# Patient Record
Sex: MALE | Race: WHITE | NOT HISPANIC OR LATINO | Employment: OTHER | URBAN - METROPOLITAN AREA
[De-identification: names, ages, dates, MRNs, and addresses within clinical notes are randomized per-mention and may not be internally consistent; named-entity substitution may affect disease eponyms.]

---

## 2017-01-16 ENCOUNTER — OFFICE VISIT (OUTPATIENT)
Dept: INTERNAL MEDICINE | Facility: CLINIC | Age: 82
End: 2017-01-16
Payer: MEDICARE

## 2017-01-16 ENCOUNTER — HOSPITAL ENCOUNTER (OUTPATIENT)
Dept: RADIOLOGY | Facility: HOSPITAL | Age: 82
Discharge: HOME | End: 2017-01-16
Attending: FAMILY MEDICINE
Payer: MEDICARE

## 2017-01-16 VITALS
SYSTOLIC BLOOD PRESSURE: 124 MMHG | BODY MASS INDEX: 23.9 KG/M2 | OXYGEN SATURATION: 96 % | HEIGHT: 69 IN | WEIGHT: 161.38 LBS | TEMPERATURE: 98 F | DIASTOLIC BLOOD PRESSURE: 67 MMHG | HEART RATE: 60 BPM | RESPIRATION RATE: 17 BRPM

## 2017-01-16 DIAGNOSIS — E78.2 MIXED HYPERLIPIDEMIA: ICD-10-CM

## 2017-01-16 DIAGNOSIS — K21.9 GASTROESOPHAGEAL REFLUX DISEASE, ESOPHAGITIS PRESENCE NOT SPECIFIED: ICD-10-CM

## 2017-01-16 DIAGNOSIS — Z01.818 PREOP GENERAL PHYSICAL EXAM: Primary | ICD-10-CM

## 2017-01-16 DIAGNOSIS — Z01.818 PREOP GENERAL PHYSICAL EXAM: ICD-10-CM

## 2017-01-16 DIAGNOSIS — N40.0 BENIGN PROSTATIC HYPERPLASIA, PRESENCE OF LOWER URINARY TRACT SYMPTOMS UNSPECIFIED, UNSPECIFIED MORPHOLOGY: ICD-10-CM

## 2017-01-16 DIAGNOSIS — D32.9 MENINGIOMA: ICD-10-CM

## 2017-01-16 LAB
BILIRUB UR QL STRIP: NEGATIVE
CLARITY UR REFRACT.AUTO: CLEAR
COLOR UR AUTO: YELLOW
GLUCOSE UR QL STRIP: NEGATIVE
HGB UR QL STRIP: NEGATIVE
KETONES UR QL STRIP: NEGATIVE
LEUKOCYTE ESTERASE UR QL STRIP: NEGATIVE
NITRITE UR QL STRIP: NEGATIVE
PH UR STRIP: 7 [PH] (ref 5–8)
PROT UR QL STRIP: NEGATIVE
SP GR UR STRIP: 1 (ref 1–1.03)
URN SPEC COLLECT METH UR: NORMAL
UROBILINOGEN UR STRIP-ACNC: NEGATIVE EU/DL

## 2017-01-16 PROCEDURE — 99214 OFFICE O/P EST MOD 30 MIN: CPT | Mod: S$GLB,,, | Performed by: FAMILY MEDICINE

## 2017-01-16 PROCEDURE — 71020 XR CHEST PA AND LATERAL: CPT | Mod: 26,,, | Performed by: RADIOLOGY

## 2017-01-16 PROCEDURE — 99499 UNLISTED E&M SERVICE: CPT | Mod: S$GLB,,, | Performed by: FAMILY MEDICINE

## 2017-01-16 PROCEDURE — 1157F ADVNC CARE PLAN IN RCRD: CPT | Mod: S$GLB,,, | Performed by: FAMILY MEDICINE

## 2017-01-16 PROCEDURE — 3074F SYST BP LT 130 MM HG: CPT | Mod: S$GLB,,, | Performed by: FAMILY MEDICINE

## 2017-01-16 PROCEDURE — 1160F RVW MEDS BY RX/DR IN RCRD: CPT | Mod: S$GLB,,, | Performed by: FAMILY MEDICINE

## 2017-01-16 PROCEDURE — 99999 PR PBB SHADOW E&M-EST. PATIENT-LVL III: CPT | Mod: PBBFAC,,, | Performed by: FAMILY MEDICINE

## 2017-01-16 PROCEDURE — 93010 ELECTROCARDIOGRAM REPORT: CPT | Mod: S$GLB,,, | Performed by: NUCLEAR MEDICINE

## 2017-01-16 PROCEDURE — 1159F MED LIST DOCD IN RCRD: CPT | Mod: S$GLB,,, | Performed by: FAMILY MEDICINE

## 2017-01-16 PROCEDURE — 1125F AMNT PAIN NOTED PAIN PRSNT: CPT | Mod: S$GLB,,, | Performed by: FAMILY MEDICINE

## 2017-01-16 PROCEDURE — 71020 XR CHEST PA AND LATERAL: CPT | Mod: TC,PO

## 2017-01-16 PROCEDURE — 93005 ELECTROCARDIOGRAM TRACING: CPT | Mod: S$GLB,,, | Performed by: FAMILY MEDICINE

## 2017-01-16 PROCEDURE — 3078F DIAST BP <80 MM HG: CPT | Mod: S$GLB,,, | Performed by: FAMILY MEDICINE

## 2017-01-16 NOTE — PROGRESS NOTES
Chief Complaint:    Chief Complaint   Patient presents with    Pre-op Exam       History of Present Illness:  Patient is a for preop evaluation for surgery for lumbar spinal stenosis surgery will be performed at the [facility].  Patient denies any chest pain or shortness of breath.  He has no known cardiac history.  He has been a past smoker but no shortness of breath or cough.    The surgery is to be performed by Dr. Carina Lancaster, neurosurgeon at the [facility]      ROS:  Review of Systems   Constitutional: Negative for activity change, chills, fatigue, fever and unexpected weight change.   HENT: Negative for congestion, ear discharge, ear pain, hearing loss, postnasal drip and rhinorrhea.    Eyes: Negative for pain and visual disturbance.   Respiratory: Negative for cough, chest tightness and shortness of breath.    Cardiovascular: Negative for chest pain and palpitations.   Gastrointestinal: Negative for abdominal pain, diarrhea and vomiting.   Endocrine: Negative for heat intolerance.   Genitourinary: Negative for dysuria, flank pain, frequency and hematuria.   Musculoskeletal: Negative for back pain, gait problem and neck pain.   Skin: Negative for color change and rash.   Neurological: Negative for dizziness, tremors, seizures, numbness and headaches.   Psychiatric/Behavioral: Negative for agitation, hallucinations, self-injury, sleep disturbance and suicidal ideas. The patient is not nervous/anxious.        Past Medical History   Diagnosis Date    Abnormal exercise tolerance test 7/25/2013    Arthritis     Colon polyp     Diverticulosis     Dyslipidemia 7/25/2013    GERD (gastroesophageal reflux disease)     Hyperlipidemia 1980     HIGH TG    Knee pain, right 6/14/2013    Meningioma     Personal history of colonic polyps 5/27/2014    RLS (restless legs syndrome) 6/14/2013    Tobacco dependence      resolved    West Nile meningitis 2010     per patient       Social  "History:  Social History     Social History    Marital status:      Spouse name: N/A    Number of children: N/A    Years of education: N/A     Social History Main Topics    Smoking status: Former Smoker     Packs/day: 1.00     Years: 25.00     Quit date: 1/1/1990    Smokeless tobacco: Never Used    Alcohol use No    Drug use: No    Sexual activity: Not Currently     Birth control/ protection: None     Other Topics Concern    None     Social History Narrative       Family History:   family history includes Cancer in his son; Diabetes in his maternal uncle; Heart disease in his mother. There is no history of Kidney disease or Stroke.    Health Maintenance   Topic Date Due    Zoster Vaccine  11/28/1992    Influenza Vaccine  08/01/2016    Lipid Panel  11/16/2020    TETANUS VACCINE  11/16/2025    Pneumococcal (65+)  Completed       Physical Exam:    Vital Signs  Temp: 97.8 °F (36.6 °C)  Temp src: Tympanic  Pulse: 60  Resp: 17  SpO2: 96 %  BP: 124/67  BP Location: Right arm  BP Method: Manual  Patient Position: Sitting  Pain Score:   8  Height and Weight  Height: 5' 9" (175.3 cm)  Weight: 73.2 kg (161 lb 6 oz)  BSA (Calculated - sq m): 1.89 sq meters  BMI (Calculated): 23.9  Weight in (lb) to have BMI = 25: 168.9]    Body mass index is 23.83 kg/(m^2).    Physical Exam   Constitutional: He is oriented to person, place, and time. He appears well-developed.   HENT:   Mouth/Throat: Oropharynx is clear and moist.   Eyes: Conjunctivae are normal. Pupils are equal, round, and reactive to light.   Neck: Normal range of motion. Neck supple.   Cardiovascular: Normal rate, regular rhythm and normal heart sounds.    No murmur heard.  Pulmonary/Chest: Effort normal and breath sounds normal. No respiratory distress. He has no wheezes. He has no rales. He exhibits no tenderness.   Abdominal: Soft. He exhibits no distension and no mass. There is no tenderness. There is no guarding.   Musculoskeletal: He exhibits no " edema or tenderness.   Lymphadenopathy:     He has no cervical adenopathy.   Neurological: He is alert and oriented to person, place, and time. He has normal reflexes.   Skin: Skin is warm and dry.   Psychiatric: He has a normal mood and affect. His behavior is normal. Judgment and thought content normal.   Vitals reviewed.      Lab Results   Component Value Date    CHOL 157 11/16/2015    CHOL 168 10/07/2014    CHOL 176 06/02/2014    TRIG 161 (H) 11/16/2015    TRIG 228 (H) 10/07/2014    TRIG 88 06/02/2014    HDL 56 11/16/2015    HDL 52 10/07/2014    HDL 61 06/02/2014    TOTALCHOLEST 2.8 11/16/2015    TOTALCHOLEST 3.2 10/07/2014    TOTALCHOLEST 2.9 06/02/2014    NONHDLCHOL 101 11/16/2015    NONHDLCHOL 116 10/07/2014    NONHDLCHOL 115 06/02/2014       Lab Results   Component Value Date    HGBA1C 5.5 11/16/2015       Assessment:      ICD-10-CM ICD-9-CM   1. Preop general physical exam Z01.818 V72.83   2. Benign prostatic hyperplasia, presence of lower urinary tract symptoms unspecified, unspecified morphology N40.0 600.00   3. Mixed hyperlipidemia E78.2 272.2   4. Gastroesophageal reflux disease, esophagitis presence not specified K21.9 530.81   5. Meningioma D32.9 225.2         Plan:  Patient with the above mentioned medical problems which are all stable.  Labs pending as below.  Patient's risk for surgery under general anesthesia is moderate given his age.  Routine perioperative care is advised.  Orders Placed This Encounter   Procedures    X-Ray Chest PA And Lateral    CBC auto differential    Comprehensive metabolic panel    APTT    URINALYSIS    Protime-INR    IN OFFICE EKG 12-LEAD (to [place])       Current Outpatient Prescriptions   Medication Sig Dispense Refill    b complex vitamins tablet Take 1 tablet by mouth once daily.      meclizine (ANTIVERT) 25 mg tablet Take 1 tablet (25 mg total) by mouth 3 (three) times daily as needed. 270 tablet 3    meloxicam (MOBIC) 15 MG tablet Take 1 tablet (15 mg  total) by mouth once daily. 30 tablet 0    meloxicam (MOBIC) 15 MG tablet TAKE ONE TABLET BY MOUTH ONCE DAILY 30 tablet 0    omeprazole (PRILOSEC) 20 MG capsule Take 1 capsule (20 mg total) by mouth once daily. 90 capsule 3    pravastatin (PRAVACHOL) 20 MG tablet Take 1 tablet (20 mg total) by mouth every evening. 90 tablet 3    tamsulosin (FLOMAX) 0.4 mg Cp24 Take 1 capsule (0.4 mg total) by mouth once daily. 90 capsule 3     No current facility-administered medications for this visit.        There are no discontinued medications.    No Follow-up on file.      Dr Madie Davis MD    Disclaimer: This note is prepared using voice recognition system and as such is likely to have errors and is not proof read.

## 2017-01-16 NOTE — MR AVS SNAPSHOT
[facility] - Internal Medicine  [address] [place] [id number]  Phone: [phone]                  Dominguez Ritchie   2017 3:00 PM   Office Visit    Description:  Male : 1932   Provider:  Madie Davis MD   Department:  [facility] Internal Medicine           Reason for Visit     Pre-op Exam           Diagnoses this Visit        Comments    Preop general physical exam    -  Primary     Benign prostatic hyperplasia, presence of lower urinary tract symptoms unspecified, unspecified morphology         Mixed hyperlipidemia         Gastroesophageal reflux disease, esophagitis presence not specified         Meningioma                To Do List           Future Appointments        Provider Department Dept Phone    2017 4:00 PM LABORATORY, [facility] Laboratory [phone]    2017 4:15 PM [facility] - X-Ray [phone]    2017 4:30 PM EKG, [facility] Cardiology [phone]      Goals (5 Years of Data)     None      [facility] On Call     [facility] On Call Nurse [facility] Line -  Assistance  Registered nurses in the [facility] On Call Center provide clinical advisement, health education, appointment booking, and other advisory services.  Call for this free service at [phone].             Medications           Message regarding Medications     Verify the changes and/or additions to your medication regime listed below are the same as discussed with your clinician today.  If any of these changes or additions are incorrect, please notify your healthcare provider.             Verify that the below list of medications is an accurate representation of the medications you are currently taking.  If none reported, the list may be blank. If incorrect, please contact your healthcare provider. Carry this list with you in case of emergency.           Current Medications     b complex vitamins tablet Take 1 tablet by mouth once daily.    meclizine (ANTIVERT) 25 mg tablet  "Take 1 tablet (25 mg total) by mouth 3 (three) times daily as needed.    meloxicam (MOBIC) 15 MG tablet Take 1 tablet (15 mg total) by mouth once daily.    meloxicam (MOBIC) 15 MG tablet TAKE ONE TABLET BY MOUTH ONCE DAILY    omeprazole (PRILOSEC) 20 MG capsule Take 1 capsule (20 mg total) by mouth once daily.    pravastatin (PRAVACHOL) 20 MG tablet Take 1 tablet (20 mg total) by mouth every evening.    tamsulosin (FLOMAX) 0.4 mg Cp24 Take 1 capsule (0.4 mg total) by mouth once daily.           Clinical Reference Information           Vital Signs - Last Recorded  Most recent update: 1/16/2017  3:20 PM by Tracy Figueredo MA    BP Pulse Temp Resp Ht Wt    124/67 (BP Location: Right arm, Patient Position: Sitting, BP Method: Manual) 60 97.8 °F (36.6 °C) (Tympanic) 17 5' 9" (1.753 m) 73.2 kg (161 lb 6 oz)    SpO2 BMI             96% 23.83 kg/m2         Blood Pressure          Most Recent Value    BP  124/67      Allergies as of 1/16/2017     Amoxicillin      Immunizations Administered on Date of Encounter - 1/16/2017     None      Orders Placed During Today's Visit      Normal Orders This Visit    IN OFFICE EKG 12-LEAD (to [place])     URINALYSIS     Future Labs/Procedures Expected by Expires    APTT  1/16/2017 3/17/2018    CBC auto differential  1/16/2017 (Approximate) 2/15/2017    Comprehensive metabolic panel  1/16/2017 (Approximate) 3/17/2018    Protime-INR  1/16/2017 3/17/2018    X-Ray Chest PA And Lateral  1/16/2017 1/16/2018      "

## 2017-01-19 ENCOUNTER — TELEPHONE (OUTPATIENT)
Dept: INTERNAL MEDICINE | Facility: CLINIC | Age: 82
End: 2017-01-19

## 2017-01-19 NOTE — TELEPHONE ENCOUNTER
----- Message from Alfredo Mendes sent at 1/19/2017  1:48 PM CST -----  Lauren, daughter, [phone] is requesting to speak with the nurse in regards to the pt's paperwork that needs to be sent to [facility].

## 2017-01-19 NOTE — TELEPHONE ENCOUNTER
----- Message from Lora Moreno sent at 1/19/2017  4:24 PM CST -----  Contact: Lauren/daughter  Lauren stated that the pre op clearance  needs to fax to Dr. Carina Lancaster, Please call her at [phone].    Thanks  Td

## 2017-02-10 RX ORDER — MELOXICAM 15 MG/1
TABLET ORAL
Qty: 30 TABLET | Refills: 0 | Status: SHIPPED | OUTPATIENT
Start: 2017-02-10 | End: 2017-04-05

## 2017-04-05 ENCOUNTER — OFFICE VISIT (OUTPATIENT)
Dept: INTERNAL MEDICINE | Facility: CLINIC | Age: 82
End: 2017-04-05
Payer: MEDICARE

## 2017-04-05 ENCOUNTER — LAB VISIT (OUTPATIENT)
Dept: LAB | Facility: HOSPITAL | Age: 82
End: 2017-04-05
Attending: FAMILY MEDICINE
Payer: MEDICARE

## 2017-04-05 VITALS
WEIGHT: 162.94 LBS | DIASTOLIC BLOOD PRESSURE: 64 MMHG | SYSTOLIC BLOOD PRESSURE: 138 MMHG | HEART RATE: 66 BPM | BODY MASS INDEX: 24.06 KG/M2 | OXYGEN SATURATION: 98 % | TEMPERATURE: 98 F

## 2017-04-05 DIAGNOSIS — E53.8 B12 DEFICIENCY: ICD-10-CM

## 2017-04-05 DIAGNOSIS — M17.11 PRIMARY LOCALIZED OSTEOARTHROSIS OF THE KNEE, RIGHT: Primary | ICD-10-CM

## 2017-04-05 DIAGNOSIS — M17.11 PRIMARY LOCALIZED OSTEOARTHROSIS OF THE KNEE, RIGHT: ICD-10-CM

## 2017-04-05 DIAGNOSIS — D64.9 ANEMIA, UNSPECIFIED TYPE: ICD-10-CM

## 2017-04-05 DIAGNOSIS — E78.2 MIXED HYPERLIPIDEMIA: ICD-10-CM

## 2017-04-05 DIAGNOSIS — R29.898 LEG WEAKNESS, BILATERAL: ICD-10-CM

## 2017-04-05 LAB
ALBUMIN SERPL BCP-MCNC: 3.4 G/DL
ALP SERPL-CCNC: 68 U/L
ALT SERPL W/O P-5'-P-CCNC: 17 U/L
ANION GAP SERPL CALC-SCNC: 5 MMOL/L
AST SERPL-CCNC: 24 U/L
BASOPHILS # BLD AUTO: 0.03 K/UL
BASOPHILS NFR BLD: 0.5 %
BILIRUB SERPL-MCNC: 0.4 MG/DL
BUN SERPL-MCNC: 17 MG/DL
CALCIUM SERPL-MCNC: 9.2 MG/DL
CHLORIDE SERPL-SCNC: 105 MMOL/L
CHOLEST/HDLC SERPL: 3 {RATIO}
CO2 SERPL-SCNC: 28 MMOL/L
CREAT SERPL-MCNC: 0.8 MG/DL
DIFFERENTIAL METHOD: ABNORMAL
EOSINOPHIL # BLD AUTO: 0.3 K/UL
EOSINOPHIL NFR BLD: 4.1 %
ERYTHROCYTE [DISTWIDTH] IN BLOOD BY AUTOMATED COUNT: 13.7 %
EST. GFR  (AFRICAN AMERICAN): >60 ML/MIN/1.73 M^2
EST. GFR  (NON AFRICAN AMERICAN): >60 ML/MIN/1.73 M^2
GLUCOSE SERPL-MCNC: 100 MG/DL
HCT VFR BLD AUTO: 36.6 %
HDL/CHOLESTEROL RATIO: 33.3 %
HDLC SERPL-MCNC: 156 MG/DL
HDLC SERPL-MCNC: 52 MG/DL
HGB BLD-MCNC: 12.5 G/DL
LDLC SERPL CALC-MCNC: 62.4 MG/DL
LYMPHOCYTES # BLD AUTO: 1.3 K/UL
LYMPHOCYTES NFR BLD: 20.7 %
MCH RBC QN AUTO: 30.9 PG
MCHC RBC AUTO-ENTMCNC: 34.2 %
MCV RBC AUTO: 91 FL
MONOCYTES # BLD AUTO: 0.7 K/UL
MONOCYTES NFR BLD: 10.7 %
NEUTROPHILS # BLD AUTO: 4 K/UL
NEUTROPHILS NFR BLD: 63.7 %
NONHDLC SERPL-MCNC: 104 MG/DL
PLATELET # BLD AUTO: 222 K/UL
PMV BLD AUTO: 9.7 FL
POTASSIUM SERPL-SCNC: 4.2 MMOL/L
PROT SERPL-MCNC: 6.4 G/DL
RBC # BLD AUTO: 4.04 M/UL
SODIUM SERPL-SCNC: 138 MMOL/L
TRIGL SERPL-MCNC: 208 MG/DL
VIT B12 SERPL-MCNC: 389 PG/ML
WBC # BLD AUTO: 6.33 K/UL

## 2017-04-05 PROCEDURE — 3078F DIAST BP <80 MM HG: CPT | Mod: S$GLB,,, | Performed by: FAMILY MEDICINE

## 2017-04-05 PROCEDURE — 80061 LIPID PANEL: CPT

## 2017-04-05 PROCEDURE — 3075F SYST BP GE 130 - 139MM HG: CPT | Mod: S$GLB,,, | Performed by: FAMILY MEDICINE

## 2017-04-05 PROCEDURE — 85025 COMPLETE CBC W/AUTO DIFF WBC: CPT

## 2017-04-05 PROCEDURE — 99999 PR PBB SHADOW E&M-EST. PATIENT-LVL III: CPT | Mod: PBBFAC,,, | Performed by: FAMILY MEDICINE

## 2017-04-05 PROCEDURE — 82607 VITAMIN B-12: CPT

## 2017-04-05 PROCEDURE — 1157F ADVNC CARE PLAN IN RCRD: CPT | Mod: S$GLB,,, | Performed by: FAMILY MEDICINE

## 2017-04-05 PROCEDURE — 99499 UNLISTED E&M SERVICE: CPT | Mod: S$GLB,,, | Performed by: FAMILY MEDICINE

## 2017-04-05 PROCEDURE — 1160F RVW MEDS BY RX/DR IN RCRD: CPT | Mod: S$GLB,,, | Performed by: FAMILY MEDICINE

## 2017-04-05 PROCEDURE — 20610 DRAIN/INJ JOINT/BURSA W/O US: CPT | Mod: RT,S$GLB,, | Performed by: FAMILY MEDICINE

## 2017-04-05 PROCEDURE — 1159F MED LIST DOCD IN RCRD: CPT | Mod: S$GLB,,, | Performed by: FAMILY MEDICINE

## 2017-04-05 PROCEDURE — 80053 COMPREHEN METABOLIC PANEL: CPT

## 2017-04-05 PROCEDURE — 1125F AMNT PAIN NOTED PAIN PRSNT: CPT | Mod: S$GLB,,, | Performed by: FAMILY MEDICINE

## 2017-04-05 PROCEDURE — 99214 OFFICE O/P EST MOD 30 MIN: CPT | Mod: 25,S$GLB,, | Performed by: FAMILY MEDICINE

## 2017-04-05 PROCEDURE — 36415 COLL VENOUS BLD VENIPUNCTURE: CPT | Mod: PO

## 2017-04-05 RX ORDER — METHYLPREDNISOLONE ACETATE 40 MG/ML
40 INJECTION, SUSPENSION INTRA-ARTICULAR; INTRALESIONAL; INTRAMUSCULAR; SOFT TISSUE
Status: COMPLETED | OUTPATIENT
Start: 2017-04-05 | End: 2017-04-05

## 2017-04-05 RX ORDER — DICLOFENAC SODIUM 10 MG/G
2 GEL TOPICAL DAILY
Qty: 1 TUBE | Refills: 2 | Status: SHIPPED | OUTPATIENT
Start: 2017-04-05 | End: 2018-10-08

## 2017-04-05 RX ADMIN — METHYLPREDNISOLONE ACETATE 40 MG: 40 INJECTION, SUSPENSION INTRA-ARTICULAR; INTRALESIONAL; INTRAMUSCULAR; SOFT TISSUE at 01:04

## 2017-04-05 NOTE — PROGRESS NOTES
Chief Complaint:    Chief Complaint   Patient presents with    Knee Pain     right knee pain        History of Present Illness:  Patient presents today status post surgery for lumbar spinal stenosis he says he does not think the surgery was very successful.  He still got some back pain although it's improving he has some leg weakness.  He has some pain in his right knee which is from his arthritis he says he's had a steroid injection about a year back he wants another one.  He's had some iron deficiency anemia but he is not taking any iron tablets.  Does take B12 his B12 levels were low.  BPH is stable.  Taking pravastatin for hyperlipidemia no side effects to for lipid check.      ROS:  Review of Systems   Constitutional: Negative for activity change, chills, fatigue, fever and unexpected weight change.   HENT: Negative for congestion, ear discharge, ear pain, hearing loss, postnasal drip and rhinorrhea.    Eyes: Negative for pain and visual disturbance.   Respiratory: Negative for cough, chest tightness and shortness of breath.    Cardiovascular: Negative for chest pain and palpitations.   Gastrointestinal: Negative for abdominal pain, diarrhea and vomiting.   Endocrine: Negative for heat intolerance.   Genitourinary: Negative for dysuria, flank pain, frequency and hematuria.   Musculoskeletal: Negative for back pain, gait problem and neck pain.   Skin: Negative for color change and rash.   Neurological: Negative for dizziness, tremors, seizures, numbness and headaches.   Psychiatric/Behavioral: Negative for agitation, hallucinations, self-injury, sleep disturbance and suicidal ideas. The patient is not nervous/anxious.        Past Medical History:   Diagnosis Date    Abnormal exercise tolerance test 7/25/2013    Arthritis     Colon polyp     Diverticulosis     Dyslipidemia 7/25/2013    GERD (gastroesophageal reflux disease)     Hyperlipidemia 1980    HIGH TG    Knee pain, right 6/14/2013    Meningioma      Personal history of colonic polyps 5/27/2014    RLS (restless legs syndrome) 6/14/2013    Tobacco dependence     resolved    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       Social History:  Social History     Social History    Marital status:      Spouse name: N/A    Number of children: N/A    Years of education: N/A     Social History Main Topics    Smoking status: Former Smoker     Packs/day: 1.00     Years: 25.00     Quit date: 1/1/1990    Smokeless tobacco: Never Used    Alcohol use No    Drug use: No    Sexual activity: Not Currently     Birth control/ protection: None     Other Topics Concern    None     Social History Narrative    None       Family History:   family history includes Cancer in his son; Diabetes in his maternal uncle; Heart disease in his mother. There is no history of Kidney disease or Stroke.    Health Maintenance   Topic Date Due    Zoster Vaccine  11/28/1992    Lipid Panel  11/16/2020    TETANUS VACCINE  11/16/2025    Pneumococcal (65+)  Completed    Influenza Vaccine  Completed       Physical Exam:    Vital Signs  Temp: 97.9 °F (36.6 °C)  Temp src: Tympanic  Pulse: 66  SpO2: 98 %  BP: 138/64  BP Location: Right arm  BP Method: Automatic  Patient Position: Sitting  Pain Score:   6 (rt knee pain )  Height and Weight  Weight: 73.9 kg (162 lb 14.7 oz)]    Body mass index is 24.06 kg/(m^2).    Physical Exam   Constitutional: He is oriented to person, place, and time. He appears well-developed.   HENT:   Mouth/Throat: Oropharynx is clear and moist.   Eyes: Conjunctivae are normal. Pupils are equal, round, and reactive to light.   Neck: Normal range of motion. Neck supple.   Cardiovascular: Normal rate, regular rhythm and normal heart sounds.    No murmur heard.  Pulmonary/Chest: Effort normal and breath sounds normal. No respiratory distress. He has no wheezes. He has no rales. He exhibits no tenderness.   Abdominal: Soft. He exhibits no distension and no mass. There  is no tenderness. There is no guarding.   Musculoskeletal: He exhibits no edema.        Right knee: Tenderness found. Lateral joint line tenderness noted.   His decreased cramping bilateral legs especially quadriceps.  It also decreased strength of foot dorsiflexion.  Patient has slightly unsteady gait.   Lymphadenopathy:     He has no cervical adenopathy.   Neurological: He is alert and oriented to person, place, and time. He has normal reflexes.   Skin: Skin is warm and dry.   Psychiatric: He has a normal mood and affect. His behavior is normal. Judgment and thought content normal.       Lab Results   Component Value Date    CHOL 157 11/16/2015    CHOL 168 10/07/2014    CHOL 176 06/02/2014    TRIG 161 (H) 11/16/2015    TRIG 228 (H) 10/07/2014    TRIG 88 06/02/2014    HDL 56 11/16/2015    HDL 52 10/07/2014    HDL 61 06/02/2014    TOTALCHOLEST 2.8 11/16/2015    TOTALCHOLEST 3.2 10/07/2014    TOTALCHOLEST 2.9 06/02/2014    NONHDLCHOL 101 11/16/2015    NONHDLCHOL 116 10/07/2014    NONHDLCHOL 115 06/02/2014       Lab Results   Component Value Date    HGBA1C 5.5 11/16/2015       Assessment:      ICD-10-CM ICD-9-CM   1. Primary localized osteoarthrosis of the knee, right M17.11 715.16   2. Leg weakness, bilateral R29.898 729.89   3. Mixed hyperlipidemia E78.2 272.2   4. Anemia, unspecified type D64.9 285.9   5. B12 deficiency E53.8 266.2         Plan:  1.  Osteoarthritis of the knee, Depo-Medrol injection given the right knee.  Offered steroid injection, risk of steroid injection discussed with the patient which include but not limited to,  1.  Infection  2.  Bleeding  3.  Tendon disruption  4.  Elevation of blood sugar  5.  Fat atrophy  6.  Worsening pain and other unforeseen complication.  Treatment alternatives were also discussed, patient likes to proceed with the steroid injection.  Depo-Medrol 40 mg and lidocaine 2% without epi Cc was used for injection, and the area was identified prepped and injection done  sterile condition, patient tolerated procedure well.    2.  Leg weakness including weakness of the foot dorsiflexion possibly related to spinal stenosis, he has an appointment with physical therapy.  That up and work it.  3.  Anemia recheck CBC must take iron tablets.  4.  Hyperlipidemia check lipids and chemistries today  5.  B12 deficiency check levels today.    Orders Placed This Encounter   Procedures    CBC auto differential    Comprehensive metabolic panel    Vitamin B12    Lipid panel       Current Outpatient Prescriptions   Medication Sig Dispense Refill    b complex vitamins tablet Take 1 tablet by mouth once daily.      meclizine (ANTIVERT) 25 mg tablet Take 1 tablet (25 mg total) by mouth 3 (three) times daily as needed. 270 tablet 3    omeprazole (PRILOSEC) 20 MG capsule Take 1 capsule (20 mg total) by mouth once daily. 90 capsule 3    pravastatin (PRAVACHOL) 20 MG tablet Take 1 tablet (20 mg total) by mouth every evening. 90 tablet 3    tamsulosin (FLOMAX) 0.4 mg Cp24 Take 1 capsule (0.4 mg total) by mouth once daily. 90 capsule 3    diclofenac sodium (VOLTAREN) 1 % Gel Apply 2 g topically once daily. 1 Tube 2     No current facility-administered medications for this visit.        Medications Discontinued During This Encounter   Medication Reason    meloxicam (MOBIC) 15 MG tablet Duplicate Order    meloxicam (MOBIC) 15 MG tablet Patient no longer taking       No Follow-up on file.      Dr Madie Davis MD    Disclaimer: This note is prepared using voice recognition system and as such is likely to have errors and is not proof read.

## 2017-04-07 ENCOUNTER — TELEPHONE (OUTPATIENT)
Dept: INTERNAL MEDICINE | Facility: CLINIC | Age: 82
End: 2017-04-07

## 2017-06-02 ENCOUNTER — OFFICE VISIT (OUTPATIENT)
Dept: INTERNAL MEDICINE | Facility: CLINIC | Age: 82
End: 2017-06-02
Payer: MEDICARE

## 2017-06-02 VITALS
WEIGHT: 158.06 LBS | DIASTOLIC BLOOD PRESSURE: 78 MMHG | SYSTOLIC BLOOD PRESSURE: 128 MMHG | HEART RATE: 68 BPM | HEIGHT: 70 IN | TEMPERATURE: 98 F | OXYGEN SATURATION: 96 % | BODY MASS INDEX: 22.63 KG/M2

## 2017-06-02 DIAGNOSIS — G25.81 RLS (RESTLESS LEGS SYNDROME): Primary | ICD-10-CM

## 2017-06-02 PROCEDURE — 1125F AMNT PAIN NOTED PAIN PRSNT: CPT | Mod: S$GLB,,,

## 2017-06-02 PROCEDURE — 99999 PR PBB SHADOW E&M-EST. PATIENT-LVL III: CPT | Mod: PBBFAC,,,

## 2017-06-02 PROCEDURE — 1159F MED LIST DOCD IN RCRD: CPT | Mod: S$GLB,,,

## 2017-06-02 PROCEDURE — 99213 OFFICE O/P EST LOW 20 MIN: CPT | Mod: S$GLB,,,

## 2017-06-02 RX ORDER — ROPINIROLE 0.5 MG/1
0.5 TABLET, FILM COATED ORAL NIGHTLY
Qty: 90 TABLET | Refills: 3 | Status: SHIPPED | OUTPATIENT
Start: 2017-06-02 | End: 2018-12-03 | Stop reason: SDUPTHER

## 2017-06-02 NOTE — PROGRESS NOTES
Subjective:       Patient ID: Dominguez Ritchie is a 84 y.o. male.    Chief Complaint: Follow-up (restless legs)    HPI   The patient presents today with a chronic complaint of an achiness in his legs bilaterally.  He says the symptoms are intermittent and moderate to severe intensity.  He says he experiences the sensations primarily late in the evening and when he is excessively tired.  He states that he feels a compulsion to have to move his legs he voices associated trouble sleeping.  The patient voices a history of restless leg syndrome but has not refilled his medication in quite some time.  He is taking Requip in the past which she says works well for him. He denies any side effects from the medication.    The patient also states that he has had issues with cerumen impactions in the past he has been using some over-the-counter medications to soften and remove wax.  He asks if I would not checking his ear started seeing a clear of wax.     Review of Systems   Constitutional: Negative for activity change, appetite change, fatigue and unexpected weight change.   HENT: Negative.    Eyes: Negative.    Respiratory: Negative for cough, chest tightness, shortness of breath and wheezing.    Cardiovascular: Negative for chest pain, palpitations and leg swelling.   Gastrointestinal: Negative for constipation, diarrhea, nausea and vomiting.   Endocrine: Negative.    Genitourinary: Negative.    Musculoskeletal: Positive for myalgias (bilateral thighs).   Skin: Negative for color change.   Allergic/Immunologic: Negative.    Neurological: Negative for dizziness, weakness and light-headedness.   Hematological: Negative.    Psychiatric/Behavioral: Negative for sleep disturbance.         Objective:      Physical Exam   Constitutional: He is oriented to person, place, and time. Vital signs are normal. He appears well-developed and well-nourished. He is active and cooperative. No distress.   HENT:   Head: Normocephalic and atraumatic.    Right Ear: Hearing, tympanic membrane, external ear and ear canal normal.   Left Ear: Hearing, tympanic membrane, external ear and ear canal normal.   Eyes: Conjunctivae are normal. Pupils are equal, round, and reactive to light. No scleral icterus.   Neck: Normal range of motion. Neck supple.   Cardiovascular: Normal rate, regular rhythm, normal heart sounds and intact distal pulses.  Exam reveals no gallop and no friction rub.    No murmur heard.  Pulmonary/Chest: Effort normal and breath sounds normal. No respiratory distress. He has no wheezes. He has no rales. He exhibits no tenderness.   Musculoskeletal: Normal range of motion. He exhibits no edema or tenderness.   Neurological: He is alert and oriented to person, place, and time. He exhibits normal muscle tone. Coordination normal.   Skin: Skin is warm and dry. No rash noted. He is not diaphoretic. No erythema. No pallor.   Psychiatric: He has a normal mood and affect. His speech is normal and behavior is normal. Judgment and thought content normal.       Assessment:       1. RLS (restless legs syndrome)          Plan:   RLS (restless legs syndrome)  -     ropinirole (REQUIP) 0.5 MG tablet; Take 1 tablet (0.5 mg total) by mouth every evening.  Dispense: 90 tablet; Refill: 3            Disclaimer: This note is prepared using voice recognition software.  As such there may be errors in the dictation.  It has not been proofread.

## 2017-06-16 ENCOUNTER — OFFICE VISIT (OUTPATIENT)
Dept: FAMILY MEDICINE | Facility: CLINIC | Age: 82
End: 2017-06-16
Payer: MEDICARE

## 2017-06-16 VITALS
BODY MASS INDEX: 22.33 KG/M2 | OXYGEN SATURATION: 96 % | WEIGHT: 156 LBS | SYSTOLIC BLOOD PRESSURE: 136 MMHG | TEMPERATURE: 98 F | DIASTOLIC BLOOD PRESSURE: 62 MMHG | HEART RATE: 68 BPM | HEIGHT: 70 IN
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
## 2017-06-16 DIAGNOSIS — G89.29 CHRONIC BILATERAL LOW BACK PAIN WITHOUT SCIATICA: ICD-10-CM

## 2017-06-16 DIAGNOSIS — M25.551 RIGHT HIP PAIN: ICD-10-CM

## 2017-06-16 DIAGNOSIS — M54.50 CHRONIC BILATERAL LOW BACK PAIN WITHOUT SCIATICA: ICD-10-CM

## 2017-06-16 DIAGNOSIS — J30.1 SEASONAL ALLERGIC RHINITIS DUE TO POLLEN: Primary | ICD-10-CM

## 2017-06-16 PROCEDURE — 1159F MED LIST DOCD IN RCRD: CPT | Mod: S$GLB,,,

## 2017-06-16 PROCEDURE — 99214 OFFICE O/P EST MOD 30 MIN: CPT | Mod: 25,S$GLB,,

## 2017-06-16 PROCEDURE — 96372 THER/PROPH/DIAG INJ SC/IM: CPT | Mod: S$GLB,,,

## 2017-06-16 PROCEDURE — 1125F AMNT PAIN NOTED PAIN PRSNT: CPT | Mod: S$GLB,,,

## 2017-06-16 PROCEDURE — 99999 PR PBB SHADOW E&M-EST. PATIENT-LVL IV: CPT | Mod: PBBFAC,,,

## 2017-06-16 RX ORDER — MELOXICAM 15 MG/1
TABLET ORAL
COMMUNITY
Start: 2017-06-07 | End: 2017-11-21

## 2017-06-16 RX ORDER — LEVOCETIRIZINE DIHYDROCHLORIDE 5 MG/1
5 TABLET, FILM COATED ORAL NIGHTLY
Qty: 30 TABLET | Refills: 5 | Status: SHIPPED | OUTPATIENT
Start: 2017-06-16 | End: 2018-05-09 | Stop reason: SINTOL

## 2017-06-16 RX ORDER — METHYLPREDNISOLONE ACETATE 80 MG/ML
80 INJECTION, SUSPENSION INTRA-ARTICULAR; INTRALESIONAL; INTRAMUSCULAR; SOFT TISSUE
Status: COMPLETED | OUTPATIENT
Start: 2017-06-16 | End: 2017-06-16

## 2017-06-16 RX ADMIN — METHYLPREDNISOLONE ACETATE 80 MG: 80 INJECTION, SUSPENSION INTRA-ARTICULAR; INTRALESIONAL; INTRAMUSCULAR; SOFT TISSUE at 11:06

## 2017-06-16 NOTE — PROGRESS NOTES
Pt given Methylprednisolone acetate 80 mg/ ml IM right ventroglutael. Pt advised to wait 15 minutes to observe for adverse effect. Pt tolerated well.

## 2017-06-16 NOTE — PROGRESS NOTES
Subjective:       Patient ID: Dominguez Ritchie is a 84 y.o. male.    Chief Complaint: Hip Pain (right) and Sinus Problem (or allergies)    HPI   The patient presents today with a one-day complaint of right hip pain.  He states he fell yesterday from a standing position and landed on flat concrete.  He denies any bruising.  He voices some associated trouble ambulating he says the pain is a constant mild intensity soreness.  He says the pain is worse with walking and bearing weight and is relieved with resting.  He has not taken any over-the-counter medications for relief.     She also voices a several week complaint of nasal congestion and rhinorrhea.  He says his symptoms are constant and waxing and waning in intensity.  He says they're worse when he is outside working such as painting or mowing the lawn.  He says they improved when she goes inside.  He has been taking Mucinex DM for the symptoms with minimal relief.  He denies a cough, fever, shortness of breath, or wheezing.  He denies any other upper respiratory complaints.     The patient voices a complaint of back pain which is chronic in nature.  He had surgery on his back in January of this year which was approximately 6 months ago.  He says since that time the back pain has not improved.  The patient was given hydrocodone/acetaminophen 5/325 mg to take on a daily basis as needed.  He states he has not taken very much of the medication as he is holding onto in case he really needs it.  The patient went back to his surgeon and told him the pain was not improving and said that they want to do another MRI which he did not have the money to pay for.  The patient did not want to have surgery again he is interested in referral to interventional pain management for epidural steroid injections.  He says the pain is constant and moderate in intensity.  He denies any radiation of the pain.  He does voice some weakness in his legs and some mild tingling.      Review of  Systems   Constitutional: Negative for activity change, appetite change, fatigue and unexpected weight change.   HENT: Positive for congestion.    Eyes: Negative.    Respiratory: Negative for cough, chest tightness, shortness of breath and wheezing.    Cardiovascular: Negative for chest pain, palpitations and leg swelling.   Gastrointestinal: Negative for constipation, diarrhea, nausea and vomiting.   Endocrine: Negative.    Genitourinary: Negative.    Musculoskeletal: Positive for arthralgias and back pain.   Skin: Negative for color change.   Allergic/Immunologic: Negative.    Neurological: Negative for dizziness, weakness and light-headedness.   Hematological: Negative.    Psychiatric/Behavioral: Negative for sleep disturbance.         Objective:      Physical Exam   Constitutional: He is oriented to person, place, and time. Vital signs are normal. He appears well-developed and well-nourished. He is active and cooperative. No distress.   HENT:   Head: Normocephalic and atraumatic. Hair is normal.   Right Ear: Hearing, tympanic membrane, external ear and ear canal normal.   Left Ear: Hearing, tympanic membrane, external ear and ear canal normal.   Nose: Mucosal edema and rhinorrhea present. No nose lacerations, sinus tenderness, nasal deformity, septal deviation or nasal septal hematoma. No epistaxis.  No foreign bodies. Right sinus exhibits no maxillary sinus tenderness and no frontal sinus tenderness. Left sinus exhibits no maxillary sinus tenderness and no frontal sinus tenderness.   Mouth/Throat: Uvula is midline, oropharynx is clear and moist and mucous membranes are normal.   Eyes: Conjunctivae are normal. Pupils are equal, round, and reactive to light. Right eye exhibits no discharge. Left eye exhibits no discharge. No scleral icterus.   Neck: Trachea normal, normal range of motion and phonation normal. Neck supple. No tracheal deviation present.   Cardiovascular: Normal rate, regular rhythm, normal heart  sounds and intact distal pulses.  Exam reveals no gallop and no friction rub.    No murmur heard.  Pulmonary/Chest: Effort normal and breath sounds normal. No respiratory distress. He has no decreased breath sounds. He has no wheezes. He has no rhonchi. He has no rales. He exhibits no tenderness.   Musculoskeletal: Normal range of motion. He exhibits no edema.        Right hip: He exhibits tenderness. He exhibits normal range of motion, normal strength, no bony tenderness, no swelling, no crepitus, no deformity and no laceration.        Lumbar back: He exhibits pain. He exhibits normal range of motion, no tenderness, no bony tenderness, no swelling, no edema, no deformity, no laceration, no spasm and normal pulse.        Legs:  Lymphadenopathy:        Head (right side): No submental, no submandibular, no tonsillar, no preauricular, no posterior auricular and no occipital adenopathy present.        Head (left side): No submental, no submandibular, no tonsillar, no preauricular, no posterior auricular and no occipital adenopathy present.     He has no cervical adenopathy.        Right cervical: No superficial cervical, no deep cervical and no posterior cervical adenopathy present.       Left cervical: No superficial cervical, no deep cervical and no posterior cervical adenopathy present.   Neurological: He is alert and oriented to person, place, and time. He exhibits normal muscle tone. Coordination normal. GCS eye subscore is 4. GCS verbal subscore is 5. GCS motor subscore is 6.   Skin: Skin is warm and dry. No rash noted. He is not diaphoretic. No erythema. No pallor.   Psychiatric: He has a normal mood and affect. His speech is normal and behavior is normal. Judgment and thought content normal.       Assessment:       1. Seasonal allergic rhinitis due to pollen    2. Fall, accidental, initial encounter    3. Right hip pain    4. Chronic bilateral low back pain without sciatica          Plan:   Seasonal allergic  rhinitis due to pollen  -     methylPREDNISolone acetate injection 80 mg; Inject 1 mL (80 mg total) into the muscle one time.  -     levocetirizine (XYZAL) 5 MG tablet; Take 1 tablet (5 mg total) by mouth every evening.  Dispense: 30 tablet; Refill: 5    Fall, accidental, initial encounter        -    I offered x-ray, the patient refused since x-ray is not available in this clinic    Right hip pain        -    I offered x-ray, the patient refused since x-ray is not available in this clinic    Chronic bilateral low back pain without sciatica  -     methylPREDNISolone acetate injection 80 mg; Inject 1 mL (80 mg total) into the muscle one time.  -     Ambulatory referral to Pain Clinic            Disclaimer: This note is prepared using voice recognition software.  As such there may be errors in the dictation.  It has not been proofread.

## 2017-06-22 ENCOUNTER — HOSPITAL ENCOUNTER (OUTPATIENT)
Dept: RADIOLOGY | Facility: HOSPITAL | Age: 82
Discharge: HOME | End: 2017-06-22
Attending: ANESTHESIOLOGY
Payer: MEDICARE

## 2017-06-22 ENCOUNTER — OFFICE VISIT (OUTPATIENT)
Dept: PAIN MEDICINE | Facility: CLINIC | Age: 82
End: 2017-06-22
Payer: MEDICARE

## 2017-06-22 VITALS
DIASTOLIC BLOOD PRESSURE: 67 MMHG | SYSTOLIC BLOOD PRESSURE: 142 MMHG | RESPIRATION RATE: 16 BRPM | WEIGHT: 155 LBS | BODY MASS INDEX: 22.19 KG/M2 | HEART RATE: 54 BPM | HEIGHT: 70 IN

## 2017-06-22 DIAGNOSIS — M54.16 RIGHT LUMBAR RADICULOPATHY: ICD-10-CM

## 2017-06-22 DIAGNOSIS — M47.817 SPONDYLOSIS OF LUMBOSACRAL REGION WITHOUT MYELOPATHY OR RADICULOPATHY: ICD-10-CM

## 2017-06-22 DIAGNOSIS — M47.817 SPONDYLOSIS OF LUMBOSACRAL REGION WITHOUT MYELOPATHY OR RADICULOPATHY: Primary | ICD-10-CM

## 2017-06-22 PROCEDURE — 72114 X-RAY EXAM L-S SPINE BENDING: CPT | Mod: 26,,, | Performed by: RADIOLOGY

## 2017-06-22 PROCEDURE — 1159F MED LIST DOCD IN RCRD: CPT | Mod: S$GLB,,, | Performed by: ANESTHESIOLOGY

## 2017-06-22 PROCEDURE — 1125F AMNT PAIN NOTED PAIN PRSNT: CPT | Mod: S$GLB,,, | Performed by: ANESTHESIOLOGY

## 2017-06-22 PROCEDURE — 99999 PR PBB SHADOW E&M-EST. PATIENT-LVL III: CPT | Mod: PBBFAC,,, | Performed by: ANESTHESIOLOGY

## 2017-06-22 PROCEDURE — 72114 X-RAY EXAM L-S SPINE BENDING: CPT | Mod: TC

## 2017-06-22 PROCEDURE — 99203 OFFICE O/P NEW LOW 30 MIN: CPT | Mod: S$GLB,,, | Performed by: ANESTHESIOLOGY

## 2017-06-22 PROCEDURE — 99499 UNLISTED E&M SERVICE: CPT | Mod: S$GLB,,, | Performed by: ANESTHESIOLOGY

## 2017-06-22 RX ORDER — TRAMADOL HYDROCHLORIDE 50 MG/1
50 TABLET ORAL 3 TIMES DAILY PRN
Qty: 90 TABLET | Refills: 0 | Status: SHIPPED | OUTPATIENT
Start: 2017-06-22 | End: 2017-07-20

## 2017-06-22 NOTE — LETTER
June 22, 2017      Jeremías Vegas, FRANKY  [address] [address] [address]           [facility]  [address] [place] [id number]  Phone: [phone]  Fax: [phone]          Patient: Dominguez Ritchie   MR Number: [id number]   YOB: 1932   Date of Visit: 6/22/2017       Dear Jeremías Vegas:    Thank you for referring Dominguez Ritchie to me for evaluation. Attached you will find relevant portions of my assessment and plan of care.    If you have questions, please do not hesitate to call me. I look forward to following Dominguez Ritchie along with you.    Sincerely,    Dyllan Carroll MD    Enclosure  CC:  No Recipients    If you would like to receive this communication electronically, please contact [email or web address] or [phone] to request more information on [employer] Link access.    For providers and/or their staff who would like to refer a patient to [facility], please contact us through our one-stop-shop provider referral line, [facility] , at [phone].    If you feel you have received this communication in error or would no longer like to receive these types of communications, please e-mail [email or web address]

## 2017-06-22 NOTE — PROGRESS NOTES
Chief Pain Complaint:  Low Back Pain, Right leg pain    History of Present Illness:   This patient is a 84 y.o. male who presents today complaining of the above noted pain/s. The patient describes the pain as follows.    - duration of pain: chronic pain, increased x ~ months  - timing: constant   - character: aching, sharp  - radiating, dermatomal: extends into right leg  - antecedent trauma, prior spinal surgery: patient reports prior trauma, prior lumbar surgery (fusion in January 2017 with Dr. Lancaster at [facility])  - pertinent negatives: No fever, No chills, No weight loss, No bladder dysfunction, No bowel dysfunction, No saddle anesthesia  - pertinent positives: generalized nonspecific Lower Extremity weakness bilaterally    - medications, other therapies tried (physical therapy, injections):     >> Tylenol, NSAIDs, gabapentin    >> Has previously undergone Physical Therapy    >> Has previously undergone spinal injection/s         Imaging / Labs / Studies (reviewed on 6/22/2017):      Results for orders placed during the hospital encounter of 11/16/15   X-Ray Lumbar Spine Complete 5 View    Narrative Five views of the lumbar spine  Findings: There is increased vertebral body height loss noted at the L1 level.  50% vertebrae height loss is noted anteriorly at this level.  The alignment is within normal limits. There is moderate disk space narrowing noted at the L4-5 level.  Mild/moderate facet arthropathy is noted from L2-3 through L5-S1 levels. Calcified granulomas are noted within the spleen. No pars defects.         Results for orders placed during the hospital encounter of 03/26/15   X-Ray Lumbar Spine AP And Lateral    Narrative Three views of the lumbar spine  Findings: There is a compression L1 level that is new when compared to the prior exam and demonstrates approximately 25% vertebral height loss.  There is no obvious condensation line seen on the plain films suggesting that this is still a chronic  "deformity.  The alignment is grossly within normal limits.  There is multilevel spondylosis with mild to moderate to space narrowing noted throughout the lumbar spine greatest at L4-5 level.  Facet arthropathy is noted from the L2-3 through the L5-S1 levels and most prominent at the L5-S1 level.         Review of Systems:  CONSTITUTIONAL: patient denies any fever, chills, or weight loss  SKIN: patient denies any rash or itching  RESPIRATORY: patient denies having any shortness of breath  GASTROINTESTINAL: patient denies having any diarrhea, constipation, or bowel incontinence  GENITOURINARY: patient denies having any abnormal bladder function    MUSCULOSKELETAL:  - patient complains of the above noted pain/s (see chief pain complaint)    NEUROLOGICAL:   - pain as above  - strength in Lower extremities is decreased, BILATERALLY  - sensation in Lower extremities is abnormal, on the LEFT  - patient denies any loss of bowel or bladder control      PSYCHIATRIC: patient denies any change in mood    Other:  All other systems reviewed and are negative      Physical Exam:  BP (!) 142/67 (BP Location: Right arm, Patient Position: Sitting, BP Method: Automatic)   Pulse (!) 54   Resp 16   Ht 5' 10" (1.778 m)   Wt 70.3 kg (155 lb)   BMI 22.24 kg/m²  (reviewed on 6/22/2017)  General: alert and oriented, in no apparent distress  Gait: normal gait  Skin: No rashes, No discoloration, No obvious lesions  HEENT: EOMI  Cardiovascular: no significant peripheral edema present  Respiratory: respirations nonlabored    Musculoskeletal:  - Any pain on flexion, extension, rotation:    >> pain on extension and rotation  - Straight Leg Raise:     >> LEFT :: negative    >> RIGHT :: positive    - Any tenderness to palpation across paraspinal muscles, joints, bursae:     >> across lumbar paraspinals    Neuro:  - Extremity Strength:     >> LEFT :: dec with dorsiflexion    >> RIGHT :: dec with dorsiflexion  - Extremity Reflexes:    >> LEFT  :: " 2+    >> RIGHT :: 2+     Psych:  Mood and affect is appropriate      Assessment:  Lumbar Radiculopathy  Lumbar Spondylosis      Plan:  Patient has low back and right leg pain.  He has had ongoing pain, seen at [facility], underwent spinal injection that did not help, then underwent surgery with Dr. Lancaster in January 2017.  He feels his pain is perhaps worse, he has followed-up at [facility], recommended giving it some time.  He is on gabapentin, I will add tramadol, check lumbar xr.  I will request records.  RTC in 4 weeks.  Can consider injection after imaging and record review at next visit.  Imaging / studies reviewed, detailed above.  I discussed in detail the risks, benefits, and alternatives to any and all potential treatment options.  All questions and concerns were fully addressed today in clinic.      Disclaimer:  This note may have been prepared using voice recognition software, it may have not been extensively proofed, as such there could be errors within the text such as sound alike errors.

## 2017-07-20 ENCOUNTER — OFFICE VISIT (OUTPATIENT)
Dept: PAIN MEDICINE | Facility: CLINIC | Age: 82
End: 2017-07-20
Payer: MEDICARE

## 2017-07-20 VITALS
DIASTOLIC BLOOD PRESSURE: 66 MMHG | HEIGHT: 70 IN | SYSTOLIC BLOOD PRESSURE: 102 MMHG | HEART RATE: 73 BPM | WEIGHT: 155 LBS | BODY MASS INDEX: 22.19 KG/M2

## 2017-07-20 DIAGNOSIS — M47.817 SPONDYLOSIS OF LUMBOSACRAL REGION WITHOUT MYELOPATHY OR RADICULOPATHY: Primary | ICD-10-CM

## 2017-07-20 DIAGNOSIS — M54.16 BILATERAL LUMBAR RADICULOPATHY: ICD-10-CM

## 2017-07-20 PROCEDURE — 1125F AMNT PAIN NOTED PAIN PRSNT: CPT | Mod: S$GLB,,, | Performed by: ANESTHESIOLOGY

## 2017-07-20 PROCEDURE — 99214 OFFICE O/P EST MOD 30 MIN: CPT | Mod: S$GLB,,, | Performed by: ANESTHESIOLOGY

## 2017-07-20 PROCEDURE — 99999 PR PBB SHADOW E&M-EST. PATIENT-LVL III: CPT | Mod: PBBFAC,,, | Performed by: ANESTHESIOLOGY

## 2017-07-20 PROCEDURE — 99499 UNLISTED E&M SERVICE: CPT | Mod: S$GLB,,, | Performed by: ANESTHESIOLOGY

## 2017-07-20 PROCEDURE — 1159F MED LIST DOCD IN RCRD: CPT | Mod: S$GLB,,, | Performed by: ANESTHESIOLOGY

## 2017-07-20 RX ORDER — HYDROCODONE BITARTRATE AND ACETAMINOPHEN 7.5; 325 MG/1; MG/1
TABLET ORAL
Qty: 90 TABLET | Refills: 0 | Status: SHIPPED | OUTPATIENT
Start: 2017-07-20 | End: 2018-05-09 | Stop reason: ALTCHOICE

## 2017-07-20 RX ORDER — GABAPENTIN 300 MG/1
300 CAPSULE ORAL 3 TIMES DAILY
Qty: 90 CAPSULE | Refills: 0 | Status: SHIPPED | OUTPATIENT
Start: 2017-07-20 | End: 2017-08-19

## 2017-07-20 NOTE — PROGRESS NOTES
Chief Pain Complaint:  Low Back Pain, Bilat Hip pain, Right leg pain    History of Present Illness:   This patient is a 84 y.o. male who presents today complaining of the above noted pain/s. The patient describes the pain as follows.    - duration of pain: chronic pain, increased x ~ months  - timing: constant   - character: aching, sharp  - radiating, dermatomal: extends into right leg, patient reports BLE numbness which makes his toes want to drag.   - antecedent trauma, prior spinal surgery: patient reports prior trauma, prior lumbar surgery (surgery in January 2017 with [place]. Lancaster at [facility])  - pertinent negatives: No fever, No chills, No weight loss, No bladder dysfunction, No bowel dysfunction, No saddle anesthesia  - pertinent positives: generalized nonspecific Lower Extremity weakness bilaterally    - medications, other therapies tried (physical therapy, injections):     >> Tylenol, NSAIDs, gabapentin, Mobic, tramadol    >> Has previously undergone Physical Therapy    >> Has previously undergone spinal injection/s in the past, he is uncertain as to the specific type of injections that he has had, it seems one was a lumbar rhizotomy, he has undergone approximately 3-4 spinal injections previously        Imaging / Labs / Studies (reviewed on 7/20/2017):      Results for orders placed during the hospital encounter of 11/16/15   X-Ray Lumbar Spine Complete 5 View    Narrative Five views of the lumbar spine  Findings: There is increased vertebral body height loss noted at the L1 level.  50% vertebrae height loss is noted anteriorly at this level.  The alignment is within normal limits. There is moderate disk space narrowing noted at the L4-5 level.  Mild/moderate facet arthropathy is noted from L2-3 through L5-S1 levels. Calcified granulomas are noted within the spleen. No pars defects.         Results for orders placed during the hospital encounter of 03/26/15   X-Ray Lumbar Spine AP And Lateral    Narrative Three  "views of the lumbar spine  Findings: There is a compression L1 level that is new when compared to the prior exam and demonstrates approximately 25% vertebral height loss.  There is no obvious condensation line seen on the plain films suggesting that this is still a chronic deformity.  The alignment is grossly within normal limits.  There is multilevel spondylosis with mild to moderate to space narrowing noted throughout the lumbar spine greatest at L4-5 level.  Facet arthropathy is noted from the L2-3 through the L5-S1 levels and most prominent at the L5-S1 level.         Review of Systems:  CONSTITUTIONAL: patient denies any fever, chills, or weight loss  SKIN: patient denies any rash or itching  RESPIRATORY: patient denies having any shortness of breath  GASTROINTESTINAL: patient denies having any diarrhea, constipation, or bowel incontinence  GENITOURINARY: patient denies having any abnormal bladder function    MUSCULOSKELETAL:  - patient complains of the above noted pain/s (see chief pain complaint)    NEUROLOGICAL:   - pain as above  - strength in Lower extremities is decreased, BILATERALLY  - sensation in Lower extremities is abnormal, on the LEFT  - patient denies any loss of bowel or bladder control      PSYCHIATRIC: patient denies any change in mood    Other:  All other systems reviewed and are negative      Physical Exam:  /66   Pulse 73   Ht 5' 10" (1.778 m)   Wt 70.3 kg (155 lb)   BMI 22.24 kg/m²  (reviewed on 7/20/2017)  General: alert and oriented, in no apparent distress  Gait: normal gait  Skin: No rashes, No discoloration, No obvious lesions  HEENT: EOMI  Cardiovascular: no significant peripheral edema present  Respiratory: respirations nonlabored    Musculoskeletal:  - Any pain on flexion, extension, rotation:    >> pain on extension and rotation  - Straight Leg Raise:     >> LEFT :: negative    >> RIGHT :: positive    - Any tenderness to palpation across paraspinal muscles, joints, " bursae:     >> across lumbar paraspinals    Neuro:  - Extremity Strength:     >> LEFT :: dec with dorsiflexion    >> RIGHT :: dec with dorsiflexion  - Extremity Reflexes:    >> LEFT  :: 2+    >> RIGHT :: 2+     Psych:  Mood and affect is appropriate      Assessment:  Lumbar Radiculopathy  Lumbar Spondylosis      Plan:  Patient returns for follow-up visit.  He complains of continued low back and right leg pain.  He has been seen at the [facility], underwent a spinal injection that did not help, then underwent surgery with Dr. Lancaster in January 2017.  He is uncertain the exact surgery that he had, I previously requested records from the [facility] however I have not received these as of yet and I will re-request today.  He is on gabapentin, I added tramadol, checked a lumbar xr that is detailed above.  I will request records.  RTC in 4 weeks.  I discuss treatment options including adjusting his medications, checking a new lumbar MRI, another spinal injection, physical therapy.  Patient would prefer to hold off on getting another MRI for the time being.  I will increase his gabapentin, I asked that he discontinue the tramadol.  He notes that he has not been helped by the compounded pain cream, I advise that he can discontinue using.  I will prescribe him Norco 7.5/325 mg tablets to be taken up to 3 times a day when necessary pain.  Patient can follow up in approximately 4 weeks, he will call to make the appointment.  Imaging / studies reviewed, detailed above.  I discussed in detail the risks, benefits, and alternatives to any and all potential treatment options.  All questions and concerns were fully addressed today in clinic.      Disclaimer:  This note may have been prepared using voice recognition software, it may have not been extensively proofed, as such there could be errors within the text such as sound alike errors.

## 2017-08-08 RX ORDER — TAMSULOSIN HYDROCHLORIDE 0.4 MG/1
CAPSULE ORAL
Qty: 90 CAPSULE | Refills: 3 | Status: SHIPPED | OUTPATIENT
Start: 2017-08-08 | End: 2018-10-08

## 2017-08-08 RX ORDER — PRAVASTATIN SODIUM 20 MG/1
TABLET ORAL
Qty: 90 TABLET | Refills: 3 | Status: SHIPPED | OUTPATIENT
Start: 2017-08-08 | End: 2017-12-14

## 2017-08-08 RX ORDER — OMEPRAZOLE 20 MG/1
CAPSULE, DELAYED RELEASE ORAL
Qty: 90 CAPSULE | Refills: 3 | Status: SHIPPED | OUTPATIENT
Start: 2017-08-08 | End: 2018-10-01 | Stop reason: SDUPTHER

## 2017-09-07 ENCOUNTER — OFFICE VISIT (OUTPATIENT)
Dept: FAMILY MEDICINE | Facility: CLINIC | Age: 82
End: 2017-09-07
Payer: MEDICARE

## 2017-09-07 VITALS
DIASTOLIC BLOOD PRESSURE: 62 MMHG | OXYGEN SATURATION: 96 % | BODY MASS INDEX: 23.1 KG/M2 | HEART RATE: 60 BPM | TEMPERATURE: 98 F | SYSTOLIC BLOOD PRESSURE: 126 MMHG | WEIGHT: 161.38 LBS | HEIGHT: 70 IN

## 2017-09-07 DIAGNOSIS — J01.00 ACUTE NON-RECURRENT MAXILLARY SINUSITIS: Primary | ICD-10-CM

## 2017-09-07 PROCEDURE — 3078F DIAST BP <80 MM HG: CPT | Mod: S$GLB,,,

## 2017-09-07 PROCEDURE — 99999 PR PBB SHADOW E&M-EST. PATIENT-LVL IV: CPT | Mod: PBBFAC,,,

## 2017-09-07 PROCEDURE — 3074F SYST BP LT 130 MM HG: CPT | Mod: S$GLB,,,

## 2017-09-07 PROCEDURE — 3008F BODY MASS INDEX DOCD: CPT | Mod: S$GLB,,,

## 2017-09-07 PROCEDURE — 99213 OFFICE O/P EST LOW 20 MIN: CPT | Mod: 25,S$GLB,,

## 2017-09-07 PROCEDURE — 1125F AMNT PAIN NOTED PAIN PRSNT: CPT | Mod: S$GLB,,,

## 2017-09-07 PROCEDURE — 96372 THER/PROPH/DIAG INJ SC/IM: CPT | Mod: S$GLB,,,

## 2017-09-07 PROCEDURE — 1159F MED LIST DOCD IN RCRD: CPT | Mod: S$GLB,,,

## 2017-09-07 RX ORDER — GABAPENTIN 100 MG/1
CAPSULE ORAL
COMMUNITY
Start: 2017-09-07 | End: 2018-05-09 | Stop reason: ALTCHOICE

## 2017-09-07 RX ORDER — METHYLPREDNISOLONE ACETATE 80 MG/ML
80 INJECTION, SUSPENSION INTRA-ARTICULAR; INTRALESIONAL; INTRAMUSCULAR; SOFT TISSUE
Status: COMPLETED | OUTPATIENT
Start: 2017-09-07 | End: 2017-09-07

## 2017-09-07 RX ORDER — LIDOCAINE AND PRILOCAINE 25; 25 MG/G; MG/G
CREAM TOPICAL
COMMUNITY
Start: 2017-06-29 | End: 2018-10-08

## 2017-09-07 RX ORDER — AZITHROMYCIN 250 MG/1
250 TABLET, FILM COATED ORAL DAILY
Qty: 6 TABLET | Refills: 0 | Status: SHIPPED | OUTPATIENT
Start: 2017-09-07 | End: 2017-09-12

## 2017-09-07 RX ORDER — CICLOPIROX OLAMINE 7.7 MG/G
CREAM TOPICAL
COMMUNITY
Start: 2017-07-01 | End: 2018-10-08

## 2017-09-07 RX ORDER — MELOXICAM 15 MG
TABLET ORAL
COMMUNITY
Start: 2017-08-08 | End: 2017-11-21

## 2017-09-07 RX ORDER — TRIAMCINOLONE ACETONIDE 1 MG/G
CREAM TOPICAL
COMMUNITY
Start: 2017-07-01 | End: 2018-10-08

## 2017-09-07 RX ADMIN — METHYLPREDNISOLONE ACETATE 80 MG: 80 INJECTION, SUSPENSION INTRA-ARTICULAR; INTRALESIONAL; INTRAMUSCULAR; SOFT TISSUE at 02:09

## 2017-09-07 NOTE — PROGRESS NOTES
Pt given methylprednisolone acetate injection 80 mg/ ml IM right ventrogluteal. Pt advised to wait 15 minutes to observe for adverse reaction. Pt tolerated well.

## 2017-09-07 NOTE — PROGRESS NOTES
Subjective:       Patient ID: Dominguez Ritchie is a 84 y.o. male.    Chief Complaint: Generalized Body Aches and Chest Congestion    HPI   The patient presents today with a week complaint of nasal congestion, PND, rhinorrhea. he says His symptoms are constant and moderate in intensity.  he voices associated Sinus pressure and a sore throat . he denies fever, Cough, SOB, or wheezing. he can not identify any exacerbating or mitigating factors.      Review of Systems   Constitutional: Negative for appetite change, chills, fatigue and fever.   HENT: Positive for congestion, postnasal drip, rhinorrhea, sinus pressure and sore throat. Negative for ear discharge, ear pain, facial swelling, hearing loss, trouble swallowing and voice change.    Eyes: Negative for visual disturbance.   Respiratory: Negative for cough, chest tightness and shortness of breath.    Cardiovascular: Negative for chest pain and palpitations.   Gastrointestinal: Negative for abdominal pain, nausea and vomiting.   Endocrine: Negative.    Genitourinary: Negative.    Musculoskeletal: Negative for arthralgias and myalgias.   Skin: Negative.    Allergic/Immunologic: Negative for environmental allergies, food allergies and immunocompromised state.   Neurological: Negative for dizziness, weakness and headaches.   Hematological: Negative.    Psychiatric/Behavioral: Negative.          Objective:      Physical Exam   Constitutional: He is oriented to person, place, and time. He appears well-developed and well-nourished. No distress.   HENT:   Head: Normocephalic and atraumatic. Hair is normal.   Right Ear: Hearing, tympanic membrane, external ear and ear canal normal.   Left Ear: Hearing, tympanic membrane, external ear and ear canal normal.   Nose: Mucosal edema and rhinorrhea present. No nose lacerations, sinus tenderness, nasal deformity, septal deviation or nasal septal hematoma. No epistaxis.  No foreign bodies. Right sinus exhibits maxillary sinus  tenderness. Right sinus exhibits no frontal sinus tenderness. Left sinus exhibits no maxillary sinus tenderness and no frontal sinus tenderness.   Mouth/Throat: Uvula is midline, oropharynx is clear and moist and mucous membranes are normal.   Eyes: Conjunctivae are normal. Pupils are equal, round, and reactive to light. Right eye exhibits no discharge. Left eye exhibits no discharge.   Neck: Trachea normal, normal range of motion and phonation normal. Neck supple. No tracheal deviation present.   Cardiovascular: Normal rate, regular rhythm, normal heart sounds and intact distal pulses.  Exam reveals no gallop and no friction rub.    No murmur heard.  Pulmonary/Chest: Effort normal and breath sounds normal. No respiratory distress. He has no decreased breath sounds. He has no wheezes. He has no rhonchi. He has no rales. He exhibits no tenderness.   Musculoskeletal: Normal range of motion.   Lymphadenopathy:        Head (right side): No submental, no submandibular, no tonsillar, no preauricular, no posterior auricular and no occipital adenopathy present.        Head (left side): No submental, no submandibular, no tonsillar, no preauricular, no posterior auricular and no occipital adenopathy present.     He has no cervical adenopathy.        Right cervical: No superficial cervical, no deep cervical and no posterior cervical adenopathy present.       Left cervical: No superficial cervical, no deep cervical and no posterior cervical adenopathy present.   Neurological: He is alert and oriented to person, place, and time. He exhibits normal muscle tone. GCS eye subscore is 4. GCS verbal subscore is 5. GCS motor subscore is 6.   Skin: Skin is warm and dry. No rash noted. He is not diaphoretic. No erythema. No pallor.   Psychiatric: He has a normal mood and affect. His speech is normal and behavior is normal. Judgment and thought content normal.       Assessment:       1. Acute non-recurrent maxillary sinusitis           Plan:   Acute non-recurrent maxillary sinusitis  -     azithromycin (ZITHROMAX Z-JACQUIE) 250 MG tablet; Take 1 tablet (250 mg total) by mouth once daily. Take 2 tablet day 1 the 1 tablet daily until complete  Dispense: 6 tablet; Refill: 0  -     methylPREDNISolone acetate injection 80 mg; Inject 1 mL (80 mg total) into the muscle one time.            Disclaimer: This note is prepared using voice recognition software.  As such there may be errors in the dictation.  It has not been proofread.

## 2017-09-12 ENCOUNTER — TELEPHONE (OUTPATIENT)
Dept: FAMILY MEDICINE | Facility: CLINIC | Age: 82
End: 2017-09-12

## 2017-09-12 DIAGNOSIS — M79.10 MUSCLE PAIN: Primary | ICD-10-CM

## 2017-09-12 NOTE — TELEPHONE ENCOUNTER
Okay to hold pravastatin for at least 3 weeks and tell him to come and check a CPK in follow-up in the clinic.

## 2017-09-12 NOTE — TELEPHONE ENCOUNTER
Pt c/o muscle fatigue and thinks that it could be possibly from his pravastatin , he wants to stop it.   I told him to stop it and call us back in 1 week to let us know how he is feeling, if he improves we can change the medication

## 2017-09-12 NOTE — TELEPHONE ENCOUNTER
----- Message from Trisha Tamez sent at 9/11/2017  2:18 PM CDT -----  He would like for you to call him regarding his medication.   Call him at [phone].                               edmond

## 2017-10-04 RX ORDER — ROSUVASTATIN CALCIUM 10 MG/1
10 TABLET, COATED ORAL DAILY
Qty: 30 TABLET | Refills: 2 | Status: SHIPPED | OUTPATIENT
Start: 2017-10-04 | End: 2017-12-14

## 2017-10-04 NOTE — TELEPHONE ENCOUNTER
Has never tried Crestor, we'll try on Crestor 10 mg once daily watch for increased muscle pain fatigue carefully.

## 2017-10-04 NOTE — TELEPHONE ENCOUNTER
Spoke with pt today, he stopped back by the office after stopping  His Pravastatin for 3 weeks , he said that he feels better but he still has pain in his lower extremities and weakness in his legs , but he has also had a back surgery last year and that has been a results from that surgery.   He would like to start a different statin and see if the pain returns or if he remains the same.   If it comes back he would like to be started on the Repatha

## 2017-11-13 ENCOUNTER — TELEPHONE (OUTPATIENT)
Dept: PAIN MEDICINE | Facility: CLINIC | Age: 82
End: 2017-11-13

## 2017-11-13 NOTE — TELEPHONE ENCOUNTER
----- Message from Gill Lopez LPN sent at 11/10/2017  6:49 AM CST -----  Contact: self- [phone]  Ruthie butt will need an appointment to get refill.  Can you please call him to set that up?   ----- Message -----  From: Candie Membreno LPN  Sent: 11/9/2017   4:47 PM  To: Gill Lopez LPN, #        ----- Message -----  From: Roberto Michaels  Sent: 11/9/2017   2:34 PM  To: [facility] Staff    Would like to consult with nurse about refill on Hydrocod-acetpn 7.5mg 325. Please call pt kulwant at [phone]. thx leandro Funes  [facility] [address] [place] [id number] [place] [address]  [address] [address] [address]  Phone: [phone] Fax: [phone]

## 2017-11-15 ENCOUNTER — TELEPHONE (OUTPATIENT)
Dept: PAIN MEDICINE | Facility: CLINIC | Age: 82
End: 2017-11-15

## 2017-11-21 ENCOUNTER — OFFICE VISIT (OUTPATIENT)
Dept: PAIN MEDICINE | Facility: CLINIC | Age: 82
End: 2017-11-21
Payer: MEDICARE

## 2017-11-21 VITALS
WEIGHT: 161 LBS | SYSTOLIC BLOOD PRESSURE: 131 MMHG | RESPIRATION RATE: 18 BRPM | HEIGHT: 70 IN | BODY MASS INDEX: 23.05 KG/M2 | HEART RATE: 65 BPM | DIASTOLIC BLOOD PRESSURE: 73 MMHG

## 2017-11-21 DIAGNOSIS — M51.36 DDD (DEGENERATIVE DISC DISEASE), LUMBAR: ICD-10-CM

## 2017-11-21 DIAGNOSIS — M47.817 SPONDYLOSIS OF LUMBOSACRAL REGION WITHOUT MYELOPATHY OR RADICULOPATHY: Primary | ICD-10-CM

## 2017-11-21 DIAGNOSIS — M54.16 RIGHT LUMBAR RADICULOPATHY: ICD-10-CM

## 2017-11-21 DIAGNOSIS — M96.1 FAILED BACK SYNDROME OF LUMBAR SPINE: ICD-10-CM

## 2017-11-21 DIAGNOSIS — M54.16 BILATERAL LUMBAR RADICULOPATHY: ICD-10-CM

## 2017-11-21 PROCEDURE — 99214 OFFICE O/P EST MOD 30 MIN: CPT | Mod: S$GLB,,, | Performed by: PHYSICIAN ASSISTANT

## 2017-11-21 PROCEDURE — 99999 PR PBB SHADOW E&M-EST. PATIENT-LVL IV: CPT | Mod: PBBFAC,,, | Performed by: PHYSICIAN ASSISTANT

## 2017-11-21 RX ORDER — MELOXICAM 7.5 MG/1
7.5 TABLET ORAL 2 TIMES DAILY WITH MEALS
Qty: 60 TABLET | Refills: 2 | Status: SHIPPED | OUTPATIENT
Start: 2017-11-21 | End: 2018-03-06 | Stop reason: SDUPTHER

## 2017-11-21 RX ORDER — TRAMADOL HYDROCHLORIDE 50 MG/1
50 TABLET ORAL 3 TIMES DAILY PRN
Qty: 90 TABLET | Refills: 1 | Status: SHIPPED | OUTPATIENT
Start: 2017-11-21 | End: 2017-12-21

## 2017-11-21 NOTE — PROGRESS NOTES
Chief Pain Complaint:  Low Back Pain, Bilat Hip pain, Right leg pain    History of Present Illness:   This patient is a 84 y.o. male who presents today complaining of the above noted pain/s. The patient describes the pain as follows.    - duration of pain: chronic pain   - timing: constant   - character: aching, sharp  - radiating, dermatomal: extends into right leg    - antecedent trauma, prior spinal surgery: patient reports prior trauma, prior lumbar surgery (surgery in January 2017 with Dr. Lancaster at [facility])  - pertinent negatives: No fever, No chills, No weight loss, No bladder dysfunction, No bowel dysfunction, No saddle anesthesia  - pertinent positives: generalized nonspecific Lower Extremity weakness bilaterally, BLE numbness  - medications, other therapies tried (physical therapy, injections):     >> Tylenol, NSAIDs, gabapentin, Mobic, tramadol, Norco    >> Has previously undergone Physical Therapy    >> Has previously undergone spinal injection/s    - has undergone approximately 3-4 spinal injections previously (uncertain as to the specific type of injections that he has had, seems one was a lumbar rhizotomy)        Imaging / Labs / Studies (reviewed on 11/21/2017):    Results for orders placed during the hospital encounter of 11/16/15   X-Ray Lumbar Spine Complete 5 View    Narrative Five views of the lumbar spine  Findings: There is increased vertebral body height loss noted at the L1 level.  50% vertebrae height loss is noted anteriorly at this level.  The alignment is within normal limits. There is moderate disk space narrowing noted at the L4-5 level.  Mild/moderate facet arthropathy is noted from L2-3 through L5-S1 levels. Calcified granulomas are noted within the spleen. No pars defects.       Results for orders placed during the hospital encounter of 03/26/15   X-Ray Lumbar Spine AP And Lateral    Narrative Three views of the lumbar spine  Findings: There is a compression L1 level that is new when  "compared to the prior exam and demonstrates approximately 25% vertebral height loss.  There is no obvious condensation line seen on the plain films suggesting that this is still a chronic deformity.  The alignment is grossly within normal limits.  There is multilevel spondylosis with mild to moderate to space narrowing noted throughout the lumbar spine greatest at L4-5 level.  Facet arthropathy is noted from the L2-3 through the L5-S1 levels and most prominent at the L5-S1 level.         Review of Systems:  CONSTITUTIONAL: patient denies any fever, chills, or weight loss  SKIN: patient denies any rash or itching  RESPIRATORY: patient denies having any shortness of breath  GASTROINTESTINAL: patient denies having any diarrhea, constipation, or bowel incontinence  GENITOURINARY: patient denies having any abnormal bladder function    MUSCULOSKELETAL:  - patient complains of the above noted pain/s (see chief pain complaint)    NEUROLOGICAL:   - pain as above  - strength in Lower extremities is decreased, BILATERALLY  - sensation in Lower extremities is abnormal, on the LEFT  - patient denies any loss of bowel or bladder control      PSYCHIATRIC: patient denies any change in mood    Other:  All other systems reviewed and are negative      Physical Exam:  Vitals:  /73 (BP Location: Right arm, Patient Position: Sitting, BP Method: Medium (Automatic))   Pulse 65   Resp 18   Ht 5' 10" (1.778 m)   Wt 73 kg (161 lb)   BMI 23.10 kg/m²    (reviewed on 11/21/2017)    General: alert and oriented, in no apparent distress  Gait: normal gait  Skin: No rashes, No discoloration, No obvious lesions  HEENT: EOMI  Cardiovascular: no significant peripheral edema present  Respiratory: respirations nonlabored    Musculoskeletal:  - Any pain on flexion, extension, rotation:    >> pain on extension and rotation of lumbar spine  - Straight Leg Raise:     >> LEFT :: negative    >> RIGHT :: positive  - Any tenderness to palpation across " paraspinal muscles, joints, bursae:     >> across lumbar paraspinals    Neuro:  - Extremity Strength:     >> LEFT :: dec with dorsiflexion    >> RIGHT :: dec with dorsiflexion  - Extremity Reflexes:    >> LEFT  :: 2+    >> RIGHT :: 2+     Psych:  Mood and affect is appropriate      Assessment:  Lumbar Radiculopathy  Lumbar Spondylosis      Plan:  Patient returns for follow-up visit.  He complains of continued low back and right leg pain.  He has been seen at [facility], underwent a spinal injection that did not help, and then ultimatelyunderwent surgery (unsure of type) with Dr. Lancaster in January 2017.  We previously requested records multiple times from the [facility], however we have not received these.   - When he was seen in July, he reported that tramadol was not helping, so he was switched to Norco 7.5 TID. Since then, he finds no relief with Norco (although has not filled since July, so takes very sparingly). Will refill tramadol 50mg TID PRN pain today, which will likely last him a while.  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 He had treatment for this, but he reports the damage was already present by the time it was discovered. Will decrease current dose of gabapentin to see if this helps with the increased dizziness. He is currently taking 200mg QAM, mid-day, QHS. Will decrease to 100mg TID.  - Will refill Mobic at 7.5mg BID PRN dose. He denies any kidney or cardiac issues and has no history of gastric ulcers. Most recent labs reviewed.  - We can consider updating lumbar MRI, trying another spinal injection, or physical therapy.  Patient would prefer to hold off at this time.    RTC PRN. I discussed the risks, benefits, and alternatives to potential treatment options. All questions and concerns were fully addressed today in clinic. Dr. Carroll was consulted regarding the patient plan and agrees.

## 2017-11-27 ENCOUNTER — TELEPHONE (OUTPATIENT)
Dept: PAIN MEDICINE | Facility: CLINIC | Age: 82
End: 2017-11-27

## 2017-11-27 NOTE — TELEPHONE ENCOUNTER
----- Message from Candie Membreno LPN sent at 11/14/2017  4:01 PM CST -----  Contact: pt      ----- Message -----  From: Ana Sandhu  Sent: 11/14/2017   2:38 PM  To: [facility] Staff    He's calling to get a refill...    1. What is the name of the medication you are requesting? Hydrocodacetam  2. What is the dose? 75/325 mg  3. How do you take the medication? Orally, topically, etc? orally  4. How often do you take this medication? As needed  5. Do you need a 30 day or 90 day supply? 30  6. How many refills are you requesting? 1  7. What is your preferred pharmacy and location of the pharmacy? [facility] [phone]  8. Who can we contact with further questions? [phone]

## 2017-11-27 NOTE — TELEPHONE ENCOUNTER
----- Message from Elvira Bay sent at 11/27/2017  2:35 PM CST -----  Please call pt again at [phone].

## 2017-11-27 NOTE — TELEPHONE ENCOUNTER
Left message letting patient know he will have to make an appointment in order to get an RX of [place].

## 2017-11-27 NOTE — TELEPHONE ENCOUNTER
Patient states he still has some norco and will finish taking those then get the tramadol refilled if he needs them. I acknowledged.

## 2017-12-04 ENCOUNTER — TELEPHONE (OUTPATIENT)
Dept: FAMILY MEDICINE | Facility: CLINIC | Age: 82
End: 2017-12-04

## 2017-12-04 DIAGNOSIS — E78.5 HYPERLIPIDEMIA, UNSPECIFIED HYPERLIPIDEMIA TYPE: Primary | ICD-10-CM

## 2017-12-04 DIAGNOSIS — R97.20 BPH WITH ELEVATED PSA: ICD-10-CM

## 2017-12-04 DIAGNOSIS — N40.0 BPH WITH ELEVATED PSA: ICD-10-CM

## 2017-12-04 DIAGNOSIS — D64.9 ANEMIA, UNSPECIFIED TYPE: ICD-10-CM

## 2017-12-04 DIAGNOSIS — R42 VERTIGO: ICD-10-CM

## 2017-12-04 DIAGNOSIS — R73.03 PREDIABETES: ICD-10-CM

## 2017-12-04 NOTE — TELEPHONE ENCOUNTER
----- Message from Kay Silva sent at 12/4/2017  1:18 PM CST -----  pls input bloodwork orders & ana this wk....[phone]

## 2017-12-04 NOTE — TELEPHONE ENCOUNTER
----- Message from Kay Silva sent at 12/4/2017  1:18 PM CST -----  pls input bloodwork orders & ana this wk....[phone]

## 2017-12-05 RX ORDER — MECLIZINE HYDROCHLORIDE 25 MG/1
TABLET ORAL
Qty: 270 TABLET | Refills: 3 | Status: SHIPPED | OUTPATIENT
Start: 2017-12-05 | End: 2019-07-18 | Stop reason: DRUGHIGH

## 2017-12-14 ENCOUNTER — OFFICE VISIT (OUTPATIENT)
Dept: FAMILY MEDICINE | Facility: CLINIC | Age: 82
End: 2017-12-14
Payer: MEDICARE

## 2017-12-14 VITALS
HEART RATE: 65 BPM | TEMPERATURE: 99 F | HEIGHT: 70 IN | OXYGEN SATURATION: 97 % | DIASTOLIC BLOOD PRESSURE: 68 MMHG | SYSTOLIC BLOOD PRESSURE: 130 MMHG | BODY MASS INDEX: 22.61 KG/M2 | WEIGHT: 157.94 LBS

## 2017-12-14 DIAGNOSIS — N40.0 BPH WITH ELEVATED PSA: ICD-10-CM

## 2017-12-14 DIAGNOSIS — R97.20 BPH WITH ELEVATED PSA: ICD-10-CM

## 2017-12-14 DIAGNOSIS — E78.2 MIXED HYPERLIPIDEMIA: ICD-10-CM

## 2017-12-14 DIAGNOSIS — R73.03 PREDIABETES: ICD-10-CM

## 2017-12-14 DIAGNOSIS — Z78.9 STATIN INTOLERANCE: Primary | ICD-10-CM

## 2017-12-14 PROCEDURE — 99499 UNLISTED E&M SERVICE: CPT | Mod: S$GLB,,, | Performed by: FAMILY MEDICINE

## 2017-12-14 PROCEDURE — 99214 OFFICE O/P EST MOD 30 MIN: CPT | Mod: S$GLB,,, | Performed by: FAMILY MEDICINE

## 2017-12-14 PROCEDURE — 99999 PR PBB SHADOW E&M-EST. PATIENT-LVL III: CPT | Mod: PBBFAC,,, | Performed by: FAMILY MEDICINE

## 2017-12-14 NOTE — PROGRESS NOTES
Chief Complaint:    Chief Complaint   Patient presents with    Follow-up       History of Present Illness:    Is here for six-month follow-up,  His B12 deficiency is on B12 replacement therapy orally.  He could not tolerate statin drugs he stop pravastatin and also stop Crestor because of muscle pain.  Patient has significant hearing loss he uses hearing aids.  He also takes meloxicam for his back pain I will advise him not to do so because of the risk associated with this.    ROS:  Review of Systems   Constitutional: Negative for activity change, chills, fatigue, fever and unexpected weight change.   HENT: Negative for congestion, ear discharge, ear pain, hearing loss, postnasal drip and rhinorrhea.    Eyes: Negative for pain and visual disturbance.   Respiratory: Negative for cough, chest tightness and shortness of breath.    Cardiovascular: Negative for chest pain and palpitations.   Gastrointestinal: Negative for abdominal pain, diarrhea and vomiting.   Endocrine: Negative for heat intolerance.   Genitourinary: Negative for dysuria, flank pain, frequency and hematuria.   Musculoskeletal: Negative for back pain, gait problem and neck pain.   Skin: Negative for color change and rash.   Neurological: Negative for dizziness, tremors, seizures, numbness and headaches.   Psychiatric/Behavioral: Negative for agitation, hallucinations, self-injury, sleep disturbance and suicidal ideas. The patient is not nervous/anxious.        Past Medical History:   Diagnosis Date    Abnormal exercise tolerance test 7/25/2013    Arthritis     Colon polyp     Diverticulosis     Dyslipidemia 7/25/2013    GERD (gastroesophageal reflux disease)     Hyperlipidemia 1980    HIGH TG    Knee pain, right 6/14/2013    Meningioma     Personal history of colonic polyps 5/27/2014    RLS (restless legs syndrome) 6/14/2013    Tobacco dependence     resolved    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       Social History:  Social  "History     Social History    Marital status:      Spouse name: N/A    Number of children: N/A    Years of education: N/A     Social History Main Topics    Smoking status: Former Smoker     Packs/day: 1.00     Years: 25.00     Quit date: 1/1/1990    Smokeless tobacco: Never Used    Alcohol use No    Drug use: No    Sexual activity: Not Currently     Birth control/ protection: None     Other Topics Concern    None     Social History Narrative    None       Family History:   family history includes Cancer in his son; Diabetes in his maternal uncle; Heart disease in his mother.    Health Maintenance   Topic Date Due    Zoster Vaccine  11/28/1992    Lipid Panel  04/05/2022    TETANUS VACCINE  11/16/2025    Pneumococcal (65+)  Completed    Influenza Vaccine  Completed       Physical Exam:    Vital Signs  Temp: 98.5 °F (36.9 °C)  Temp src: Tympanic  Pulse: 65  SpO2: 97 %  BP: 130/68  BP Location: Left arm  Patient Position: Sitting  Pain Score: 0-No pain  Height and Weight  Height: 5' 10" (177.8 cm)  Weight: 71.6 kg (157 lb 15.4 oz)  BSA (Calculated - sq m): 1.88 sq meters  BMI (Calculated): 22.7  Weight in (lb) to have BMI = 25: 173.9]    Body mass index is 22.66 kg/m².    Physical Exam   Constitutional: He is oriented to person, place, and time. He appears well-developed.   HENT:   Mouth/Throat: Oropharynx is clear and moist.   Eyes: Conjunctivae are normal. Pupils are equal, round, and reactive to light.   Neck: Normal range of motion. Neck supple.   Cardiovascular: Normal rate, regular rhythm and normal heart sounds.    No murmur heard.  Pulmonary/Chest: Effort normal and breath sounds normal. No respiratory distress. He has no wheezes. He has no rales. He exhibits no tenderness.   Abdominal: Soft. He exhibits no distension and no mass. There is no tenderness. There is no guarding.   Musculoskeletal: He exhibits no edema or tenderness.   Lymphadenopathy:     He has no cervical adenopathy. "   Neurological: He is alert and oriented to person, place, and time. He has normal reflexes.   Skin: Skin is warm and dry.   Psychiatric: He has a normal mood and affect. His behavior is normal. Judgment and thought content normal.       Lab Results   Component Value Date    CHOL 156 04/05/2017    CHOL 157 11/16/2015    CHOL 168 10/07/2014    TRIG 208 (H) 04/05/2017    TRIG 161 (H) 11/16/2015    TRIG 228 (H) 10/07/2014    HDL 52 04/05/2017    HDL 56 11/16/2015    HDL 52 10/07/2014    TOTALCHOLEST 3.0 04/05/2017    TOTALCHOLEST 2.8 11/16/2015    TOTALCHOLEST 3.2 10/07/2014    NONHDLCHOL 104 04/05/2017    NONHDLCHOL 101 11/16/2015    NONHDLCHOL 116 10/07/2014       Lab Results   Component Value Date    HGBA1C 5.5 11/16/2015       Assessment:      ICD-10-CM ICD-9-CM   1. Statin intolerance Z78.9 995.27   2. Mixed hyperlipidemia E78.2 272.2   3. Prediabetes R73.03 790.29   4. BPH with elevated PSA N40.0 600.00    R97.20 790.93         Plan:  Continue current meds.  Since he is unable to tolerate statins it's okay to stay off of statin therapy.  Check labs as below  Please consider stopping meloxicam.  Please bring all medications next time so we can update his meds.  No orders of the defined types were placed in this encounter.      Current Outpatient Prescriptions   Medication Sig Dispense Refill    b complex vitamins tablet Take 1 tablet by mouth once daily.      ciclopirox (LOPROX) 0.77 % Crea       diclofenac sodium (VOLTAREN) 1 % Gel Apply 2 g topically once daily. 1 Tube 2    gabapentin (NEURONTIN) 100 MG capsule       hydrocodone-acetaminophen 7.5-325mg (NORCO) 7.5-325 mg per tablet Take 1 tablet by mouth up to 3 times a day as needed for pain 90 tablet 0    levocetirizine (XYZAL) 5 MG tablet Take 1 tablet (5 mg total) by mouth every evening. 30 tablet 5    lidocaine-prilocaine (EMLA) cream       meclizine (ANTIVERT) 25 mg tablet TAKE 1 TABLET THREE TIMES DAILY AS NEEDED 270 tablet 3    meloxicam (MOBIC)  7.5 MG tablet Take 1 tablet (7.5 mg total) by mouth 2 (two) times daily with meals. as needed for pain 60 tablet 2    omeprazole (PRILOSEC) 20 MG capsule TAKE 1 CAPSULE ONE TIME DAILY 90 capsule 3    ropinirole (REQUIP) 0.5 MG tablet Take 1 tablet (0.5 mg total) by mouth every evening. 90 tablet 3    tamsulosin (FLOMAX) 0.4 mg Cp24 TAKE 1 CAPSULE ONE TIME DAILY 90 capsule 3    traMADol (ULTRAM) 50 mg tablet Take 1 tablet (50 mg total) by mouth 3 (three) times daily as needed for Pain. 90 tablet 1    triamcinolone acetonide 0.1% (KENALOG) 0.1 % cream        No current facility-administered medications for this visit.        Medications Discontinued During This Encounter   Medication Reason    rosuvastatin (CRESTOR) 10 MG tablet     pravastatin (PRAVACHOL) 20 MG tablet        Return in about 6 months (around 6/14/2018).      Madie Davis MD

## 2017-12-15 ENCOUNTER — LAB VISIT (OUTPATIENT)
Dept: LAB | Facility: HOSPITAL | Age: 82
End: 2017-12-15
Attending: FAMILY MEDICINE
Payer: MEDICARE

## 2017-12-15 DIAGNOSIS — N40.0 BPH WITH ELEVATED PSA: ICD-10-CM

## 2017-12-15 DIAGNOSIS — E78.5 HYPERLIPIDEMIA, UNSPECIFIED HYPERLIPIDEMIA TYPE: ICD-10-CM

## 2017-12-15 DIAGNOSIS — D64.9 ANEMIA, UNSPECIFIED TYPE: ICD-10-CM

## 2017-12-15 DIAGNOSIS — R73.03 PREDIABETES: ICD-10-CM

## 2017-12-15 DIAGNOSIS — R97.20 BPH WITH ELEVATED PSA: ICD-10-CM

## 2017-12-15 LAB
ALBUMIN SERPL BCP-MCNC: 3.7 G/DL
ALP SERPL-CCNC: 62 U/L
ALT SERPL W/O P-5'-P-CCNC: 14 U/L
ANION GAP SERPL CALC-SCNC: 5 MMOL/L
AST SERPL-CCNC: 20 U/L
BASOPHILS # BLD AUTO: 0.04 K/UL
BASOPHILS NFR BLD: 0.8 %
BILIRUB SERPL-MCNC: 0.6 MG/DL
BUN SERPL-MCNC: 14 MG/DL
CALCIUM SERPL-MCNC: 9.7 MG/DL
CHLORIDE SERPL-SCNC: 104 MMOL/L
CHOLEST SERPL-MCNC: 214 MG/DL
CHOLEST/HDLC SERPL: 3.4 {RATIO}
CO2 SERPL-SCNC: 30 MMOL/L
COMPLEXED PSA SERPL-MCNC: 13.1 NG/ML
CREAT SERPL-MCNC: 0.9 MG/DL
DIFFERENTIAL METHOD: ABNORMAL
EOSINOPHIL # BLD AUTO: 0.2 K/UL
EOSINOPHIL NFR BLD: 3.1 %
ERYTHROCYTE [DISTWIDTH] IN BLOOD BY AUTOMATED COUNT: 12.7 %
EST. GFR  (AFRICAN AMERICAN): >60 ML/MIN/1.73 M^2
EST. GFR  (NON AFRICAN AMERICAN): >60 ML/MIN/1.73 M^2
ESTIMATED AVG GLUCOSE: 105 MG/DL
GLUCOSE SERPL-MCNC: 106 MG/DL
HBA1C MFR BLD HPLC: 5.3 %
HCT VFR BLD AUTO: 39.8 %
HDLC SERPL-MCNC: 63 MG/DL
HDLC SERPL: 29.4 %
HGB BLD-MCNC: 13.3 G/DL
IMM GRANULOCYTES # BLD AUTO: 0 K/UL
IMM GRANULOCYTES NFR BLD AUTO: 0 %
LDLC SERPL CALC-MCNC: 121.2 MG/DL
LYMPHOCYTES # BLD AUTO: 1.2 K/UL
LYMPHOCYTES NFR BLD: 24.4 %
MCH RBC QN AUTO: 30.9 PG
MCHC RBC AUTO-ENTMCNC: 33.4 G/DL
MCV RBC AUTO: 93 FL
MONOCYTES # BLD AUTO: 0.5 K/UL
MONOCYTES NFR BLD: 10.5 %
NEUTROPHILS # BLD AUTO: 3 K/UL
NEUTROPHILS NFR BLD: 61.2 %
NONHDLC SERPL-MCNC: 151 MG/DL
NRBC BLD-RTO: 0 /100 WBC
PLATELET # BLD AUTO: 230 K/UL
PMV BLD AUTO: 9.9 FL
POTASSIUM SERPL-SCNC: 5 MMOL/L
PROT SERPL-MCNC: 6.7 G/DL
RBC # BLD AUTO: 4.3 M/UL
SODIUM SERPL-SCNC: 139 MMOL/L
TRIGL SERPL-MCNC: 149 MG/DL
VIT B12 SERPL-MCNC: 1394 PG/ML
WBC # BLD AUTO: 4.87 K/UL

## 2017-12-15 PROCEDURE — 83036 HEMOGLOBIN GLYCOSYLATED A1C: CPT

## 2017-12-15 PROCEDURE — 84153 ASSAY OF PSA TOTAL: CPT

## 2017-12-15 PROCEDURE — 85025 COMPLETE CBC W/AUTO DIFF WBC: CPT

## 2017-12-15 PROCEDURE — 36415 COLL VENOUS BLD VENIPUNCTURE: CPT | Mod: PO

## 2017-12-15 PROCEDURE — 80061 LIPID PANEL: CPT

## 2017-12-15 PROCEDURE — 80053 COMPREHEN METABOLIC PANEL: CPT

## 2017-12-15 PROCEDURE — 82607 VITAMIN B-12: CPT

## 2018-01-18 ENCOUNTER — PES CALL (OUTPATIENT)
Dept: ADMINISTRATIVE | Facility: CLINIC | Age: 83
End: 2018-01-18

## 2018-03-07 RX ORDER — MELOXICAM 7.5 MG/1
TABLET ORAL
Qty: 60 TABLET | Refills: 0 | Status: SHIPPED | OUTPATIENT
Start: 2018-03-07 | End: 2018-04-10 | Stop reason: SDUPTHER

## 2018-03-23 ENCOUNTER — PES CALL (OUTPATIENT)
Dept: ADMINISTRATIVE | Facility: CLINIC | Age: 83
End: 2018-03-23

## 2018-04-10 RX ORDER — MELOXICAM 7.5 MG/1
TABLET ORAL
Qty: 60 TABLET | Refills: 0 | Status: SHIPPED | OUTPATIENT
Start: 2018-04-10 | End: 2018-05-24

## 2018-05-09 ENCOUNTER — OFFICE VISIT (OUTPATIENT)
Dept: FAMILY MEDICINE | Facility: CLINIC | Age: 83
End: 2018-05-09
Payer: MEDICARE

## 2018-05-09 VITALS
TEMPERATURE: 98 F | HEART RATE: 77 BPM | WEIGHT: 152.88 LBS | DIASTOLIC BLOOD PRESSURE: 58 MMHG | SYSTOLIC BLOOD PRESSURE: 158 MMHG | BODY MASS INDEX: 21.89 KG/M2 | OXYGEN SATURATION: 97 % | HEIGHT: 70 IN

## 2018-05-09 DIAGNOSIS — R03.0 ELEVATED BLOOD-PRESSURE READING WITHOUT DIAGNOSIS OF HYPERTENSION: ICD-10-CM

## 2018-05-09 DIAGNOSIS — J30.2 SEASONAL ALLERGIC RHINITIS, UNSPECIFIED TRIGGER: Primary | ICD-10-CM

## 2018-05-09 DIAGNOSIS — G89.29 CHRONIC MIDLINE LOW BACK PAIN WITHOUT SCIATICA: ICD-10-CM

## 2018-05-09 DIAGNOSIS — G25.81 RLS (RESTLESS LEGS SYNDROME): ICD-10-CM

## 2018-05-09 DIAGNOSIS — R42 VERTIGO: ICD-10-CM

## 2018-05-09 DIAGNOSIS — M17.0 PRIMARY OSTEOARTHRITIS OF BOTH KNEES: ICD-10-CM

## 2018-05-09 DIAGNOSIS — M54.50 CHRONIC MIDLINE LOW BACK PAIN WITHOUT SCIATICA: ICD-10-CM

## 2018-05-09 PROCEDURE — 3078F DIAST BP <80 MM HG: CPT | Mod: CPTII,S$GLB,, | Performed by: FAMILY MEDICINE

## 2018-05-09 PROCEDURE — 99214 OFFICE O/P EST MOD 30 MIN: CPT | Mod: S$GLB,,, | Performed by: FAMILY MEDICINE

## 2018-05-09 PROCEDURE — 3077F SYST BP >= 140 MM HG: CPT | Mod: CPTII,S$GLB,, | Performed by: FAMILY MEDICINE

## 2018-05-09 PROCEDURE — 99999 PR PBB SHADOW E&M-EST. PATIENT-LVL III: CPT | Mod: PBBFAC,,, | Performed by: FAMILY MEDICINE

## 2018-05-09 RX ORDER — LORATADINE 10 MG/1
10 TABLET ORAL DAILY
Qty: 30 TABLET | Refills: 1 | COMMUNITY
Start: 2018-05-09 | End: 2018-10-08

## 2018-05-09 NOTE — PATIENT INSTRUCTIONS
Seasonal Allergy  Seasonal allergy is also called hay fever. It may occur after a person is exposed to pollens released from grasses, weeds, trees and shrubs. This type of allergy occurs during the spring and summer when the pollen contacts the lining of the nose, eyes, eyelids, sinuses and throat. This causes histamine to be released from the tissues. Histamine causes itching and swelling. This may produce a watery discharge from the eyes or nose. Violent sneezing, nasal congestion, post-nasal drip, itching of the eyes, nose, throat and mouth, scratchy throat, and dry cough may also occur.  Home care  Seasonal allergy cannot be cured, but symptoms can be reduced by these measures:  · Avoid or reduce exposure to the allergen as much as you can:    ¨ Stay indoors on windy days of pollen season.   ¨ Keep windows and doors closed. Use air conditioning instead in your home and car. This filters the air.  ¨ Change air conditioner filters often.  ¨ Take a shower, wash your hair, and change clothes after being outdoors.  ¨ Put on a NIOSH-rated 95 filter mask when working outdoors. Before going outside, take your allergy medicine as advised by your healthcare provider.  · Decongestant pills and sprays reduce tissue swelling and watery discharge. Overuse of nasal decongestant sprays may make symptoms worse. Do not use these more often than recommended. Sometimes you can experience a rebound effect (symptoms worsen), when stopping them. Talk to your healthcare provider or pharmacist about these medicines before taking them, especially if you have high blood pressure or heart problems.   · Antihistamines block the release of histamine during the allergic response. They work better when taken before symptoms develop. Unless a prescription antihistamine was prescribed, you can take over-the-counter antihistamines that do not cause drowsiness.  Ask your pharmacist for suggestions.  · Steroid nasal sprays or oral steroids may  also be prescribed for more severe symptoms. These help to reduce the local inflammation that can add to the allergic response.  · If you have asthma, pollen season may make your asthma symptoms worse. It is important that you use your asthma medicines as directed during this time to prevent or treat attacks. Some persons with asthma have asthma symptoms that get worse when they take antihistamines. This is due to the drying effect on the lungs. If you notice this, stop the antihistamines, drink extra fluids and notify your doctor.  · If you have sinus congestion or drainage, a saline nasal rinse may give relief. A saline nasal rinse lessens the swelling and clears excess mucus. This allows sinuses to drain. Prepackaged kits are sold at most drug stores. These contain pre-mixed salt packets and an irrigation device.  Follow-up care  Follow up with your healthcare provider or as directed. If you have been referred to a specialist, make an appointment promptly.  When to seek medical advice  Call your healthcare provider for any of the following:  · Facial, ear or sinus pain; colored drainage from the nose  · Headaches  · You have asthma and your asthma symptoms do not respond to the usual doses of your medicine  · Cough with colored sputum (mucus)  · Fever of 100.4°F (38°C) or higher, or as directed by the healthcare provider  Call 911 if any of these occur:  · Trouble breathing or swallowing, wheezing  · Hoarse voice, trouble speaking, or drooling  · Confusion  · Very drowsy or trouble awakening  · Fainting or loss of consciousness  · Rapid heart rate, or weak pulse  · Low blood pressure  · Feeling of doom  · Nausea, vomiting, abdominal pain, diarrhea  · Vomiting blood, or large amounts of blood in stool  · Seizure  · Cold, moist, or pale (blue in color) skin  Date Last Reviewed: 5/1/2017  © 3736-5450 [employer]. [address] [place] [address], [place] [address]. All rights reserved. This information is not  intended as a substitute for professional medical care. Always follow your healthcare professional's instructions.

## 2018-05-09 NOTE — PROGRESS NOTES
Subjective:       Patient ID: Dominguez Ritchie is a 85 y.o. male.    Chief Complaint: URI; Headache; and Hypertension  C/O URI x 3 days. Associated with nasal congestion. He was taking mucinex DM twice a day. Until this morning, he noticed a headache-frontal dull headache and checked his blood pressure and it was elevated 160/50. Advocates allergy sxs at this time of the year and if he cuts grass. Not on any other medication. Prior to this he would take mucinex DM for 3 days and the sxs would resolve. He has not taken med for the headache.  Elevated Pressure without the diagnosis of HTN. Denies weakness, blurry vision and chest pain.  He has vertigo for which he takes meclizine prn.  He has chronic back and knee pain  for which he takes mobic and norco as needed for pain and he has some at home. Has a cane at home and plans to have a stool handy for the marriage ceremony.  Has restless leg syndrome for which he does not want to take medication-ropinole anymore  He denies fever, sinus pain and dyspnea.  Denies heart disease and No lung disease.     He is getting  on Saturday with a plan to go driving around [place] and stay in a hotel for the weekend. Cristobal came with him to the visit.    Past Medical History:   Diagnosis Date    Abnormal exercise tolerance test 7/25/2013    Arthritis     Colon polyp     Diverticulosis     Dyslipidemia 7/25/2013    GERD (gastroesophageal reflux disease)     Hyperlipidemia 1980    HIGH TG    Knee pain, right 6/14/2013    Meningioma     Personal history of colonic polyps 5/27/2014    RLS (restless legs syndrome) 6/14/2013    Tobacco dependence     resolved    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     Family History   Problem Relation Age of Onset    Heart disease Mother     Cancer Son         pancreatic     Diabetes Maternal Uncle     Kidney disease Neg Hx     Stroke Neg Hx      Social History     Social History    Marital status:      Spouse name: N/A  "   Number of children: N/A    Years of education: N/A     Occupational History    Not on file.     Social History Main Topics    Smoking status: Former Smoker     Packs/day: 1.00     Years: 25.00     Quit date: 1/1/1990    Smokeless tobacco: Never Used    Alcohol use No    Drug use: No    Sexual activity: Not Currently     Birth control/ protection: None     Other Topics Concern    Not on file     Social History Narrative    No narrative on file       Review of Systems   Constitutional: Negative for activity change, appetite change and fever.   HENT: Positive for congestion. Negative for facial swelling and voice change.         Hard of hearing   Eyes: Negative for discharge and itching.   Respiratory: Positive for cough. Negative for chest tightness, shortness of breath and wheezing.    Cardiovascular: Negative.  Negative for chest pain and leg swelling.   Gastrointestinal: Negative for abdominal pain, nausea and vomiting.   Endocrine: Negative for cold intolerance and heat intolerance.   Genitourinary: Negative for dysuria and flank pain.   Musculoskeletal: Positive for arthralgias, back pain and gait problem. Negative for myalgias and neck stiffness.   Skin: Negative for pallor and rash.   Neurological: Negative for facial asymmetry and weakness.   Psychiatric/Behavioral: Negative for agitation and confusion.       Objective:      BP (!) 158/58 (BP Location: Left arm, Patient Position: Sitting, BP Method: Medium (Manual))   Pulse 77   Temp 97.6 °F (36.4 °C) (Oral)   Ht 5' 10" (1.778 m)   Wt 69.3 kg (152 lb 14.2 oz)   SpO2 97%   BMI 21.94 kg/m²   Results for orders placed or performed in visit on 12/15/17   CBC auto differential   Result Value Ref Range    WBC 4.87 3.90 - 12.70 K/uL    RBC 4.30 (L) 4.60 - 6.20 M/uL    Hemoglobin 13.3 (L) 14.0 - 18.0 g/dL    Hematocrit 39.8 (L) 40.0 - 54.0 %    MCV 93 82 - 98 fL    MCH 30.9 27.0 - 31.0 pg    MCHC 33.4 32.0 - 36.0 g/dL    RDW 12.7 11.5 - 14.5 %    " Platelets 230 150 - 350 K/uL    MPV 9.9 9.2 - 12.9 fL    Immature Granulocytes 0.0 0.0 - 0.5 %    Gran # (ANC) 3.0 1.8 - 7.7 K/uL    Immature Grans (Abs) 0.00 0.00 - 0.04 K/uL    Lymph # 1.2 1.0 - 4.8 K/uL    Mono # 0.5 0.3 - 1.0 K/uL    Eos # 0.2 0.0 - 0.5 K/uL    Baso # 0.04 0.00 - 0.20 K/uL    nRBC 0 0 /100 WBC    Gran% 61.2 38.0 - 73.0 %    Lymph% 24.4 18.0 - 48.0 %    Mono% 10.5 4.0 - 15.0 %    Eosinophil% 3.1 0.0 - 8.0 %    Basophil% 0.8 0.0 - 1.9 %    Differential Method Automated    Comprehensive metabolic panel   Result Value Ref Range    Sodium 139 136 - 145 mmol/L    Potassium 5.0 3.5 - 5.1 mmol/L    Chloride 104 95 - 110 mmol/L    CO2 30 (H) 23 - 29 mmol/L    Glucose 106 70 - 110 mg/dL    BUN, Bld 14 8 - 23 mg/dL    Creatinine 0.9 0.5 - 1.4 mg/dL    Calcium 9.7 8.7 - 10.5 mg/dL    Total Protein 6.7 6.0 - 8.4 g/dL    Albumin 3.7 3.5 - 5.2 g/dL    Total Bilirubin 0.6 0.1 - 1.0 mg/dL    Alkaline Phosphatase 62 55 - 135 U/L    AST 20 10 - 40 U/L    ALT 14 10 - 44 U/L    Anion Gap 5 (L) 8 - 16 mmol/L    eGFR if African American >60.0 >60 mL/min/1.73 m^2    eGFR if non African American >60.0 >60 mL/min/1.73 m^2   Lipid panel   Result Value Ref Range    Cholesterol 214 (H) 120 - 199 mg/dL    Triglycerides 149 30 - 150 mg/dL    HDL 63 40 - 75 mg/dL    LDL Cholesterol 121.2 63.0 - 159.0 mg/dL    HDL/Chol Ratio 29.4 20.0 - 50.0 %    Total Cholesterol/HDL Ratio 3.4 2.0 - 5.0    Non-HDL Cholesterol 151 mg/dL   Hemoglobin A1c   Result Value Ref Range    Hemoglobin A1C 5.3 4.0 - 5.6 %    Estimated Avg Glucose 105 68 - 131 mg/dL   Vitamin B12   Result Value Ref Range    Vitamin B-12 1394 (H) 210 - 950 pg/mL   PSA, Screening   Result Value Ref Range    PSA, SCREEN 13.1 (H) 0.00 - 4.00 ng/mL     Physical Exam   Constitutional: He is oriented to person, place, and time. He appears well-developed and well-nourished.   HENT:   Head: Normocephalic and atraumatic.   Right Ear: External ear normal.   Left Ear: External ear  normal.   Eyes: Conjunctivae are normal. Right eye exhibits no discharge. Left eye exhibits no discharge.   Neck: Neck supple.   Cardiovascular: Normal rate, regular rhythm and normal heart sounds.    Pulmonary/Chest: Effort normal and breath sounds normal. No respiratory distress. He exhibits no tenderness.   Abdominal: Soft. Normal appearance and bowel sounds are normal. He exhibits no distension. There is no hepatosplenomegaly. There is no tenderness.   Musculoskeletal:        Right knee: He exhibits decreased range of motion.        Left knee: He exhibits decreased range of motion.        Lumbar back: He exhibits decreased range of motion.   Lymphadenopathy:     He has no cervical adenopathy.   Neurological: He is alert and oriented to person, place, and time.   Skin: Skin is warm and dry.   Psychiatric: He has a normal mood and affect. His behavior is normal.   Nursing note and vitals reviewed.      Assessment:       1. Seasonal allergic rhinitis, unspecified trigger    2. Elevated blood-pressure reading without diagnosis of hypertension    3. Vertigo    4. Chronic midline low back pain without sciatica    5. Primary osteoarthritis of both knees    6. RLS (restless legs syndrome)        Plan:   Seasonal allergic rhinitis, unspecified trigger with headache  -stop mucinex DM-ramping up the blood pressure.  -already has Mobic and Norco at come, ok to take those for pain as ordered  -     loratadine (CLARITIN) 10 mg tablet; Take 1 tablet (10 mg total) by mouth once daily.  Dispense: 30 tablet; Refill: 1    Elevated blood-pressure reading without diagnosis of hypertension  See note above.  Monitor at home and record.Bring to the clinic on your next visit.    20 minutes was spent in face to face with the patient and over 50% of the time was spent in explaining the process of allergies, complications, treatment and home management of cough and other sxs.

## 2018-05-24 RX ORDER — MELOXICAM 7.5 MG/1
TABLET ORAL
Qty: 60 TABLET | Refills: 0 | Status: SHIPPED | OUTPATIENT
Start: 2018-05-24 | End: 2018-07-12 | Stop reason: SDUPTHER

## 2018-06-25 ENCOUNTER — TELEPHONE (OUTPATIENT)
Dept: PAIN MEDICINE | Facility: CLINIC | Age: 83
End: 2018-06-25

## 2018-06-25 NOTE — TELEPHONE ENCOUNTER
----- Message from Arlet Gray sent at 6/25/2018  3:39 PM CDT -----  Pt is requesting a call from nurse to discuss a medication. Pt is requesting an order of 90 supply of meloxican       Please call pt back at [phone]    [facility] - [place], [place] - [address]  [address] [address]  Phone: [phone] Fax: [phone]

## 2018-06-26 ENCOUNTER — TELEPHONE (OUTPATIENT)
Dept: PAIN MEDICINE | Facility: CLINIC | Age: 83
End: 2018-06-26

## 2018-06-26 NOTE — TELEPHONE ENCOUNTER
----- Message from Yadi Forde PA-C sent at 6/26/2018 10:47 AM CDT -----  He really needs to come in for a visit, hasnt been seen since November.    ----- Message -----  From: Ruthie Lorenzo MA  Sent: 6/25/2018   4:04 PM  To: Yadi Forde PA-C        ----- Message -----  From: Arlet Gray  Sent: 6/25/2018   3:39 PM  To: Maurisio JUAREZ Staff    Pt is requesting a call from nurse to discuss a medication. Pt is requesting an order of 90 supply of meloxican       Please call pt back at [phone]    [facility] - [place], [place] - [address] [facility] [facility]  [address]  [facility] [facility] [address]  Phone: [phone] Fax: [phone]

## 2018-07-12 ENCOUNTER — OFFICE VISIT (OUTPATIENT)
Dept: PAIN MEDICINE | Facility: CLINIC | Age: 83
End: 2018-07-12
Payer: MEDICARE

## 2018-07-12 ENCOUNTER — HOSPITAL ENCOUNTER (OUTPATIENT)
Dept: RADIOLOGY | Facility: HOSPITAL | Age: 83
Discharge: HOME | End: 2018-07-12
Attending: PHYSICIAN ASSISTANT
Payer: MEDICARE

## 2018-07-12 VITALS
WEIGHT: 156 LBS | BODY MASS INDEX: 22.33 KG/M2 | SYSTOLIC BLOOD PRESSURE: 135 MMHG | RESPIRATION RATE: 18 BRPM | HEIGHT: 70 IN | DIASTOLIC BLOOD PRESSURE: 63 MMHG | HEART RATE: 69 BPM

## 2018-07-12 DIAGNOSIS — M96.1 FAILED BACK SYNDROME OF LUMBAR SPINE: ICD-10-CM

## 2018-07-12 DIAGNOSIS — M47.817 SPONDYLOSIS OF LUMBOSACRAL REGION WITHOUT MYELOPATHY OR RADICULOPATHY: ICD-10-CM

## 2018-07-12 DIAGNOSIS — M54.16 BILATERAL LUMBAR RADICULOPATHY: ICD-10-CM

## 2018-07-12 DIAGNOSIS — M17.12 PRIMARY OSTEOARTHRITIS OF LEFT KNEE: ICD-10-CM

## 2018-07-12 DIAGNOSIS — M17.12 PRIMARY OSTEOARTHRITIS OF LEFT KNEE: Primary | ICD-10-CM

## 2018-07-12 DIAGNOSIS — M51.36 DDD (DEGENERATIVE DISC DISEASE), LUMBAR: ICD-10-CM

## 2018-07-12 PROCEDURE — 3078F DIAST BP <80 MM HG: CPT | Mod: CPTII,S$GLB,, | Performed by: PHYSICIAN ASSISTANT

## 2018-07-12 PROCEDURE — 3075F SYST BP GE 130 - 139MM HG: CPT | Mod: CPTII,S$GLB,, | Performed by: PHYSICIAN ASSISTANT

## 2018-07-12 PROCEDURE — 73564 X-RAY EXAM KNEE 4 OR MORE: CPT | Mod: 26,50,, | Performed by: RADIOLOGY

## 2018-07-12 PROCEDURE — 99999 PR PBB SHADOW E&M-EST. PATIENT-LVL V: CPT | Mod: PBBFAC,,, | Performed by: PHYSICIAN ASSISTANT

## 2018-07-12 PROCEDURE — 73564 X-RAY EXAM KNEE 4 OR MORE: CPT | Mod: TC,50

## 2018-07-12 PROCEDURE — 99214 OFFICE O/P EST MOD 30 MIN: CPT | Mod: S$GLB,,, | Performed by: PHYSICIAN ASSISTANT

## 2018-07-12 RX ORDER — MELOXICAM 7.5 MG/1
TABLET ORAL
Qty: 180 TABLET | Refills: 0 | Status: SHIPPED | OUTPATIENT
Start: 2018-07-12 | End: 2018-09-07 | Stop reason: SDUPTHER

## 2018-07-12 RX ORDER — TRAMADOL HYDROCHLORIDE 50 MG/1
50 TABLET ORAL EVERY 8 HOURS PRN
Qty: 90 TABLET | Refills: 1 | Status: SHIPPED | OUTPATIENT
Start: 2018-07-12 | End: 2018-07-12 | Stop reason: SDUPTHER

## 2018-07-12 RX ORDER — TRAMADOL HYDROCHLORIDE 50 MG/1
50 TABLET ORAL EVERY 8 HOURS PRN
Qty: 90 TABLET | Refills: 1 | Status: SHIPPED | OUTPATIENT
Start: 2018-07-12 | End: 2018-08-11

## 2018-07-13 NOTE — PROGRESS NOTES
Chief Pain Complaint:  Low Back Pain, Bilat Hip pain, Right leg pain, right knee pain    History of Present Illness:   This patient is a 85 y.o. male who presents today complaining of the above noted pain/s. The patient describes the pain as follows.    - duration of pain: chronic pain   - timing: constant   - character: aching, sharp  - radiating, dermatomal: extends into right leg    - antecedent trauma, prior spinal surgery: patient reports prior trauma, prior lumbar surgery (surgery in January 2017 with Dr. Lancaster at [facility]), left knee replacement  - pertinent negatives: No fever, No chills, No weight loss, No bladder dysfunction, No bowel dysfunction, No saddle anesthesia  - pertinent positives: generalized nonspecific Lower Extremity weakness bilaterally, BLE numbness  - medications, other therapies tried (physical therapy, injections):     >> Tylenol, NSAIDs, gabapentin, Mobic, tramadol, Norco    >> Has previously undergone Physical Therapy    >> Has previously undergone spinal injection/s    - has undergone approximately 3-4 spinal injections previously (uncertain as to the specific type of injections that he has had, seems one was a lumbar rhizotomy)        Imaging / Labs / Studies (reviewed on 7/13/2018):    Results for orders placed during the hospital encounter of 11/16/15   X-Ray Lumbar Spine Complete 5 View    Narrative Five views of the lumbar spine  Findings: There is increased vertebral body height loss noted at the L1 level.  50% vertebrae height loss is noted anteriorly at this level.  The alignment is within normal limits. There is moderate disk space narrowing noted at the L4-5 level.  Mild/moderate facet arthropathy is noted from L2-3 through L5-S1 levels. Calcified granulomas are noted within the spleen. No pars defects.       Results for orders placed during the hospital encounter of 03/26/15   X-Ray Lumbar Spine AP And Lateral    Narrative Three views of the lumbar spine  Findings: There is a  "compression L1 level that is new when compared to the prior exam and demonstrates approximately 25% vertebral height loss.  There is no obvious condensation line seen on the plain films suggesting that this is still a chronic deformity.  The alignment is grossly within normal limits.  There is multilevel spondylosis with mild to moderate to space narrowing noted throughout the lumbar spine greatest at L4-5 level.  Facet arthropathy is noted from the L2-3 through the L5-S1 levels and most prominent at the L5-S1 level.         Review of Systems:  CONSTITUTIONAL: patient denies any fever, chills, or weight loss  SKIN: patient denies any rash or itching  RESPIRATORY: patient denies having any shortness of breath  GASTROINTESTINAL: patient denies having any diarrhea, constipation, or bowel incontinence  GENITOURINARY: patient denies having any abnormal bladder function    MUSCULOSKELETAL:  - patient complains of the above noted pain/s (see chief pain complaint)    NEUROLOGICAL:   - pain as above  - strength in Lower extremities is decreased, BILATERALLY  - sensation in Lower extremities is abnormal, on the LEFT  - patient denies any loss of bowel or bladder control      PSYCHIATRIC: patient denies any change in mood    Other:  All other systems reviewed and are negative      Physical Exam:  Vitals:  /63 (BP Location: Right arm, Patient Position: Sitting, BP Method: Medium (Automatic))   Pulse 69   Resp 18   Ht 5' 10" (1.778 m)   Wt 70.8 kg (156 lb)   BMI 22.38 kg/m²    (reviewed on 7/13/2018)    General: alert and oriented, in no apparent distress  Gait: normal gait  Skin: No rashes, No discoloration, No obvious lesions  HEENT: EOMI  Cardiovascular: no significant peripheral edema present  Respiratory: respirations nonlabored    Musculoskeletal:  - Any pain on flexion, extension, rotation:    >> pain on extension and rotation of lumbar spine  - Straight Leg Raise:     >> LEFT :: negative    >> RIGHT :: " positive  - Any tenderness to palpation across paraspinal muscles, joints, bursae:     >> across lumbar paraspinals    Neuro:  - Extremity Strength:     >> LEFT :: dec with dorsiflexion    >> RIGHT :: dec with dorsiflexion  - Extremity Reflexes:    >> LEFT  :: 2+    >> RIGHT :: 2+     Psych:  Mood and affect is appropriate    _________________________________________________________________________________________________________________________________________________________________________________________________________________________      Assessment:  Demottesaurabh Ritchie is a 85 y.o. male who presents with    ICD-10-CM ICD-9-CM   1. Primary osteoarthritis of left knee M17.12 715.16   2. Spondylosis of lumbosacral region without myelopathy or radiculopathy M47.817 721.3   3. Bilateral lumbar radiculopathy M54.16 724.4   4. DDD (degenerative disc disease), lumbar M51.36 722.52   5. Failed back syndrome of lumbar spine M96.1 722.83     Patient returns for follow-up visit.  He complains of continued low back and right leg pain.  He has been seen at the [facility], underwent a spinal injection that did not help, and then ultimately underwent surgery (unsure of type) with Dr. Lancaster in January 2017.  We previously requested records multiple times from the [facility], however we have not received these. 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 He had treatment for this, but he reports the damage was already present by the time it was discovered.     Plan:  1. Interventional: Schedule right knee genicular nerve block with RN IV sedation.    2. Pharmacologic:   - Refill tramadol 50mg TID PRN pain. He takes very sparingly.   - Will refill Mobic at 7.5mg QD PRN dose. He denies any kidney or cardiac issues and has no history of gastric ulcers. Most recent labs reviewed.  - Opioid contract to be signed next visit with Dr. Sadler.   - MIRA TORRES reviewed and  appropriate. Last filled on 3-27-18.     3. Rehabilitative: Encouraged regular exercise.    4. Diagnostic:   - Order bilateral knee x-ray.  - Consider ordering lumbar MRI to evaluate radiculopathy.     5. Follow up: 3-4 weeks post-injection/ med refill with Dr. Sadler to establish care    - I discussed the risks, benefits, and alternatives to potential treatment options. All questions and concerns were fully addressed today in clinic. Dr. Sadler was consulted regarding the patient plan and agrees.

## 2018-07-20 ENCOUNTER — OFFICE VISIT (OUTPATIENT)
Dept: FAMILY MEDICINE | Facility: CLINIC | Age: 83
End: 2018-07-20
Payer: MEDICARE

## 2018-07-20 VITALS
OXYGEN SATURATION: 96 % | TEMPERATURE: 98 F | DIASTOLIC BLOOD PRESSURE: 61 MMHG | SYSTOLIC BLOOD PRESSURE: 127 MMHG | HEIGHT: 70 IN | BODY MASS INDEX: 22.27 KG/M2 | HEART RATE: 59 BPM | WEIGHT: 155.56 LBS

## 2018-07-20 DIAGNOSIS — Z12.5 ENCOUNTER FOR SCREENING FOR MALIGNANT NEOPLASM OF PROSTATE: ICD-10-CM

## 2018-07-20 DIAGNOSIS — E78.00 PURE HYPERCHOLESTEROLEMIA: ICD-10-CM

## 2018-07-20 DIAGNOSIS — N40.0 BPH WITH ELEVATED PSA: ICD-10-CM

## 2018-07-20 DIAGNOSIS — R73.03 PREDIABETES: ICD-10-CM

## 2018-07-20 DIAGNOSIS — H60.391 OTHER INFECTIVE CHRONIC OTITIS EXTERNA OF RIGHT EAR: Primary | ICD-10-CM

## 2018-07-20 DIAGNOSIS — R97.20 BPH WITH ELEVATED PSA: ICD-10-CM

## 2018-07-20 PROCEDURE — 3078F DIAST BP <80 MM HG: CPT | Mod: CPTII,S$GLB,, | Performed by: FAMILY MEDICINE

## 2018-07-20 PROCEDURE — 99214 OFFICE O/P EST MOD 30 MIN: CPT | Mod: S$GLB,,, | Performed by: FAMILY MEDICINE

## 2018-07-20 PROCEDURE — 99999 PR PBB SHADOW E&M-EST. PATIENT-LVL III: CPT | Mod: PBBFAC,,, | Performed by: FAMILY MEDICINE

## 2018-07-20 PROCEDURE — 3074F SYST BP LT 130 MM HG: CPT | Mod: CPTII,S$GLB,, | Performed by: FAMILY MEDICINE

## 2018-07-20 RX ORDER — NEOMYCIN SULFATE, POLYMYXIN B SULFATE, HYDROCORTISONE 3.5; 10000; 1 MG/ML; [USP'U]/ML; MG/ML
3 SOLUTION/ DROPS AURICULAR (OTIC) EVERY 6 HOURS
Qty: 10 ML | Refills: 2 | Status: SHIPPED | OUTPATIENT
Start: 2018-07-20 | End: 2018-07-27

## 2018-07-20 NOTE — PATIENT INSTRUCTIONS

## 2018-07-22 NOTE — PROGRESS NOTES
Subjective:       Patient ID: Dominguez Ritchie is a 85 y.o. male.    Chief Complaint: Otalgia      Right ear infection: recurrent otitis externa of the right ear. He has hearing loss and uses hearing aid. He denies fever and nasal congestion.    HLD: He has hyperlipidemia per computer record, and he is due for recheck.    He has BPH with elevated PSA. He is not on medication for his BPH due to intolerance.     Prediabetes: chronic, stable and is due for recheck.    Past Medical History:   Diagnosis Date    Abnormal exercise tolerance test 7/25/2013    Arthritis     Colon polyp     Diverticulosis     Dyslipidemia 7/25/2013    GERD (gastroesophageal reflux disease)     Hyperlipidemia 1980    HIGH TG    Knee pain, right 6/14/2013    Meningioma     Personal history of colonic polyps 5/27/2014    RLS (restless legs syndrome) 6/14/2013    Tobacco dependence     resolved    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     Family History   Problem Relation Age of Onset    Heart disease Mother     Cancer Son         pancreatic     Diabetes Maternal Uncle     Kidney disease Neg Hx     Stroke Neg Hx      Social History     Social History    Marital status:      Spouse name: N/A    Number of children: N/A    Years of education: N/A     Occupational History    Not on file.     Social History Main Topics    Smoking status: Former Smoker     Packs/day: 1.00     Years: 25.00     Quit date: 1/1/1990    Smokeless tobacco: Never Used    Alcohol use No    Drug use: No    Sexual activity: Not Currently     Birth control/ protection: None     Other Topics Concern    Not on file     Social History Narrative    No narrative on file       Review of Systems   Constitutional: Negative for appetite change and fever.   HENT: Negative for congestion, facial swelling and voice change.    Eyes: Negative for discharge and itching.   Respiratory: Negative for cough, chest tightness and wheezing.    Cardiovascular:  "Negative.  Negative for chest pain and leg swelling.   Gastrointestinal: Negative for abdominal pain, nausea and vomiting.   Endocrine: Negative for cold intolerance and heat intolerance.   Genitourinary: Negative for dysuria and flank pain.   Musculoskeletal: Negative for myalgias and neck stiffness.   Skin: Negative for pallor and rash.   Neurological: Negative for facial asymmetry and weakness.   Psychiatric/Behavioral: Negative for agitation and confusion.       Objective:      /61 (BP Location: Left arm, Patient Position: Sitting, BP Method: Medium (Automatic))   Pulse (!) 59   Temp 98.1 °F (36.7 °C) (Oral)   Ht 5' 10" (1.778 m)   Wt 70.5 kg (155 lb 8.6 oz)   SpO2 96%   BMI 22.32 kg/m²   Results for orders placed or performed in visit on 12/15/17   CBC auto differential   Result Value Ref Range    WBC 4.87 3.90 - 12.70 K/uL    RBC 4.30 (L) 4.60 - 6.20 M/uL    Hemoglobin 13.3 (L) 14.0 - 18.0 g/dL    Hematocrit 39.8 (L) 40.0 - 54.0 %    MCV 93 82 - 98 fL    MCH 30.9 27.0 - 31.0 pg    MCHC 33.4 32.0 - 36.0 g/dL    RDW 12.7 11.5 - 14.5 %    Platelets 230 150 - 350 K/uL    MPV 9.9 9.2 - 12.9 fL    Immature Granulocytes 0.0 0.0 - 0.5 %    Gran # (ANC) 3.0 1.8 - 7.7 K/uL    Immature Grans (Abs) 0.00 0.00 - 0.04 K/uL    Lymph # 1.2 1.0 - 4.8 K/uL    Mono # 0.5 0.3 - 1.0 K/uL    Eos # 0.2 0.0 - 0.5 K/uL    Baso # 0.04 0.00 - 0.20 K/uL    nRBC 0 0 /100 WBC    Gran% 61.2 38.0 - 73.0 %    Lymph% 24.4 18.0 - 48.0 %    Mono% 10.5 4.0 - 15.0 %    Eosinophil% 3.1 0.0 - 8.0 %    Basophil% 0.8 0.0 - 1.9 %    Differential Method Automated    Comprehensive metabolic panel   Result Value Ref Range    Sodium 139 136 - 145 mmol/L    Potassium 5.0 3.5 - 5.1 mmol/L    Chloride 104 95 - 110 mmol/L    CO2 30 (H) 23 - 29 mmol/L    Glucose 106 70 - 110 mg/dL    BUN, Bld 14 8 - 23 mg/dL    Creatinine 0.9 0.5 - 1.4 mg/dL    Calcium 9.7 8.7 - 10.5 mg/dL    Total Protein 6.7 6.0 - 8.4 g/dL    Albumin 3.7 3.5 - 5.2 g/dL    Total " Bilirubin 0.6 0.1 - 1.0 mg/dL    Alkaline Phosphatase 62 55 - 135 U/L    AST 20 10 - 40 U/L    ALT 14 10 - 44 U/L    Anion Gap 5 (L) 8 - 16 mmol/L    eGFR if African American >60.0 >60 mL/min/1.73 m^2    eGFR if non African American >60.0 >60 mL/min/1.73 m^2   Lipid panel   Result Value Ref Range    Cholesterol 214 (H) 120 - 199 mg/dL    Triglycerides 149 30 - 150 mg/dL    HDL 63 40 - 75 mg/dL    LDL Cholesterol 121.2 63.0 - 159.0 mg/dL    HDL/Chol Ratio 29.4 20.0 - 50.0 %    Total Cholesterol/HDL Ratio 3.4 2.0 - 5.0    Non-HDL Cholesterol 151 mg/dL   Hemoglobin A1c   Result Value Ref Range    Hemoglobin A1C 5.3 4.0 - 5.6 %    Estimated Avg Glucose 105 68 - 131 mg/dL   Vitamin B12   Result Value Ref Range    Vitamin B-12 1394 (H) 210 - 950 pg/mL   PSA, Screening   Result Value Ref Range    PSA, SCREEN 13.1 (H) 0.00 - 4.00 ng/mL     Physical Exam   Constitutional: He is oriented to person, place, and time. He appears well-developed. No distress.   HENT:   Head: Normocephalic and atraumatic.   Right Ear: External ear normal.   Left Ear: External ear normal.   Right ear canal with erythema, no drainage.   Eyes: Conjunctivae and EOM are normal.   Neck: Neck supple.   Cardiovascular: Normal rate and regular rhythm.    Pulmonary/Chest: Effort normal. No respiratory distress.   Abdominal: Soft. Normal appearance and bowel sounds are normal. There is no hepatosplenomegaly.   Genitourinary:   Genitourinary Comments: deferred   Musculoskeletal: He exhibits no edema.   Neurological: He is alert and oriented to person, place, and time.   Skin: Skin is warm and dry.   Psychiatric: He has a normal mood and affect. His behavior is normal.   Nursing note and vitals reviewed.      Assessment:       1. Other infective chronic otitis externa of right ear    2. Prediabetes    3. BPH with elevated PSA    4. Pure hypercholesterolemia    5. Encounter for screening for malignant neoplasm of prostate         Plan:       Other infective  chronic otitis externa of right ear  -     neomycin-polymyxin-hydrocortisone (CORTISPORIN) otic solution; Place 3 drops into the right ear every 6 (six) hours. for 7 days  Dispense: 10 mL; Refill: 2    Prediabetes  -     CBC auto differential; Future; Expected date: 07/21/2018  -     Comprehensive metabolic panel; Future; Expected date: 07/21/2018  -     Hemoglobin A1c; Future; Expected date: 07/21/2018    BPH with elevated PSA  -     PSA, Screening; Future; Expected date: 07/21/2018, lab is due in 12/18    Pure hypercholesterolemia  -     Lipid panel; Future; Expected date: 07/20/2018    Encounter for screening for malignant neoplasm of prostate   -     PSA, Screening; Future; Expected date: 07/21/2018

## 2018-07-25 ENCOUNTER — HOSPITAL ENCOUNTER (OUTPATIENT)
Facility: HOSPITAL | Age: 83
Discharge: HOME | End: 2018-07-25
Attending: ANESTHESIOLOGY | Admitting: ANESTHESIOLOGY
Payer: MEDICARE

## 2018-07-25 ENCOUNTER — SURGERY (OUTPATIENT)
Age: 83
End: 2018-07-25

## 2018-07-25 VITALS
OXYGEN SATURATION: 97 % | TEMPERATURE: 98 F | HEART RATE: 65 BPM | SYSTOLIC BLOOD PRESSURE: 162 MMHG | RESPIRATION RATE: 17 BRPM | DIASTOLIC BLOOD PRESSURE: 71 MMHG

## 2018-07-25 DIAGNOSIS — M17.12 PRIMARY OSTEOARTHRITIS OF LEFT KNEE: ICD-10-CM

## 2018-07-25 PROCEDURE — 64450 NJX AA&/STRD OTHER PN/BRANCH: CPT | Mod: RT,,, | Performed by: ANESTHESIOLOGY

## 2018-07-25 PROCEDURE — 25000003 PHARM REV CODE 250

## 2018-07-25 PROCEDURE — 25000003 PHARM REV CODE 250: Performed by: ANESTHESIOLOGY

## 2018-07-25 PROCEDURE — 63600175 PHARM REV CODE 636 W HCPCS: Performed by: ANESTHESIOLOGY

## 2018-07-25 PROCEDURE — 63600175 PHARM REV CODE 636 W HCPCS

## 2018-07-25 RX ORDER — METHYLPREDNISOLONE ACETATE 40 MG/ML
INJECTION, SUSPENSION INTRA-ARTICULAR; INTRALESIONAL; INTRAMUSCULAR; SOFT TISSUE
Status: DISCONTINUED | OUTPATIENT
Start: 2018-07-25 | End: 2018-07-25 | Stop reason: HOSPADM

## 2018-07-25 RX ORDER — LIDOCAINE HYDROCHLORIDE 20 MG/ML
INJECTION, SOLUTION INFILTRATION; PERINEURAL
Status: DISCONTINUED | OUTPATIENT
Start: 2018-07-25 | End: 2018-07-25 | Stop reason: HOSPADM

## 2018-07-25 RX ADMIN — METHYLPREDNISOLONE ACETATE 80 MG: 40 INJECTION, SUSPENSION INTRA-ARTICULAR; INTRALESIONAL; INTRAMUSCULAR; SOFT TISSUE at 02:07

## 2018-07-25 RX ADMIN — LIDOCAINE HYDROCHLORIDE 6 ML: 20 INJECTION, SOLUTION INFILTRATION; PERINEURAL at 02:07

## 2018-07-25 NOTE — H&P (VIEW-ONLY)
Chief Pain Complaint:  Low Back Pain, Bilat Hip pain, Right leg pain, right knee pain    History of Present Illness:   This patient is a 85 y.o. male who presents today complaining of the above noted pain/s. The patient describes the pain as follows.    - duration of pain: chronic pain   - timing: constant   - character: aching, sharp  - radiating, dermatomal: extends into right leg    - antecedent trauma, prior spinal surgery: patient reports prior trauma, prior lumbar surgery (surgery in January 2017 with Dr. Lancaster at [facility] [place]), left knee replacement  - pertinent negatives: No fever, No chills, No weight loss, No bladder dysfunction, No bowel dysfunction, No saddle anesthesia  - pertinent positives: generalized nonspecific Lower Extremity weakness bilaterally, BLE numbness  - medications, other therapies tried (physical therapy, injections):     >> Tylenol, NSAIDs, gabapentin, Mobic, tramadol, Norco    >> Has previously undergone Physical Therapy    >> Has previously undergone spinal injection/s    - has undergone approximately 3-4 spinal injections previously (uncertain as to the specific type of injections that he has had, seems one was a lumbar rhizotomy)        Imaging / Labs / Studies (reviewed on 7/13/2018):    Results for orders placed during the hospital encounter of 11/16/15   X-Ray Lumbar Spine Complete 5 View    Narrative Five views of the lumbar spine  Findings: There is increased vertebral body height loss noted at the L1 level.  50% vertebrae height loss is noted anteriorly at this level.  The alignment is within normal limits. There is moderate disk space narrowing noted at the L4-5 level.  Mild/moderate facet arthropathy is noted from L2-3 through L5-S1 levels. Calcified granulomas are noted within the spleen. No pars defects.       Results for orders placed during the hospital encounter of 03/26/15   X-Ray Lumbar Spine AP And Lateral    Narrative Three views of the lumbar spine  Findings: There is a  "compression L1 level that is new when compared to the prior exam and demonstrates approximately 25% vertebral height loss.  There is no obvious condensation line seen on the plain films suggesting that this is still a chronic deformity.  The alignment is grossly within normal limits.  There is multilevel spondylosis with mild to moderate to space narrowing noted throughout the lumbar spine greatest at L4-5 level.  Facet arthropathy is noted from the L2-3 through the L5-S1 levels and most prominent at the L5-S1 level.         Review of Systems:  CONSTITUTIONAL: patient denies any fever, chills, or weight loss  SKIN: patient denies any rash or itching  RESPIRATORY: patient denies having any shortness of breath  GASTROINTESTINAL: patient denies having any diarrhea, constipation, or bowel incontinence  GENITOURINARY: patient denies having any abnormal bladder function    MUSCULOSKELETAL:  - patient complains of the above noted pain/s (see chief pain complaint)    NEUROLOGICAL:   - pain as above  - strength in Lower extremities is decreased, BILATERALLY  - sensation in Lower extremities is abnormal, on the LEFT  - patient denies any loss of bowel or bladder control      PSYCHIATRIC: patient denies any change in mood    Other:  All other systems reviewed and are negative      Physical Exam:  Vitals:  /63 (BP Location: Right arm, Patient Position: Sitting, BP Method: Medium (Automatic))   Pulse 69   Resp 18   Ht 5' 10" (1.778 m)   Wt 70.8 kg (156 lb)   BMI 22.38 kg/m²    (reviewed on 7/13/2018)    General: alert and oriented, in no apparent distress  Gait: normal gait  Skin: No rashes, No discoloration, No obvious lesions  HEENT: EOMI  Cardiovascular: no significant peripheral edema present  Respiratory: respirations nonlabored    Musculoskeletal:  - Any pain on flexion, extension, rotation:    >> pain on extension and rotation of lumbar spine  - Straight Leg Raise:     >> LEFT :: negative    >> RIGHT :: " positive  - Any tenderness to palpation across paraspinal muscles, joints, bursae:     >> across lumbar paraspinals    Neuro:  - Extremity Strength:     >> LEFT :: dec with dorsiflexion    >> RIGHT :: dec with dorsiflexion  - Extremity Reflexes:    >> LEFT  :: 2+    >> RIGHT :: 2+     Psych:  Mood and affect is appropriate    _________________________________________________________________________________________________________________________________________________________________________________________________________________________      Assessment:  Inmansaurabh Ritchie is a 85 y.o. male who presents with    ICD-10-CM ICD-9-CM   1. Primary osteoarthritis of left knee M17.12 715.16   2. Spondylosis of lumbosacral region without myelopathy or radiculopathy M47.817 721.3   3. Bilateral lumbar radiculopathy M54.16 724.4   4. DDD (degenerative disc disease), lumbar M51.36 722.52   5. Failed back syndrome of lumbar spine M96.1 722.83     Patient returns for follow-up visit.  He complains of continued low back and right leg pain.  He has been seen at the [facility], underwent a spinal injection that did not help, and then ultimately underwent surgery (unsure of type) with Dr. Lancaster in January 2017.  We previously requested records multiple times from the [facility], however we have not received these. 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 He had treatment for this, but he reports the damage was already present by the time it was discovered.     Plan:  1. Interventional: Schedule right knee genicular nerve block with RN IV sedation.    2. Pharmacologic:   - Refill tramadol 50mg TID PRN pain. He takes very sparingly.   - Will refill Mobic at 7.5mg QD PRN dose. He denies any kidney or cardiac issues and has no history of gastric ulcers. Most recent labs reviewed.  - Opioid contract to be signed next visit with Dr. Sadler.   - MIRA TORRSE reviewed and  appropriate. Last filled on 3-27-18.     3. Rehabilitative: Encouraged regular exercise.    4. Diagnostic:   - Order bilateral knee x-ray.  - Consider ordering lumbar MRI to evaluate radiculopathy.     5. Follow up: 3-4 weeks post-injection/ med refill with Dr. Sadler to establish care    - I discussed the risks, benefits, and alternatives to potential treatment options. All questions and concerns were fully addressed today in clinic. Dr. Sadler was consulted regarding the patient plan and agrees.

## 2018-07-25 NOTE — PLAN OF CARE
Problem: Patient Care Overview  Goal: Plan of Care Review  Outcome: Outcome(s) achieved Date Met: 07/25/18  Patient d/c home in stable condition via wheelchair with ride. Verbalized understanding of d/c instructions. Patient voiced no complaints at this time. Patient stood at side of bed, walked steps with no new motor deficits. Neurologically intact.

## 2018-07-25 NOTE — OP NOTE
PROCEDURE: Genicular Nerve Block (Superior Lateral, Superior Medial, and Inferior Medial Genicular Nerve Block) under fluoroscopic guidance    SIDE: right     PROCEDURE DATE: 7/25/2018    PREOPERATIVE DIAGNOSIS: Mononeuropathies of Lower Limb, Chronic Knee Pain  POSTOPERATIVE DIAGNOSIS: Mononeuropathies of Lower Limb, Chronic Knee Pain    PROVIDER: Nabor Sadler MD  Assistant(s): None    ANESTHESIA: Local, No Sedation    >> 0 mg of VERSED    >> 0 mcg of FENTANYL     INDICATION: The patient has knee pain unresponsive to conservative treatments. Fluoroscopy was used to optimize visualization of needle placement and to maximize safety.        PROCEDURE DESCRIPTION / TECHNIQUE:   The patient was seen and identified in the preoperative area. Risks, benefits, complications, and alternatives were discussed with the patient. The patient agreed to proceed with the procedure and signed the consent. The site and side of the procedure was identified and marked. No iv was started.    The patient was taken to the procedural suite. The patient was positioned in supine orientation on procedure table.  The procedural knee/s was/were exposed. A pillow was placed under the procedural knee to offset lateral alignment in order to optimize lateral fluoroscopic visualization. A time out was performed prior to any intervention. The procedure, site, side, and allergies were stated and agreed to by all present. The anterior, lateral, and medial aspects of the procedural knee/s was/were widely prepped with ChloraPrep. The procedural site/s was/were draped in usual sterile fashion. Vital signs were closely monitored throughout this procedure. Conscious sedation was NOT used for this procedure.    AP fluoroscopy was used to identify the interface of the femoral shaft and the superior medial and superior lateral femoral epicondyle, as well as the interface of the tibial shaft and the medial tibial epicondyle. These targeted sites were marked and  localized with 1% Lidocaine. The target sites were approached under fluoroscopic guidance using 3 seperate 25 gauge 3.5 inch spinal needles. The needles were advanced until osseus contact was made. The fluoroscope was moved into a lateral orientation and the needles were advanced further until each needle tip reached the median aspect of the femoral or tibial shaft. Osseus contact was maintained. After negative aspiration, 1.5 to 2 mL of an intectate solution comprised of 4 mL of 1% PF Lidocaine and 2 mL of Methylprednisolone (40 mg/mL) was injected at each targeted site. After injection, the stylets were replaced and all needles were removed intact. This injection technique was performed for all of the above noted sites. Following injection, the injection sites were cleaned and bandages were applied. The patient tolerated the procedure well.      Description of Findings: Not applicable    Prosthetic devices, grafts, tissues, or devices implanted: None    Specimen Removed: No    Estimated Blood Loss: minimal    COMPLICATIONS: None    DISPOSITION / PLANS: The patient was transferred to the recovery area in a stable condition for observation. The patient was reexamined prior to discharge. There was no evidence of acute neurologic injury following the procedure.  Patient was discharged from the recovery room after meeting discharge criteria. Home discharge instructions were given to the patient by the staff.

## 2018-08-01 ENCOUNTER — PES CALL (OUTPATIENT)
Dept: ADMINISTRATIVE | Facility: CLINIC | Age: 83
End: 2018-08-01

## 2018-09-07 ENCOUNTER — OFFICE VISIT (OUTPATIENT)
Dept: PAIN MEDICINE | Facility: CLINIC | Age: 83
End: 2018-09-07
Payer: MEDICARE

## 2018-09-07 VITALS
WEIGHT: 155 LBS | HEART RATE: 72 BPM | SYSTOLIC BLOOD PRESSURE: 125 MMHG | BODY MASS INDEX: 22.19 KG/M2 | DIASTOLIC BLOOD PRESSURE: 73 MMHG | RESPIRATION RATE: 18 BRPM | HEIGHT: 70 IN

## 2018-09-07 DIAGNOSIS — M47.817 SPONDYLOSIS OF LUMBOSACRAL REGION WITHOUT MYELOPATHY OR RADICULOPATHY: ICD-10-CM

## 2018-09-07 DIAGNOSIS — M51.36 DDD (DEGENERATIVE DISC DISEASE), LUMBAR: ICD-10-CM

## 2018-09-07 DIAGNOSIS — M17.12 PRIMARY OSTEOARTHRITIS OF LEFT KNEE: Primary | ICD-10-CM

## 2018-09-07 DIAGNOSIS — M54.16 BILATERAL LUMBAR RADICULOPATHY: ICD-10-CM

## 2018-09-07 DIAGNOSIS — M96.1 FAILED BACK SYNDROME OF LUMBAR SPINE: ICD-10-CM

## 2018-09-07 PROCEDURE — 99999 PR PBB SHADOW E&M-EST. PATIENT-LVL III: CPT | Mod: PBBFAC,,, | Performed by: PHYSICIAN ASSISTANT

## 2018-09-07 PROCEDURE — 1101F PT FALLS ASSESS-DOCD LE1/YR: CPT | Mod: CPTII,,, | Performed by: PHYSICIAN ASSISTANT

## 2018-09-07 PROCEDURE — 3078F DIAST BP <80 MM HG: CPT | Mod: CPTII,,, | Performed by: PHYSICIAN ASSISTANT

## 2018-09-07 PROCEDURE — 3074F SYST BP LT 130 MM HG: CPT | Mod: CPTII,,, | Performed by: PHYSICIAN ASSISTANT

## 2018-09-07 PROCEDURE — 99213 OFFICE O/P EST LOW 20 MIN: CPT | Mod: PBBFAC | Performed by: PHYSICIAN ASSISTANT

## 2018-09-07 PROCEDURE — 99214 OFFICE O/P EST MOD 30 MIN: CPT | Mod: S$PBB,,, | Performed by: PHYSICIAN ASSISTANT

## 2018-09-07 RX ORDER — MELOXICAM 7.5 MG/1
TABLET ORAL
Qty: 180 TABLET | Refills: 0 | Status: SHIPPED | OUTPATIENT
Start: 2018-09-07 | End: 2018-10-15 | Stop reason: SINTOL

## 2018-09-07 RX ORDER — TRAMADOL HYDROCHLORIDE 50 MG/1
50 TABLET ORAL EVERY 8 HOURS PRN
Qty: 90 TABLET | Refills: 0 | Status: SHIPPED | OUTPATIENT
Start: 2018-09-07 | End: 2018-10-08

## 2018-09-07 NOTE — TELEPHONE ENCOUNTER
----- Message from Jessica Hendrix sent at 9/7/2018  3:13 PM CDT -----  Caller needs call back  pt medication.[phone]

## 2018-09-14 ENCOUNTER — TELEPHONE (OUTPATIENT)
Dept: PAIN MEDICINE | Facility: CLINIC | Age: 83
End: 2018-09-14

## 2018-09-14 NOTE — TELEPHONE ENCOUNTER
----- Message from Marisol Kirkland sent at 9/14/2018  2:32 PM CDT -----  Contact: pt  Please call pt @ [phone] regarding scheduling appt to get injection in back.

## 2018-09-17 ENCOUNTER — TELEPHONE (OUTPATIENT)
Dept: PHYSICAL MEDICINE AND REHAB | Facility: CLINIC | Age: 83
End: 2018-09-17

## 2018-09-17 NOTE — TELEPHONE ENCOUNTER
----- Message from Heidi Ring sent at 9/17/2018  3:29 PM CDT -----  Pt wife at [phone]/or [phone]/states they are calling regarding pt's appt on 9/18/18//please call to discuss//laurita/nish

## 2018-09-18 ENCOUNTER — TELEPHONE (OUTPATIENT)
Dept: PAIN MEDICINE | Facility: CLINIC | Age: 83
End: 2018-09-18

## 2018-09-20 ENCOUNTER — OFFICE VISIT (OUTPATIENT)
Dept: PAIN MEDICINE | Facility: CLINIC | Age: 83
End: 2018-09-20
Payer: MEDICARE

## 2018-09-20 ENCOUNTER — HOSPITAL ENCOUNTER (OUTPATIENT)
Dept: RADIOLOGY | Facility: HOSPITAL | Age: 83
Discharge: HOME | End: 2018-09-20
Attending: PHYSICIAN ASSISTANT
Payer: MEDICARE

## 2018-09-20 VITALS
DIASTOLIC BLOOD PRESSURE: 73 MMHG | SYSTOLIC BLOOD PRESSURE: 131 MMHG | HEART RATE: 66 BPM | RESPIRATION RATE: 18 BRPM | HEIGHT: 70 IN | BODY MASS INDEX: 22.19 KG/M2 | WEIGHT: 155 LBS

## 2018-09-20 DIAGNOSIS — M17.12 PRIMARY OSTEOARTHRITIS OF LEFT KNEE: Primary | ICD-10-CM

## 2018-09-20 DIAGNOSIS — M54.16 RIGHT LUMBAR RADICULOPATHY: ICD-10-CM

## 2018-09-20 DIAGNOSIS — M47.817 SPONDYLOSIS OF LUMBOSACRAL REGION WITHOUT MYELOPATHY OR RADICULOPATHY: ICD-10-CM

## 2018-09-20 DIAGNOSIS — G57.01 PIRIFORMIS SYNDROME OF RIGHT SIDE: ICD-10-CM

## 2018-09-20 DIAGNOSIS — M54.16 BILATERAL LUMBAR RADICULOPATHY: ICD-10-CM

## 2018-09-20 DIAGNOSIS — M51.36 DDD (DEGENERATIVE DISC DISEASE), LUMBAR: ICD-10-CM

## 2018-09-20 DIAGNOSIS — M70.61 GREATER TROCHANTERIC BURSITIS, RIGHT: ICD-10-CM

## 2018-09-20 DIAGNOSIS — M96.1 FAILED BACK SYNDROME OF LUMBAR SPINE: ICD-10-CM

## 2018-09-20 DIAGNOSIS — M46.1 SACROILIITIS: ICD-10-CM

## 2018-09-20 PROCEDURE — 1101F PT FALLS ASSESS-DOCD LE1/YR: CPT | Mod: CPTII,,, | Performed by: PHYSICIAN ASSISTANT

## 2018-09-20 PROCEDURE — 3075F SYST BP GE 130 - 139MM HG: CPT | Mod: CPTII,,, | Performed by: PHYSICIAN ASSISTANT

## 2018-09-20 PROCEDURE — 3078F DIAST BP <80 MM HG: CPT | Mod: CPTII,,, | Performed by: PHYSICIAN ASSISTANT

## 2018-09-20 PROCEDURE — 99214 OFFICE O/P EST MOD 30 MIN: CPT | Mod: PBBFAC,25 | Performed by: PHYSICIAN ASSISTANT

## 2018-09-20 PROCEDURE — 99999 PR PBB SHADOW E&M-EST. PATIENT-LVL IV: CPT | Mod: PBBFAC,,, | Performed by: PHYSICIAN ASSISTANT

## 2018-09-20 PROCEDURE — 72110 X-RAY EXAM L-2 SPINE 4/>VWS: CPT | Mod: 26,,, | Performed by: RADIOLOGY

## 2018-09-20 PROCEDURE — 99214 OFFICE O/P EST MOD 30 MIN: CPT | Mod: S$PBB,,, | Performed by: PHYSICIAN ASSISTANT

## 2018-09-20 PROCEDURE — 72110 X-RAY EXAM L-2 SPINE 4/>VWS: CPT | Mod: TC

## 2018-09-20 NOTE — PROGRESS NOTES
Chief Pain Complaint:  Low Back Pain, Bilat Hip pain, Right leg pain, right knee pain      Interval History:  Patient was last seen on 9/7/2018.  Since his right knee pain is better, he does not feel the pain is radiating down the leg anymore. But, he does feel it in the right side of leg and buttock still.       History of Present Illness:   This patient is a 85 y.o. male who presents today complaining of the above noted pain/s. The patient describes the pain as follows.    - duration of pain: chronic pain   - timing: constant   - character: aching, sharp  - radiating, dermatomal: extends into right leg    - antecedent trauma, prior spinal surgery: patient reports prior trauma, prior lumbar surgery (surgery in January 2017 with Dr. Lancaster at [facility] [place]), left knee replacement  - pertinent negatives: No fever, No chills, No weight loss, No bladder dysfunction, No bowel dysfunction, No saddle anesthesia  - pertinent positives: generalized nonspecific Lower Extremity weakness bilaterally, BLE numbness  - medications, other therapies tried (physical therapy, injections):     >> Tylenol, NSAIDs, gabapentin, Mobic, tramadol, Norco    >> Has previously undergone Physical Therapy    >> Has previously undergone spinal injection/s    - has undergone approximately 3-4 spinal injections previously (uncertain as to the specific type of injections that he has had, seems one was a lumbar rhizotomy)   - right knee genicular nerve block on 7/25/18 with 70% pain relief    _________________________________________________________________________________________________________________________________________________________________________________________________________________________      Imaging / Labs / Studies (reviewed on 9/20/2018):    9/20/2018 X-Ray Lumbar Complete With Flex And Ext  TECHNIQUE:  Five views of the lumbar spine plus flexion extension views were performed.  COMPARISON:  November 16, 2015  FINDINGS:  Vertebral body  heights are unchanged.  Chronic compression deformity of L1 noted.  Alignment is anatomic.  L4-5 disc height loss present.  There is multilevel anterior osteophyte formation.  Lower lumbar spine facet arthropathy noted.  No change in alignment on flexion or extension views.  No pars defects appreciated.  Mild vascular calcifications noted.  Impression: Degenerative findings.      7/12/2018 XR KNEE ORTHO BILAT WITH FLEXION  TECHNIQUE:  AP standing of both knees, PA flexion standing views of both knees, and Merchant views of both knees were performed.  Lateral views of both knees were also performed.  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  Prosthesis position and alignment are satisfactory without radiographic evidence of hardware loosening or other complicating process.  Moderate degenerative change of the right knee.  No acute fracture or malalignment.       Results for orders placed during the hospital encounter of 11/16/15   X-Ray Lumbar Spine Complete 5 View    Narrative Five views of the lumbar spine  Findings: There is increased vertebral body height loss noted at the L1 level.  50% vertebrae height loss is noted anteriorly at this level.  The alignment is within normal limits. There is moderate disk space narrowing noted at the L4-5 level.  Mild/moderate facet arthropathy is noted from L2-3 through L5-S1 levels. Calcified granulomas are noted within the spleen. No pars defects.       Results for orders placed during the hospital encounter of 03/26/15   X-Ray Lumbar Spine AP And Lateral    Narrative Three views of the lumbar spine  Findings: There is a compression L1 level that is new when compared to the prior exam and demonstrates approximately 25% vertebral height loss.  There is no obvious condensation line seen on the plain films suggesting that this is still a chronic deformity.  The alignment is grossly within normal limits.  There is multilevel  "spondylosis with mild to moderate to space narrowing noted throughout the lumbar spine greatest at L4-5 level.  Facet arthropathy is noted from the L2-3 through the L5-S1 levels and most prominent at the L5-S1 level.     _________________________________________________________________________________________________________________________________________________________________________________________________________________________      Review of Systems:  CONSTITUTIONAL: patient denies any fever, chills, or weight loss  SKIN: patient denies any rash or itching  RESPIRATORY: patient denies having any shortness of breath  GASTROINTESTINAL: patient denies having any diarrhea, constipation, or bowel incontinence  GENITOURINARY: patient denies having any abnormal bladder function    MUSCULOSKELETAL:  - patient complains of the above noted pain/s (see chief pain complaint)    NEUROLOGICAL:   - pain as above  - strength in Lower extremities is decreased, BILATERALLY  - sensation in Lower extremities is abnormal, on the LEFT  - patient denies any loss of bowel or bladder control      PSYCHIATRIC: patient denies any change in mood    Other:  All other systems reviewed and are negative    _________________________________________________________________________________________________________________________________________________________________________________________________________________________     Physical Exam:  Vitals:  /73 (BP Location: Right arm, Patient Position: Sitting, BP Method: Medium (Automatic))   Pulse 66   Resp 18   Ht 5' 10" (1.778 m)   Wt 70.3 kg (155 lb)   BMI 22.24 kg/m²    (reviewed on 9/20/2018)    General: alert and oriented, in no apparent distress  Gait: normal gait  Skin: No rashes, No discoloration, No obvious lesions  HEENT: EOMI  Cardiovascular: no significant peripheral edema present  Respiratory: respirations nonlabored    Musculoskeletal:  - Any pain on flexion, extension, " rotation:    >> pain on extension and rotation of lumbar spine    >> facet loading causes mild pain bilaterally, R>L   - Straight Leg Raise:     >> LEFT :: negative    >> RIGHT :: positive  - Any tenderness to palpation across paraspinal muscles, joints, bursae:     >> over right piriformis    >> over right SIJ    >> over right GT bursa    Neuro:  - Extremity Strength:     >> LEFT :: dec with dorsiflexion    >> RIGHT :: dec with dorsiflexion  - Extremity Reflexes:    >> LEFT  :: 2+    >> RIGHT :: 2+     Psych:  Mood and affect is appropriate    _________________________________________________________________________________________________________________________________________________________________________________________________________________________      Assessment:  Charlestonsaurabh Ritchie is a 85 y.o. male who presents with    ICD-10-CM ICD-9-CM   1. Primary osteoarthritis of left knee M17.12 715.16   2. Spondylosis of lumbosacral region without myelopathy or radiculopathy M47.817 721.3   3. Bilateral lumbar radiculopathy M54.16 724.4   4. DDD (degenerative disc disease), lumbar M51.36 722.52   5. Failed back syndrome of lumbar spine M96.1 722.83   6. Right lumbar radiculopathy M54.16 724.4      Patient returns for follow-up visit.  He complains of continued low back and right leg pain.  He has been seen at the [facility], underwent a spinal injection that did not help, and then ultimately underwent surgery (unsure of type) with Dr. Lancaster in January 2017.  We previously requested records multiple times from the [facility], however we have not received these. 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 He had treatment for this, but he reports the damage was already present by the time it was discovered.       Plan:  1. Interventional: Schedule right SIJ + GT bursa + piriformis injection with local.     2. Pharmacologic:   - Continue tramadol  50mg TID PRN pain. He takes very sparingly. Last filled on 9-7-18.  - Will refill Mobic at 7.5mg QD PRN dose.     - Opioid contract to be signed at his next visit with Dr. Sadler.   - LA  reviewed and appropriate.      3. Rehabilitative: Encouraged regular exercise.    4. Diagnostic: Order lumbar x-ray.    5. Follow up: 3-4 weeks after injection    - I discussed the risks, benefits, and alternatives to potential treatment options. All questions and concerns were fully addressed today in clinic. Dr. Sadler was consulted regarding the patient plan and agrees.

## 2018-09-20 NOTE — H&P (VIEW-ONLY)
Chief Pain Complaint:  Low Back Pain, Bilat Hip pain, Right leg pain, right knee pain      Interval History:  Patient was last seen on 9/7/2018.  Since his right knee pain is better, he does not feel the pain is radiating down the leg anymore. But, he does feel it in the right side of leg and buttock still.       History of Present Illness:   This patient is a 85 y.o. male who presents today complaining of the above noted pain/s. The patient describes the pain as follows.    - duration of pain: chronic pain   - timing: constant   - character: aching, sharp  - radiating, dermatomal: extends into right leg    - antecedent trauma, prior spinal surgery: patient reports prior trauma, prior lumbar surgery (surgery in January 2017 with Dr. Lancaster at [facility] [place]), left knee replacement  - pertinent negatives: No fever, No chills, No weight loss, No bladder dysfunction, No bowel dysfunction, No saddle anesthesia  - pertinent positives: generalized nonspecific Lower Extremity weakness bilaterally, BLE numbness  - medications, other therapies tried (physical therapy, injections):     >> Tylenol, NSAIDs, gabapentin, Mobic, tramadol, Norco    >> Has previously undergone Physical Therapy    >> Has previously undergone spinal injection/s    - has undergone approximately 3-4 spinal injections previously (uncertain as to the specific type of injections that he has had, seems one was a lumbar rhizotomy)   - right knee genicular nerve block on 7/25/18 with 70% pain relief    _________________________________________________________________________________________________________________________________________________________________________________________________________________________      Imaging / Labs / Studies (reviewed on 9/20/2018):    9/20/2018 X-Ray Lumbar Complete With Flex And Ext  TECHNIQUE:  Five views of the lumbar spine plus flexion extension views were performed.  COMPARISON:  November 16, 2015  FINDINGS:  Vertebral body  heights are unchanged.  Chronic compression deformity of L1 noted.  Alignment is anatomic.  L4-5 disc height loss present.  There is multilevel anterior osteophyte formation.  Lower lumbar spine facet arthropathy noted.  No change in alignment on flexion or extension views.  No pars defects appreciated.  Mild vascular calcifications noted.  Impression: Degenerative findings.      7/12/2018 XR KNEE ORTHO BILAT WITH FLEXION  TECHNIQUE:  AP standing of both knees, PA flexion standing views of both knees, and Merchant views of both knees were performed.  Lateral views of both knees were also performed.  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  Prosthesis position and alignment are satisfactory without radiographic evidence of hardware loosening or other complicating process.  Moderate degenerative change of the right knee.  No acute fracture or malalignment.       Results for orders placed during the hospital encounter of 11/16/15   X-Ray Lumbar Spine Complete 5 View    Narrative Five views of the lumbar spine  Findings: There is increased vertebral body height loss noted at the L1 level.  50% vertebrae height loss is noted anteriorly at this level.  The alignment is within normal limits. There is moderate disk space narrowing noted at the L4-5 level.  Mild/moderate facet arthropathy is noted from L2-3 through L5-S1 levels. Calcified granulomas are noted within the spleen. No pars defects.       Results for orders placed during the hospital encounter of 03/26/15   X-Ray Lumbar Spine AP And Lateral    Narrative Three views of the lumbar spine  Findings: There is a compression L1 level that is new when compared to the prior exam and demonstrates approximately 25% vertebral height loss.  There is no obvious condensation line seen on the plain films suggesting that this is still a chronic deformity.  The alignment is grossly within normal limits.  There is multilevel  "spondylosis with mild to moderate to space narrowing noted throughout the lumbar spine greatest at L4-5 level.  Facet arthropathy is noted from the L2-3 through the L5-S1 levels and most prominent at the L5-S1 level.     _________________________________________________________________________________________________________________________________________________________________________________________________________________________      Review of Systems:  CONSTITUTIONAL: patient denies any fever, chills, or weight loss  SKIN: patient denies any rash or itching  RESPIRATORY: patient denies having any shortness of breath  GASTROINTESTINAL: patient denies having any diarrhea, constipation, or bowel incontinence  GENITOURINARY: patient denies having any abnormal bladder function    MUSCULOSKELETAL:  - patient complains of the above noted pain/s (see chief pain complaint)    NEUROLOGICAL:   - pain as above  - strength in Lower extremities is decreased, BILATERALLY  - sensation in Lower extremities is abnormal, on the LEFT  - patient denies any loss of bowel or bladder control      PSYCHIATRIC: patient denies any change in mood    Other:  All other systems reviewed and are negative    _________________________________________________________________________________________________________________________________________________________________________________________________________________________     Physical Exam:  Vitals:  /73 (BP Location: Right arm, Patient Position: Sitting, BP Method: Medium (Automatic))   Pulse 66   Resp 18   Ht 5' 10" (1.778 m)   Wt 70.3 kg (155 lb)   BMI 22.24 kg/m²    (reviewed on 9/20/2018)    General: alert and oriented, in no apparent distress  Gait: normal gait  Skin: No rashes, No discoloration, No obvious lesions  HEENT: EOMI  Cardiovascular: no significant peripheral edema present  Respiratory: respirations nonlabored    Musculoskeletal:  - Any pain on flexion, extension, " rotation:    >> pain on extension and rotation of lumbar spine    >> facet loading causes mild pain bilaterally, R>L   - Straight Leg Raise:     >> LEFT :: negative    >> RIGHT :: positive  - Any tenderness to palpation across paraspinal muscles, joints, bursae:     >> over right piriformis    >> over right SIJ    >> over right GT bursa    Neuro:  - Extremity Strength:     >> LEFT :: dec with dorsiflexion    >> RIGHT :: dec with dorsiflexion  - Extremity Reflexes:    >> LEFT  :: 2+    >> RIGHT :: 2+     Psych:  Mood and affect is appropriate    _________________________________________________________________________________________________________________________________________________________________________________________________________________________      Assessment:  Orangevillesaurabh Ritchie is a 85 y.o. male who presents with    ICD-10-CM ICD-9-CM   1. Primary osteoarthritis of left knee M17.12 715.16   2. Spondylosis of lumbosacral region without myelopathy or radiculopathy M47.817 721.3   3. Bilateral lumbar radiculopathy M54.16 724.4   4. DDD (degenerative disc disease), lumbar M51.36 722.52   5. Failed back syndrome of lumbar spine M96.1 722.83   6. Right lumbar radiculopathy M54.16 724.4      Patient returns for follow-up visit.  He complains of continued low back and right leg pain.  He has been seen at the [facility], underwent a spinal injection that did not help, and then ultimately underwent surgery (unsure of type) with Dr. Lancaster in January 2017.  We previously requested records multiple times from the [facility], however we have not received these. 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 He had treatment for this, but he reports the damage was already present by the time it was discovered.       Plan:  1. Interventional: Schedule right SIJ + GT bursa + piriformis injection with local.     2. Pharmacologic:   - Continue tramadol  50mg TID PRN pain. He takes very sparingly. Last filled on 9-7-18.  - Will refill Mobic at 7.5mg QD PRN dose.     - Opioid contract to be signed at his next visit with Dr. Sadler.   - LA  reviewed and appropriate.      3. Rehabilitative: Encouraged regular exercise.    4. Diagnostic: Order lumbar x-ray.    5. Follow up: 3-4 weeks after injection    - I discussed the risks, benefits, and alternatives to potential treatment options. All questions and concerns were fully addressed today in clinic. Dr. Sadler was consulted regarding the patient plan and agrees.

## 2018-10-01 RX ORDER — OMEPRAZOLE 20 MG/1
20 CAPSULE, DELAYED RELEASE ORAL DAILY
Qty: 90 CAPSULE | Refills: 3 | Status: SHIPPED | OUTPATIENT
Start: 2018-10-01 | End: 2019-10-11 | Stop reason: SDUPTHER

## 2018-10-08 ENCOUNTER — OFFICE VISIT (OUTPATIENT)
Dept: FAMILY MEDICINE | Facility: CLINIC | Age: 83
End: 2018-10-08
Payer: MEDICARE

## 2018-10-08 VITALS
BODY MASS INDEX: 22.6 KG/M2 | HEIGHT: 70 IN | SYSTOLIC BLOOD PRESSURE: 142 MMHG | WEIGHT: 157.88 LBS | HEART RATE: 63 BPM | OXYGEN SATURATION: 94 % | DIASTOLIC BLOOD PRESSURE: 74 MMHG | TEMPERATURE: 98 F

## 2018-10-08 DIAGNOSIS — R03.0 ELEVATED BLOOD PRESSURE READING WITHOUT DIAGNOSIS OF HYPERTENSION: Primary | ICD-10-CM

## 2018-10-08 DIAGNOSIS — I87.2 VENOUS INSUFFICIENCY OF BOTH LOWER EXTREMITIES: ICD-10-CM

## 2018-10-08 PROCEDURE — 3078F DIAST BP <80 MM HG: CPT | Mod: CPTII,,, | Performed by: FAMILY MEDICINE

## 2018-10-08 PROCEDURE — 99999 PR PBB SHADOW E&M-EST. PATIENT-LVL III: CPT | Mod: PBBFAC,,, | Performed by: FAMILY MEDICINE

## 2018-10-08 PROCEDURE — 3077F SYST BP >= 140 MM HG: CPT | Mod: CPTII,,, | Performed by: FAMILY MEDICINE

## 2018-10-08 PROCEDURE — 1101F PT FALLS ASSESS-DOCD LE1/YR: CPT | Mod: CPTII,,, | Performed by: FAMILY MEDICINE

## 2018-10-08 PROCEDURE — 90662 IIV NO PRSV INCREASED AG IM: CPT | Mod: PBBFAC,PO

## 2018-10-08 PROCEDURE — 99213 OFFICE O/P EST LOW 20 MIN: CPT | Mod: PBBFAC,PO | Performed by: FAMILY MEDICINE

## 2018-10-08 PROCEDURE — 99214 OFFICE O/P EST MOD 30 MIN: CPT | Mod: S$PBB,,, | Performed by: FAMILY MEDICINE

## 2018-10-08 NOTE — PROGRESS NOTES
Subjective:       Patient ID: Dominguez Ritchie is a 85 yr old male.    Chief Complaint: Elevated BP    BP is elevated x one month. His home recordings for BP ranges from  121-166/61-77, 67-78, He eats a low salt diet and is not any mediation that would raise his BP. Denies any sxs.  BLE edema: from the mid calf down. Associated with discoloration. Present for more than a year. Blood flow to the LE has been evaluated for this problem and it was negative per pt. Denies claudication and resting pain.  He came with his wife who collaborated his story.    Past Medical History:   Diagnosis Date    Abnormal exercise tolerance test 2013    Arthritis     Colon polyp     Diverticulosis     Dyslipidemia 2013    GERD (gastroesophageal reflux disease)     Hyperlipidemia 1980    HIGH TG    Knee pain, right 2013    Meningioma     Personal history of colonic polyps 2014    RLS (restless legs syndrome) 2013    Tobacco dependence     resolved    West Nile meningitis     per patient     Family History   Problem Relation Age of Onset    Heart disease Mother     Cancer Son         pancreatic     Diabetes Maternal Uncle     Kidney disease Neg Hx     Stroke Neg Hx      Social History     Socioeconomic History    Marital status:      Spouse name: Not on file    Number of children: Not on file    Years of education: Not on file    Highest education level: Not on file   Social Needs    Financial resource strain: Not on file    Food insecurity - worry: Not on file    Food insecurity - inability: Not on file    Transportation needs - medical: Not on file    Transportation needs - non-medical: Not on file   Occupational History    Not on file   Tobacco Use    Smoking status: Former Smoker     Packs/day: 1.00     Years: 25.00     Pack years: 25.00     Last attempt to quit: 1990     Years since quittin.7    Smokeless tobacco: Never Used   Substance and Sexual Activity     Alcohol use: No     Alcohol/week: 0.0 oz    Drug use: No    Sexual activity: Not Currently     Birth control/protection: None   Other Topics Concern    Not on file   Social History Narrative    Not on file     Outpatient Encounter Medications as of 10/8/2018   Medication Sig Dispense Refill    b complex vitamins tablet Take 1 tablet by mouth once daily.      meclizine (ANTIVERT) 25 mg tablet TAKE 1 TABLET THREE TIMES DAILY AS NEEDED 270 tablet 3    meloxicam (MOBIC) 7.5 MG tablet TAKE 1 TABLET BY MOUTH TWICE DAILY WITH MEALS AS NEEDED FOR PAIN. 90 day supply. 180 tablet 0    omeprazole (PRILOSEC) 20 MG capsule Take 1 capsule (20 mg total) by mouth once daily. 90 capsule 3    ropinirole (REQUIP) 0.5 MG tablet Take 1 tablet (0.5 mg total) by mouth every evening. 90 tablet 3    traMADol (ULTRAM) 50 mg tablet Take 1 tablet (50 mg total) by mouth every 8 (eight) hours as needed for Pain. 90 tablet 0    [DISCONTINUED] ciclopirox (LOPROX) 0.77 % Crea       [DISCONTINUED] diclofenac sodium (VOLTAREN) 1 % Gel Apply 2 g topically once daily. 1 Tube 2    [DISCONTINUED] lidocaine-prilocaine (EMLA) cream       [DISCONTINUED] loratadine (CLARITIN) 10 mg tablet Take 1 tablet (10 mg total) by mouth once daily. 30 tablet 1    [DISCONTINUED] tamsulosin (FLOMAX) 0.4 mg Cp24 TAKE 1 CAPSULE ONE TIME DAILY 90 capsule 3    [DISCONTINUED] triamcinolone acetonide 0.1% (KENALOG) 0.1 % cream        No facility-administered encounter medications on file as of 10/8/2018.        Review of Systems   Constitutional: Negative for appetite change and fever.   HENT: Negative for congestion, facial swelling and voice change.    Eyes: Negative for discharge and itching.   Respiratory: Negative for cough, chest tightness and wheezing.    Cardiovascular: Negative.  Negative for chest pain and leg swelling.   Gastrointestinal: Negative for abdominal pain, nausea and vomiting.   Endocrine: Negative for cold intolerance and heat intolerance.  "  Genitourinary: Negative for dysuria and flank pain.   Musculoskeletal: Negative for myalgias and neck stiffness.   Skin: Negative for pallor and rash.   Neurological: Negative for facial asymmetry and weakness.   Psychiatric/Behavioral: Negative for agitation and confusion.       Objective:      BP (!) 142/74 (BP Location: Left arm, Patient Position: Sitting, BP Method: Medium (Manual))   Pulse 63   Temp 98.3 °F (36.8 °C) (Oral)   Ht 5' 10" (1.778 m)   Wt 71.6 kg (157 lb 13.6 oz)   SpO2 (!) 94%   BMI 22.65 kg/m²   Physical Exam   Constitutional: He is oriented to person, place, and time. He appears well-developed. No distress.   HENT:   Head: Normocephalic and atraumatic.   Right Ear: External ear normal.   Left Ear: External ear normal.   Eyes: Conjunctivae and EOM are normal.   Neck: Neck supple.   Cardiovascular: Normal rate and regular rhythm.   Pulmonary/Chest: Effort normal. No respiratory distress.   Abdominal: Soft. Normal appearance and bowel sounds are normal. There is no hepatosplenomegaly.   Genitourinary:   Genitourinary Comments: deferred   Musculoskeletal: He exhibits edema.   Trace edema to BLE. With darkening and discoloration of the skin from midcalf down   Neurological: He is alert and oriented to person, place, and time.   Skin: Skin is warm and dry.   Psychiatric: He has a normal mood and affect. His behavior is normal.   Nursing note and vitals reviewed.      Results for orders placed or performed in visit on 12/15/17   CBC auto differential   Result Value Ref Range    WBC 4.87 3.90 - 12.70 K/uL    RBC 4.30 (L) 4.60 - 6.20 M/uL    Hemoglobin 13.3 (L) 14.0 - 18.0 g/dL    Hematocrit 39.8 (L) 40.0 - 54.0 %    MCV 93 82 - 98 fL    MCH 30.9 27.0 - 31.0 pg    MCHC 33.4 32.0 - 36.0 g/dL    RDW 12.7 11.5 - 14.5 %    Platelets 230 150 - 350 K/uL    MPV 9.9 9.2 - 12.9 fL    Immature Granulocytes 0.0 0.0 - 0.5 %    Gran # (ANC) 3.0 1.8 - 7.7 K/uL    Immature Grans (Abs) 0.00 0.00 - 0.04 K/uL    " Lymph # 1.2 1.0 - 4.8 K/uL    Mono # 0.5 0.3 - 1.0 K/uL    Eos # 0.2 0.0 - 0.5 K/uL    Baso # 0.04 0.00 - 0.20 K/uL    nRBC 0 0 /100 WBC    Gran% 61.2 38.0 - 73.0 %    Lymph% 24.4 18.0 - 48.0 %    Mono% 10.5 4.0 - 15.0 %    Eosinophil% 3.1 0.0 - 8.0 %    Basophil% 0.8 0.0 - 1.9 %    Differential Method Automated    Comprehensive metabolic panel   Result Value Ref Range    Sodium 139 136 - 145 mmol/L    Potassium 5.0 3.5 - 5.1 mmol/L    Chloride 104 95 - 110 mmol/L    CO2 30 (H) 23 - 29 mmol/L    Glucose 106 70 - 110 mg/dL    BUN, Bld 14 8 - 23 mg/dL    Creatinine 0.9 0.5 - 1.4 mg/dL    Calcium 9.7 8.7 - 10.5 mg/dL    Total Protein 6.7 6.0 - 8.4 g/dL    Albumin 3.7 3.5 - 5.2 g/dL    Total Bilirubin 0.6 0.1 - 1.0 mg/dL    Alkaline Phosphatase 62 55 - 135 U/L    AST 20 10 - 40 U/L    ALT 14 10 - 44 U/L    Anion Gap 5 (L) 8 - 16 mmol/L    eGFR if African American >60.0 >60 mL/min/1.73 m^2    eGFR if non African American >60.0 >60 mL/min/1.73 m^2   Lipid panel   Result Value Ref Range    Cholesterol 214 (H) 120 - 199 mg/dL    Triglycerides 149 30 - 150 mg/dL    HDL 63 40 - 75 mg/dL    LDL Cholesterol 121.2 63.0 - 159.0 mg/dL    HDL/Chol Ratio 29.4 20.0 - 50.0 %    Total Cholesterol/HDL Ratio 3.4 2.0 - 5.0    Non-HDL Cholesterol 151 mg/dL   Hemoglobin A1c   Result Value Ref Range    Hemoglobin A1C 5.3 4.0 - 5.6 %    Estimated Avg Glucose 105 68 - 131 mg/dL   Vitamin B12   Result Value Ref Range    Vitamin B-12 1394 (H) 210 - 950 pg/mL   PSA, Screening   Result Value Ref Range    PSA, SCREEN 13.1 (H) 0.00 - 4.00 ng/mL     Assessment:       1. Elevated blood pressure reading without diagnosis of hypertension    2. Leg swelling        Plan:   Elevated blood pressure reading without diagnosis of hypertension: BP elevation is not consistent. Therefore,  Monitor at home and bring record to office  DASH diet  Walk as tolerated daily  Will do labs on his next visit-patient agrees with plan  Venous insufficiency-Leg swelling and  discoloration due to hemosiderin deposit  Elevate leg while sitting, monitor for claudication  -     COMPRESSION STOCKINGS    Vaccine due  -     Influenza - High Dose (65+) (PF) (IM)

## 2018-10-09 NOTE — PATIENT INSTRUCTIONS

## 2018-10-10 ENCOUNTER — LAB VISIT (OUTPATIENT)
Dept: LAB | Facility: HOSPITAL | Age: 83
End: 2018-10-10
Attending: FAMILY MEDICINE
Payer: MEDICARE

## 2018-10-10 DIAGNOSIS — E78.00 PURE HYPERCHOLESTEROLEMIA: ICD-10-CM

## 2018-10-10 DIAGNOSIS — N40.0 BPH WITH ELEVATED PSA: ICD-10-CM

## 2018-10-10 DIAGNOSIS — R97.20 BPH WITH ELEVATED PSA: ICD-10-CM

## 2018-10-10 DIAGNOSIS — R73.03 PREDIABETES: ICD-10-CM

## 2018-10-10 DIAGNOSIS — Z12.5 ENCOUNTER FOR SCREENING FOR MALIGNANT NEOPLASM OF PROSTATE: ICD-10-CM

## 2018-10-10 LAB
ALBUMIN SERPL BCP-MCNC: 3.4 G/DL
ALP SERPL-CCNC: 61 U/L
ALT SERPL W/O P-5'-P-CCNC: 21 U/L
ANION GAP SERPL CALC-SCNC: 6 MMOL/L
AST SERPL-CCNC: 29 U/L
BASOPHILS # BLD AUTO: 0.03 K/UL
BASOPHILS NFR BLD: 0.8 %
BILIRUB SERPL-MCNC: 0.4 MG/DL
BUN SERPL-MCNC: 14 MG/DL
CALCIUM SERPL-MCNC: 9.1 MG/DL
CHLORIDE SERPL-SCNC: 105 MMOL/L
CHOLEST SERPL-MCNC: 210 MG/DL
CHOLEST/HDLC SERPL: 3.4 {RATIO}
CO2 SERPL-SCNC: 28 MMOL/L
COMPLEXED PSA SERPL-MCNC: 9 NG/ML
CREAT SERPL-MCNC: 0.8 MG/DL
DIFFERENTIAL METHOD: ABNORMAL
EOSINOPHIL # BLD AUTO: 0.2 K/UL
EOSINOPHIL NFR BLD: 5.5 %
ERYTHROCYTE [DISTWIDTH] IN BLOOD BY AUTOMATED COUNT: 13.2 %
EST. GFR  (AFRICAN AMERICAN): >60 ML/MIN/1.73 M^2
EST. GFR  (NON AFRICAN AMERICAN): >60 ML/MIN/1.73 M^2
GLUCOSE SERPL-MCNC: 102 MG/DL
HCT VFR BLD AUTO: 39.1 %
HDLC SERPL-MCNC: 62 MG/DL
HDLC SERPL: 29.5 %
HGB BLD-MCNC: 13 G/DL
IMM GRANULOCYTES # BLD AUTO: 0.02 K/UL
IMM GRANULOCYTES NFR BLD AUTO: 0.5 %
LDLC SERPL CALC-MCNC: 123.6 MG/DL
LYMPHOCYTES # BLD AUTO: 0.8 K/UL
LYMPHOCYTES NFR BLD: 20.6 %
MCH RBC QN AUTO: 32.1 PG
MCHC RBC AUTO-ENTMCNC: 33.2 G/DL
MCV RBC AUTO: 97 FL
MONOCYTES # BLD AUTO: 0.7 K/UL
MONOCYTES NFR BLD: 17.8 %
NEUTROPHILS # BLD AUTO: 2.2 K/UL
NEUTROPHILS NFR BLD: 54.8 %
NONHDLC SERPL-MCNC: 148 MG/DL
NRBC BLD-RTO: 0 /100 WBC
PLATELET # BLD AUTO: 152 K/UL
PMV BLD AUTO: 10.3 FL
POTASSIUM SERPL-SCNC: 4.7 MMOL/L
PROT SERPL-MCNC: 6.1 G/DL
RBC # BLD AUTO: 4.05 M/UL
SODIUM SERPL-SCNC: 139 MMOL/L
TRIGL SERPL-MCNC: 122 MG/DL
WBC # BLD AUTO: 3.99 K/UL

## 2018-10-10 PROCEDURE — 36415 COLL VENOUS BLD VENIPUNCTURE: CPT | Mod: PO

## 2018-10-10 PROCEDURE — 83036 HEMOGLOBIN GLYCOSYLATED A1C: CPT

## 2018-10-10 PROCEDURE — 80053 COMPREHEN METABOLIC PANEL: CPT

## 2018-10-10 PROCEDURE — 80061 LIPID PANEL: CPT

## 2018-10-10 PROCEDURE — 84153 ASSAY OF PSA TOTAL: CPT

## 2018-10-10 PROCEDURE — 85025 COMPLETE CBC W/AUTO DIFF WBC: CPT

## 2018-10-11 LAB
ESTIMATED AVG GLUCOSE: 108 MG/DL
HBA1C MFR BLD HPLC: 5.4 %

## 2018-10-12 ENCOUNTER — PES CALL (OUTPATIENT)
Dept: ADMINISTRATIVE | Facility: CLINIC | Age: 83
End: 2018-10-12

## 2018-10-15 ENCOUNTER — CLINICAL SUPPORT (OUTPATIENT)
Dept: CARDIOLOGY | Facility: CLINIC | Age: 83
End: 2018-10-15
Payer: MEDICARE

## 2018-10-15 ENCOUNTER — OFFICE VISIT (OUTPATIENT)
Dept: FAMILY MEDICINE | Facility: CLINIC | Age: 83
End: 2018-10-15
Payer: MEDICARE

## 2018-10-15 VITALS
SYSTOLIC BLOOD PRESSURE: 161 MMHG | WEIGHT: 156.63 LBS | OXYGEN SATURATION: 95 % | HEIGHT: 70 IN | DIASTOLIC BLOOD PRESSURE: 71 MMHG | HEART RATE: 62 BPM | TEMPERATURE: 98 F | BODY MASS INDEX: 22.42 KG/M2

## 2018-10-15 DIAGNOSIS — Z91.81 AT RISK FOR FALLING: ICD-10-CM

## 2018-10-15 DIAGNOSIS — N40.0 BPH WITH ELEVATED PSA: ICD-10-CM

## 2018-10-15 DIAGNOSIS — E78.5 HYPERLIPIDEMIA, UNSPECIFIED HYPERLIPIDEMIA TYPE: ICD-10-CM

## 2018-10-15 DIAGNOSIS — R03.0 ELEVATED BP WITHOUT DIAGNOSIS OF HYPERTENSION: ICD-10-CM

## 2018-10-15 DIAGNOSIS — R03.0 ELEVATED BP WITHOUT DIAGNOSIS OF HYPERTENSION: Primary | ICD-10-CM

## 2018-10-15 DIAGNOSIS — R97.20 BPH WITH ELEVATED PSA: ICD-10-CM

## 2018-10-15 PROCEDURE — 3078F DIAST BP <80 MM HG: CPT | Mod: CPTII,,, | Performed by: FAMILY MEDICINE

## 2018-10-15 PROCEDURE — 93010 ELECTROCARDIOGRAM REPORT: CPT | Mod: ,,, | Performed by: NUCLEAR MEDICINE

## 2018-10-15 PROCEDURE — 99214 OFFICE O/P EST MOD 30 MIN: CPT | Mod: S$PBB,,, | Performed by: FAMILY MEDICINE

## 2018-10-15 PROCEDURE — 1101F PT FALLS ASSESS-DOCD LE1/YR: CPT | Mod: CPTII,,, | Performed by: FAMILY MEDICINE

## 2018-10-15 PROCEDURE — 99213 OFFICE O/P EST LOW 20 MIN: CPT | Mod: PBBFAC,PO | Performed by: FAMILY MEDICINE

## 2018-10-15 PROCEDURE — 93005 ELECTROCARDIOGRAM TRACING: CPT | Mod: PBBFAC,PO | Performed by: FAMILY MEDICINE

## 2018-10-15 PROCEDURE — 99999 PR PBB SHADOW E&M-EST. PATIENT-LVL III: CPT | Mod: PBBFAC,,, | Performed by: FAMILY MEDICINE

## 2018-10-15 PROCEDURE — 3077F SYST BP >= 140 MM HG: CPT | Mod: CPTII,,, | Performed by: FAMILY MEDICINE

## 2018-10-15 NOTE — PATIENT INSTRUCTIONS
Discharge Instructions: Taking Your Blood Pressure  Blood pressure is the force of blood as it moves from the heart through the blood vessels. You can take your own blood pressure reading using a digital monitor. Take readings as often as your healthcare provider instructs. Take your readings each time in the same way, using the same arm. Here are guidelines for taking your blood pressure.  The American Heart Association (AHA) recommends purchasing a blood pressure monitor that is validated and approved by the Association for the Advancement of Medical Instrumentation, the [place] Hypertension Society, and the International Protocol for the Validation of Automated BP Measuring Devices. If the blood pressure monitor is for a senior adult, a pregnant woman, or a child, make certain it is validated for use with such a population. For the most reliable readings, the AHA recommends an automatic, cuff-style, upper arm (bicep) monitor. The readings from finger and wrist monitors are not as reliable as the upper arm monitor.        Step 1. Relax    · Wait at least a half hour after smoking, eating, or exercising. Do not drink coffee, tea, soda, or other caffeinated beverages before checking your blood pressure.   · Sit comfortably at a table. Place the monitor near you.  · Rest for a few minutes before you begin.        Step 2. Wrap the cuff    · Place your arm on the table, palm up. Put your arm in a position that is level with your heart. Wrap the cuff around your upper arm, about an inch above your elbow. Its best to wrap the cuff on bare skin, not over clothing.  · Make sure your cuff fits. If it doesnt wrap around your upper arm, order a larger cuff. A cuff that is too large or too small can result in an inaccurate blood pressure reading.           Step 3. Inflate the cuff    · Pump the cuff until the scale reads 200. If you have a self-inflating cuff, push the button that starts the pump.  · The cuff will  tighten, then loosen.  · The numbers will change. When they stop changing, your blood pressure reading will appear.  · If you get a reading that is too high or too low for you, relax for a few minutes. Then do the test again.    Step 4. Write down the results  · Write down your blood pressure numbers. Jody the date and time. Keep your results in one place, such as a notebook.  · Remove the cuff from your arm. Turn off the machine.  · Take the readings with you to your medical appointments.  · If you start a new blood pressure medicine, or change a blood pressure medicine dose, note the day you started the new drug or dosage on your blood pressure recording sheet. This will help your healthcare provider monitor the effect of medication changes.     Date Last Reviewed: 4/27/2016  © 5762-8131 [employer]. [address] [place] [address]. All rights reserved. This information is not intended as a substitute for professional medical care. Always follow your healthcare professional's instructions.        Avoiding Slips, Trips, and Falls for Healthcare Workers    Common hazards like water spills and burned-out light bulbs can lead to serious, painful injuries--and could also limit your ability to respond to emergencies. Protect yourself, your coworkers, and your patients by doing what you can to create a hazard-free workplace.  Clean up wet surfaces  Anytime you see (or cause) a spill, clean it up right away. If you cant, jody it with a sign or paper towels and report it to the appropriate person for cleanup.  Keep your area clutter-free and well-lit  Every piece of equipment left out or file drawer left open is a hazard that can trip you up, particularly when its dark:  · Clean up clutter, especially in front of doors, in hallways, and on stairs.  · Dont leave wheelchairs, cleaning supplies, handcarts, and other materials lying around.  · Turn on lights before entering a room or supply  closet.  · Report burned-out light bulbs to maintenance promptly.  · Close file drawers before you walk away from them.  Avoid shortcuts  Taking a shortcut to save time can be risky. The more shortcuts you take, the greater your chance for taking a tumble:  · Find a ladder or a step stool when somethings out of easy reach, instead of using an object not meant for climbing.  · Never carry a load that you cant see over. If necessary, make more than one trip, use supply carts, or ask for assistance.  · Use only designated walkways.  Date Last Reviewed: 2/14/2016  © 4803-4521 [employer]. [address] [place] [address], [place] [address]. All rights reserved. This information is not intended as a substitute for professional medical care. Always follow your healthcare professional's instructions.

## 2018-10-15 NOTE — PROGRESS NOTES
Subjective:       Patient ID: Dominguez Ritchie is a 85 y.o. male.    Chief Complaint: Routine general exam  He is here for his routine 3 months chronic care management.  Had his lab work done last week and here for a review.    Brought in by his wife  Lives with- wife  Depression-denies  Functional assessment-able to perform ADL but has chronic back and knee pain that limits his activity. Wears hearing aid  MMSE- not done  Sleep-good  Energy level-good  Eating-low salt  Continence-denies  Home health-none  Equiptment at home-none  End of life -decision maker-code status-unknown    Problem today-Elevated BP. Brought a week log of blood pressure with range from 123-168/64-84, HR .  Half normal and half slightly elevated. Denies symptoms.  PMH: MRI brain 4/4/2013----Known Meningiomas within the right posterior fossa have increased in   size since previous exam and demonstrate mild increased mass effect upon   the adjacent brain stem and cerebellum. He advocates balance problem.    Recent lab was reviewed with patient, normal except for slightly elevated cholesterol and elevated but improved PSA level  He is sees Dr Sadler for knee and back pain.      Past Medical History:   Diagnosis Date    Abnormal exercise tolerance test 7/25/2013    Arthritis     Colon polyp     Diverticulosis     Dyslipidemia 7/25/2013    GERD (gastroesophageal reflux disease)     Hyperlipidemia 1980    HIGH TG    Knee pain, right 6/14/2013    Meningioma     Personal history of colonic polyps 5/27/2014    RLS (restless legs syndrome) 6/14/2013    Tobacco dependence     resolved    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     Family History   Problem Relation Age of Onset    Heart disease Mother     Cancer Son         pancreatic     Diabetes Maternal Uncle     Kidney disease Neg Hx     Stroke Neg Hx      Social History     Socioeconomic History    Marital status:      Spouse name: Not on file    Number of children: Not  on file    Years of education: Not on file    Highest education level: Not on file   Social Needs    Financial resource strain: Not on file    Food insecurity - worry: Not on file    Food insecurity - inability: Not on file    Transportation needs - medical: Not on file    Transportation needs - non-medical: Not on file   Occupational History    Not on file   Tobacco Use    Smoking status: Former Smoker     Packs/day: 1.00     Years: 25.00     Pack years: 25.00     Last attempt to quit: 1990     Years since quittin.8    Smokeless tobacco: Never Used   Substance and Sexual Activity    Alcohol use: No     Alcohol/week: 0.0 oz    Drug use: No    Sexual activity: Not Currently     Birth control/protection: None   Other Topics Concern    Not on file   Social History Narrative    Not on file     Outpatient Encounter Medications as of 10/15/2018   Medication Sig Dispense Refill    b complex vitamins tablet Take 1 tablet by mouth once daily.      meclizine (ANTIVERT) 25 mg tablet TAKE 1 TABLET THREE TIMES DAILY AS NEEDED 270 tablet 3    omeprazole (PRILOSEC) 20 MG capsule Take 1 capsule (20 mg total) by mouth once daily. 90 capsule 3    ropinirole (REQUIP) 0.5 MG tablet Take 1 tablet (0.5 mg total) by mouth every evening. 90 tablet 3    [DISCONTINUED] meloxicam (MOBIC) 7.5 MG tablet TAKE 1 TABLET BY MOUTH TWICE DAILY WITH MEALS AS NEEDED FOR PAIN. 90 day supply. 180 tablet 0     No facility-administered encounter medications on file as of 10/15/2018.        Review of Systems   Constitutional: Negative for appetite change and fever.   HENT: Negative for congestion, facial swelling and voice change.    Eyes: Negative for discharge and itching.   Respiratory: Negative for cough, chest tightness and wheezing.    Cardiovascular: Negative.  Negative for chest pain and leg swelling.   Gastrointestinal: Negative for abdominal pain, nausea and vomiting.   Endocrine: Negative for cold intolerance and heat  "intolerance.   Genitourinary: Negative for dysuria and flank pain.   Musculoskeletal: Positive for back pain and gait problem. Negative for myalgias and neck stiffness.   Skin: Negative for pallor and rash.   Neurological: Negative for facial asymmetry and weakness.   Psychiatric/Behavioral: Negative for agitation and confusion.       Objective:      BP (!) 161/71 (BP Location: Right arm, Patient Position: Sitting, BP Method: Medium (Automatic))   Pulse 62   Temp 98.3 °F (36.8 °C) (Tympanic)   Ht 5' 10" (1.778 m)   Wt 71.1 kg (156 lb 10.2 oz)   SpO2 95%   BMI 22.48 kg/m²   Physical Exam   Constitutional: He is oriented to person, place, and time. He appears well-developed. No distress.   HENT:   Head: Normocephalic and atraumatic.   Right Ear: External ear normal.   Left Ear: External ear normal.   Eyes: Conjunctivae and EOM are normal.   Neck: Neck supple.   Cardiovascular: Normal rate and regular rhythm.   Pulmonary/Chest: Effort normal. No respiratory distress.   Abdominal: Soft. Normal appearance and bowel sounds are normal. There is no hepatosplenomegaly.   Genitourinary:   Genitourinary Comments: deferred   Musculoskeletal: He exhibits edema.   Trace edema to BLE and diffuse chronic discoloration.   Neurological: He is alert and oriented to person, place, and time.   Skin: Skin is warm and dry.   Psychiatric: He has a normal mood and affect. His behavior is normal.   Nursing note and vitals reviewed.      Results for orders placed or performed in visit on 10/10/18   CBC auto differential   Result Value Ref Range    WBC 3.99 3.90 - 12.70 K/uL    RBC 4.05 (L) 4.60 - 6.20 M/uL    Hemoglobin 13.0 (L) 14.0 - 18.0 g/dL    Hematocrit 39.1 (L) 40.0 - 54.0 %    MCV 97 82 - 98 fL    MCH 32.1 (H) 27.0 - 31.0 pg    MCHC 33.2 32.0 - 36.0 g/dL    RDW 13.2 11.5 - 14.5 %    Platelets 152 150 - 350 K/uL    MPV 10.3 9.2 - 12.9 fL    Immature Granulocytes 0.5 0.0 - 0.5 %    Gran # (ANC) 2.2 1.8 - 7.7 K/uL    Immature Grans " (Abs) 0.02 0.00 - 0.04 K/uL    Lymph # 0.8 (L) 1.0 - 4.8 K/uL    Mono # 0.7 0.3 - 1.0 K/uL    Eos # 0.2 0.0 - 0.5 K/uL    Baso # 0.03 0.00 - 0.20 K/uL    nRBC 0 0 /100 WBC    Gran% 54.8 38.0 - 73.0 %    Lymph% 20.6 18.0 - 48.0 %    Mono% 17.8 (H) 4.0 - 15.0 %    Eosinophil% 5.5 0.0 - 8.0 %    Basophil% 0.8 0.0 - 1.9 %    Differential Method Automated    Comprehensive metabolic panel   Result Value Ref Range    Sodium 139 136 - 145 mmol/L    Potassium 4.7 3.5 - 5.1 mmol/L    Chloride 105 95 - 110 mmol/L    CO2 28 23 - 29 mmol/L    Glucose 102 70 - 110 mg/dL    BUN, Bld 14 8 - 23 mg/dL    Creatinine 0.8 0.5 - 1.4 mg/dL    Calcium 9.1 8.7 - 10.5 mg/dL    Total Protein 6.1 6.0 - 8.4 g/dL    Albumin 3.4 (L) 3.5 - 5.2 g/dL    Total Bilirubin 0.4 0.1 - 1.0 mg/dL    Alkaline Phosphatase 61 55 - 135 U/L    AST 29 10 - 40 U/L    ALT 21 10 - 44 U/L    Anion Gap 6 (L) 8 - 16 mmol/L    eGFR if African American >60.0 >60 mL/min/1.73 m^2    eGFR if non African American >60.0 >60 mL/min/1.73 m^2   Lipid panel   Result Value Ref Range    Cholesterol 210 (H) 120 - 199 mg/dL    Triglycerides 122 30 - 150 mg/dL    HDL 62 40 - 75 mg/dL    LDL Cholesterol 123.6 63.0 - 159.0 mg/dL    HDL/Chol Ratio 29.5 20.0 - 50.0 %    Total Cholesterol/HDL Ratio 3.4 2.0 - 5.0    Non-HDL Cholesterol 148 mg/dL   PSA, Screening   Result Value Ref Range    PSA, SCREEN 9.0 (H) 0.00 - 4.00 ng/mL   Hemoglobin A1c   Result Value Ref Range    Hemoglobin A1C 5.4 4.0 - 5.6 %    Estimated Avg Glucose 108 68 - 131 mg/dL     Assessment:       1. Elevated BP without diagnosis of hypertension    2. Hyperlipidemia, unspecified hyperlipidemia type    3. At risk for falling    4. BPH with elevated PSA        Plan:   Doing well except for elevated SBP and hyperlipidemia    Elevated BP without diagnosis of hypertension: Plan is to eliminate meds which could elevate BP   -dash diet, increase activity as tolerated  -continue to monitor and record  -stop meloxicam which can  elevated blood pressure  -     EKG 12-lead; Future; Expected date: 10/15/2018-to screen for LVH.  EKG was reviewed by myself.  No LVH normal progression of R-wave, no ST changes, sinus Vazquez heart rate 57    Hyperlipidemia, unspecified hyperlipidemia type;    -Omega 3 fatty acid daily    BPH with elevated PSA: chronic. Improving level.     At risk for falls: due to chronic back and knee pain and balance problem due to meningiomas.    A total of 25 mins was spent in face to face time, of which over 50 % was spent in counseling patient on HTN, complications, treatment, and side effects of medications including hypotension.

## 2018-10-17 ENCOUNTER — HOSPITAL ENCOUNTER (OUTPATIENT)
Facility: HOSPITAL | Age: 83
Discharge: HOME | End: 2018-10-17
Attending: ANESTHESIOLOGY | Admitting: ANESTHESIOLOGY
Payer: MEDICARE

## 2018-10-17 VITALS
OXYGEN SATURATION: 97 % | RESPIRATION RATE: 17 BRPM | HEART RATE: 74 BPM | SYSTOLIC BLOOD PRESSURE: 184 MMHG | DIASTOLIC BLOOD PRESSURE: 77 MMHG

## 2018-10-17 DIAGNOSIS — M46.1 SACROILIITIS: ICD-10-CM

## 2018-10-17 DIAGNOSIS — G57.01 PIRIFORMIS SYNDROME OF RIGHT SIDE: ICD-10-CM

## 2018-10-17 DIAGNOSIS — M70.61 GREATER TROCHANTERIC BURSITIS, RIGHT: ICD-10-CM

## 2018-10-17 PROCEDURE — 25000003 PHARM REV CODE 250

## 2018-10-17 PROCEDURE — 27096 INJECT SACROILIAC JOINT: CPT | Mod: RT,,, | Performed by: ANESTHESIOLOGY

## 2018-10-17 PROCEDURE — 20552 NJX 1/MLT TRIGGER POINT 1/2: CPT | Mod: 59,,, | Performed by: ANESTHESIOLOGY

## 2018-10-17 PROCEDURE — 20610 DRAIN/INJ JOINT/BURSA W/O US: CPT | Mod: 59,RT,, | Performed by: ANESTHESIOLOGY

## 2018-10-17 PROCEDURE — 25000003 PHARM REV CODE 250: Performed by: ANESTHESIOLOGY

## 2018-10-17 PROCEDURE — 63600175 PHARM REV CODE 636 W HCPCS

## 2018-10-17 PROCEDURE — 63600175 PHARM REV CODE 636 W HCPCS: Performed by: ANESTHESIOLOGY

## 2018-10-17 RX ORDER — LIDOCAINE HYDROCHLORIDE 20 MG/ML
INJECTION, SOLUTION INFILTRATION; PERINEURAL
Status: DISCONTINUED | OUTPATIENT
Start: 2018-10-17 | End: 2018-10-17 | Stop reason: HOSPADM

## 2018-10-17 RX ORDER — GADOBUTROL 604.72 MG/ML
4 INJECTION INTRAVENOUS
Status: COMPLETED | OUTPATIENT
Start: 2018-10-17 | End: 2018-10-17

## 2018-10-17 RX ORDER — METHYLPREDNISOLONE ACETATE 40 MG/ML
INJECTION, SUSPENSION INTRA-ARTICULAR; INTRALESIONAL; INTRAMUSCULAR; SOFT TISSUE
Status: DISCONTINUED | OUTPATIENT
Start: 2018-10-17 | End: 2018-10-17 | Stop reason: HOSPADM

## 2018-10-17 RX ADMIN — GADOBUTROL 4 ML: 604.72 INJECTION INTRAVENOUS at 03:10

## 2018-10-17 NOTE — OP NOTE
PROCEDURE: Sacroiliac joint, Piriformis Muscle Inejction and Greater trochanteric bursa injection under fluoroscopic guidance       SIDE: RIGHT Sacroiliac injection and RIGHT greater trochanteric bursa injection Right Piriformis Injection      PROCEDURE DATE: 10/17/2018    PREOPERATIVE DIAGNOSIS: Sacroiliitis, greater trochanteric bursitis, Piriformis Syndrome, Lumbar Spondylosis  POSTOPERATIVE DIAGNOSIS: Sacroiliitis, greater trochanteric bursitis, Piriformis Syndrome, Lumbar Spondylosis    PROVIDER: Nabor Sadler MD  Assistant(s): none    Anesthesia: Local, No Sedation     >> 0 mg of VERSED    >> 0 mcg of FENTANYL     INDICATION: The patient has a history of pain due to sacroiliitis unresponsive to conservative treatments. Fluoroscopy was used to optimize visualization of needle placement and to maximize safety.       PROCEDURE DESCRIPTION: The patient was seen and identified in the preoperative area. Risks, benefits, complications, and alternatives were discussed with the patient. The patient agreed to proceed with the procedure and signed the consent. The site and side of the procedure was identified and marked.     The patient was taken to the procedural suite. The patient was positioned in prone orientation on procedure table. A time out was performed prior to any intervention. The procedure, site, side, and allergies were stated and agreed to by all present. The lumbosacral area extending to the skin overlying the femoral greater trochanter was widely prepped with ChloraPrep. The procedural site was draped in usual sterile fashion. Vital signs were closely monitored throughout this procedure.     The fluoroscopic camera was placed in contralateral oblique view and was adjusted until the anterior and posterior joint margins of the targeted sacroiliac joint aligned in linear array. The lower pole of the joint was identified, marked, and localized with 1% Lidocaine. A 25 gauge 3.5 inch spinal needle was  introduced and advanced to the joint under fluoroscopic guidance. The joint space was entered and after negative aspiration, 2 mL of injectate was posited into the joint space. The needle was then withdrawn outside of the joint space and after negative aspiration 1 mL of solution was injected outside of the joint space. The injectant solution used was comprised of 4 mL of 1% PF Lidocaine and 2 mL of Methylprednisolone (40 mg/mL). This techniques was performed for the above noted joint/s.    Following Sacroiliac Joint injection, the stylet was replaced and the needle was withdrawn intact. The RIGHT greater trochanter was then identified with fluoroscopy. The targeted site of entry was localized with 1% PF Lidocaine. The above noted spinal needle was advanced to the lateral border of the greater trochanter until the osseus interface was met.  After negative aspiration, 2 mL of the above noted solution was injected. No pain or paresthesia was noted on injection. Following injection, the stylet was replaced and the needle was withdrawn intact. This technique was used for the above noted greater trochanteric bursa / bursae. The skin was cleaned and bandages were applied.    Piriformis Muscle Injection:  The fluoroscope was used to crisp up the right sacroiliac joint and the inferior border of the ilium. A 25 G 3.5 inch needle was then advanced under fluoroscopic guidance to contact the ilium after which the needle was withdrawn and redirected slightly anteriorly to lie within the substance of the piriformis muscle. This was confirmed with instillation of 0.5 ml of contrast dye gadolinium contrast  which delineated the course of the piriformis muscle diagonally from the origin on the anteroinferior sacrum to the insertion on the greater trochanter of the femur after negative aspiration for blood or air. After confirming good spread of the contrast, 1 ml Methylprednisolone (40 mg/mL) and 2 mL of 1% PF Lidocaine  was  injected. The needle was then removed intact.     Description of Findings: Not applicable    Prosthetic devices, grafts, tissues, or devices implanted: None    Specimen Removed: No    Estimated Blood Loss: minimal    COMPLICATIONS: None    DISPOSITION / PLANS: The patient was transferred to the recovery area in a stable condition for observation. The patient was reexamined prior to discharge. There was no evidence of acute neurologic injury following the procedure.  Patient was discharged from the recovery room after meeting discharge criteria. Home discharge instructions were given to the patient by the staff.

## 2018-10-17 NOTE — DISCHARGE SUMMARY
[facility]  Discharge Note       Description of Procedure: Right Sacroiliac Joint and Greater Trochanteric Bursa Injection Piriformis Muscle inejction under Fluoroscopic Guidance    Procedure Date: 10/17/2018    Admit Date: 10/17/2018  Discharge Date: 10/17/2018     Attending Physician: Viktoriya Tomlin   Discharge Provider: Viktoriya Tomlin    Preoperative Diagnosis: Sacroiliitis and Greater Trochanteric Bursitis Piriformis Syndrome, Lumbar Spondylosis    Postoperative Diagnosis: as above, same as preoperative diagnosis    Discharged Condition: Stable    Hospital Course: Patient was admitted for an outpatient procedure. The procedure was tolerated well with no complications.    Final Diagnoses: Same as principal problem.    Disposition: Home, self-care.    Follow up/Patient Instructions:  Follow-up in clinic in 2-3 weeks.    Medications: No medications were prescribed today. The patient was advised to resume normal medication regimen without change.  Specific information was provided regarding restarting any anticoagulant/s.    Discharge Procedure Orders (must include Diet, Follow-up, Activity):  Light activity for the remainder of the day, resume normal activity tomorrow. Resume normal diet. Follow-up in clinic in 2-3 weeks.

## 2018-10-17 NOTE — PLAN OF CARE
Problem: Patient Care Overview  Goal: Plan of Care Review  Outcome: Outcome(s) achieved Date Met: 10/17/18  Patient d/c home in stable condition via wheelchair with ride. Verbalized understanding of d/c instructions. Patient voiced no complaints at this time. Patient stood at side of bed, walked steps with no new motor deficits. Neurologically intact.

## 2018-10-23 NOTE — PROGRESS NOTES
Subjective:       Patient ID: Dominguez Ritchie is a 85 y.o. male.    Chief Complaint: No chief complaint on file.        Past Medical History:   Diagnosis Date    Abnormal exercise tolerance test 2013    Arthritis     Colon polyp     Diverticulosis     Dyslipidemia 2013    GERD (gastroesophageal reflux disease)     Hyperlipidemia 1980    HIGH TG    Knee pain, right 2013    Meningioma     Personal history of colonic polyps 2014    RLS (restless legs syndrome) 2013    Tobacco dependence     resolved    West Nile meningitis     per patient     Family History   Problem Relation Age of Onset    Heart disease Mother     Cancer Son         pancreatic     Diabetes Maternal Uncle     Kidney disease Neg Hx     Stroke Neg Hx      Social History     Socioeconomic History    Marital status:      Spouse name: Not on file    Number of children: Not on file    Years of education: Not on file    Highest education level: Not on file   Social Needs    Financial resource strain: Not on file    Food insecurity - worry: Not on file    Food insecurity - inability: Not on file    Transportation needs - medical: Not on file    Transportation needs - non-medical: Not on file   Occupational History    Not on file   Tobacco Use    Smoking status: Former Smoker     Packs/day: 1.00     Years: 25.00     Pack years: 25.00     Last attempt to quit: 1990     Years since quittin.8    Smokeless tobacco: Never Used   Substance and Sexual Activity    Alcohol use: No     Alcohol/week: 0.0 oz    Drug use: No    Sexual activity: Not Currently     Birth control/protection: None   Other Topics Concern    Not on file   Social History Narrative    Not on file     Outpatient Encounter Medications as of 10/15/2018   Medication Sig Dispense Refill    b complex vitamins tablet Take 1 tablet by mouth once daily.      meclizine (ANTIVERT) 25 mg tablet TAKE 1 TABLET THREE TIMES DAILY AS  NEEDED 270 tablet 3    omeprazole (PRILOSEC) 20 MG capsule Take 1 capsule (20 mg total) by mouth once daily. 90 capsule 3    ropinirole (REQUIP) 0.5 MG tablet Take 1 tablet (0.5 mg total) by mouth every evening. 90 tablet 3    [DISCONTINUED] meloxicam (MOBIC) 7.5 MG tablet TAKE 1 TABLET BY MOUTH TWICE DAILY WITH MEALS AS NEEDED FOR PAIN. 90 day supply. 180 tablet 0     No facility-administered encounter medications on file as of 10/15/2018.        Review of Systems    Objective:      There were no vitals taken for this visit.  Physical Exam    Results for orders placed or performed in visit on 10/10/18   CBC auto differential   Result Value Ref Range    WBC 3.99 3.90 - 12.70 K/uL    RBC 4.05 (L) 4.60 - 6.20 M/uL    Hemoglobin 13.0 (L) 14.0 - 18.0 g/dL    Hematocrit 39.1 (L) 40.0 - 54.0 %    MCV 97 82 - 98 fL    MCH 32.1 (H) 27.0 - 31.0 pg    MCHC 33.2 32.0 - 36.0 g/dL    RDW 13.2 11.5 - 14.5 %    Platelets 152 150 - 350 K/uL    MPV 10.3 9.2 - 12.9 fL    Immature Granulocytes 0.5 0.0 - 0.5 %    Gran # (ANC) 2.2 1.8 - 7.7 K/uL    Immature Grans (Abs) 0.02 0.00 - 0.04 K/uL    Lymph # 0.8 (L) 1.0 - 4.8 K/uL    Mono # 0.7 0.3 - 1.0 K/uL    Eos # 0.2 0.0 - 0.5 K/uL    Baso # 0.03 0.00 - 0.20 K/uL    nRBC 0 0 /100 WBC    Gran% 54.8 38.0 - 73.0 %    Lymph% 20.6 18.0 - 48.0 %    Mono% 17.8 (H) 4.0 - 15.0 %    Eosinophil% 5.5 0.0 - 8.0 %    Basophil% 0.8 0.0 - 1.9 %    Differential Method Automated    Comprehensive metabolic panel   Result Value Ref Range    Sodium 139 136 - 145 mmol/L    Potassium 4.7 3.5 - 5.1 mmol/L    Chloride 105 95 - 110 mmol/L    CO2 28 23 - 29 mmol/L    Glucose 102 70 - 110 mg/dL    BUN, Bld 14 8 - 23 mg/dL    Creatinine 0.8 0.5 - 1.4 mg/dL    Calcium 9.1 8.7 - 10.5 mg/dL    Total Protein 6.1 6.0 - 8.4 g/dL    Albumin 3.4 (L) 3.5 - 5.2 g/dL    Total Bilirubin 0.4 0.1 - 1.0 mg/dL    Alkaline Phosphatase 61 55 - 135 U/L    AST 29 10 - 40 U/L    ALT 21 10 - 44 U/L    Anion Gap 6 (L) 8 - 16 mmol/L     eGFR if African American >60.0 >60 mL/min/1.73 m^2    eGFR if non African American >60.0 >60 mL/min/1.73 m^2   Lipid panel   Result Value Ref Range    Cholesterol 210 (H) 120 - 199 mg/dL    Triglycerides 122 30 - 150 mg/dL    HDL 62 40 - 75 mg/dL    LDL Cholesterol 123.6 63.0 - 159.0 mg/dL    HDL/Chol Ratio 29.5 20.0 - 50.0 %    Total Cholesterol/HDL Ratio 3.4 2.0 - 5.0    Non-HDL Cholesterol 148 mg/dL   PSA, Screening   Result Value Ref Range    PSA, SCREEN 9.0 (H) 0.00 - 4.00 ng/mL   Hemoglobin A1c   Result Value Ref Range    Hemoglobin A1C 5.4 4.0 - 5.6 %    Estimated Avg Glucose 108 68 - 131 mg/dL     Assessment:       1. Elevated BP without diagnosis of hypertension        Plan:   Elevated BP without diagnosis of hypertension  -     EKG 12-lead

## 2018-11-15 ENCOUNTER — TELEPHONE (OUTPATIENT)
Dept: PAIN MEDICINE | Facility: CLINIC | Age: 83
End: 2018-11-15

## 2018-11-15 NOTE — TELEPHONE ENCOUNTER
----- Message from Heidi Ring sent at 11/15/2018  8:00 AM CST -----  Pt at [phone]//states he is wanting to talk with your office regarding injections in his back//please call//laurita/nish

## 2018-11-16 ENCOUNTER — TELEPHONE (OUTPATIENT)
Dept: PAIN MEDICINE | Facility: CLINIC | Age: 83
End: 2018-11-16

## 2018-11-16 DIAGNOSIS — M46.1 SACROILIITIS: Primary | ICD-10-CM

## 2018-11-16 DIAGNOSIS — M70.61 GREATER TROCHANTERIC BURSITIS, RIGHT: ICD-10-CM

## 2018-11-16 NOTE — TELEPHONE ENCOUNTER
----- Message from Jessica Hendrix sent at 11/16/2018  7:57 AM CST -----  Patient needs call back rg todays appointment..[phone]

## 2018-11-29 ENCOUNTER — HOSPITAL ENCOUNTER (OUTPATIENT)
Dept: RADIOLOGY | Facility: HOSPITAL | Age: 83
Discharge: HOME | End: 2018-11-29
Attending: PHYSICIAN ASSISTANT
Payer: MEDICARE

## 2018-11-29 ENCOUNTER — TELEPHONE (OUTPATIENT)
Dept: PAIN MEDICINE | Facility: CLINIC | Age: 83
End: 2018-11-29
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
## 2018-11-29 PROCEDURE — 73560 X-RAY EXAM OF KNEE 1 OR 2: CPT | Mod: TC,HCNC,RT

## 2018-11-29 PROCEDURE — 73564 X-RAY EXAM KNEE 4 OR MORE: CPT | Mod: 26,HCNC,50, | Performed by: RADIOLOGY

## 2018-11-29 NOTE — TELEPHONE ENCOUNTER
Mr Ritchie walked into the clinic without a appt. requested a xray stated that he had fell hurting his left knee that he had a eplacement on Mrs forde ordered an xray we called back to let mr ritchie know X-ray shows previous knee replacement NO hardware looening or fracture----- Message from Yadi Forde PA-C sent at 11/29/2018  2:55 PM CST -----  X-ray shows previous knee replacement.  NO hardware loosening or failure.   NO fracture.

## 2018-11-29 NOTE — PROGRESS NOTES
Patient came into clinic requesting to speak to nurse.  Ruthie Lorenzo MA, spoke to patient who reported he recently fell and requested left knee x-ray and a call if showing fracture.      He has h/o left total knee replacement by Dr. Saldivar.

## 2018-12-03 ENCOUNTER — OFFICE VISIT (OUTPATIENT)
Dept: FAMILY MEDICINE | Facility: CLINIC | Age: 83
End: 2018-12-03
Payer: MEDICARE

## 2018-12-03 VITALS
DIASTOLIC BLOOD PRESSURE: 70 MMHG | TEMPERATURE: 98 F | OXYGEN SATURATION: 96 % | SYSTOLIC BLOOD PRESSURE: 155 MMHG | HEART RATE: 64 BPM | BODY MASS INDEX: 22.9 KG/M2 | WEIGHT: 159.63 LBS

## 2018-12-03 DIAGNOSIS — M54.41 LOW BACK PAIN WITH RIGHT-SIDED SCIATICA, UNSPECIFIED BACK PAIN LATERALITY, UNSPECIFIED CHRONICITY: ICD-10-CM

## 2018-12-03 DIAGNOSIS — D32.9 MENINGIOMA: ICD-10-CM

## 2018-12-03 DIAGNOSIS — I10 HTN, GOAL BELOW 130/80: Primary | ICD-10-CM

## 2018-12-03 DIAGNOSIS — G25.81 RLS (RESTLESS LEGS SYNDROME): ICD-10-CM

## 2018-12-03 PROCEDURE — 1100F PTFALLS ASSESS-DOCD GE2>/YR: CPT | Mod: CPTII,HCNC,S$GLB, | Performed by: FAMILY MEDICINE

## 2018-12-03 PROCEDURE — 99214 OFFICE O/P EST MOD 30 MIN: CPT | Mod: HCNC,S$GLB,, | Performed by: FAMILY MEDICINE

## 2018-12-03 PROCEDURE — 3288F FALL RISK ASSESSMENT DOCD: CPT | Mod: CPTII,HCNC,S$GLB, | Performed by: FAMILY MEDICINE

## 2018-12-03 PROCEDURE — 99999 PR PBB SHADOW E&M-EST. PATIENT-LVL III: CPT | Mod: PBBFAC,HCNC,, | Performed by: FAMILY MEDICINE

## 2018-12-03 RX ORDER — TRAMADOL HYDROCHLORIDE 50 MG/1
50 TABLET ORAL
Qty: 30 TABLET | Refills: 0 | Status: CANCELLED | OUTPATIENT
Start: 2018-12-03

## 2018-12-03 RX ORDER — MELOXICAM 7.5 MG/1
7.5 TABLET ORAL 2 TIMES DAILY PRN
COMMUNITY
End: 2018-12-03 | Stop reason: SDUPTHER

## 2018-12-03 RX ORDER — TRAMADOL HYDROCHLORIDE 50 MG/1
50 TABLET ORAL
COMMUNITY
End: 2019-01-18 | Stop reason: SDUPTHER

## 2018-12-03 RX ORDER — MELOXICAM 7.5 MG/1
7.5 TABLET ORAL 2 TIMES DAILY PRN
Qty: 60 TABLET | Refills: 0 | Status: SHIPPED | OUTPATIENT
Start: 2018-12-03 | End: 2019-03-19 | Stop reason: SDUPTHER

## 2018-12-03 RX ORDER — ROPINIROLE 0.5 MG/1
0.5 TABLET, FILM COATED ORAL NIGHTLY
Qty: 90 TABLET | Refills: 3 | Status: SHIPPED | OUTPATIENT
Start: 2018-12-03 | End: 2020-09-08

## 2018-12-03 RX ORDER — AMLODIPINE BESYLATE 2.5 MG/1
2.5 TABLET ORAL DAILY
Qty: 30 TABLET | Refills: 11 | Status: SHIPPED | OUTPATIENT
Start: 2018-12-03 | End: 2019-12-05

## 2018-12-03 NOTE — PROGRESS NOTES
Subjective:       Patient ID: Dominguez Ritchie is a 86 y.o. male.    Chief Complaint: bp and med check    Seen 3 mons with elevated blood pressure without diagnosis of hypertension; Advised to monitor at home. Brought records, on the average  BP reading is  150s /80s. No sxs.    Also complaint of low back pain: acute on chronic. 4 days ago, he slipped and fell on his right knee with the knee flexed. He was evaluated with an xray and it was negative for fracture but he continues to have muscular pain on the calf and posterior thigh.  was reviewed and patient reports that he is does  Not recall ever getting large amount of tramadol. Spouse collaborated his story.    RLS: Chronic. Pt reports that he is getting sxs even during the day while watching TV. He wants to know if he could take the dose of Ropinirole in the afternoon and take another dose at bedtime.    He has history of meningioma with residual equilibrium problem.  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     Family History   Problem Relation Age of Onset    Heart disease Mother     Cancer Son         pancreatic     Diabetes Maternal Uncle     Kidney disease Neg Hx     Stroke Neg Hx      Social History     Socioeconomic History    Marital status:      Spouse name: Not on file    Number of children: Not on file    Years of education: Not on file    Highest education level: Not on file   Social Needs    Financial resource strain: Not on file     Food insecurity - worry: Not on file    Food insecurity - inability: Not on file    Transportation needs - medical: Not on file    Transportation needs - non-medical: Not on file   Occupational History    Not on file   Tobacco Use    Smoking status: Former Smoker     Packs/day: 1.00     Years: 25.00     Pack years: 25.00     Last attempt to quit: 1990     Years since quittin.9    Smokeless tobacco: Never Used   Substance and Sexual Activity    Alcohol use: No     Alcohol/week: 0.0 oz    Drug use: No    Sexual activity: Not Currently     Birth control/protection: None   Other Topics Concern    Not on file   Social History Narrative    Not on file     Outpatient Encounter Medications as of 12/3/2018   Medication Sig Dispense Refill    b complex vitamins tablet Take 1 tablet by mouth once daily.      meclizine (ANTIVERT) 25 mg tablet TAKE 1 TABLET THREE TIMES DAILY AS NEEDED 270 tablet 3    meloxicam (MOBIC) 7.5 MG tablet Take 1 tablet (7.5 mg total) by mouth 2 (two) times daily as needed for Pain. 60 tablet 0    omeprazole (PRILOSEC) 20 MG capsule Take 1 capsule (20 mg total) by mouth once daily. 90 capsule 3    rOPINIRole (REQUIP) 0.5 MG tablet Take 1 tablet (0.5 mg total) by mouth every evening. 90 tablet 3    traMADol (ULTRAM) 50 mg tablet Take 50 mg by mouth every 24 hours as needed for Pain.      [DISCONTINUED] meloxicam (MOBIC) 7.5 MG tablet Take 7.5 mg by mouth 2 (two) times daily as needed for Pain.      [DISCONTINUED] ropinirole (REQUIP) 0.5 MG tablet Take 1 tablet (0.5 mg total) by mouth every evening. 90 tablet 3    amLODIPine (NORVASC) 2.5 MG tablet Take 1 tablet (2.5 mg total) by mouth once daily. 30 tablet 11     No facility-administered encounter medications on file as of 12/3/2018.        Review of Systems   Constitutional: Negative for appetite change and fever.   HENT: Negative for congestion, facial swelling and voice change.    Eyes: Negative for discharge and  itching.   Respiratory: Negative for cough, chest tightness and wheezing.    Cardiovascular: Negative.  Negative for chest pain and leg swelling.   Gastrointestinal: Negative for abdominal pain, nausea and vomiting.   Endocrine: Negative for cold intolerance and heat intolerance.   Genitourinary: Negative for dysuria and flank pain.   Musculoskeletal: Positive for back pain. Negative for myalgias and neck stiffness.   Skin: Negative for pallor and rash.   Neurological: Negative for facial asymmetry and weakness.   Psychiatric/Behavioral: Negative for agitation and confusion.       Objective:      BP (!) 155/70 (BP Location: Left arm, Patient Position: Sitting, BP Method: Medium (Automatic))   Pulse 64   Temp 97.8 °F (36.6 °C) (Oral)   Wt 72.4 kg (159 lb 9.8 oz)   SpO2 96%   BMI 22.90 kg/m²   Physical Exam   Constitutional: He is oriented to person, place, and time. He appears well-developed. No distress.   HENT:   Head: Normocephalic and atraumatic.   Right Ear: External ear normal.   Left Ear: External ear normal.   Eyes: Conjunctivae and EOM are normal.   Neck: Neck supple.   Cardiovascular: Normal rate and regular rhythm.   Pulmonary/Chest: Effort normal. No respiratory distress.   Abdominal: Soft. Normal appearance and bowel sounds are normal. There is no hepatosplenomegaly.   Genitourinary:   Genitourinary Comments: deferred   Musculoskeletal: He exhibits no edema.        Lumbar back: He exhibits tenderness.        Back:         Right lower leg: He exhibits tenderness.        Legs:  Neurological: He is alert and oriented to person, place, and time.   Skin: Skin is warm and dry.   Psychiatric: He has a normal mood and affect. His behavior is normal.   Nursing note and vitals reviewed.      Results for orders placed or performed in visit on 10/10/18   CBC auto differential   Result Value Ref Range    WBC 3.99 3.90 - 12.70 K/uL    RBC 4.05 (L) 4.60 - 6.20 M/uL    Hemoglobin 13.0 (L) 14.0 - 18.0 g/dL     Hematocrit 39.1 (L) 40.0 - 54.0 %    MCV 97 82 - 98 fL    MCH 32.1 (H) 27.0 - 31.0 pg    MCHC 33.2 32.0 - 36.0 g/dL    RDW 13.2 11.5 - 14.5 %    Platelets 152 150 - 350 K/uL    MPV 10.3 9.2 - 12.9 fL    Immature Granulocytes 0.5 0.0 - 0.5 %    Gran # (ANC) 2.2 1.8 - 7.7 K/uL    Immature Grans (Abs) 0.02 0.00 - 0.04 K/uL    Lymph # 0.8 (L) 1.0 - 4.8 K/uL    Mono # 0.7 0.3 - 1.0 K/uL    Eos # 0.2 0.0 - 0.5 K/uL    Baso # 0.03 0.00 - 0.20 K/uL    nRBC 0 0 /100 WBC    Gran% 54.8 38.0 - 73.0 %    Lymph% 20.6 18.0 - 48.0 %    Mono% 17.8 (H) 4.0 - 15.0 %    Eosinophil% 5.5 0.0 - 8.0 %    Basophil% 0.8 0.0 - 1.9 %    Differential Method Automated    Comprehensive metabolic panel   Result Value Ref Range    Sodium 139 136 - 145 mmol/L    Potassium 4.7 3.5 - 5.1 mmol/L    Chloride 105 95 - 110 mmol/L    CO2 28 23 - 29 mmol/L    Glucose 102 70 - 110 mg/dL    BUN, Bld 14 8 - 23 mg/dL    Creatinine 0.8 0.5 - 1.4 mg/dL    Calcium 9.1 8.7 - 10.5 mg/dL    Total Protein 6.1 6.0 - 8.4 g/dL    Albumin 3.4 (L) 3.5 - 5.2 g/dL    Total Bilirubin 0.4 0.1 - 1.0 mg/dL    Alkaline Phosphatase 61 55 - 135 U/L    AST 29 10 - 40 U/L    ALT 21 10 - 44 U/L    Anion Gap 6 (L) 8 - 16 mmol/L    eGFR if African American >60.0 >60 mL/min/1.73 m^2    eGFR if non African American >60.0 >60 mL/min/1.73 m^2   Lipid panel   Result Value Ref Range    Cholesterol 210 (H) 120 - 199 mg/dL    Triglycerides 122 30 - 150 mg/dL    HDL 62 40 - 75 mg/dL    LDL Cholesterol 123.6 63.0 - 159.0 mg/dL    HDL/Chol Ratio 29.5 20.0 - 50.0 %    Total Cholesterol/HDL Ratio 3.4 2.0 - 5.0    Non-HDL Cholesterol 148 mg/dL   PSA, Screening   Result Value Ref Range    PSA, SCREEN 9.0 (H) 0.00 - 4.00 ng/mL   Hemoglobin A1c   Result Value Ref Range    Hemoglobin A1C 5.4 4.0 - 5.6 %    Estimated Avg Glucose 108 68 - 131 mg/dL     Assessment:       1. HTN, goal below 130/80    2. RLS (restless legs syndrome)    3. Low back pain with right-sided sciatica, unspecified back pain  laterality, unspecified chronicity    4. Meningioma        Plan:   HTN, goal below 130/80  Monitor at home  Dash diet  -     amLODIPine (NORVASC) 2.5 MG tablet; Take 1 tablet (2.5 mg total) by mouth once daily.  Dispense: 30 tablet; Refill: 11    RLS (restless legs syndrome)/Meningioma  With residual sxs due to the Meningioma,   Advised not to increase dose due to risk for side effect.hypotension, dizziness and sudden sleep episodes  -     rOPINIRole (REQUIP) 0.5 MG tablet; Take 1 tablet (0.5 mg total) by mouth every evening.  Dispense: 90 tablet; Refill: 3    Low back pain with right-sided sciatica, unspecified back pain laterality, unspecified chronicity    -     meloxicam (MOBIC) 7.5 MG tablet; Take 1 tablet (7.5 mg total) by mouth 2 (two) times daily as needed for Pain.  Dispense: 60 tablet; Refill: 0

## 2018-12-04 NOTE — PATIENT INSTRUCTIONS
Possible Causes of Low Back or Leg Pain    The symptoms in your back or leg may be due to pressure on a nerve. This pressure may be caused by a damaged disk or by abnormal bone growth. Either way, you may feel pain, burning, tingling, or numbness. If you have pressure on a nerve that connects to the sciatic nerve, pain may shoot down your leg.    Pressure from the disk  Constant wear and tear can weaken a disk over time and cause back pain. The disk can then be damaged by a sudden movement or injury. If its soft center begins to bulge, the disk may press on a nerve. Or the outside of the disk may tear, and the soft center may squeeze through and pinch a nerve.    Pressure from bone  As a disk wears out, the vertebrae right above and below the disk begin to touch. This can put pressure on a nerve. Often, abnormal bone (called bone spurs) grows where the vertebrae rub against each other. This can cause the foramen or the spinal canal to narrow (called stenosis) and press against a nerve.  Date Last Reviewed: 10/4/2015  © 8587-6933 [employer]. [address] [place] [address]. All rights reserved. This information is not intended as a substitute for professional medical care. Always follow your healthcare professional's instructions.

## 2018-12-05 ENCOUNTER — OFFICE VISIT (OUTPATIENT)
Dept: FAMILY MEDICINE | Facility: CLINIC | Age: 83
End: 2018-12-05
Payer: MEDICARE

## 2018-12-05 VITALS
SYSTOLIC BLOOD PRESSURE: 166 MMHG | HEART RATE: 80 BPM | BODY MASS INDEX: 22.51 KG/M2 | WEIGHT: 156.88 LBS | DIASTOLIC BLOOD PRESSURE: 70 MMHG | OXYGEN SATURATION: 98 %

## 2018-12-05 DIAGNOSIS — M85.80 OSTEOPENIA, UNSPECIFIED LOCATION: ICD-10-CM

## 2018-12-05 DIAGNOSIS — R97.20 BPH WITH ELEVATED PSA: ICD-10-CM

## 2018-12-05 DIAGNOSIS — Z78.9 STATIN INTOLERANCE: ICD-10-CM

## 2018-12-05 DIAGNOSIS — M46.1 SACROILIITIS: ICD-10-CM

## 2018-12-05 DIAGNOSIS — I10 HTN, GOAL BELOW 130/80: ICD-10-CM

## 2018-12-05 DIAGNOSIS — D32.9 MENINGIOMA: ICD-10-CM

## 2018-12-05 DIAGNOSIS — G25.81 RLS (RESTLESS LEGS SYNDROME): Primary | ICD-10-CM

## 2018-12-05 DIAGNOSIS — J84.10 CALCIFIED GRANULOMA OF LUNG: ICD-10-CM

## 2018-12-05 DIAGNOSIS — E78.2 MIXED HYPERLIPIDEMIA: ICD-10-CM

## 2018-12-05 DIAGNOSIS — R73.03 PREDIABETES: ICD-10-CM

## 2018-12-05 DIAGNOSIS — N40.0 BPH WITH ELEVATED PSA: ICD-10-CM

## 2018-12-05 DIAGNOSIS — I35.8 AORTIC VALVE SCLEROSIS: ICD-10-CM

## 2018-12-05 DIAGNOSIS — I77.1 TORTUOUS AORTA: ICD-10-CM

## 2018-12-05 PROBLEM — J98.4 CALCIFIED GRANULOMA OF LUNG: Status: ACTIVE | Noted: 2018-12-05

## 2018-12-05 PROCEDURE — 96160 PT-FOCUSED HLTH RISK ASSMT: CPT | Mod: HCNC,S$GLB,, | Performed by: NURSE PRACTITIONER

## 2018-12-05 PROCEDURE — 99999 PR PBB SHADOW E&M-EST. PATIENT-LVL III: CPT | Mod: PBBFAC,HCNC,, | Performed by: NURSE PRACTITIONER

## 2018-12-05 PROCEDURE — 99499 UNLISTED E&M SERVICE: CPT | Mod: HCNC,S$GLB,, | Performed by: NURSE PRACTITIONER

## 2018-12-05 NOTE — PROGRESS NOTES
Dominguez Ritchie presented for a  Medicare AWV and comprehensive Health Risk Assessment today. The following components were reviewed and updated:    · Medical history  · Family History  · Social history  · Allergies and Current Medications  · Health Risk Assessment  · Health Maintenance  · Care Team     ** See Completed Assessments for Annual Wellness Visit within the encounter summary.**       The following assessments were completed:  · Living Situation  · CAGE  · Depression Screening  · Timed Get Up and Go  · Whisper Test  · Cognitive Function Screening  · Nutrition Screening  · ADL Screening  · PAQ Screening    Vitals:    12/05/18 1050   BP: (!) 166/70   Pulse: 80   SpO2: 98%   Weight: 71.2 kg (156 lb 13.7 oz)     Body mass index is 22.51 kg/m².  Physical Exam      Diagnoses and health risks identified today and associated recommendations/orders:    1. Tortuous aorta  Stable. BP control    2. Calcified granuloma of lung  stable    3. RLS (restless legs syndrome)  Stable continue current treatment plan    4. Meningioma  stable    5. Prediabetes  Stable continue current treatment plan    6. Mixed hyperlipidemia  Stable continue current treatment. Low fat diet    7. BPH with elevated PSA  Stable managed per urology    8. Aortic valve sclerosis  Stable     9. Statin intolerance      10. Sacroiliitis  stable    11. HTN, goal below 130/80  Uncontrolled will follow up with pcp    12. Osteopenia, unspecified location  stable      Provided Dominguez with a 5-10 year written screening schedule and personal prevention plan. Recommendations were developed using the USPSTF age appropriate recommendations. Education, counseling, and referrals were provided as needed. After Visit Summary printed and given to patient which includes a list of additional screenings\tests needed.    No Follow-up on file.    Michelle Grewal NP  I offered to discuss end of life issues, including information on how to make advance directives that the patient  could use to name someone who would make medical decisions on their behalf if they became too ill to make themselves.    _X_Patient declined  ___Patient is interested, I provided paper work and offered to discuss.

## 2018-12-19 ENCOUNTER — HOSPITAL ENCOUNTER (OUTPATIENT)
Facility: HOSPITAL | Age: 83
Discharge: HOME | End: 2018-12-19
Attending: ANESTHESIOLOGY | Admitting: ANESTHESIOLOGY
Payer: MEDICARE

## 2018-12-19 VITALS
HEART RATE: 71 BPM | OXYGEN SATURATION: 100 % | DIASTOLIC BLOOD PRESSURE: 77 MMHG | RESPIRATION RATE: 16 BRPM | SYSTOLIC BLOOD PRESSURE: 171 MMHG

## 2018-12-19 DIAGNOSIS — M46.1 SACROILIITIS: ICD-10-CM

## 2018-12-19 DIAGNOSIS — M70.61 GREATER TROCHANTERIC BURSITIS, RIGHT: ICD-10-CM

## 2018-12-19 PROCEDURE — 25000003 PHARM REV CODE 250: Mod: HCNC | Performed by: ANESTHESIOLOGY

## 2018-12-19 PROCEDURE — 63600175 PHARM REV CODE 636 W HCPCS: Mod: HCNC

## 2018-12-19 PROCEDURE — 25000003 PHARM REV CODE 250: Mod: HCNC

## 2018-12-19 PROCEDURE — 63600175 PHARM REV CODE 636 W HCPCS: Mod: HCNC | Performed by: ANESTHESIOLOGY

## 2018-12-19 PROCEDURE — 27096 INJECT SACROILIAC JOINT: CPT | Mod: RT,,, | Performed by: ANESTHESIOLOGY

## 2018-12-19 PROCEDURE — 20610 DRAIN/INJ JOINT/BURSA W/O US: CPT | Mod: 59,RT,, | Performed by: ANESTHESIOLOGY

## 2018-12-19 RX ORDER — LIDOCAINE HYDROCHLORIDE 20 MG/ML
INJECTION, SOLUTION EPIDURAL; INFILTRATION; INTRACAUDAL; PERINEURAL
Status: DISCONTINUED | OUTPATIENT
Start: 2018-12-19 | End: 2018-12-19 | Stop reason: HOSPADM

## 2018-12-19 RX ORDER — METHYLPREDNISOLONE ACETATE 40 MG/ML
INJECTION, SUSPENSION INTRA-ARTICULAR; INTRALESIONAL; INTRAMUSCULAR; SOFT TISSUE
Status: DISCONTINUED | OUTPATIENT
Start: 2018-12-19 | End: 2018-12-19 | Stop reason: HOSPADM

## 2018-12-19 RX ORDER — GADOBUTROL 604.72 MG/ML
4 INJECTION INTRAVENOUS
Status: COMPLETED | OUTPATIENT
Start: 2018-12-19 | End: 2018-12-19

## 2018-12-19 RX ADMIN — GADOBUTROL 4 ML: 604.72 INJECTION INTRAVENOUS at 10:12

## 2018-12-19 NOTE — OP NOTE
PROCEDURE: Sacroiliac joint and Greater trochanteric bursa injection under fluoroscopic guidance       SIDE: RIGHT Sacroiliac injection and RIGHT greater trochanteric bursa injection      PROCEDURE DATE: 12/19/2018    PREOPERATIVE DIAGNOSIS: Sacroiliitis, greater trochanteric bursitis  POSTOPERATIVE DIAGNOSIS: Sacroiliitis, greater trochanteric bursitis    PROVIDER: Nabor Sadler MD  Assistant(s): none    Anesthesia: Local, No Sedation     >> 0 mg of VERSED    >> 0 mcg of FENTANYL     INDICATION: The patient has a history of pain due to sacroiliitis unresponsive to conservative treatments. Fluoroscopy was used to optimize visualization of needle placement and to maximize safety.       PROCEDURE DESCRIPTION: The patient was seen and identified in the preoperative area. Risks, benefits, complications, and alternatives were discussed with the patient. The patient agreed to proceed with the procedure and signed the consent. The site and side of the procedure was identified and marked.     The patient was taken to the procedural suite. The patient was positioned in prone orientation on procedure table. A time out was performed prior to any intervention. The procedure, site, side, and allergies were stated and agreed to by all present. The lumbosacral area extending to the skin overlying the femoral greater trochanter was widely prepped with ChloraPrep. The procedural site was draped in usual sterile fashion. Vital signs were closely monitored throughout this procedure.     The fluoroscopic camera was placed in contralateral oblique view and was adjusted until the anterior and posterior joint margins of the targeted sacroiliac joint aligned in linear array. The lower pole of the joint was identified, marked, and localized with 1% Lidocaine. A 25 gauge 3.5 inch spinal needle was introduced and advanced to the joint under fluoroscopic guidance. The joint space was entered and after negative aspiration, 2 mL of injectate was  posited into the joint space. The needle was then withdrawn outside of the joint space and after negative aspiration 1 mL of solution was injected outside of the joint space. The injectant solution used was comprised of 4 mL of 1% PF Lidocaine and 1 mL of Methylprednisolone (40 mg/mL). This techniques was performed for the above noted joint/s.    Following Sacroiliac Joint injection, the stylet was replaced and the needle was withdrawn intact. The RIGHT greater trochanter was then identified with fluoroscopy. The targeted site of entry was localized with 1% PF Lidocaine. The above noted spinal needle was advanced to the lateral border of the greater trochanter until the osseus interface was met.  After negative aspiration, 2 mL of the above noted solution was injected. No pain or paresthesia was noted on injection. Following injection, the stylet was replaced and the needle was withdrawn intact. This technique was used for the above noted greater trochanteric bursa / bursae. The skin was cleaned and bandages were applied.    Description of Findings: Not applicable    Prosthetic devices, grafts, tissues, or devices implanted: None    Specimen Removed: No    Estimated Blood Loss: minimal    COMPLICATIONS: None    DISPOSITION / PLANS: The patient was transferred to the recovery area in a stable condition for observation. The patient was reexamined prior to discharge. There was no evidence of acute neurologic injury following the procedure.  Patient was discharged from the recovery room after meeting discharge criteria. Home discharge instructions were given to the patient by the staff.

## 2018-12-19 NOTE — PLAN OF CARE
Problem: Adult Inpatient Plan of Care  Goal: Plan of Care Review  Outcome: Outcome(s) achieved Date Met: 12/19/18  Patient d/c home in stable condition via wheelchair with ride. Verbalized understanding of d/c instructions. Patient voiced no complaints at this time. Patient stood at side of bed, walked steps with no new motor deficits. Neurologically intact.

## 2018-12-19 NOTE — DISCHARGE SUMMARY
[facility]  Discharge Note       Description of Procedure: Right Sacroiliac Joint and Greater Trochanteric Bursa Injection under Fluoroscopic Guidance    Procedure Date: 12/19/2018    Admit Date: 12/19/2018  Discharge Date: 12/19/2018     Attending Physician: Viktoriya Tomlin   Discharge Provider: Viktoriya Tomlin    Preoperative Diagnosis: Sacroiliitis and Greater Trochanteric Bursitis     Postoperative Diagnosis: as above, same as preoperative diagnosis    Discharged Condition: Stable    Hospital Course: Patient was admitted for an outpatient procedure. The procedure was tolerated well with no complications.    Final Diagnoses: Same as principal problem.    Disposition: Home, self-care.    Follow up/Patient Instructions:  Follow-up in clinic in 2-3 weeks.    Medications: No medications were prescribed today. The patient was advised to resume normal medication regimen without change.  Specific information was provided regarding restarting any anticoagulant/s.    Discharge Procedure Orders (must include Diet, Follow-up, Activity):  Light activity for the remainder of the day, resume normal activity tomorrow. Resume normal diet. Follow-up in clinic in 2-3 weeks.

## 2019-01-18 ENCOUNTER — HOSPITAL ENCOUNTER (OUTPATIENT)
Dept: RADIOLOGY | Facility: HOSPITAL | Age: 84
Discharge: HOME | End: 2019-01-18
Attending: ANESTHESIOLOGY
Payer: MEDICARE

## 2019-01-18 ENCOUNTER — OFFICE VISIT (OUTPATIENT)
Dept: PAIN MEDICINE | Facility: CLINIC | Age: 84
End: 2019-01-18
Payer: MEDICARE

## 2019-01-18 VITALS
HEIGHT: 70 IN | HEART RATE: 64 BPM | SYSTOLIC BLOOD PRESSURE: 104 MMHG | BODY MASS INDEX: 22.33 KG/M2 | WEIGHT: 156 LBS | DIASTOLIC BLOOD PRESSURE: 57 MMHG | RESPIRATION RATE: 18 BRPM

## 2019-01-18 DIAGNOSIS — G89.29 CHRONIC PAIN OF RIGHT HIP: ICD-10-CM

## 2019-01-18 DIAGNOSIS — M54.50 RIGHT-SIDED LOW BACK PAIN WITHOUT SCIATICA, UNSPECIFIED CHRONICITY: ICD-10-CM

## 2019-01-18 DIAGNOSIS — M47.816 LUMBAR FACET ARTHROPATHY: ICD-10-CM

## 2019-01-18 DIAGNOSIS — G57.01 PIRIFORMIS SYNDROME OF RIGHT SIDE: Primary | ICD-10-CM

## 2019-01-18 DIAGNOSIS — M25.551 CHRONIC PAIN OF RIGHT HIP: ICD-10-CM

## 2019-01-18 DIAGNOSIS — M47.816 LUMBAR SPONDYLOSIS: ICD-10-CM

## 2019-01-18 PROCEDURE — 99999 PR PBB SHADOW E&M-EST. PATIENT-LVL III: ICD-10-PCS | Mod: PBBFAC,HCNC,, | Performed by: ANESTHESIOLOGY

## 2019-01-18 PROCEDURE — 73502 XR HIP 2 VIEW RIGHT: ICD-10-PCS | Mod: 26,HCNC,RT, | Performed by: RADIOLOGY

## 2019-01-18 PROCEDURE — 1101F PR PT FALLS ASSESS DOC 0-1 FALLS W/OUT INJ PAST YR: ICD-10-PCS | Mod: CPTII,HCNC,S$GLB, | Performed by: ANESTHESIOLOGY

## 2019-01-18 PROCEDURE — 99214 OFFICE O/P EST MOD 30 MIN: CPT | Mod: HCNC,S$GLB,, | Performed by: ANESTHESIOLOGY

## 2019-01-18 PROCEDURE — 1101F PT FALLS ASSESS-DOCD LE1/YR: CPT | Mod: CPTII,HCNC,S$GLB, | Performed by: ANESTHESIOLOGY

## 2019-01-18 PROCEDURE — 99999 PR PBB SHADOW E&M-EST. PATIENT-LVL III: CPT | Mod: PBBFAC,HCNC,, | Performed by: ANESTHESIOLOGY

## 2019-01-18 PROCEDURE — 99214 PR OFFICE/OUTPT VISIT, EST, LEVL IV, 30-39 MIN: ICD-10-PCS | Mod: HCNC,S$GLB,, | Performed by: ANESTHESIOLOGY

## 2019-01-18 PROCEDURE — 73502 X-RAY EXAM HIP UNI 2-3 VIEWS: CPT | Mod: 26,HCNC,RT, | Performed by: RADIOLOGY

## 2019-01-18 PROCEDURE — 73502 X-RAY EXAM HIP UNI 2-3 VIEWS: CPT | Mod: TC,HCNC,RT

## 2019-01-18 RX ORDER — TRAMADOL HYDROCHLORIDE 50 MG/1
50 TABLET ORAL 2 TIMES DAILY PRN
Qty: 90 TABLET | Refills: 0 | Status: SHIPPED | OUTPATIENT
Start: 2019-01-18 | End: 2019-05-16 | Stop reason: SDUPTHER

## 2019-01-18 NOTE — PROGRESS NOTES
Chief Pain Complaint:  Low Back Pain, Bilat Hip pain, Right leg pain, right knee pain      Interval History:  Patient was last seen on 9/7/2018.  Since his right knee pain is better, he does not feel the pain is radiating down the leg anymore. But, he does feel it in the right side of leg and buttock still.       History of Present Illness:   This patient is a 86 y.o. male who presents today complaining of the above noted pain/s. The patient describes the pain as follows.    - duration of pain: chronic pain   - timing: constant   - character: aching, sharp  - radiating, dermatomal: extends into right leg    - antecedent trauma, prior spinal surgery: patient reports prior trauma, prior lumbar surgery (surgery in January 2017 with Dr. Lancaster at [facility]), left knee replacement  - pertinent negatives: No fever, No chills, No weight loss, No bladder dysfunction, No bowel dysfunction, No saddle anesthesia  - pertinent positives: generalized nonspecific Lower Extremity weakness bilaterally, BLE numbness  - medications, other therapies tried (physical therapy, injections):     >> Tylenol, NSAIDs, gabapentin, Mobic, tramadol, Norco    >> Has previously undergone Physical Therapy    >> Has previously undergone spinal injection/s    - has undergone approximately 3-4 spinal injections previously (uncertain as to the specific type of injections that he has had, seems one was a lumbar rhizotomy)   - Right knee genicular nerve block on 7/25/18 with 70% pain relief for 6 months    -Right SI, Right GTB, Right Piriformis Inejction 10/17/18 with 80% pain relief for 2 months   - Right SI and Right GTB 12/19/18 with 20% relief   _________________________________________________________________________________________________________________________________________________________________________________________________________________________      Imaging / Labs / Studies (reviewed on 1/18/2019):    9/20/2018 X-Ray Lumbar Complete With  Flex And Ext  TECHNIQUE:  Five views of the lumbar spine plus flexion extension views were performed.  COMPARISON:  November 16, 2015  FINDINGS:  Vertebral body heights are unchanged.  Chronic compression deformity of L1 noted.  Alignment is anatomic.  L4-5 disc height loss present.  There is multilevel anterior osteophyte formation.  Lower lumbar spine facet arthropathy noted.  No change in alignment on flexion or extension views.  No pars defects appreciated.  Mild vascular calcifications noted.  Impression: Degenerative findings.      7/12/2018 XR KNEE ORTHO BILAT WITH FLEXION  TECHNIQUE:  AP standing of both knees, PA flexion standing views of both knees, and Merchant views of both knees were performed.  Lateral views of both knees were also performed.  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  Prosthesis position and alignment are satisfactory without radiographic evidence of hardware loosening or other complicating process.  Moderate degenerative change of the right knee.  No acute fracture or malalignment.       Results for orders placed during the hospital encounter of 11/16/15   X-Ray Lumbar Spine Complete 5 View    Narrative Five views of the lumbar spine  Findings: There is increased vertebral body height loss noted at the L1 level.  50% vertebrae height loss is noted anteriorly at this level.  The alignment is within normal limits. There is moderate disk space narrowing noted at the L4-5 level.  Mild/moderate facet arthropathy is noted from L2-3 through L5-S1 levels. Calcified granulomas are noted within the spleen. No pars defects.       Results for orders placed during the hospital encounter of 03/26/15   X-Ray Lumbar Spine AP And Lateral    Narrative Three views of the lumbar spine  Findings: There is a compression L1 level that is new when compared to the prior exam and demonstrates approximately 25% vertebral height loss.  There is no obvious  "condensation line seen on the plain films suggesting that this is still a chronic deformity.  The alignment is grossly within normal limits.  There is multilevel spondylosis with mild to moderate to space narrowing noted throughout the lumbar spine greatest at L4-5 level.  Facet arthropathy is noted from the L2-3 through the L5-S1 levels and most prominent at the L5-S1 level.     _________________________________________________________________________________________________________________________________________________________________________________________________________________________      Review of Systems:  CONSTITUTIONAL: patient denies any fever, chills, or weight loss  SKIN: patient denies any rash or itching  RESPIRATORY: patient denies having any shortness of breath  GASTROINTESTINAL: patient denies having any diarrhea, constipation, or bowel incontinence  GENITOURINARY: patient denies having any abnormal bladder function    MUSCULOSKELETAL:  - patient complains of the above noted pain/s (see chief pain complaint)    NEUROLOGICAL:   - pain as above  - strength in Lower extremities is decreased, BILATERALLY  - sensation in Lower extremities is abnormal, on the LEFT  - patient denies any loss of bowel or bladder control      PSYCHIATRIC: patient denies any change in mood    Other:  All other systems reviewed and are negative    _________________________________________________________________________________________________________________________________________________________________________________________________________________________     Physical Exam:  Vitals:  BP (!) 104/57 (BP Location: Right arm, Patient Position: Sitting, BP Method: Medium (Automatic))   Pulse 64   Resp 18   Ht 5' 10" (1.778 m)   Wt 70.8 kg (156 lb)   BMI 22.38 kg/m²    (reviewed on 1/18/2019)    General: alert and oriented, in no apparent distress  Gait: normal gait  Skin: No rashes, No discoloration, No obvious " lesions  HEENT: EOMI  Cardiovascular: no significant peripheral edema present  Respiratory: respirations nonlabored    Musculoskeletal:  - Any pain on flexion, extension, rotation:    >> pain on extension and rotation of lumbar spine    >> facet loading causes mild pain bilaterally, R>L   - Straight Leg Raise:     >> LEFT :: negative    >> RIGHT :: positive  - Any tenderness to palpation across paraspinal muscles, joints, bursae:     >> over right piriformis    >> over right SIJ    >> over right GT bursa    Neuro:  - Extremity Strength:     >> LEFT :: dec with dorsiflexion    >> RIGHT :: dec with dorsiflexion  - Extremity Reflexes:    >> LEFT  :: 2+    >> RIGHT :: 2+     Psych:  Mood and affect is appropriate    _________________________________________________________________________________________________________________________________________________________________________________________________________________________      Assessment:  Dominguez Ritchie is a 86 y.o. male who presents with    ICD-10-CM ICD-9-CM   1. Chronic pain of right hip M25.551 719.45    G89.29 338.29      Patient returns for follow-up visit.  He complains of continued low back and right leg pain.  He has been seen at the [facility], underwent a spinal injection that did not help, and then ultimately underwent surgery (unsure of type) with Dr. Lancaster in January 2017.  We previously requested records multiple times from the [facility], however we have not received these. 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 He had treatment for this, but he reports the damage was already present by the time it was discovered.       Plan:  1. Interventional: Schedule Right Hip Inejction and Right Piriformis Injection with local.     2. Pharmacologic:   - Continue tramadol 50mg TID PRN pain. He takes very sparingly. Last filled on 9-8-18.  - Will refill Mobic at 7.5mg QD PRN dose.      - Opioid contract to be signed 1/18/2019.   - LA  reviewed and appropriate.      3. Rehabilitative: Encouraged regular exercise.    4. Diagnostic: Order Right Hip Xray.     5. Follow up: Post Injection.

## 2019-01-18 NOTE — H&P (VIEW-ONLY)
Chief Pain Complaint:  Low Back Pain, Bilat Hip pain, Right leg pain, right knee pain      Interval History:  Patient was last seen on 9/7/2018.  Since his right knee pain is better, he does not feel the pain is radiating down the leg anymore. But, he does feel it in the right side of leg and buttock still.       History of Present Illness:   This patient is a 86 y.o. male who presents today complaining of the above noted pain/s. The patient describes the pain as follows.    - duration of pain: chronic pain   - timing: constant   - character: aching, sharp  - radiating, dermatomal: extends into right leg    - antecedent trauma, prior spinal surgery: patient reports prior trauma, prior lumbar surgery (surgery in January 2017 with [place] Lancaster [place] [facility] left knee replacement  - pertinent negatives: No fever, No chills, No weight loss, No bladder dysfunction, No bowel dysfunction, No saddle anesthesia  - pertinent positives: generalized nonspecific Lower Extremity weakness bilaterally, BLE numbness  - medications, other therapies tried (physical therapy, injections):     >> Tylenol, NSAIDs, gabapentin, Mobic, tramadol, Norco    >> Has previously undergone Physical Therapy    >> Has previously undergone spinal injection/s    - has undergone approximately 3-4 spinal injections previously (uncertain as to the specific type of injections that he has had, seems one was a lumbar rhizotomy)   - Right knee genicular nerve block on 7/25/18 with 70% pain relief for 6 months    -Right SI, Right GTB, Right Piriformis Inejction 10/17/18 with 80% pain relief for 2 months   - Right SI and Right GTB 12/19/18 with 20% relief   _________________________________________________________________________________________________________________________________________________________________________________________________________________________      Imaging / Labs / Studies (reviewed on 1/18/2019):    9/20/2018 X-Ray Lumbar Complete With  Flex And Ext  TECHNIQUE:  Five views of the lumbar spine plus flexion extension views were performed.  COMPARISON:  November 16, 2015  FINDINGS:  Vertebral body heights are unchanged.  Chronic compression deformity of L1 noted.  Alignment is anatomic.  L4-5 disc height loss present.  There is multilevel anterior osteophyte formation.  Lower lumbar spine facet arthropathy noted.  No change in alignment on flexion or extension views.  No pars defects appreciated.  Mild vascular calcifications noted.  Impression: Degenerative findings.      7/12/2018 XR KNEE ORTHO BILAT WITH FLEXION  TECHNIQUE:  AP standing of both knees, PA flexion standing views of both knees, and Merchant views of both knees were performed.  Lateral views of both knees were also performed.  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  Prosthesis position and alignment are satisfactory without radiographic evidence of hardware loosening or other complicating process.  Moderate degenerative change of the right knee.  No acute fracture or malalignment.       Results for orders placed during the hospital encounter of 11/16/15   X-Ray Lumbar Spine Complete 5 View    Narrative Five views of the lumbar spine  Findings: There is increased vertebral body height loss noted at the L1 level.  50% vertebrae height loss is noted anteriorly at this level.  The alignment is within normal limits. There is moderate disk space narrowing noted at the L4-5 level.  Mild/moderate facet arthropathy is noted from L2-3 through L5-S1 levels. Calcified granulomas are noted within the spleen. No pars defects.       Results for orders placed during the hospital encounter of 03/26/15   X-Ray Lumbar Spine AP And Lateral    Narrative Three views of the lumbar spine  Findings: There is a compression L1 level that is new when compared to the prior exam and demonstrates approximately 25% vertebral height loss.  There is no obvious  "condensation line seen on the plain films suggesting that this is still a chronic deformity.  The alignment is grossly within normal limits.  There is multilevel spondylosis with mild to moderate to space narrowing noted throughout the lumbar spine greatest at L4-5 level.  Facet arthropathy is noted from the L2-3 through the L5-S1 levels and most prominent at the L5-S1 level.     _________________________________________________________________________________________________________________________________________________________________________________________________________________________      Review of Systems:  CONSTITUTIONAL: patient denies any fever, chills, or weight loss  SKIN: patient denies any rash or itching  RESPIRATORY: patient denies having any shortness of breath  GASTROINTESTINAL: patient denies having any diarrhea, constipation, or bowel incontinence  GENITOURINARY: patient denies having any abnormal bladder function    MUSCULOSKELETAL:  - patient complains of the above noted pain/s (see chief pain complaint)    NEUROLOGICAL:   - pain as above  - strength in Lower extremities is decreased, BILATERALLY  - sensation in Lower extremities is abnormal, on the LEFT  - patient denies any loss of bowel or bladder control      PSYCHIATRIC: patient denies any change in mood    Other:  All other systems reviewed and are negative    _________________________________________________________________________________________________________________________________________________________________________________________________________________________     Physical Exam:  Vitals:  BP (!) 104/57 (BP Location: Right arm, Patient Position: Sitting, BP Method: Medium (Automatic))   Pulse 64   Resp 18   Ht 5' 10" (1.778 m)   Wt 70.8 kg (156 lb)   BMI 22.38 kg/m²    (reviewed on 1/18/2019)    General: alert and oriented, in no apparent distress  Gait: normal gait  Skin: No rashes, No discoloration, No obvious " lesions  HEENT: EOMI  Cardiovascular: no significant peripheral edema present  Respiratory: respirations nonlabored    Musculoskeletal:  - Any pain on flexion, extension, rotation:    >> pain on extension and rotation of lumbar spine    >> facet loading causes mild pain bilaterally, R>L   - Straight Leg Raise:     >> LEFT :: negative    >> RIGHT :: positive  - Any tenderness to palpation across paraspinal muscles, joints, bursae:     >> over right piriformis    >> over right SIJ    >> over right GT bursa    Neuro:  - Extremity Strength:     >> LEFT :: dec with dorsiflexion    >> RIGHT :: dec with dorsiflexion  - Extremity Reflexes:    >> LEFT  :: 2+    >> RIGHT :: 2+     Psych:  Mood and affect is appropriate    _________________________________________________________________________________________________________________________________________________________________________________________________________________________      Assessment:  Dominguez Ritchie is a 86 y.o. male who presents with    ICD-10-CM ICD-9-CM   1. Chronic pain of right hip M25.551 719.45    G89.29 338.29      Patient returns for follow-up visit.  He complains of continued low back and right leg pain.  He has been seen at the [facility], underwent a spinal injection that did not help, and then ultimately underwent surgery (unsure of type) with Dr. Lancaster in January 2017.  We previously requested records multiple times from the [facility], however we have not received these. 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 He had treatment for this, but he reports the damage was already present by the time it was discovered.       Plan:  1. Interventional: Schedule Right Hip Inejction and Right Piriformis Injection with local.     2. Pharmacologic:   - Continue tramadol 50mg TID PRN pain. He takes very sparingly. Last filled on 9-8-18.  - Will refill Mobic at 7.5mg QD PRN dose.      - Opioid contract to be signed 1/18/2019.   - LA  reviewed and appropriate.      3. Rehabilitative: Encouraged regular exercise.    4. Diagnostic: Order Right Hip Xray.     5. Follow up: Post Injection.

## 2019-01-31 ENCOUNTER — HOSPITAL ENCOUNTER (OUTPATIENT)
Facility: HOSPITAL | Age: 84
Discharge: HOME | End: 2019-01-31
Attending: ANESTHESIOLOGY | Admitting: ANESTHESIOLOGY
Payer: MEDICARE

## 2019-01-31 VITALS
DIASTOLIC BLOOD PRESSURE: 82 MMHG | OXYGEN SATURATION: 98 % | SYSTOLIC BLOOD PRESSURE: 179 MMHG | RESPIRATION RATE: 16 BRPM | HEART RATE: 71 BPM

## 2019-01-31 DIAGNOSIS — G57.01 PIRIFORMIS SYNDROME OF RIGHT SIDE: ICD-10-CM

## 2019-01-31 DIAGNOSIS — M25.551 CHRONIC PAIN OF RIGHT HIP: ICD-10-CM

## 2019-01-31 DIAGNOSIS — G89.29 CHRONIC PAIN OF RIGHT HIP: ICD-10-CM

## 2019-01-31 PROCEDURE — 20552 NJX 1/MLT TRIGGER POINT 1/2: CPT | Mod: HCNC,,, | Performed by: ANESTHESIOLOGY

## 2019-01-31 PROCEDURE — 25000003 PHARM REV CODE 250: Mod: HCNC | Performed by: ANESTHESIOLOGY

## 2019-01-31 PROCEDURE — 63600175 PHARM REV CODE 636 W HCPCS: Mod: HCNC | Performed by: ANESTHESIOLOGY

## 2019-01-31 PROCEDURE — 20552 PR INJECT TRIGGER POINT, 1 OR 2: ICD-10-PCS | Mod: HCNC,,, | Performed by: ANESTHESIOLOGY

## 2019-01-31 PROCEDURE — 63600175 PHARM REV CODE 636 W HCPCS: Mod: HCNC

## 2019-01-31 PROCEDURE — 25000003 PHARM REV CODE 250: Mod: HCNC

## 2019-01-31 RX ORDER — METHYLPREDNISOLONE ACETATE 40 MG/ML
INJECTION, SUSPENSION INTRA-ARTICULAR; INTRALESIONAL; INTRAMUSCULAR; SOFT TISSUE
Status: DISCONTINUED | OUTPATIENT
Start: 2019-01-31 | End: 2019-01-31 | Stop reason: HOSPADM

## 2019-01-31 RX ORDER — LIDOCAINE HYDROCHLORIDE 20 MG/ML
INJECTION, SOLUTION EPIDURAL; INFILTRATION; INTRACAUDAL; PERINEURAL
Status: DISCONTINUED | OUTPATIENT
Start: 2019-01-31 | End: 2019-01-31 | Stop reason: HOSPADM

## 2019-01-31 NOTE — DISCHARGE SUMMARY
[facility]  Discharge Note       Description of Procedure: Right Piriformis Injection under Fluoroscopic Guidance    Procedure Date: 1/31/2019    Admit Date: 1/31/2019  Discharge Date: 1/31/2019     Attending Physician: Viktoriya Tomlin   Discharge Provider: Viktoriya Tomlin    Preoperative Diagnosis: Lumbar Spondylosis, Piriformis Syndrome     Postoperative Diagnosis: as above, same as preoperative diagnosis    Discharged Condition: Stable    Hospital Course: Patient was admitted for an outpatient procedure. The procedure was tolerated well with no complications.    Final Diagnoses: Same as principal problem.    Disposition: Home, self-care.    Follow up/Patient Instructions:  Follow-up in clinic in 2-3 weeks.    Medications: No medications were prescribed today. The patient was advised to resume normal medication regimen without change.  Specific information was provided regarding restarting any anticoagulant/s.    Discharge Procedure Orders (must include Diet, Follow-up, Activity):  Light activity for the remainder of the day, resume normal activity tomorrow. Resume normal diet. Follow-up in clinic in 2-3 weeks.

## 2019-01-31 NOTE — PLAN OF CARE
Problem: Adult Inpatient Plan of Care  Goal: Plan of Care Review  Outcome: Outcome(s) achieved Date Met: 01/31/19  Patient discharged home in stable condition via wheelchair with ride. Verbalized understanding of discharge instructions. Patient voiced no complaints at this time. Patient stood at side of bed, walked steps with no new motor or sensory deficits. Neurologically intact.

## 2019-01-31 NOTE — OP NOTE
PROCEDURE:  Right Piriformis Injection under fluoroscopic guidance        PROCEDURE DATE: 01/31/2019     PREOPERATIVE DIAGNOSIS:, Lumbar Spondylosis, Piriformis Syndrome   POSTOPERATIVE DIAGNOSIS: Lumbar Spondylosis, Piriformis Syndrome      PROVIDER: Nabor Sadler MD  Assistant(s): None     ANESTHESIA: Local, No Sedation    >> 0 mg of VERSED    >> 0 mcg of FENTANYL      INDICATION: The patient has a history of pain due to sacroiliitis unresponsive to conservative treatments. Fluoroscopy was used to optimize visualization of needle placement and to maximize safety.      PROCEDURE DESCRIPTION: The patient was seen and identified in the preoperative area. Risks, benefits, complications, and alternatives were discussed with the patient. The patient agreed to proceed with the procedure and signed the consent. The site and side of the procedure was identified and marked. An IV was not placed for this procedure.     The patient was taken to the procedural suite. The patient was positioned in prone orientation on procedure table. A time out was performed prior to any intervention. The procedure, site, side, and allergies were stated and agreed to by all present. The lumbosacral area was widely prepped with ChloraPrep. The procedural site was draped in usual sterile fashion. Vital signs were closely monitored throughout this procedure. Conscious sedation was not used for this procedure.     Piriformis Muscle Injection:  The fluoroscope was used to crisp up the right sacroiliac joint and the inferior border of the ilium. A 25 G 3.5 inch needle was then advanced under fluoroscopic guidance to contact the ilium after which the needle was withdrawn and redirected slightly anteriorly to lie within the substance of the piriformis muscle. This was confirmed with instillation of 0.5 ml of gadolinium contrast dye  which delineated the course of the piriformis muscle diagonally from the origin on the anteroinferior sacrum to the  insertion on the greater trochanter of the femur after negative aspiration for blood or air. After confirming good spread of the contrast, 1 ml Methylprednisolone (40 mg/mL) and 2 mL of 1% PF Lidocaine  was injected. The needle was then removed intact.         Description of Findings: Not applicable     Prosthetic devices, grafts, tissues, or devices implanted: None     Specimen Removed: No     Estimated Blood Loss: minimal     COMPLICATIONS: None     DISPOSITION / PLANS: The patient was transferred to the recovery area in a stable condition for observation. The patient was reexamined prior to discharge. There was no evidence of acute neurologic injury following the procedure.  Patient was discharged from the recovery room after meeting discharge criteria. Home discharge instructions were given to the patient by the staff.

## 2019-03-15 DIAGNOSIS — M54.41 LOW BACK PAIN WITH RIGHT-SIDED SCIATICA, UNSPECIFIED BACK PAIN LATERALITY, UNSPECIFIED CHRONICITY: ICD-10-CM

## 2019-03-18 RX ORDER — MELOXICAM 7.5 MG/1
TABLET ORAL
Qty: 180 TABLET | Refills: 0 | OUTPATIENT
Start: 2019-03-18

## 2019-03-19 ENCOUNTER — TELEPHONE (OUTPATIENT)
Dept: PAIN MEDICINE | Facility: CLINIC | Age: 84
End: 2019-03-19

## 2019-03-19 DIAGNOSIS — M54.41 LOW BACK PAIN WITH RIGHT-SIDED SCIATICA, UNSPECIFIED BACK PAIN LATERALITY, UNSPECIFIED CHRONICITY: ICD-10-CM

## 2019-03-19 RX ORDER — MELOXICAM 7.5 MG/1
7.5 TABLET ORAL DAILY PRN
Qty: 30 TABLET | Refills: 0 | Status: SHIPPED | OUTPATIENT
Start: 2019-03-19 | End: 2019-04-18

## 2019-03-19 NOTE — TELEPHONE ENCOUNTER
----- Message from Gay Engle LPN sent at 3/18/2019  4:08 PM CDT -----      ----- Message -----  From: Arlet Gray  Sent: 3/18/2019   3:24 PM  To: Gianna JUAREZ Staff    ..Type:  RX Refill Request    Who Called: Rox Ritchie (Spouse)  Refill or New Rx: refill   RX Name and Strength :meloxicam  How is the patient currently taking it? (ex. 1XDay):   Is this a 30 day or 90 day RX:30  Preferred Pharmacy with phone number:..    [facility] - [place], [place] - [address]  [address] [facility] [address]  Phone: [phone] Fax: [phone]    Local or Mail Order: mail  Order   Ordering Provider: gianna   Would the patient rather a call back or a response via [facility]? Call back   Best Call Back Number: [phone]  Additional Information:

## 2019-03-19 NOTE — TELEPHONE ENCOUNTER
----- Message from Yadi Forde PA-C sent at 3/19/2019  7:51 AM CDT -----  Rx refill of Mobic sent to [facility].   ----- Message -----  From: Gay Engel LPN  Sent: 3/18/2019   4:08 PM  To: Yadi Forde PA-C        ----- Message -----  From: Arlet Gray  Sent: 3/18/2019   3:24 PM  To: Gianna JUAREZ Staff    ..Type:  RX Refill Request    Who Called: Rox Ritchie (Spouse)  Refill or New Rx: refill   RX Name and Strength :meloxicam  How is the patient currently taking it? (ex. 1XDay):   Is this a 30 day or 90 day RX:30  Preferred Pharmacy with phone number:..    [facility] - [facility] [address]  [address] [facility] [address] [address]  Phone: [phone] Fax: [phone]    Local or Mail Order: mail  Order   Ordering Provider: gianna   Would the patient rather a call back or a response via [facility]? Call back   Best Call Back Number: [phone]  Additional Information:

## 2019-04-18 ENCOUNTER — OFFICE VISIT (OUTPATIENT)
Dept: FAMILY MEDICINE | Facility: CLINIC | Age: 84
End: 2019-04-18
Payer: MEDICARE

## 2019-04-18 ENCOUNTER — LAB VISIT (OUTPATIENT)
Dept: LAB | Facility: HOSPITAL | Age: 84
End: 2019-04-18
Attending: FAMILY MEDICINE
Payer: MEDICARE

## 2019-04-18 VITALS
HEART RATE: 62 BPM | WEIGHT: 157.94 LBS | SYSTOLIC BLOOD PRESSURE: 130 MMHG | OXYGEN SATURATION: 96 % | BODY MASS INDEX: 22.66 KG/M2 | TEMPERATURE: 98 F | DIASTOLIC BLOOD PRESSURE: 60 MMHG

## 2019-04-18 DIAGNOSIS — G89.29 CHRONIC MIDLINE LOW BACK PAIN WITH RIGHT-SIDED SCIATICA: ICD-10-CM

## 2019-04-18 DIAGNOSIS — E78.2 MIXED HYPERLIPIDEMIA: ICD-10-CM

## 2019-04-18 DIAGNOSIS — I10 HTN, GOAL BELOW 130/80: ICD-10-CM

## 2019-04-18 DIAGNOSIS — I10 HTN, GOAL BELOW 130/80: Primary | ICD-10-CM

## 2019-04-18 DIAGNOSIS — M54.41 CHRONIC MIDLINE LOW BACK PAIN WITH RIGHT-SIDED SCIATICA: ICD-10-CM

## 2019-04-18 LAB
ALBUMIN SERPL BCP-MCNC: 3.7 G/DL (ref 3.5–5.2)
ALP SERPL-CCNC: 76 U/L (ref 55–135)
ALT SERPL W/O P-5'-P-CCNC: 22 U/L (ref 10–44)
ANION GAP SERPL CALC-SCNC: 5 MMOL/L (ref 8–16)
AST SERPL-CCNC: 30 U/L (ref 10–40)
BASOPHILS # BLD AUTO: 0.06 K/UL (ref 0–0.2)
BASOPHILS NFR BLD: 1.1 % (ref 0–1.9)
BILIRUB SERPL-MCNC: 0.5 MG/DL (ref 0.1–1)
BUN SERPL-MCNC: 21 MG/DL (ref 8–23)
CALCIUM SERPL-MCNC: 9.2 MG/DL (ref 8.7–10.5)
CHLORIDE SERPL-SCNC: 105 MMOL/L (ref 95–110)
CHOLEST SERPL-MCNC: 199 MG/DL (ref 120–199)
CHOLEST/HDLC SERPL: 3.4 {RATIO} (ref 2–5)
CO2 SERPL-SCNC: 29 MMOL/L (ref 23–29)
CREAT SERPL-MCNC: 0.8 MG/DL (ref 0.5–1.4)
DIFFERENTIAL METHOD: ABNORMAL
EOSINOPHIL # BLD AUTO: 0.3 K/UL (ref 0–0.5)
EOSINOPHIL NFR BLD: 5.6 % (ref 0–8)
ERYTHROCYTE [DISTWIDTH] IN BLOOD BY AUTOMATED COUNT: 12.5 % (ref 11.5–14.5)
EST. GFR  (AFRICAN AMERICAN): >60 ML/MIN/1.73 M^2
EST. GFR  (NON AFRICAN AMERICAN): >60 ML/MIN/1.73 M^2
GLUCOSE SERPL-MCNC: 96 MG/DL (ref 70–110)
HCT VFR BLD AUTO: 38.7 % (ref 40–54)
HDLC SERPL-MCNC: 59 MG/DL (ref 40–75)
HDLC SERPL: 29.6 % (ref 20–50)
HGB BLD-MCNC: 12.7 G/DL (ref 14–18)
IMM GRANULOCYTES # BLD AUTO: 0.03 K/UL (ref 0–0.04)
IMM GRANULOCYTES NFR BLD AUTO: 0.5 % (ref 0–0.5)
LDLC SERPL CALC-MCNC: 114.8 MG/DL (ref 63–159)
LYMPHOCYTES # BLD AUTO: 1.5 K/UL (ref 1–4.8)
LYMPHOCYTES NFR BLD: 26.1 % (ref 18–48)
MCH RBC QN AUTO: 31.6 PG (ref 27–31)
MCHC RBC AUTO-ENTMCNC: 32.8 G/DL (ref 32–36)
MCV RBC AUTO: 96 FL (ref 82–98)
MONOCYTES # BLD AUTO: 0.8 K/UL (ref 0.3–1)
MONOCYTES NFR BLD: 14.2 % (ref 4–15)
NEUTROPHILS # BLD AUTO: 2.9 K/UL (ref 1.8–7.7)
NEUTROPHILS NFR BLD: 52.5 % (ref 38–73)
NONHDLC SERPL-MCNC: 140 MG/DL
NRBC BLD-RTO: 0 /100 WBC
PLATELET # BLD AUTO: 219 K/UL (ref 150–350)
PMV BLD AUTO: 9.9 FL (ref 9.2–12.9)
POTASSIUM SERPL-SCNC: 4.6 MMOL/L (ref 3.5–5.1)
PROT SERPL-MCNC: 6.6 G/DL (ref 6–8.4)
RBC # BLD AUTO: 4.02 M/UL (ref 4.6–6.2)
SODIUM SERPL-SCNC: 139 MMOL/L (ref 136–145)
TRIGL SERPL-MCNC: 126 MG/DL (ref 30–150)
WBC # BLD AUTO: 5.56 K/UL (ref 3.9–12.7)

## 2019-04-18 PROCEDURE — 36415 COLL VENOUS BLD VENIPUNCTURE: CPT | Mod: HCNC,PO

## 2019-04-18 PROCEDURE — 1100F PTFALLS ASSESS-DOCD GE2>/YR: CPT | Mod: HCNC,CPTII,S$GLB, | Performed by: FAMILY MEDICINE

## 2019-04-18 PROCEDURE — 80061 LIPID PANEL: CPT | Mod: HCNC

## 2019-04-18 PROCEDURE — 99214 OFFICE O/P EST MOD 30 MIN: CPT | Mod: HCNC,S$GLB,, | Performed by: FAMILY MEDICINE

## 2019-04-18 PROCEDURE — 99999 PR PBB SHADOW E&M-EST. PATIENT-LVL III: CPT | Mod: PBBFAC,HCNC,, | Performed by: FAMILY MEDICINE

## 2019-04-18 PROCEDURE — 3288F FALL RISK ASSESSMENT DOCD: CPT | Mod: HCNC,CPTII,S$GLB, | Performed by: FAMILY MEDICINE

## 2019-04-18 PROCEDURE — 85025 COMPLETE CBC W/AUTO DIFF WBC: CPT | Mod: HCNC

## 2019-04-18 PROCEDURE — 99214 PR OFFICE/OUTPT VISIT, EST, LEVL IV, 30-39 MIN: ICD-10-PCS | Mod: HCNC,S$GLB,, | Performed by: FAMILY MEDICINE

## 2019-04-18 PROCEDURE — 80053 COMPREHEN METABOLIC PANEL: CPT | Mod: HCNC

## 2019-04-18 PROCEDURE — 3288F PR FALLS RISK ASSESSMENT DOCUMENTED: ICD-10-PCS | Mod: HCNC,CPTII,S$GLB, | Performed by: FAMILY MEDICINE

## 2019-04-18 PROCEDURE — 1100F PR PT FALLS ASSESS DOC 2+ FALLS/FALL W/INJURY/YR: ICD-10-PCS | Mod: HCNC,CPTII,S$GLB, | Performed by: FAMILY MEDICINE

## 2019-04-18 PROCEDURE — 99999 PR PBB SHADOW E&M-EST. PATIENT-LVL III: ICD-10-PCS | Mod: PBBFAC,HCNC,, | Performed by: FAMILY MEDICINE

## 2019-04-18 RX ORDER — TRIAMCINOLONE ACETONIDE 1 MG/G
CREAM TOPICAL
COMMUNITY
Start: 2019-01-20 | End: 2019-09-09

## 2019-04-18 NOTE — PROGRESS NOTES
Subjective:       Patient ID: Dominguez Ritchie is a 86 y.o. male.    Chief Complaint: Follow-up on hypertension    Follow up on HTN: He was started on amlodipine in 12/18.  He has been monitoring it at home and his BP is normal.  She is on amlodipine 2.5 mg p.o. daily and he takes 100% of the time  He has chronic low back pain with right-sided sciatica.  Reports that he does not want to get DREW anymore because it is not lasting but a few days and too expensive.  He was on meloxicam 7.5 mg p.o. daily as needed for pain and would like to go back to it  He has hyperlipidemia, was told to stop statin by his previous PCP.  Needs lab today.      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     Family History   Problem Relation Age of Onset    Heart disease Mother     Cancer Son         pancreatic     Diabetes Maternal Uncle     Kidney disease Neg Hx     Stroke Neg Hx      Social History     Socioeconomic History    Marital status:      Spouse name: Not on file    Number of children: Not on file    Years of education: Not on file    Highest education level: Not on file   Occupational History    Not on file   Social Needs    Financial resource strain: Not on file    Food insecurity:     Worry: Not on file     Inability: Not on file    Transportation needs:     Medical: Not on file     Non-medical: Not on file   Tobacco Use    Smoking status: Former Smoker     Packs/day: 1.00     Years: 25.00     Pack years:  25.00     Last attempt to quit: 1990     Years since quittin.3    Smokeless tobacco: Never Used   Substance and Sexual Activity    Alcohol use: No     Alcohol/week: 0.0 oz    Drug use: No    Sexual activity: Not Currently     Birth control/protection: None   Lifestyle    Physical activity:     Days per week: Not on file     Minutes per session: Not on file    Stress: Not on file   Relationships    Social connections:     Talks on phone: Not on file     Gets together: Not on file     Attends Christian service: Not on file     Active member of club or organization: Not on file     Attends meetings of clubs or organizations: Not on file     Relationship status: Not on file   Other Topics Concern    Not on file   Social History Narrative    Not on file     Outpatient Encounter Medications as of 2019   Medication Sig Dispense Refill    amLODIPine (NORVASC) 2.5 MG tablet Take 1 tablet (2.5 mg total) by mouth once daily. 30 tablet 11    b complex vitamins tablet Take 1 tablet by mouth once daily.      meclizine (ANTIVERT) 25 mg tablet TAKE 1 TABLET THREE TIMES DAILY AS NEEDED 270 tablet 3    meloxicam (MOBIC) 7.5 MG tablet Take 1 tablet (7.5 mg total) by mouth daily as needed for Pain. 30 tablet 0    omeprazole (PRILOSEC) 20 MG capsule Take 1 capsule (20 mg total) by mouth once daily. 90 capsule 3    rOPINIRole (REQUIP) 0.5 MG tablet Take 1 tablet (0.5 mg total) by mouth every evening. 90 tablet 3    traMADol (ULTRAM) 50 mg tablet Take 1 tablet (50 mg total) by mouth 2 (two) times daily as needed for Pain. 90 tablet 0    triamcinolone acetonide 0.1% (KENALOG) 0.1 % cream        No facility-administered encounter medications on file as of 2019.        Review of Systems   Constitutional: Negative for appetite change and fever.   HENT: Negative for congestion, facial swelling and voice change.    Eyes: Negative for discharge and itching.   Respiratory: Negative for cough, chest tightness  and wheezing.    Cardiovascular: Negative.  Negative for chest pain and leg swelling.   Gastrointestinal: Negative for abdominal pain, nausea and vomiting.   Endocrine: Negative for cold intolerance and heat intolerance.   Genitourinary: Negative for dysuria and flank pain.   Musculoskeletal: Negative for myalgias and neck stiffness.   Skin: Negative for pallor and rash.   Neurological: Negative for facial asymmetry and weakness.   Psychiatric/Behavioral: Negative for agitation and confusion.       Objective:      /60 (BP Location: Left arm, Patient Position: Sitting, BP Method: Medium (Automatic))   Pulse 62   Temp 97.5 °F (36.4 °C) (Oral)   Wt 71.6 kg (157 lb 15.4 oz)   SpO2 96%   BMI 22.66 kg/m²   Physical Exam   Constitutional: He is oriented to person, place, and time. He appears well-developed. No distress.   HENT:   Head: Normocephalic and atraumatic.   Right Ear: External ear normal.   Left Ear: External ear normal.   Eyes: Conjunctivae and EOM are normal.   Neck: Neck supple.   Cardiovascular: Normal rate and regular rhythm.   Pulmonary/Chest: Effort normal. No respiratory distress.   Abdominal: Soft. Normal appearance and bowel sounds are normal. There is no hepatosplenomegaly.   Genitourinary:   Genitourinary Comments: deferred   Musculoskeletal: He exhibits no edema.        Right shoulder: He exhibits tenderness.        Lumbar back: He exhibits tenderness.        Back:    Has good flexibility but straight leg raise is positive to the right   Neurological: He is alert and oriented to person, place, and time.   Skin: Skin is warm and dry.   Psychiatric: He has a normal mood and affect. His behavior is normal.   Nursing note and vitals reviewed.      Results for orders placed or performed in visit on 10/10/18   CBC auto differential   Result Value Ref Range    WBC 3.99 3.90 - 12.70 K/uL    RBC 4.05 (L) 4.60 - 6.20 M/uL    Hemoglobin 13.0 (L) 14.0 - 18.0 g/dL    Hematocrit 39.1 (L) 40.0 - 54.0 %     MCV 97 82 - 98 fL    MCH 32.1 (H) 27.0 - 31.0 pg    MCHC 33.2 32.0 - 36.0 g/dL    RDW 13.2 11.5 - 14.5 %    Platelets 152 150 - 350 K/uL    MPV 10.3 9.2 - 12.9 fL    Immature Granulocytes 0.5 0.0 - 0.5 %    Gran # (ANC) 2.2 1.8 - 7.7 K/uL    Immature Grans (Abs) 0.02 0.00 - 0.04 K/uL    Lymph # 0.8 (L) 1.0 - 4.8 K/uL    Mono # 0.7 0.3 - 1.0 K/uL    Eos # 0.2 0.0 - 0.5 K/uL    Baso # 0.03 0.00 - 0.20 K/uL    nRBC 0 0 /100 WBC    Gran% 54.8 38.0 - 73.0 %    Lymph% 20.6 18.0 - 48.0 %    Mono% 17.8 (H) 4.0 - 15.0 %    Eosinophil% 5.5 0.0 - 8.0 %    Basophil% 0.8 0.0 - 1.9 %    Differential Method Automated    Comprehensive metabolic panel   Result Value Ref Range    Sodium 139 136 - 145 mmol/L    Potassium 4.7 3.5 - 5.1 mmol/L    Chloride 105 95 - 110 mmol/L    CO2 28 23 - 29 mmol/L    Glucose 102 70 - 110 mg/dL    BUN, Bld 14 8 - 23 mg/dL    Creatinine 0.8 0.5 - 1.4 mg/dL    Calcium 9.1 8.7 - 10.5 mg/dL    Total Protein 6.1 6.0 - 8.4 g/dL    Albumin 3.4 (L) 3.5 - 5.2 g/dL    Total Bilirubin 0.4 0.1 - 1.0 mg/dL    Alkaline Phosphatase 61 55 - 135 U/L    AST 29 10 - 40 U/L    ALT 21 10 - 44 U/L    Anion Gap 6 (L) 8 - 16 mmol/L    eGFR if African American >60.0 >60 mL/min/1.73 m^2    eGFR if non African American >60.0 >60 mL/min/1.73 m^2   Lipid panel   Result Value Ref Range    Cholesterol 210 (H) 120 - 199 mg/dL    Triglycerides 122 30 - 150 mg/dL    HDL 62 40 - 75 mg/dL    LDL Cholesterol 123.6 63.0 - 159.0 mg/dL    HDL/Chol Ratio 29.5 20.0 - 50.0 %    Total Cholesterol/HDL Ratio 3.4 2.0 - 5.0    Non-HDL Cholesterol 148 mg/dL   PSA, Screening   Result Value Ref Range    PSA, SCREEN 9.0 (H) 0.00 - 4.00 ng/mL   Hemoglobin A1c   Result Value Ref Range    Hemoglobin A1C 5.4 4.0 - 5.6 %    Estimated Avg Glucose 108 68 - 131 mg/dL     Assessment:       1. HTN, goal below 130/80    2. Mixed hyperlipidemia    3. Chronic midline low back pain with right-sided sciatica        Plan:   HTN, goal below 130/80  -chronic stable  continue amlodipine 2.5 mg daily.  Mixed hyperlipidemia  -chronic stable not on medication.  Lab today  Chronic midline low back pain with right-sided sciatica  Chronic stable.  Meloxicam as needed-side effects discussed with patient and his wife, medicine of for daily use due to risk for gastritis and take with food.  HTN, goal below 130/80  -     CBC auto differential; Future; Expected date: 04/18/2019  -     Comprehensive metabolic panel; Future; Expected date: 04/18/2019    Mixed hyperlipidemia  -     Lipid panel; Future; Expected date: 04/18/2019    Chronic midline low back pain with right-sided sciatica

## 2019-04-18 NOTE — PATIENT INSTRUCTIONS
Relieving Back Pain  Back pain is a common problem. You can strain back muscles by lifting too much weight or just by moving the wrong way. Back strain can be uncomfortable, even painful. And it can take weeks or months to improve. To help yourself feel better and prevent future back strains, try these tips.  Important Note: Do not give aspirin to children or teens without first discussing it with your healthcare provider.      ? Ice    Ice reduces muscle pain and swelling. It helps most during the first 24 to 48 hours after an injury.  · Wrap an ice pack or a bag of frozen peas in a thin towel. (Never place ice directly on your skin.)  · Place the ice where your back hurts the most.  · Dont ice for more than 20 minutes at a time.  · You can use ice several times a day.  ? Medicines  Over-the-counter pain relievers can include acetaminophen and anti-inflammatory medicines, which includes aspirin or ibuprofen. They can help ease discomfort. Some also reduce swelling.  · Tell your healthcare provider about any medicines you are already taking.  · Take medicines only as directed.  ? Heat  After the first 48 hours, heat can relax sore muscles and improve blood flow.  · Try a warm bath or shower. Or use a heating pad set on low. To prevent a burn, keep a cloth between you and the heating pad.  · Dont use a heating pad for more than 15 minutes at a time. Never sleep on a heating pad.  Date Last Reviewed: 9/1/2015  © 4077-0754 [employer]. [address] [place] [address], [place] [address]. All rights reserved. This information is not intended as a substitute for professional medical care. Always follow your healthcare professional's instructions.        Established High Blood Pressure    High blood pressure (hypertension) is a chronic disease. Often, healthcare providers dont know what causes it. But it can be caused by certain health conditions and medicines.  If you have high blood pressure, you may not have  any symptoms. If you do have symptoms, they may include headache, dizziness, changes in your vision, chest pain, and shortness of breath. But even without symptoms, high blood pressure thats not treated raises your risk for heart attack and stroke. High blood pressure is a serious health risk and shouldnt be ignored.  A blood pressure reading is made up of two numbers: a higher number over a lower number. The top number is the systolic pressure. The bottom number is the diastolic pressure. A normal blood pressure is a systolic pressure of  less than 120 over a diastolic pressure of less than 80. You will see your blood pressure readings written together. For example, a person with a systolic pressure of 188 and a diastolic pressure of 78 will have 118/78 written in the medical record.  High blood pressure is when either the top number is 140 or higher, or the bottom number is 90 or higher. This must be the result when taking your blood pressure a number of times. The blood pressures between normal and high are called prehypertension.  Home care  If you have high blood pressure, you should do what is listed below to lower your blood pressure. If you are taking medicines for high blood pressure, these methods may reduce or end your need for medicines in the future.  · Begin a weight-loss program if you are overweight.  · Cut back on how much salt you get in your diet. Heres how to do this:  ¨ Dont eat foods that have a lot of salt. These include olives, pickles, smoked meats, and salted potato chips.  ¨ Dont add salt to your food at the table.  ¨ Use only small amounts of salt when cooking.  · Start an exercise program. Talk with your healthcare provider about the type of exercise program that would be best for you. It doesn't have to be hard. Even brisk walking for 20 minutes 3 times a week is a good form of exercise.  · Dont take medicines that stimulate the heart. This includes many over-the-counter cold and  sinus decongestant pills and sprays, as well as diet pills. Check the warnings about hypertension on the label. Before buying any over-the-counter medicines or supplements, always ask the pharmacist about the product's potential interaction with your high blood pressure and your high blood pressure medicines.  · Stimulants such as amphetamine or cocaine could be deadly for someone with high blood pressure. Never take these.  · Limit how much caffeine you get in your diet. Switch to caffeine-free products.  · Stop smoking. If you are a long-time smoker, this can be hard. Talk to your healthcare provider about medicines and nicotine replacement options to help you. Also, enroll in a stop-smoking program to make it more likely that you will quit for good.  · Learn how to handle stress. This is an important part of any program to lower blood pressure. Learn about relaxation methods like meditation, yoga, or biofeedback.  · If your provider prescribed medicines, take them exactly as directed. Missing doses may cause your blood pressure get out of control.  · If you miss a dose or doses, check with your healthcare provider or pharmacist about what to do.  · Consider buying an automatic blood pressure machine. Ask your provider for a recommendation. You can get one of these at most pharmacies.     The American Heart Association recommends the following guidelines for home blood pressure monitoring:  · Don't smoke or drink coffee for 30 minutes before taking your blood pressure.  · Go to the bathroom before the test.  · Relax for 5 minutes before taking the measurement.  · Sit with your back supported (don't sit on a couch or soft chair); keep your feet on the floor uncrossed. Place your arm on a solid flat surface (like a table) with the upper part of the arm at heart level. Place the middle of the cuff directly above the eye of the elbow. Check the monitor's instruction manual for an illustration.  · Take multiple  readings. When you measure, take 2 to 3 readings one minute apart and record all of the results.  · Take your blood pressure at the same time every day, or as your healthcare provider recommends.  · Record the date, time, and blood pressure reading.  · Take the record with you to your next medical appointment. If your blood pressure monitor has a built-in memory, simply take the monitor with you to your next appointment.  · Call your provider if you have several high readings. Don't be frightened by a single high blood pressure reading, but if you get several high readings, check in with your healthcare provider.  · Note: When blood pressure reaches a systolic (top number) of 180 or higher OR diastolic (bottom number) of 110 or higher, seek emergency medical treatment.  Follow-up care  You will need to see your healthcare provider regularly. This is to check your blood pressure and to make changes to your medicines. Make a follow-up appointment as directed. Bring the record of your home blood pressure readings to the appointment.  When to seek medical advice  Call your healthcare provider right away if any of these occur:  · Blood pressure reaches a systolic (upper number) of 180 or higher OR a diastolic (bottom number) of 110 or higher  · Chest pain or shortness of breath  · Severe headache  · Throbbing or rushing sound in the ears  · Nosebleed  · Sudden severe pain in your belly (abdomen)  · Extreme drowsiness, confusion, or fainting  · Dizziness or spinning sensation (vertigo)  · Weakness of an arm or leg or one side of the face  · You have problems speaking or seeing   Date Last Reviewed: 12/1/2016  © 3594-0087 [employer] [place]. [address] [place] [address], [place] [address]. All rights reserved. This information is not intended as a substitute for professional medical care. Always follow your healthcare professional's instructions.

## 2019-04-25 ENCOUNTER — OFFICE VISIT (OUTPATIENT)
Dept: FAMILY MEDICINE | Facility: CLINIC | Age: 84
End: 2019-04-25
Payer: MEDICARE

## 2019-04-25 VITALS
HEART RATE: 61 BPM | SYSTOLIC BLOOD PRESSURE: 130 MMHG | OXYGEN SATURATION: 98 % | WEIGHT: 158.06 LBS | DIASTOLIC BLOOD PRESSURE: 80 MMHG | HEIGHT: 70 IN | BODY MASS INDEX: 22.63 KG/M2

## 2019-04-25 DIAGNOSIS — M46.1 SACROILIITIS: ICD-10-CM

## 2019-04-25 DIAGNOSIS — J30.89 NON-SEASONAL ALLERGIC RHINITIS DUE TO OTHER ALLERGIC TRIGGER: ICD-10-CM

## 2019-04-25 DIAGNOSIS — D32.9 MENINGIOMA: ICD-10-CM

## 2019-04-25 DIAGNOSIS — J84.10 CALCIFIED GRANULOMA OF LUNG: ICD-10-CM

## 2019-04-25 DIAGNOSIS — I77.1 TORTUOUS AORTA: ICD-10-CM

## 2019-04-25 DIAGNOSIS — R42 DIZZINESS: ICD-10-CM

## 2019-04-25 DIAGNOSIS — H66.001 ACUTE SUPPURATIVE OTITIS MEDIA OF RIGHT EAR WITHOUT SPONTANEOUS RUPTURE OF TYMPANIC MEMBRANE, RECURRENCE NOT SPECIFIED: Primary | ICD-10-CM

## 2019-04-25 PROCEDURE — 1100F PTFALLS ASSESS-DOCD GE2>/YR: CPT | Mod: HCNC,CPTII,S$GLB, | Performed by: FAMILY MEDICINE

## 2019-04-25 PROCEDURE — 99499 RISK ADDL DX/OHS AUDIT: ICD-10-PCS | Mod: HCNC,S$GLB,, | Performed by: FAMILY MEDICINE

## 2019-04-25 PROCEDURE — 99999 PR PBB SHADOW E&M-EST. PATIENT-LVL III: CPT | Mod: PBBFAC,HCNC,, | Performed by: FAMILY MEDICINE

## 2019-04-25 PROCEDURE — 99214 PR OFFICE/OUTPT VISIT, EST, LEVL IV, 30-39 MIN: ICD-10-PCS | Mod: HCNC,S$GLB,, | Performed by: FAMILY MEDICINE

## 2019-04-25 PROCEDURE — 99999 PR PBB SHADOW E&M-EST. PATIENT-LVL III: ICD-10-PCS | Mod: PBBFAC,HCNC,, | Performed by: FAMILY MEDICINE

## 2019-04-25 PROCEDURE — 3288F FALL RISK ASSESSMENT DOCD: CPT | Mod: HCNC,CPTII,S$GLB, | Performed by: FAMILY MEDICINE

## 2019-04-25 PROCEDURE — 3288F PR FALLS RISK ASSESSMENT DOCUMENTED: ICD-10-PCS | Mod: HCNC,CPTII,S$GLB, | Performed by: FAMILY MEDICINE

## 2019-04-25 PROCEDURE — 99214 OFFICE O/P EST MOD 30 MIN: CPT | Mod: HCNC,S$GLB,, | Performed by: FAMILY MEDICINE

## 2019-04-25 PROCEDURE — 99499 UNLISTED E&M SERVICE: CPT | Mod: HCNC,S$GLB,, | Performed by: FAMILY MEDICINE

## 2019-04-25 PROCEDURE — 1100F PR PT FALLS ASSESS DOC 2+ FALLS/FALL W/INJURY/YR: ICD-10-PCS | Mod: HCNC,CPTII,S$GLB, | Performed by: FAMILY MEDICINE

## 2019-04-25 RX ORDER — DOXYCYCLINE 100 MG/1
100 CAPSULE ORAL 2 TIMES DAILY
Qty: 20 CAPSULE | Refills: 0 | Status: SHIPPED | OUTPATIENT
Start: 2019-04-25 | End: 2019-05-05

## 2019-04-25 RX ORDER — FLUTICASONE PROPIONATE 50 MCG
2 SPRAY, SUSPENSION (ML) NASAL DAILY
Qty: 16 G | Refills: 0 | Status: SHIPPED | OUTPATIENT
Start: 2019-04-25 | End: 2019-05-02

## 2019-04-25 NOTE — PATIENT INSTRUCTIONS
Middle Ear Infection (Adult)  You have an infection of the middle ear, the space behind the eardrum. This is also called acute otitis media (AOM). Sometimes it is caused by the common cold. This is because congestion can block the internal passage (eustachian tube) that drains fluid from the middle ear. When the middle ear fills with fluid, bacteria can grow there and cause an infection. Oral antibiotics are used to treat this illness, not ear drops. Symptoms usually start to improve within 1 to 2 days of treatment.    Home care  The following are general care guidelines:  · Finish all of the antibiotic medicine given, even though you may feel better after the first few days.  · You may use over-the-counter medicine, such as acetaminophen or ibuprofen, to control pain and fever, unless something else was prescribed. If you have chronic liver or kidney disease or have ever had a stomach ulcer or gastrointestinal bleeding, talk with your healthcare provider before using these medicines. Do not give aspirin to anyone under 18 years of age who has a fever. It may cause severe illness or death.  Follow-up care  Follow up with your healthcare provider, or as advised, in 2 weeks if all symptoms have not gotten better, or if hearing doesn't go back to normal within 1 month.  When to seek medical advice  Call your healthcare provider right away if any of these occur:  · Ear pain gets worse or does not improve after 3 days of treatment  · Unusual drowsiness or confusion  · Neck pain, stiff neck, or headache  · Fluid or blood draining from the ear canal  · Fever of 100.4°F (38°C) or as advised   · Seizure  Date Last Reviewed: 6/1/2016  © 2199-8086 [employer]. [address] [place] [address], [place] [address]. All rights reserved. This information is not intended as a substitute for professional medical care. Always follow your healthcare professional's instructions.

## 2019-04-25 NOTE — PROGRESS NOTES
"Subjective:       Patient ID: Dominguez Ritchie is a 86 y.o. male.    Chief Complaint: Dizziness (has hx of vertigo , this episode started this week , he feels like his head is "stopped up" )      HPI     Dizziness      Additional comments: has hx of vertigo , this episode started this week   , he feels like his head is "stopped up"     Last edited by Shandra Chavez LPN on 4/25/2019 11:43 AM.   (History).    Dizziness: x 2 days. Acute on chronic. Reports that it feels like his head is heavy. Associated with congested and post nasal drip and right ear ache-mild. Also with headache which he describes as a tight band around his head on waking up.   He has allergies and takes Claritin daily.   He has history of vertigo and gait imbalance but it is worse since the congestion started. He stopped taking Antivert awhile back.  He is allergic to amoxil.    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     Family History   Problem Relation Age of Onset    Heart disease Mother     Cancer Son         pancreatic     Diabetes Maternal Uncle     Kidney disease Neg Hx     Stroke Neg Hx      Social History     Socioeconomic History    Marital status:      Spouse name: Not on file    Number of children: Not on file    Years of education: Not on file    Highest education level: Not on file   Occupational History    Not on file   Social Needs    Financial resource strain: Not on file    Food insecurity:     " Worry: Not on file     Inability: Not on file    Transportation needs:     Medical: Not on file     Non-medical: Not on file   Tobacco Use    Smoking status: Former Smoker     Packs/day: 1.00     Years: 25.00     Pack years: 25.00     Last attempt to quit: 1990     Years since quittin.3    Smokeless tobacco: Never Used   Substance and Sexual Activity    Alcohol use: No     Alcohol/week: 0.0 oz    Drug use: No    Sexual activity: Not Currently     Birth control/protection: None   Lifestyle    Physical activity:     Days per week: Not on file     Minutes per session: Not on file    Stress: Not at all   Relationships    Social connections:     Talks on phone: Not on file     Gets together: Not on file     Attends Druze service: Not on file     Active member of club or organization: Not on file     Attends meetings of clubs or organizations: Not on file     Relationship status: Not on file   Other Topics Concern    Not on file   Social History Narrative    Not on file     Outpatient Encounter Medications as of 2019   Medication Sig Dispense Refill    amLODIPine (NORVASC) 2.5 MG tablet Take 1 tablet (2.5 mg total) by mouth once daily. 30 tablet 11    b complex vitamins tablet Take 1 tablet by mouth once daily.      meclizine (ANTIVERT) 25 mg tablet TAKE 1 TABLET THREE TIMES DAILY AS NEEDED 270 tablet 3    omeprazole (PRILOSEC) 20 MG capsule Take 1 capsule (20 mg total) by mouth once daily. 90 capsule 3    rOPINIRole (REQUIP) 0.5 MG tablet Take 1 tablet (0.5 mg total) by mouth every evening. 90 tablet 3    traMADol (ULTRAM) 50 mg tablet Take 1 tablet (50 mg total) by mouth 2 (two) times daily as needed for Pain. 90 tablet 0    triamcinolone acetonide 0.1% (KENALOG) 0.1 % cream       doxycycline (VIBRAMYCIN) 100 MG Cap Take 1 capsule (100 mg total) by mouth 2 (two) times daily. for 10 days 20 capsule 0    fluticasone (FLONASE) 50 mcg/actuation nasal spray 2 sprays (100 mcg total) by  "Each Nare route once daily. for 7 days 16 g 0     No facility-administered encounter medications on file as of 4/25/2019.        Review of Systems   Constitutional: Negative for appetite change and fever.   HENT: Positive for congestion, ear pain, postnasal drip, rhinorrhea and sinus pressure. Negative for facial swelling, sore throat and voice change.    Eyes: Positive for itching. Negative for photophobia, discharge and visual disturbance.   Respiratory: Negative for cough, chest tightness and wheezing.    Cardiovascular: Negative.  Negative for chest pain and leg swelling.   Gastrointestinal: Negative for abdominal pain, nausea and vomiting.   Endocrine: Negative for cold intolerance and heat intolerance.   Genitourinary: Negative for dysuria and flank pain.   Musculoskeletal: Negative for myalgias and neck stiffness.   Skin: Negative for pallor and rash.   Neurological: Positive for headaches. Negative for facial asymmetry and weakness.   Psychiatric/Behavioral: Negative for agitation and confusion.       Objective:      /80 (BP Location: Right arm, Patient Position: Sitting, BP Method: Large (Manual))   Pulse 61   Ht 5' 10" (1.778 m)   Wt 71.7 kg (158 lb 1.1 oz)   SpO2 98%   BMI 22.68 kg/m²   Physical Exam   Constitutional: He is oriented to person, place, and time. He appears well-developed. No distress.   HENT:   Head: Normocephalic and atraumatic.   Right Ear: External ear normal. Tympanic membrane is erythematous.   Left Ear: External ear normal.   Nose: Rhinorrhea present. Right sinus exhibits no maxillary sinus tenderness and no frontal sinus tenderness. Left sinus exhibits no maxillary sinus tenderness and no frontal sinus tenderness.   Mouth/Throat: No posterior oropharyngeal edema or posterior oropharyngeal erythema.   Eyes: Conjunctivae and EOM are normal.   Neck: Neck supple. No thyromegaly present.   Cardiovascular: Normal rate and regular rhythm.   Pulmonary/Chest: Effort normal. No " respiratory distress.   Abdominal: Soft. Bowel sounds are normal. He exhibits no distension. There is no tenderness.   Genitourinary:   Genitourinary Comments: deferred   Musculoskeletal: He exhibits no edema or deformity.   Lymphadenopathy:        Head (right side): No submandibular adenopathy present.        Head (left side): No submandibular adenopathy present.     He has no cervical adenopathy.   Neurological: He is alert and oriented to person, place, and time.   Skin: Skin is warm and dry.   Psychiatric: He has a normal mood and affect. His behavior is normal.   Nursing note and vitals reviewed.      Results for orders placed or performed in visit on 04/18/19   Lipid panel   Result Value Ref Range    Cholesterol 199 120 - 199 mg/dL    Triglycerides 126 30 - 150 mg/dL    HDL 59 40 - 75 mg/dL    LDL Cholesterol 114.8 63.0 - 159.0 mg/dL    HDL/Chol Ratio 29.6 20.0 - 50.0 %    Total Cholesterol/HDL Ratio 3.4 2.0 - 5.0    Non-HDL Cholesterol 140 mg/dL   CBC auto differential   Result Value Ref Range    WBC 5.56 3.90 - 12.70 K/uL    RBC 4.02 (L) 4.60 - 6.20 M/uL    Hemoglobin 12.7 (L) 14.0 - 18.0 g/dL    Hematocrit 38.7 (L) 40.0 - 54.0 %    MCV 96 82 - 98 fL    MCH 31.6 (H) 27.0 - 31.0 pg    MCHC 32.8 32.0 - 36.0 g/dL    RDW 12.5 11.5 - 14.5 %    Platelets 219 150 - 350 K/uL    MPV 9.9 9.2 - 12.9 fL    Immature Granulocytes 0.5 0.0 - 0.5 %    Gran # (ANC) 2.9 1.8 - 7.7 K/uL    Immature Grans (Abs) 0.03 0.00 - 0.04 K/uL    Lymph # 1.5 1.0 - 4.8 K/uL    Mono # 0.8 0.3 - 1.0 K/uL    Eos # 0.3 0.0 - 0.5 K/uL    Baso # 0.06 0.00 - 0.20 K/uL    nRBC 0 0 /100 WBC    Gran% 52.5 38.0 - 73.0 %    Lymph% 26.1 18.0 - 48.0 %    Mono% 14.2 4.0 - 15.0 %    Eosinophil% 5.6 0.0 - 8.0 %    Basophil% 1.1 0.0 - 1.9 %    Differential Method Automated    Comprehensive metabolic panel   Result Value Ref Range    Sodium 139 136 - 145 mmol/L    Potassium 4.6 3.5 - 5.1 mmol/L    Chloride 105 95 - 110 mmol/L    CO2 29 23 - 29 mmol/L     Glucose 96 70 - 110 mg/dL    BUN, Bld 21 8 - 23 mg/dL    Creatinine 0.8 0.5 - 1.4 mg/dL    Calcium 9.2 8.7 - 10.5 mg/dL    Total Protein 6.6 6.0 - 8.4 g/dL    Albumin 3.7 3.5 - 5.2 g/dL    Total Bilirubin 0.5 0.1 - 1.0 mg/dL    Alkaline Phosphatase 76 55 - 135 U/L    AST 30 10 - 40 U/L    ALT 22 10 - 44 U/L    Anion Gap 5 (L) 8 - 16 mmol/L    eGFR if African American >60.0 >60 mL/min/1.73 m^2    eGFR if non African American >60.0 >60 mL/min/1.73 m^2   DATE OF EXAM: Apr 4 2013      BOC   0236  -  MRI BRAIN W/O & W CONTRAST:   \  [id number]     CLINICAL HISTORY:   \225.4 [id number] XOCHITL SPIN MENINGES NEOPL     PROCEDURE COMMENT:   \     ICD 9 CODE(S):   (\)     CPT 4 CODE(S)/MODIFIER(S):   (\)     Comparison: 11/19/10     Usual sequences performed including T1 pre-and post intravenous   administration of 7.5 cc GADAVIST.     History: Loss of balance.     Findings:     Stable mild generalized atrophy.  Scattered foci of intermediate to   increased signal again noted within the deep periventricular white matter   and subcortical white matter bilaterally.  No acute intracranial   hemorrhage or infarction.  Flow voids noted within the major branches of   the [place] and dural sinuses.  Sella, pituitary and IACs are   normal in appearance.  No ischemia on diffusion sequence.     Previously noted extra axial, dural based masses within the right side of   the posterior fossa have increased in size.  Present measurements are 2.6   x 2.0 cm furthermore anterior medial mass and 1.9 x 1.1 cm the second   mass.  Both previously measured 1.5 x 1.3 cm.  Moderate and mild   associated mass effect upon the brainstem and cerebellum noted.  Right   inferior and middle cerebellar peduncles are somewhat effaced and there   is minimal effacement of the fourth ventricle as well.  Uniform   enhancement identified throughout both masses.  Overall findings most   consistent with meningiomas.        Impression:        1.  Known meningiomas  within the right posterior fossa have increased in   size since previous exam and demonstrate mild increased mass effect upon   the adjacent brain stem and cerebellum.  See above.     2.  Remainder of the exam essentially unchanged.  See above.        Assessment:       1. Acute suppurative otitis media of right ear without spontaneous rupture of tympanic membrane, recurrence not specified    2. Dizziness    3. Meningioma    4. Sacroiliitis    5. Tortuous aorta    6. Calcified granuloma of lung    7. Non-seasonal allergic rhinitis due to other allergic trigger        Plan:   Acute suppurative otitis media of right ear without spontaneous rupture of tympanic membrane, recurrence not specified  -     doxycycline (VIBRAMYCIN) 100 MG Cap; Take 1 capsule (100 mg total) by mouth 2 (two) times daily. for 10 days  Dispense: 20 capsule; Refill: 0    Dizziness  -treat the infection,  -then re-evaluate  Meningioma  History of  Sacroiliitis ; managed by Dr. Sadler  Chronic stable  Tortuous aorta  Chronic stable  Calcified granuloma of lung  History of  Non-seasonal allergic rhinitis due to other allergic trigger  -     fluticasone (FLONASE) 50 mcg/actuation nasal spray; 2 sprays (100 mcg total) by Each Nare route once daily. for 7 days  Dispense: 16 g; Refill: 0

## 2019-05-09 ENCOUNTER — OFFICE VISIT (OUTPATIENT)
Dept: FAMILY MEDICINE | Facility: CLINIC | Age: 84
End: 2019-05-09
Payer: MEDICARE

## 2019-05-09 VITALS
SYSTOLIC BLOOD PRESSURE: 150 MMHG | DIASTOLIC BLOOD PRESSURE: 72 MMHG | WEIGHT: 158.5 LBS | HEART RATE: 60 BPM | TEMPERATURE: 98 F | OXYGEN SATURATION: 96 % | BODY MASS INDEX: 22.74 KG/M2

## 2019-05-09 DIAGNOSIS — J30.89 NON-SEASONAL ALLERGIC RHINITIS DUE TO OTHER ALLERGIC TRIGGER: ICD-10-CM

## 2019-05-09 DIAGNOSIS — G89.29 CHRONIC BILATERAL LOW BACK PAIN WITHOUT SCIATICA: Primary | ICD-10-CM

## 2019-05-09 DIAGNOSIS — I10 ESSENTIAL HYPERTENSION: ICD-10-CM

## 2019-05-09 DIAGNOSIS — M54.50 CHRONIC BILATERAL LOW BACK PAIN WITHOUT SCIATICA: Primary | ICD-10-CM

## 2019-05-09 DIAGNOSIS — M17.11 PRIMARY OSTEOARTHRITIS OF RIGHT KNEE: ICD-10-CM

## 2019-05-09 DIAGNOSIS — M17.12 PRIMARY OSTEOARTHRITIS OF LEFT KNEE: ICD-10-CM

## 2019-05-09 PROCEDURE — 99214 PR OFFICE/OUTPT VISIT, EST, LEVL IV, 30-39 MIN: ICD-10-PCS | Mod: HCNC,S$GLB,, | Performed by: FAMILY MEDICINE

## 2019-05-09 PROCEDURE — 99214 OFFICE O/P EST MOD 30 MIN: CPT | Mod: HCNC,S$GLB,, | Performed by: FAMILY MEDICINE

## 2019-05-09 PROCEDURE — 3288F PR FALLS RISK ASSESSMENT DOCUMENTED: ICD-10-PCS | Mod: HCNC,CPTII,S$GLB, | Performed by: FAMILY MEDICINE

## 2019-05-09 PROCEDURE — 1100F PR PT FALLS ASSESS DOC 2+ FALLS/FALL W/INJURY/YR: ICD-10-PCS | Mod: HCNC,CPTII,S$GLB, | Performed by: FAMILY MEDICINE

## 2019-05-09 PROCEDURE — 99999 PR PBB SHADOW E&M-EST. PATIENT-LVL III: ICD-10-PCS | Mod: PBBFAC,HCNC,, | Performed by: FAMILY MEDICINE

## 2019-05-09 PROCEDURE — 99999 PR PBB SHADOW E&M-EST. PATIENT-LVL III: CPT | Mod: PBBFAC,HCNC,, | Performed by: FAMILY MEDICINE

## 2019-05-09 PROCEDURE — 1100F PTFALLS ASSESS-DOCD GE2>/YR: CPT | Mod: HCNC,CPTII,S$GLB, | Performed by: FAMILY MEDICINE

## 2019-05-09 PROCEDURE — 3288F FALL RISK ASSESSMENT DOCD: CPT | Mod: HCNC,CPTII,S$GLB, | Performed by: FAMILY MEDICINE

## 2019-05-09 RX ORDER — MELOXICAM 7.5 MG/1
7.5 TABLET ORAL DAILY PRN
Qty: 90 TABLET | Refills: 1 | Status: SHIPPED | OUTPATIENT
Start: 2019-05-09 | End: 2019-10-01 | Stop reason: SDUPTHER

## 2019-05-09 NOTE — PATIENT INSTRUCTIONS
Self-Care for Low Back Pain    Most people have low back pain now and then. In many cases, it isnt serious and self-care can help. Sometimes low back pain can be a sign of a bigger problem. Call your healthcare provider if your pain returns often or gets worse over time. For the long-term care of your back, get regular exercise, lose any excess weight and learn good posture.  Take a short rest  Lying down during the day may be beneficial for short periods of time if severe pain increases with sitting or standing. Long-term bed rest could be detrimental.  Reduce pain and swelling  Cold reduces swelling. Both cold and heat can reduce pain. Protect your skin by placing a towel between your body and the ice or heat source.  · For the first few days, apply an ice pack for 15 to 20 minutes .  · After the first few days, try heat for 15 minutes at a time to ease pain. Never sleep on a heating pad.  · Over-the-counter medicine can help control pain and swelling. Try aspirin or ibuprofen.  Exercise  Exercise can help your back heal. It also helps your back get stronger and more flexible, preventing any reinjury. Ask your healthcare provider about specific exercises for your back.  Use good posture to avoid reinjury  · When moving, bend at the hips and knees. Dont bend at the waist or twist around.  · When lifting, keep the object close to your body. Dont try to lift more than you can handle.  · When sitting, keep your lower back supported. Use a rolled-up towel as needed.  Seek immediate medical care if:  · Youre unable to stand or walk.  · You have a temperature over 100.4°F (38.0°C)  · You have frequent, painful, or bloody urination.  · You have severe abdominal pain.  · You have a sharp, stabbing pain.  · Your pain is constant.  · You have pain or numbness in your leg.  · You feel pain in a new area of your back.  · You notice that the pain isnt decreasing after more than a week.   Date Last Reviewed: 9/29/2015  ©  1865-7062 [employer]. [address] [place] [address], [place] [address]. All rights reserved. This information is not intended as a substitute for professional medical care. Always follow your healthcare professional's instructions.

## 2019-05-09 NOTE — PROGRESS NOTES
Subjective:       Patient ID: Dominguez Ritchie is a 86 y.o. male.    Chief Complaint: Follow-up sinusitis and low back pain and knee pain    Sinusitis: He reports that sinus infection is resolved. But he still feels like his head is congested. He usually takes Mucinex DM for it but that was discontinued when his BP started going up. On the other hand, he says that Claritin which was recommended does not work.   HTN:Chronic Condition, Stable, latest lab result reviewed. BP was found to be elevated but he takes his med at night. And according to him and his wife, his BP satys in the 120s/70s at home. Thinks he may have white coat sydnrome  Low back pain:chronic. Across the low back. Constant nagging pain associated with stiffness. Denies numbness to legs and feet. He has had DREW in the past and wants to get another one before his vacation in 2 weeks. He has previous back issues including primary osteoarthritis of bilateral knees-asking for meloxicam for the knees..  He has longstanding dizziness with balance problem since the meningioma s/p radiation. No change in his sxs.  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     Family History   Problem Relation Age of Onset    Heart disease Mother     Cancer Son         pancreatic     Diabetes Maternal Uncle     Kidney disease Neg Hx     Stroke Neg Hx      Social History     Socioeconomic History    Marital status:      Spouse name: Not on file    Number of  children: Not on file    Years of education: Not on file    Highest education level: Not on file   Occupational History    Not on file   Social Needs    Financial resource strain: Not on file    Food insecurity:     Worry: Not on file     Inability: Not on file    Transportation needs:     Medical: Not on file     Non-medical: Not on file   Tobacco Use    Smoking status: Former Smoker     Packs/day: 1.00     Years: 25.00     Pack years: 25.00     Last attempt to quit: 1990     Years since quittin.3    Smokeless tobacco: Never Used   Substance and Sexual Activity    Alcohol use: No     Alcohol/week: 0.0 oz    Drug use: No    Sexual activity: Not Currently     Birth control/protection: None   Lifestyle    Physical activity:     Days per week: Not on file     Minutes per session: Not on file    Stress: Not at all   Relationships    Social connections:     Talks on phone: Not on file     Gets together: Not on file     Attends Christian service: Not on file     Active member of club or organization: Not on file     Attends meetings of clubs or organizations: Not on file     Relationship status: Not on file   Other Topics Concern    Not on file   Social History Narrative    Not on file     Outpatient Encounter Medications as of 2019   Medication Sig Dispense Refill    amLODIPine (NORVASC) 2.5 MG tablet Take 1 tablet (2.5 mg total) by mouth once daily. 30 tablet 11    b complex vitamins tablet Take 1 tablet by mouth once daily.      meclizine (ANTIVERT) 25 mg tablet TAKE 1 TABLET THREE TIMES DAILY AS NEEDED 270 tablet 3    omeprazole (PRILOSEC) 20 MG capsule Take 1 capsule (20 mg total) by mouth once daily. 90 capsule 3    rOPINIRole (REQUIP) 0.5 MG tablet Take 1 tablet (0.5 mg total) by mouth every evening. 90 tablet 3    traMADol (ULTRAM) 50 mg tablet Take 1 tablet (50 mg total) by mouth 2 (two) times daily as needed for Pain. 90 tablet 0    triamcinolone acetonide 0.1% (KENALOG) 0.1 %  cream       meloxicam (MOBIC) 7.5 MG tablet Take 1 tablet (7.5 mg total) by mouth daily as needed for Pain. 90 tablet 1     No facility-administered encounter medications on file as of 5/9/2019.        Review of Systems   Constitutional: Negative for appetite change and fever.   HENT: Negative for congestion, facial swelling and voice change.    Eyes: Negative for discharge and itching.   Respiratory: Negative for cough, chest tightness and wheezing.    Cardiovascular: Negative.  Negative for chest pain and leg swelling.   Gastrointestinal: Negative for abdominal pain, nausea and vomiting.   Endocrine: Negative for cold intolerance and heat intolerance.   Genitourinary: Negative for dysuria and flank pain.   Musculoskeletal: Positive for back pain. Negative for myalgias and neck stiffness.   Skin: Negative for pallor and rash.   Neurological: Positive for dizziness. Negative for facial asymmetry and weakness.   Psychiatric/Behavioral: Negative for agitation and confusion.       Objective:      BP (!) 150/72 (BP Location: Right arm, Patient Position: Sitting, BP Method: Medium (Automatic)) Comment: takes bp med at night per patient  Pulse 60   Temp 98.2 °F (36.8 °C) (Oral)   Wt 71.9 kg (158 lb 8.2 oz)   SpO2 96%   BMI 22.74 kg/m²   Physical Exam   Constitutional: He is oriented to person, place, and time. He appears well-developed. No distress.   HENT:   Head: Normocephalic and atraumatic.   Right Ear: External ear normal.   Left Ear: External ear normal.   Eyes: Conjunctivae and EOM are normal.   Neck: Neck supple.   Cardiovascular: Normal rate and regular rhythm.   Pulmonary/Chest: Effort normal. No respiratory distress.   Abdominal: Soft. Normal appearance and bowel sounds are normal. There is no hepatosplenomegaly.   Genitourinary:   Genitourinary Comments: deferred   Musculoskeletal: He exhibits no edema.   Neurological: He is alert and oriented to person, place, and time.   Skin: Skin is warm and dry.    Psychiatric: He has a normal mood and affect. His behavior is normal.   Nursing note and vitals reviewed.      Results for orders placed or performed in visit on 04/18/19   Lipid panel   Result Value Ref Range    Cholesterol 199 120 - 199 mg/dL    Triglycerides 126 30 - 150 mg/dL    HDL 59 40 - 75 mg/dL    LDL Cholesterol 114.8 63.0 - 159.0 mg/dL    Hdl/Cholesterol Ratio 29.6 20.0 - 50.0 %    Total Cholesterol/HDL Ratio 3.4 2.0 - 5.0    Non-HDL Cholesterol 140 mg/dL   CBC auto differential   Result Value Ref Range    WBC 5.56 3.90 - 12.70 K/uL    RBC 4.02 (L) 4.60 - 6.20 M/uL    Hemoglobin 12.7 (L) 14.0 - 18.0 g/dL    Hematocrit 38.7 (L) 40.0 - 54.0 %    Mean Corpuscular Volume 96 82 - 98 fL    Mean Corpuscular Hemoglobin 31.6 (H) 27.0 - 31.0 pg    Mean Corpuscular Hemoglobin Conc 32.8 32.0 - 36.0 g/dL    RDW 12.5 11.5 - 14.5 %    Platelets 219 150 - 350 K/uL    MPV 9.9 9.2 - 12.9 fL    Immature Granulocytes 0.5 0.0 - 0.5 %    Gran # (ANC) 2.9 1.8 - 7.7 K/uL    Immature Grans (Abs) 0.03 0.00 - 0.04 K/uL    Lymph # 1.5 1.0 - 4.8 K/uL    Mono # 0.8 0.3 - 1.0 K/uL    Eos # 0.3 0.0 - 0.5 K/uL    Baso # 0.06 0.00 - 0.20 K/uL    nRBC 0 0 /100 WBC    Gran% 52.5 38.0 - 73.0 %    Lymph% 26.1 18.0 - 48.0 %    Mono% 14.2 4.0 - 15.0 %    Eosinophil% 5.6 0.0 - 8.0 %    Basophil% 1.1 0.0 - 1.9 %    Differential Method Automated    Comprehensive metabolic panel   Result Value Ref Range    Sodium 139 136 - 145 mmol/L    Potassium 4.6 3.5 - 5.1 mmol/L    Chloride 105 95 - 110 mmol/L    CO2 29 23 - 29 mmol/L    Glucose 96 70 - 110 mg/dL    BUN, Bld 21 8 - 23 mg/dL    Creatinine 0.8 0.5 - 1.4 mg/dL    Calcium 9.2 8.7 - 10.5 mg/dL    Total Protein 6.6 6.0 - 8.4 g/dL    Albumin 3.7 3.5 - 5.2 g/dL    Total Bilirubin 0.5 0.1 - 1.0 mg/dL    Alkaline Phosphatase 76 55 - 135 U/L    AST 30 10 - 40 U/L    ALT 22 10 - 44 U/L    Anion Gap 5 (L) 8 - 16 mmol/L    eGFR if African American >60.0 >60 mL/min/1.73 m^2    eGFR if non African American  >60.0 >60 mL/min/1.73 m^2     Assessment:       1. Chronic bilateral low back pain without sciatica    2. Primary osteoarthritis of left knee    3. Primary osteoarthritis of right knee    4. Non-seasonal allergic rhinitis due to other allergic trigger    5. Essential hypertension        Plan:   Chronic bilateral low back pain without sciatica  -referred to interventional pain management, message sent to the clinic today.    Primary osteoarthritis of left knee  -     meloxicam (MOBIC) 7.5 MG tablet; Take 1 tablet (7.5 mg total) by mouth daily as needed for Pain.  Dispense: 90 tablet; Refill: 1    Primary osteoarthritis of right knee  See note above    Non-seasonal allergic rhinitis due to other allergic trigger  Ok to take mucinex DM for no more than 3 days prn at a time to avoid BP elevation  Continue Claritin daily    Essential hypertension  Monitor blood pressure at home and record.  Bring record to clinic on your next visit  Recommended Heathy lifestyle.  Recommended DASH diet   Choose a diet rich in fruits, vegetables, and low-fat dairy products, but low in meats, sweets, and refined grains   Be more active.   Return To Clinic for re-evaluation

## 2019-05-14 ENCOUNTER — TELEPHONE (OUTPATIENT)
Dept: PAIN MEDICINE | Facility: CLINIC | Age: 84
End: 2019-05-14

## 2019-05-14 NOTE — TELEPHONE ENCOUNTER
Contacted pt with options. Pt declined and states he is going out of town and will call back at a later date to schedule//lp

## 2019-05-14 NOTE — TELEPHONE ENCOUNTER
----- Message from Deya Boone sent at 5/14/2019  9:35 AM CDT -----  Contact: Rox Ritchie (Spouse)  Rox Ritchie (Spouse) calling wanting to know if can get the pt in today after lunch  or the 15th,16th and the 17 th if possible for a shot,please..[phone]

## 2019-05-16 DIAGNOSIS — M17.12 PRIMARY OSTEOARTHRITIS OF LEFT KNEE: ICD-10-CM

## 2019-05-16 RX ORDER — TRAMADOL HYDROCHLORIDE 50 MG/1
TABLET ORAL
Qty: 60 TABLET | Refills: 0 | Status: SHIPPED | OUTPATIENT
Start: 2019-05-16 | End: 2020-06-09 | Stop reason: SDUPTHER

## 2019-05-16 RX ORDER — MELOXICAM 7.5 MG/1
TABLET ORAL
Qty: 30 TABLET | Refills: 0 | OUTPATIENT
Start: 2019-05-16

## 2019-07-18 ENCOUNTER — LAB VISIT (OUTPATIENT)
Dept: LAB | Facility: HOSPITAL | Age: 84
End: 2019-07-18
Attending: FAMILY MEDICINE
Payer: MEDICARE

## 2019-07-18 ENCOUNTER — OFFICE VISIT (OUTPATIENT)
Dept: FAMILY MEDICINE | Facility: CLINIC | Age: 84
End: 2019-07-18
Payer: MEDICARE

## 2019-07-18 VITALS
TEMPERATURE: 98 F | BODY MASS INDEX: 22.73 KG/M2 | HEIGHT: 70 IN | OXYGEN SATURATION: 97 % | WEIGHT: 158.81 LBS | HEART RATE: 63 BPM | DIASTOLIC BLOOD PRESSURE: 70 MMHG | SYSTOLIC BLOOD PRESSURE: 138 MMHG

## 2019-07-18 DIAGNOSIS — R42 VERTIGO: Primary | ICD-10-CM

## 2019-07-18 DIAGNOSIS — Z13.6 ENCOUNTER FOR ABDOMINAL AORTIC ANEURYSM (AAA) SCREENING: ICD-10-CM

## 2019-07-18 DIAGNOSIS — E53.8 VITAMIN B 12 DEFICIENCY: ICD-10-CM

## 2019-07-18 DIAGNOSIS — R42 VERTIGO: ICD-10-CM

## 2019-07-18 DIAGNOSIS — R60.0 BILATERAL LOWER EXTREMITY EDEMA: ICD-10-CM

## 2019-07-18 LAB
ALBUMIN SERPL BCP-MCNC: 3.7 G/DL (ref 3.5–5.2)
ALP SERPL-CCNC: 78 U/L (ref 55–135)
ALT SERPL W/O P-5'-P-CCNC: 15 U/L (ref 10–44)
ANION GAP SERPL CALC-SCNC: 8 MMOL/L (ref 8–16)
AST SERPL-CCNC: 22 U/L (ref 10–40)
BASOPHILS # BLD AUTO: 0.05 K/UL (ref 0–0.2)
BASOPHILS NFR BLD: 0.9 % (ref 0–1.9)
BILIRUB SERPL-MCNC: 0.4 MG/DL (ref 0.1–1)
BNP SERPL-MCNC: 46 PG/ML (ref 0–99)
BUN SERPL-MCNC: 22 MG/DL (ref 8–23)
CALCIUM SERPL-MCNC: 9.3 MG/DL (ref 8.7–10.5)
CHLORIDE SERPL-SCNC: 104 MMOL/L (ref 95–110)
CO2 SERPL-SCNC: 26 MMOL/L (ref 23–29)
CREAT SERPL-MCNC: 0.8 MG/DL (ref 0.5–1.4)
DIFFERENTIAL METHOD: ABNORMAL
EOSINOPHIL # BLD AUTO: 0.2 K/UL (ref 0–0.5)
EOSINOPHIL NFR BLD: 3.6 % (ref 0–8)
ERYTHROCYTE [DISTWIDTH] IN BLOOD BY AUTOMATED COUNT: 13 % (ref 11.5–14.5)
EST. GFR  (AFRICAN AMERICAN): >60 ML/MIN/1.73 M^2
EST. GFR  (NON AFRICAN AMERICAN): >60 ML/MIN/1.73 M^2
FOLATE SERPL-MCNC: 10.3 NG/ML (ref 4–24)
GLUCOSE SERPL-MCNC: 97 MG/DL (ref 70–110)
HCT VFR BLD AUTO: 40.1 % (ref 40–54)
HGB BLD-MCNC: 13 G/DL (ref 14–18)
IMM GRANULOCYTES # BLD AUTO: 0.01 K/UL (ref 0–0.04)
IMM GRANULOCYTES NFR BLD AUTO: 0.2 % (ref 0–0.5)
LYMPHOCYTES # BLD AUTO: 1.3 K/UL (ref 1–4.8)
LYMPHOCYTES NFR BLD: 23.2 % (ref 18–48)
MCH RBC QN AUTO: 31 PG (ref 27–31)
MCHC RBC AUTO-ENTMCNC: 32.4 G/DL (ref 32–36)
MCV RBC AUTO: 96 FL (ref 82–98)
MONOCYTES # BLD AUTO: 0.6 K/UL (ref 0.3–1)
MONOCYTES NFR BLD: 10.4 % (ref 4–15)
NEUTROPHILS # BLD AUTO: 3.6 K/UL (ref 1.8–7.7)
NEUTROPHILS NFR BLD: 61.7 % (ref 38–73)
NRBC BLD-RTO: 0 /100 WBC
PLATELET # BLD AUTO: 221 K/UL (ref 150–350)
PMV BLD AUTO: 10.1 FL (ref 9.2–12.9)
POTASSIUM SERPL-SCNC: 4.8 MMOL/L (ref 3.5–5.1)
PROT SERPL-MCNC: 6.7 G/DL (ref 6–8.4)
RBC # BLD AUTO: 4.19 M/UL (ref 4.6–6.2)
SODIUM SERPL-SCNC: 138 MMOL/L (ref 136–145)
VIT B12 SERPL-MCNC: 1575 PG/ML (ref 210–950)
WBC # BLD AUTO: 5.78 K/UL (ref 3.9–12.7)

## 2019-07-18 PROCEDURE — 83880 ASSAY OF NATRIURETIC PEPTIDE: CPT | Mod: HCNC

## 2019-07-18 PROCEDURE — 99214 OFFICE O/P EST MOD 30 MIN: CPT | Mod: HCNC,S$GLB,, | Performed by: FAMILY MEDICINE

## 2019-07-18 PROCEDURE — 82607 VITAMIN B-12: CPT | Mod: HCNC

## 2019-07-18 PROCEDURE — 99999 PR PBB SHADOW E&M-EST. PATIENT-LVL III: CPT | Mod: PBBFAC,HCNC,, | Performed by: FAMILY MEDICINE

## 2019-07-18 PROCEDURE — 99999 PR PBB SHADOW E&M-EST. PATIENT-LVL III: ICD-10-PCS | Mod: PBBFAC,HCNC,, | Performed by: FAMILY MEDICINE

## 2019-07-18 PROCEDURE — 85025 COMPLETE CBC W/AUTO DIFF WBC: CPT | Mod: HCNC

## 2019-07-18 PROCEDURE — 80053 COMPREHEN METABOLIC PANEL: CPT | Mod: HCNC

## 2019-07-18 PROCEDURE — 3288F FALL RISK ASSESSMENT DOCD: CPT | Mod: HCNC,CPTII,S$GLB, | Performed by: FAMILY MEDICINE

## 2019-07-18 PROCEDURE — 82746 ASSAY OF FOLIC ACID SERUM: CPT | Mod: HCNC

## 2019-07-18 PROCEDURE — 3288F PR FALLS RISK ASSESSMENT DOCUMENTED: ICD-10-PCS | Mod: HCNC,CPTII,S$GLB, | Performed by: FAMILY MEDICINE

## 2019-07-18 PROCEDURE — 99214 PR OFFICE/OUTPT VISIT, EST, LEVL IV, 30-39 MIN: ICD-10-PCS | Mod: HCNC,S$GLB,, | Performed by: FAMILY MEDICINE

## 2019-07-18 PROCEDURE — 36415 COLL VENOUS BLD VENIPUNCTURE: CPT | Mod: HCNC,PO

## 2019-07-18 PROCEDURE — 1100F PR PT FALLS ASSESS DOC 2+ FALLS/FALL W/INJURY/YR: ICD-10-PCS | Mod: HCNC,CPTII,S$GLB, | Performed by: FAMILY MEDICINE

## 2019-07-18 PROCEDURE — 1100F PTFALLS ASSESS-DOCD GE2>/YR: CPT | Mod: HCNC,CPTII,S$GLB, | Performed by: FAMILY MEDICINE

## 2019-07-18 RX ORDER — FUROSEMIDE 20 MG/1
20 TABLET ORAL DAILY PRN
Qty: 30 TABLET | Refills: 0
Start: 2019-07-18 | End: 2020-01-22 | Stop reason: ALTCHOICE

## 2019-07-18 RX ORDER — MECLIZINE HYDROCHLORIDE 25 MG/1
25 TABLET ORAL 2 TIMES DAILY PRN
Qty: 180 TABLET | Refills: 2 | Status: SHIPPED | OUTPATIENT
Start: 2019-07-18 | End: 2019-07-18 | Stop reason: SDUPTHER

## 2019-07-18 RX ORDER — MECLIZINE HYDROCHLORIDE 25 MG/1
25 TABLET ORAL 2 TIMES DAILY PRN
Qty: 180 TABLET | Refills: 2 | Status: SHIPPED | OUTPATIENT
Start: 2019-07-18 | End: 2020-01-22 | Stop reason: SDUPTHER

## 2019-07-18 NOTE — PROGRESS NOTES
Subjective:       Patient ID: Dominguez Ritchie is a 86 y.o. male.    Chief Complaint:  Follow-up on dizziness, hypertension, GERD, vitamin B12 and lumbar radiculopathy    Dizziness:  Chronic.  Stable.  Motion sickness helps.  Needs refill on Meclizine  Sinusitis:  Noted on previous visit, reports that he is having to take mucinex D sometimes for relief   HTN: controlled, checks it at home every day, he is on amlodipine 2.5 mg p.o. daily and will need a refill  GERD:  Chronic, controlled on ppi Prilosec 20 mg every other day.  VIt B 12 def:  Chronic with elevated MCV per recent labs.  He was getting replacement therapy 2 years ago.  Lumbar radiculopathy: chronic.  Worsening pain. Low back pain and soreness with stiffness, worse with standing and walking. Hardly able to strands stand straight while walking.  Pain radiates to posterior bilateral lower legs Sees Dr. Sadler.    He smoked in his teenage years, and is over due for AAA screen  They are looking into buying another property to get [place] of [place].  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     Family History   Problem Relation Age of Onset    Heart disease Mother     Cancer Son         pancreatic     Diabetes Maternal Uncle     Kidney disease Neg Hx     Stroke Neg Hx      Social History     Socioeconomic History    Marital status:      Spouse name: Not on file    Number of children: Not on file    Years of education: Not on file     Highest education level: Not on file   Occupational History    Not on file   Social Needs    Financial resource strain: Not on file    Food insecurity:     Worry: Not on file     Inability: Not on file    Transportation needs:     Medical: Not on file     Non-medical: Not on file   Tobacco Use    Smoking status: Former Smoker     Packs/day: 1.00     Years: 25.00     Pack years: 25.00     Last attempt to quit: 1990     Years since quittin.5    Smokeless tobacco: Never Used   Substance and Sexual Activity    Alcohol use: No     Alcohol/week: 0.0 oz    Drug use: No    Sexual activity: Not Currently     Birth control/protection: None   Lifestyle    Physical activity:     Days per week: Not on file     Minutes per session: Not on file    Stress: Not at all   Relationships    Social connections:     Talks on phone: Not on file     Gets together: Not on file     Attends [employer] service: Not on file     Active member of club or organization: Not on file     Attends meetings of clubs or organizations: Not on file     Relationship status: Not on file   Other Topics Concern    Not on file   Social History Narrative    Not on file     Outpatient Encounter Medications as of 2019   Medication Sig Dispense Refill    amLODIPine (NORVASC) 2.5 MG tablet Take 1 tablet (2.5 mg total) by mouth once daily. 30 tablet 11    meloxicam (MOBIC) 7.5 MG tablet Take 1 tablet (7.5 mg total) by mouth daily as needed for Pain. 90 tablet 1    omeprazole (PRILOSEC) 20 MG capsule Take 1 capsule (20 mg total) by mouth once daily. 90 capsule 3    rOPINIRole (REQUIP) 0.5 MG tablet Take 1 tablet (0.5 mg total) by mouth every evening. 90 tablet 3    traMADol (ULTRAM) 50 mg tablet TAKE 1 TABLET TWICE DAILY AS NEEDED FOR PAIN 60 tablet 0    triamcinolone acetonide 0.1% (KENALOG) 0.1 % cream       [DISCONTINUED] meclizine (ANTIVERT) 25 mg tablet TAKE 1 TABLET THREE TIMES DAILY AS NEEDED 270 tablet 3    furosemide (LASIX)  "20 MG tablet Take 1 tablet (20 mg total) by mouth daily as needed. 30 tablet 0    meclizine (ANTIVERT) 25 mg tablet Take 1 tablet (25 mg total) by mouth 2 (two) times daily as needed. 180 tablet 2    [DISCONTINUED] b complex vitamins tablet Take 1 tablet by mouth once daily.      [DISCONTINUED] meclizine (ANTIVERT) 25 mg tablet Take 1 tablet (25 mg total) by mouth 2 (two) times daily as needed. 180 tablet 2     No facility-administered encounter medications on file as of 7/18/2019.        Review of Systems   Constitutional: Negative for appetite change and fever.   HENT: Negative for congestion, facial swelling and voice change.    Eyes: Negative for discharge and itching.   Respiratory: Negative for cough, chest tightness and wheezing.    Cardiovascular: Negative.  Negative for chest pain and leg swelling.   Gastrointestinal: Negative for abdominal pain, nausea and vomiting.   Endocrine: Negative for cold intolerance and heat intolerance.   Genitourinary: Negative for dysuria and flank pain.   Musculoskeletal: Positive for back pain and gait problem. Negative for joint swelling, myalgias and neck stiffness.   Skin: Negative for pallor and rash.   Neurological: Negative for facial asymmetry and weakness.   Psychiatric/Behavioral: Negative for agitation and confusion.       Objective:      /70 (BP Location: Right arm, Patient Position: Sitting, BP Method: Medium (Manual))   Pulse 63   Temp 98.4 °F (36.9 °C) (Oral)   Ht 5' 10" (1.778 m)   Wt 72 kg (158 lb 13.5 oz)   SpO2 97%   BMI 22.79 kg/m²   Physical Exam   Constitutional: He is oriented to person, place, and time. He appears well-developed. No distress.     Able to get on the exam table without problem   HENT:   Head: Normocephalic and atraumatic.   Right Ear: External ear normal.   Left Ear: External ear normal.   Eyes: Conjunctivae and EOM are normal.   Neck: Neck supple.   Cardiovascular: Normal rate, regular rhythm, normal heart sounds and " intact distal pulses.   Pulmonary/Chest: Effort normal. No respiratory distress.   Abdominal: Soft. Normal appearance and bowel sounds are normal. There is no hepatosplenomegaly.   Genitourinary:   Genitourinary Comments: deferred   Musculoskeletal: He exhibits edema.        Lumbar back: He exhibits decreased range of motion and tenderness.        Back:    Bilateral LE :  +1 pitting edema on exam   Neurological: He is alert and oriented to person, place, and time. No cranial nerve deficit.   Skin: Skin is warm and dry.   Psychiatric: He has a normal mood and affect. His behavior is normal.   Nursing note and vitals reviewed.      Results for orders placed or performed in visit on 04/18/19   Lipid panel   Result Value Ref Range    Cholesterol 199 120 - 199 mg/dL    Triglycerides 126 30 - 150 mg/dL    HDL 59 40 - 75 mg/dL    LDL Cholesterol 114.8 63.0 - 159.0 mg/dL    Hdl/Cholesterol Ratio 29.6 20.0 - 50.0 %    Total Cholesterol/HDL Ratio 3.4 2.0 - 5.0    Non-HDL Cholesterol 140 mg/dL   CBC auto differential   Result Value Ref Range    WBC 5.56 3.90 - 12.70 K/uL    RBC 4.02 (L) 4.60 - 6.20 M/uL    Hemoglobin 12.7 (L) 14.0 - 18.0 g/dL    Hematocrit 38.7 (L) 40.0 - 54.0 %    Mean Corpuscular Volume 96 82 - 98 fL    Mean Corpuscular Hemoglobin 31.6 (H) 27.0 - 31.0 pg    Mean Corpuscular Hemoglobin Conc 32.8 32.0 - 36.0 g/dL    RDW 12.5 11.5 - 14.5 %    Platelets 219 150 - 350 K/uL    MPV 9.9 9.2 - 12.9 fL    Immature Granulocytes 0.5 0.0 - 0.5 %    Gran # (ANC) 2.9 1.8 - 7.7 K/uL    Immature Grans (Abs) 0.03 0.00 - 0.04 K/uL    Lymph # 1.5 1.0 - 4.8 K/uL    Mono # 0.8 0.3 - 1.0 K/uL    Eos # 0.3 0.0 - 0.5 K/uL    Baso # 0.06 0.00 - 0.20 K/uL    nRBC 0 0 /100 WBC    Gran% 52.5 38.0 - 73.0 %    Lymph% 26.1 18.0 - 48.0 %    Mono% 14.2 4.0 - 15.0 %    Eosinophil% 5.6 0.0 - 8.0 %    Basophil% 1.1 0.0 - 1.9 %    Differential Method Automated    Comprehensive metabolic panel   Result Value Ref Range    Sodium 139 136 - 145  mmol/L    Potassium 4.6 3.5 - 5.1 mmol/L    Chloride 105 95 - 110 mmol/L    CO2 29 23 - 29 mmol/L    Glucose 96 70 - 110 mg/dL    BUN, Bld 21 8 - 23 mg/dL    Creatinine 0.8 0.5 - 1.4 mg/dL    Calcium 9.2 8.7 - 10.5 mg/dL    Total Protein 6.6 6.0 - 8.4 g/dL    Albumin 3.7 3.5 - 5.2 g/dL    Total Bilirubin 0.5 0.1 - 1.0 mg/dL    Alkaline Phosphatase 76 55 - 135 U/L    AST 30 10 - 40 U/L    ALT 22 10 - 44 U/L    Anion Gap 5 (L) 8 - 16 mmol/L    eGFR if African American >60.0 >60 mL/min/1.73 m^2    eGFR if non African American >60.0 >60 mL/min/1.73 m^2     Assessment:       1. Vertigo    2. Vitamin B 12 deficiency    3. Encounter for abdominal aortic aneurysm (AAA) screening    4. Bilateral lower extremity edema        Plan:   Vertigo  -     CBC auto differential; Future; Expected date: 07/18/2019  -     Comprehensive metabolic panel; Future; Expected date: 07/18/2019  -     meclizine (ANTIVERT) 25 mg tablet; Take 1 tablet (25 mg total) by mouth 2 (two) times daily as needed.  Dispense: 180 tablet; Refill: 2      Vitamin B 12 deficiency ; check level  -     Vitamin B12; Future; Expected date: 07/18/2019  -     Folate; Future; Expected date: 07/18/2019  -       Encounter for abdominal aortic aneurysm (AAA) screening ; age-appropriate  -     US Abdominal Aorta; Future; Expected date: 07/18/2019    Bilateral lower extremity edema ;  Monitor for now  Lasix 20 mg po daily prn-use with caution due to increase risk for dehydration sequela  -     Brain natriuretic peptide; Future; Expected date: 07/18/2019

## 2019-07-23 ENCOUNTER — HOSPITAL ENCOUNTER (OUTPATIENT)
Dept: RADIOLOGY | Facility: HOSPITAL | Age: 84
Discharge: HOME | End: 2019-07-23
Attending: FAMILY MEDICINE
Payer: MEDICARE

## 2019-07-23 DIAGNOSIS — Z13.6 ENCOUNTER FOR ABDOMINAL AORTIC ANEURYSM (AAA) SCREENING: ICD-10-CM

## 2019-07-23 PROCEDURE — 76775 US EXAM ABDO BACK WALL LIM: CPT | Mod: TC,HCNC

## 2019-08-05 ENCOUNTER — OFFICE VISIT (OUTPATIENT)
Dept: FAMILY MEDICINE | Facility: CLINIC | Age: 84
End: 2019-08-05
Payer: MEDICARE

## 2019-08-05 VITALS
SYSTOLIC BLOOD PRESSURE: 120 MMHG | TEMPERATURE: 98 F | OXYGEN SATURATION: 95 % | DIASTOLIC BLOOD PRESSURE: 60 MMHG | HEART RATE: 65 BPM | BODY MASS INDEX: 22.74 KG/M2 | WEIGHT: 158.5 LBS

## 2019-08-05 DIAGNOSIS — E53.8 VITAMIN B12 DEFICIENCY: ICD-10-CM

## 2019-08-05 DIAGNOSIS — H92.11 OTORRHEA OF RIGHT EAR: Primary | ICD-10-CM

## 2019-08-05 DIAGNOSIS — R60.0 BILATERAL LEG EDEMA: ICD-10-CM

## 2019-08-05 DIAGNOSIS — I10 ESSENTIAL HYPERTENSION: ICD-10-CM

## 2019-08-05 PROCEDURE — 1101F PT FALLS ASSESS-DOCD LE1/YR: CPT | Mod: HCNC,CPTII,S$GLB, | Performed by: FAMILY MEDICINE

## 2019-08-05 PROCEDURE — 99999 PR PBB SHADOW E&M-EST. PATIENT-LVL III: CPT | Mod: PBBFAC,HCNC,, | Performed by: FAMILY MEDICINE

## 2019-08-05 PROCEDURE — 99999 PR PBB SHADOW E&M-EST. PATIENT-LVL III: ICD-10-PCS | Mod: PBBFAC,HCNC,, | Performed by: FAMILY MEDICINE

## 2019-08-05 PROCEDURE — 1101F PR PT FALLS ASSESS DOC 0-1 FALLS W/OUT INJ PAST YR: ICD-10-PCS | Mod: HCNC,CPTII,S$GLB, | Performed by: FAMILY MEDICINE

## 2019-08-05 PROCEDURE — 99214 PR OFFICE/OUTPT VISIT, EST, LEVL IV, 30-39 MIN: ICD-10-PCS | Mod: HCNC,S$GLB,, | Performed by: FAMILY MEDICINE

## 2019-08-05 PROCEDURE — 99214 OFFICE O/P EST MOD 30 MIN: CPT | Mod: HCNC,S$GLB,, | Performed by: FAMILY MEDICINE

## 2019-08-05 RX ORDER — FLUTICASONE PROPIONATE 50 MCG
2 SPRAY, SUSPENSION (ML) NASAL DAILY
Qty: 16 G | Refills: 0 | Status: SHIPPED | OUTPATIENT
Start: 2019-08-05 | End: 2019-09-04

## 2019-08-05 NOTE — PROGRESS NOTES
Subjective:       Patient ID: Dominguez Ritchie is a 86 y.o. male.    Chief Complaint: Right ear drainage  Right ear drainage: Patient reports that he felt pressure in the ear and cleansed the ear out and got dark cerumen followed by a foul smelling yellowish exudate from the ear.  Incident happened one day ago. There is no continuous drainage from the ear and the pressure is decreased..  Denies ear ache.    HTN: Follow up. Chronic controlled on amlodipine 2.5 mg p.o. daily.  No complaints of side effects.    Bilateral lower extremity edema ; dependent.  Improving. Elevation of the feet helps.  BNP was normal and he denies SOB and nocturia.   He has lasix and compression stockings at home, though not using.    Vitamin B12 deficiency ; used to be on repletion therapy. Level was rechecked on last visit to see if he needs to continue. Lab was reviewed with patient.  Level is high.    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     Family History   Problem Relation Age of Onset    Heart disease Mother     Cancer Son         pancreatic     Diabetes Maternal Uncle     Kidney disease Neg Hx     Stroke Neg Hx      Social History     Socioeconomic History    Marital status:      Spouse name: Not on file    Number of children: Not on file    Years of education: Not on file    Highest education level: Not on file   Occupational History    Not on file   Social Needs    Financial resource strain: Not on file     Food insecurity:     Worry: Not on file     Inability: Not on file    Transportation needs:     Medical: Not on file     Non-medical: Not on file   Tobacco Use    Smoking status: Former Smoker     Packs/day: 1.00     Years: 25.00     Pack years: 25.00     Last attempt to quit: 1990     Years since quittin.6    Smokeless tobacco: Never Used   Substance and Sexual Activity    Alcohol use: No     Alcohol/week: 0.0 oz    Drug use: No    Sexual activity: Not Currently     Birth control/protection: None   Lifestyle    Physical activity:     Days per week: Not on file     Minutes per session: Not on file    Stress: Not at all   Relationships    Social connections:     Talks on phone: Not on file     Gets together: Not on file     Attends Rastafarian service: Not on file     Active member of club or organization: Not on file     Attends meetings of clubs or organizations: Not on file     Relationship status: Not on file   Other Topics Concern    Not on file   Social History Narrative    Not on file     Outpatient Encounter Medications as of 2019   Medication Sig Dispense Refill    amLODIPine (NORVASC) 2.5 MG tablet Take 1 tablet (2.5 mg total) by mouth once daily. 30 tablet 11    furosemide (LASIX) 20 MG tablet Take 1 tablet (20 mg total) by mouth daily as needed. 30 tablet 0    meclizine (ANTIVERT) 25 mg tablet Take 1 tablet (25 mg total) by mouth 2 (two) times daily as needed. 180 tablet 2    meloxicam (MOBIC) 7.5 MG tablet Take 1 tablet (7.5 mg total) by mouth daily as needed for Pain. 90 tablet 1    omeprazole (PRILOSEC) 20 MG capsule Take 1 capsule (20 mg total) by mouth once daily. 90 capsule 3    rOPINIRole (REQUIP) 0.5 MG tablet Take 1 tablet (0.5 mg total) by mouth every evening. 90 tablet 3    traMADol (ULTRAM) 50 mg tablet TAKE 1 TABLET TWICE DAILY AS NEEDED FOR PAIN 60 tablet 0    triamcinolone acetonide 0.1% (KENALOG) 0.1 % cream       fluticasone propionate (FLONASE) 50  mcg/actuation nasal spray 2 sprays (100 mcg total) by Each Nostril route once daily. 16 g 0     No facility-administered encounter medications on file as of 8/5/2019.        Review of Systems   Constitutional: Negative for activity change, appetite change and fever.   HENT: Positive for ear discharge. Negative for congestion, ear pain, facial swelling and voice change.    Eyes: Negative for discharge and itching.   Respiratory: Negative for cough, chest tightness and wheezing.    Cardiovascular: Negative.  Negative for chest pain and leg swelling.   Gastrointestinal: Negative for abdominal pain, nausea and vomiting.   Endocrine: Negative for cold intolerance and heat intolerance.   Genitourinary: Negative for dysuria and flank pain.   Musculoskeletal: Negative for myalgias and neck stiffness.   Skin: Negative for pallor and rash.   Neurological: Negative for facial asymmetry and weakness.   Psychiatric/Behavioral: Negative for agitation and confusion.       Objective:      /60 (BP Location: Left arm, Patient Position: Sitting, BP Method: Medium (Automatic))   Pulse 65   Temp 98.1 °F (36.7 °C) (Oral)   Wt 71.9 kg (158 lb 8.2 oz)   SpO2 95%   BMI 22.74 kg/m²   Physical Exam   Constitutional: He is oriented to person, place, and time. He appears well-developed. No distress.   HENT:   Head: Normocephalic and atraumatic.   Right Ear: External ear normal. There is drainage. Tympanic membrane is perforated.   Left Ear: External ear normal.   Yellowish fluid in the canal without erythema   Eyes: Conjunctivae and EOM are normal.   Neck: Neck supple.   Cardiovascular: Normal rate and regular rhythm.   Pulmonary/Chest: Effort normal. No respiratory distress.   Abdominal: Soft. Normal appearance and bowel sounds are normal. There is no hepatosplenomegaly.   Genitourinary:   Genitourinary Comments: deferred   Musculoskeletal: He exhibits no edema.   Neurological: He is alert and oriented to person, place, and time.    Skin: Skin is warm and dry.   Psychiatric: He has a normal mood and affect. His behavior is normal.   Nursing note and vitals reviewed.      Results for orders placed or performed in visit on 07/18/19   CBC auto differential   Result Value Ref Range    WBC 5.78 3.90 - 12.70 K/uL    RBC 4.19 (L) 4.60 - 6.20 M/uL    Hemoglobin 13.0 (L) 14.0 - 18.0 g/dL    Hematocrit 40.1 40.0 - 54.0 %    Mean Corpuscular Volume 96 82 - 98 fL    Mean Corpuscular Hemoglobin 31.0 27.0 - 31.0 pg    Mean Corpuscular Hemoglobin Conc 32.4 32.0 - 36.0 g/dL    RDW 13.0 11.5 - 14.5 %    Platelets 221 150 - 350 K/uL    MPV 10.1 9.2 - 12.9 fL    Immature Granulocytes 0.2 0.0 - 0.5 %    Gran # (ANC) 3.6 1.8 - 7.7 K/uL    Immature Grans (Abs) 0.01 0.00 - 0.04 K/uL    Lymph # 1.3 1.0 - 4.8 K/uL    Mono # 0.6 0.3 - 1.0 K/uL    Eos # 0.2 0.0 - 0.5 K/uL    Baso # 0.05 0.00 - 0.20 K/uL    nRBC 0 0 /100 WBC    Gran% 61.7 38.0 - 73.0 %    Lymph% 23.2 18.0 - 48.0 %    Mono% 10.4 4.0 - 15.0 %    Eosinophil% 3.6 0.0 - 8.0 %    Basophil% 0.9 0.0 - 1.9 %    Differential Method Automated    Comprehensive metabolic panel   Result Value Ref Range    Sodium 138 136 - 145 mmol/L    Potassium 4.8 3.5 - 5.1 mmol/L    Chloride 104 95 - 110 mmol/L    CO2 26 23 - 29 mmol/L    Glucose 97 70 - 110 mg/dL    BUN, Bld 22 8 - 23 mg/dL    Creatinine 0.8 0.5 - 1.4 mg/dL    Calcium 9.3 8.7 - 10.5 mg/dL    Total Protein 6.7 6.0 - 8.4 g/dL    Albumin 3.7 3.5 - 5.2 g/dL    Total Bilirubin 0.4 0.1 - 1.0 mg/dL    Alkaline Phosphatase 78 55 - 135 U/L    AST 22 10 - 40 U/L    ALT 15 10 - 44 U/L    Anion Gap 8 8 - 16 mmol/L    eGFR if African American >60.0 >60 mL/min/1.73 m^2    eGFR if non African American >60.0 >60 mL/min/1.73 m^2   Vitamin B12   Result Value Ref Range    Vitamin B-12 1575 (H) 210 - 950 pg/mL   Folate   Result Value Ref Range    Folate 10.3 4.0 - 24.0 ng/mL   Brain natriuretic peptide   Result Value Ref Range    BNP 46 0 - 99 pg/mL     Assessment:       1. Otorrhea  of right ear    2. Bilateral leg edema    3. Essential hypertension    4. Vitamin B12 deficiency        Plan:   Otorrhea of right ear ;  No sign of infection on exam,   Declined antihistamine, will treat with Flonase instead.  Signs of infection discussed with patient and his wife  -     fluticasone propionate (FLONASE) 50 mcg/actuation nasal spray; 2 sprays (100 mcg total) by Each Nostril route once daily.  Dispense: 16 g; Refill: 0    Bilateral leg edema  Improving  Continue elevation, compression stockings and Lasix p.r.n.  Essential hypertension ;  Chronic controlled  Continue amlodipine    Vitamin B12 deficiency ;  I reviewed labs; vitamin B12 was over 1500 and folate was normal.  Will not resume vitamin B12 repletion  Treatment options and alternatives were discussed with the patient. Patient was given ample time to ask questions. All questions were answered. Voices understanding and acceptance of this advice. Will call back if any further questions or concerns.

## 2019-08-06 NOTE — PATIENT INSTRUCTIONS
Anatomy of the Ear    The ear is a complex and delicate organ. It collects sound waves so you can hear the world around you. The ear also has a second function--it helps you keep your balance. Your ear can be divided into 3 parts. The outer ear and middle ear help collect and amplify sound. The inner ear converts sound waves to messages that are sent to the brain. The inner ear also senses the movement and position of your head and body so you can maintain your balance and see clearly, even when you change positions.  The mastoid bone surrounds the middle ear. The external ear collects sound waves. The ear canal carries sound waves to the eardrum. The eardrum vibrates from sound waves, setting the middle ear bones in motion. The middle ear bones (ossicles) vibrate, transmitting sound waves to the inner ear. When the ear is healthy, air pressure remains balanced in the middle ear. The eustachian tube helps control air pressure in the middle ear. The semicircular canals help maintain balance. The vestibular nerve carries balance signals to the brain. The auditory nerve carries sound signals to the brain. The cochlea picks up sound waves and makes nerve signals.     Date Last Reviewed: 10/1/2016  © 4101-2488 [employer]. [address] [place] [address], [place] [address]. All rights reserved. This information is not intended as a substitute for professional medical care. Always follow your healthcare professional's instructions.

## 2019-08-16 ENCOUNTER — OFFICE VISIT (OUTPATIENT)
Dept: PAIN MEDICINE | Facility: CLINIC | Age: 84
End: 2019-08-16
Payer: MEDICARE

## 2019-08-16 VITALS
SYSTOLIC BLOOD PRESSURE: 111 MMHG | DIASTOLIC BLOOD PRESSURE: 63 MMHG | HEIGHT: 70 IN | WEIGHT: 158.75 LBS | BODY MASS INDEX: 22.73 KG/M2 | HEART RATE: 60 BPM

## 2019-08-16 DIAGNOSIS — M47.816 LUMBAR FACET ARTHROPATHY: ICD-10-CM

## 2019-08-16 DIAGNOSIS — M17.12 PRIMARY OSTEOARTHRITIS OF LEFT KNEE: ICD-10-CM

## 2019-08-16 DIAGNOSIS — M47.816 LUMBAR SPONDYLOSIS: Primary | ICD-10-CM

## 2019-08-16 PROCEDURE — 99999 PR PBB SHADOW E&M-EST. PATIENT-LVL III: ICD-10-PCS | Mod: PBBFAC,HCNC,, | Performed by: ANESTHESIOLOGY

## 2019-08-16 PROCEDURE — 99214 OFFICE O/P EST MOD 30 MIN: CPT | Mod: HCNC,S$GLB,, | Performed by: ANESTHESIOLOGY

## 2019-08-16 PROCEDURE — 1100F PTFALLS ASSESS-DOCD GE2>/YR: CPT | Mod: HCNC,CPTII,S$GLB, | Performed by: ANESTHESIOLOGY

## 2019-08-16 PROCEDURE — 3288F FALL RISK ASSESSMENT DOCD: CPT | Mod: HCNC,CPTII,S$GLB, | Performed by: ANESTHESIOLOGY

## 2019-08-16 PROCEDURE — 3288F PR FALLS RISK ASSESSMENT DOCUMENTED: ICD-10-PCS | Mod: HCNC,CPTII,S$GLB, | Performed by: ANESTHESIOLOGY

## 2019-08-16 PROCEDURE — 1100F PR PT FALLS ASSESS DOC 2+ FALLS/FALL W/INJURY/YR: ICD-10-PCS | Mod: HCNC,CPTII,S$GLB, | Performed by: ANESTHESIOLOGY

## 2019-08-16 PROCEDURE — 99214 PR OFFICE/OUTPT VISIT, EST, LEVL IV, 30-39 MIN: ICD-10-PCS | Mod: HCNC,S$GLB,, | Performed by: ANESTHESIOLOGY

## 2019-08-16 PROCEDURE — 99999 PR PBB SHADOW E&M-EST. PATIENT-LVL III: CPT | Mod: PBBFAC,HCNC,, | Performed by: ANESTHESIOLOGY

## 2019-08-16 NOTE — H&P (VIEW-ONLY)
Chief Pain Complaint:  Low Back Pain, Bilat Hip pain, Right leg pain, right knee pain      History of Present Illness:     Dominguez Ritchie is a 86 y.o. male  who is presenting with a chief complaint of Back Pain  . The patient began experiencing this problem insidiously, and the pain has been gradually worsening over the past 4 month(s). The pain is described as throbbing, cramping, aching and heavy and is located in the bilateral lumbar spine. Pain is intermittent and lasts hours. The  pain is nonradiating. The patient rates his pain a 6 out of ten and interferes with activities of daily living a 7 out of ten. Pain is exacerbated by extension of the lumbar spine, prolonged standing, and is improved by rest. Patient reports no prior trauma, no prior spinal surgery     - antecedent trauma, prior spinal surgery: patient reports prior trauma, prior lumbar surgery (surgery in January 2017 with Dr. Lancaster at [facility] [place]), left knee replacement  - pertinent negatives: No fever, No chills, No weight loss, No bladder dysfunction, No bowel dysfunction, No saddle anesthesia  - pertinent positives: generalized nonspecific Lower Extremity weakness bilaterally, BLE numbness  - medications, other therapies tried (physical therapy, injections):     >> Tylenol, NSAIDs, gabapentin, Mobic, tramadol, Norco    >> Has previously undergone Physical Therapy    >> Has previously undergone spinal injection/s    - has undergone approximately 3-4 spinal injections previously (uncertain as to the specific type of injections that he has had, seems one was a lumbar rhizotomy)   - Right knee genicular nerve block on 7/25/18 with 70% pain relief for 6 months    - Right SI, Right GTB, Right Piriformis Inejction 10/17/18 with 80% pain relief for 2 months   - Right SI and Right GTB 12/19/18 with 20% relief    - Right Piriformis on 1/31/19 with no relief     Imaging / Labs / Studies (reviewed on 8/16/2019):    9/20/2018 X-Ray Lumbar Complete With Flex And  Ext  TECHNIQUE:  Five views of the lumbar spine plus flexion extension views were performed.  COMPARISON:  November 16, 2015  FINDINGS:  Vertebral body heights are unchanged.  Chronic compression deformity of L1 noted.  Alignment is anatomic.  L4-5 disc height loss present.  There is multilevel anterior osteophyte formation.  Lower lumbar spine facet arthropathy noted.  No change in alignment on flexion or extension views.  No pars defects appreciated.  Mild vascular calcifications noted.  Impression: Degenerative findings.      7/12/2018 XR KNEE ORTHO BILAT WITH FLEXION  TECHNIQUE:  AP standing of both knees, PA flexion standing views of both knees, and Merchant views of both knees were performed.  Lateral views of both knees were also performed.  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  Prosthesis position and alignment are satisfactory without radiographic evidence of hardware loosening or other complicating process.  Moderate degenerative change of the right knee.  No acute fracture or malalignment.       Results for orders placed during the hospital encounter of 11/16/15   X-Ray Lumbar Spine Complete 5 View    Narrative Five views of the lumbar spine  Findings: There is increased vertebral body height loss noted at the L1 level.  50% vertebrae height loss is noted anteriorly at this level.  The alignment is within normal limits. There is moderate disk space narrowing noted at the L4-5 level.  Mild/moderate facet arthropathy is noted from L2-3 through L5-S1 levels. Calcified granulomas are noted within the spleen. No pars defects.       Results for orders placed during the hospital encounter of 03/26/15   X-Ray Lumbar Spine AP And Lateral    Narrative Three views of the lumbar spine  Findings: There is a compression L1 level that is new when compared to the prior exam and demonstrates approximately 25% vertebral height loss.  There is no obvious condensation  "line seen on the plain films suggesting that this is still a chronic deformity.  The alignment is grossly within normal limits.  There is multilevel spondylosis with mild to moderate to space narrowing noted throughout the lumbar spine greatest at L4-5 level.  Facet arthropathy is noted from the L2-3 through the L5-S1 levels and most prominent at the L5-S1 level.     _________________________________________________________________________________________________________________________________________________________________________________________________________________________      Review of Systems:  CONSTITUTIONAL: patient denies any fever, chills, or weight loss  SKIN: patient denies any rash or itching  RESPIRATORY: patient denies having any shortness of breath  GASTROINTESTINAL: patient denies having any diarrhea, constipation, or bowel incontinence  GENITOURINARY: patient denies having any abnormal bladder function    MUSCULOSKELETAL:  - patient complains of the above noted pain/s (see chief pain complaint)    NEUROLOGICAL:   - pain as above  - strength in Lower extremities is decreased, BILATERALLY  - sensation in Lower extremities is abnormal, on the LEFT  - patient denies any loss of bowel or bladder control      PSYCHIATRIC: patient denies any change in mood    Other:  All other systems reviewed and are negative    _________________________________________________________________________________________________________________________________________________________________________________________________________________________     Physical Exam:  Vitals:  /63 (BP Location: Left arm, Patient Position: Sitting)   Pulse 60   Ht 5' 10" (1.778 m)   Wt 72 kg (158 lb 11.7 oz)   BMI 22.78 kg/m²    (reviewed on 8/16/2019)    General: alert and oriented, in no apparent distress  Gait: normal gait  Skin: No rashes, No discoloration, No obvious lesions  HEENT: EOMI  Cardiovascular: no significant " peripheral edema present  Respiratory: respirations nonlabored    Musculoskeletal:       Lumbar Spine     - Pain on flexion of lumbar spine Absent   - Straight Leg Raise:  Absent      - Pain on extension of lumbar spine Present  - TTP over the lumbar facet joints Present  Bilateral L5-S1  - Lumbar facet loading Present     -Pain on palpation over the SI joint Present on Right much improved   - BRENDEN: Absent    Neuro:  - Extremity Strength:     >> LEFT :: dec with dorsiflexion    >> RIGHT :: dec with dorsiflexion  - Extremity Reflexes:    >> LEFT  :: 2+    >> RIGHT :: 2+     Psych:  Mood and affect is appropriate    _________________________________________________________________________________________________________________________________________________________________________________________________________________________      Assessment:  Dominguez Ritchie is a 86 y.o. male who presents with    ICD-10-CM ICD-9-CM   1. Lumbar spondylosis M47.816 721.3   2. Lumbar facet arthropathy M47.816 721.3   3. Primary osteoarthritis of left knee M17.12 715.16      Patient returns for follow-up visit.  He complains of continued low back and right leg pain.  He has been seen at the [facility], underwent a spinal injection that did not help, and then ultimately underwent surgery (unsure of type) with Dr. Lancaster in January 2017.  We previously requested records multiple times from the [facility], however we have not received these. 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 He had treatment for this, but he reports the damage was already present by the time it was discovered.       Plan:  1. Interventional: Schedule patient for bilateral L3, 4,5 MBB with RN IV sedation.    2. Pharmacologic:   - Continue tramadol 50mg TID PRN pain. He takes very sparingly. Last filled on 5-16-19.  - Continue Mobic at 7.5mg QD PRN dose.     - Opioid contract signed  1/18/2019.   - LA  reviewed and appropriate.      3. Rehabilitative: Encouraged regular exercise.    4. Diagnostic: None for now.    5. Follow up: Post Injection.

## 2019-08-16 NOTE — PROGRESS NOTES
Chief Pain Complaint:  Low Back Pain, Bilat Hip pain, Right leg pain, right knee pain      History of Present Illness:     Dominguez Ritchie is a 86 y.o. male  who is presenting with a chief complaint of Back Pain  . The patient began experiencing this problem insidiously, and the pain has been gradually worsening over the past 4 month(s). The pain is described as throbbing, cramping, aching and heavy and is located in the bilateral lumbar spine. Pain is intermittent and lasts hours. The  pain is nonradiating. The patient rates his pain a 6 out of ten and interferes with activities of daily living a 7 out of ten. Pain is exacerbated by extension of the lumbar spine, prolonged standing, and is improved by rest. Patient reports no prior trauma, no prior spinal surgery     - antecedent trauma, prior spinal surgery: patient reports prior trauma, prior lumbar surgery (surgery in January 2017 with Dr. Lancaster at [facility] [place] left knee replacement  - pertinent negatives: No fever, No chills, No weight loss, No bladder dysfunction, No bowel dysfunction, No saddle anesthesia  - pertinent positives: generalized nonspecific Lower Extremity weakness bilaterally, BLE numbness  - medications, other therapies tried (physical therapy, injections):     >> Tylenol, NSAIDs, gabapentin, Mobic, tramadol, Norco    >> Has previously undergone Physical Therapy    >> Has previously undergone spinal injection/s    - has undergone approximately 3-4 spinal injections previously (uncertain as to the specific type of injections that he has had, seems one was a lumbar rhizotomy)   - Right knee genicular nerve block on 7/25/18 with 70% pain relief for 6 months    - Right SI, Right GTB, Right Piriformis Inejction 10/17/18 with 80% pain relief for 2 months   - Right SI and Right GTB 12/19/18 with 20% relief    - Right Piriformis on 1/31/19 with no relief     Imaging / Labs / Studies (reviewed on 8/16/2019):    9/20/2018 X-Ray Lumbar Complete With Flex And  Ext  TECHNIQUE:  Five views of the lumbar spine plus flexion extension views were performed.  COMPARISON:  November 16, 2015  FINDINGS:  Vertebral body heights are unchanged.  Chronic compression deformity of L1 noted.  Alignment is anatomic.  L4-5 disc height loss present.  There is multilevel anterior osteophyte formation.  Lower lumbar spine facet arthropathy noted.  No change in alignment on flexion or extension views.  No pars defects appreciated.  Mild vascular calcifications noted.  Impression: Degenerative findings.      7/12/2018 XR KNEE ORTHO BILAT WITH FLEXION  TECHNIQUE:  AP standing of both knees, PA flexion standing views of both knees, and Merchant views of both knees were performed.  Lateral views of both knees were also performed.  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  Prosthesis position and alignment are satisfactory without radiographic evidence of hardware loosening or other complicating process.  Moderate degenerative change of the right knee.  No acute fracture or malalignment.       Results for orders placed during the hospital encounter of 11/16/15   X-Ray Lumbar Spine Complete 5 View    Narrative Five views of the lumbar spine  Findings: There is increased vertebral body height loss noted at the L1 level.  50% vertebrae height loss is noted anteriorly at this level.  The alignment is within normal limits. There is moderate disk space narrowing noted at the L4-5 level.  Mild/moderate facet arthropathy is noted from L2-3 through L5-S1 levels. Calcified granulomas are noted within the spleen. No pars defects.       Results for orders placed during the hospital encounter of 03/26/15   X-Ray Lumbar Spine AP And Lateral    Narrative Three views of the lumbar spine  Findings: There is a compression L1 level that is new when compared to the prior exam and demonstrates approximately 25% vertebral height loss.  There is no obvious condensation  "line seen on the plain films suggesting that this is still a chronic deformity.  The alignment is grossly within normal limits.  There is multilevel spondylosis with mild to moderate to space narrowing noted throughout the lumbar spine greatest at L4-5 level.  Facet arthropathy is noted from the L2-3 through the L5-S1 levels and most prominent at the L5-S1 level.     _________________________________________________________________________________________________________________________________________________________________________________________________________________________      Review of Systems:  CONSTITUTIONAL: patient denies any fever, chills, or weight loss  SKIN: patient denies any rash or itching  RESPIRATORY: patient denies having any shortness of breath  GASTROINTESTINAL: patient denies having any diarrhea, constipation, or bowel incontinence  GENITOURINARY: patient denies having any abnormal bladder function    MUSCULOSKELETAL:  - patient complains of the above noted pain/s (see chief pain complaint)    NEUROLOGICAL:   - pain as above  - strength in Lower extremities is decreased, BILATERALLY  - sensation in Lower extremities is abnormal, on the LEFT  - patient denies any loss of bowel or bladder control      PSYCHIATRIC: patient denies any change in mood    Other:  All other systems reviewed and are negative    _________________________________________________________________________________________________________________________________________________________________________________________________________________________     Physical Exam:  Vitals:  /63 (BP Location: Left arm, Patient Position: Sitting)   Pulse 60   Ht 5' 10" (1.778 m)   Wt 72 kg (158 lb 11.7 oz)   BMI 22.78 kg/m²    (reviewed on 8/16/2019)    General: alert and oriented, in no apparent distress  Gait: normal gait  Skin: No rashes, No discoloration, No obvious lesions  HEENT: EOMI  Cardiovascular: no significant " peripheral edema present  Respiratory: respirations nonlabored    Musculoskeletal:       Lumbar Spine     - Pain on flexion of lumbar spine Absent   - Straight Leg Raise:  Absent      - Pain on extension of lumbar spine Present  - TTP over the lumbar facet joints Present  Bilateral L5-S1  - Lumbar facet loading Present     -Pain on palpation over the SI joint Present on Right much improved   - BRENDEN: Absent    Neuro:  - Extremity Strength:     >> LEFT :: dec with dorsiflexion    >> RIGHT :: dec with dorsiflexion  - Extremity Reflexes:    >> LEFT  :: 2+    >> RIGHT :: 2+     Psych:  Mood and affect is appropriate    _________________________________________________________________________________________________________________________________________________________________________________________________________________________      Assessment:  Dominguez Ritchie is a 86 y.o. male who presents with    ICD-10-CM ICD-9-CM   1. Lumbar spondylosis M47.816 721.3   2. Lumbar facet arthropathy M47.816 721.3   3. Primary osteoarthritis of left knee M17.12 715.16      Patient returns for follow-up visit.  He complains of continued low back and right leg pain.  He has been seen at the [facility], underwent a spinal injection that did not help, and then ultimately underwent surgery (unsure of type) with Dr. Lancaster in January 2017.  We previously requested records multiple times from the [facility], however we have not received these. 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 He had treatment for this, but he reports the damage was already present by the time it was discovered.       Plan:  1. Interventional: Schedule patient for bilateral L3, 4,5 MBB with RN IV sedation.    2. Pharmacologic:   - Continue tramadol 50mg TID PRN pain. He takes very sparingly. Last filled on 5-16-19.  - Continue Mobic at 7.5mg QD PRN dose.     - Opioid contract signed  1/18/2019.   - LA  reviewed and appropriate.      3. Rehabilitative: Encouraged regular exercise.    4. Diagnostic: None for now.    5. Follow up: Post Injection.

## 2019-08-16 NOTE — PATIENT INSTRUCTIONS
Pain Management Pre-Procedure Instructions  (also available in your [employer] account)    Patient Name:___Dominguez Ritchie____MRN: [id number] you are scheduled to have the following procedure:__ Lumbar Medial Branch Block  _with______Nabor Sadler MD    on: ______________________________ at: [facility]    You will be contacted the day before your procedure to be given an arrival and procedure time                                                                                                            Day of Procedure   Ensure you have obtained arrival time from the Pain Management department  o We will call 48 hours in advance with your arrival time. Please check any voicemails you may have  o If you arrive past your scheduled procedure time, you may be asked to reschedule your procedure.   For your safety, ensure you have a  with you to remain present throughout your procedure   o If you arrive without a responsible adult to stay with you and drive you home, you may be asked to reschedule your procedure   Take all of your prescribed medications (exceptions noted below) with a small amount of water  o [] Nothing by mouth two (2) hours before your procedure.  It is ok to take your regular medications with a small sip of water.  o [] Nothing by mouth after midnight the night before your procedure.  It is ok to take your regular medications with a small sip of water.     Wear loose, comfortable clothing    You may wear glasses, dentures, contact lenses and/or hearing aids. Please leave all valuable items at home.   Contact the Pain Management department at [phone] or via [employer] if you are:  o Running a fever above 100 degrees  o Feel ill, have any type of infection, or are taking antibiotics now or have in the past 2 weeks  o Have had any outpatient procedures in the past 2 weeks (colonoscopy, major dental work, etc.)  o If you are allergic to iodine, IVP dye or  shellfish.      Contact Information: [phone], ask to speak to the pain management department with any questions or concerns or send a message via [employer]

## 2019-08-26 ENCOUNTER — TELEPHONE (OUTPATIENT)
Dept: FAMILY MEDICINE | Facility: CLINIC | Age: 84
End: 2019-08-26

## 2019-08-27 ENCOUNTER — PES CALL (OUTPATIENT)
Dept: ADMINISTRATIVE | Facility: CLINIC | Age: 84
End: 2019-08-27

## 2019-08-27 ENCOUNTER — TELEPHONE (OUTPATIENT)
Dept: OTOLARYNGOLOGY | Facility: CLINIC | Age: 84
End: 2019-08-27

## 2019-08-27 DIAGNOSIS — H92.11 PURULENT OTORRHEA OF RIGHT EAR: Primary | ICD-10-CM

## 2019-08-27 NOTE — TELEPHONE ENCOUNTER
"Spoke with pt's wife , Rox and she says that he is ear is still draining and smells bad. 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
----- Message from Venus Mendes sent at 8/26/2019  7:58 AM CDT -----  Contact: Rox - Wife  Request a call concerning the pt ear pain, no additional info given and can be reached at [phone]///thxMW  
Sent message to Dr. Austin office to schedule earlier apt for patient   
Abdomen soft, non-tender, no guarding.

## 2019-08-27 NOTE — TELEPHONE ENCOUNTER
----- Message from Carina Coon MA sent at 8/27/2019  3:44 PM CDT -----  I have schedule patient on 9/9/19. Purulent otorrhea of right ear. Please contact patient to schedule a sooner appointment

## 2019-08-27 NOTE — TELEPHONE ENCOUNTER
----- Message from Carina Coon MA sent at 8/27/2019  3:52 PM CDT -----  Please call patient's wife at [phone]  Rox Ritchie

## 2019-08-29 ENCOUNTER — HOSPITAL ENCOUNTER (OUTPATIENT)
Facility: HOSPITAL | Age: 84
Discharge: HOME | End: 2019-08-29
Attending: ANESTHESIOLOGY | Admitting: ANESTHESIOLOGY
Payer: MEDICARE

## 2019-08-29 VITALS
OXYGEN SATURATION: 97 % | HEART RATE: 57 BPM | TEMPERATURE: 99 F | RESPIRATION RATE: 17 BRPM | SYSTOLIC BLOOD PRESSURE: 143 MMHG | BODY MASS INDEX: 22.66 KG/M2 | DIASTOLIC BLOOD PRESSURE: 66 MMHG | WEIGHT: 158.31 LBS | HEIGHT: 70 IN

## 2019-08-29 DIAGNOSIS — M47.816 LUMBAR SPONDYLOSIS: ICD-10-CM

## 2019-08-29 DIAGNOSIS — M47.816 LUMBAR FACET ARTHROPATHY: Primary | ICD-10-CM

## 2019-08-29 PROCEDURE — 64494 PR INJ DX/THER AGNT PARAVERT FACET JOINT,IMG GUIDE,LUMBAR/SAC, 2ND LEVEL: ICD-10-PCS | Mod: 50,HCNC,, | Performed by: ANESTHESIOLOGY

## 2019-08-29 PROCEDURE — 25000003 PHARM REV CODE 250: Mod: HCNC | Performed by: ANESTHESIOLOGY

## 2019-08-29 PROCEDURE — 64494 INJ PARAVERT F JNT L/S 2 LEV: CPT | Mod: 50,HCNC,, | Performed by: ANESTHESIOLOGY

## 2019-08-29 PROCEDURE — 64493 PR INJ DX/THER AGNT PARAVERT FACET JOINT,IMG GUIDE,LUMBAR/SAC,1ST LVL: ICD-10-PCS | Mod: 50,HCNC,, | Performed by: ANESTHESIOLOGY

## 2019-08-29 PROCEDURE — 99152 PR MOD CONSCIOUS SEDATION, SAME PHYS, 5+ YRS, FIRST 15 MIN: ICD-10-PCS | Mod: HCNC,,, | Performed by: ANESTHESIOLOGY

## 2019-08-29 PROCEDURE — 63600175 PHARM REV CODE 636 W HCPCS: Mod: HCNC | Performed by: ANESTHESIOLOGY

## 2019-08-29 PROCEDURE — 64494 INJ PARAVERT F JNT L/S 2 LEV: CPT | Mod: 50,HCNC | Performed by: ANESTHESIOLOGY

## 2019-08-29 PROCEDURE — 64493 INJ PARAVERT F JNT L/S 1 LEV: CPT | Mod: 50,HCNC | Performed by: ANESTHESIOLOGY

## 2019-08-29 PROCEDURE — 99152 MOD SED SAME PHYS/QHP 5/>YRS: CPT | Mod: HCNC,,, | Performed by: ANESTHESIOLOGY

## 2019-08-29 PROCEDURE — 64495 INJ PARAVERT F JNT L/S 3 LEV: CPT | Mod: 50,HCNC | Performed by: ANESTHESIOLOGY

## 2019-08-29 PROCEDURE — 64493 INJ PARAVERT F JNT L/S 1 LEV: CPT | Mod: 50,HCNC,, | Performed by: ANESTHESIOLOGY

## 2019-08-29 RX ORDER — MIDAZOLAM HYDROCHLORIDE 1 MG/ML
INJECTION, SOLUTION INTRAMUSCULAR; INTRAVENOUS
Status: DISCONTINUED | OUTPATIENT
Start: 2019-08-29 | End: 2019-08-29 | Stop reason: HOSPADM

## 2019-08-29 RX ORDER — FENTANYL CITRATE 50 UG/ML
INJECTION, SOLUTION INTRAMUSCULAR; INTRAVENOUS
Status: DISCONTINUED | OUTPATIENT
Start: 2019-08-29 | End: 2019-08-29 | Stop reason: HOSPADM

## 2019-08-29 RX ORDER — LIDOCAINE HYDROCHLORIDE 20 MG/ML
INJECTION, SOLUTION EPIDURAL; INFILTRATION; INTRACAUDAL; PERINEURAL
Status: DISCONTINUED | OUTPATIENT
Start: 2019-08-29 | End: 2019-08-29 | Stop reason: HOSPADM

## 2019-08-29 RX ORDER — METHYLPREDNISOLONE ACETATE 40 MG/ML
INJECTION, SUSPENSION INTRA-ARTICULAR; INTRALESIONAL; INTRAMUSCULAR; SOFT TISSUE
Status: DISCONTINUED | OUTPATIENT
Start: 2019-08-29 | End: 2019-08-29 | Stop reason: HOSPADM

## 2019-08-29 NOTE — OP NOTE
Procedure: Lumbar Medial Branch Block under Fluoroscopic Guidance     Side: bilateral      Level:  Sacral ala (Corresponding to the L5 dorsal ramus), L5 transverse process (Corresponding to the L4 medial branch) and L4 transverse process (Corresponding to the L3 medial branch)      PROCEDURE DATE: 8/29/2019     Pre-operative Diagnosis: Lumbar Spondylosis  Post-operative Diagnosis: Lumbar Spondylosis     Provider: Nabor Sadler MD  Assistant(s): none     Anesthesia: Local, IV Sedation    >> 1 mg of VERSED    >> 50 mcg of FENTANYL      Indication: Low back pain without radiculopathy. Symptoms unresponsive to conservative treatments. Fluoroscopy was used to optimize visualization of needle placement and to maximize safety.      Procedure Description / Technique:  The patient was seen and identified in the preoperative area. Risks, benefits, complications, and alternatives were discussed with the patient. The patient agreed to proceed with the procedure and signed the consent. The site and side of the procedure was identified and marked. An iv was started.      The patient was taken to the procedural suite and positioned in prone orientation on the procedure table. A pillow was placed under the abdomen to reduce lumbar lordosis. A time out was performed. The procedure, site, side, and allergies were stated and agreed to by all present. The lumbosacral area was widely prepped with ChloraPrep. The procedural site was draped in usual sterile fashion. Vital signs were closely monitored throughout this procedure. Conscious sedation was used for this procedure to decrease patient anxiety.     The Right sacral ala and superior articular process was identified and marked on AP fluoroscopic imaging. Subcutaneous tissues were localized using 1% PF Lidocaine to improve patient comfort. A 25 gauge 3.5 inch spinal needle was advance until the needle rested on OS at the interface of the sacral ala and the base of the sacral superior  "articular process. After negative aspiration, 0.75 mL of a solution containing 4 mL of 1% PF Lidocaine and 2 mL of Methylprednisolone (40 mg/mL) was injected. No pain or paresthesia was noted on injection. After right side injection, the fluoroscope was obliqued to the Right until the cori dog outline of the L5 vertebrae came into view. The spinal needle was advanced to the "eye of the cori dog" and after negative aspiration a 0.75 mL of the above noted solution was injected. No pain or paresthesia was noted. This technique was repeated at each of the above noted levels. The spinal needle was removed intact following injection at each targeted site. The stylet was replaced prior to needle removal at each site.     The Left sacral ala and superior articular process was identified and marked on AP fluoroscopic imaging. Subcutaneous tissues were localized using 1% PF Lidocaine to improve patient comfort. A 25 gauge 3.5 inch spinal needle was advance until the needle rested on OS at the interface of the sacral ala and the base of the sacral superior articular process. After negative aspiration, 0.75 mL of a solution containing 4 mL of 1% PF Lidocaine and 2 mL of Methylprednisolone (40 mg/mL) was injected. No pain or paresthesia was noted on injection. After left side injection, the fluoroscope was obliqued to the Left until the cori dog outline of the L5 vertebrae came into view. The spinal needle was advanced to the "eye of the cori dog" and after negative aspiration a 0.75 mL of the above noted solution was injected. No pain or paresthesia was noted. This technique was repeated at each of the above noted levels. The spinal needle was removed intact following injection at each targeted site. The stylet was replaced prior to needle removal at each site.     Description of Findings: Not applicable     Prosthetic devices, grafts, tissues, or devices implanted: None     Specimen Removed: No     ESTIMATED BLOOD " LOSS: minimal     COMPLICATIONS: None     DISPOSITION / PLANS: The patient was transferred to the recovery area in a stable condition for observation. The patient was reexamined prior to discharge. There was no evidence of acute neurologic injury following the procedure.  Patient was discharged from the recovery room after meeting discharge criteria. Home discharge instructions were given to the patient by the staff.

## 2019-08-29 NOTE — H&P
Procedure: Lumbar Medial Branch Block under Fluoroscopic Guidance    Side: bilateral     Level:  Sacral ala (Corresponding to the L5 dorsal ramus), L5 transverse process (Corresponding to the L4 medial branch) and L4 transverse process (Corresponding to the L3 medial branch)     PROCEDURE DATE: 8/29/2019    Pre-operative Diagnosis: Lumbar Spondylosis  Post-operative Diagnosis: Lumbar Spondylosis    Provider: Nabor Sadler MD  Assistant(s): none    Anesthesia: Local, IV Sedation    >> 1 mg of VERSED    >> 50 mcg of FENTANYL     Indication: Low back pain without radiculopathy. Symptoms unresponsive to conservative treatments. Fluoroscopy was used to optimize visualization of needle placement and to maximize safety.     Procedure Description / Technique:  The patient was seen and identified in the preoperative area. Risks, benefits, complications, and alternatives were discussed with the patient. The patient agreed to proceed with the procedure and signed the consent. The site and side of the procedure was identified and marked. An iv was started.     The patient was taken to the procedural suite and positioned in prone orientation on the procedure table. A pillow was placed under the abdomen to reduce lumbar lordosis. A time out was performed. The procedure, site, side, and allergies were stated and agreed to by all present. The lumbosacral area was widely prepped with ChloraPrep. The procedural site was draped in usual sterile fashion. Vital signs were closely monitored throughout this procedure. Conscious sedation was used for this procedure to decrease patient anxiety.    The Right sacral ala and superior articular process was identified and marked on AP fluoroscopic imaging. Subcutaneous tissues were localized using 1% PF Lidocaine to improve patient comfort. A 25 gauge 3.5 inch spinal needle was advance until the needle rested on OS at the interface of the sacral ala and the base of the sacral superior articular  "process. After negative aspiration, 0.75 mL of a solution containing 4 mL of 1% PF Lidocaine and 2 mL of Methylprednisolone (40 mg/mL) was injected. No pain or paresthesia was noted on injection. After right side injection, the fluoroscope was obliqued to the Right until the cori dog outline of the L5 vertebrae came into view. The spinal needle was advanced to the "eye of the cori dog" and after negative aspiration a 0.75 mL of the above noted solution was injected. No pain or paresthesia was noted. This technique was repeated at each of the above noted levels. The spinal needle was removed intact following injection at each targeted site. The stylet was replaced prior to needle removal at each site.    The Left sacral ala and superior articular process was identified and marked on AP fluoroscopic imaging. Subcutaneous tissues were localized using 1% PF Lidocaine to improve patient comfort. A 25 gauge 3.5 inch spinal needle was advance until the needle rested on OS at the interface of the sacral ala and the base of the sacral superior articular process. After negative aspiration, 0.75 mL of a solution containing 4 mL of 1% PF Lidocaine and 2 mL of Methylprednisolone (40 mg/mL) was injected. No pain or paresthesia was noted on injection. After left side injection, the fluoroscope was obliqued to the Left until the cori dog outline of the L5 vertebrae came into view. The spinal needle was advanced to the "eye of the cori dog" and after negative aspiration a 0.75 mL of the above noted solution was injected. No pain or paresthesia was noted. This technique was repeated at each of the above noted levels. The spinal needle was removed intact following injection at each targeted site. The stylet was replaced prior to needle removal at each site.    Description of Findings: Not applicable    Prosthetic devices, grafts, tissues, or devices implanted: None    Specimen Removed: No    ESTIMATED BLOOD LOSS: " minimal    COMPLICATIONS: None    DISPOSITION / PLANS: The patient was transferred to the recovery area in a stable condition for observation. The patient was reexamined prior to discharge. There was no evidence of acute neurologic injury following the procedure.  Patient was discharged from the recovery room after meeting discharge criteria. Home discharge instructions were given to the patient by the staff.

## 2019-08-29 NOTE — PLAN OF CARE
"Pt and spouse verbalized understanding of discharge instructions. Bandaids x 6 to lower back c/d/i.  Patient voiced no complaints, with no further questions at this time. Patient stood at side of bed, walked steps with cane and no new motor deficits noted. Pt states feels, "steady on my legs" . VSS on RA. Recovery care complete.   "

## 2019-08-29 NOTE — DISCHARGE INSTRUCTIONS
Pain Post Injection     1. Side effects may include: headache, restlessness at night, weakness to upper or lower extremities (arms or legs), flushing of the face and/ or neck. None are life threatening and will subside within 2-3 days.   2. If you have SEVERE headache with nausea and extreme pain, please call office ([phone]). Unless you usually have this type of migraine headache.   3. If you develop fever (greater than 101 F) or have any redness, swelling, or drainage at the injection site(s), please call off ([phone]). This may be a sign of infection.   4. If a rash or whelps (like hives) occur, please call the office ([phone]).  5. If you are a heart patient, please watch for symptoms such as swelling in your hands and feet and shortness of breath. If any of these symptoms occur, please notify your primary care/ heart doctor and go to the nearest emergency room.  6. If you have high blood pressure, you may experience an increase in your blood pressure over the next two weeks. Please continue to monitor your blood pressure and contact your primary care provider if your blood pressure does not return to baseline.    7. If you are a diabetic or you monitor your blood sugar, you may have an increase in your blood sugar over the next two weeks. If your blood sugar does not return to your baseline, please contact your primary care provider.  8. NO HEAT TO THE INJECTION SITE for the next 24 hours including: bath or shower, heating pad, moist heat, and / or hot tubs.   9. May use ice pack to injection site for any pain or discomfort. Apply ice pack for 20 minutes then remove for 20 minutes before re- applying to site. (recipe for homemade frozen gel pack below)  10. DO NOT DRIVE OR OPERATE HEAVY MACHINERY UNTIL TOMORROW MORNING.   11. OK to resume all medications unless otherwise directed by your  doctor.  12. Injection(s) may take up to two weeks until full effects are noted.  13. You may return to normal activity/ work the following day.    Homemade Frozen Gel Ice Pack:  1 bottle of rubbing alcohol  3 bottles of water (use rubbing alcohol bottle to measure)  2 large zip lock bags    Instructions:  1. Pour alcohol and water into zip lock bag. Make sure bag is large enough to allow for expansion.  2. Try to get as much air out of the freezer bag before sealing it shut.   3. Place the bag and its contents inside a second freezer bag to contain any leakage.   4. Leave the bag in the freezer for at least one hour.  5. When it's ready, place a towel between the gel pack and bare skin to avoid burning the skin.       Recovery After Procedural Sedation (Adult)  You have been given medicine by vein to make you sleep during your surgery. This may have included both a pain medicine and sleeping medicine. Most of the effects have worn off. But you may still have some drowsiness for the next 6 to 8 hours.  Home care  Follow these guidelines when you get home:  · For the next 8 hours, you should be watched by a responsible adult. This person should make sure your condition is not getting worse.  · Don't drink any alcohol for the next 24 hours.  · Don't drive, operate dangerous machinery, or make important business or personal decisions during the next 24 hours.  Note: Your healthcare provider may tell you not to take any medicine by mouth for pain or sleep in the next 4 hours. These medicines may react with the medicines you were given in the hospital. This could cause a much stronger response than usual.  Follow-up care  Follow up with your healthcare provider if you are not alert and back to your usual level of activity within 12 hours.  When to seek medical advice  Call your healthcare provider right away if any of these occur:  · Drowsiness gets worse  · Weakness or dizziness gets worse  · Repeated vomiting  · You  can't be awakened   Date Last Reviewed: 10/18/2016  © 6158-9908 [employer]. [address] [place] [address], [place] [address]. All rights reserved. This information is not intended as a substitute for professional medical care. Always follow your healthcare professional's instructions.

## 2019-08-29 NOTE — DISCHARGE SUMMARY
[facility]  Discharge Note       Description of Procedure: Bilateral L3, 4, 5 Lumbar Medial Branch Block under Fluoroscopic Guidance    Procedure Date: 8/29/2019    Admit Date: 8/29/2019  Discharge Date: 8/29/2019     Attending Physician: Viktoriya Tomlin   Discharge Provider: Viktoriya Tomlin    Preoperative Diagnosis: Lumbar Spondylosis, Lumbar Facet Arthropathy     Postoperative Diagnosis: as above, same as preoperative diagnosis    Discharged Condition: Stable    Hospital Course: Patient was admitted for an outpatient procedure. The procedure was tolerated well with no complications.    Final Diagnoses: Same as principal problem.    Disposition: Home, self-care.    Follow up/Patient Instructions:  Follow-up in clinic in 2-3 weeks.    Medications: No medications were prescribed today. The patient was advised to resume normal medication regimen without change.  Specific information was provided regarding restarting any anticoagulant/s.    Discharge Procedure Orders (must include Diet, Follow-up, Activity):  Light activity for the remainder of the day, resume normal activity tomorrow. Resume normal diet. Follow-up in clinic in 2-3 weeks.

## 2019-09-09 ENCOUNTER — OFFICE VISIT (OUTPATIENT)
Dept: OTOLARYNGOLOGY | Facility: CLINIC | Age: 84
End: 2019-09-09
Payer: MEDICARE

## 2019-09-09 VITALS
HEART RATE: 61 BPM | DIASTOLIC BLOOD PRESSURE: 67 MMHG | WEIGHT: 156.94 LBS | SYSTOLIC BLOOD PRESSURE: 125 MMHG | BODY MASS INDEX: 22.52 KG/M2 | TEMPERATURE: 98 F

## 2019-09-09 DIAGNOSIS — H92.11 OTORRHEA OF RIGHT EAR: Primary | ICD-10-CM

## 2019-09-09 DIAGNOSIS — H90.3 SENSORINEURAL HEARING LOSS (SNHL) OF BOTH EARS: ICD-10-CM

## 2019-09-09 PROCEDURE — 1100F PTFALLS ASSESS-DOCD GE2>/YR: CPT | Mod: HCNC,CPTII,S$GLB, | Performed by: ORTHOPAEDIC SURGERY

## 2019-09-09 PROCEDURE — 3288F FALL RISK ASSESSMENT DOCD: CPT | Mod: HCNC,CPTII,S$GLB, | Performed by: ORTHOPAEDIC SURGERY

## 2019-09-09 PROCEDURE — 1100F PR PT FALLS ASSESS DOC 2+ FALLS/FALL W/INJURY/YR: ICD-10-PCS | Mod: HCNC,CPTII,S$GLB, | Performed by: ORTHOPAEDIC SURGERY

## 2019-09-09 PROCEDURE — 99203 PR OFFICE/OUTPT VISIT, NEW, LEVL III, 30-44 MIN: ICD-10-PCS | Mod: HCNC,S$GLB,, | Performed by: ORTHOPAEDIC SURGERY

## 2019-09-09 PROCEDURE — 99999 PR PBB SHADOW E&M-EST. PATIENT-LVL III: CPT | Mod: PBBFAC,HCNC,, | Performed by: ORTHOPAEDIC SURGERY

## 2019-09-09 PROCEDURE — 3288F PR FALLS RISK ASSESSMENT DOCUMENTED: ICD-10-PCS | Mod: HCNC,CPTII,S$GLB, | Performed by: ORTHOPAEDIC SURGERY

## 2019-09-09 PROCEDURE — 99999 PR PBB SHADOW E&M-EST. PATIENT-LVL III: ICD-10-PCS | Mod: PBBFAC,HCNC,, | Performed by: ORTHOPAEDIC SURGERY

## 2019-09-09 PROCEDURE — 99203 OFFICE O/P NEW LOW 30 MIN: CPT | Mod: HCNC,S$GLB,, | Performed by: ORTHOPAEDIC SURGERY

## 2019-09-09 RX ORDER — NEOMYCIN SULFATE, POLYMYXIN B SULFATE AND HYDROCORTISONE 10; 3.5; 1 MG/ML; MG/ML; [USP'U]/ML
3 SUSPENSION/ DROPS AURICULAR (OTIC) 3 TIMES DAILY
Qty: 10 ML | Refills: 0 | Status: SHIPPED | OUTPATIENT
Start: 2019-09-09 | End: 2019-09-16

## 2019-09-09 NOTE — PATIENT INSTRUCTIONS
1.  Use prescription ear drops to right ear x 7 days    2.  After that time, then use OTC Swimmer's ear drops to both ears several times weekly followed by a hair dryer on low to fully dry the ear after removing hearing aids    3. Call Dr. Austin if drainage not resolved

## 2019-09-09 NOTE — LETTER
September 9, 2019      Keyla Martin MD  [address] [address] [address]           [facility]  [address] [place] [id number]  Phone: [phone]  Fax: [phone]          Patient: Dominguez Ritchie   MR Number: [id number]   YOB: 1932   Date of Visit: 9/9/2019       Dear Dr. Keyla Martin:    Thank you for referring Dominguez Ritchie to me for evaluation. Attached you will find relevant portions of my assessment and plan of care.    If you have questions, please do not hesitate to call me. I look forward to following Dominguez Ritchie along with you.    Sincerely,    Betty Austin MD    Enclosure  CC:  No Recipients    If you would like to receive this communication electronically, please contact [email or web address] or [phone] to request more information on [facility] Link access.    For providers and/or their staff who would like to refer a patient to [facility], please contact us through our one-stop-shop provider referral line, [facility], at [phone].    If you feel you have received this communication in error or would no longer like to receive these types of communications, please e-mail [email or web address]

## 2019-09-10 NOTE — PROGRESS NOTES
Subjective:       Patient ID: Dominguez Ritchie is a 86 y.o. male.    Chief Complaint: Ear Drainage (With an odor)    Patient is a very pleasant 86 year old gentleman here to see me today for evaluation of right otorrhea.  He has had hearing aids for many years, and has noted drainage from his right ear for the last several weeks.  He has not had any recent change or adjustment to his hearing aids.  He denies any ear pain.  He has no previous history of ear surgery.    Review of Systems   Constitutional: Negative for fatigue, fever and unexpected weight change.   HENT: Positive for ear discharge and hearing loss. Negative for congestion, ear pain, facial swelling, nosebleeds, postnasal drip, rhinorrhea, sinus pressure, sneezing, sore throat, tinnitus, trouble swallowing and voice change.    Eyes: Negative for discharge, redness and itching.   Respiratory: Negative for cough, choking, shortness of breath and wheezing.    Cardiovascular: Negative for chest pain and palpitations.   Gastrointestinal: Negative for abdominal pain.        No reflux.   Musculoskeletal: Negative for neck pain.   Neurological: Positive for light-headedness. Negative for dizziness, facial asymmetry and headaches.   Hematological: Negative for adenopathy. Does not bruise/bleed easily.   Psychiatric/Behavioral: Negative for agitation, behavioral problems, confusion and decreased concentration.       Objective:      Physical Exam   Constitutional: He is oriented to person, place, and time. Vital signs are normal. He appears well-developed and well-nourished. No distress.   HENT:   Head: Normocephalic and atraumatic.   Right Ear: Tympanic membrane, external ear and ear canal normal. There is drainage. Decreased hearing is noted.   Left Ear: Tympanic membrane, external ear and ear canal normal. Decreased hearing is noted.   Nose: Nose normal. No mucosal edema, rhinorrhea, nasal deformity or septal deviation.   Mouth/Throat: Uvula is midline,  oropharynx is clear and moist and mucous membranes are normal. No trismus in the jaw. Normal dentition. No uvula swelling. No oropharyngeal exudate or posterior oropharyngeal edema.   Otorrhea right ear, suctioned with otomicroscope   Eyes: Pupils are equal, round, and reactive to light. Conjunctivae and EOM are normal. Right eye exhibits no chemosis. Left eye exhibits no chemosis. Right conjunctiva is not injected. Left conjunctiva is not injected. No scleral icterus.   Neck: Trachea normal and phonation normal. No tracheal tenderness present. No tracheal deviation present. No thyroid mass and no thyromegaly present.   Cardiovascular: Intact distal pulses.   Pulmonary/Chest: Effort normal. No accessory muscle usage or stridor. No respiratory distress.   Lymphadenopathy:        Head (right side): No submental, no submandibular, no preauricular and no posterior auricular adenopathy present.        Head (left side): No submental, no submandibular, no preauricular and no posterior auricular adenopathy present.     He has no cervical adenopathy.        Right cervical: No superficial cervical and no deep cervical adenopathy present.       Left cervical: No superficial cervical and no deep cervical adenopathy present.   Neurological: He is alert and oriented to person, place, and time. No cranial nerve deficit.   Skin: Skin is warm and dry. No rash noted. No erythema.   Psychiatric: He has a normal mood and affect. His behavior is normal. Thought content normal.       Assessment:       1. Otorrhea of right ear    2. Sensorineural hearing loss (SNHL) of both ears        Plan:       1.  Right otorrhea:  Cortisporin x 7 days.  Discussed with the patient that excess moisture from swimming, bathing, sweating, ear phones or plugs, and even humidity in the environment can lead to otitis externa, or swimmer's ear.  I would recommend the use of over the counter Swimmer's Ear drops, or can make their own by mixing equal parts of  rubbing alcohol and white vinegar after swimming or several times weekly, followed by a hair dryer on a low setting to fully dry the ear.    2.  SNHL:  Continue with hearing aids.  Will follow, if otorrhea continues could follow with audiology for vented molds.

## 2019-10-01 DIAGNOSIS — M17.12 PRIMARY OSTEOARTHRITIS OF LEFT KNEE: ICD-10-CM

## 2019-10-01 RX ORDER — MELOXICAM 7.5 MG/1
TABLET ORAL
Qty: 90 TABLET | Refills: 0 | Status: SHIPPED | OUTPATIENT
Start: 2019-10-01 | End: 2019-12-27

## 2019-10-11 RX ORDER — OMEPRAZOLE 20 MG/1
CAPSULE, DELAYED RELEASE ORAL
Qty: 90 CAPSULE | Refills: 0 | Status: SHIPPED | OUTPATIENT
Start: 2019-10-11 | End: 2020-02-06

## 2019-10-22 ENCOUNTER — OFFICE VISIT (OUTPATIENT)
Dept: PAIN MEDICINE | Facility: CLINIC | Age: 84
End: 2019-10-22
Payer: MEDICARE

## 2019-10-22 ENCOUNTER — OFFICE VISIT (OUTPATIENT)
Dept: FAMILY MEDICINE | Facility: CLINIC | Age: 84
End: 2019-10-22
Payer: MEDICARE

## 2019-10-22 ENCOUNTER — LAB VISIT (OUTPATIENT)
Dept: LAB | Facility: HOSPITAL | Age: 84
End: 2019-10-22
Attending: FAMILY MEDICINE
Payer: MEDICARE

## 2019-10-22 VITALS
OXYGEN SATURATION: 94 % | RESPIRATION RATE: 20 BRPM | DIASTOLIC BLOOD PRESSURE: 66 MMHG | TEMPERATURE: 98 F | HEART RATE: 71 BPM | WEIGHT: 159.06 LBS | SYSTOLIC BLOOD PRESSURE: 122 MMHG | BODY MASS INDEX: 22.77 KG/M2 | HEIGHT: 70 IN

## 2019-10-22 VITALS
DIASTOLIC BLOOD PRESSURE: 63 MMHG | WEIGHT: 160.94 LBS | BODY MASS INDEX: 23.04 KG/M2 | HEIGHT: 70 IN | SYSTOLIC BLOOD PRESSURE: 116 MMHG | HEART RATE: 62 BPM

## 2019-10-22 DIAGNOSIS — I10 ESSENTIAL HYPERTENSION: ICD-10-CM

## 2019-10-22 DIAGNOSIS — M15.9 OSTEOARTHRITIS OF MULTIPLE JOINTS, UNSPECIFIED OSTEOARTHRITIS TYPE: ICD-10-CM

## 2019-10-22 DIAGNOSIS — G25.81 RLS (RESTLESS LEGS SYNDROME): ICD-10-CM

## 2019-10-22 DIAGNOSIS — M47.816 LUMBAR SPONDYLOSIS: ICD-10-CM

## 2019-10-22 DIAGNOSIS — D32.9 MENINGIOMA: ICD-10-CM

## 2019-10-22 DIAGNOSIS — M54.16 LUMBAR RADICULOPATHY: Primary | ICD-10-CM

## 2019-10-22 DIAGNOSIS — R26.9 GAIT ABNORMALITY: ICD-10-CM

## 2019-10-22 DIAGNOSIS — I10 ESSENTIAL HYPERTENSION: Primary | ICD-10-CM

## 2019-10-22 LAB
ALBUMIN SERPL BCP-MCNC: 3.7 G/DL (ref 3.5–5.2)
ALP SERPL-CCNC: 74 U/L (ref 55–135)
ALT SERPL W/O P-5'-P-CCNC: 19 U/L (ref 10–44)
ANION GAP SERPL CALC-SCNC: 7 MMOL/L (ref 8–16)
AST SERPL-CCNC: 26 U/L (ref 10–40)
BASOPHILS # BLD AUTO: 0.04 K/UL (ref 0–0.2)
BASOPHILS NFR BLD: 0.9 % (ref 0–1.9)
BILIRUB SERPL-MCNC: 0.5 MG/DL (ref 0.1–1)
BUN SERPL-MCNC: 19 MG/DL (ref 8–23)
CALCIUM SERPL-MCNC: 9.2 MG/DL (ref 8.7–10.5)
CHLORIDE SERPL-SCNC: 104 MMOL/L (ref 95–110)
CHOLEST SERPL-MCNC: 247 MG/DL (ref 120–199)
CHOLEST/HDLC SERPL: 4.6 {RATIO} (ref 2–5)
CO2 SERPL-SCNC: 29 MMOL/L (ref 23–29)
CREAT SERPL-MCNC: 0.8 MG/DL (ref 0.5–1.4)
DIFFERENTIAL METHOD: ABNORMAL
EOSINOPHIL # BLD AUTO: 0.2 K/UL (ref 0–0.5)
EOSINOPHIL NFR BLD: 4.2 % (ref 0–8)
ERYTHROCYTE [DISTWIDTH] IN BLOOD BY AUTOMATED COUNT: 13.1 % (ref 11.5–14.5)
EST. GFR  (AFRICAN AMERICAN): >60 ML/MIN/1.73 M^2
EST. GFR  (NON AFRICAN AMERICAN): >60 ML/MIN/1.73 M^2
GLUCOSE SERPL-MCNC: 94 MG/DL (ref 70–110)
HCT VFR BLD AUTO: 40 % (ref 40–54)
HDLC SERPL-MCNC: 54 MG/DL (ref 40–75)
HDLC SERPL: 21.9 % (ref 20–50)
HGB BLD-MCNC: 13.1 G/DL (ref 14–18)
IMM GRANULOCYTES # BLD AUTO: 0.01 K/UL (ref 0–0.04)
IMM GRANULOCYTES NFR BLD AUTO: 0.2 % (ref 0–0.5)
LDLC SERPL CALC-MCNC: 146.8 MG/DL (ref 63–159)
LYMPHOCYTES # BLD AUTO: 1.2 K/UL (ref 1–4.8)
LYMPHOCYTES NFR BLD: 25.9 % (ref 18–48)
MCH RBC QN AUTO: 31.4 PG (ref 27–31)
MCHC RBC AUTO-ENTMCNC: 32.8 G/DL (ref 32–36)
MCV RBC AUTO: 96 FL (ref 82–98)
MONOCYTES # BLD AUTO: 0.7 K/UL (ref 0.3–1)
MONOCYTES NFR BLD: 14.5 % (ref 4–15)
NEUTROPHILS # BLD AUTO: 2.5 K/UL (ref 1.8–7.7)
NEUTROPHILS NFR BLD: 54.3 % (ref 38–73)
NONHDLC SERPL-MCNC: 193 MG/DL
NRBC BLD-RTO: 0 /100 WBC
PLATELET # BLD AUTO: 204 K/UL (ref 150–350)
PMV BLD AUTO: 9.7 FL (ref 9.2–12.9)
POTASSIUM SERPL-SCNC: 4.7 MMOL/L (ref 3.5–5.1)
PROT SERPL-MCNC: 6.5 G/DL (ref 6–8.4)
RBC # BLD AUTO: 4.17 M/UL (ref 4.6–6.2)
SODIUM SERPL-SCNC: 140 MMOL/L (ref 136–145)
TRIGL SERPL-MCNC: 231 MG/DL (ref 30–150)
WBC # BLD AUTO: 4.55 K/UL (ref 3.9–12.7)

## 2019-10-22 PROCEDURE — 99999 PR PBB SHADOW E&M-EST. PATIENT-LVL III: CPT | Mod: PBBFAC,HCNC,, | Performed by: FAMILY MEDICINE

## 2019-10-22 PROCEDURE — 80061 LIPID PANEL: CPT | Mod: HCNC

## 2019-10-22 PROCEDURE — 80053 COMPREHEN METABOLIC PANEL: CPT | Mod: HCNC

## 2019-10-22 PROCEDURE — 99999 PR PBB SHADOW E&M-EST. PATIENT-LVL III: CPT | Mod: PBBFAC,HCNC,, | Performed by: ANESTHESIOLOGY

## 2019-10-22 PROCEDURE — 36415 COLL VENOUS BLD VENIPUNCTURE: CPT | Mod: HCNC,PO

## 2019-10-22 PROCEDURE — 1101F PT FALLS ASSESS-DOCD LE1/YR: CPT | Mod: HCNC,CPTII,S$GLB, | Performed by: ANESTHESIOLOGY

## 2019-10-22 PROCEDURE — 99999 PR PBB SHADOW E&M-EST. PATIENT-LVL III: ICD-10-PCS | Mod: PBBFAC,HCNC,, | Performed by: ANESTHESIOLOGY

## 2019-10-22 PROCEDURE — 99214 OFFICE O/P EST MOD 30 MIN: CPT | Mod: HCNC,S$GLB,, | Performed by: ANESTHESIOLOGY

## 2019-10-22 PROCEDURE — 99499 RISK ADDL DX/OHS AUDIT: ICD-10-PCS | Mod: S$GLB,,, | Performed by: FAMILY MEDICINE

## 2019-10-22 PROCEDURE — 1101F PR PT FALLS ASSESS DOC 0-1 FALLS W/OUT INJ PAST YR: ICD-10-PCS | Mod: HCNC,CPTII,S$GLB, | Performed by: ANESTHESIOLOGY

## 2019-10-22 PROCEDURE — 99214 OFFICE O/P EST MOD 30 MIN: CPT | Mod: HCNC,S$GLB,, | Performed by: FAMILY MEDICINE

## 2019-10-22 PROCEDURE — 99499 UNLISTED E&M SERVICE: CPT | Mod: S$GLB,,, | Performed by: FAMILY MEDICINE

## 2019-10-22 PROCEDURE — 99214 PR OFFICE/OUTPT VISIT, EST, LEVL IV, 30-39 MIN: ICD-10-PCS | Mod: HCNC,S$GLB,, | Performed by: ANESTHESIOLOGY

## 2019-10-22 PROCEDURE — 99214 PR OFFICE/OUTPT VISIT, EST, LEVL IV, 30-39 MIN: ICD-10-PCS | Mod: HCNC,S$GLB,, | Performed by: FAMILY MEDICINE

## 2019-10-22 PROCEDURE — 99999 PR PBB SHADOW E&M-EST. PATIENT-LVL III: ICD-10-PCS | Mod: PBBFAC,HCNC,, | Performed by: FAMILY MEDICINE

## 2019-10-22 PROCEDURE — 1101F PR PT FALLS ASSESS DOC 0-1 FALLS W/OUT INJ PAST YR: ICD-10-PCS | Mod: HCNC,CPTII,S$GLB, | Performed by: FAMILY MEDICINE

## 2019-10-22 PROCEDURE — 1101F PT FALLS ASSESS-DOCD LE1/YR: CPT | Mod: HCNC,CPTII,S$GLB, | Performed by: FAMILY MEDICINE

## 2019-10-22 PROCEDURE — 85025 COMPLETE CBC W/AUTO DIFF WBC: CPT | Mod: HCNC

## 2019-10-22 NOTE — PROGRESS NOTES
Chief Pain Complaint:  Low Back Pain, Bilat Hip pain, Right leg pain, right knee pain      History of Present Illness:     Dominguez Ritchie is a 86 y.o. male  who is presenting with a chief complaint of lumbar back pain. The patient began experiencing this problem insidiously, and the pain has been gradually worsening over the past 8 month(s). The pain is described as throbbing, cramping, aching and heavy and is located in the right lumbar spine. Pain is intermittent and lasts hours. The  pain radiated to the right leg. The patient rates his pain a 6 out of ten and interferes with activities of daily living a 7 out of ten. Pain is exacerbated by flexion/extension of the lumbar spine, getting up from a seated position, prolonged standing, and is improved by rest. Patient reports no prior trauma, no prior spinal surgery     - antecedent trauma, prior spinal surgery: patient reports prior trauma, prior lumbar surgery (surgery in January 2017 with Dr. Lancaster at [facility]), left knee replacement  - pertinent negatives: No fever, No chills, No weight loss, No bladder dysfunction, No bowel dysfunction, No saddle anesthesia  - pertinent positives: generalized nonspecific Lower Extremity weakness bilaterally, BLE numbness  - medications, other therapies tried (physical therapy, injections):     >> Tylenol, NSAIDs, gabapentin, Mobic, tramadol, Norco    >> Has previously undergone Physical Therapy    >> Has previously undergone spinal injection/s    - has undergone approximately 3-4 spinal injections previously (uncertain as to the specific type of injections that he has had, seems one was a lumbar rhizotomy)   - Right knee genicular nerve block on 7/25/18 with 70% pain relief for 6 months    - Right SI, Right GTB, Right Piriformis Inejction 10/17/18 with 80% pain relief for 2 months   - Right SI and Right GTB 12/19/18 with 20% relief    - Right Piriformis on 1/31/19 with no relief              -  Bilateral L3, 4,5 MBB on 8/29/19  with minimal relief     Imaging / Labs / Studies (reviewed on 10/22/2019):    9/20/2018 X-Ray Lumbar Complete With Flex And Ext  TECHNIQUE:  Five views of the lumbar spine plus flexion extension views were performed.  COMPARISON:  November 16, 2015  FINDINGS:  Vertebral body heights are unchanged.  Chronic compression deformity of L1 noted.  Alignment is anatomic.  L4-5 disc height loss present.  There is multilevel anterior osteophyte formation.  Lower lumbar spine facet arthropathy noted.  No change in alignment on flexion or extension views.  No pars defects appreciated.  Mild vascular calcifications noted.  Impression: Degenerative findings.      7/12/2018 XR KNEE ORTHO BILAT WITH FLEXION  TECHNIQUE:  AP standing of both knees, PA flexion standing views of both knees, and Merchant views of both knees were performed.  Lateral views of both knees were also performed.  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  Prosthesis position and alignment are satisfactory without radiographic evidence of hardware loosening or other complicating process.  Moderate degenerative change of the right knee.  No acute fracture or malalignment.       Results for orders placed during the hospital encounter of 11/16/15   X-Ray Lumbar Spine Complete 5 View    Narrative Five views of the lumbar spine  Findings: There is increased vertebral body height loss noted at the L1 level.  50% vertebrae height loss is noted anteriorly at this level.  The alignment is within normal limits. There is moderate disk space narrowing noted at the L4-5 level.  Mild/moderate facet arthropathy is noted from L2-3 through L5-S1 levels. Calcified granulomas are noted within the spleen. No pars defects.       Results for orders placed during the hospital encounter of 03/26/15   X-Ray Lumbar Spine AP And Lateral    Narrative Three views of the lumbar spine  Findings: There is a compression L1 level that is new  "when compared to the prior exam and demonstrates approximately 25% vertebral height loss.  There is no obvious condensation line seen on the plain films suggesting that this is still a chronic deformity.  The alignment is grossly within normal limits.  There is multilevel spondylosis with mild to moderate to space narrowing noted throughout the lumbar spine greatest at L4-5 level.  Facet arthropathy is noted from the L2-3 through the L5-S1 levels and most prominent at the L5-S1 level.     _________________________________________________________________________________________________________________________________________________________________________________________________________________________      Review of Systems:  CONSTITUTIONAL: patient denies any fever, chills, or weight loss  SKIN: patient denies any rash or itching  RESPIRATORY: patient denies having any shortness of breath  GASTROINTESTINAL: patient denies having any diarrhea, constipation, or bowel incontinence  GENITOURINARY: patient denies having any abnormal bladder function    MUSCULOSKELETAL:  - patient complains of the above noted pain/s (see chief pain complaint)    NEUROLOGICAL:   - pain as above  - strength in Lower extremities is decreased, BILATERALLY  - sensation in Lower extremities is abnormal, on the LEFT  - patient denies any loss of bowel or bladder control      PSYCHIATRIC: patient denies any change in mood    Other:  All other systems reviewed and are negative    _________________________________________________________________________________________________________________________________________________________________________________________________________________________     Physical Exam:  Vitals:  /63 (BP Location: Left arm, Patient Position: Sitting, BP Method: Large (Automatic))   Pulse 62   Ht 5' 10" (1.778 m)   Wt 73 kg (160 lb 15 oz)   BMI 23.09 kg/m²    (reviewed on 10/22/2019)    General: alert and " oriented, in no apparent distress  Gait: normal gait  Skin: No rashes, No discoloration, No obvious lesions  HEENT: EOMI  Cardiovascular: no significant peripheral edema present  Respiratory: respirations nonlabored    Musculoskeletal:       Lumbar Spine     - Pain on flexion of lumbar spine Absent   - Straight Leg Raise:  Absent      - Pain on extension of lumbar spine Present  - TTP over the lumbar facet joints Present  Bilateral L5-S1  - Lumbar facet loading Present     -Pain on palpation over the SI joint Present on Right much improved   - BRENDEN: Absent    Neuro:  - Extremity Strength:     >> LEFT :: dec with dorsiflexion    >> RIGHT :: dec with dorsiflexion  - Extremity Reflexes:    >> LEFT  :: 2+    >> RIGHT :: 2+     Psych:  Mood and affect is appropriate    _________________________________________________________________________________________________________________________________________________________________________________________________________________________      Assessment:  Dominguez Ritchie is a 86 y.o. male who presents with    ICD-10-CM ICD-9-CM   1. Lumbar radiculopathy M54.16 724.4   2. Lumbar spondylosis M47.816 721.3      Patient returns for follow-up visit.  He complains of continued low back and right leg pain.  He has been seen at [facility], underwent a spinal injection that did not help, and then ultimately underwent surgery (unsure of type) with Dr. Lancaster in January 2017.  We previously requested records multiple times from the [facility], however we have not received these. 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 He had treatment for this, but he reports the damage was already present by the time it was discovered.       Plan:  1. Interventional: None for now. Consider Right L3-4 TFESI.       Bilateral L3, 4,5 MBB on 8/29/19 with minimal relief     2. Pharmacologic:   - Continue tramadol 50mg TID  PRN pain. He takes very sparingly. Last filled on 5-16-19.  - Continue Mobic at 7.5mg QD PRN dose.     - Opioid contract signed 1/18/2019.   - LA  reviewed and appropriate.      3. Rehabilitative: Encouraged regular exercise.    4. Diagnostic: Lumbar MRI.    5. Follow up: Post MRI.

## 2019-10-22 NOTE — PROGRESS NOTES
Subjective:       Patient ID: Dominguez Ritchie is a 86 y.o. male.    Chief Complaint: Follow-up (3 month)  For HTN, Osteoarthrtis, Dizziness      History of Present Illness:   Dominguez Ritchie 86 y.o. male presents today with 3 months follow up   Came in by- spouse who collaborated the story  Lives with-spouse  Depression-no  Functional assessment-timed up and go- good  MMSE-NA  Sleep-good  Energy level- good  Eating-good  Continence-no  Home health-no  Equiptment at home-cane  End of life -decision maker-code status-not on file  Problem today-none.  He has meningioma which gives him vertigo-doing well. Not on meclizine anymore.  Ch vertebrae and michael knee pain-he has an appt with Dr Sadler today for his back. Reports that he is having more problems with pain control and between the vertigo and the back pain, he is having to keep a cane close for support.   HTN is controlled and he is complaint with med. Due for 6 months lab for monitor today  Overall feels great. Goes to the  on aging and is getting a new set of hearing aids.       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     Family History   Problem Relation Age of Onset    Heart disease Mother     Cancer Son         pancreatic     Diabetes Maternal Uncle     Kidney disease Neg Hx     Stroke Neg Hx      Social History     Socioeconomic History    Marital status:      Spouse name: Not on file    Number of children: Not on file    Years of  education: Not on file    Highest education level: Not on file   Occupational History    Not on file   Social Needs    Financial resource strain: Not on file    Food insecurity:     Worry: Not on file     Inability: Not on file    Transportation needs:     Medical: Not on file     Non-medical: Not on file   Tobacco Use    Smoking status: Former Smoker     Packs/day: 1.00     Years: 25.00     Pack years: 25.00     Last attempt to quit: 1990     Years since quittin.8    Smokeless tobacco: Never Used   Substance and Sexual Activity    Alcohol use: No     Alcohol/week: 0.0 standard drinks    Drug use: No    Sexual activity: Not Currently     Birth control/protection: None   Lifestyle    Physical activity:     Days per week: Not on file     Minutes per session: Not on file    Stress: Not at all   Relationships    Social connections:     Talks on phone: Not on file     Gets together: Not on file     Attends Gnosticist service: Not on file     Active member of club or organization: Not on file     Attends meetings of clubs or organizations: Not on file     Relationship status: Not on file   Other Topics Concern    Not on file   Social History Narrative    Not on file     Outpatient Encounter Medications as of 10/22/2019   Medication Sig Dispense Refill    meloxicam (MOBIC) 7.5 MG tablet TAKE 1 TABLET BY MOUTH ONCE DAILY AS NEEDED FOR PAIN 90 tablet 0    omeprazole (PRILOSEC) 20 MG capsule TAKE 1 CAPSULE EVERY DAY 90 capsule 0    rOPINIRole (REQUIP) 0.5 MG tablet Take 1 tablet (0.5 mg total) by mouth every evening. (Patient taking differently: Take 0.5 mg by mouth nightly as needed. ) 90 tablet 3    traMADol (ULTRAM) 50 mg tablet TAKE 1 TABLET TWICE DAILY AS NEEDED FOR PAIN 60 tablet 0    amLODIPine (NORVASC) 2.5 MG tablet Take 1 tablet (2.5 mg total) by mouth once daily. 30 tablet 11    furosemide (LASIX) 20 MG tablet Take 1 tablet (20 mg total) by mouth daily as needed. (Patient not taking:  "Reported on 10/22/2019) 30 tablet 0    meclizine (ANTIVERT) 25 mg tablet Take 1 tablet (25 mg total) by mouth 2 (two) times daily as needed. 180 tablet 2     No facility-administered encounter medications on file as of 10/22/2019.        Review of Systems   Constitutional: Negative for activity change, appetite change and fever.   HENT: Negative for congestion, ear discharge, ear pain, facial swelling and voice change.    Eyes: Negative for discharge and itching.   Respiratory: Negative for cough, chest tightness and wheezing.    Cardiovascular: Negative.  Negative for chest pain and leg swelling.   Gastrointestinal: Negative for abdominal pain, nausea and vomiting.   Endocrine: Negative for cold intolerance and heat intolerance.   Genitourinary: Negative for dysuria and flank pain.   Musculoskeletal: Positive for gait problem. Negative for myalgias and neck stiffness.   Skin: Negative for pallor and rash.   Neurological: Negative for facial asymmetry and weakness.   Psychiatric/Behavioral: Negative for agitation and confusion.       Objective:      /66 (BP Location: Right arm, Patient Position: Sitting, BP Method: Large (Manual))   Pulse 71   Temp 97.9 °F (36.6 °C) (Oral)   Resp 20   Ht 5' 10" (1.778 m)   Wt 72.1 kg (159 lb 1 oz)   SpO2 (!) 94%   BMI 22.82 kg/m²   Physical Exam   Constitutional: He is oriented to person, place, and time. He appears well-developed. No distress.   HENT:   Head: Normocephalic and atraumatic.   Right Ear: External ear normal. There is drainage. Tympanic membrane is perforated.   Left Ear: External ear normal.   Eyes: Conjunctivae and EOM are normal.   Neck: Neck supple.   Cardiovascular: Normal rate and regular rhythm.   Pulmonary/Chest: Effort normal. No respiratory distress.   Abdominal: Soft. Normal appearance and bowel sounds are normal. There is no hepatosplenomegaly.   Genitourinary:   Genitourinary Comments: deferred   Musculoskeletal: He exhibits no edema.        " Thoracic back: He exhibits decreased range of motion.        Lumbar back: He exhibits decreased range of motion and tenderness.        Back:    Neurological: He is alert and oriented to person, place, and time.   Skin: Skin is warm and dry.   Psychiatric: He has a normal mood and affect. His behavior is normal.   Nursing note and vitals reviewed.      Results for orders placed or performed in visit on 07/18/19   CBC auto differential   Result Value Ref Range    WBC 5.78 3.90 - 12.70 K/uL    RBC 4.19 (L) 4.60 - 6.20 M/uL    Hemoglobin 13.0 (L) 14.0 - 18.0 g/dL    Hematocrit 40.1 40.0 - 54.0 %    Mean Corpuscular Volume 96 82 - 98 fL    Mean Corpuscular Hemoglobin 31.0 27.0 - 31.0 pg    Mean Corpuscular Hemoglobin Conc 32.4 32.0 - 36.0 g/dL    RDW 13.0 11.5 - 14.5 %    Platelets 221 150 - 350 K/uL    MPV 10.1 9.2 - 12.9 fL    Immature Granulocytes 0.2 0.0 - 0.5 %    Gran # (ANC) 3.6 1.8 - 7.7 K/uL    Immature Grans (Abs) 0.01 0.00 - 0.04 K/uL    Lymph # 1.3 1.0 - 4.8 K/uL    Mono # 0.6 0.3 - 1.0 K/uL    Eos # 0.2 0.0 - 0.5 K/uL    Baso # 0.05 0.00 - 0.20 K/uL    nRBC 0 0 /100 WBC    Gran% 61.7 38.0 - 73.0 %    Lymph% 23.2 18.0 - 48.0 %    Mono% 10.4 4.0 - 15.0 %    Eosinophil% 3.6 0.0 - 8.0 %    Basophil% 0.9 0.0 - 1.9 %    Differential Method Automated    Comprehensive metabolic panel   Result Value Ref Range    Sodium 138 136 - 145 mmol/L    Potassium 4.8 3.5 - 5.1 mmol/L    Chloride 104 95 - 110 mmol/L    CO2 26 23 - 29 mmol/L    Glucose 97 70 - 110 mg/dL    BUN, Bld 22 8 - 23 mg/dL    Creatinine 0.8 0.5 - 1.4 mg/dL    Calcium 9.3 8.7 - 10.5 mg/dL    Total Protein 6.7 6.0 - 8.4 g/dL    Albumin 3.7 3.5 - 5.2 g/dL    Total Bilirubin 0.4 0.1 - 1.0 mg/dL    Alkaline Phosphatase 78 55 - 135 U/L    AST 22 10 - 40 U/L    ALT 15 10 - 44 U/L    Anion Gap 8 8 - 16 mmol/L    eGFR if African American >60.0 >60 mL/min/1.73 m^2    eGFR if non African American >60.0 >60 mL/min/1.73 m^2   Vitamin B12   Result Value Ref Range     Vitamin B-12 1575 (H) 210 - 950 pg/mL   Folate   Result Value Ref Range    Folate 10.3 4.0 - 24.0 ng/mL   Brain natriuretic peptide   Result Value Ref Range    BNP 46 0 - 99 pg/mL     Assessment:       1. Essential hypertension    2. RLS (restless legs syndrome)    3. Meningioma    4. Gait abnormality    5. Osteoarthritis of multiple joints, unspecified osteoarthritis type        Plan:   Essential hypertension:   -     CBC auto differential; Future; Expected date: 10/22/2019  -     Comprehensive metabolic panel; Future; Expected date: 10/22/2019  -     Lipid panel; Future; Expected date: 10/22/2019    Osteoarthritis: multiple joints: managed by Dr Sadler  -  RLS (restless legs syndrome): stable, no complaints    Meningioma: stable    Gait abnormality: due to back pain, advised to use walking cane for support as needed

## 2019-10-23 NOTE — PATIENT INSTRUCTIONS
Avoiding Slips, Trips, and Falls for Healthcare Workers    Common hazards like water spills and burned-out light bulbs can lead to serious, painful injuries--and could also limit your ability to respond to emergencies. Protect yourself, your coworkers, and your patients by doing what you can to create a hazard-free workplace.  Clean up wet surfaces  Anytime you see (or cause) a spill, clean it up right away. If you cant, jody it with a sign or paper towels and report it to the appropriate person for cleanup.  Keep your area clutter-free and well-lit  Every piece of equipment left out or file drawer left open is a hazard that can trip you up, particularly when its dark:  · Clean up clutter, especially in front of doors, in hallways, and on stairs.  · Dont leave wheelchairs, cleaning supplies, handcarts, and other materials lying around.  · Turn on lights before entering a room or supply closet.  · Report burned-out light bulbs to maintenance promptly.  · Close file drawers before you walk away from them.  Avoid shortcuts  Taking a shortcut to save time can be risky. The more shortcuts you take, the greater your chance for taking a tumble:  · Find a ladder or a step stool when somethings out of easy reach, instead of using an object not meant for climbing.  · Never carry a load that you cant see over. If necessary, make more than one trip, use supply carts, or ask for assistance.  · Use only designated walkways.  Date Last Reviewed: 2/14/2016 © 2000-2017 [employer], [employer]. [address] [place] [address]. All rights reserved. This information is not intended as a substitute for professional medical care. Always follow your healthcare professional's instructions.        Avoiding Slips, Trips, and Falls for Healthcare Workers    Common hazards like water spills and burned-out light bulbs can lead to serious, painful injuries--and could also limit your ability to respond to emergencies. Protect  yourself, your coworkers, and your patients by doing what you can to create a hazard-free workplace.  Clean up wet surfaces  Anytime you see (or cause) a spill, clean it up right away. If you cant, jody it with a sign or paper towels and report it to the appropriate person for cleanup.  Keep your area clutter-free and well-lit  Every piece of equipment left out or file drawer left open is a hazard that can trip you up, particularly when its dark:  · Clean up clutter, especially in front of doors, in hallways, and on stairs.  · Dont leave wheelchairs, cleaning supplies, handcarts, and other materials lying around.  · Turn on lights before entering a room or supply closet.  · Report burned-out light bulbs to maintenance promptly.  · Close file drawers before you walk away from them.  Avoid shortcuts  Taking a shortcut to save time can be risky. The more shortcuts you take, the greater your chance for taking a tumble:  · Find a ladder or a step stool when somethings out of easy reach, instead of using an object not meant for climbing.  · Never carry a load that you cant see over. If necessary, make more than one trip, use supply carts, or ask for assistance.  · Use only designated walkways.  Date Last Reviewed: 2/14/2016  © 8768-0134 [employer]. [address] [place] [address]. All rights reserved. This information is not intended as a substitute for professional medical care. Always follow your healthcare professional's instructions.        Avoiding Slips, Trips, and Falls for Healthcare Workers    Common hazards like water spills and burned-out light bulbs can lead to serious, painful injuries--and could also limit your ability to respond to emergencies. Protect yourself, your coworkers, and your patients by doing what you can to create a hazard-free workplace.  Clean up wet surfaces  Anytime you see (or cause) a spill, clean it up right away. If you cant, jody it with a sign or paper  towels and report it to the appropriate person for cleanup.  Keep your area clutter-free and well-lit  Every piece of equipment left out or file drawer left open is a hazard that can trip you up, particularly when its dark:  · Clean up clutter, especially in front of doors, in hallways, and on stairs.  · Dont leave wheelchairs, cleaning supplies, handcarts, and other materials lying around.  · Turn on lights before entering a room or supply closet.  · Report burned-out light bulbs to maintenance promptly.  · Close file drawers before you walk away from them.  Avoid shortcuts  Taking a shortcut to save time can be risky. The more shortcuts you take, the greater your chance for taking a tumble:  · Find a ladder or a step stool when somethings out of easy reach, instead of using an object not meant for climbing.  · Never carry a load that you cant see over. If necessary, make more than one trip, use supply carts, or ask for assistance.  · Use only designated walkways.  Date Last Reviewed: 2/14/2016 © 2000-2017 [employer]. [address] [place] [address]. All rights reserved. This information is not intended as a substitute for professional medical care. Always follow your healthcare professional's instructions.        Avoiding Slips, Trips, and Falls for Healthcare Workers    Common hazards like water spills and burned-out light bulbs can lead to serious, painful injuries--and could also limit your ability to respond to emergencies. Protect yourself, your coworkers, and your patients by doing what you can to create a hazard-free workplace.  Clean up wet surfaces  Anytime you see (or cause) a spill, clean it up right away. If you cant, jody it with a sign or paper towels and report it to the appropriate person for cleanup.  Keep your area clutter-free and well-lit  Every piece of equipment left out or file drawer left open is a hazard that can trip you up, particularly when its  dark:  · Clean up clutter, especially in front of doors, in hallways, and on stairs.  · Dont leave wheelchairs, cleaning supplies, handcarts, and other materials lying around.  · Turn on lights before entering a room or supply closet.  · Report burned-out light bulbs to maintenance promptly.  · Close file drawers before you walk away from them.  Avoid shortcuts  Taking a shortcut to save time can be risky. The more shortcuts you take, the greater your chance for taking a tumble:  · Find a ladder or a step stool when somethings out of easy reach, instead of using an object not meant for climbing.  · Never carry a load that you cant see over. If necessary, make more than one trip, use supply carts, or ask for assistance.  · Use only designated walkways.  Date Last Reviewed: 2/14/2016  © 3552-3360 [employer]. [address] [place] [address]. All rights reserved. This information is not intended as a substitute for professional medical care. Always follow your healthcare professional's instructions.        Avoiding Slips, Trips, and Falls for Healthcare Workers    Common hazards like water spills and burned-out light bulbs can lead to serious, painful injuries--and could also limit your ability to respond to emergencies. Protect yourself, your coworkers, and your patients by doing what you can to create a hazard-free workplace.  Clean up wet surfaces  Anytime you see (or cause) a spill, clean it up right away. If you cant, jody it with a sign or paper towels and report it to the appropriate person for cleanup.  Keep your area clutter-free and well-lit  Every piece of equipment left out or file drawer left open is a hazard that can trip you up, particularly when its dark:  · Clean up clutter, especially in front of doors, in hallways, and on stairs.  · Dont leave wheelchairs, cleaning supplies, handcarts, and other materials lying around.  · Turn on lights before entering a room or supply  closet.  · Report burned-out light bulbs to maintenance promptly.  · Close file drawers before you walk away from them.  Avoid shortcuts  Taking a shortcut to save time can be risky. The more shortcuts you take, the greater your chance for taking a tumble:  · Find a ladder or a step stool when somethings out of easy reach, instead of using an object not meant for climbing.  · Never carry a load that you cant see over. If necessary, make more than one trip, use supply carts, or ask for assistance.  · Use only designated walkways.  Date Last Reviewed: 2/14/2016  © 2530-1998 [employer]. [address] [place] [address], [place] [address]. All rights reserved. This information is not intended as a substitute for professional medical care. Always follow your healthcare professional's instructions.        Avoiding Slips, Trips, and Falls for Healthcare Workers    Common hazards like water spills and burned-out light bulbs can lead to serious, painful injuries--and could also limit your ability to respond to emergencies. Protect yourself, your coworkers, and your patients by doing what you can to create a hazard-free workplace.  Clean up wet surfaces  Anytime you see (or cause) a spill, clean it up right away. If you cant, jody it with a sign or paper towels and report it to the appropriate person for cleanup.  Keep your area clutter-free and well-lit  Every piece of equipment left out or file drawer left open is a hazard that can trip you up, particularly when its dark:  · Clean up clutter, especially in front of doors, in hallways, and on stairs.  · Dont leave wheelchairs, cleaning supplies, handcarts, and other materials lying around.  · Turn on lights before entering a room or supply closet.  · Report burned-out light bulbs to maintenance promptly.  · Close file drawers before you walk away from them.  Avoid shortcuts  Taking a shortcut to save time can be risky. The more shortcuts you take, the greater your  chance for taking a tumble:  · Find a ladder or a step stool when somethings out of easy reach, instead of using an object not meant for climbing.  · Never carry a load that you cant see over. If necessary, make more than one trip, use supply carts, or ask for assistance.  · Use only designated walkways.  Date Last Reviewed: 2/14/2016  © 2583-7737 [employer]. [address] [place] [address], [place] [address]. All rights reserved. This information is not intended as a substitute for professional medical care. Always follow your healthcare professional's instructions.

## 2019-12-05 DIAGNOSIS — I10 HTN, GOAL BELOW 130/80: ICD-10-CM

## 2019-12-05 RX ORDER — AMLODIPINE BESYLATE 2.5 MG/1
2.5 TABLET ORAL DAILY
Qty: 90 TABLET | Refills: 3 | Status: SHIPPED | OUTPATIENT
Start: 2019-12-05 | End: 2020-04-13 | Stop reason: SDUPTHER

## 2019-12-12 ENCOUNTER — TELEPHONE (OUTPATIENT)
Dept: FAMILY MEDICINE | Facility: CLINIC | Age: 84
End: 2019-12-12

## 2019-12-12 NOTE — TELEPHONE ENCOUNTER
----- Message from Suzan Venegas sent at 12/12/2019  4:24 PM CST -----  Contact: Rox-spouse  Requesting a call back regarding fish oil. She states that the patient can not take it anymore and would like to know if there is something else he can take. Please call Rox back at [phone]

## 2019-12-13 ENCOUNTER — TELEPHONE (OUTPATIENT)
Dept: FAMILY MEDICINE | Facility: CLINIC | Age: 84
End: 2019-12-13

## 2019-12-13 NOTE — TELEPHONE ENCOUNTER
Attempted to call pt, no answer. Left message that MD stated it was ok for him to stop fish oil as long as he was eating healthy and embracing a healthy lifestyle. Instructed to call clinic with any further questions

## 2019-12-13 NOTE — TELEPHONE ENCOUNTER
----- Message from Purnima Rivera sent at 12/13/2019  7:58 AM CST -----  Contact: Patient wife  .Type:  Patient Returning Call    Who Called: Patient wife   Who Left Message for Patient: nurse   Does the patient know what this is regarding?:  Would the patient rather a call back or a response via [employer]? melody  Best Call Back Number: [phone]  Additional Information: n/a

## 2019-12-27 DIAGNOSIS — M17.12 PRIMARY OSTEOARTHRITIS OF LEFT KNEE: ICD-10-CM

## 2019-12-27 RX ORDER — MELOXICAM 7.5 MG/1
TABLET ORAL
Qty: 60 TABLET | Refills: 0 | Status: SHIPPED | OUTPATIENT
Start: 2019-12-27 | End: 2020-01-22 | Stop reason: SDUPTHER

## 2020-01-03 ENCOUNTER — OFFICE VISIT (OUTPATIENT)
Dept: OTOLARYNGOLOGY | Facility: CLINIC | Age: 85
End: 2020-01-03
Payer: MEDICARE

## 2020-01-03 VITALS
SYSTOLIC BLOOD PRESSURE: 131 MMHG | TEMPERATURE: 99 F | BODY MASS INDEX: 22.94 KG/M2 | HEART RATE: 68 BPM | HEIGHT: 70 IN | DIASTOLIC BLOOD PRESSURE: 73 MMHG | WEIGHT: 160.25 LBS

## 2020-01-03 DIAGNOSIS — H90.3 SENSORINEURAL HEARING LOSS (SNHL) OF BOTH EARS: ICD-10-CM

## 2020-01-03 DIAGNOSIS — H92.11 OTORRHEA OF RIGHT EAR: Primary | ICD-10-CM

## 2020-01-03 PROCEDURE — 1101F PR PT FALLS ASSESS DOC 0-1 FALLS W/OUT INJ PAST YR: ICD-10-PCS | Mod: HCNC,CPTII,S$GLB, | Performed by: PHYSICIAN ASSISTANT

## 2020-01-03 PROCEDURE — 99213 PR OFFICE/OUTPT VISIT, EST, LEVL III, 20-29 MIN: ICD-10-PCS | Mod: HCNC,S$GLB,, | Performed by: PHYSICIAN ASSISTANT

## 2020-01-03 PROCEDURE — 99213 OFFICE O/P EST LOW 20 MIN: CPT | Mod: HCNC,S$GLB,, | Performed by: PHYSICIAN ASSISTANT

## 2020-01-03 PROCEDURE — 1159F PR MEDICATION LIST DOCUMENTED IN MEDICAL RECORD: ICD-10-PCS | Mod: HCNC,S$GLB,, | Performed by: PHYSICIAN ASSISTANT

## 2020-01-03 PROCEDURE — 1159F MED LIST DOCD IN RCRD: CPT | Mod: HCNC,S$GLB,, | Performed by: PHYSICIAN ASSISTANT

## 2020-01-03 PROCEDURE — 99999 PR PBB SHADOW E&M-EST. PATIENT-LVL III: ICD-10-PCS | Mod: PBBFAC,HCNC,, | Performed by: PHYSICIAN ASSISTANT

## 2020-01-03 PROCEDURE — 99999 PR PBB SHADOW E&M-EST. PATIENT-LVL III: CPT | Mod: PBBFAC,HCNC,, | Performed by: PHYSICIAN ASSISTANT

## 2020-01-03 PROCEDURE — 1101F PT FALLS ASSESS-DOCD LE1/YR: CPT | Mod: HCNC,CPTII,S$GLB, | Performed by: PHYSICIAN ASSISTANT

## 2020-01-03 NOTE — PROGRESS NOTES
Subjective:       Patient ID: Dominguez Ritchie is a 87 y.o. male.    Chief Complaint: Ear Drainage (Right Ear)    Patient is a very pleasant 87 year old gentleman here to see me today for evaluation of right otorrhea.  He has had hearing aids for many years, and has noted drainage from his right ear for the last several days.  He has new hearing aids (past 3-4 months) and his wife thinks they are causing more problems for him.  He says his old hearing aid AD fit better than these new ones which seem too big for ear canal.  He denies any ear pain.  He has no previous history of ear surgery.  He was last here with Dr. Austin in 9/2019 for right ear drainage which improved with Cortisporin.  He resumed using those ear drops 2 days ago.    Review of Systems   Constitutional: Negative for fever.   HENT: Positive for ear discharge (right) and hearing loss (wears aids AU). Negative for congestion and ear pain.        Objective:      Physical Exam   Constitutional: He is oriented to person, place, and time. Vital signs are normal. He appears well-developed and well-nourished. No distress.   HENT:   Head: Normocephalic and atraumatic.   Right Ear: Tympanic membrane, external ear and ear canal normal. There is drainage. No swelling (minimal erythema at EAC meatus) or tenderness. No middle ear effusion. Decreased hearing is noted.   Left Ear: Tympanic membrane, external ear and ear canal normal. No drainage, swelling or tenderness.  No middle ear effusion. Decreased hearing is noted.   Nose: Nose normal. No mucosal edema, rhinorrhea, nasal deformity or septal deviation.   Mouth/Throat: Uvula is midline, oropharynx is clear and moist and mucous membranes are normal. No trismus in the jaw. Normal dentition. No uvula swelling. No oropharyngeal exudate or posterior oropharyngeal edema.   Otorrhea right ear, suctioned with otomicroscope   Eyes: Pupils are equal, round, and reactive to light. Conjunctivae and EOM are normal. Right  eye exhibits no chemosis. Left eye exhibits no chemosis. Right conjunctiva is not injected. Left conjunctiva is not injected. No scleral icterus.   Neck: Trachea normal and phonation normal. No tracheal tenderness present. No tracheal deviation present. No thyroid mass and no thyromegaly present.   Cardiovascular: Intact distal pulses.   Pulmonary/Chest: Effort normal. No accessory muscle usage or stridor. No respiratory distress.   Lymphadenopathy:        Head (right side): No submental, no submandibular, no preauricular and no posterior auricular adenopathy present.        Head (left side): No submental, no submandibular, no preauricular and no posterior auricular adenopathy present.     He has no cervical adenopathy.        Right cervical: No superficial cervical and no deep cervical adenopathy present.       Left cervical: No superficial cervical and no deep cervical adenopathy present.   Neurological: He is alert and oriented to person, place, and time. No cranial nerve deficit.   Skin: Skin is warm and dry. No rash noted. No erythema.   Psychiatric: He has a normal mood and affect. His behavior is normal. Thought content normal.       Assessment:       1. Otorrhea of right ear    2. Sensorineural hearing loss (SNHL) of both ears        Plan:         Right otorrhea:  Cortisporin x 7 days.  Discussed with the patient that excess moisture from swimming, bathing, sweating, ear phones or plugs, and even humidity in the environment can lead to otitis externa, or swimmer's ear.  I would recommend the use of over the counter Swimmer's Ear drops, or can make their own by mixing equal parts of rubbing alcohol and white vinegar after swimming or several times weekly, followed by a hair dryer on a low setting to fully dry the ear.      SNHL:  Continue with hearing aids.  Will follow, if otorrhea continues could follow with audiology for vented molds.   Wife says they plan to discuss hearing aid fitting with Adriana Thorpe  at their next visit with her.

## 2020-01-22 ENCOUNTER — OFFICE VISIT (OUTPATIENT)
Dept: FAMILY MEDICINE | Facility: CLINIC | Age: 85
End: 2020-01-22
Payer: MEDICARE

## 2020-01-22 ENCOUNTER — LAB VISIT (OUTPATIENT)
Dept: LAB | Facility: HOSPITAL | Age: 85
End: 2020-01-22
Attending: FAMILY MEDICINE
Payer: MEDICARE

## 2020-01-22 VITALS
DIASTOLIC BLOOD PRESSURE: 72 MMHG | SYSTOLIC BLOOD PRESSURE: 130 MMHG | RESPIRATION RATE: 18 BRPM | HEIGHT: 70 IN | TEMPERATURE: 97 F | OXYGEN SATURATION: 96 % | HEART RATE: 84 BPM | BODY MASS INDEX: 23 KG/M2 | WEIGHT: 160.63 LBS

## 2020-01-22 DIAGNOSIS — I10 ESSENTIAL HYPERTENSION: Primary | ICD-10-CM

## 2020-01-22 DIAGNOSIS — R42 VERTIGO: ICD-10-CM

## 2020-01-22 DIAGNOSIS — I10 ESSENTIAL HYPERTENSION: ICD-10-CM

## 2020-01-22 DIAGNOSIS — E78.2 MIXED HYPERLIPIDEMIA: ICD-10-CM

## 2020-01-22 DIAGNOSIS — Z12.5 SCREENING PSA (PROSTATE SPECIFIC ANTIGEN): ICD-10-CM

## 2020-01-22 DIAGNOSIS — J30.89 NON-SEASONAL ALLERGIC RHINITIS DUE TO OTHER ALLERGIC TRIGGER: ICD-10-CM

## 2020-01-22 DIAGNOSIS — M17.12 PRIMARY OSTEOARTHRITIS OF LEFT KNEE: ICD-10-CM

## 2020-01-22 LAB
ALBUMIN SERPL BCP-MCNC: 3.8 G/DL (ref 3.5–5.2)
ALP SERPL-CCNC: 73 U/L (ref 55–135)
ALT SERPL W/O P-5'-P-CCNC: 13 U/L (ref 10–44)
ANION GAP SERPL CALC-SCNC: 4 MMOL/L (ref 8–16)
AST SERPL-CCNC: 21 U/L (ref 10–40)
BASOPHILS # BLD AUTO: 0.05 K/UL (ref 0–0.2)
BASOPHILS NFR BLD: 1 % (ref 0–1.9)
BILIRUB SERPL-MCNC: 0.4 MG/DL (ref 0.1–1)
BUN SERPL-MCNC: 17 MG/DL (ref 8–23)
CALCIUM SERPL-MCNC: 9.2 MG/DL (ref 8.7–10.5)
CHLORIDE SERPL-SCNC: 105 MMOL/L (ref 95–110)
CHOLEST SERPL-MCNC: 229 MG/DL (ref 120–199)
CHOLEST/HDLC SERPL: 4.9 {RATIO} (ref 2–5)
CO2 SERPL-SCNC: 28 MMOL/L (ref 23–29)
CREAT SERPL-MCNC: 0.8 MG/DL (ref 0.5–1.4)
DIFFERENTIAL METHOD: ABNORMAL
EOSINOPHIL # BLD AUTO: 0.2 K/UL (ref 0–0.5)
EOSINOPHIL NFR BLD: 3.2 % (ref 0–8)
ERYTHROCYTE [DISTWIDTH] IN BLOOD BY AUTOMATED COUNT: 12.5 % (ref 11.5–14.5)
EST. GFR  (AFRICAN AMERICAN): >60 ML/MIN/1.73 M^2
EST. GFR  (NON AFRICAN AMERICAN): >60 ML/MIN/1.73 M^2
GLUCOSE SERPL-MCNC: 105 MG/DL (ref 70–110)
HCT VFR BLD AUTO: 42 % (ref 40–54)
HDLC SERPL-MCNC: 47 MG/DL (ref 40–75)
HDLC SERPL: 20.5 % (ref 20–50)
HGB BLD-MCNC: 13.9 G/DL (ref 14–18)
IMM GRANULOCYTES # BLD AUTO: 0.01 K/UL (ref 0–0.04)
IMM GRANULOCYTES NFR BLD AUTO: 0.2 % (ref 0–0.5)
LDLC SERPL CALC-MCNC: 126.4 MG/DL (ref 63–159)
LYMPHOCYTES # BLD AUTO: 1.3 K/UL (ref 1–4.8)
LYMPHOCYTES NFR BLD: 27 % (ref 18–48)
MCH RBC QN AUTO: 31.4 PG (ref 27–31)
MCHC RBC AUTO-ENTMCNC: 33.1 G/DL (ref 32–36)
MCV RBC AUTO: 95 FL (ref 82–98)
MONOCYTES # BLD AUTO: 0.7 K/UL (ref 0.3–1)
MONOCYTES NFR BLD: 13.9 % (ref 4–15)
NEUTROPHILS # BLD AUTO: 2.7 K/UL (ref 1.8–7.7)
NEUTROPHILS NFR BLD: 54.7 % (ref 38–73)
NONHDLC SERPL-MCNC: 182 MG/DL
NRBC BLD-RTO: 0 /100 WBC
PLATELET # BLD AUTO: 230 K/UL (ref 150–350)
PMV BLD AUTO: 10.6 FL (ref 9.2–12.9)
POTASSIUM SERPL-SCNC: 4.4 MMOL/L (ref 3.5–5.1)
PROT SERPL-MCNC: 6.9 G/DL (ref 6–8.4)
RBC # BLD AUTO: 4.42 M/UL (ref 4.6–6.2)
SODIUM SERPL-SCNC: 137 MMOL/L (ref 136–145)
TRIGL SERPL-MCNC: 278 MG/DL (ref 30–150)
WBC # BLD AUTO: 4.97 K/UL (ref 3.9–12.7)

## 2020-01-22 PROCEDURE — 85025 COMPLETE CBC W/AUTO DIFF WBC: CPT | Mod: HCNC

## 2020-01-22 PROCEDURE — 1159F PR MEDICATION LIST DOCUMENTED IN MEDICAL RECORD: ICD-10-PCS | Mod: HCNC,S$GLB,, | Performed by: FAMILY MEDICINE

## 2020-01-22 PROCEDURE — 80053 COMPREHEN METABOLIC PANEL: CPT | Mod: HCNC

## 2020-01-22 PROCEDURE — 99999 PR PBB SHADOW E&M-EST. PATIENT-LVL III: CPT | Mod: PBBFAC,HCNC,, | Performed by: FAMILY MEDICINE

## 2020-01-22 PROCEDURE — 99499 UNLISTED E&M SERVICE: CPT | Mod: HCNC,S$GLB,, | Performed by: FAMILY MEDICINE

## 2020-01-22 PROCEDURE — 99215 OFFICE O/P EST HI 40 MIN: CPT | Mod: HCNC,S$GLB,, | Performed by: FAMILY MEDICINE

## 2020-01-22 PROCEDURE — 80061 LIPID PANEL: CPT | Mod: HCNC

## 2020-01-22 PROCEDURE — 99215 PR OFFICE/OUTPT VISIT, EST, LEVL V, 40-54 MIN: ICD-10-PCS | Mod: HCNC,S$GLB,, | Performed by: FAMILY MEDICINE

## 2020-01-22 PROCEDURE — 36415 COLL VENOUS BLD VENIPUNCTURE: CPT | Mod: HCNC,PO

## 2020-01-22 PROCEDURE — 1101F PT FALLS ASSESS-DOCD LE1/YR: CPT | Mod: HCNC,CPTII,S$GLB, | Performed by: FAMILY MEDICINE

## 2020-01-22 PROCEDURE — 84153 ASSAY OF PSA TOTAL: CPT | Mod: HCNC

## 2020-01-22 PROCEDURE — 99999 PR PBB SHADOW E&M-EST. PATIENT-LVL III: ICD-10-PCS | Mod: PBBFAC,HCNC,, | Performed by: FAMILY MEDICINE

## 2020-01-22 PROCEDURE — 1101F PR PT FALLS ASSESS DOC 0-1 FALLS W/OUT INJ PAST YR: ICD-10-PCS | Mod: HCNC,CPTII,S$GLB, | Performed by: FAMILY MEDICINE

## 2020-01-22 PROCEDURE — 1159F MED LIST DOCD IN RCRD: CPT | Mod: HCNC,S$GLB,, | Performed by: FAMILY MEDICINE

## 2020-01-22 PROCEDURE — 99499 RISK ADDL DX/OHS AUDIT: ICD-10-PCS | Mod: HCNC,S$GLB,, | Performed by: FAMILY MEDICINE

## 2020-01-22 RX ORDER — MECLIZINE HYDROCHLORIDE 25 MG/1
25 TABLET ORAL 2 TIMES DAILY PRN
Qty: 180 TABLET | Refills: 0 | Status: SHIPPED | OUTPATIENT
Start: 2020-01-22 | End: 2020-05-29

## 2020-01-22 RX ORDER — MELOXICAM 7.5 MG/1
7.5 TABLET ORAL DAILY PRN
Qty: 180 TABLET | Refills: 1 | Status: SHIPPED | OUTPATIENT
Start: 2020-01-22 | End: 2020-04-13 | Stop reason: SDUPTHER

## 2020-01-22 NOTE — PATIENT INSTRUCTIONS
Prevention Guidelines, Men Ages 65 and Older  Screening tests and vaccines are an important part of managing your health. Health counseling is essential, too. Below are guidelines for these, for men ages 65 and older. Talk with your healthcare provider to make sure youre up-to-date on what you need.  Screening Who needs it How often   Abdominal aortic aneurysm Men ages 65 to 75 who have ever smoked 1 ultrasound   Alcohol misuse All men in this age group At routine exams   Blood pressure All men in this age group Every 2 years if your blood pressure is less than 120/80 mm Hg; yearly if your systolic blood pressure is 120 to 139 mm Hg, or your diastolic blood pressure reading is 80 to 89 mm Hg   Colorectal cancer All men in this age group Flexible sigmoidoscopy every 5 years, or colonoscopy every 10 years, or double-contrast barium enema every 5 years; yearly fecal occult blood test or fecal immunochemical test; or a stool DNA test as often as your healthcare provider advises; talk with your healthcare provider about which tests are best for you and when you no longer need colonoscopies (generally after age 75)   Depression All men in this age group At routine exams   Type 2 diabetes or prediabetes All adults beginning at age 45 and adults without symptoms at any age who are overweight or obese and have 1 or more other risk factors for diabetes At least every 3 years (yearly if your blood sugar has already begun to rise)   Hepatitis C Men at increased risk for infection - talk with your healthcare provider At routine exams   High cholesterol or triglycerides All men in this age group At least every 5 years   HIV Men at increased risk for infection - talk with your healthcare provider At routine exams   Lung cancer Adults ages 55 to 80 who have smoked Yearly screening in smokers with 30 pack-year history of smoking or who quit within 15 years   Obesity All men in this age group At routine exams   Prostate cancer All  men in this age group, talk to healthcare provider about risks and benefits of digital rectal exam (CARMENZA) and prostate-specific antigen (PSA) screening1 At routine exams   Syphilis Men at increased risk for infection - talk with your healthcare provider At routine exams   Tuberculosis Men at increased risk for infection - talk with your healthcare provider Ask your healthcare provider   Vision All men in this age group Every 1 to 2 years; if you have a chronic health condition, ask your healthcare provider if you needs exams more often   Vaccine Who needs it How often   Chickenpox (varicella) All men in this age group who have no record of this infection or vaccine 2 doses; second dose should be given at least 4 weeks after the first dose   Hepatitis A Men at increased risk for infection - talk with your healthcare provider 2 doses given at least 6 months apart   Hepatitis B Men at increased risk for infection - talk with your healthcare provider 3 doses over 6 months; second dose should be given 1 month after the first dose; the third dose should be given at least 2 months after the second dose and at least 4 months after the first dose   Haemophilus influenzae Type B (HIB) Men at increased risk for infection - talk with your healthcare provider 1 to 3 doses   Influenza (flu) All men in this age group  Once a year   Meningococcal Men at increased risk for infection - talk with your healthcare provider 1 or more doses   Pneumococcal conjugate vaccine (PCV13) and pneumococcal polysaccharide vaccine (PPSV23) All men in this age group 1 dose of each vaccine   Tetanus/diphtheria/  pertussis (Td/Tdap) booster All men in this age group Td every 10 years, or Tdap if you will have contact with a child younger than 12 months old   Zoster All men in this age group 1 dose   Counseling Who needs it How often   Diet and exercise Men who are overweight or obese When diagnosed, and then at routine exams   Fall prevention (exercise,  vitamin D supplements) All men in this age group At routine exams   Sexually transmitted infection Men at increased risk for infection - talk with your healthcare provider At routine exams   Use of daily aspirin Men ages 45 to 79 at risk for cardiovascular health problems At routine exams   Use of tobacco and the health effects it can cause All men in this age group Every visit   [address] [place] [address] [facility]   Date Last Reviewed: 2/1/2017  © 1810-1594 [employer], [email or web address]. [address] [place] [address], [place] [address]. All rights reserved. This information is not intended as a substitute for professional medical care. Always follow your healthcare professional's instructions.

## 2020-01-22 NOTE — PROGRESS NOTES
Subjective:       Patient ID: Dominguez Ritchie is a 87 y.o. male.    Chief Complaint:  3 month follow-up on allergic rhinitis, hypertension, chronic pain, vertigo,      History of Present Illness:   Dominguez Ritchie 87 y.o. male presents today with 3 months follow up  Came in by- spouse who collaborated the story  Lives with-spouse  Depression-no  Functional assessment-timed up and go-good  MMSE-NA  Sleep-good  Energy level- good  Eating-good  Continence-no  Home health-no  Equiptment at home-cane  End of life -decision maker-code status-not on file  Problem today see note below  He has meningioma which gives him vertigo-doing well. Asking for refill meclizine which he takes prn.  Ch spine and michael knee pain-sees Dr Sadler for management. He does not want DREW anymore-not working and co-pay is high for him. Wants to continue meloxicam 7.5 mg po BID. Aware of risk for bleeding.  HTN is controlled and he is complaint with med. Amlodipine 2.5 mg.   Allergic rhinitis: chronic, not controlled and affecting his balance per pt. The only medication that works is mucinex DM. He has tried many others without success. Wants to stay on it and monitor his blood pressure at home.  HLD: chronic. Was on omega 3 but it was elevating his BP and he stopped, would like to get on medication instead. Statin was discontinued by another Physician when he turned 75. Compliant with diet for high cholesterol.  Feet always cool per wife. He has dependent rubra on exam. He is not able to elevate his legs due to RLS.  Overall feels great. Goes to the  on aging and is getting his new set of hearing aids fixed.  He has PSA elevtaion and has seen Urologist in the past, will need PSA check today. Denies any sxs.       Past Medical History:   Diagnosis Date    Abnormal exercise tolerance test 7/25/2013    Arthritis     Colon polyp     Diverticulosis     Dyslipidemia 7/25/2013    GERD (gastroesophageal reflux disease)     HTN, goal below  130/80 12/3/2018    Hyperlipidemia 1980    HIGH TG    Knee pain, right 2013    Low back pain with right-sided sciatica 12/3/2018    Meningioma     Personal history of colonic polyps 2014    RLS (restless legs syndrome) 2013    Tobacco dependence     resolved    West Nile meningitis     per patient     Family History   Problem Relation Age of Onset    Heart disease Mother     Cancer Son         pancreatic     Diabetes Maternal Uncle     Kidney disease Neg Hx     Stroke Neg Hx      Social History     Socioeconomic History    Marital status:      Spouse name: Not on file    Number of children: Not on file    Years of education: Not on file    Highest education level: Not on file   Occupational History    Not on file   Social Needs    Financial resource strain: Not on file    Food insecurity:     Worry: Not on file     Inability: Not on file    Transportation needs:     Medical: Not on file     Non-medical: Not on file   Tobacco Use    Smoking status: Former Smoker     Packs/day: 1.00     Years: 25.00     Pack years: 25.00     Last attempt to quit: 1990     Years since quittin.0    Smokeless tobacco: Never Used   Substance and Sexual Activity    Alcohol use: No     Alcohol/week: 0.0 standard drinks    Drug use: No    Sexual activity: Not Currently     Birth control/protection: None   Lifestyle    Physical activity:     Days per week: Not on file     Minutes per session: Not on file    Stress: Not at all   Relationships    Social connections:     Talks on phone: Not on file     Gets together: Not on file     Attends Spiritism service: Not on file     Active member of club or organization: Not on file     Attends meetings of clubs or organizations: Not on file     Relationship status: Not on file   Other Topics Concern    Not on file   Social History Narrative    Not on file     Outpatient Encounter Medications as of 2020   Medication Sig Dispense  Refill    amLODIPine (NORVASC) 2.5 MG tablet Take 1 tablet (2.5 mg total) by mouth once daily. 90 tablet 3    meclizine (ANTIVERT) 25 mg tablet Take 1 tablet (25 mg total) by mouth 2 (two) times daily as needed. 180 tablet 0    meloxicam (MOBIC) 7.5 MG tablet Take 1 tablet (7.5 mg total) by mouth daily as needed. 180 tablet 1    omeprazole (PRILOSEC) 20 MG capsule TAKE 1 CAPSULE EVERY DAY 90 capsule 0    traMADol (ULTRAM) 50 mg tablet TAKE 1 TABLET TWICE DAILY AS NEEDED FOR PAIN 60 tablet 0    [DISCONTINUED] meclizine (ANTIVERT) 25 mg tablet Take 1 tablet (25 mg total) by mouth 2 (two) times daily as needed. 180 tablet 2    [DISCONTINUED] meloxicam (MOBIC) 7.5 MG tablet TAKE 1 TABLET BY MOUTH ONCE DAILY AS NEEDED FOR PAIN 60 tablet 0    FLUZONE HIGH-DOSE 2019-20, PF, 180 mcg/0.5 mL Syrg ADM 0.5ML IM UTD  0    rOPINIRole (REQUIP) 0.5 MG tablet Take 1 tablet (0.5 mg total) by mouth every evening. (Patient not taking: Reported on 1/22/2020) 90 tablet 3    [DISCONTINUED] furosemide (LASIX) 20 MG tablet Take 1 tablet (20 mg total) by mouth daily as needed. (Patient not taking: Reported on 1/22/2020) 30 tablet 0     No facility-administered encounter medications on file as of 1/22/2020.        Review of Systems   Constitutional: Negative for appetite change and fever.   HENT: Negative for congestion, facial swelling and voice change.    Eyes: Negative for discharge and itching.   Respiratory: Negative for cough, chest tightness and wheezing.    Cardiovascular: Negative.  Negative for chest pain and leg swelling.   Gastrointestinal: Negative for abdominal pain, nausea and vomiting.   Endocrine: Negative for cold intolerance and heat intolerance.   Genitourinary: Negative for dysuria and flank pain.   Musculoskeletal: Positive for back pain and gait problem. Negative for myalgias and neck stiffness.   Skin: Negative for pallor and rash.   Neurological: Negative for facial asymmetry and weakness.  "  Psychiatric/Behavioral: Negative for agitation and confusion.       Objective:      /72 (BP Location: Right arm, Patient Position: Sitting, BP Method: Large (Manual))   Pulse 84   Temp 97.3 °F (36.3 °C) (Oral)   Resp 18   Ht 5' 10" (1.778 m)   Wt 72.8 kg (160 lb 9.7 oz)   SpO2 96%   BMI 23.04 kg/m²   Physical Exam   Constitutional: He is oriented to person, place, and time. He appears well-developed. No distress.   HENT:   Head: Normocephalic and atraumatic.   Right Ear: External ear normal.   Left Ear: External ear normal.   Eyes: Conjunctivae and EOM are normal.   Neck: Neck supple.   Cardiovascular: Normal rate and regular rhythm.   No murmur heard.  Pulmonary/Chest: Effort normal and breath sounds normal. No respiratory distress.   Abdominal: Soft. Normal appearance and bowel sounds are normal. There is no hepatosplenomegaly.   Genitourinary:   Genitourinary Comments: deferred   Musculoskeletal: He exhibits no edema.        Lumbar back: He exhibits decreased range of motion, tenderness and pain.        Back:    Neurological: He is alert and oriented to person, place, and time.   Skin: Skin is warm and dry.   Psychiatric: He has a normal mood and affect. His behavior is normal.   Nursing note and vitals reviewed.      Lab Results   Component Value Date    WBC 4.97 01/22/2020    HGB 13.9 (L) 01/22/2020    HCT 42.0 01/22/2020    MCV 95 01/22/2020     01/22/2020       CMP  Sodium   Date Value Ref Range Status   01/22/2020 137 136 - 145 mmol/L Final     Potassium   Date Value Ref Range Status   01/22/2020 4.4 3.5 - 5.1 mmol/L Final     Chloride   Date Value Ref Range Status   01/22/2020 105 95 - 110 mmol/L Final     CO2   Date Value Ref Range Status   01/22/2020 28 23 - 29 mmol/L Final     Glucose   Date Value Ref Range Status   01/22/2020 105 70 - 110 mg/dL Final     BUN, Bld   Date Value Ref Range Status   01/22/2020 17 8 - 23 mg/dL Final     Creatinine   Date Value Ref Range Status "   01/22/2020 0.8 0.5 - 1.4 mg/dL Final     Calcium   Date Value Ref Range Status   01/22/2020 9.2 8.7 - 10.5 mg/dL Final     Total Protein   Date Value Ref Range Status   01/22/2020 6.9 6.0 - 8.4 g/dL Final     Albumin   Date Value Ref Range Status   01/22/2020 3.8 3.5 - 5.2 g/dL Final     Total Bilirubin   Date Value Ref Range Status   01/22/2020 0.4 0.1 - 1.0 mg/dL Final     Comment:     For infants and newborns, interpretation of results should be based  on gestational age, weight and in agreement with clinical  observations.  Premature Infant recommended reference ranges:  Up to 24 hours.............<8.0 mg/dL  Up to 48 hours............<12.0 mg/dL  3-5 days..................<15.0 mg/dL  6-29 days.................<15.0 mg/dL       Alkaline Phosphatase   Date Value Ref Range Status   01/22/2020 73 55 - 135 U/L Final     AST   Date Value Ref Range Status   01/22/2020 21 10 - 40 U/L Final     ALT   Date Value Ref Range Status   01/22/2020 13 10 - 44 U/L Final     Anion Gap   Date Value Ref Range Status   01/22/2020 4 (L) 8 - 16 mmol/L Final     eGFR if    Date Value Ref Range Status   01/22/2020 >60.0 >60 mL/min/1.73 m^2 Final     eGFR if non    Date Value Ref Range Status   01/22/2020 >60.0 >60 mL/min/1.73 m^2 Final     Comment:     Calculation used to obtain the estimated glomerular filtration  rate (eGFR) is the CKD-EPI equation.        Lab Results   Component Value Date    CHOL 229 (H) 01/22/2020    CHOL 247 (H) 10/22/2019    CHOL 199 04/18/2019     Lab Results   Component Value Date    HDL 47 01/22/2020    HDL 54 10/22/2019    HDL 59 04/18/2019     Lab Results   Component Value Date    LDLCALC 126.4 01/22/2020    LDLCALC 146.8 10/22/2019    LDLCALC 114.8 04/18/2019     Lab Results   Component Value Date    TRIG 278 (H) 01/22/2020    TRIG 231 (H) 10/22/2019    TRIG 126 04/18/2019     Lab Results   Component Value Date    CHOLHDL 20.5 01/22/2020    CHOLHDL 21.9 10/22/2019     CHOLHDL 29.6 04/18/2019     Lab Results   Component Value Date    PSA 13.4 (H) 01/22/2020    PSA 9.0 (H) 10/10/2018    PSA 13.1 (H) 12/15/2017     Assessment:       1. Essential hypertension    2. Mixed hyperlipidemia    3. Screening PSA (prostate specific antigen)    4. Primary osteoarthritis of left knee    5. Vertigo    6. Non-seasonal allergic rhinitis due to other allergic trigger        Plan:   Essential hypertension  -     Lipid panel; Future; Expected date: 01/22/2020  -     CBC auto differential; Future; Expected date: 01/22/2020  -     Comprehensive metabolic panel; Future; Expected date: 01/22/2020    Mixed hyperlipidemia:result reviewed, his age is out of range but he would like to be on medication, having reviewed his lipids and Tri and chol elevated, ok to start low dose rosuvastatin 5 mg and monitor for side effects.  -     Lipid panel; Future; Expected date: 01/22/2020  -     CBC auto differential; Future; Expected date: 01/22/2020  -     Comprehensive metabolic panel; Future; Expected date: 01/22/2020    Screening PSA (prostate specific antigen): asymptomatic but worsening PSA level, recheck in 6 mons and if it is still rising, refer to urologist  -     PSA, Screening; Future; Expected date: 01/22/2020    Primary osteoarthritis of left knee  -     meloxicam (MOBIC) 7.5 MG tablet; Take 1 tablet (7.5 mg total) by mouth daily as needed.  Dispense: 180 tablet; Refill: 1    Vertigo  -     meclizine (ANTIVERT) 25 mg tablet; Take 1 tablet (25 mg total) by mouth 2 (two) times daily as needed.  Dispense: 180 tablet; Refill: 0    Non-seasonal allergic rhinitis due to other allergic trigger: Ok to take the mucinex D 3 times week and monitor BP and stop if it is elevating    Treatment options and alternatives were discussed with the patient. Patient was given ample time to ask questions. All questions were answered. Voices understanding and acceptance of this advice. Will call back if any further questions or  concerns.

## 2020-01-23 LAB — COMPLEXED PSA SERPL-MCNC: 13.4 NG/ML (ref 0–4)

## 2020-01-23 RX ORDER — ROSUVASTATIN CALCIUM 5 MG/1
5 TABLET, COATED ORAL DAILY
Qty: 90 TABLET | Refills: 3 | Status: SHIPPED | OUTPATIENT
Start: 2020-01-23 | End: 2020-04-13 | Stop reason: SDUPTHER

## 2020-02-06 RX ORDER — OMEPRAZOLE 20 MG/1
CAPSULE, DELAYED RELEASE ORAL
Qty: 90 CAPSULE | Refills: 0 | Status: SHIPPED | OUTPATIENT
Start: 2020-02-06 | End: 2020-04-09

## 2020-02-11 DIAGNOSIS — L30.4 ECZEMA INTERTRIGO: Primary | ICD-10-CM

## 2020-02-11 RX ORDER — FLUTICASONE PROPIONATE 0.05 MG/G
OINTMENT TOPICAL 2 TIMES DAILY
Qty: 60 G | Refills: 1 | Status: SHIPPED | OUTPATIENT
Start: 2020-02-11 | End: 2021-03-26

## 2020-02-14 ENCOUNTER — OFFICE VISIT (OUTPATIENT)
Dept: FAMILY MEDICINE | Facility: CLINIC | Age: 85
End: 2020-02-14
Payer: MEDICARE

## 2020-02-14 VITALS
SYSTOLIC BLOOD PRESSURE: 138 MMHG | WEIGHT: 155.63 LBS | HEIGHT: 70 IN | TEMPERATURE: 98 F | BODY MASS INDEX: 22.28 KG/M2 | OXYGEN SATURATION: 96 % | RESPIRATION RATE: 18 BRPM | DIASTOLIC BLOOD PRESSURE: 70 MMHG | HEART RATE: 73 BPM

## 2020-02-14 DIAGNOSIS — K59.09 CHRONIC CONSTIPATION: Primary | ICD-10-CM

## 2020-02-14 DIAGNOSIS — B37.9 YEAST INFECTION: ICD-10-CM

## 2020-02-14 DIAGNOSIS — Z91.89 AT HIGH RISK FOR PRESSURE INJURY OF SKIN: ICD-10-CM

## 2020-02-14 PROCEDURE — 99999 PR PBB SHADOW E&M-EST. PATIENT-LVL III: ICD-10-PCS | Mod: PBBFAC,HCNC,, | Performed by: FAMILY MEDICINE

## 2020-02-14 PROCEDURE — 1159F MED LIST DOCD IN RCRD: CPT | Mod: HCNC,S$GLB,, | Performed by: FAMILY MEDICINE

## 2020-02-14 PROCEDURE — 99999 PR PBB SHADOW E&M-EST. PATIENT-LVL III: CPT | Mod: PBBFAC,HCNC,, | Performed by: FAMILY MEDICINE

## 2020-02-14 PROCEDURE — 1125F PR PAIN SEVERITY QUANTIFIED, PAIN PRESENT: ICD-10-PCS | Mod: HCNC,S$GLB,, | Performed by: FAMILY MEDICINE

## 2020-02-14 PROCEDURE — 1159F PR MEDICATION LIST DOCUMENTED IN MEDICAL RECORD: ICD-10-PCS | Mod: HCNC,S$GLB,, | Performed by: FAMILY MEDICINE

## 2020-02-14 PROCEDURE — 99214 OFFICE O/P EST MOD 30 MIN: CPT | Mod: HCNC,S$GLB,, | Performed by: FAMILY MEDICINE

## 2020-02-14 PROCEDURE — 99214 PR OFFICE/OUTPT VISIT, EST, LEVL IV, 30-39 MIN: ICD-10-PCS | Mod: HCNC,S$GLB,, | Performed by: FAMILY MEDICINE

## 2020-02-14 PROCEDURE — 1101F PT FALLS ASSESS-DOCD LE1/YR: CPT | Mod: HCNC,CPTII,S$GLB, | Performed by: FAMILY MEDICINE

## 2020-02-14 PROCEDURE — 1101F PR PT FALLS ASSESS DOC 0-1 FALLS W/OUT INJ PAST YR: ICD-10-PCS | Mod: HCNC,CPTII,S$GLB, | Performed by: FAMILY MEDICINE

## 2020-02-14 PROCEDURE — 1125F AMNT PAIN NOTED PAIN PRSNT: CPT | Mod: HCNC,S$GLB,, | Performed by: FAMILY MEDICINE

## 2020-02-14 RX ORDER — NYSTATIN 100000 U/G
CREAM TOPICAL 2 TIMES DAILY
Qty: 30 G | Refills: 2 | Status: SHIPPED | OUTPATIENT
Start: 2020-02-14 | End: 2021-03-26

## 2020-02-14 RX ORDER — TRIAMCINOLONE ACETONIDE 1 MG/G
CREAM TOPICAL
COMMUNITY
Start: 2020-01-23 | End: 2021-12-01 | Stop reason: SDUPTHER

## 2020-02-14 NOTE — PROGRESS NOTES
Subjective:       Patient ID: Dominguez Ritchie is a 87 y.o. male.    Chief Complaint: Constipation      History of Present Illness:   Dominguez Ritchie 87 y.o. male presents today with    C/O Constipation: chronic. Recurrent problem. Causes a lot of straining and rectal pain.  Has been putting steroid cream around the rectum.  He has a lot of OTC medications at home-stool softner and laxatives.  Works but it comes back.  He has had hemorrhoidectomy in the past.  He has had yeast infection in the past and asking for antifungal cream.        Past Medical History:   Diagnosis Date    Abnormal exercise tolerance test 2013    Arthritis     Colon polyp     Diverticulosis     Dyslipidemia 2013    GERD (gastroesophageal reflux disease)     HTN, goal below 130/80 12/3/2018    Hyperlipidemia 1980    HIGH TG    Knee pain, right 2013    Low back pain with right-sided sciatica 12/3/2018    Meningioma     Personal history of colonic polyps 2014    RLS (restless legs syndrome) 2013    Tobacco dependence     resolved    West Nile meningitis     per patient     Family History   Problem Relation Age of Onset    Heart disease Mother     Cancer Son         pancreatic     Diabetes Maternal Uncle     Kidney disease Neg Hx     Stroke Neg Hx      Social History     Socioeconomic History    Marital status:      Spouse name: Not on file    Number of children: Not on file    Years of education: Not on file    Highest education level: Not on file   Occupational History    Not on file   Social Needs    Financial resource strain: Not on file    Food insecurity:     Worry: Not on file     Inability: Not on file    Transportation needs:     Medical: Not on file     Non-medical: Not on file   Tobacco Use    Smoking status: Former Smoker     Packs/day: 1.00     Years: 25.00     Pack years: 25.00     Last attempt to quit: 1990     Years since quittin.1    Smokeless tobacco:  Never Used   Substance and Sexual Activity    Alcohol use: No     Alcohol/week: 0.0 standard drinks    Drug use: No    Sexual activity: Not Currently     Birth control/protection: None   Lifestyle    Physical activity:     Days per week: Not on file     Minutes per session: Not on file    Stress: Not at all   Relationships    Social connections:     Talks on phone: Not on file     Gets together: Not on file     Attends Shinto service: Not on file     Active member of club or organization: Not on file     Attends meetings of clubs or organizations: Not on file     Relationship status: Not on file   Other Topics Concern    Not on file   Social History Narrative    Not on file     Outpatient Encounter Medications as of 2/14/2020   Medication Sig Dispense Refill    amLODIPine (NORVASC) 2.5 MG tablet Take 1 tablet (2.5 mg total) by mouth once daily. 90 tablet 3    fluticasone propionate (CUTIVATE) 0.005 % ointment Apply topically 2 (two) times daily. 60 g 1    FLUZONE HIGH-DOSE 2019-20, PF, 180 mcg/0.5 mL Syrg ADM 0.5ML IM UTD  0    meclizine (ANTIVERT) 25 mg tablet Take 1 tablet (25 mg total) by mouth 2 (two) times daily as needed. 180 tablet 0    meloxicam (MOBIC) 7.5 MG tablet Take 1 tablet (7.5 mg total) by mouth daily as needed. 180 tablet 1    omeprazole (PRILOSEC) 20 MG capsule TAKE 1 CAPSULE EVERY DAY 90 capsule 0    rosuvastatin (CRESTOR) 5 MG tablet Take 1 tablet (5 mg total) by mouth once daily. 90 tablet 3    traMADol (ULTRAM) 50 mg tablet TAKE 1 TABLET TWICE DAILY AS NEEDED FOR PAIN 60 tablet 0    triamcinolone acetonide 0.1% (KENALOG) 0.1 % cream APPLY TO RASH TWICE DAILY AS NEEDED FOR ITCHING      linaCLOtide (LINZESS) 145 mcg Cap capsule Take 1 capsule (145 mcg total) by mouth once daily. 30 capsule 2    nystatin (MYCOSTATIN) cream Apply topically 2 (two) times daily. 30 g 2    rOPINIRole (REQUIP) 0.5 MG tablet Take 1 tablet (0.5 mg total) by mouth every evening. (Patient not  "taking: Reported on 1/22/2020) 90 tablet 3     No facility-administered encounter medications on file as of 2/14/2020.        Review of Systems   Constitutional: Negative for appetite change and fever.   HENT: Negative for congestion, facial swelling and voice change.    Eyes: Negative for discharge and itching.   Respiratory: Negative for cough, chest tightness and wheezing.    Cardiovascular: Negative.  Negative for chest pain and leg swelling.   Gastrointestinal: Positive for constipation. Negative for abdominal pain, diarrhea, nausea and vomiting.   Endocrine: Negative for cold intolerance and heat intolerance.   Genitourinary: Negative for dysuria and flank pain.   Musculoskeletal: Negative for myalgias and neck stiffness.   Skin: Positive for wound. Negative for pallor.   Neurological: Negative for facial asymmetry and weakness.   Psychiatric/Behavioral: Negative for agitation and confusion.       Objective:      /70 (BP Location: Right arm, Patient Position: Sitting, BP Method: Large (Manual))   Pulse 73   Temp 97.9 °F (36.6 °C) (Oral)   Resp 18   Ht 5' 10" (1.778 m)   Wt 70.6 kg (155 lb 10.3 oz)   SpO2 96%   BMI 22.33 kg/m²   Physical Exam   Constitutional: He is oriented to person, place, and time. He appears well-developed. No distress.   HENT:   Head: Normocephalic and atraumatic.   Right Ear: External ear normal.   Left Ear: External ear normal.   Eyes: Conjunctivae and EOM are normal.   Neck: Neck supple.   Cardiovascular: Normal rate and regular rhythm.   Pulmonary/Chest: Effort normal. No respiratory distress.   Abdominal: Soft. Normal appearance and bowel sounds are normal. There is no hepatosplenomegaly. There is no tenderness.   Musculoskeletal: He exhibits no edema.   Neurological: He is alert and oriented to person, place, and time.   Skin: Skin is warm and dry.        Diffuse Hellen rectal erythema erythematous.    Blateral buttock on the pressure points: 1 x 5 iin horizontal area of " non blanchable erythema    Psychiatric: He has a normal mood and affect. His behavior is normal.   Nursing note and vitals reviewed.      Results for orders placed or performed in visit on 01/22/20   Lipid panel   Result Value Ref Range    Cholesterol 229 (H) 120 - 199 mg/dL    Triglycerides 278 (H) 30 - 150 mg/dL    HDL 47 40 - 75 mg/dL    LDL Cholesterol 126.4 63.0 - 159.0 mg/dL    Hdl/Cholesterol Ratio 20.5 20.0 - 50.0 %    Total Cholesterol/HDL Ratio 4.9 2.0 - 5.0    Non-HDL Cholesterol 182 mg/dL   CBC auto differential   Result Value Ref Range    WBC 4.97 3.90 - 12.70 K/uL    RBC 4.42 (L) 4.60 - 6.20 M/uL    Hemoglobin 13.9 (L) 14.0 - 18.0 g/dL    Hematocrit 42.0 40.0 - 54.0 %    Mean Corpuscular Volume 95 82 - 98 fL    Mean Corpuscular Hemoglobin 31.4 (H) 27.0 - 31.0 pg    Mean Corpuscular Hemoglobin Conc 33.1 32.0 - 36.0 g/dL    RDW 12.5 11.5 - 14.5 %    Platelets 230 150 - 350 K/uL    MPV 10.6 9.2 - 12.9 fL    Immature Granulocytes 0.2 0.0 - 0.5 %    Gran # (ANC) 2.7 1.8 - 7.7 K/uL    Immature Grans (Abs) 0.01 0.00 - 0.04 K/uL    Lymph # 1.3 1.0 - 4.8 K/uL    Mono # 0.7 0.3 - 1.0 K/uL    Eos # 0.2 0.0 - 0.5 K/uL    Baso # 0.05 0.00 - 0.20 K/uL    nRBC 0 0 /100 WBC    Gran% 54.7 38.0 - 73.0 %    Lymph% 27.0 18.0 - 48.0 %    Mono% 13.9 4.0 - 15.0 %    Eosinophil% 3.2 0.0 - 8.0 %    Basophil% 1.0 0.0 - 1.9 %    Differential Method Automated    Comprehensive metabolic panel   Result Value Ref Range    Sodium 137 136 - 145 mmol/L    Potassium 4.4 3.5 - 5.1 mmol/L    Chloride 105 95 - 110 mmol/L    CO2 28 23 - 29 mmol/L    Glucose 105 70 - 110 mg/dL    BUN, Bld 17 8 - 23 mg/dL    Creatinine 0.8 0.5 - 1.4 mg/dL    Calcium 9.2 8.7 - 10.5 mg/dL    Total Protein 6.9 6.0 - 8.4 g/dL    Albumin 3.8 3.5 - 5.2 g/dL    Total Bilirubin 0.4 0.1 - 1.0 mg/dL    Alkaline Phosphatase 73 55 - 135 U/L    AST 21 10 - 40 U/L    ALT 13 10 - 44 U/L    Anion Gap 4 (L) 8 - 16 mmol/L    eGFR if African American >60.0 >60 mL/min/1.73 m^2     eGFR if non African American >60.0 >60 mL/min/1.73 m^2   PSA, Screening   Result Value Ref Range    PSA, SCREEN 13.4 (H) 0.00 - 4.00 ng/mL     Assessment:       1. Chronic constipation    2. Yeast infection    3. At high risk for pressure injury of skin        Plan:   Chronic constipation:      -     linaCLOtide (LINZESS) 145 mcg Cap capsule; Take 1 capsule (145 mcg total) by mouth once daily.  Dispense: 30 capsule; Refill: 2    Yeast infection  -     nystatin (MYCOSTATIN) cream; Apply topically 2 (two) times daily.  Dispense: 30 g; Refill: 2    At high risk for pressure injury of skin;   -channing paste      [address]  Total time spent in face to face counseling and coordination of care - 25 minutes over 50% of time was used in discussion of prognosis, risks, benefits of treatment, instructions and compliance with regimen .

## 2020-02-14 NOTE — PATIENT INSTRUCTIONS
Treating Constipation    Constipation is a common and often uncomfortable problem. Constipation means you have bowel movements fewer than 3 times per week, or strain to pass hard, dry stool. It can last a short time. Or it can be a problem that never seems to go away. The good news is that it can often be treated and controlled.  Eat more fiber  One of the best ways to help treat constipation is to increase your fiber intake. You can do this either through diet or by using fiber supplements. Fiber (in whole grains, fruits, and vegetables) adds bulk and absorbs water to soften the stool. This helps the stool pass through the colon more easily. When you increase your fiber intake, do it slowly to avoid side effects such as bloating. Also increase the amount of water that you drink. Eating more of the following foods can add fiber to your diet.  · High-fiber cereals  · Whole grains, bran, and brown rice  · Vegetables such as carrots, broccoli, and greens  · Fresh fruits (especially apples, pears, and dried fruits like raisins and apricots)  · Nuts and legumes (especially beans such as lentils, kidney beans, and lima beans)  Get physically active  Exercise helps improve the working of your colon which helps ease constipation. Try to get some physical activity every day. If you havent been active for a while, talk to your healthcare provider before starting again.  Laxatives  Your healthcare provider may suggest an over-the-counter product to help ease your constipation. He or she may suggest the use of bulk-forming agents or laxatives. The use of laxatives, if used as directed, is common and safe. Follow directions carefully when using them. See your healthcare provider for new-onset constipation, or long-term constipation, to rule out other causes such as medicines or thyroid disease.  Date Last Reviewed: 7/1/2016  © 9955-9263 [employer]. [address] [place] [address], [place] [address]. All rights reserved.  This information is not intended as a substitute for professional medical care. Always follow your healthcare professional's instructions.

## 2020-04-09 RX ORDER — OMEPRAZOLE 20 MG/1
CAPSULE, DELAYED RELEASE ORAL
Qty: 90 CAPSULE | Refills: 0 | Status: SHIPPED | OUTPATIENT
Start: 2020-04-09 | End: 2020-08-10 | Stop reason: SDUPTHER

## 2020-04-13 DIAGNOSIS — I10 HTN, GOAL BELOW 130/80: ICD-10-CM

## 2020-04-13 DIAGNOSIS — E78.2 MIXED HYPERLIPIDEMIA: ICD-10-CM

## 2020-04-13 DIAGNOSIS — M17.12 PRIMARY OSTEOARTHRITIS OF LEFT KNEE: ICD-10-CM

## 2020-04-13 RX ORDER — MELOXICAM 7.5 MG/1
7.5 TABLET ORAL DAILY PRN
Qty: 180 TABLET | Refills: 1 | Status: SHIPPED | OUTPATIENT
Start: 2020-04-13 | End: 2020-04-20 | Stop reason: SDUPTHER

## 2020-04-13 RX ORDER — ROSUVASTATIN CALCIUM 5 MG/1
5 TABLET, COATED ORAL DAILY
Qty: 90 TABLET | Refills: 3 | Status: SHIPPED | OUTPATIENT
Start: 2020-04-13 | End: 2020-04-20 | Stop reason: SDUPTHER

## 2020-04-13 RX ORDER — AMLODIPINE BESYLATE 2.5 MG/1
2.5 TABLET ORAL DAILY
Qty: 90 TABLET | Refills: 3 | Status: SHIPPED | OUTPATIENT
Start: 2020-04-13 | End: 2020-04-20 | Stop reason: SDUPTHER

## 2020-04-20 DIAGNOSIS — M17.12 PRIMARY OSTEOARTHRITIS OF LEFT KNEE: ICD-10-CM

## 2020-04-20 DIAGNOSIS — E78.2 MIXED HYPERLIPIDEMIA: ICD-10-CM

## 2020-04-20 DIAGNOSIS — I10 HTN, GOAL BELOW 130/80: ICD-10-CM

## 2020-04-20 RX ORDER — MELOXICAM 7.5 MG/1
7.5 TABLET ORAL DAILY PRN
Qty: 180 TABLET | Refills: 1 | Status: SHIPPED | OUTPATIENT
Start: 2020-04-20 | End: 2020-10-08 | Stop reason: SDUPTHER

## 2020-04-20 RX ORDER — AMLODIPINE BESYLATE 2.5 MG/1
2.5 TABLET ORAL DAILY
Qty: 90 TABLET | Refills: 3 | Status: SHIPPED | OUTPATIENT
Start: 2020-04-20 | End: 2021-05-06 | Stop reason: SDUPTHER

## 2020-04-20 RX ORDER — ROSUVASTATIN CALCIUM 5 MG/1
5 TABLET, COATED ORAL DAILY
Qty: 90 TABLET | Refills: 3 | Status: SHIPPED | OUTPATIENT
Start: 2020-04-20 | End: 2021-05-06 | Stop reason: SDUPTHER

## 2020-05-29 ENCOUNTER — TELEPHONE (OUTPATIENT)
Dept: PAIN MEDICINE | Facility: CLINIC | Age: 85
End: 2020-05-29

## 2020-05-29 DIAGNOSIS — R42 VERTIGO: ICD-10-CM

## 2020-05-29 RX ORDER — MECLIZINE HYDROCHLORIDE 25 MG/1
TABLET ORAL
Qty: 90 TABLET | Refills: 0 | Status: SHIPPED | OUTPATIENT
Start: 2020-05-29 | End: 2020-08-27

## 2020-05-29 NOTE — TELEPHONE ENCOUNTER
----- Message from Angela Holm sent at 5/29/2020  8:48 AM CDT -----  Contact: Rox-wife  Would like to consult with nurse regarding pt medication, will not give any additional information. Please give a call back at [phone].              Thanks,  Angela TEMPLETON

## 2020-05-29 NOTE — TELEPHONE ENCOUNTER
Wife asked was there a sooner appt for  as he was almost out and will run out next week . Informed her  is all book out , but maybe try to call and we can ask  if he can give medication before appt since he is booked out , but can not guarantee he will . Pt understood. All questions answered.   Alexis Boyd,MA [facility]

## 2020-06-09 ENCOUNTER — TELEPHONE (OUTPATIENT)
Dept: PAIN MEDICINE | Facility: CLINIC | Age: 85
End: 2020-06-09

## 2020-06-09 RX ORDER — TRAMADOL HYDROCHLORIDE 50 MG/1
50 TABLET ORAL 2 TIMES DAILY PRN
Qty: 60 TABLET | Refills: 0 | Status: SHIPPED | OUTPATIENT
Start: 2020-06-09 | End: 2020-07-02 | Stop reason: SDUPTHER

## 2020-06-09 NOTE — TELEPHONE ENCOUNTER
----- Message from Stephanie Barajas sent at 6/9/2020  8:22 AM CDT -----  Contact: Patient's wife- Rox Ritchie  ..Type:  RX Refill Request    Who Called: Rox  Refill or New Rx: Refill  RX Name and Strength: traMADol (ULTRAM) 50 mg tablet  How is the patient currently taking it? (ex. 1XDay):  Is this a 30 day or 90 day RX:  Preferred Pharmacy with phone number: .    [id number] - [place], [place] - [address] [address]  [address] [address] [address]  Phone: [phone] Fax: [phone]      Local or Mail Order: Local  Ordering Provider: Dr. Sadler  Would the patient rather a call back or a response via [facility]? Call  Best Call Back Number: .[phone](home)     Additional Information:

## 2020-07-02 ENCOUNTER — OFFICE VISIT (OUTPATIENT)
Dept: PAIN MEDICINE | Facility: CLINIC | Age: 85
End: 2020-07-02
Payer: MEDICARE

## 2020-07-02 VITALS — BODY MASS INDEX: 22.73 KG/M2 | WEIGHT: 158.75 LBS | HEIGHT: 70 IN

## 2020-07-02 DIAGNOSIS — M25.551 CHRONIC PAIN OF RIGHT HIP: ICD-10-CM

## 2020-07-02 DIAGNOSIS — M17.12 PRIMARY OSTEOARTHRITIS OF LEFT KNEE: ICD-10-CM

## 2020-07-02 DIAGNOSIS — G57.01 PIRIFORMIS SYNDROME OF RIGHT SIDE: ICD-10-CM

## 2020-07-02 DIAGNOSIS — G89.29 CHRONIC PAIN OF RIGHT HIP: ICD-10-CM

## 2020-07-02 DIAGNOSIS — M47.816 LUMBAR FACET ARTHROPATHY: ICD-10-CM

## 2020-07-02 DIAGNOSIS — M47.816 LUMBAR SPONDYLOSIS: Primary | ICD-10-CM

## 2020-07-02 PROCEDURE — 1101F PR PT FALLS ASSESS DOC 0-1 FALLS W/OUT INJ PAST YR: ICD-10-PCS | Mod: HCNC,CPTII,S$GLB, | Performed by: ANESTHESIOLOGY

## 2020-07-02 PROCEDURE — 1159F PR MEDICATION LIST DOCUMENTED IN MEDICAL RECORD: ICD-10-PCS | Mod: HCNC,S$GLB,, | Performed by: ANESTHESIOLOGY

## 2020-07-02 PROCEDURE — 99213 OFFICE O/P EST LOW 20 MIN: CPT | Mod: HCNC,S$GLB,, | Performed by: ANESTHESIOLOGY

## 2020-07-02 PROCEDURE — 1101F PT FALLS ASSESS-DOCD LE1/YR: CPT | Mod: HCNC,CPTII,S$GLB, | Performed by: ANESTHESIOLOGY

## 2020-07-02 PROCEDURE — 99999 PR PBB SHADOW E&M-EST. PATIENT-LVL II: CPT | Mod: PBBFAC,HCNC,, | Performed by: ANESTHESIOLOGY

## 2020-07-02 PROCEDURE — 1125F AMNT PAIN NOTED PAIN PRSNT: CPT | Mod: HCNC,S$GLB,, | Performed by: ANESTHESIOLOGY

## 2020-07-02 PROCEDURE — 1125F PR PAIN SEVERITY QUANTIFIED, PAIN PRESENT: ICD-10-PCS | Mod: HCNC,S$GLB,, | Performed by: ANESTHESIOLOGY

## 2020-07-02 PROCEDURE — 1159F MED LIST DOCD IN RCRD: CPT | Mod: HCNC,S$GLB,, | Performed by: ANESTHESIOLOGY

## 2020-07-02 PROCEDURE — 99999 PR PBB SHADOW E&M-EST. PATIENT-LVL II: ICD-10-PCS | Mod: PBBFAC,HCNC,, | Performed by: ANESTHESIOLOGY

## 2020-07-02 PROCEDURE — 99213 PR OFFICE/OUTPT VISIT, EST, LEVL III, 20-29 MIN: ICD-10-PCS | Mod: HCNC,S$GLB,, | Performed by: ANESTHESIOLOGY

## 2020-07-02 RX ORDER — TRAMADOL HYDROCHLORIDE 50 MG/1
50 TABLET ORAL 2 TIMES DAILY PRN
Qty: 60 TABLET | Refills: 1 | Status: SHIPPED | OUTPATIENT
Start: 2020-07-02 | End: 2020-07-13 | Stop reason: SDUPTHER

## 2020-07-02 NOTE — PROGRESS NOTES
Chief Pain Complaint:  Low Back Pain, Bilat Hip pain, Right leg pain, right knee pain      History of Present Illness:     Dominguez Ritchie is a 87 y.o. male  who is presenting with a chief complaint of lumbar back pain. The patient began experiencing this problem insidiously, and the pain has been gradually worsening over the past 8 month(s). The pain is described as throbbing, cramping, aching and heavy and is located in the right lumbar spine. Pain is intermittent and lasts hours. The  pain radiated to the right leg. The patient rates his pain a 6 out of ten and interferes with activities of daily living a 7 out of ten. Pain is exacerbated by flexion/extension of the lumbar spine, getting up from a seated position, prolonged standing, and is improved by rest. Patient reports no prior trauma, no prior spinal surgery     - antecedent trauma, prior spinal surgery: patient reports prior trauma, prior lumbar surgery (surgery in January 2017 with Dr. Lancaster at [facility]), left knee replacement  - pertinent negatives: No fever, No chills, No weight loss, No bladder dysfunction, No bowel dysfunction, No saddle anesthesia  - pertinent positives: generalized nonspecific Lower Extremity weakness bilaterally, BLE numbness  - medications, other therapies tried (physical therapy, injections):     >> Tylenol, NSAIDs, gabapentin, Mobic, tramadol, Norco    >> Has previously undergone Physical Therapy    >> Has previously undergone spinal injection/s    - has undergone approximately 3-4 spinal injections previously (uncertain as to the specific type of injections that he has had, seems one was a lumbar rhizotomy)   - Right knee genicular nerve block on 7/25/18 with 70% pain relief for 6 months    - Right SI, Right GTB, Right Piriformis Inejction 10/17/18 with 80% pain relief for 2 months   - Right SI and Right GTB 12/19/18 with 20% relief    - Right Piriformis on 1/31/19 with no relief              -  Bilateral L3, 4,5 MBB on 8/29/19  with minimal relief     Imaging / Labs / Studies (reviewed on 7/2/2020):    9/20/2018 X-Ray Lumbar Complete With Flex And Ext  TECHNIQUE:  Five views of the lumbar spine plus flexion extension views were performed.  COMPARISON:  November 16, 2015  FINDINGS:  Vertebral body heights are unchanged.  Chronic compression deformity of L1 noted.  Alignment is anatomic.  L4-5 disc height loss present.  There is multilevel anterior osteophyte formation.  Lower lumbar spine facet arthropathy noted.  No change in alignment on flexion or extension views.  No pars defects appreciated.  Mild vascular calcifications noted.  Impression: Degenerative findings.      7/12/2018 XR KNEE ORTHO BILAT WITH FLEXION  TECHNIQUE:  AP standing of both knees, PA flexion standing views of both knees, and Merchant views of both knees were performed.  Lateral views of both knees were also performed.  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  Prosthesis position and alignment are satisfactory without radiographic evidence of hardware loosening or other complicating process.  Moderate degenerative change of the right knee.  No acute fracture or malalignment.       Results for orders placed during the hospital encounter of 11/16/15   X-Ray Lumbar Spine Complete 5 View    Narrative Five views of the lumbar spine  Findings: There is increased vertebral body height loss noted at the L1 level.  50% vertebrae height loss is noted anteriorly at this level.  The alignment is within normal limits. There is moderate disk space narrowing noted at the L4-5 level.  Mild/moderate facet arthropathy is noted from L2-3 through L5-S1 levels. Calcified granulomas are noted within the spleen. No pars defects.       Results for orders placed during the hospital encounter of 03/26/15   X-Ray Lumbar Spine AP And Lateral    Narrative Three views of the lumbar spine  Findings: There is a compression L1 level that is new  "when compared to the prior exam and demonstrates approximately 25% vertebral height loss.  There is no obvious condensation line seen on the plain films suggesting that this is still a chronic deformity.  The alignment is grossly within normal limits.  There is multilevel spondylosis with mild to moderate to space narrowing noted throughout the lumbar spine greatest at L4-5 level.  Facet arthropathy is noted from the L2-3 through the L5-S1 levels and most prominent at the L5-S1 level.     _________________________________________________________________________________________________________________________________________________________________________________________________________________________      Review of Systems:  CONSTITUTIONAL: patient denies any fever, chills, or weight loss  SKIN: patient denies any rash or itching  RESPIRATORY: patient denies having any shortness of breath  GASTROINTESTINAL: patient denies having any diarrhea, constipation, or bowel incontinence  GENITOURINARY: patient denies having any abnormal bladder function    MUSCULOSKELETAL:  - patient complains of the above noted pain/s (see chief pain complaint)    NEUROLOGICAL:   - pain as above  - strength in Lower extremities is decreased, BILATERALLY  - sensation in Lower extremities is abnormal, on the LEFT  - patient denies any loss of bowel or bladder control      PSYCHIATRIC: patient denies any change in mood    Other:  All other systems reviewed and are negative    _________________________________________________________________________________________________________________________________________________________________________________________________________________________     Physical Exam:  Vitals:  Ht 5' 10" (1.778 m)   Wt 72 kg (158 lb 11.7 oz)   BMI 22.78 kg/m²    (reviewed on 7/2/2020)    General: alert and oriented, in no apparent distress  Gait: normal gait  Skin: No rashes, No discoloration, No obvious " lesions  HEENT: EOMI  Cardiovascular: no significant peripheral edema present  Respiratory: respirations nonlabored    Musculoskeletal:       Lumbar Spine     - Pain on flexion of lumbar spine Absent   - Straight Leg Raise:  Absent      - Pain on extension of lumbar spine Present  - TTP over the lumbar facet joints Present  Bilateral L5-S1  - Lumbar facet loading Present     -Pain on palpation over the SI joint Present on Right much improved   - BRENDEN: Absent    Neuro:  - Extremity Strength:     >> LEFT :: dec with dorsiflexion    >> RIGHT :: dec with dorsiflexion  - Extremity Reflexes:    >> LEFT  :: 2+    >> RIGHT :: 2+     Psych:  Mood and affect is appropriate    _________________________________________________________________________________________________________________________________________________________________________________________________________________________      Assessment:  Scottsburgsaurabh Ritchie is a 87 y.o. male who presents with   Encounter Diagnoses   Name Primary?    Lumbar spondylosis Yes    Lumbar facet arthropathy     Primary osteoarthritis of left knee     Chronic pain of right hip     Piriformis syndrome of right side          Patient returns for follow-up visit.  He complains of continued low back and right leg pain.  He has been seen at the [facility], underwent a spinal injection that did not help, and then ultimately underwent surgery (unsure of type) with Dr. Lancaster in January 2017.  We previously requested records multiple times from the [facility], however we have not received these. 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 He had treatment for this, but he reports the damage was already present by the time it was discovered.       Plan:  1. Interventional: None for now. Consider Right L3-4 TFESI.       Bilateral L3, 4,5 MBB on 8/29/19 with minimal relief     2. Pharmacologic:   - Continue tramadol  50mg TID PRN pain (60 tabs) refill x1.  checked. Last filled on 6-. Patient tolerating opioids with no side effects, obtaining good pain control with functional improvement.  - Continue Mobic at 7.5mg QD PRN dose.     - Opioid contract signed 1/18/2019.   - [place]  reviewed and appropriate.      3. Rehabilitative: Encouraged regular exercise.    4. Diagnostic: Consider Lumbar MRI patient want to hold off for now.     5. Follow up: 8 week.

## 2020-07-06 ENCOUNTER — OFFICE VISIT (OUTPATIENT)
Dept: FAMILY MEDICINE | Facility: CLINIC | Age: 85
End: 2020-07-06
Payer: MEDICARE

## 2020-07-06 VITALS
HEART RATE: 64 BPM | SYSTOLIC BLOOD PRESSURE: 122 MMHG | OXYGEN SATURATION: 97 % | TEMPERATURE: 99 F | BODY MASS INDEX: 21.89 KG/M2 | DIASTOLIC BLOOD PRESSURE: 58 MMHG | WEIGHT: 152.56 LBS

## 2020-07-06 DIAGNOSIS — L30.9 PERIANAL DERMATITIS: Primary | ICD-10-CM

## 2020-07-06 DIAGNOSIS — K59.09 CHRONIC CONSTIPATION: ICD-10-CM

## 2020-07-06 DIAGNOSIS — G89.29 CHRONIC MIDLINE LOW BACK PAIN WITH RIGHT-SIDED SCIATICA: ICD-10-CM

## 2020-07-06 DIAGNOSIS — I10 ESSENTIAL HYPERTENSION: ICD-10-CM

## 2020-07-06 DIAGNOSIS — R73.03 PREDIABETES: ICD-10-CM

## 2020-07-06 DIAGNOSIS — M54.41 CHRONIC MIDLINE LOW BACK PAIN WITH RIGHT-SIDED SCIATICA: ICD-10-CM

## 2020-07-06 DIAGNOSIS — R97.20 ELEVATED PSA: ICD-10-CM

## 2020-07-06 PROCEDURE — 1126F AMNT PAIN NOTED NONE PRSNT: CPT | Mod: HCNC,S$GLB,, | Performed by: FAMILY MEDICINE

## 2020-07-06 PROCEDURE — 1126F PR PAIN SEVERITY QUANTIFIED, NO PAIN PRESENT: ICD-10-PCS | Mod: HCNC,S$GLB,, | Performed by: FAMILY MEDICINE

## 2020-07-06 PROCEDURE — 1101F PT FALLS ASSESS-DOCD LE1/YR: CPT | Mod: HCNC,CPTII,S$GLB, | Performed by: FAMILY MEDICINE

## 2020-07-06 PROCEDURE — 1159F MED LIST DOCD IN RCRD: CPT | Mod: HCNC,S$GLB,, | Performed by: FAMILY MEDICINE

## 2020-07-06 PROCEDURE — 99999 PR PBB SHADOW E&M-EST. PATIENT-LVL III: ICD-10-PCS | Mod: PBBFAC,HCNC,, | Performed by: FAMILY MEDICINE

## 2020-07-06 PROCEDURE — 99214 PR OFFICE/OUTPT VISIT, EST, LEVL IV, 30-39 MIN: ICD-10-PCS | Mod: HCNC,S$GLB,, | Performed by: FAMILY MEDICINE

## 2020-07-06 PROCEDURE — 1101F PR PT FALLS ASSESS DOC 0-1 FALLS W/OUT INJ PAST YR: ICD-10-PCS | Mod: HCNC,CPTII,S$GLB, | Performed by: FAMILY MEDICINE

## 2020-07-06 PROCEDURE — 1159F PR MEDICATION LIST DOCUMENTED IN MEDICAL RECORD: ICD-10-PCS | Mod: HCNC,S$GLB,, | Performed by: FAMILY MEDICINE

## 2020-07-06 PROCEDURE — 99999 PR PBB SHADOW E&M-EST. PATIENT-LVL III: CPT | Mod: PBBFAC,HCNC,, | Performed by: FAMILY MEDICINE

## 2020-07-06 PROCEDURE — 99214 OFFICE O/P EST MOD 30 MIN: CPT | Mod: HCNC,S$GLB,, | Performed by: FAMILY MEDICINE

## 2020-07-06 NOTE — PROGRESS NOTES
Subjective:       Patient ID: Dominguez Ritchie is a 87 y.o. male.    Chief Complaint: Otalgia and Rectal Pain      HPI Comments:       Current Outpatient Medications:     amLODIPine (NORVASC) 2.5 MG tablet, Take 1 tablet (2.5 mg total) by mouth once daily., Disp: 90 tablet, Rfl: 3    meclizine (ANTIVERT) 25 mg tablet, Take 1 tablet by mouth twice daily as needed, Disp: 90 tablet, Rfl: 0    meloxicam (MOBIC) 7.5 MG tablet, Take 1 tablet (7.5 mg total) by mouth daily as needed., Disp: 180 tablet, Rfl: 1    omeprazole (PRILOSEC) 20 MG capsule, TAKE 1 CAPSULE EVERY DAY, Disp: 90 capsule, Rfl: 0    rosuvastatin (CRESTOR) 5 MG tablet, Take 1 tablet (5 mg total) by mouth once daily., Disp: 90 tablet, Rfl: 3    traMADoL (ULTRAM) 50 mg tablet, Take 1 tablet (50 mg total) by mouth 2 (two) times daily as needed., Disp: 60 tablet, Rfl: 1    fluticasone propionate (CUTIVATE) 0.005 % ointment, Apply topically 2 (two) times daily. (Patient not taking: Reported on 7/6/2020), Disp: 60 g, Rfl: 1    FLUZONE HIGH-DOSE 2019-20, PF, 180 mcg/0.5 mL Syrg, ADM 0.5ML IM UTD, Disp: , Rfl: 0    linaCLOtide (LINZESS) 145 mcg Cap capsule, Take 1 capsule (145 mcg total) by mouth once daily. (Patient not taking: Reported on 7/6/2020), Disp: 30 capsule, Rfl: 2    nystatin (MYCOSTATIN) cream, Apply topically 2 (two) times daily., Disp: 30 g, Rfl: 2    rOPINIRole (REQUIP) 0.5 MG tablet, Take 1 tablet (0.5 mg total) by mouth every evening. (Patient not taking: Reported on 1/22/2020), Disp: 90 tablet, Rfl: 3    triamcinolone acetonide 0.1% (KENALOG) 0.1 % cream, APPLY TO RASH TWICE DAILY AS NEEDED FOR ITCHING, Disp: , Rfl:       Has had a Vijay anal/rectal rash months now.  Could not tolerate previous treatments which included nystatin and  Juliet's  butt paste.  These were tried back in February.  Problem has waxed and waned since then but never got completely better.  Currently soaking in Sitz baths with soap and water.  Has a  dermatologist that he has been seeing for years.  Does not spend excessive amounts of time sitting or lying down.    Alternating constipation with some loose stools.  Takes Linzess p.r.n..  Complains of a block sensation in his right ear.  No pain.  Is intermittent and seems to be getting better    Review of Systems   Constitutional: Negative for activity change, appetite change and fever.   HENT: Negative for sore throat.    Respiratory: Negative for cough and shortness of breath.    Cardiovascular: Negative for chest pain.   Gastrointestinal: Negative for abdominal pain, diarrhea and nausea.   Genitourinary: Negative for difficulty urinating.   Musculoskeletal: Negative for arthralgias and myalgias.   Skin: Positive for rash.   Neurological: Negative for dizziness and headaches.       Objective:      Vitals:    07/06/20 1453   BP: (!) 122/58   Pulse: 64   Temp: 98.6 °F (37 °C)   TempSrc: Tympanic   SpO2: 97%   Weight: 69.2 kg (152 lb 8.9 oz)   PainSc: 0-No pain     Physical Exam  Vitals signs and nursing note reviewed.   Constitutional:       General: He is not in acute distress.     Appearance: He is well-developed. He is not diaphoretic.   HENT:      Head: Normocephalic.   Neck:      Musculoskeletal: Neck supple.      Thyroid: No thyromegaly.   Cardiovascular:      Rate and Rhythm: Normal rate and regular rhythm.      Heart sounds: Normal heart sounds. No murmur.   Pulmonary:      Effort: Pulmonary effort is normal.      Breath sounds: Normal breath sounds. No wheezing or rales.   Abdominal:      General: There is no distension.      Palpations: Abdomen is soft.   Genitourinary:     Rectum: No mass or anal fissure.       Lymphadenopathy:      Cervical: No cervical adenopathy.   Skin:     General: Skin is warm and dry.      Comments: No decubitus erythema or ulceration   Neurological:      Mental Status: He is alert and oriented to person, place, and time.   Psychiatric:         Behavior: Behavior normal.          Thought Content: Thought content normal.         Judgment: Judgment normal.         Assessment:       1. Perianal dermatitis    2. Essential hypertension    3. Prediabetes    4. Chronic constipation    5. Chronic midline low back pain with right-sided sciatica    6. Elevated PSA        Plan:   Perianal dermatitis  Comments:  Appears fungal but he had difficulty tolerating topical antifungals.  Pt to make derm appt for recommendations.  Problem does not appear to be due to pressure    Essential hypertension  Comments:  Controlled    Prediabetes  Comments:  Last A1c 5.4    Chronic constipation  Comments:  On Linzess    Chronic midline low back pain with right-sided sciatica  Comments:  Followed by chronic pain management    Elevated PSA  Comments:  Progressive increase over the last few years.  Will review goals/plans at follow-up visit

## 2020-07-09 ENCOUNTER — TELEPHONE (OUTPATIENT)
Dept: PAIN MEDICINE | Facility: CLINIC | Age: 85
End: 2020-07-09

## 2020-07-09 NOTE — TELEPHONE ENCOUNTER
----- Message from Suzan Venegas sent at 7/9/2020  1:15 PM CDT -----  Contact: pt  Pt requesting a call back to know if he will be able to get an injection at his upcoming appt. Please call back  at [phone]

## 2020-07-13 ENCOUNTER — OFFICE VISIT (OUTPATIENT)
Dept: PAIN MEDICINE | Facility: CLINIC | Age: 85
End: 2020-07-13
Payer: MEDICARE

## 2020-07-13 VITALS
BODY MASS INDEX: 21.76 KG/M2 | WEIGHT: 152 LBS | HEIGHT: 70 IN | DIASTOLIC BLOOD PRESSURE: 69 MMHG | HEART RATE: 61 BPM | RESPIRATION RATE: 18 BRPM | SYSTOLIC BLOOD PRESSURE: 139 MMHG

## 2020-07-13 DIAGNOSIS — M54.9 DORSALGIA, UNSPECIFIED: ICD-10-CM

## 2020-07-13 DIAGNOSIS — M47.816 LUMBAR FACET ARTHROPATHY: ICD-10-CM

## 2020-07-13 DIAGNOSIS — M54.16 LUMBAR RADICULOPATHY: ICD-10-CM

## 2020-07-13 DIAGNOSIS — M47.816 LUMBAR SPONDYLOSIS: Primary | ICD-10-CM

## 2020-07-13 PROCEDURE — 1125F PR PAIN SEVERITY QUANTIFIED, PAIN PRESENT: ICD-10-PCS | Mod: HCNC,S$GLB,, | Performed by: ANESTHESIOLOGY

## 2020-07-13 PROCEDURE — 99213 PR OFFICE/OUTPT VISIT, EST, LEVL III, 20-29 MIN: ICD-10-PCS | Mod: HCNC,S$GLB,, | Performed by: ANESTHESIOLOGY

## 2020-07-13 PROCEDURE — 99999 PR PBB SHADOW E&M-EST. PATIENT-LVL III: CPT | Mod: PBBFAC,HCNC,, | Performed by: ANESTHESIOLOGY

## 2020-07-13 PROCEDURE — 1101F PR PT FALLS ASSESS DOC 0-1 FALLS W/OUT INJ PAST YR: ICD-10-PCS | Mod: HCNC,CPTII,S$GLB, | Performed by: ANESTHESIOLOGY

## 2020-07-13 PROCEDURE — 99213 OFFICE O/P EST LOW 20 MIN: CPT | Mod: HCNC,S$GLB,, | Performed by: ANESTHESIOLOGY

## 2020-07-13 PROCEDURE — 99999 PR PBB SHADOW E&M-EST. PATIENT-LVL III: ICD-10-PCS | Mod: PBBFAC,HCNC,, | Performed by: ANESTHESIOLOGY

## 2020-07-13 PROCEDURE — 1125F AMNT PAIN NOTED PAIN PRSNT: CPT | Mod: HCNC,S$GLB,, | Performed by: ANESTHESIOLOGY

## 2020-07-13 PROCEDURE — 1101F PT FALLS ASSESS-DOCD LE1/YR: CPT | Mod: HCNC,CPTII,S$GLB, | Performed by: ANESTHESIOLOGY

## 2020-07-13 PROCEDURE — 1159F PR MEDICATION LIST DOCUMENTED IN MEDICAL RECORD: ICD-10-PCS | Mod: HCNC,S$GLB,, | Performed by: ANESTHESIOLOGY

## 2020-07-13 PROCEDURE — 1159F MED LIST DOCD IN RCRD: CPT | Mod: HCNC,S$GLB,, | Performed by: ANESTHESIOLOGY

## 2020-07-13 NOTE — PROGRESS NOTES
Chief Pain Complaint:  Low Back Pain, Bilat Hip pain, Right leg pain, right knee pain      History of Present Illness:     Dominguez Ritchie is a 87 y.o. male  who is presenting with a chief complaint of lumbar back pain. The patient began experiencing this problem insidiously, and the pain has been gradually worsening over the past 8 month(s). The pain is described as throbbing, cramping, aching and heavy and is located in the right lumbar spine. Pain is intermittent and lasts hours. The  pain radiated to the right leg. The patient rates his pain a 6 out of ten and interferes with activities of daily living a 7 out of ten. Pain is exacerbated by flexion/extension of the lumbar spine, getting up from a seated position, prolonged standing, and is improved by rest. Patient reports no prior trauma, no prior spinal surgery     - antecedent trauma, prior spinal surgery: patient reports prior trauma, prior lumbar surgery (surgery in January 2017 with Dr. Lancaster at [facility]), left knee replacement  - pertinent negatives: No fever, No chills, No weight loss, No bladder dysfunction, No bowel dysfunction, No saddle anesthesia  - pertinent positives: generalized nonspecific Lower Extremity weakness bilaterally, BLE numbness  - medications, other therapies tried (physical therapy, injections):     >> Tylenol, NSAIDs, gabapentin, Mobic, tramadol, Norco    >> Has previously undergone Physical Therapy    >> Has previously undergone spinal injection/s    - has undergone approximately 3-4 spinal injections previously (uncertain as to the specific type of injections that he has had, seems one was a lumbar rhizotomy)   - Right knee genicular nerve block on 7/25/18 with 70% pain relief for 6 months    - Right SI, Right GTB, Right Piriformis Inejction 10/17/18 with 80% pain relief for 2 months   - Right SI and Right GTB 12/19/18 with 20% relief    - Right Piriformis on 1/31/19 with no relief              -  Bilateral L3, 4,5 MBB on 8/29/19  with minimal relief     Imaging / Labs / Studies (reviewed on 7/13/2020):    9/20/2018 X-Ray Lumbar Complete With Flex And Ext  TECHNIQUE:  Five views of the lumbar spine plus flexion extension views were performed.  COMPARISON:  November 16, 2015  FINDINGS:  Vertebral body heights are unchanged.  Chronic compression deformity of L1 noted.  Alignment is anatomic.  L4-5 disc height loss present.  There is multilevel anterior osteophyte formation.  Lower lumbar spine facet arthropathy noted.  No change in alignment on flexion or extension views.  No pars defects appreciated.  Mild vascular calcifications noted.  Impression: Degenerative findings.      7/12/2018 XR KNEE ORTHO BILAT WITH FLEXION  TECHNIQUE:  AP standing of both knees, PA flexion standing views of both knees, and Merchant views of both knees were performed.  Lateral views of both knees were also performed.  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  Prosthesis position and alignment are satisfactory without radiographic evidence of hardware loosening or other complicating process.  Moderate degenerative change of the right knee.  No acute fracture or malalignment.       Results for orders placed during the hospital encounter of 11/16/15   X-Ray Lumbar Spine Complete 5 View    Narrative Five views of the lumbar spine  Findings: There is increased vertebral body height loss noted at the L1 level.  50% vertebrae height loss is noted anteriorly at this level.  The alignment is within normal limits. There is moderate disk space narrowing noted at the L4-5 level.  Mild/moderate facet arthropathy is noted from L2-3 through L5-S1 levels. Calcified granulomas are noted within the spleen. No pars defects.       Results for orders placed during the hospital encounter of 03/26/15   X-Ray Lumbar Spine AP And Lateral    Narrative Three views of the lumbar spine  Findings: There is a compression L1 level that is new  "when compared to the prior exam and demonstrates approximately 25% vertebral height loss.  There is no obvious condensation line seen on the plain films suggesting that this is still a chronic deformity.  The alignment is grossly within normal limits.  There is multilevel spondylosis with mild to moderate to space narrowing noted throughout the lumbar spine greatest at L4-5 level.  Facet arthropathy is noted from the L2-3 through the L5-S1 levels and most prominent at the L5-S1 level.     _________________________________________________________________________________________________________________________________________________________________________________________________________________________      Review of Systems:  CONSTITUTIONAL: patient denies any fever, chills, or weight loss  SKIN: patient denies any rash or itching  RESPIRATORY: patient denies having any shortness of breath  GASTROINTESTINAL: patient denies having any diarrhea, constipation, or bowel incontinence  GENITOURINARY: patient denies having any abnormal bladder function    MUSCULOSKELETAL:  - patient complains of the above noted pain/s (see chief pain complaint)    NEUROLOGICAL:   - pain as above  - strength in Lower extremities is decreased, BILATERALLY  - sensation in Lower extremities is abnormal, on the LEFT  - patient denies any loss of bowel or bladder control      PSYCHIATRIC: patient denies any change in mood    Other:  All other systems reviewed and are negative    _________________________________________________________________________________________________________________________________________________________________________________________________________________________     Physical Exam:  Vitals:  /69 (BP Location: Right arm, Patient Position: Sitting, BP Method: Medium (Automatic))   Pulse 61   Resp 18   Ht 5' 10" (1.778 m)   Wt 68.9 kg (152 lb)   BMI 21.81 kg/m²    (reviewed on 7/13/2020)    General: alert " and oriented, in no apparent distress  Gait: normal gait  Skin: No rashes, No discoloration, No obvious lesions  HEENT: EOMI  Cardiovascular: no significant peripheral edema present  Respiratory: respirations nonlabored    Musculoskeletal:       Lumbar Spine     - Pain on flexion of lumbar spine Absent   - Straight Leg Raise:  Absent      - Pain on extension of lumbar spine Present  - TTP over the lumbar facet joints Present  Bilateral L5-S1  - Lumbar facet loading Present     -Pain on palpation over the SI joint Present on Right much improved   - BRENDEN: Absent    Neuro:  - Extremity Strength:     >> LEFT :: dec with dorsiflexion    >> RIGHT :: dec with dorsiflexion  - Extremity Reflexes:    >> LEFT  :: 2+    >> RIGHT :: 2+     Psych:  Mood and affect is appropriate    _________________________________________________________________________________________________________________________________________________________________________________________________________________________      Assessment:  Dominguez Ritchie is a 87 y.o. male who presents with   Encounter Diagnoses   Name Primary?    Lumbar spondylosis Yes    Lumbar radiculopathy     Lumbar facet arthropathy     Dorsalgia, unspecified          Patient returns for follow-up visit.  He complains of continued low back and right leg pain.  He has been seen at the [facility], underwent a spinal injection that did not help, and then ultimately underwent surgery (unsure of type) with Dr. Lancaster in January 2017.  We previously requested records multiple times from the [facility], however we have not received these. 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 He had treatment for this, but he reports the damage was already present by the time it was discovered.       Plan:  1. Interventional: None for now. Consider Right L3-4 TFESI.       Bilateral L3, 4,5 MBB on 8/29/19 with minimal  relief     2. Pharmacologic:   -  Discontinue tramadol 50mg TID PRN pain (60 tabs). Patient did not tolerate secondary to dizziness.   - Continue Mobic at 7.5mg QD PRN dose.     - Opioid contract signed 1/18/2019.   - LA  reviewed and appropriate.      3. Rehabilitative: Encouraged regular exercise.    4. Diagnostic: Lumbar MRI ordered.    5. Follow up: Post MRI.

## 2020-07-23 ENCOUNTER — TELEPHONE (OUTPATIENT)
Dept: INTERNAL MEDICINE | Facility: CLINIC | Age: 85
End: 2020-07-23

## 2020-07-23 NOTE — TELEPHONE ENCOUNTER
----- Message from Marisol Kirkland sent at 7/23/2020  9:29 AM CDT -----  Type:  Needs Medical Advice    Who Called: wife  Symptoms (please be specific): ear pain   How long has patient had these symptoms:  1wk  Pharmacy name and phone #:  [facility]  Would the patient rather a call back or a response via [facility]?  Call back  Best Call Back Number: [phone]  Additional Information: states pt need refill on eye drops

## 2020-07-24 NOTE — TELEPHONE ENCOUNTER
Patient would like ear drops called in for his ear pains. States he has had them in the past. Does not feel he needs an appointment although he was told he may have to get one to get the prescription.

## 2020-07-27 ENCOUNTER — OFFICE VISIT (OUTPATIENT)
Dept: FAMILY MEDICINE | Facility: CLINIC | Age: 85
End: 2020-07-27
Payer: MEDICARE

## 2020-07-27 ENCOUNTER — TELEPHONE (OUTPATIENT)
Dept: FAMILY MEDICINE | Facility: CLINIC | Age: 85
End: 2020-07-27

## 2020-07-27 VITALS
WEIGHT: 156.75 LBS | HEART RATE: 73 BPM | DIASTOLIC BLOOD PRESSURE: 70 MMHG | SYSTOLIC BLOOD PRESSURE: 140 MMHG | TEMPERATURE: 98 F | OXYGEN SATURATION: 98 % | BODY MASS INDEX: 22.49 KG/M2

## 2020-07-27 DIAGNOSIS — H60.331 ACUTE SWIMMER'S EAR OF RIGHT SIDE: Primary | ICD-10-CM

## 2020-07-27 PROCEDURE — 1101F PR PT FALLS ASSESS DOC 0-1 FALLS W/OUT INJ PAST YR: ICD-10-PCS | Mod: HCNC,CPTII,S$GLB, | Performed by: FAMILY MEDICINE

## 2020-07-27 PROCEDURE — 1125F PR PAIN SEVERITY QUANTIFIED, PAIN PRESENT: ICD-10-PCS | Mod: HCNC,S$GLB,, | Performed by: FAMILY MEDICINE

## 2020-07-27 PROCEDURE — 99213 PR OFFICE/OUTPT VISIT, EST, LEVL III, 20-29 MIN: ICD-10-PCS | Mod: HCNC,S$GLB,, | Performed by: FAMILY MEDICINE

## 2020-07-27 PROCEDURE — 99999 PR PBB SHADOW E&M-EST. PATIENT-LVL III: CPT | Mod: PBBFAC,HCNC,, | Performed by: FAMILY MEDICINE

## 2020-07-27 PROCEDURE — 1101F PT FALLS ASSESS-DOCD LE1/YR: CPT | Mod: HCNC,CPTII,S$GLB, | Performed by: FAMILY MEDICINE

## 2020-07-27 PROCEDURE — 1159F PR MEDICATION LIST DOCUMENTED IN MEDICAL RECORD: ICD-10-PCS | Mod: HCNC,S$GLB,, | Performed by: FAMILY MEDICINE

## 2020-07-27 PROCEDURE — 1159F MED LIST DOCD IN RCRD: CPT | Mod: HCNC,S$GLB,, | Performed by: FAMILY MEDICINE

## 2020-07-27 PROCEDURE — 1125F AMNT PAIN NOTED PAIN PRSNT: CPT | Mod: HCNC,S$GLB,, | Performed by: FAMILY MEDICINE

## 2020-07-27 PROCEDURE — 99999 PR PBB SHADOW E&M-EST. PATIENT-LVL III: ICD-10-PCS | Mod: PBBFAC,HCNC,, | Performed by: FAMILY MEDICINE

## 2020-07-27 PROCEDURE — 99213 OFFICE O/P EST LOW 20 MIN: CPT | Mod: HCNC,S$GLB,, | Performed by: FAMILY MEDICINE

## 2020-07-27 RX ORDER — NEOMYCIN SULFATE, POLYMYXIN B SULFATE AND HYDROCORTISONE 10; 3.5; 1 MG/ML; MG/ML; [USP'U]/ML
3 SUSPENSION/ DROPS AURICULAR (OTIC) 4 TIMES DAILY
Qty: 10 ML | Refills: 0 | Status: SHIPPED | OUTPATIENT
Start: 2020-07-27 | End: 2020-08-06

## 2020-07-27 RX ORDER — OFLOXACIN 3 MG/ML
1 SOLUTION/ DROPS OPHTHALMIC 4 TIMES DAILY
Qty: 10 ML | Refills: 1 | Status: SHIPPED | OUTPATIENT
Start: 2020-07-27 | End: 2020-08-03

## 2020-07-27 RX ORDER — FLUOCINOLONE ACETONIDE 0.11 MG/ML
1 OIL AURICULAR (OTIC) DAILY PRN
Qty: 20 ML | Refills: 1 | Status: SHIPPED | OUTPATIENT
Start: 2020-07-27 | End: 2020-08-26

## 2020-07-27 NOTE — PROGRESS NOTES
Subjective:       Patient ID: Dominguez Ritchie is a 87 y.o. male.    Chief Complaint: Otalgia (right ear)      History of Present Illness:   Dominguez Ritchie 87 y.o. male presents today with   Ear Pain: Patient complains of right ear pain.  Onset of symptoms was 4 days ago, gradually worsening since that time. He also notes   ear symptoms: yellow drainage. He does have a history of ear infections.  He does have a history of swimming.  He has tried no medications  for his symptoms.    Past Medical History:   Diagnosis Date    Abnormal exercise tolerance test 2013    Arthritis     Colon polyp     Diverticulosis     Dyslipidemia 2013    GERD (gastroesophageal reflux disease)     HTN, goal below 130/80 12/3/2018    Hyperlipidemia 1980    HIGH TG    Knee pain, right 2013    Low back pain with right-sided sciatica 12/3/2018    Meningioma     Personal history of colonic polyps 2014    RLS (restless legs syndrome) 2013    Tobacco dependence     resolved    West Nile meningitis     per patient     Family History   Problem Relation Age of Onset    Heart disease Mother     Cancer Son         pancreatic     Diabetes Maternal Uncle     Kidney disease Neg Hx     Stroke Neg Hx      Social History     Socioeconomic History    Marital status:      Spouse name: Not on file    Number of children: Not on file    Years of education: Not on file    Highest education level: Not on file   Occupational History    Not on file   Social Needs    Financial resource strain: Not on file    Food insecurity     Worry: Not on file     Inability: Not on file    Transportation needs     Medical: Not on file     Non-medical: Not on file   Tobacco Use    Smoking status: Former Smoker     Packs/day: 1.00     Years: 25.00     Pack years: 25.00     Quit date: 1990     Years since quittin.5    Smokeless tobacco: Never Used   Substance and Sexual Activity    Alcohol use: No      Alcohol/week: 0.0 standard drinks    Drug use: No    Sexual activity: Not Currently     Birth control/protection: None   Lifestyle    Physical activity     Days per week: Not on file     Minutes per session: Not on file    Stress: Not at all   Relationships    Social connections     Talks on phone: Not on file     Gets together: Not on file     Attends [employer] service: Not on file     Active member of club or organization: Not on file     Attends meetings of clubs or organizations: Not on file     Relationship status: Not on file   Other Topics Concern    Not on file   Social History Narrative    Not on file     Outpatient Encounter Medications as of 7/27/2020   Medication Sig Dispense Refill    amLODIPine (NORVASC) 2.5 MG tablet Take 1 tablet (2.5 mg total) by mouth once daily. 90 tablet 3    meclizine (ANTIVERT) 25 mg tablet Take 1 tablet by mouth twice daily as needed 90 tablet 0    meloxicam (MOBIC) 7.5 MG tablet Take 1 tablet (7.5 mg total) by mouth daily as needed. 180 tablet 1    omeprazole (PRILOSEC) 20 MG capsule TAKE 1 CAPSULE EVERY DAY 90 capsule 0    fluocinolone acetonide oiL 0.01 % Drop Place 1 drop in ear(s) daily as needed. 20 mL 1    fluticasone propionate (CUTIVATE) 0.005 % ointment Apply topically 2 (two) times daily. 60 g 1    FLUZONE HIGH-DOSE 2019-20, PF, 180 mcg/0.5 mL Syrg ADM 0.5ML IM UTD  0    linaCLOtide (LINZESS) 145 mcg Cap capsule Take 1 capsule (145 mcg total) by mouth once daily. 30 capsule 2    nystatin (MYCOSTATIN) cream Apply topically 2 (two) times daily. 30 g 2    ofloxacin (OCUFLOX) 0.3 % ophthalmic solution Place 1 drop into the right eye 4 (four) times daily. for 7 days 10 mL 1    rOPINIRole (REQUIP) 0.5 MG tablet Take 1 tablet (0.5 mg total) by mouth every evening. (Patient not taking: Reported on 1/22/2020) 90 tablet 3    rosuvastatin (CRESTOR) 5 MG tablet Take 1 tablet (5 mg total) by mouth once daily. 90 tablet 3    triamcinolone acetonide 0.1%  (KENALOG) 0.1 % cream APPLY TO RASH TWICE DAILY AS NEEDED FOR ITCHING       No facility-administered encounter medications on file as of 7/27/2020.        Review of Systems   Constitutional: Negative for appetite change and fever.   HENT: Positive for ear discharge and ear pain. Negative for congestion, facial swelling and voice change.    Eyes: Negative for discharge and itching.   Respiratory: Negative for cough, chest tightness and wheezing.    Cardiovascular: Negative.  Negative for chest pain and leg swelling.   Gastrointestinal: Negative for abdominal pain, nausea and vomiting.   Endocrine: Negative for cold intolerance and heat intolerance.   Genitourinary: Negative for dysuria and flank pain.   Musculoskeletal: Negative for myalgias and neck stiffness.   Skin: Negative for pallor and rash.   Neurological: Negative for facial asymmetry and weakness.   Psychiatric/Behavioral: Negative for agitation and confusion.       Objective:      BP (!) 140/70 (BP Location: Right arm, Patient Position: Sitting, BP Method: Medium (Manual))   Pulse 73   Temp 98.2 °F (36.8 °C) (Temporal)   Wt 71.1 kg (156 lb 12 oz)   SpO2 98%   BMI 22.49 kg/m²   Physical Exam  Vitals signs and nursing note reviewed.   Constitutional:       General: He is not in acute distress.     Appearance: Normal appearance. He is well-developed.   HENT:      Head: Normocephalic and atraumatic.      Right Ear: Drainage (yellow drainage) present.      Left Ear: External ear normal. No drainage.   Eyes:      Conjunctiva/sclera: Conjunctivae normal.   Neck:      Musculoskeletal: Neck supple.   Cardiovascular:      Rate and Rhythm: Normal rate and regular rhythm.   Pulmonary:      Effort: Pulmonary effort is normal. No respiratory distress.   Abdominal:      General: Bowel sounds are normal.      Palpations: Abdomen is soft.   Genitourinary:     Comments: deferred  Skin:     General: Skin is warm and dry.   Neurological:      Mental Status: He is alert  and oriented to person, place, and time.   Psychiatric:         Behavior: Behavior normal.         Results for orders placed or performed in visit on 01/22/20   Lipid panel   Result Value Ref Range    Cholesterol 229 (H) 120 - 199 mg/dL    Triglycerides 278 (H) 30 - 150 mg/dL    HDL 47 40 - 75 mg/dL    LDL Cholesterol 126.4 63.0 - 159.0 mg/dL    Hdl/Cholesterol Ratio 20.5 20.0 - 50.0 %    Total Cholesterol/HDL Ratio 4.9 2.0 - 5.0    Non-HDL Cholesterol 182 mg/dL   CBC auto differential   Result Value Ref Range    WBC 4.97 3.90 - 12.70 K/uL    RBC 4.42 (L) 4.60 - 6.20 M/uL    Hemoglobin 13.9 (L) 14.0 - 18.0 g/dL    Hematocrit 42.0 40.0 - 54.0 %    Mean Corpuscular Volume 95 82 - 98 fL    Mean Corpuscular Hemoglobin 31.4 (H) 27.0 - 31.0 pg    Mean Corpuscular Hemoglobin Conc 33.1 32.0 - 36.0 g/dL    RDW 12.5 11.5 - 14.5 %    Platelets 230 150 - 350 K/uL    MPV 10.6 9.2 - 12.9 fL    Immature Granulocytes 0.2 0.0 - 0.5 %    Gran # (ANC) 2.7 1.8 - 7.7 K/uL    Immature Grans (Abs) 0.01 0.00 - 0.04 K/uL    Lymph # 1.3 1.0 - 4.8 K/uL    Mono # 0.7 0.3 - 1.0 K/uL    Eos # 0.2 0.0 - 0.5 K/uL    Baso # 0.05 0.00 - 0.20 K/uL    nRBC 0 0 /100 WBC    Gran% 54.7 38.0 - 73.0 %    Lymph% 27.0 18.0 - 48.0 %    Mono% 13.9 4.0 - 15.0 %    Eosinophil% 3.2 0.0 - 8.0 %    Basophil% 1.0 0.0 - 1.9 %    Differential Method Automated    Comprehensive metabolic panel   Result Value Ref Range    Sodium 137 136 - 145 mmol/L    Potassium 4.4 3.5 - 5.1 mmol/L    Chloride 105 95 - 110 mmol/L    CO2 28 23 - 29 mmol/L    Glucose 105 70 - 110 mg/dL    BUN, Bld 17 8 - 23 mg/dL    Creatinine 0.8 0.5 - 1.4 mg/dL    Calcium 9.2 8.7 - 10.5 mg/dL    Total Protein 6.9 6.0 - 8.4 g/dL    Albumin 3.8 3.5 - 5.2 g/dL    Total Bilirubin 0.4 0.1 - 1.0 mg/dL    Alkaline Phosphatase 73 55 - 135 U/L    AST 21 10 - 40 U/L    ALT 13 10 - 44 U/L    Anion Gap 4 (L) 8 - 16 mmol/L    eGFR if African American >60.0 >60 mL/min/1.73 m^2    eGFR if non African American >60.0  >60 mL/min/1.73 m^2   PSA, Screening   Result Value Ref Range    PSA, SCREEN 13.4 (H) 0.00 - 4.00 ng/mL     Assessment:       1. Acute swimmer's ear of right side        Plan:   Acute swimmer's ear of right side    Other orders  -     ofloxacin (OCUFLOX) 0.3 % ophthalmic solution; Place 1 drop into the right eye 4 (four) times daily. for 7 days  Dispense: 10 mL; Refill: 1  -     fluocinolone acetonide oiL 0.01 % Drop; Place 1 drop in ear(s) daily as needed.  Dispense: 20 mL; Refill: 1

## 2020-07-27 NOTE — PATIENT INSTRUCTIONS

## 2020-08-10 RX ORDER — OMEPRAZOLE 20 MG/1
CAPSULE, DELAYED RELEASE ORAL
Qty: 90 CAPSULE | Refills: 0 | OUTPATIENT
Start: 2020-08-10

## 2020-08-10 RX ORDER — OMEPRAZOLE 20 MG/1
20 CAPSULE, DELAYED RELEASE ORAL DAILY
Qty: 90 CAPSULE | Refills: 1 | Status: SHIPPED | OUTPATIENT
Start: 2020-08-10 | End: 2021-02-05

## 2020-08-17 ENCOUNTER — TELEPHONE (OUTPATIENT)
Dept: PAIN MEDICINE | Facility: CLINIC | Age: 85
End: 2020-08-17

## 2020-08-17 DIAGNOSIS — M47.816 LUMBAR SPONDYLOSIS: ICD-10-CM

## 2020-08-17 DIAGNOSIS — M54.16 LUMBAR RADICULOPATHY: Primary | ICD-10-CM

## 2020-08-17 NOTE — TELEPHONE ENCOUNTER
Contacted patient. Injection scheduled. Pre-injection instructions taught and mailed to patient. Patient verbalized understanding. All questions answered.

## 2020-08-17 NOTE — TELEPHONE ENCOUNTER
----- Message from Heidi Blackwood LPN sent at 8/17/2020 10:31 AM CDT -----  Per :  Right L3-4, L4-5 TFESI. Stop Meloxicam 4 days and all asa products/multivitamins and supplements 7 days  ----- Message -----  From: Heidi Ring  Sent: 8/17/2020   7:57 AM CDT  To: [facility] Tomlin Staff    Type:  Needs Medical Advice    Who Called:  Pt  Dominguez   Symptoms (please be specific):   back pain    How long has patient had these symptoms:     Pharmacy name and phone #:     Would the patient rather a call back or a response via [facility]?   Call back   Best Call Back Number:   [phone]  Additional Information:  Pt is calling to ask about getting an injection in his back//please call to discuss//hussain/nish

## 2020-08-26 DIAGNOSIS — M47.816 LUMBAR SPONDYLOSIS: ICD-10-CM

## 2020-08-26 DIAGNOSIS — M54.9 DORSALGIA, UNSPECIFIED: ICD-10-CM

## 2020-08-26 DIAGNOSIS — M54.16 LUMBAR RADICULOPATHY: Primary | ICD-10-CM

## 2020-08-31 ENCOUNTER — TELEPHONE (OUTPATIENT)
Dept: PAIN MEDICINE | Facility: CLINIC | Age: 85
End: 2020-08-31

## 2020-08-31 NOTE — TELEPHONE ENCOUNTER
----- Message from Ti Gray sent at 8/31/2020  4:26 PM CDT -----  Regarding: pts wife  Would like a call from nurse in regards to additional information. Please call back at .[phone] (home)           Thank you ,   Ti Gray

## 2020-09-01 ENCOUNTER — TELEPHONE (OUTPATIENT)
Dept: PAIN MEDICINE | Facility: CLINIC | Age: 85
End: 2020-09-01

## 2020-09-01 NOTE — TELEPHONE ENCOUNTER
Spoke with patient regarding MRI and procedure on Thursday 09/03/2020 with Dr. Sadler. Per Dr. Sadler in order to do the procedure he will need an updated MRI. Patient is responsible for a $195 copay for his MRI and a $130 dollar copay for his procedure later that morning with Dr. Sadler. Patient stated that he can not afford to pay that and decided to cancel both his MRI and procedure with Dr. Sadler. Advised patient that we do have a financial assistance department that I could give him the number to to get set up for assistance. Patient stated that he was not interested and that he will probably end up changing insurance companies.

## 2020-09-01 NOTE — TELEPHONE ENCOUNTER
----- Message from Gama Cruz sent at 9/1/2020  8:45 AM CDT -----  Regarding: pt advice  Contact: Rox  Pt wife Rox called to consult with nurse about pt upcoming procedure 09/03/2020. Please call Rox back at [phone]. Thanks tp

## 2020-09-02 ENCOUNTER — TELEPHONE (OUTPATIENT)
Dept: PAIN MEDICINE | Facility: CLINIC | Age: 85
End: 2020-09-02

## 2020-09-02 NOTE — TELEPHONE ENCOUNTER
Contacted pt. Pt states he has paid $130 copay for procedure that has been cancelled. Contacted , Candace. Candace states she will request refund. Pt notified. All questions answered.//lp

## 2020-09-02 NOTE — TELEPHONE ENCOUNTER
----- Message from Lora Moreno sent at 9/2/2020 12:27 PM CDT -----  Contact: Nabor Mix would like a call back at [phone] or [phone], Regards to if he is going to have his injection done or will he be giving his payment back.    Thanks  Td

## 2020-09-11 ENCOUNTER — OFFICE VISIT (OUTPATIENT)
Dept: FAMILY MEDICINE | Facility: CLINIC | Age: 85
End: 2020-09-11
Payer: MEDICARE

## 2020-09-11 VITALS
OXYGEN SATURATION: 96 % | HEIGHT: 70 IN | HEART RATE: 62 BPM | SYSTOLIC BLOOD PRESSURE: 140 MMHG | TEMPERATURE: 98 F | DIASTOLIC BLOOD PRESSURE: 78 MMHG | BODY MASS INDEX: 22.3 KG/M2 | WEIGHT: 155.75 LBS

## 2020-09-11 DIAGNOSIS — M54.16 LUMBAR RADICULOPATHY: ICD-10-CM

## 2020-09-11 DIAGNOSIS — R26.9 GAIT ABNORMALITY: ICD-10-CM

## 2020-09-11 DIAGNOSIS — I10 ESSENTIAL HYPERTENSION: Primary | ICD-10-CM

## 2020-09-11 DIAGNOSIS — R42 VERTIGO: ICD-10-CM

## 2020-09-11 DIAGNOSIS — D32.9 MENINGIOMA: ICD-10-CM

## 2020-09-11 PROCEDURE — 1100F PR PT FALLS ASSESS DOC 2+ FALLS/FALL W/INJURY/YR: ICD-10-PCS | Mod: HCNC,CPTII,S$GLB, | Performed by: FAMILY MEDICINE

## 2020-09-11 PROCEDURE — 3288F PR FALLS RISK ASSESSMENT DOCUMENTED: ICD-10-PCS | Mod: HCNC,CPTII,S$GLB, | Performed by: FAMILY MEDICINE

## 2020-09-11 PROCEDURE — 99214 OFFICE O/P EST MOD 30 MIN: CPT | Mod: HCNC,S$GLB,, | Performed by: FAMILY MEDICINE

## 2020-09-11 PROCEDURE — 99999 PR PBB SHADOW E&M-EST. PATIENT-LVL IV: CPT | Mod: PBBFAC,HCNC,, | Performed by: FAMILY MEDICINE

## 2020-09-11 PROCEDURE — 1125F PR PAIN SEVERITY QUANTIFIED, PAIN PRESENT: ICD-10-PCS | Mod: HCNC,S$GLB,, | Performed by: FAMILY MEDICINE

## 2020-09-11 PROCEDURE — 1100F PTFALLS ASSESS-DOCD GE2>/YR: CPT | Mod: HCNC,CPTII,S$GLB, | Performed by: FAMILY MEDICINE

## 2020-09-11 PROCEDURE — 99999 PR PBB SHADOW E&M-EST. PATIENT-LVL IV: ICD-10-PCS | Mod: PBBFAC,HCNC,, | Performed by: FAMILY MEDICINE

## 2020-09-11 PROCEDURE — 1159F MED LIST DOCD IN RCRD: CPT | Mod: HCNC,S$GLB,, | Performed by: FAMILY MEDICINE

## 2020-09-11 PROCEDURE — 3288F FALL RISK ASSESSMENT DOCD: CPT | Mod: HCNC,CPTII,S$GLB, | Performed by: FAMILY MEDICINE

## 2020-09-11 PROCEDURE — 1125F AMNT PAIN NOTED PAIN PRSNT: CPT | Mod: HCNC,S$GLB,, | Performed by: FAMILY MEDICINE

## 2020-09-11 PROCEDURE — 1159F PR MEDICATION LIST DOCUMENTED IN MEDICAL RECORD: ICD-10-PCS | Mod: HCNC,S$GLB,, | Performed by: FAMILY MEDICINE

## 2020-09-11 PROCEDURE — 99214 PR OFFICE/OUTPT VISIT, EST, LEVL IV, 30-39 MIN: ICD-10-PCS | Mod: HCNC,S$GLB,, | Performed by: FAMILY MEDICINE

## 2020-09-11 NOTE — PROGRESS NOTES
Subjective:       Patient ID: Dominguez Ritchie is a 87 y.o. male.    Chief Complaint: Hypertension      History of Present Illness:   Dominguez Ritchie 87 y.o. male presents today with   HTN: he is worried about his BP. This morning -->after med-Norvasc 2.5 mg, it went down to 141 and he decided to come in. Currently it is 140.  Advocates back pain this morning. He has chronic lumbar radiculopathy and chronic vertigo due to a meningioma. Ambulates with a cane.  High risk for fall.        Past Medical History:   Diagnosis Date    Abnormal exercise tolerance test 2013    Arthritis     Colon polyp     Diverticulosis     Dyslipidemia 2013    GERD (gastroesophageal reflux disease)     HTN, goal below 130/80 12/3/2018    Hyperlipidemia 1980    HIGH TG    Knee pain, right 2013    Low back pain with right-sided sciatica 12/3/2018    Meningioma     Personal history of colonic polyps 2014    RLS (restless legs syndrome) 2013    Tobacco dependence     resolved    West Nile meningitis     per patient     Family History   Problem Relation Age of Onset    Heart disease Mother     Cancer Son         pancreatic     Diabetes Maternal Uncle     Kidney disease Neg Hx     Stroke Neg Hx      Social History     Socioeconomic History    Marital status:      Spouse name: Not on file    Number of children: Not on file    Years of education: Not on file    Highest education level: Not on file   Occupational History    Not on file   Social Needs    Financial resource strain: Not on file    Food insecurity     Worry: Not on file     Inability: Not on file    Transportation needs     Medical: Not on file     Non-medical: Not on file   Tobacco Use    Smoking status: Former Smoker     Packs/day: 1.00     Years: 25.00     Pack years: 25.00     Quit date: 1990     Years since quittin.7    Smokeless tobacco: Never Used   Substance and Sexual Activity    Alcohol use: No      Alcohol/week: 0.0 standard drinks    Drug use: No    Sexual activity: Not Currently     Birth control/protection: None   Lifestyle    Physical activity     Days per week: Not on file     Minutes per session: Not on file    Stress: Not at all   Relationships    Social connections     Talks on phone: Not on file     Gets together: Not on file     Attends Sikh service: Not on file     Active member of club or organization: Not on file     Attends meetings of clubs or organizations: Not on file     Relationship status: Not on file   Other Topics Concern    Not on file   Social History Narrative    Not on file     Outpatient Encounter Medications as of 9/11/2020   Medication Sig Dispense Refill    amLODIPine (NORVASC) 2.5 MG tablet Take 1 tablet (2.5 mg total) by mouth once daily. 90 tablet 3    meclizine (ANTIVERT) 25 mg tablet Take 1 tablet by mouth twice daily as needed 90 tablet 0    meloxicam (MOBIC) 7.5 MG tablet Take 1 tablet (7.5 mg total) by mouth daily as needed. 180 tablet 1    omeprazole (PRILOSEC) 20 MG capsule Take 1 capsule (20 mg total) by mouth once daily. 90 capsule 1    rOPINIRole (REQUIP) 0.5 MG tablet Take 1 tablet by mouth in the evening 90 tablet 0    rosuvastatin (CRESTOR) 5 MG tablet Take 1 tablet (5 mg total) by mouth once daily. 90 tablet 3    fluticasone propionate (CUTIVATE) 0.005 % ointment Apply topically 2 (two) times daily. (Patient not taking: Reported on 9/11/2020) 60 g 1    FLUZONE HIGH-DOSE 2019-20, PF, 180 mcg/0.5 mL Syrg ADM 0.5ML IM UTD  0    linaCLOtide (LINZESS) 145 mcg Cap capsule Take 1 capsule (145 mcg total) by mouth once daily. (Patient not taking: Reported on 9/11/2020) 30 capsule 2    nystatin (MYCOSTATIN) cream Apply topically 2 (two) times daily. (Patient not taking: Reported on 9/11/2020) 30 g 2    triamcinolone acetonide 0.1% (KENALOG) 0.1 % cream APPLY TO RASH TWICE DAILY AS NEEDED FOR ITCHING       No facility-administered encounter  "medications on file as of 9/11/2020.        Review of Systems   Constitutional: Negative for appetite change and fever.   HENT: Negative for congestion, facial swelling and voice change.    Eyes: Negative for discharge and itching.   Respiratory: Negative for cough, chest tightness and wheezing.    Cardiovascular: Negative.  Negative for chest pain and leg swelling.   Gastrointestinal: Negative for abdominal pain, nausea and vomiting.   Endocrine: Negative for cold intolerance and heat intolerance.   Genitourinary: Negative for dysuria and flank pain.   Musculoskeletal: Positive for back pain and gait problem. Negative for myalgias and neck stiffness.   Skin: Negative for pallor and rash.   Neurological: Negative for facial asymmetry and weakness.   Psychiatric/Behavioral: Negative for agitation and confusion.       Objective:      BP (!) 140/78   Pulse 62   Temp 98.2 °F (36.8 °C) (Temporal)   Ht 5' 10" (1.778 m)   Wt 70.7 kg (155 lb 12.1 oz)   SpO2 96%   BMI 22.35 kg/m²   Physical Exam  Vitals signs and nursing note reviewed.   Constitutional:       General: He is not in acute distress.     Appearance: Normal appearance. He is well-developed.   HENT:      Head: Normocephalic and atraumatic.      Right Ear: External ear normal.      Left Ear: External ear normal.   Eyes:      Conjunctiva/sclera: Conjunctivae normal.   Neck:      Musculoskeletal: Neck supple.   Cardiovascular:      Rate and Rhythm: Normal rate and regular rhythm.   Pulmonary:      Effort: Pulmonary effort is normal. No respiratory distress.   Abdominal:      General: Bowel sounds are normal.      Palpations: Abdomen is soft.   Genitourinary:     Comments: deferred  Skin:     General: Skin is warm and dry.   Neurological:      Mental Status: He is alert and oriented to person, place, and time.   Psychiatric:         Behavior: Behavior normal.         Results for orders placed or performed in visit on 01/22/20   Lipid panel   Result Value Ref " Range    Cholesterol 229 (H) 120 - 199 mg/dL    Triglycerides 278 (H) 30 - 150 mg/dL    HDL 47 40 - 75 mg/dL    LDL Cholesterol 126.4 63.0 - 159.0 mg/dL    Hdl/Cholesterol Ratio 20.5 20.0 - 50.0 %    Total Cholesterol/HDL Ratio 4.9 2.0 - 5.0    Non-HDL Cholesterol 182 mg/dL   CBC auto differential   Result Value Ref Range    WBC 4.97 3.90 - 12.70 K/uL    RBC 4.42 (L) 4.60 - 6.20 M/uL    Hemoglobin 13.9 (L) 14.0 - 18.0 g/dL    Hematocrit 42.0 40.0 - 54.0 %    Mean Corpuscular Volume 95 82 - 98 fL    Mean Corpuscular Hemoglobin 31.4 (H) 27.0 - 31.0 pg    Mean Corpuscular Hemoglobin Conc 33.1 32.0 - 36.0 g/dL    RDW 12.5 11.5 - 14.5 %    Platelets 230 150 - 350 K/uL    MPV 10.6 9.2 - 12.9 fL    Immature Granulocytes 0.2 0.0 - 0.5 %    Gran # (ANC) 2.7 1.8 - 7.7 K/uL    Immature Grans (Abs) 0.01 0.00 - 0.04 K/uL    Lymph # 1.3 1.0 - 4.8 K/uL    Mono # 0.7 0.3 - 1.0 K/uL    Eos # 0.2 0.0 - 0.5 K/uL    Baso # 0.05 0.00 - 0.20 K/uL    nRBC 0 0 /100 WBC    Gran% 54.7 38.0 - 73.0 %    Lymph% 27.0 18.0 - 48.0 %    Mono% 13.9 4.0 - 15.0 %    Eosinophil% 3.2 0.0 - 8.0 %    Basophil% 1.0 0.0 - 1.9 %    Differential Method Automated    Comprehensive metabolic panel   Result Value Ref Range    Sodium 137 136 - 145 mmol/L    Potassium 4.4 3.5 - 5.1 mmol/L    Chloride 105 95 - 110 mmol/L    CO2 28 23 - 29 mmol/L    Glucose 105 70 - 110 mg/dL    BUN, Bld 17 8 - 23 mg/dL    Creatinine 0.8 0.5 - 1.4 mg/dL    Calcium 9.2 8.7 - 10.5 mg/dL    Total Protein 6.9 6.0 - 8.4 g/dL    Albumin 3.8 3.5 - 5.2 g/dL    Total Bilirubin 0.4 0.1 - 1.0 mg/dL    Alkaline Phosphatase 73 55 - 135 U/L    AST 21 10 - 40 U/L    ALT 13 10 - 44 U/L    Anion Gap 4 (L) 8 - 16 mmol/L    eGFR if African American >60.0 >60 mL/min/1.73 m^2    eGFR if non African American >60.0 >60 mL/min/1.73 m^2   PSA, Screening   Result Value Ref Range    PSA, SCREEN 13.4 (H) 0.00 - 4.00 ng/mL     Assessment:       1. Essential hypertension    2. Lumbar radiculopathy    3.  Meningioma    4. Gait abnormality    5. Vertigo        Plan:   I have reviewed all of the patient's clinical history available in Epic and have utilized this in my evaluation and management recommendations today.     Essential hypertension: No indication to change med to avoid hypotension given age and comorbidities.  Monitor blood pressure at home and record.  Bring record to clinic on your next visit  Recommended Heathy lifestyle.  Recommended DASH diet   Return To Clinic for re-evaluation    Lumbar radiculopathy; Pain management and Ortho are managing. Consider spinal cord stimulator evaluation with TIMMY Patterson.       Total time spent in face to face counseling and coordination of care - 25 minutes over 50% of time was used in discussion of prognosis, risks, benefits of treatment, instructions and compliance with regimen .

## 2020-10-08 ENCOUNTER — TELEPHONE (OUTPATIENT)
Dept: FAMILY MEDICINE | Facility: CLINIC | Age: 85
End: 2020-10-08

## 2020-10-08 DIAGNOSIS — M17.12 PRIMARY OSTEOARTHRITIS OF LEFT KNEE: ICD-10-CM

## 2020-10-08 RX ORDER — MELOXICAM 7.5 MG/1
7.5 TABLET ORAL 2 TIMES DAILY PRN
Qty: 180 TABLET | Refills: 0 | Status: SHIPPED | OUTPATIENT
Start: 2020-10-08 | End: 2021-12-23

## 2020-10-08 NOTE — TELEPHONE ENCOUNTER
Pt wants to up his Meloxicam to twice a day instead of once a day. Pt stated that he has been taking  7.5mg twice a day for a few years now but the pharmacy changed it to 7.5mg once a day. He was once taking 15mg once a day but then it was changed to 7.5 mg twice a day. Please advise.

## 2020-10-08 NOTE — TELEPHONE ENCOUNTER
----- Message from Aida Jarrell sent at 10/8/2020  8:37 AM CDT -----  Regarding: meds  Contact: patient  Patient needs to speak to nurse about his medications, please call him back at [phone]

## 2020-10-19 ENCOUNTER — LAB VISIT (OUTPATIENT)
Dept: LAB | Facility: HOSPITAL | Age: 85
End: 2020-10-19
Attending: ANESTHESIOLOGY
Payer: MEDICARE

## 2020-10-19 DIAGNOSIS — Z79.01 LONG TERM (CURRENT) USE OF ANTICOAGULANTS: ICD-10-CM

## 2020-10-19 DIAGNOSIS — Z01.812 PRE-OPERATIVE LABORATORY EXAMINATION: Primary | ICD-10-CM

## 2020-10-19 LAB
APTT BLDCRRT: 33.8 SEC (ref 21–32)
BASOPHILS # BLD AUTO: 0.06 K/UL (ref 0–0.2)
BASOPHILS NFR BLD: 1 % (ref 0–1.9)
DIFFERENTIAL METHOD: ABNORMAL
EOSINOPHIL # BLD AUTO: 0.4 K/UL (ref 0–0.5)
EOSINOPHIL NFR BLD: 5.9 % (ref 0–8)
ERYTHROCYTE [DISTWIDTH] IN BLOOD BY AUTOMATED COUNT: 13.2 % (ref 11.5–14.5)
HCT VFR BLD AUTO: 39.9 % (ref 40–54)
HGB BLD-MCNC: 12.9 G/DL (ref 14–18)
IMM GRANULOCYTES # BLD AUTO: 0.02 K/UL (ref 0–0.04)
IMM GRANULOCYTES NFR BLD AUTO: 0.3 % (ref 0–0.5)
INR PPP: 1 (ref 0.8–1.2)
LYMPHOCYTES # BLD AUTO: 1.5 K/UL (ref 1–4.8)
LYMPHOCYTES NFR BLD: 24.9 % (ref 18–48)
MCH RBC QN AUTO: 31 PG (ref 27–31)
MCHC RBC AUTO-ENTMCNC: 32.3 G/DL (ref 32–36)
MCV RBC AUTO: 96 FL (ref 82–98)
MONOCYTES # BLD AUTO: 0.7 K/UL (ref 0.3–1)
MONOCYTES NFR BLD: 11.2 % (ref 4–15)
NEUTROPHILS # BLD AUTO: 3.4 K/UL (ref 1.8–7.7)
NEUTROPHILS NFR BLD: 56.7 % (ref 38–73)
NRBC BLD-RTO: 0 /100 WBC
PLATELET # BLD AUTO: 216 K/UL (ref 150–350)
PMV BLD AUTO: 10.3 FL (ref 9.2–12.9)
PROTHROMBIN TIME: 10.9 SEC (ref 9–12.5)
RBC # BLD AUTO: 4.16 M/UL (ref 4.6–6.2)
WBC # BLD AUTO: 6.06 K/UL (ref 3.9–12.7)

## 2020-10-19 PROCEDURE — 85025 COMPLETE CBC W/AUTO DIFF WBC: CPT | Mod: HCNC

## 2020-10-19 PROCEDURE — 85610 PROTHROMBIN TIME: CPT | Mod: HCNC

## 2020-10-19 PROCEDURE — 36415 COLL VENOUS BLD VENIPUNCTURE: CPT | Mod: HCNC,PO

## 2020-10-19 PROCEDURE — 85730 THROMBOPLASTIN TIME PARTIAL: CPT | Mod: HCNC

## 2020-10-26 ENCOUNTER — TELEPHONE (OUTPATIENT)
Dept: PAIN MEDICINE | Facility: CLINIC | Age: 85
End: 2020-10-26

## 2020-10-26 NOTE — TELEPHONE ENCOUNTER
----- Message from Rohini Benitez sent at 10/26/2020  2:51 PM CDT -----  Contact: Pt  Is calling to speak with the nurse to schedule his injection and can be reached at [phone]//nithin/melvin

## 2020-10-27 ENCOUNTER — TELEPHONE (OUTPATIENT)
Dept: PAIN MEDICINE | Facility: CLINIC | Age: 85
End: 2020-10-27

## 2020-10-27 DIAGNOSIS — M54.16 LUMBAR RADICULOPATHY: Primary | ICD-10-CM

## 2020-10-27 DIAGNOSIS — M47.816 LUMBAR SPONDYLOSIS: ICD-10-CM

## 2020-10-27 NOTE — TELEPHONE ENCOUNTER
Call patient to schedule the procedure with Dr. Sadler on November 4th 2020 along with Post Injection F/U appt. All instructions were given, all questions are answered, patient understood.  On 12.07.2020----- Message from Heidi Blackwood LPN sent at 10/27/2020 12:17 PM CDT -----    ----- Message -----  From: Kay Silva  Sent: 10/27/2020   7:54 AM CDT  To: Viktori[facility] Tomlin Staff    duplicate message regarding being scheduled for injection..[phone] (home)

## 2020-11-02 ENCOUNTER — TELEPHONE (OUTPATIENT)
Dept: PAIN MEDICINE | Facility: CLINIC | Age: 85
End: 2020-11-02

## 2020-11-02 NOTE — TELEPHONE ENCOUNTER
Returned patients phone call. Patient stated that he had something come up and needed to cancel his procedure with Dr. Sadler on Wednesday 11/04/2020. Procedure canceled per patient request. Patient stated that he will call back to r/s.

## 2020-11-02 NOTE — TELEPHONE ENCOUNTER
----- Message from Suzan Venegas sent at 11/2/2020  2:40 PM CST -----  Contact: pt  Pt called stating that he has to cancel his upcoming appt. He will call back to reschedule. Please call pt back at [phone]

## 2021-01-22 ENCOUNTER — LAB VISIT (OUTPATIENT)
Dept: LAB | Facility: HOSPITAL | Age: 86
End: 2021-01-22
Attending: FAMILY MEDICINE
Payer: MEDICARE

## 2021-01-22 ENCOUNTER — OFFICE VISIT (OUTPATIENT)
Dept: FAMILY MEDICINE | Facility: CLINIC | Age: 86
End: 2021-01-22
Payer: MEDICARE

## 2021-01-22 ENCOUNTER — PATIENT MESSAGE (OUTPATIENT)
Dept: ADMINISTRATIVE | Facility: OTHER | Age: 86
End: 2021-01-22

## 2021-01-22 VITALS
HEIGHT: 70 IN | SYSTOLIC BLOOD PRESSURE: 122 MMHG | WEIGHT: 157.5 LBS | HEART RATE: 68 BPM | DIASTOLIC BLOOD PRESSURE: 68 MMHG | BODY MASS INDEX: 22.55 KG/M2 | TEMPERATURE: 98 F | OXYGEN SATURATION: 96 %

## 2021-01-22 DIAGNOSIS — Z12.5 SCREENING PSA (PROSTATE SPECIFIC ANTIGEN): ICD-10-CM

## 2021-01-22 DIAGNOSIS — R42 VERTIGO: ICD-10-CM

## 2021-01-22 DIAGNOSIS — R73.03 PREDIABETES: ICD-10-CM

## 2021-01-22 DIAGNOSIS — I10 ESSENTIAL HYPERTENSION: ICD-10-CM

## 2021-01-22 DIAGNOSIS — N40.1 BENIGN PROSTATIC HYPERPLASIA WITH URINARY FREQUENCY: Primary | ICD-10-CM

## 2021-01-22 DIAGNOSIS — R26.9 GAIT ABNORMALITY: ICD-10-CM

## 2021-01-22 DIAGNOSIS — R97.20 BPH WITH ELEVATED PSA: ICD-10-CM

## 2021-01-22 DIAGNOSIS — N40.0 BPH WITH ELEVATED PSA: ICD-10-CM

## 2021-01-22 DIAGNOSIS — R35.0 BENIGN PROSTATIC HYPERPLASIA WITH URINARY FREQUENCY: Primary | ICD-10-CM

## 2021-01-22 DIAGNOSIS — D32.9 MENINGIOMA: ICD-10-CM

## 2021-01-22 LAB
BILIRUB SERPL-MCNC: NEGATIVE MG/DL
BLOOD URINE, POC: NEGATIVE
CLARITY, POC UA: CLEAR
COLOR, POC UA: YELLOW
GLUCOSE UR QL STRIP: NORMAL
KETONES UR QL STRIP: NEGATIVE
LEUKOCYTE ESTERASE URINE, POC: NORMAL
NITRITE, POC UA: NORMAL
PH, POC UA: 5
PROTEIN, POC: NORMAL
SPECIFIC GRAVITY, POC UA: 1
UROBILINOGEN, POC UA: NORMAL

## 2021-01-22 PROCEDURE — 83036 HEMOGLOBIN GLYCOSYLATED A1C: CPT

## 2021-01-22 PROCEDURE — 1101F PT FALLS ASSESS-DOCD LE1/YR: CPT | Mod: CPTII,S$GLB,, | Performed by: FAMILY MEDICINE

## 2021-01-22 PROCEDURE — 1159F MED LIST DOCD IN RCRD: CPT | Mod: S$GLB,,, | Performed by: FAMILY MEDICINE

## 2021-01-22 PROCEDURE — 99214 PR OFFICE/OUTPT VISIT, EST, LEVL IV, 30-39 MIN: ICD-10-PCS | Mod: 25,S$GLB,, | Performed by: FAMILY MEDICINE

## 2021-01-22 PROCEDURE — 87086 URINE CULTURE/COLONY COUNT: CPT

## 2021-01-22 PROCEDURE — 1125F AMNT PAIN NOTED PAIN PRSNT: CPT | Mod: S$GLB,,, | Performed by: FAMILY MEDICINE

## 2021-01-22 PROCEDURE — 80061 LIPID PANEL: CPT

## 2021-01-22 PROCEDURE — 99999 PR PBB SHADOW E&M-EST. PATIENT-LVL IV: CPT | Mod: PBBFAC,,, | Performed by: FAMILY MEDICINE

## 2021-01-22 PROCEDURE — 1125F PR PAIN SEVERITY QUANTIFIED, PAIN PRESENT: ICD-10-PCS | Mod: S$GLB,,, | Performed by: FAMILY MEDICINE

## 2021-01-22 PROCEDURE — 36415 COLL VENOUS BLD VENIPUNCTURE: CPT | Mod: PO

## 2021-01-22 PROCEDURE — 84443 ASSAY THYROID STIM HORMONE: CPT

## 2021-01-22 PROCEDURE — 3288F FALL RISK ASSESSMENT DOCD: CPT | Mod: CPTII,S$GLB,, | Performed by: FAMILY MEDICINE

## 2021-01-22 PROCEDURE — 85025 COMPLETE CBC W/AUTO DIFF WBC: CPT

## 2021-01-22 PROCEDURE — 84153 ASSAY OF PSA TOTAL: CPT

## 2021-01-22 PROCEDURE — 99999 PR PBB SHADOW E&M-EST. PATIENT-LVL IV: ICD-10-PCS | Mod: PBBFAC,,, | Performed by: FAMILY MEDICINE

## 2021-01-22 PROCEDURE — 99499 RISK ADDL DX/OHS AUDIT: ICD-10-PCS | Mod: HCNC,S$GLB,, | Performed by: FAMILY MEDICINE

## 2021-01-22 PROCEDURE — 81002 URINALYSIS NONAUTO W/O SCOPE: CPT | Mod: S$GLB,,, | Performed by: FAMILY MEDICINE

## 2021-01-22 PROCEDURE — 1101F PR PT FALLS ASSESS DOC 0-1 FALLS W/OUT INJ PAST YR: ICD-10-PCS | Mod: CPTII,S$GLB,, | Performed by: FAMILY MEDICINE

## 2021-01-22 PROCEDURE — 81002 POCT URINE DIPSTICK WITHOUT MICROSCOPE: ICD-10-PCS | Mod: S$GLB,,, | Performed by: FAMILY MEDICINE

## 2021-01-22 PROCEDURE — 1159F PR MEDICATION LIST DOCUMENTED IN MEDICAL RECORD: ICD-10-PCS | Mod: S$GLB,,, | Performed by: FAMILY MEDICINE

## 2021-01-22 PROCEDURE — 99499 UNLISTED E&M SERVICE: CPT | Mod: HCNC,S$GLB,, | Performed by: FAMILY MEDICINE

## 2021-01-22 PROCEDURE — 3288F PR FALLS RISK ASSESSMENT DOCUMENTED: ICD-10-PCS | Mod: CPTII,S$GLB,, | Performed by: FAMILY MEDICINE

## 2021-01-22 PROCEDURE — 99214 OFFICE O/P EST MOD 30 MIN: CPT | Mod: 25,S$GLB,, | Performed by: FAMILY MEDICINE

## 2021-01-22 PROCEDURE — 80053 COMPREHEN METABOLIC PANEL: CPT

## 2021-01-22 RX ORDER — TAMSULOSIN HYDROCHLORIDE 0.4 MG/1
0.4 CAPSULE ORAL DAILY
Qty: 30 CAPSULE | Refills: 2 | Status: SHIPPED | OUTPATIENT
Start: 2021-01-22 | End: 2021-05-13

## 2021-01-23 LAB
ALBUMIN SERPL BCP-MCNC: 3.8 G/DL (ref 3.5–5.2)
ALP SERPL-CCNC: 65 U/L (ref 55–135)
ALT SERPL W/O P-5'-P-CCNC: 13 U/L (ref 10–44)
ANION GAP SERPL CALC-SCNC: 7 MMOL/L (ref 8–16)
AST SERPL-CCNC: 22 U/L (ref 10–40)
BASOPHILS # BLD AUTO: 0.05 K/UL (ref 0–0.2)
BASOPHILS NFR BLD: 0.8 % (ref 0–1.9)
BILIRUB SERPL-MCNC: 0.4 MG/DL (ref 0.1–1)
BUN SERPL-MCNC: 18 MG/DL (ref 8–23)
CALCIUM SERPL-MCNC: 9.1 MG/DL (ref 8.7–10.5)
CHLORIDE SERPL-SCNC: 104 MMOL/L (ref 95–110)
CHOLEST SERPL-MCNC: 168 MG/DL (ref 120–199)
CHOLEST/HDLC SERPL: 2.7 {RATIO} (ref 2–5)
CO2 SERPL-SCNC: 28 MMOL/L (ref 23–29)
COMPLEXED PSA SERPL-MCNC: 21.5 NG/ML (ref 0–4)
CREAT SERPL-MCNC: 0.8 MG/DL (ref 0.5–1.4)
DIFFERENTIAL METHOD: ABNORMAL
EOSINOPHIL # BLD AUTO: 0.2 K/UL (ref 0–0.5)
EOSINOPHIL NFR BLD: 2.6 % (ref 0–8)
ERYTHROCYTE [DISTWIDTH] IN BLOOD BY AUTOMATED COUNT: 13.7 % (ref 11.5–14.5)
EST. GFR  (AFRICAN AMERICAN): >60 ML/MIN/1.73 M^2
EST. GFR  (NON AFRICAN AMERICAN): >60 ML/MIN/1.73 M^2
ESTIMATED AVG GLUCOSE: 114 MG/DL (ref 68–131)
GLUCOSE SERPL-MCNC: 90 MG/DL (ref 70–110)
HBA1C MFR BLD HPLC: 5.6 % (ref 4–5.6)
HCT VFR BLD AUTO: 40.2 % (ref 40–54)
HDLC SERPL-MCNC: 62 MG/DL (ref 40–75)
HDLC SERPL: 36.9 % (ref 20–50)
HGB BLD-MCNC: 12.9 G/DL (ref 14–18)
IMM GRANULOCYTES # BLD AUTO: 0.01 K/UL (ref 0–0.04)
IMM GRANULOCYTES NFR BLD AUTO: 0.2 % (ref 0–0.5)
LDLC SERPL CALC-MCNC: 64.4 MG/DL (ref 63–159)
LYMPHOCYTES # BLD AUTO: 1.4 K/UL (ref 1–4.8)
LYMPHOCYTES NFR BLD: 21.9 % (ref 18–48)
MCH RBC QN AUTO: 31.3 PG (ref 27–31)
MCHC RBC AUTO-ENTMCNC: 32.1 G/DL (ref 32–36)
MCV RBC AUTO: 98 FL (ref 82–98)
MONOCYTES # BLD AUTO: 0.7 K/UL (ref 0.3–1)
MONOCYTES NFR BLD: 10.7 % (ref 4–15)
NEUTROPHILS # BLD AUTO: 3.9 K/UL (ref 1.8–7.7)
NEUTROPHILS NFR BLD: 63.8 % (ref 38–73)
NONHDLC SERPL-MCNC: 106 MG/DL
NRBC BLD-RTO: 0 /100 WBC
PLATELET # BLD AUTO: 230 K/UL (ref 150–350)
PMV BLD AUTO: 10 FL (ref 9.2–12.9)
POTASSIUM SERPL-SCNC: 4.5 MMOL/L (ref 3.5–5.1)
PROT SERPL-MCNC: 6.7 G/DL (ref 6–8.4)
RBC # BLD AUTO: 4.12 M/UL (ref 4.6–6.2)
SODIUM SERPL-SCNC: 139 MMOL/L (ref 136–145)
TRIGL SERPL-MCNC: 208 MG/DL (ref 30–150)
TSH SERPL DL<=0.005 MIU/L-ACNC: 1.69 UIU/ML (ref 0.4–4)
WBC # BLD AUTO: 6.16 K/UL (ref 3.9–12.7)

## 2021-01-24 LAB — BACTERIA UR CULT: NORMAL

## 2021-01-28 ENCOUNTER — TELEPHONE (OUTPATIENT)
Dept: INTERNAL MEDICINE | Facility: CLINIC | Age: 86
End: 2021-01-28

## 2021-02-26 ENCOUNTER — TELEPHONE (OUTPATIENT)
Dept: FAMILY MEDICINE | Facility: CLINIC | Age: 86
End: 2021-02-26

## 2021-03-02 ENCOUNTER — OFFICE VISIT (OUTPATIENT)
Dept: UROLOGY | Facility: CLINIC | Age: 86
End: 2021-03-02
Payer: MEDICARE

## 2021-03-02 VITALS — DIASTOLIC BLOOD PRESSURE: 68 MMHG | SYSTOLIC BLOOD PRESSURE: 118 MMHG | BODY MASS INDEX: 22.96 KG/M2 | WEIGHT: 160 LBS

## 2021-03-02 DIAGNOSIS — R97.20 ELEVATED PROSTATE SPECIFIC ANTIGEN (PSA): Primary | ICD-10-CM

## 2021-03-02 PROCEDURE — 99999 PR PBB SHADOW E&M-EST. PATIENT-LVL III: CPT | Mod: PBBFAC,,, | Performed by: UROLOGY

## 2021-03-02 PROCEDURE — 1125F AMNT PAIN NOTED PAIN PRSNT: CPT | Mod: S$GLB,,, | Performed by: UROLOGY

## 2021-03-02 PROCEDURE — 1159F PR MEDICATION LIST DOCUMENTED IN MEDICAL RECORD: ICD-10-PCS | Mod: S$GLB,,, | Performed by: UROLOGY

## 2021-03-02 PROCEDURE — 1159F MED LIST DOCD IN RCRD: CPT | Mod: S$GLB,,, | Performed by: UROLOGY

## 2021-03-02 PROCEDURE — 99203 PR OFFICE/OUTPT VISIT, NEW, LEVL III, 30-44 MIN: ICD-10-PCS | Mod: S$GLB,,, | Performed by: UROLOGY

## 2021-03-02 PROCEDURE — 99999 PR PBB SHADOW E&M-EST. PATIENT-LVL III: ICD-10-PCS | Mod: PBBFAC,,, | Performed by: UROLOGY

## 2021-03-02 PROCEDURE — 1125F PR PAIN SEVERITY QUANTIFIED, PAIN PRESENT: ICD-10-PCS | Mod: S$GLB,,, | Performed by: UROLOGY

## 2021-03-02 PROCEDURE — 99203 OFFICE O/P NEW LOW 30 MIN: CPT | Mod: S$GLB,,, | Performed by: UROLOGY

## 2021-03-02 RX ORDER — DIAZEPAM 5 MG/1
5 TABLET ORAL ONCE
Qty: 1 TABLET | Refills: 0 | Status: SHIPPED | OUTPATIENT
Start: 2021-03-02 | End: 2021-03-26

## 2021-03-02 RX ORDER — SULFAMETHOXAZOLE AND TRIMETHOPRIM 800; 160 MG/1; MG/1
1 TABLET ORAL 2 TIMES DAILY
Qty: 10 TABLET | Refills: 0 | Status: SHIPPED | OUTPATIENT
Start: 2021-03-02 | End: 2021-03-26 | Stop reason: ALTCHOICE

## 2021-03-02 RX ORDER — OXYCODONE AND ACETAMINOPHEN 5; 325 MG/1; MG/1
1 TABLET ORAL EVERY 4 HOURS PRN
Qty: 10 TABLET | Refills: 0 | Status: SHIPPED | OUTPATIENT
Start: 2021-03-02 | End: 2021-05-13

## 2021-03-16 ENCOUNTER — TELEPHONE (OUTPATIENT)
Dept: UROLOGY | Facility: CLINIC | Age: 86
End: 2021-03-16

## 2021-03-26 ENCOUNTER — OFFICE VISIT (OUTPATIENT)
Dept: FAMILY MEDICINE | Facility: CLINIC | Age: 86
End: 2021-03-26
Payer: MEDICARE

## 2021-03-26 VITALS
BODY MASS INDEX: 22.67 KG/M2 | HEIGHT: 70 IN | WEIGHT: 158.38 LBS | OXYGEN SATURATION: 96 % | HEART RATE: 63 BPM | SYSTOLIC BLOOD PRESSURE: 118 MMHG | DIASTOLIC BLOOD PRESSURE: 58 MMHG

## 2021-03-26 DIAGNOSIS — J30.89 SEASONAL ALLERGIC RHINITIS DUE TO OTHER ALLERGIC TRIGGER: Primary | ICD-10-CM

## 2021-03-26 DIAGNOSIS — M46.1 SACROILIITIS: ICD-10-CM

## 2021-03-26 DIAGNOSIS — I10 ESSENTIAL HYPERTENSION: ICD-10-CM

## 2021-03-26 DIAGNOSIS — I77.1 TORTUOUS AORTA: ICD-10-CM

## 2021-03-26 DIAGNOSIS — J84.10 CALCIFIED GRANULOMA OF LUNG: ICD-10-CM

## 2021-03-26 PROCEDURE — 3288F FALL RISK ASSESSMENT DOCD: CPT | Mod: CPTII,S$GLB,, | Performed by: FAMILY MEDICINE

## 2021-03-26 PROCEDURE — 99999 PR PBB SHADOW E&M-EST. PATIENT-LVL IV: CPT | Mod: PBBFAC,,, | Performed by: FAMILY MEDICINE

## 2021-03-26 PROCEDURE — 1159F PR MEDICATION LIST DOCUMENTED IN MEDICAL RECORD: ICD-10-PCS | Mod: S$GLB,,, | Performed by: FAMILY MEDICINE

## 2021-03-26 PROCEDURE — 99999 PR PBB SHADOW E&M-EST. PATIENT-LVL IV: ICD-10-PCS | Mod: PBBFAC,,, | Performed by: FAMILY MEDICINE

## 2021-03-26 PROCEDURE — 99499 RISK ADDL DX/OHS AUDIT: ICD-10-PCS | Mod: S$GLB,,, | Performed by: FAMILY MEDICINE

## 2021-03-26 PROCEDURE — 99214 PR OFFICE/OUTPT VISIT, EST, LEVL IV, 30-39 MIN: ICD-10-PCS | Mod: S$GLB,,, | Performed by: FAMILY MEDICINE

## 2021-03-26 PROCEDURE — 1126F AMNT PAIN NOTED NONE PRSNT: CPT | Mod: S$GLB,,, | Performed by: FAMILY MEDICINE

## 2021-03-26 PROCEDURE — 3288F PR FALLS RISK ASSESSMENT DOCUMENTED: ICD-10-PCS | Mod: CPTII,S$GLB,, | Performed by: FAMILY MEDICINE

## 2021-03-26 PROCEDURE — 1126F PR PAIN SEVERITY QUANTIFIED, NO PAIN PRESENT: ICD-10-PCS | Mod: S$GLB,,, | Performed by: FAMILY MEDICINE

## 2021-03-26 PROCEDURE — 1101F PT FALLS ASSESS-DOCD LE1/YR: CPT | Mod: CPTII,S$GLB,, | Performed by: FAMILY MEDICINE

## 2021-03-26 PROCEDURE — 1159F MED LIST DOCD IN RCRD: CPT | Mod: S$GLB,,, | Performed by: FAMILY MEDICINE

## 2021-03-26 PROCEDURE — 99214 OFFICE O/P EST MOD 30 MIN: CPT | Mod: S$GLB,,, | Performed by: FAMILY MEDICINE

## 2021-03-26 PROCEDURE — 99499 UNLISTED E&M SERVICE: CPT | Mod: S$GLB,,, | Performed by: FAMILY MEDICINE

## 2021-03-26 PROCEDURE — 1101F PR PT FALLS ASSESS DOC 0-1 FALLS W/OUT INJ PAST YR: ICD-10-PCS | Mod: CPTII,S$GLB,, | Performed by: FAMILY MEDICINE

## 2021-03-26 RX ORDER — LORATADINE 10 MG/1
10 TABLET ORAL DAILY
Qty: 30 TABLET | Refills: 0 | Status: CANCELLED | OUTPATIENT
Start: 2021-03-26 | End: 2021-04-25

## 2021-03-26 RX ORDER — FLUTICASONE PROPIONATE 50 MCG
2 SPRAY, SUSPENSION (ML) NASAL DAILY
Qty: 16 G | Refills: 2 | Status: SHIPPED | OUTPATIENT
Start: 2021-03-26 | End: 2021-04-25

## 2021-03-26 RX ORDER — FLUTICASONE PROPIONATE 0.05 MG/G
OINTMENT TOPICAL 2 TIMES DAILY
Qty: 60 G | Refills: 1 | Status: CANCELLED | OUTPATIENT
Start: 2021-03-26 | End: 2022-03-26

## 2021-03-28 ENCOUNTER — NURSE TRIAGE (OUTPATIENT)
Dept: ADMINISTRATIVE | Facility: CLINIC | Age: 86
End: 2021-03-28

## 2021-04-07 ENCOUNTER — TELEPHONE (OUTPATIENT)
Dept: FAMILY MEDICINE | Facility: CLINIC | Age: 86
End: 2021-04-07

## 2021-04-07 DIAGNOSIS — G89.29 CHRONIC MIDLINE LOW BACK PAIN WITH RIGHT-SIDED SCIATICA: Primary | ICD-10-CM

## 2021-04-07 DIAGNOSIS — M54.41 CHRONIC MIDLINE LOW BACK PAIN WITH RIGHT-SIDED SCIATICA: Primary | ICD-10-CM

## 2021-04-07 DIAGNOSIS — R26.9 GAIT ABNORMALITY: ICD-10-CM

## 2021-04-12 ENCOUNTER — OFFICE VISIT (OUTPATIENT)
Dept: FAMILY MEDICINE | Facility: CLINIC | Age: 86
End: 2021-04-12
Payer: MEDICARE

## 2021-04-12 VITALS
OXYGEN SATURATION: 97 % | BODY MASS INDEX: 22.28 KG/M2 | DIASTOLIC BLOOD PRESSURE: 68 MMHG | SYSTOLIC BLOOD PRESSURE: 128 MMHG | TEMPERATURE: 98 F | HEART RATE: 68 BPM | HEIGHT: 70 IN | WEIGHT: 155.63 LBS

## 2021-04-12 DIAGNOSIS — K62.89 RECTAL PAIN: Primary | ICD-10-CM

## 2021-04-12 DIAGNOSIS — K62.89 PERIRECTAL BURNING: ICD-10-CM

## 2021-04-12 PROCEDURE — 1159F MED LIST DOCD IN RCRD: CPT | Mod: S$GLB,,, | Performed by: FAMILY MEDICINE

## 2021-04-12 PROCEDURE — 1159F PR MEDICATION LIST DOCUMENTED IN MEDICAL RECORD: ICD-10-PCS | Mod: S$GLB,,, | Performed by: FAMILY MEDICINE

## 2021-04-12 PROCEDURE — 3288F PR FALLS RISK ASSESSMENT DOCUMENTED: ICD-10-PCS | Mod: CPTII,S$GLB,, | Performed by: FAMILY MEDICINE

## 2021-04-12 PROCEDURE — 99213 OFFICE O/P EST LOW 20 MIN: CPT | Mod: S$GLB,,, | Performed by: FAMILY MEDICINE

## 2021-04-12 PROCEDURE — 99999 PR PBB SHADOW E&M-EST. PATIENT-LVL III: CPT | Mod: PBBFAC,,, | Performed by: FAMILY MEDICINE

## 2021-04-12 PROCEDURE — 99213 PR OFFICE/OUTPT VISIT, EST, LEVL III, 20-29 MIN: ICD-10-PCS | Mod: S$GLB,,, | Performed by: FAMILY MEDICINE

## 2021-04-12 PROCEDURE — 1101F PR PT FALLS ASSESS DOC 0-1 FALLS W/OUT INJ PAST YR: ICD-10-PCS | Mod: CPTII,S$GLB,, | Performed by: FAMILY MEDICINE

## 2021-04-12 PROCEDURE — 1101F PT FALLS ASSESS-DOCD LE1/YR: CPT | Mod: CPTII,S$GLB,, | Performed by: FAMILY MEDICINE

## 2021-04-12 PROCEDURE — 3288F FALL RISK ASSESSMENT DOCD: CPT | Mod: CPTII,S$GLB,, | Performed by: FAMILY MEDICINE

## 2021-04-12 PROCEDURE — 99999 PR PBB SHADOW E&M-EST. PATIENT-LVL III: ICD-10-PCS | Mod: PBBFAC,,, | Performed by: FAMILY MEDICINE

## 2021-04-12 RX ORDER — LIDOCAINE HYDROCHLORIDE 20 MG/ML
SOLUTION OROPHARYNGEAL EVERY 6 HOURS
Qty: 1 BOTTLE | Refills: 1 | Status: SHIPPED | OUTPATIENT
Start: 2021-04-12 | End: 2021-05-13

## 2021-04-22 ENCOUNTER — PES CALL (OUTPATIENT)
Dept: ADMINISTRATIVE | Facility: CLINIC | Age: 86
End: 2021-04-22

## 2021-05-06 DIAGNOSIS — I10 HTN, GOAL BELOW 130/80: ICD-10-CM

## 2021-05-06 DIAGNOSIS — E78.2 MIXED HYPERLIPIDEMIA: ICD-10-CM

## 2021-05-06 RX ORDER — ROSUVASTATIN CALCIUM 5 MG/1
5 TABLET, COATED ORAL DAILY
Qty: 90 TABLET | Refills: 3 | Status: SHIPPED | OUTPATIENT
Start: 2021-05-06 | End: 2022-08-12 | Stop reason: SDUPTHER

## 2021-05-06 RX ORDER — OMEPRAZOLE 20 MG/1
20 CAPSULE, DELAYED RELEASE ORAL DAILY
Qty: 90 CAPSULE | Refills: 1 | Status: SHIPPED | OUTPATIENT
Start: 2021-05-06 | End: 2021-07-12 | Stop reason: SDUPTHER

## 2021-05-06 RX ORDER — AMLODIPINE BESYLATE 2.5 MG/1
2.5 TABLET ORAL DAILY
Qty: 90 TABLET | Refills: 3 | Status: SHIPPED | OUTPATIENT
Start: 2021-05-06 | End: 2021-05-13 | Stop reason: ALTCHOICE

## 2021-05-07 DIAGNOSIS — R42 VERTIGO: ICD-10-CM

## 2021-05-07 DIAGNOSIS — M17.12 PRIMARY OSTEOARTHRITIS OF LEFT KNEE: ICD-10-CM

## 2021-05-07 RX ORDER — MECLIZINE HYDROCHLORIDE 25 MG/1
TABLET ORAL
Qty: 90 TABLET | Refills: 0 | OUTPATIENT
Start: 2021-05-07

## 2021-05-07 RX ORDER — MELOXICAM 7.5 MG/1
TABLET ORAL
Qty: 180 TABLET | Refills: 0 | OUTPATIENT
Start: 2021-05-07

## 2021-05-07 RX ORDER — MECLIZINE HYDROCHLORIDE 25 MG/1
25 TABLET ORAL 2 TIMES DAILY PRN
Qty: 90 TABLET | Refills: 0 | Status: CANCELLED | OUTPATIENT
Start: 2021-05-07

## 2021-05-13 ENCOUNTER — OFFICE VISIT (OUTPATIENT)
Dept: FAMILY MEDICINE | Facility: CLINIC | Age: 86
End: 2021-05-13
Payer: MEDICARE

## 2021-05-13 VITALS
TEMPERATURE: 98 F | WEIGHT: 153.88 LBS | OXYGEN SATURATION: 96 % | SYSTOLIC BLOOD PRESSURE: 134 MMHG | HEIGHT: 70 IN | HEART RATE: 63 BPM | DIASTOLIC BLOOD PRESSURE: 72 MMHG | BODY MASS INDEX: 22.03 KG/M2

## 2021-05-13 DIAGNOSIS — G89.29 CHRONIC BILATERAL LOW BACK PAIN WITHOUT SCIATICA: ICD-10-CM

## 2021-05-13 DIAGNOSIS — M54.50 CHRONIC BILATERAL LOW BACK PAIN WITHOUT SCIATICA: ICD-10-CM

## 2021-05-13 DIAGNOSIS — K59.00 CONSTIPATION, UNSPECIFIED CONSTIPATION TYPE: ICD-10-CM

## 2021-05-13 DIAGNOSIS — R42 DIZZINESS: Primary | ICD-10-CM

## 2021-05-13 PROCEDURE — 1159F PR MEDICATION LIST DOCUMENTED IN MEDICAL RECORD: ICD-10-PCS | Mod: S$GLB,,, | Performed by: FAMILY MEDICINE

## 2021-05-13 PROCEDURE — 1125F PR PAIN SEVERITY QUANTIFIED, PAIN PRESENT: ICD-10-PCS | Mod: S$GLB,,, | Performed by: FAMILY MEDICINE

## 2021-05-13 PROCEDURE — 3288F PR FALLS RISK ASSESSMENT DOCUMENTED: ICD-10-PCS | Mod: CPTII,S$GLB,, | Performed by: FAMILY MEDICINE

## 2021-05-13 PROCEDURE — 1159F MED LIST DOCD IN RCRD: CPT | Mod: S$GLB,,, | Performed by: FAMILY MEDICINE

## 2021-05-13 PROCEDURE — 1100F PR PT FALLS ASSESS DOC 2+ FALLS/FALL W/INJURY/YR: ICD-10-PCS | Mod: CPTII,S$GLB,, | Performed by: FAMILY MEDICINE

## 2021-05-13 PROCEDURE — 3288F FALL RISK ASSESSMENT DOCD: CPT | Mod: CPTII,S$GLB,, | Performed by: FAMILY MEDICINE

## 2021-05-13 PROCEDURE — 99999 PR PBB SHADOW E&M-EST. PATIENT-LVL III: CPT | Mod: PBBFAC,,, | Performed by: FAMILY MEDICINE

## 2021-05-13 PROCEDURE — 99214 OFFICE O/P EST MOD 30 MIN: CPT | Mod: S$GLB,,, | Performed by: FAMILY MEDICINE

## 2021-05-13 PROCEDURE — 1125F AMNT PAIN NOTED PAIN PRSNT: CPT | Mod: S$GLB,,, | Performed by: FAMILY MEDICINE

## 2021-05-13 PROCEDURE — 1100F PTFALLS ASSESS-DOCD GE2>/YR: CPT | Mod: CPTII,S$GLB,, | Performed by: FAMILY MEDICINE

## 2021-05-13 PROCEDURE — 99214 PR OFFICE/OUTPT VISIT, EST, LEVL IV, 30-39 MIN: ICD-10-PCS | Mod: S$GLB,,, | Performed by: FAMILY MEDICINE

## 2021-05-13 PROCEDURE — 99999 PR PBB SHADOW E&M-EST. PATIENT-LVL III: ICD-10-PCS | Mod: PBBFAC,,, | Performed by: FAMILY MEDICINE

## 2021-05-13 RX ORDER — MELOXICAM 7.5 MG/1
7.5 TABLET ORAL DAILY
COMMUNITY
End: 2021-06-10 | Stop reason: ALTCHOICE

## 2021-05-13 RX ORDER — POLYETHYLENE GLYCOL 3350 17 G/17G
17 POWDER, FOR SOLUTION ORAL DAILY
Qty: 100 EACH | Refills: 6 | Status: ON HOLD | OUTPATIENT
Start: 2021-05-13 | End: 2022-11-01 | Stop reason: HOSPADM

## 2021-05-13 RX ORDER — BISACODYL 5 MG
5 TABLET, DELAYED RELEASE (ENTERIC COATED) ORAL DAILY PRN
COMMUNITY

## 2021-05-13 RX ORDER — FLUTICASONE PROPIONATE 0.5 MG/G
CREAM TOPICAL
COMMUNITY
Start: 2021-04-08 | End: 2022-11-08

## 2021-05-13 RX ORDER — TRAMADOL HYDROCHLORIDE 50 MG/1
50 TABLET ORAL EVERY 6 HOURS PRN
COMMUNITY
End: 2021-10-14

## 2021-05-13 RX ORDER — KETOCONAZOLE 20 MG/G
CREAM TOPICAL
COMMUNITY
Start: 2021-04-08 | End: 2022-11-08

## 2021-05-21 ENCOUNTER — TELEPHONE (OUTPATIENT)
Dept: FAMILY MEDICINE | Facility: CLINIC | Age: 86
End: 2021-05-21

## 2021-06-10 ENCOUNTER — OFFICE VISIT (OUTPATIENT)
Dept: FAMILY MEDICINE | Facility: CLINIC | Age: 86
End: 2021-06-10
Payer: MEDICARE

## 2021-06-10 VITALS
BODY MASS INDEX: 21.82 KG/M2 | OXYGEN SATURATION: 96 % | HEART RATE: 67 BPM | DIASTOLIC BLOOD PRESSURE: 68 MMHG | SYSTOLIC BLOOD PRESSURE: 122 MMHG | HEIGHT: 70 IN | WEIGHT: 152.44 LBS | TEMPERATURE: 97 F

## 2021-06-10 DIAGNOSIS — G89.29 CHRONIC PAIN OF BOTH KNEES: Primary | ICD-10-CM

## 2021-06-10 DIAGNOSIS — M25.562 CHRONIC PAIN OF BOTH KNEES: Primary | ICD-10-CM

## 2021-06-10 DIAGNOSIS — I10 ESSENTIAL HYPERTENSION: ICD-10-CM

## 2021-06-10 DIAGNOSIS — R97.20 ELEVATED PROSTATE SPECIFIC ANTIGEN (PSA): ICD-10-CM

## 2021-06-10 DIAGNOSIS — N42.9 DISORDER OF PROSTATE, UNSPECIFIED: ICD-10-CM

## 2021-06-10 DIAGNOSIS — Z12.5 ENCOUNTER FOR SCREENING FOR MALIGNANT NEOPLASM OF PROSTATE: ICD-10-CM

## 2021-06-10 DIAGNOSIS — E78.2 MIXED HYPERLIPIDEMIA: ICD-10-CM

## 2021-06-10 DIAGNOSIS — R53.83 FATIGUE, UNSPECIFIED TYPE: ICD-10-CM

## 2021-06-10 DIAGNOSIS — R73.03 PREDIABETES: ICD-10-CM

## 2021-06-10 DIAGNOSIS — M25.561 CHRONIC PAIN OF BOTH KNEES: Primary | ICD-10-CM

## 2021-06-10 DIAGNOSIS — Z00.00 ROUTINE MEDICAL EXAM: ICD-10-CM

## 2021-06-10 PROCEDURE — 3288F PR FALLS RISK ASSESSMENT DOCUMENTED: ICD-10-PCS | Mod: CPTII,S$GLB,, | Performed by: NURSE PRACTITIONER

## 2021-06-10 PROCEDURE — 1159F MED LIST DOCD IN RCRD: CPT | Mod: S$GLB,,, | Performed by: NURSE PRACTITIONER

## 2021-06-10 PROCEDURE — 1125F AMNT PAIN NOTED PAIN PRSNT: CPT | Mod: S$GLB,,, | Performed by: NURSE PRACTITIONER

## 2021-06-10 PROCEDURE — 99999 PR PBB SHADOW E&M-EST. PATIENT-LVL III: ICD-10-PCS | Mod: PBBFAC,,, | Performed by: NURSE PRACTITIONER

## 2021-06-10 PROCEDURE — 1101F PR PT FALLS ASSESS DOC 0-1 FALLS W/OUT INJ PAST YR: ICD-10-PCS | Mod: CPTII,S$GLB,, | Performed by: NURSE PRACTITIONER

## 2021-06-10 PROCEDURE — 1159F PR MEDICATION LIST DOCUMENTED IN MEDICAL RECORD: ICD-10-PCS | Mod: S$GLB,,, | Performed by: NURSE PRACTITIONER

## 2021-06-10 PROCEDURE — 1125F PR PAIN SEVERITY QUANTIFIED, PAIN PRESENT: ICD-10-PCS | Mod: S$GLB,,, | Performed by: NURSE PRACTITIONER

## 2021-06-10 PROCEDURE — 1101F PT FALLS ASSESS-DOCD LE1/YR: CPT | Mod: CPTII,S$GLB,, | Performed by: NURSE PRACTITIONER

## 2021-06-10 PROCEDURE — 99214 PR OFFICE/OUTPT VISIT, EST, LEVL IV, 30-39 MIN: ICD-10-PCS | Mod: S$GLB,,, | Performed by: NURSE PRACTITIONER

## 2021-06-10 PROCEDURE — 99214 OFFICE O/P EST MOD 30 MIN: CPT | Mod: S$GLB,,, | Performed by: NURSE PRACTITIONER

## 2021-06-10 PROCEDURE — 99999 PR PBB SHADOW E&M-EST. PATIENT-LVL III: CPT | Mod: PBBFAC,,, | Performed by: NURSE PRACTITIONER

## 2021-06-10 PROCEDURE — 3288F FALL RISK ASSESSMENT DOCD: CPT | Mod: CPTII,S$GLB,, | Performed by: NURSE PRACTITIONER

## 2021-06-10 RX ORDER — MELOXICAM 15 MG/1
15 TABLET ORAL DAILY
Qty: 90 TABLET | Refills: 1 | Status: SHIPPED | OUTPATIENT
Start: 2021-06-10 | End: 2022-01-03

## 2021-06-28 ENCOUNTER — LAB VISIT (OUTPATIENT)
Dept: LAB | Facility: HOSPITAL | Age: 86
End: 2021-06-28
Attending: FAMILY MEDICINE
Payer: MEDICARE

## 2021-06-28 DIAGNOSIS — R53.83 FATIGUE, UNSPECIFIED TYPE: ICD-10-CM

## 2021-06-28 DIAGNOSIS — E78.2 MIXED HYPERLIPIDEMIA: ICD-10-CM

## 2021-06-28 DIAGNOSIS — Z00.00 ROUTINE MEDICAL EXAM: ICD-10-CM

## 2021-06-28 DIAGNOSIS — R73.03 PREDIABETES: ICD-10-CM

## 2021-06-28 DIAGNOSIS — N42.9 DISORDER OF PROSTATE, UNSPECIFIED: ICD-10-CM

## 2021-06-28 DIAGNOSIS — Z12.5 ENCOUNTER FOR SCREENING FOR MALIGNANT NEOPLASM OF PROSTATE: ICD-10-CM

## 2021-06-28 DIAGNOSIS — I10 ESSENTIAL HYPERTENSION: ICD-10-CM

## 2021-06-28 LAB
BASOPHILS # BLD AUTO: 0.07 K/UL (ref 0–0.2)
BASOPHILS NFR BLD: 1.2 % (ref 0–1.9)
DIFFERENTIAL METHOD: ABNORMAL
EOSINOPHIL # BLD AUTO: 0.2 K/UL (ref 0–0.5)
EOSINOPHIL NFR BLD: 3.2 % (ref 0–8)
ERYTHROCYTE [DISTWIDTH] IN BLOOD BY AUTOMATED COUNT: 13 % (ref 11.5–14.5)
HCT VFR BLD AUTO: 40.2 % (ref 40–54)
HGB BLD-MCNC: 13 G/DL (ref 14–18)
IMM GRANULOCYTES # BLD AUTO: 0.01 K/UL (ref 0–0.04)
IMM GRANULOCYTES NFR BLD AUTO: 0.2 % (ref 0–0.5)
LYMPHOCYTES # BLD AUTO: 1.7 K/UL (ref 1–4.8)
LYMPHOCYTES NFR BLD: 28 % (ref 18–48)
MCH RBC QN AUTO: 30.6 PG (ref 27–31)
MCHC RBC AUTO-ENTMCNC: 32.3 G/DL (ref 32–36)
MCV RBC AUTO: 95 FL (ref 82–98)
MONOCYTES # BLD AUTO: 0.7 K/UL (ref 0.3–1)
MONOCYTES NFR BLD: 12 % (ref 4–15)
NEUTROPHILS # BLD AUTO: 3.3 K/UL (ref 1.8–7.7)
NEUTROPHILS NFR BLD: 55.4 % (ref 38–73)
NRBC BLD-RTO: 0 /100 WBC
PLATELET # BLD AUTO: 216 K/UL (ref 150–450)
PMV BLD AUTO: 10.4 FL (ref 9.2–12.9)
RBC # BLD AUTO: 4.25 M/UL (ref 4.6–6.2)
WBC # BLD AUTO: 5.93 K/UL (ref 3.9–12.7)

## 2021-06-28 PROCEDURE — 36415 COLL VENOUS BLD VENIPUNCTURE: CPT | Mod: PO | Performed by: NURSE PRACTITIONER

## 2021-06-28 PROCEDURE — 85025 COMPLETE CBC W/AUTO DIFF WBC: CPT | Performed by: NURSE PRACTITIONER

## 2021-06-28 PROCEDURE — 80053 COMPREHEN METABOLIC PANEL: CPT | Performed by: NURSE PRACTITIONER

## 2021-06-28 PROCEDURE — 80061 LIPID PANEL: CPT | Performed by: NURSE PRACTITIONER

## 2021-06-28 PROCEDURE — 84443 ASSAY THYROID STIM HORMONE: CPT | Performed by: NURSE PRACTITIONER

## 2021-06-28 PROCEDURE — 84153 ASSAY OF PSA TOTAL: CPT | Performed by: NURSE PRACTITIONER

## 2021-06-29 LAB
ALBUMIN SERPL BCP-MCNC: 3.6 G/DL (ref 3.5–5.2)
ALP SERPL-CCNC: 62 U/L (ref 55–135)
ALT SERPL W/O P-5'-P-CCNC: 13 U/L (ref 10–44)
ANION GAP SERPL CALC-SCNC: 11 MMOL/L (ref 8–16)
AST SERPL-CCNC: 22 U/L (ref 10–40)
BILIRUB SERPL-MCNC: 0.4 MG/DL (ref 0.1–1)
BUN SERPL-MCNC: 15 MG/DL (ref 8–23)
CALCIUM SERPL-MCNC: 9.5 MG/DL (ref 8.7–10.5)
CHLORIDE SERPL-SCNC: 102 MMOL/L (ref 95–110)
CHOLEST SERPL-MCNC: 145 MG/DL (ref 120–199)
CHOLEST/HDLC SERPL: 2.4 {RATIO} (ref 2–5)
CO2 SERPL-SCNC: 23 MMOL/L (ref 23–29)
COMPLEXED PSA SERPL-MCNC: 14.3 NG/ML (ref 0–4)
CREAT SERPL-MCNC: 0.8 MG/DL (ref 0.5–1.4)
EST. GFR  (AFRICAN AMERICAN): >60 ML/MIN/1.73 M^2
EST. GFR  (NON AFRICAN AMERICAN): >60 ML/MIN/1.73 M^2
GLUCOSE SERPL-MCNC: 92 MG/DL (ref 70–110)
HDLC SERPL-MCNC: 60 MG/DL (ref 40–75)
HDLC SERPL: 41.4 % (ref 20–50)
LDLC SERPL CALC-MCNC: 57.6 MG/DL (ref 63–159)
NONHDLC SERPL-MCNC: 85 MG/DL
POTASSIUM SERPL-SCNC: 4.3 MMOL/L (ref 3.5–5.1)
PROT SERPL-MCNC: 6.3 G/DL (ref 6–8.4)
SODIUM SERPL-SCNC: 136 MMOL/L (ref 136–145)
TRIGL SERPL-MCNC: 137 MG/DL (ref 30–150)
TSH SERPL DL<=0.005 MIU/L-ACNC: 2.15 UIU/ML (ref 0.4–4)

## 2021-07-01 ENCOUNTER — OFFICE VISIT (OUTPATIENT)
Dept: FAMILY MEDICINE | Facility: CLINIC | Age: 86
End: 2021-07-01
Payer: MEDICARE

## 2021-07-01 VITALS
TEMPERATURE: 98 F | DIASTOLIC BLOOD PRESSURE: 60 MMHG | SYSTOLIC BLOOD PRESSURE: 108 MMHG | HEIGHT: 70 IN | OXYGEN SATURATION: 96 % | BODY MASS INDEX: 22.25 KG/M2 | HEART RATE: 70 BPM | WEIGHT: 155.44 LBS

## 2021-07-01 DIAGNOSIS — R97.20 ELEVATED PROSTATE SPECIFIC ANTIGEN (PSA): ICD-10-CM

## 2021-07-01 DIAGNOSIS — I10 ESSENTIAL HYPERTENSION: Primary | ICD-10-CM

## 2021-07-01 DIAGNOSIS — R73.03 PREDIABETES: ICD-10-CM

## 2021-07-01 PROCEDURE — 1159F PR MEDICATION LIST DOCUMENTED IN MEDICAL RECORD: ICD-10-PCS | Mod: S$GLB,,, | Performed by: FAMILY MEDICINE

## 2021-07-01 PROCEDURE — 1125F PR PAIN SEVERITY QUANTIFIED, PAIN PRESENT: ICD-10-PCS | Mod: S$GLB,,, | Performed by: FAMILY MEDICINE

## 2021-07-01 PROCEDURE — 1159F MED LIST DOCD IN RCRD: CPT | Mod: S$GLB,,, | Performed by: FAMILY MEDICINE

## 2021-07-01 PROCEDURE — 99999 PR PBB SHADOW E&M-EST. PATIENT-LVL IV: ICD-10-PCS | Mod: PBBFAC,,, | Performed by: FAMILY MEDICINE

## 2021-07-01 PROCEDURE — 1125F AMNT PAIN NOTED PAIN PRSNT: CPT | Mod: S$GLB,,, | Performed by: FAMILY MEDICINE

## 2021-07-01 PROCEDURE — 3288F PR FALLS RISK ASSESSMENT DOCUMENTED: ICD-10-PCS | Mod: CPTII,S$GLB,, | Performed by: FAMILY MEDICINE

## 2021-07-01 PROCEDURE — 1101F PT FALLS ASSESS-DOCD LE1/YR: CPT | Mod: CPTII,S$GLB,, | Performed by: FAMILY MEDICINE

## 2021-07-01 PROCEDURE — 99214 OFFICE O/P EST MOD 30 MIN: CPT | Mod: S$GLB,,, | Performed by: FAMILY MEDICINE

## 2021-07-01 PROCEDURE — 99499 RISK ADDL DX/OHS AUDIT: ICD-10-PCS | Mod: S$GLB,,, | Performed by: FAMILY MEDICINE

## 2021-07-01 PROCEDURE — 3288F FALL RISK ASSESSMENT DOCD: CPT | Mod: CPTII,S$GLB,, | Performed by: FAMILY MEDICINE

## 2021-07-01 PROCEDURE — 1101F PR PT FALLS ASSESS DOC 0-1 FALLS W/OUT INJ PAST YR: ICD-10-PCS | Mod: CPTII,S$GLB,, | Performed by: FAMILY MEDICINE

## 2021-07-01 PROCEDURE — 99499 UNLISTED E&M SERVICE: CPT | Mod: S$GLB,,, | Performed by: FAMILY MEDICINE

## 2021-07-01 PROCEDURE — 99999 PR PBB SHADOW E&M-EST. PATIENT-LVL IV: CPT | Mod: PBBFAC,,, | Performed by: FAMILY MEDICINE

## 2021-07-01 PROCEDURE — 99214 PR OFFICE/OUTPT VISIT, EST, LEVL IV, 30-39 MIN: ICD-10-PCS | Mod: S$GLB,,, | Performed by: FAMILY MEDICINE

## 2021-07-12 ENCOUNTER — TELEPHONE (OUTPATIENT)
Dept: UROLOGY | Facility: CLINIC | Age: 86
End: 2021-07-12

## 2021-07-12 ENCOUNTER — OFFICE VISIT (OUTPATIENT)
Dept: FAMILY MEDICINE | Facility: CLINIC | Age: 86
End: 2021-07-12
Payer: MEDICARE

## 2021-07-12 VITALS
OXYGEN SATURATION: 97 % | WEIGHT: 166.31 LBS | BODY MASS INDEX: 23.81 KG/M2 | SYSTOLIC BLOOD PRESSURE: 120 MMHG | DIASTOLIC BLOOD PRESSURE: 66 MMHG | HEART RATE: 71 BPM | HEIGHT: 70 IN | TEMPERATURE: 98 F

## 2021-07-12 DIAGNOSIS — R42 VERTIGO: ICD-10-CM

## 2021-07-12 DIAGNOSIS — J06.9 VIRAL URI WITH COUGH: Primary | ICD-10-CM

## 2021-07-12 PROCEDURE — 1101F PR PT FALLS ASSESS DOC 0-1 FALLS W/OUT INJ PAST YR: ICD-10-PCS | Mod: CPTII,S$GLB,, | Performed by: NURSE PRACTITIONER

## 2021-07-12 PROCEDURE — 96372 THER/PROPH/DIAG INJ SC/IM: CPT | Mod: S$GLB,,, | Performed by: NURSE PRACTITIONER

## 2021-07-12 PROCEDURE — 99213 OFFICE O/P EST LOW 20 MIN: CPT | Mod: 25,S$GLB,, | Performed by: NURSE PRACTITIONER

## 2021-07-12 PROCEDURE — 1159F MED LIST DOCD IN RCRD: CPT | Mod: S$GLB,,, | Performed by: NURSE PRACTITIONER

## 2021-07-12 PROCEDURE — 99213 PR OFFICE/OUTPT VISIT, EST, LEVL III, 20-29 MIN: ICD-10-PCS | Mod: 25,S$GLB,, | Performed by: NURSE PRACTITIONER

## 2021-07-12 PROCEDURE — 96372 PR INJECTION,THERAP/PROPH/DIAG2ST, IM OR SUBCUT: ICD-10-PCS | Mod: S$GLB,,, | Performed by: NURSE PRACTITIONER

## 2021-07-12 PROCEDURE — 1126F AMNT PAIN NOTED NONE PRSNT: CPT | Mod: S$GLB,,, | Performed by: NURSE PRACTITIONER

## 2021-07-12 PROCEDURE — 1159F PR MEDICATION LIST DOCUMENTED IN MEDICAL RECORD: ICD-10-PCS | Mod: S$GLB,,, | Performed by: NURSE PRACTITIONER

## 2021-07-12 PROCEDURE — 1101F PT FALLS ASSESS-DOCD LE1/YR: CPT | Mod: CPTII,S$GLB,, | Performed by: NURSE PRACTITIONER

## 2021-07-12 PROCEDURE — 1126F PR PAIN SEVERITY QUANTIFIED, NO PAIN PRESENT: ICD-10-PCS | Mod: S$GLB,,, | Performed by: NURSE PRACTITIONER

## 2021-07-12 PROCEDURE — 3288F FALL RISK ASSESSMENT DOCD: CPT | Mod: CPTII,S$GLB,, | Performed by: NURSE PRACTITIONER

## 2021-07-12 PROCEDURE — 99999 PR PBB SHADOW E&M-EST. PATIENT-LVL IV: CPT | Mod: PBBFAC,,, | Performed by: NURSE PRACTITIONER

## 2021-07-12 PROCEDURE — 3288F PR FALLS RISK ASSESSMENT DOCUMENTED: ICD-10-PCS | Mod: CPTII,S$GLB,, | Performed by: NURSE PRACTITIONER

## 2021-07-12 PROCEDURE — 99999 PR PBB SHADOW E&M-EST. PATIENT-LVL IV: ICD-10-PCS | Mod: PBBFAC,,, | Performed by: NURSE PRACTITIONER

## 2021-07-12 RX ORDER — OMEPRAZOLE 20 MG/1
20 CAPSULE, DELAYED RELEASE ORAL DAILY
Qty: 90 CAPSULE | Refills: 1 | Status: SHIPPED | OUTPATIENT
Start: 2021-07-12 | End: 2021-10-05 | Stop reason: SDUPTHER

## 2021-07-12 RX ORDER — MECLIZINE HYDROCHLORIDE 25 MG/1
25 TABLET ORAL 2 TIMES DAILY PRN
Qty: 90 TABLET | Refills: 0 | Status: SHIPPED | OUTPATIENT
Start: 2021-07-12 | End: 2021-09-22

## 2021-07-12 RX ORDER — AMLODIPINE BESYLATE 2.5 MG/1
2.5 TABLET ORAL DAILY
COMMUNITY
End: 2022-02-18 | Stop reason: SDUPTHER

## 2021-07-12 RX ORDER — METHYLPREDNISOLONE ACETATE 80 MG/ML
80 INJECTION, SUSPENSION INTRA-ARTICULAR; INTRALESIONAL; INTRAMUSCULAR; SOFT TISSUE
Status: COMPLETED | OUTPATIENT
Start: 2021-07-12 | End: 2021-07-12

## 2021-07-12 RX ADMIN — METHYLPREDNISOLONE ACETATE 80 MG: 80 INJECTION, SUSPENSION INTRA-ARTICULAR; INTRALESIONAL; INTRAMUSCULAR; SOFT TISSUE at 02:07

## 2021-07-15 ENCOUNTER — PES CALL (OUTPATIENT)
Dept: ADMINISTRATIVE | Facility: CLINIC | Age: 86
End: 2021-07-15

## 2021-07-21 ENCOUNTER — TELEPHONE (OUTPATIENT)
Dept: FAMILY MEDICINE | Facility: CLINIC | Age: 86
End: 2021-07-21

## 2021-09-27 ENCOUNTER — OFFICE VISIT (OUTPATIENT)
Dept: FAMILY MEDICINE | Facility: CLINIC | Age: 86
End: 2021-09-27
Payer: MEDICARE

## 2021-09-27 VITALS
DIASTOLIC BLOOD PRESSURE: 68 MMHG | TEMPERATURE: 97 F | HEIGHT: 70 IN | HEART RATE: 79 BPM | SYSTOLIC BLOOD PRESSURE: 128 MMHG | BODY MASS INDEX: 23.06 KG/M2 | OXYGEN SATURATION: 94 % | WEIGHT: 161.06 LBS

## 2021-09-27 DIAGNOSIS — K59.09 CHRONIC CONSTIPATION: Primary | ICD-10-CM

## 2021-09-27 PROCEDURE — 99213 OFFICE O/P EST LOW 20 MIN: CPT | Mod: HCNC,S$GLB,, | Performed by: NURSE PRACTITIONER

## 2021-09-27 PROCEDURE — 1159F MED LIST DOCD IN RCRD: CPT | Mod: HCNC,CPTII,S$GLB, | Performed by: NURSE PRACTITIONER

## 2021-09-27 PROCEDURE — 99999 PR PBB SHADOW E&M-EST. PATIENT-LVL IV: CPT | Mod: PBBFAC,HCNC,, | Performed by: NURSE PRACTITIONER

## 2021-09-27 PROCEDURE — 1101F PR PT FALLS ASSESS DOC 0-1 FALLS W/OUT INJ PAST YR: ICD-10-PCS | Mod: HCNC,CPTII,S$GLB, | Performed by: NURSE PRACTITIONER

## 2021-09-27 PROCEDURE — 1101F PT FALLS ASSESS-DOCD LE1/YR: CPT | Mod: HCNC,CPTII,S$GLB, | Performed by: NURSE PRACTITIONER

## 2021-09-27 PROCEDURE — 1125F AMNT PAIN NOTED PAIN PRSNT: CPT | Mod: HCNC,CPTII,S$GLB, | Performed by: NURSE PRACTITIONER

## 2021-09-27 PROCEDURE — 99213 PR OFFICE/OUTPT VISIT, EST, LEVL III, 20-29 MIN: ICD-10-PCS | Mod: HCNC,S$GLB,, | Performed by: NURSE PRACTITIONER

## 2021-09-27 PROCEDURE — 3288F PR FALLS RISK ASSESSMENT DOCUMENTED: ICD-10-PCS | Mod: HCNC,CPTII,S$GLB, | Performed by: NURSE PRACTITIONER

## 2021-09-27 PROCEDURE — 3288F FALL RISK ASSESSMENT DOCD: CPT | Mod: HCNC,CPTII,S$GLB, | Performed by: NURSE PRACTITIONER

## 2021-09-27 PROCEDURE — 1159F PR MEDICATION LIST DOCUMENTED IN MEDICAL RECORD: ICD-10-PCS | Mod: HCNC,CPTII,S$GLB, | Performed by: NURSE PRACTITIONER

## 2021-09-27 PROCEDURE — 99999 PR PBB SHADOW E&M-EST. PATIENT-LVL IV: ICD-10-PCS | Mod: PBBFAC,HCNC,, | Performed by: NURSE PRACTITIONER

## 2021-09-27 PROCEDURE — 1125F PR PAIN SEVERITY QUANTIFIED, PAIN PRESENT: ICD-10-PCS | Mod: HCNC,CPTII,S$GLB, | Performed by: NURSE PRACTITIONER

## 2021-10-05 ENCOUNTER — TELEPHONE (OUTPATIENT)
Dept: PAIN MEDICINE | Facility: CLINIC | Age: 86
End: 2021-10-05

## 2021-10-05 RX ORDER — OMEPRAZOLE 20 MG/1
20 CAPSULE, DELAYED RELEASE ORAL DAILY
Qty: 90 CAPSULE | Refills: 1 | Status: SHIPPED | OUTPATIENT
Start: 2021-10-05 | End: 2021-12-01

## 2021-10-13 ENCOUNTER — TELEPHONE (OUTPATIENT)
Dept: PAIN MEDICINE | Facility: CLINIC | Age: 86
End: 2021-10-13

## 2021-10-13 ENCOUNTER — OFFICE VISIT (OUTPATIENT)
Dept: DERMATOLOGY | Facility: CLINIC | Age: 86
End: 2021-10-13
Payer: MEDICARE

## 2021-10-13 DIAGNOSIS — L82.0 INFLAMED SEBORRHEIC KERATOSIS: ICD-10-CM

## 2021-10-13 DIAGNOSIS — L82.1 SEBORRHEIC KERATOSES: ICD-10-CM

## 2021-10-13 DIAGNOSIS — L57.0 ACTINIC KERATOSIS: ICD-10-CM

## 2021-10-13 DIAGNOSIS — D49.2 SKIN NEOPLASM: Primary | ICD-10-CM

## 2021-10-13 DIAGNOSIS — Z85.828 HISTORY OF NONMELANOMA SKIN CANCER: ICD-10-CM

## 2021-10-13 PROCEDURE — 99204 PR OFFICE/OUTPT VISIT, NEW, LEVL IV, 45-59 MIN: ICD-10-PCS | Mod: 25,HCNC,S$GLB, | Performed by: DERMATOLOGY

## 2021-10-13 PROCEDURE — 88341 IMHCHEM/IMCYTCHM EA ADD ANTB: CPT | Mod: 26,HCNC,, | Performed by: PATHOLOGY

## 2021-10-13 PROCEDURE — 1160F PR REVIEW ALL MEDS BY PRESCRIBER/CLIN PHARMACIST DOCUMENTED: ICD-10-PCS | Mod: HCNC,CPTII,S$GLB, | Performed by: DERMATOLOGY

## 2021-10-13 PROCEDURE — 88305 TISSUE EXAM BY PATHOLOGIST: CPT | Mod: 26,HCNC,, | Performed by: PATHOLOGY

## 2021-10-13 PROCEDURE — 11102 TANGNTL BX SKIN SINGLE LES: CPT | Mod: 59,HCNC,S$GLB, | Performed by: DERMATOLOGY

## 2021-10-13 PROCEDURE — 17004 DESTROY PREMAL LESIONS 15/>: CPT | Mod: 59,HCNC,S$GLB, | Performed by: DERMATOLOGY

## 2021-10-13 PROCEDURE — 1160F RVW MEDS BY RX/DR IN RCRD: CPT | Mod: HCNC,CPTII,S$GLB, | Performed by: DERMATOLOGY

## 2021-10-13 PROCEDURE — 17110 PR DESTRUCTION BENIGN LESIONS UP TO 14: ICD-10-PCS | Mod: HCNC,S$GLB,, | Performed by: DERMATOLOGY

## 2021-10-13 PROCEDURE — 11102 PR TANGENTIAL BIOPSY, SKIN, SINGLE LESION: ICD-10-PCS | Mod: 59,HCNC,S$GLB, | Performed by: DERMATOLOGY

## 2021-10-13 PROCEDURE — 17110 DESTRUCTION B9 LES UP TO 14: CPT | Mod: HCNC,S$GLB,, | Performed by: DERMATOLOGY

## 2021-10-13 PROCEDURE — 1159F PR MEDICATION LIST DOCUMENTED IN MEDICAL RECORD: ICD-10-PCS | Mod: HCNC,CPTII,S$GLB, | Performed by: DERMATOLOGY

## 2021-10-13 PROCEDURE — 88305 TISSUE EXAM BY PATHOLOGIST: CPT | Mod: 59,HCNC | Performed by: PATHOLOGY

## 2021-10-13 PROCEDURE — 88342 IMHCHEM/IMCYTCHM 1ST ANTB: CPT | Mod: HCNC | Performed by: PATHOLOGY

## 2021-10-13 PROCEDURE — 1125F AMNT PAIN NOTED PAIN PRSNT: CPT | Mod: HCNC,CPTII,S$GLB, | Performed by: DERMATOLOGY

## 2021-10-13 PROCEDURE — 88342 CHG IMMUNOCYTOCHEMISTRY: ICD-10-PCS | Mod: 26,HCNC,, | Performed by: PATHOLOGY

## 2021-10-13 PROCEDURE — 88341 IMHCHEM/IMCYTCHM EA ADD ANTB: CPT | Mod: HCNC | Performed by: PATHOLOGY

## 2021-10-13 PROCEDURE — 17004 PR DESTRUCTION, PREMALIGNANT LESIONS; 15 OR MORE LESIONS: ICD-10-PCS | Mod: 59,HCNC,S$GLB, | Performed by: DERMATOLOGY

## 2021-10-13 PROCEDURE — 88341 PR IHC OR ICC EACH ADD'L SINGLE ANTIBODY  STAINPR: ICD-10-PCS | Mod: 26,HCNC,, | Performed by: PATHOLOGY

## 2021-10-13 PROCEDURE — 99204 OFFICE O/P NEW MOD 45 MIN: CPT | Mod: 25,HCNC,S$GLB, | Performed by: DERMATOLOGY

## 2021-10-13 PROCEDURE — 11103 PR TANGENTIAL BIOPSY, SKIN, EA ADDTL LESION: ICD-10-PCS | Mod: 59,HCNC,S$GLB, | Performed by: DERMATOLOGY

## 2021-10-13 PROCEDURE — 99999 PR PBB SHADOW E&M-EST. PATIENT-LVL III: ICD-10-PCS | Mod: PBBFAC,HCNC,, | Performed by: DERMATOLOGY

## 2021-10-13 PROCEDURE — 88342 IMHCHEM/IMCYTCHM 1ST ANTB: CPT | Mod: 26,HCNC,, | Performed by: PATHOLOGY

## 2021-10-13 PROCEDURE — 11103 TANGNTL BX SKIN EA SEP/ADDL: CPT | Mod: 59,HCNC,S$GLB, | Performed by: DERMATOLOGY

## 2021-10-13 PROCEDURE — 99999 PR PBB SHADOW E&M-EST. PATIENT-LVL III: CPT | Mod: PBBFAC,HCNC,, | Performed by: DERMATOLOGY

## 2021-10-13 PROCEDURE — 1159F MED LIST DOCD IN RCRD: CPT | Mod: HCNC,CPTII,S$GLB, | Performed by: DERMATOLOGY

## 2021-10-13 PROCEDURE — 88305 TISSUE EXAM BY PATHOLOGIST: ICD-10-PCS | Mod: 26,HCNC,, | Performed by: PATHOLOGY

## 2021-10-13 PROCEDURE — 1125F PR PAIN SEVERITY QUANTIFIED, PAIN PRESENT: ICD-10-PCS | Mod: HCNC,CPTII,S$GLB, | Performed by: DERMATOLOGY

## 2021-10-13 RX ORDER — FLUOROURACIL 50 MG/G
CREAM TOPICAL
Qty: 40 G | Refills: 1 | Status: SHIPPED | OUTPATIENT
Start: 2021-10-13 | End: 2022-11-08

## 2021-10-14 ENCOUNTER — OFFICE VISIT (OUTPATIENT)
Dept: PAIN MEDICINE | Facility: CLINIC | Age: 86
End: 2021-10-14
Payer: MEDICARE

## 2021-10-14 VITALS
WEIGHT: 160.38 LBS | SYSTOLIC BLOOD PRESSURE: 150 MMHG | BODY MASS INDEX: 22.96 KG/M2 | RESPIRATION RATE: 17 BRPM | DIASTOLIC BLOOD PRESSURE: 76 MMHG | HEART RATE: 64 BPM | HEIGHT: 70 IN

## 2021-10-14 DIAGNOSIS — M54.50 LUMBAR SPINE PAINFUL ON MOVEMENT: ICD-10-CM

## 2021-10-14 DIAGNOSIS — M51.36 DDD (DEGENERATIVE DISC DISEASE), LUMBAR: ICD-10-CM

## 2021-10-14 DIAGNOSIS — M47.897 OTHER SPONDYLOSIS, LUMBOSACRAL REGION: ICD-10-CM

## 2021-10-14 DIAGNOSIS — M54.16 BILATERAL LUMBAR RADICULOPATHY: Primary | ICD-10-CM

## 2021-10-14 PROCEDURE — 99214 PR OFFICE/OUTPT VISIT, EST, LEVL IV, 30-39 MIN: ICD-10-PCS | Mod: HCNC,S$GLB,, | Performed by: PHYSICIAN ASSISTANT

## 2021-10-14 PROCEDURE — 1159F MED LIST DOCD IN RCRD: CPT | Mod: HCNC,CPTII,S$GLB, | Performed by: PHYSICIAN ASSISTANT

## 2021-10-14 PROCEDURE — 1125F AMNT PAIN NOTED PAIN PRSNT: CPT | Mod: HCNC,CPTII,S$GLB, | Performed by: PHYSICIAN ASSISTANT

## 2021-10-14 PROCEDURE — 1101F PR PT FALLS ASSESS DOC 0-1 FALLS W/OUT INJ PAST YR: ICD-10-PCS | Mod: HCNC,CPTII,S$GLB, | Performed by: PHYSICIAN ASSISTANT

## 2021-10-14 PROCEDURE — 1160F PR REVIEW ALL MEDS BY PRESCRIBER/CLIN PHARMACIST DOCUMENTED: ICD-10-PCS | Mod: HCNC,CPTII,S$GLB, | Performed by: PHYSICIAN ASSISTANT

## 2021-10-14 PROCEDURE — 1125F PR PAIN SEVERITY QUANTIFIED, PAIN PRESENT: ICD-10-PCS | Mod: HCNC,CPTII,S$GLB, | Performed by: PHYSICIAN ASSISTANT

## 2021-10-14 PROCEDURE — 1160F RVW MEDS BY RX/DR IN RCRD: CPT | Mod: HCNC,CPTII,S$GLB, | Performed by: PHYSICIAN ASSISTANT

## 2021-10-14 PROCEDURE — 99999 PR PBB SHADOW E&M-EST. PATIENT-LVL IV: ICD-10-PCS | Mod: PBBFAC,HCNC,, | Performed by: PHYSICIAN ASSISTANT

## 2021-10-14 PROCEDURE — 1159F PR MEDICATION LIST DOCUMENTED IN MEDICAL RECORD: ICD-10-PCS | Mod: HCNC,CPTII,S$GLB, | Performed by: PHYSICIAN ASSISTANT

## 2021-10-14 PROCEDURE — 3288F FALL RISK ASSESSMENT DOCD: CPT | Mod: HCNC,CPTII,S$GLB, | Performed by: PHYSICIAN ASSISTANT

## 2021-10-14 PROCEDURE — 3288F PR FALLS RISK ASSESSMENT DOCUMENTED: ICD-10-PCS | Mod: HCNC,CPTII,S$GLB, | Performed by: PHYSICIAN ASSISTANT

## 2021-10-14 PROCEDURE — 1101F PT FALLS ASSESS-DOCD LE1/YR: CPT | Mod: HCNC,CPTII,S$GLB, | Performed by: PHYSICIAN ASSISTANT

## 2021-10-14 PROCEDURE — 99999 PR PBB SHADOW E&M-EST. PATIENT-LVL IV: CPT | Mod: PBBFAC,HCNC,, | Performed by: PHYSICIAN ASSISTANT

## 2021-10-14 PROCEDURE — 99214 OFFICE O/P EST MOD 30 MIN: CPT | Mod: HCNC,S$GLB,, | Performed by: PHYSICIAN ASSISTANT

## 2021-10-15 ENCOUNTER — IMMUNIZATION (OUTPATIENT)
Dept: FAMILY MEDICINE | Facility: CLINIC | Age: 86
End: 2021-10-15
Payer: MEDICARE

## 2021-10-15 PROCEDURE — G0008 FLU VACCINE - QUADRIVALENT - ADJUVANTED: ICD-10-PCS | Mod: HCNC,S$GLB,, | Performed by: FAMILY MEDICINE

## 2021-10-15 PROCEDURE — 90694 FLU VACCINE - QUADRIVALENT - ADJUVANTED: ICD-10-PCS | Mod: HCNC,S$GLB,, | Performed by: FAMILY MEDICINE

## 2021-10-15 PROCEDURE — 90694 VACC AIIV4 NO PRSRV 0.5ML IM: CPT | Mod: HCNC,S$GLB,, | Performed by: FAMILY MEDICINE

## 2021-10-15 PROCEDURE — G0008 ADMIN INFLUENZA VIRUS VAC: HCPCS | Mod: HCNC,S$GLB,, | Performed by: FAMILY MEDICINE

## 2021-10-19 LAB
FINAL PATHOLOGIC DIAGNOSIS: NORMAL
GROSS: NORMAL
Lab: NORMAL
MICROSCOPIC EXAM: NORMAL

## 2021-11-08 ENCOUNTER — HOSPITAL ENCOUNTER (OUTPATIENT)
Facility: HOSPITAL | Age: 86
Discharge: HOME | End: 2021-11-08
Attending: ANESTHESIOLOGY | Admitting: ANESTHESIOLOGY
Payer: MEDICARE

## 2021-11-08 VITALS
OXYGEN SATURATION: 97 % | BODY MASS INDEX: 22.61 KG/M2 | TEMPERATURE: 98 F | HEIGHT: 70 IN | RESPIRATION RATE: 17 BRPM | WEIGHT: 157.94 LBS | DIASTOLIC BLOOD PRESSURE: 68 MMHG | HEART RATE: 68 BPM | SYSTOLIC BLOOD PRESSURE: 147 MMHG

## 2021-11-08 DIAGNOSIS — M54.16 LUMBAR RADICULOPATHY: Primary | ICD-10-CM

## 2021-11-08 PROCEDURE — 63600175 PHARM REV CODE 636 W HCPCS: Mod: HCNC | Performed by: ANESTHESIOLOGY

## 2021-11-08 PROCEDURE — 25500020 PHARM REV CODE 255: Mod: HCNC | Performed by: ANESTHESIOLOGY

## 2021-11-08 PROCEDURE — 64483 NJX AA&/STRD TFRM EPI L/S 1: CPT | Mod: 50,HCNC | Performed by: ANESTHESIOLOGY

## 2021-11-08 PROCEDURE — 99152 MOD SED SAME PHYS/QHP 5/>YRS: CPT | Mod: HCNC | Performed by: ANESTHESIOLOGY

## 2021-11-08 PROCEDURE — 64484 NJX AA&/STRD TFRM EPI L/S EA: CPT | Mod: 50,HCNC | Performed by: ANESTHESIOLOGY

## 2021-11-08 PROCEDURE — 64483 PR EPIDURAL INJ, ANES/STEROID, TRANSFORAMINAL, LUMB/SACR, SNGL LEVL: ICD-10-PCS | Mod: 50,HCNC,, | Performed by: ANESTHESIOLOGY

## 2021-11-08 PROCEDURE — 64484 PRA INJECT ANES/STEROID FORAMEN LUMBAR/SACRAL W IMG GUIDE ,EA ADD LEVEL: ICD-10-PCS | Mod: 50,HCNC,, | Performed by: ANESTHESIOLOGY

## 2021-11-08 PROCEDURE — 25000003 PHARM REV CODE 250: Mod: HCNC | Performed by: ANESTHESIOLOGY

## 2021-11-08 PROCEDURE — A9585 GADOBUTROL INJECTION: HCPCS | Mod: HCNC | Performed by: ANESTHESIOLOGY

## 2021-11-08 PROCEDURE — 64484 NJX AA&/STRD TFRM EPI L/S EA: CPT | Mod: 50,HCNC,, | Performed by: ANESTHESIOLOGY

## 2021-11-08 PROCEDURE — 64483 NJX AA&/STRD TFRM EPI L/S 1: CPT | Mod: 50,HCNC,, | Performed by: ANESTHESIOLOGY

## 2021-11-08 RX ORDER — MIDAZOLAM HYDROCHLORIDE 1 MG/ML
INJECTION, SOLUTION INTRAMUSCULAR; INTRAVENOUS
Status: DISCONTINUED | OUTPATIENT
Start: 2021-11-08 | End: 2021-11-08 | Stop reason: HOSPADM

## 2021-11-08 RX ORDER — GADOBUTROL 604.72 MG/ML
INJECTION INTRAVENOUS
Status: DISCONTINUED | OUTPATIENT
Start: 2021-11-08 | End: 2021-11-08 | Stop reason: HOSPADM

## 2021-11-08 RX ORDER — DEXAMETHASONE SODIUM PHOSPHATE 10 MG/ML
INJECTION INTRAMUSCULAR; INTRAVENOUS
Status: DISCONTINUED | OUTPATIENT
Start: 2021-11-08 | End: 2021-11-08 | Stop reason: HOSPADM

## 2021-11-08 RX ORDER — FENTANYL CITRATE 50 UG/ML
INJECTION, SOLUTION INTRAMUSCULAR; INTRAVENOUS
Status: DISCONTINUED | OUTPATIENT
Start: 2021-11-08 | End: 2021-11-08 | Stop reason: HOSPADM

## 2021-11-08 RX ORDER — LIDOCAINE HYDROCHLORIDE 10 MG/ML
INJECTION, SOLUTION EPIDURAL; INFILTRATION; INTRACAUDAL; PERINEURAL
Status: DISCONTINUED | OUTPATIENT
Start: 2021-11-08 | End: 2021-11-08 | Stop reason: HOSPADM

## 2021-11-08 RX ORDER — INDOMETHACIN 25 MG/1
CAPSULE ORAL
Status: DISCONTINUED | OUTPATIENT
Start: 2021-11-08 | End: 2021-11-08 | Stop reason: HOSPADM

## 2021-11-09 ENCOUNTER — TELEPHONE (OUTPATIENT)
Dept: PAIN MEDICINE | Facility: CLINIC | Age: 86
End: 2021-11-09
Payer: MEDICARE

## 2021-12-01 ENCOUNTER — OFFICE VISIT (OUTPATIENT)
Dept: FAMILY MEDICINE | Facility: CLINIC | Age: 86
End: 2021-12-01
Payer: MEDICARE

## 2021-12-01 VITALS
SYSTOLIC BLOOD PRESSURE: 110 MMHG | OXYGEN SATURATION: 95 % | DIASTOLIC BLOOD PRESSURE: 66 MMHG | HEART RATE: 77 BPM | HEIGHT: 70 IN | TEMPERATURE: 97 F | WEIGHT: 161.25 LBS | BODY MASS INDEX: 23.08 KG/M2

## 2021-12-01 DIAGNOSIS — K21.9 GASTROESOPHAGEAL REFLUX DISEASE, UNSPECIFIED WHETHER ESOPHAGITIS PRESENT: Primary | ICD-10-CM

## 2021-12-01 DIAGNOSIS — I10 ESSENTIAL HYPERTENSION: ICD-10-CM

## 2021-12-01 PROCEDURE — 99214 PR OFFICE/OUTPT VISIT, EST, LEVL IV, 30-39 MIN: ICD-10-PCS | Mod: HCNC,S$GLB,, | Performed by: FAMILY MEDICINE

## 2021-12-01 PROCEDURE — 99999 PR PBB SHADOW E&M-EST. PATIENT-LVL III: CPT | Mod: PBBFAC,HCNC,, | Performed by: FAMILY MEDICINE

## 2021-12-01 PROCEDURE — 99214 OFFICE O/P EST MOD 30 MIN: CPT | Mod: HCNC,S$GLB,, | Performed by: FAMILY MEDICINE

## 2021-12-01 PROCEDURE — 99999 PR PBB SHADOW E&M-EST. PATIENT-LVL III: ICD-10-PCS | Mod: PBBFAC,HCNC,, | Performed by: FAMILY MEDICINE

## 2021-12-01 RX ORDER — TRIAMCINOLONE ACETONIDE 1 MG/G
CREAM TOPICAL 2 TIMES DAILY
Qty: 454 G | Refills: 2 | Status: SHIPPED | OUTPATIENT
Start: 2021-12-01 | End: 2021-12-01 | Stop reason: SDUPTHER

## 2021-12-01 RX ORDER — TRIAMCINOLONE ACETONIDE 1 MG/G
CREAM TOPICAL 2 TIMES DAILY
Qty: 454 G | Refills: 3 | Status: SHIPPED | OUTPATIENT
Start: 2021-12-01 | End: 2023-04-10

## 2021-12-01 RX ORDER — PANTOPRAZOLE SODIUM 40 MG/1
40 TABLET, DELAYED RELEASE ORAL DAILY
Qty: 90 TABLET | Refills: 3 | Status: ON HOLD | OUTPATIENT
Start: 2021-12-01 | End: 2022-11-01 | Stop reason: HOSPADM

## 2021-12-01 RX ORDER — PANTOPRAZOLE SODIUM 40 MG/1
40 TABLET, DELAYED RELEASE ORAL DAILY
Qty: 30 TABLET | Refills: 1 | Status: SHIPPED | OUTPATIENT
Start: 2021-12-01 | End: 2021-12-01 | Stop reason: SDUPTHER

## 2021-12-09 ENCOUNTER — TELEPHONE (OUTPATIENT)
Dept: PAIN MEDICINE | Facility: CLINIC | Age: 86
End: 2021-12-09
Payer: MEDICARE

## 2021-12-10 ENCOUNTER — OFFICE VISIT (OUTPATIENT)
Dept: PAIN MEDICINE | Facility: CLINIC | Age: 86
End: 2021-12-10
Payer: MEDICARE

## 2021-12-10 VITALS
HEIGHT: 70 IN | DIASTOLIC BLOOD PRESSURE: 69 MMHG | BODY MASS INDEX: 22.71 KG/M2 | RESPIRATION RATE: 17 BRPM | SYSTOLIC BLOOD PRESSURE: 121 MMHG | HEART RATE: 62 BPM | WEIGHT: 158.63 LBS

## 2021-12-10 DIAGNOSIS — M47.816 LUMBAR SPONDYLOSIS: ICD-10-CM

## 2021-12-10 DIAGNOSIS — R26.81 GAIT INSTABILITY: ICD-10-CM

## 2021-12-10 DIAGNOSIS — M54.16 BILATERAL LUMBAR RADICULOPATHY: Primary | ICD-10-CM

## 2021-12-10 DIAGNOSIS — M54.50 LUMBAR SPINE PAINFUL ON MOVEMENT: ICD-10-CM

## 2021-12-10 DIAGNOSIS — M51.36 DDD (DEGENERATIVE DISC DISEASE), LUMBAR: ICD-10-CM

## 2021-12-10 DIAGNOSIS — M47.816 LUMBAR SPONDYLOSIS: Primary | ICD-10-CM

## 2021-12-10 DIAGNOSIS — M47.897 OTHER SPONDYLOSIS, LUMBOSACRAL REGION: ICD-10-CM

## 2021-12-10 PROCEDURE — 99999 PR PBB SHADOW E&M-EST. PATIENT-LVL IV: CPT | Mod: PBBFAC,HCNC,, | Performed by: PHYSICIAN ASSISTANT

## 2021-12-10 PROCEDURE — 99999 PR PBB SHADOW E&M-EST. PATIENT-LVL IV: ICD-10-PCS | Mod: PBBFAC,HCNC,, | Performed by: PHYSICIAN ASSISTANT

## 2021-12-10 PROCEDURE — 99214 PR OFFICE/OUTPT VISIT, EST, LEVL IV, 30-39 MIN: ICD-10-PCS | Mod: S$GLB,,, | Performed by: PHYSICIAN ASSISTANT

## 2021-12-10 PROCEDURE — 99214 OFFICE O/P EST MOD 30 MIN: CPT | Mod: S$GLB,,, | Performed by: PHYSICIAN ASSISTANT

## 2021-12-10 RX ORDER — TRAMADOL HYDROCHLORIDE AND ACETAMINOPHEN 37.5; 325 MG/1; MG/1
1 TABLET, FILM COATED ORAL EVERY 12 HOURS PRN
Qty: 30 TABLET | Refills: 0 | Status: ON HOLD | OUTPATIENT
Start: 2021-12-10 | End: 2022-11-01 | Stop reason: HOSPADM

## 2022-01-03 ENCOUNTER — LAB VISIT (OUTPATIENT)
Dept: LAB | Facility: HOSPITAL | Age: 87
End: 2022-01-03
Attending: FAMILY MEDICINE
Payer: MEDICARE

## 2022-01-03 ENCOUNTER — OFFICE VISIT (OUTPATIENT)
Dept: FAMILY MEDICINE | Facility: CLINIC | Age: 87
End: 2022-01-03
Payer: MEDICARE

## 2022-01-03 VITALS
OXYGEN SATURATION: 97 % | SYSTOLIC BLOOD PRESSURE: 122 MMHG | HEART RATE: 64 BPM | TEMPERATURE: 98 F | DIASTOLIC BLOOD PRESSURE: 74 MMHG | HEIGHT: 70 IN | WEIGHT: 159.31 LBS | BODY MASS INDEX: 22.81 KG/M2

## 2022-01-03 DIAGNOSIS — E78.2 MIXED HYPERLIPIDEMIA: ICD-10-CM

## 2022-01-03 DIAGNOSIS — R97.20 ELEVATED PROSTATE SPECIFIC ANTIGEN (PSA): ICD-10-CM

## 2022-01-03 DIAGNOSIS — M46.1 SACROILIITIS: ICD-10-CM

## 2022-01-03 DIAGNOSIS — M54.50 CHRONIC BILATERAL LOW BACK PAIN WITHOUT SCIATICA: ICD-10-CM

## 2022-01-03 DIAGNOSIS — G89.29 CHRONIC BILATERAL LOW BACK PAIN WITHOUT SCIATICA: ICD-10-CM

## 2022-01-03 DIAGNOSIS — R97.20 ELEVATED PROSTATE SPECIFIC ANTIGEN (PSA): Primary | ICD-10-CM

## 2022-01-03 DIAGNOSIS — D32.9 MENINGIOMA: ICD-10-CM

## 2022-01-03 DIAGNOSIS — R73.03 PREDIABETES: ICD-10-CM

## 2022-01-03 DIAGNOSIS — K59.09 CHRONIC CONSTIPATION: ICD-10-CM

## 2022-01-03 PROBLEM — J98.4 CALCIFIED GRANULOMA OF LUNG: Status: RESOLVED | Noted: 2018-12-05 | Resolved: 2022-01-03

## 2022-01-03 PROBLEM — I77.1 TORTUOUS AORTA: Status: RESOLVED | Noted: 2018-12-05 | Resolved: 2022-01-03

## 2022-01-03 PROBLEM — J84.10 CALCIFIED GRANULOMA OF LUNG: Status: RESOLVED | Noted: 2018-12-05 | Resolved: 2022-01-03

## 2022-01-03 LAB
ALBUMIN SERPL BCP-MCNC: 3.8 G/DL (ref 3.5–5.2)
ALP SERPL-CCNC: 64 U/L (ref 55–135)
ALT SERPL W/O P-5'-P-CCNC: 15 U/L (ref 10–44)
ANION GAP SERPL CALC-SCNC: 7 MMOL/L (ref 8–16)
AST SERPL-CCNC: 23 U/L (ref 10–40)
BASOPHILS # BLD AUTO: 0.06 K/UL (ref 0–0.2)
BASOPHILS NFR BLD: 0.8 % (ref 0–1.9)
BILIRUB SERPL-MCNC: 0.4 MG/DL (ref 0.1–1)
BUN SERPL-MCNC: 13 MG/DL (ref 8–23)
CALCIUM SERPL-MCNC: 9.4 MG/DL (ref 8.7–10.5)
CHLORIDE SERPL-SCNC: 105 MMOL/L (ref 95–110)
CHOLEST SERPL-MCNC: 143 MG/DL (ref 120–199)
CHOLEST/HDLC SERPL: 2.8 {RATIO} (ref 2–5)
CO2 SERPL-SCNC: 26 MMOL/L (ref 23–29)
COMPLEXED PSA SERPL-MCNC: 29.3 NG/ML (ref 0–4)
CREAT SERPL-MCNC: 0.8 MG/DL (ref 0.5–1.4)
DIFFERENTIAL METHOD: ABNORMAL
EOSINOPHIL # BLD AUTO: 0.3 K/UL (ref 0–0.5)
EOSINOPHIL NFR BLD: 3.7 % (ref 0–8)
ERYTHROCYTE [DISTWIDTH] IN BLOOD BY AUTOMATED COUNT: 13.2 % (ref 11.5–14.5)
EST. GFR  (AFRICAN AMERICAN): >60 ML/MIN/1.73 M^2
EST. GFR  (NON AFRICAN AMERICAN): >60 ML/MIN/1.73 M^2
GLUCOSE SERPL-MCNC: 94 MG/DL (ref 70–110)
HCT VFR BLD AUTO: 40.1 % (ref 40–54)
HDLC SERPL-MCNC: 51 MG/DL (ref 40–75)
HDLC SERPL: 35.7 % (ref 20–50)
HGB BLD-MCNC: 12.9 G/DL (ref 14–18)
IMM GRANULOCYTES # BLD AUTO: 0.05 K/UL (ref 0–0.04)
IMM GRANULOCYTES NFR BLD AUTO: 0.7 % (ref 0–0.5)
LDLC SERPL CALC-MCNC: 57.8 MG/DL (ref 63–159)
LYMPHOCYTES # BLD AUTO: 1.5 K/UL (ref 1–4.8)
LYMPHOCYTES NFR BLD: 21.7 % (ref 18–48)
MCH RBC QN AUTO: 30.1 PG (ref 27–31)
MCHC RBC AUTO-ENTMCNC: 32.2 G/DL (ref 32–36)
MCV RBC AUTO: 94 FL (ref 82–98)
MONOCYTES # BLD AUTO: 0.7 K/UL (ref 0.3–1)
MONOCYTES NFR BLD: 9.9 % (ref 4–15)
NEUTROPHILS # BLD AUTO: 4.5 K/UL (ref 1.8–7.7)
NEUTROPHILS NFR BLD: 63.2 % (ref 38–73)
NONHDLC SERPL-MCNC: 92 MG/DL
NRBC BLD-RTO: 0 /100 WBC
PLATELET # BLD AUTO: 248 K/UL (ref 150–450)
PMV BLD AUTO: 10 FL (ref 9.2–12.9)
POTASSIUM SERPL-SCNC: 4.9 MMOL/L (ref 3.5–5.1)
PROT SERPL-MCNC: 6.6 G/DL (ref 6–8.4)
RBC # BLD AUTO: 4.28 M/UL (ref 4.6–6.2)
SODIUM SERPL-SCNC: 138 MMOL/L (ref 136–145)
TRIGL SERPL-MCNC: 171 MG/DL (ref 30–150)
WBC # BLD AUTO: 7.09 K/UL (ref 3.9–12.7)

## 2022-01-03 PROCEDURE — 1159F MED LIST DOCD IN RCRD: CPT | Mod: HCNC,CPTII,S$GLB, | Performed by: FAMILY MEDICINE

## 2022-01-03 PROCEDURE — 80053 COMPREHEN METABOLIC PANEL: CPT | Mod: HCNC | Performed by: FAMILY MEDICINE

## 2022-01-03 PROCEDURE — 1101F PR PT FALLS ASSESS DOC 0-1 FALLS W/OUT INJ PAST YR: ICD-10-PCS | Mod: HCNC,CPTII,S$GLB, | Performed by: FAMILY MEDICINE

## 2022-01-03 PROCEDURE — 1126F PR PAIN SEVERITY QUANTIFIED, NO PAIN PRESENT: ICD-10-PCS | Mod: HCNC,CPTII,S$GLB, | Performed by: FAMILY MEDICINE

## 2022-01-03 PROCEDURE — 99214 OFFICE O/P EST MOD 30 MIN: CPT | Mod: HCNC,S$GLB,, | Performed by: FAMILY MEDICINE

## 2022-01-03 PROCEDURE — 80061 LIPID PANEL: CPT | Mod: HCNC | Performed by: FAMILY MEDICINE

## 2022-01-03 PROCEDURE — 99214 PR OFFICE/OUTPT VISIT, EST, LEVL IV, 30-39 MIN: ICD-10-PCS | Mod: HCNC,S$GLB,, | Performed by: FAMILY MEDICINE

## 2022-01-03 PROCEDURE — 85025 COMPLETE CBC W/AUTO DIFF WBC: CPT | Mod: HCNC | Performed by: FAMILY MEDICINE

## 2022-01-03 PROCEDURE — 99499 UNLISTED E&M SERVICE: CPT | Mod: S$GLB,,, | Performed by: FAMILY MEDICINE

## 2022-01-03 PROCEDURE — 1159F PR MEDICATION LIST DOCUMENTED IN MEDICAL RECORD: ICD-10-PCS | Mod: HCNC,CPTII,S$GLB, | Performed by: FAMILY MEDICINE

## 2022-01-03 PROCEDURE — 1160F PR REVIEW ALL MEDS BY PRESCRIBER/CLIN PHARMACIST DOCUMENTED: ICD-10-PCS | Mod: HCNC,CPTII,S$GLB, | Performed by: FAMILY MEDICINE

## 2022-01-03 PROCEDURE — 36415 COLL VENOUS BLD VENIPUNCTURE: CPT | Mod: HCNC,PO | Performed by: FAMILY MEDICINE

## 2022-01-03 PROCEDURE — 1101F PT FALLS ASSESS-DOCD LE1/YR: CPT | Mod: HCNC,CPTII,S$GLB, | Performed by: FAMILY MEDICINE

## 2022-01-03 PROCEDURE — 99499 RISK ADDL DX/OHS AUDIT: ICD-10-PCS | Mod: S$GLB,,, | Performed by: FAMILY MEDICINE

## 2022-01-03 PROCEDURE — 3288F FALL RISK ASSESSMENT DOCD: CPT | Mod: HCNC,CPTII,S$GLB, | Performed by: FAMILY MEDICINE

## 2022-01-03 PROCEDURE — 3288F PR FALLS RISK ASSESSMENT DOCUMENTED: ICD-10-PCS | Mod: HCNC,CPTII,S$GLB, | Performed by: FAMILY MEDICINE

## 2022-01-03 PROCEDURE — 99999 PR PBB SHADOW E&M-EST. PATIENT-LVL IV: ICD-10-PCS | Mod: PBBFAC,HCNC,, | Performed by: FAMILY MEDICINE

## 2022-01-03 PROCEDURE — 1160F RVW MEDS BY RX/DR IN RCRD: CPT | Mod: HCNC,CPTII,S$GLB, | Performed by: FAMILY MEDICINE

## 2022-01-03 PROCEDURE — 1126F AMNT PAIN NOTED NONE PRSNT: CPT | Mod: HCNC,CPTII,S$GLB, | Performed by: FAMILY MEDICINE

## 2022-01-03 PROCEDURE — 84153 ASSAY OF PSA TOTAL: CPT | Mod: DBM,HCNC | Performed by: FAMILY MEDICINE

## 2022-01-03 PROCEDURE — 99999 PR PBB SHADOW E&M-EST. PATIENT-LVL IV: CPT | Mod: PBBFAC,HCNC,, | Performed by: FAMILY MEDICINE

## 2022-01-03 NOTE — PROGRESS NOTES
Chief Complaint:    Chief Complaint   Patient presents with    Follow-up       History of Present Illness:  Patient presents today for follow-up    Has constipation, taking miralax and dulcolax, has not changed diet. Taking mobic    Was given radiation treatment for Brain Tumor, was told he doesn't need to follow with neurology     He wants to recheck psa before following with urology for elevated PSA.     Blood pressure is stable        ROS:  Review of Systems   Constitutional: Negative for activity change, chills, fatigue, fever and unexpected weight change.   HENT: Negative for congestion, ear discharge, ear pain, hearing loss, postnasal drip and rhinorrhea.    Eyes: Negative for pain and visual disturbance.   Respiratory: Negative for cough, chest tightness and shortness of breath.    Cardiovascular: Negative for chest pain and palpitations.   Gastrointestinal: Negative for abdominal pain, diarrhea and vomiting.   Endocrine: Negative for heat intolerance.   Genitourinary: Negative for dysuria, flank pain, frequency and hematuria.   Musculoskeletal: Negative for gait problem and neck pain.   Skin: Negative for color change and rash.   Neurological: Negative for dizziness, tremors, seizures, numbness and headaches.   Psychiatric/Behavioral: Negative for agitation, hallucinations, self-injury, sleep disturbance and suicidal ideas. The patient is not nervous/anxious.        Past Medical History:   Diagnosis Date    Abnormal exercise tolerance test 7/25/2013    Arthritis     Colon polyp     Diverticulosis     Dyslipidemia 7/25/2013    GERD (gastroesophageal reflux disease)     HTN, goal below 130/80 12/3/2018    Hyperlipidemia 1980    HIGH TG    Knee pain, right 6/14/2013    Low back pain with right-sided sciatica 12/3/2018    Meningioma     Personal history of colonic polyps 5/27/2014    RLS (restless legs syndrome) 6/14/2013    Tobacco dependence     resolved    West Nile meningitis 2010    per  "patient       Social History:  Social History     Socioeconomic History    Marital status:    Tobacco Use    Smoking status: Former Smoker     Packs/day: 1.00     Years: 25.00     Pack years: 25.00     Quit date: 1990     Years since quittin.0    Smokeless tobacco: Never Used   Substance and Sexual Activity    Alcohol use: No     Alcohol/week: 0.0 standard drinks    Drug use: No    Sexual activity: Not Currently     Birth control/protection: None       Family History:   family history includes Cancer in his son; Diabetes in his maternal uncle; Heart disease in his mother.    Health Maintenance   Topic Date Due    Lipid Panel  2022    TETANUS VACCINE  2025       Physical Exam:    Vital Signs  Temp: 97.7 °F (36.5 °C)  Temp src: Temporal  Pulse: 64  SpO2: 97 %  BP: 122/74  Pain Score: 0-No pain  Height and Weight  Height: 5' 10" (177.8 cm)  Weight: 72.3 kg (159 lb 4.5 oz)  BSA (Calculated - sq m): 1.89 sq meters  BMI (Calculated): 22.9  Weight in (lb) to have BMI = 25: 173.9]    Body mass index is 22.85 kg/m².    Physical Exam  Constitutional:       Appearance: He is well-developed.   Eyes:      Conjunctiva/sclera: Conjunctivae normal.      Pupils: Pupils are equal, round, and reactive to light.   Cardiovascular:      Rate and Rhythm: Normal rate and regular rhythm.      Heart sounds: Normal heart sounds. No murmur heard.      Pulmonary:      Effort: Pulmonary effort is normal. No respiratory distress.      Breath sounds: Normal breath sounds. No wheezing or rales.   Chest:      Chest wall: No tenderness.   Abdominal:      General: There is no distension.      Palpations: Abdomen is soft. There is no mass.      Tenderness: There is no abdominal tenderness. There is no guarding.   Musculoskeletal:         General: No tenderness.      Cervical back: Normal range of motion and neck supple.   Lymphadenopathy:      Cervical: No cervical adenopathy.   Skin:     General: Skin is warm and " dry.   Neurological:      Mental Status: He is alert and oriented to person, place, and time.      Deep Tendon Reflexes: Reflexes are normal and symmetric.   Psychiatric:         Behavior: Behavior normal.         Thought Content: Thought content normal.         Judgment: Judgment normal.         Lab Results   Component Value Date    CHOL 145 06/28/2021    CHOL 168 01/22/2021    CHOL 229 (H) 01/22/2020    TRIG 137 06/28/2021    TRIG 208 (H) 01/22/2021    TRIG 278 (H) 01/22/2020    HDL 60 06/28/2021    HDL 62 01/22/2021    HDL 47 01/22/2020    TOTALCHOLEST 2.4 06/28/2021    TOTALCHOLEST 2.7 01/22/2021    TOTALCHOLEST 4.9 01/22/2020    NONHDLCHOL 85 06/28/2021    NONHDLCHOL 106 01/22/2021    NONHDLCHOL 182 01/22/2020       Lab Results   Component Value Date    HGBA1C 5.6 01/22/2021       Assessment:      ICD-10-CM ICD-9-CM   1. Elevated prostate specific antigen (PSA)  R97.20 790.93   2. Prediabetes  R73.03 790.29   3. Chronic constipation  K59.09 564.00   4. Chronic bilateral low back pain without sciatica  M54.50 724.2    G89.29 338.29   5. Mixed hyperlipidemia  E78.2 272.2         Plan:  Orders Placed This Encounter   Procedures    PSA, Screening    CBC Auto Differential    Comprehensive Metabolic Panel    Lipid Panel     Recommend adding more fiber and vegetables to diet for constipation, if no improvement consider Linzess  Discontinue Mobic as it can worsen heartburn, avoid NSAIDS   Recommend following with neurology for  meningioma   if PSA is rising consider Urology consult  Continue current meds and plan  Labs as below   Follow-up 6 months or sooner    Current Outpatient Medications   Medication Sig Dispense Refill    amLODIPine (NORVASC) 2.5 MG tablet Take 2.5 mg by mouth once daily.      bisacodyL (DULCOLAX) 5 mg EC tablet Take 5 mg by mouth daily as needed for Constipation.      fluorouraciL (EFUDEX) 5 % cream AAA scalp and temples bid x 2 weeks, then AAA of both forearms bid x 4 weeks 40 g 1     fluticasone propionate (CUTIVATE) 0.05 % cream       ketoconazole (NIZORAL) 2 % cream       LIDOCAINE VISCOUS 2 % solution APPLY  SOLUTION EVERY 6 HOURS 100 mL 0    meclizine (ANTIVERT) 25 mg tablet Take 1 tablet by mouth twice daily as needed 90 tablet 0    meloxicam (MOBIC) 15 MG tablet Take 1 tablet (15 mg total) by mouth once daily. 90 tablet 1    meloxicam (MOBIC) 7.5 MG tablet Take 1 tablet by mouth twice daily as needed 180 tablet 0    pantoprazole (PROTONIX) 40 MG tablet Take 1 tablet (40 mg total) by mouth once daily. 90 tablet 3    polyethylene glycol (GLYCOLAX) 17 gram PwPk Take 17 g by mouth once daily. 100 each 6    rosuvastatin (CRESTOR) 5 MG tablet Take 1 tablet (5 mg total) by mouth once daily. 90 tablet 3    tramadol-acetaminophen 37.5-325 mg (ULTRACET) 37.5-325 mg Tab Take 1 tablet by mouth every 12 (twelve) hours as needed for Pain. 30 tablet 0    triamcinolone acetonide 0.1% (KENALOG) 0.1 % cream Apply topically 2 (two) times daily. 454 g 3     No current facility-administered medications for this visit.       There are no discontinued medications.    No follow-ups on file.      Madie Davis MD  Scribe Attestation:   I, Juventino Bryson, am scribing for, and in the presence of, Dr.Arif Davis I performed the above scribed service and the documentation accurately describes the services I performed. I attest to the accuracy of the note.    I, Dr. Madie Davis, reviewed documentation as scribed above. I personally performed the services described in this documentation.  I agree that the record reflects my personal performance and is accurate and complete. Madie Davis MD.  10/04/202

## 2022-01-04 ENCOUNTER — TELEPHONE (OUTPATIENT)
Dept: FAMILY MEDICINE | Facility: CLINIC | Age: 87
End: 2022-01-04
Payer: MEDICARE

## 2022-01-04 ENCOUNTER — HOSPITAL ENCOUNTER (OUTPATIENT)
Facility: HOSPITAL | Age: 87
Discharge: HOME | End: 2022-01-04
Attending: ANESTHESIOLOGY | Admitting: ANESTHESIOLOGY
Payer: MEDICARE

## 2022-01-04 VITALS
HEIGHT: 70 IN | RESPIRATION RATE: 17 BRPM | OXYGEN SATURATION: 95 % | SYSTOLIC BLOOD PRESSURE: 125 MMHG | BODY MASS INDEX: 22.61 KG/M2 | WEIGHT: 157.94 LBS | HEART RATE: 64 BPM | TEMPERATURE: 98 F | DIASTOLIC BLOOD PRESSURE: 57 MMHG

## 2022-01-04 DIAGNOSIS — M54.16 LUMBAR RADICULOPATHY: Primary | ICD-10-CM

## 2022-01-04 PROCEDURE — 63600175 PHARM REV CODE 636 W HCPCS: Mod: HCNC | Performed by: ANESTHESIOLOGY

## 2022-01-04 PROCEDURE — A9585 GADOBUTROL INJECTION: HCPCS | Mod: HCNC | Performed by: ANESTHESIOLOGY

## 2022-01-04 PROCEDURE — 64483 NJX AA&/STRD TFRM EPI L/S 1: CPT | Mod: 50,HCNC,, | Performed by: ANESTHESIOLOGY

## 2022-01-04 PROCEDURE — 99152 MOD SED SAME PHYS/QHP 5/>YRS: CPT | Mod: HCNC | Performed by: ANESTHESIOLOGY

## 2022-01-04 PROCEDURE — 25500020 PHARM REV CODE 255: Mod: HCNC | Performed by: ANESTHESIOLOGY

## 2022-01-04 PROCEDURE — 64484 NJX AA&/STRD TFRM EPI L/S EA: CPT | Mod: 50,HCNC,, | Performed by: ANESTHESIOLOGY

## 2022-01-04 PROCEDURE — 64483 NJX AA&/STRD TFRM EPI L/S 1: CPT | Mod: 50,HCNC | Performed by: ANESTHESIOLOGY

## 2022-01-04 PROCEDURE — 64484 NJX AA&/STRD TFRM EPI L/S EA: CPT | Mod: 50,HCNC | Performed by: ANESTHESIOLOGY

## 2022-01-04 PROCEDURE — 25000003 PHARM REV CODE 250: Mod: HCNC | Performed by: ANESTHESIOLOGY

## 2022-01-04 PROCEDURE — 64484 PRA INJECT ANES/STEROID FORAMEN LUMBAR/SACRAL W IMG GUIDE ,EA ADD LEVEL: ICD-10-PCS | Mod: 50,HCNC,, | Performed by: ANESTHESIOLOGY

## 2022-01-04 PROCEDURE — 64483 PR EPIDURAL INJ, ANES/STEROID, TRANSFORAMINAL, LUMB/SACR, SNGL LEVL: ICD-10-PCS | Mod: 50,HCNC,, | Performed by: ANESTHESIOLOGY

## 2022-01-04 RX ORDER — LIDOCAINE HYDROCHLORIDE 10 MG/ML
INJECTION, SOLUTION EPIDURAL; INFILTRATION; INTRACAUDAL; PERINEURAL
Status: DISCONTINUED | OUTPATIENT
Start: 2022-01-04 | End: 2022-01-04 | Stop reason: HOSPADM

## 2022-01-04 RX ORDER — SODIUM BICARBONATE 1 MEQ/ML
SYRINGE (ML) INTRAVENOUS
Status: DISCONTINUED | OUTPATIENT
Start: 2022-01-04 | End: 2022-01-04 | Stop reason: HOSPADM

## 2022-01-04 RX ORDER — MIDAZOLAM HYDROCHLORIDE 1 MG/ML
INJECTION, SOLUTION INTRAMUSCULAR; INTRAVENOUS
Status: DISCONTINUED | OUTPATIENT
Start: 2022-01-04 | End: 2022-01-04 | Stop reason: HOSPADM

## 2022-01-04 RX ORDER — FENTANYL CITRATE 50 UG/ML
INJECTION, SOLUTION INTRAMUSCULAR; INTRAVENOUS
Status: DISCONTINUED | OUTPATIENT
Start: 2022-01-04 | End: 2022-01-04 | Stop reason: HOSPADM

## 2022-01-04 RX ORDER — DEXAMETHASONE SODIUM PHOSPHATE 10 MG/ML
INJECTION INTRAMUSCULAR; INTRAVENOUS
Status: DISCONTINUED | OUTPATIENT
Start: 2022-01-04 | End: 2022-01-04 | Stop reason: HOSPADM

## 2022-01-04 RX ORDER — GADOBUTROL 604.72 MG/ML
INJECTION INTRAVENOUS
Status: DISCONTINUED | OUTPATIENT
Start: 2022-01-04 | End: 2022-01-04 | Stop reason: HOSPADM

## 2022-01-04 NOTE — OP NOTE
"Procedure: Lumbar Transforaminal Epidural Steroid Injection under Fluoroscopic Guidance (supraneural approach)    Level: L4/5 L5/S1    Side: Bilateral    PROCEDURE DATE: 1/4/2022    Pre-operative Diagnosis: Lumbar Radiculopathy  Post-operative Diagnosis: Lumbar Radiculopathy    Provider: Nabor Sadler MD  Assistant(s): None    Anesthesia: Local, IV Sedation    >> 1 mg of VERSED    >> 50 mcg of FENTANYL     Indication: Low back pain with radiculopathy consistent with distribution of targeted nerve. Symptoms unresponsive to conservative treatments. Fluoroscopy was used to optimize visualization of needle placement and to maximize safety.     Procedure Description / Technique:  The patient was seen and identified in the preoperative area. Risks, benefits, complications, and alternatives were discussed with the patient. The patient agreed to proceed with the procedure and signed the consent. The site and side of the procedure was identified and marked. An IV was started. The patient was taken to the procedural suite.    The patient was positioned in prone orientation on procedure table and a pillow was placed under the abdomen to reduce lumbar lordosis. A time out was performed prior to any intervention. The procedure, site, side, and allergies were stated and agreed to by all present. The lumbosacral area was widely prepped with ChloraPrep. The procedural site was draped in usual sterile fashion. Vital signs were closely monitored throughout this procedure. Conscious sedation was used for this procedure to decrease patient anxiety.    The target area was visualized under fluoroscopy. The cephalocaudal angle of the fluoroscope was adjusted as to align the vertebral end plates. The fluoroscopic arm was rotated ipsilaterally to an angle of approximately 30 degrees until the "cori dog" outline came into view and the tip of the inferior superior articular process pointed towards the midline, 6:00 position of the above " "pedicle. A 25 gauge 3.5 inch spinal needle was directed towards the "chin" of the "cori dog" (adjacent to the pars interarticularis and inferior to the pedicle). The needle was advanced until OS was met at the inferior border of the pedicle / pars interface. The needle was adjusted so that it would pass inferior to the osseous border. The fluoroscope was then placed in the lateral position and the needle was slowly advanced until it rested in the posterior 1/3rd of the vertebral foramen. AP fluoroscopy was checked and the needle tip rested at the 6:00 position under the pedicle. No paresthesia was elicited during needle placement. With the needle tip in its final position, gentle aspiration was negative for blood and CSF. Gadavist (gadobutrol) 1 mmol/mL (1 to 2 mL) was injected under live fluoroscopy. Microbore tubing was used for injection. There was no pain or paresthesia on injection. The contrast clearly delineated the targeted nerve root on AP fluoroscopy. No vascular uptake was seen. A solution containing 4 mL of 1% PF Lidocaine and 4 mL of Dexamethasone (10 mg/mL) was mixed and 2 mL was injected slowly at each level targeted. There was minimal resistance on injection. No pain or paresthesia was elicited on injection. The stylet was replaced and the needle was withdrawn intact. This procedure was performed for each of the above indicated levels.     Description of Findings: Not applicable    Prosthetic devices, grafts, tissues, or devices implanted: None    Specimen Removed: No    Estimated Blood Loss: minimal    COMPLICATIONS: None    DISPOSITION / PLANS: The patient was transferred to the recovery area in a stable condition for observation. The patient was reexamined prior to discharge. There was no evidence of acute neurologic injury following the procedure.  Patient was discharged from the recovery room after meeting discharge criteria. Home discharge instructions were given to the patient by the " staff.

## 2022-01-04 NOTE — TELEPHONE ENCOUNTER
Patient's PSA is now 29, he saw Dr Rodriguez back in July 2021, was scheduled for a biopsy and he called and cancelled it due to being ill at the time.   When I tried to make an appt Dr Rodriguez is out in March, can he see Reba or should he just wait to see the Dr Rodriguez. Not sure if Reba can do anything , it looks like he needs the biopsy

## 2022-01-04 NOTE — DISCHARGE INSTRUCTIONS

## 2022-01-04 NOTE — PLAN OF CARE
Patient recovered to baseline. Instructions given. All questions answered. All bandaids remain clean,dry, intact.

## 2022-01-04 NOTE — DISCHARGE SUMMARY
[facility]  Discharge Note       Description of Procedure: Lumbar Transforaminal Epidural Steroid Injection under Fluoroscopic Guidance    Procedure Date: 1/4/2022    Admit Date: 1/4/2022  Discharge Date: 1/4/2022     Attending Physician: Nabor Sadler   Discharge Provider: Nabor Sadler    Preoperative Diagnosis: Lumbar Radiculopathy     Postoperative Diagnosis: as above, same as preoperative diagnosis    Discharged Condition: Stable    Hospital Course: Patient was admitted for an outpatient procedure. The procedure was tolerated well with no complications.    Final Diagnoses: Same as principal problem.    Disposition: Home, self-care.    Follow up/Patient Instructions:  Follow-up in clinic in 2-3 weeks.    Medications: No medications were prescribed today. The patient was advised to resume normal medication regimen without change.  Specific information was provided regarding restarting any anticoagulant/s.    Discharge Procedure Orders (must include Diet, Follow-up, Activity):  Light activity for the remainder of the day, resume normal activity tomorrow. Resume normal diet. Follow-up in clinic in 2-3 weeks.

## 2022-01-04 NOTE — H&P
HPI  Patient presenting for Procedure(s) (LRB):  Bilateral L4/5 + L5/S1 TF DREW (Bilateral)     Patient on Anti-coagulation No    No health changes since previous encounter    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     Past Surgical History:   Procedure Laterality Date    APPENDECTOMY      BACK SURGERY Bilateral 2016    CATARACT EXTRACTION, BILATERAL      COLONOSCOPY  2/2009    EYE SURGERY      JOINT REPLACEMENT      left knee    LUMBAR PARAVERTEBRAL FACET JOINT NERVE BLOCK Bilateral 8/29/2019    Procedure: LMBB L3,4,5;  Surgeon: Nabor Sadler MD;  Location: [facility];  Service: Pain Management;  Laterality: Bilateral;    SELECTIVE INJECTION OF ANESTHETIC AGENT AROUND LUMBAR SPINAL NERVE ROOT BY TRANSFORAMINAL APPROACH Bilateral 11/8/2021    Procedure: Bilateral L4/5 +/- L5/S1 TF DREW;  Surgeon: Nabor Sadler MD;  Location: [facility];  Service: Pain Management;  Laterality: Bilateral;    SPINE SURGERY      UPPER GASTROINTESTINAL ENDOSCOPY  2/2009     Review of patient's allergies indicates:   Allergen Reactions    Shellfish containing products Nausea And Vomiting    Tramadol Other (See Comments)    Amoxicillin Rash    Iodine and iodide containing products Nausea And Vomiting        No current facility-administered medications on file prior to encounter.     Current Outpatient Medications on File Prior to Encounter   Medication Sig Dispense Refill    amLODIPine (NORVASC) 2.5 MG tablet Take  "2.5 mg by mouth once daily.      rosuvastatin (CRESTOR) 5 MG tablet Take 1 tablet (5 mg total) by mouth once daily. 90 tablet 3    bisacodyL (DULCOLAX) 5 mg EC tablet Take 5 mg by mouth daily as needed for Constipation.      fluorouraciL (EFUDEX) 5 % cream AAA scalp and temples bid x 2 weeks, then AAA of both forearms bid x 4 weeks 40 g 1    fluticasone propionate (CUTIVATE) 0.05 % cream       ketoconazole (NIZORAL) 2 % cream       LIDOCAINE VISCOUS 2 % solution APPLY  SOLUTION EVERY 6 HOURS 100 mL 0    meclizine (ANTIVERT) 25 mg tablet Take 1 tablet by mouth twice daily as needed 90 tablet 0    pantoprazole (PROTONIX) 40 MG tablet Take 1 tablet (40 mg total) by mouth once daily. 90 tablet 3    polyethylene glycol (GLYCOLAX) 17 gram PwPk Take 17 g by mouth once daily. 100 each 6    triamcinolone acetonide 0.1% (KENALOG) 0.1 % cream Apply topically 2 (two) times daily. 454 g 3        PMHx, PSHx, Allergies, Medications reviewed in epic    ROS negative except pain complaints in HPI    OBJECTIVE:    BP (!) 161/71 (BP Location: Right arm, Patient Position: Sitting)   Pulse 64   Temp (!) 95 °F (35 °C) (Temporal)   Resp 18   Ht 5' 10" (1.778 m)   Wt 71.6 kg (157 lb 15.4 oz)   SpO2 95%   BMI 22.66 kg/m²     PHYSICAL EXAMINATION:    GENERAL: Well appearing, in no acute distress, alert and oriented x3.  PSYCH:  Mood and affect appropriate.  SKIN: Skin color, texture, turgor normal, no rashes or lesions which will impact the procedure.  CV: RRR with palpation of the radial artery.  PULM: No evidence of respiratory difficulty, symmetric chest rise. Clear to auscultation.  NEURO: Cranial nerves grossly intact.    Plan:    Proceed with procedure as planned Procedure(s) (LRB):  Bilateral L4/5 + L5/S1 TF DREW (Bilateral)    Nabor Sadler MD  01/04/2022            "

## 2022-01-10 NOTE — TELEPHONE ENCOUNTER
Attempted to contact patient regarding scheduling prostate biopsy. LVM with callback number for patient to return call to schedule.

## 2022-01-11 RX ORDER — OXYCODONE AND ACETAMINOPHEN 5; 325 MG/1; MG/1
1 TABLET ORAL EVERY 4 HOURS PRN
Qty: 10 TABLET | Refills: 0 | Status: ON HOLD | OUTPATIENT
Start: 2022-01-11 | End: 2022-11-01 | Stop reason: HOSPADM

## 2022-01-11 RX ORDER — DIAZEPAM 5 MG/1
5 TABLET ORAL ONCE
Qty: 1 TABLET | Refills: 0 | Status: ON HOLD | OUTPATIENT
Start: 2022-01-11 | End: 2022-11-01 | Stop reason: HOSPADM

## 2022-01-11 RX ORDER — SULFAMETHOXAZOLE AND TRIMETHOPRIM 800; 160 MG/1; MG/1
1 TABLET ORAL 2 TIMES DAILY
Qty: 10 TABLET | Refills: 0 | Status: ON HOLD | OUTPATIENT
Start: 2022-01-11 | End: 2022-11-01 | Stop reason: HOSPADM

## 2022-01-11 NOTE — TELEPHONE ENCOUNTER
"Successfully contacted patient regarding message below. Notified patient that Dr. Rodriguez will call him in a "biopsy cocktail" to his pharmacy. Notified patient he should take the phenergan and valium 30 minutes prior to his appointment. Notified patient he has to have someone drive him to and from his appointment. Notified patient that pain medication (if needed) and antibiotics will be taken the day of the appointment. Notified patient of date, time, and location. Patient VU.  "

## 2022-01-13 ENCOUNTER — TELEPHONE (OUTPATIENT)
Dept: UROLOGY | Facility: CLINIC | Age: 87
End: 2022-01-13
Payer: MEDICARE

## 2022-01-13 NOTE — TELEPHONE ENCOUNTER
Successfully contacted patient's granddaughter regarding message below. Notified patient's granddaughter that the Valium is to be taken 30 minutes prior to the procedure and the antibiotic and pain medication are to be taken afterwards. Notified patient's granddaughter that they should only have a light meal tomorrow morning and do an enema tonight before the patient goes to bed. Patient's granddaughter VU.   ----- Message from Jhonny Stack sent at 1/13/2022  3:11 PM CST -----  Contact: Yovana/Grandrc  Patient with granddaughter are calling to speak with the nurse regarding the medication for the scheduled 1/14/22 biopsy procedure. Reports there are no directions for the medication provided for the procedure as when to begin the medications. The prescription is for diazePAM (VALIUM) 5 MG tablet as well as the sulfamethoxazole-trimethoprim 800-160mg (BACTRIM DS) 800-160 mg Tab. Patient is unsure and is requesting the nurse give Yovana rodriguez a call back at [phone] to be advised.   Thanks,   RP

## 2022-01-14 ENCOUNTER — PROCEDURE VISIT (OUTPATIENT)
Dept: UROLOGY | Facility: CLINIC | Age: 87
End: 2022-01-14
Payer: MEDICARE

## 2022-01-14 VITALS
SYSTOLIC BLOOD PRESSURE: 160 MMHG | WEIGHT: 157.63 LBS | BODY MASS INDEX: 22.62 KG/M2 | DIASTOLIC BLOOD PRESSURE: 74 MMHG

## 2022-01-14 DIAGNOSIS — R97.20 ELEVATED PROSTATE SPECIFIC ANTIGEN (PSA): Primary | ICD-10-CM

## 2022-01-14 PROCEDURE — 55700 PR BIOPSY OF PROSTATE,NEEDLE/PUNCH: CPT | Mod: HCNC,S$GLB,, | Performed by: UROLOGY

## 2022-01-14 PROCEDURE — 76872 PR US TRANSRECTAL: ICD-10-PCS | Mod: HCNC,S$GLB,, | Performed by: UROLOGY

## 2022-01-14 PROCEDURE — 88305 TISSUE EXAM BY PATHOLOGIST: CPT | Mod: HCNC | Performed by: PATHOLOGY

## 2022-01-14 PROCEDURE — 88305 TISSUE EXAM BY PATHOLOGIST: CPT | Mod: 26,HCNC,, | Performed by: PATHOLOGY

## 2022-01-14 PROCEDURE — 55700 PR BIOPSY OF PROSTATE,NEEDLE/PUNCH: ICD-10-PCS | Mod: HCNC,S$GLB,, | Performed by: UROLOGY

## 2022-01-14 PROCEDURE — 76872 US TRANSRECTAL: CPT | Mod: HCNC,S$GLB,, | Performed by: UROLOGY

## 2022-01-14 PROCEDURE — 96372 PR INJECTION,THERAP/PROPH/DIAG2ST, IM OR SUBCUT: ICD-10-PCS | Mod: HCNC,59,S$GLB, | Performed by: UROLOGY

## 2022-01-14 PROCEDURE — 88305 TISSUE EXAM BY PATHOLOGIST: ICD-10-PCS | Mod: 26,HCNC,, | Performed by: PATHOLOGY

## 2022-01-14 PROCEDURE — 96372 THER/PROPH/DIAG INJ SC/IM: CPT | Mod: HCNC,59,S$GLB, | Performed by: UROLOGY

## 2022-01-14 RX ORDER — LIDOCAINE HYDROCHLORIDE 20 MG/ML
JELLY TOPICAL
Status: COMPLETED | OUTPATIENT
Start: 2022-01-14 | End: 2022-01-14

## 2022-01-14 RX ORDER — CEFTRIAXONE 1 G/1
1 INJECTION, POWDER, FOR SOLUTION INTRAMUSCULAR; INTRAVENOUS
Status: COMPLETED | OUTPATIENT
Start: 2022-01-14 | End: 2022-01-14

## 2022-01-14 RX ADMIN — CEFTRIAXONE 1 G: 1 INJECTION, POWDER, FOR SOLUTION INTRAMUSCULAR; INTRAVENOUS at 08:01

## 2022-01-14 RX ADMIN — LIDOCAINE HYDROCHLORIDE: 20 JELLY TOPICAL at 08:01

## 2022-01-14 NOTE — PROCEDURES
Procedures   Chief Complaint:   Encounter Diagnosis   Name Primary?    Elevated prostate specific antigen (PSA) Yes       HPI:    1/14/22- patient is here today for a prostate needle biopsy.  88-year-old gentleman who comes in with an elevated PSA.  Patient states that he had attempted tamsulosin once before, because severe decrease in blood pressure and had stop this medication.  Although upon further questioning he does not get up at all per night to void, except occasionally 1 time.  He goes about every 2-3 hours during the daytime, with no urgency or incontinence.  He states that he has a good stream, with no lower urinary tract symptoms.  No gross hematuria, he has never been a smoker.  No previous urological history.  He has a remote history of a biopsy done in the 80s or 90s, which he states was negative.  No family history of urological cancers or stones.  He does have history of stone passage, but this was years ago.  He is concerned about his PSA.    Allergies:  Shellfish containing products, Tramadol, Amoxicillin, and Iodine and iodide containing products    Medications:  has a current medication list which includes the following prescription(s): amlodipine, bisacodyl, diazepam, fluorouracil, fluticasone propionate, ketoconazole, lidocaine viscous, meclizine, oxycodone-acetaminophen, pantoprazole, polyethylene glycol, rosuvastatin, sulfamethoxazole-trimethoprim 800-160mg, tramadol-acetaminophen 37.5-325 mg, and triamcinolone acetonide 0.1%.    Review of Systems:  General: No fever, chills, fatigability, or weight loss.  Skin: No rashes, itching, or changes in color or texture of skin.  Chest: Denies FOSTER, cyanosis, wheezing, cough, and sputum production.  Abdomen: Appetite fine. No weight loss. Denies diarrhea, abdominal pain, hematemesis, or blood in stool.  Musculoskeletal: No joint stiffness or swelling. Denies back pain.  : As above.  All other review of systems negative.    PMH:   has a past  medical history of Abnormal exercise tolerance test (7/25/2013), Arthritis, Colon polyp, Diverticulosis, Dyslipidemia (7/25/2013), GERD (gastroesophageal reflux disease), HTN, goal below 130/80 (12/3/2018), Hyperlipidemia (1980), Knee pain, right (6/14/2013), Low back pain with right-sided sciatica (12/3/2018), Meningioma, Personal history of colonic polyps (5/27/2014), RLS (restless legs syndrome) (6/14/2013), Tobacco dependence, and West Nile meningitis (2010).    PSH:   has a past surgical history that includes Appendectomy; Colonoscopy (2/2009); Upper gastrointestinal endoscopy (2/2009); Joint replacement; Back surgery (Bilateral, 2016); Spine surgery; Lumbar paravertebral facet joint nerve block (Bilateral, 8/29/2019); Eye surgery; Cataract extraction, bilateral; Selective injection of anesthetic agent around lumbar spinal nerve root by transforaminal approach (Bilateral, 11/8/2021); and Selective injection of anesthetic agent around lumbar spinal nerve root by transforaminal approach (Bilateral, 1/4/2022).    FamHx: family history includes Cancer in his son; Diabetes in his maternal uncle; Heart disease in his mother.    SocHx:  reports that he quit smoking about 32 years ago. He has a 25.00 pack-year smoking history. He has never used smokeless tobacco. He reports that he does not drink alcohol and does not use drugs.      Physical Exam:  There were no vitals filed for this visit.  General: A&Ox3, no apparent distress, no deformities  Neck: No masses, normal ROM  Lungs: normal inspiration, no use of accessory muscles  Heart: normal pulse, no arrhythmias  Abdomen: Soft, NT, ND, no masses, no hernias, no hepatosplenomegaly  Skin: The skin is warm and dry. No jaundice.  Ext: No c/c/e.  CARMENZA: 1/22- Normal rectal tone. Prost 45 gm with a 1 cm right nodule. SV not palpable. Perineum and anus normal.    Labs/Studies:   pnbx 112g 1/14/22  PSA 29.3 1/22  PSA 14.3 6/21  PSA 21.5 1/21  PSA 13.4 1/20  PSA 9.0  10/18    Procedure: (1) Transrectal Prostate Biopsy                      (2) Transrectal ultrasound of prostate                     (3) Ultrasound Guidance of Prostate Biopsy needles    Detail: After proper consents were obtained, the patient was prepped and draped in normal fashion in the left lateral decubitus position for TRUS/Bx.  The U/S with rectal probe was used to size the prostate.  A spinal needle was used and 10ml of 1% lidocaine was instilled on the either side of the prostate at the base of the seminal vesicles.  Biopsy was then performed using an 18Ga biopsy needle directed at the base, mid and apex bilaterally for a total of 12 cores. Antibiotic prophylaxis was provided using orally and intrasmuscular.    Findings: Prostate volume of 112 grams.      Impression/Plan:     Elevated PSA- patient tolerated the biopsy well will call us with any complaints.  Otherwise I will contact him with his pathology and see him back in 3 weeks.

## 2022-01-20 RX ORDER — AMLODIPINE BESYLATE 2.5 MG/1
TABLET ORAL
Qty: 90 TABLET | Refills: 0 | OUTPATIENT
Start: 2022-01-20

## 2022-01-20 NOTE — TELEPHONE ENCOUNTER
Provider Staff:     Action required for this patient.    Please note Refusal of medication.            Requested Prescriptions     Refused Prescriptions Disp Refills    amLODIPine (NORVASC) 2.5 MG tablet [Pharmacy Med Name: AMLODIPINE  2.5MG TAB] 90 tablet 0     Refused By: MAYELA ALVARES     Reason for Refusal: Refill not appropriate      Thanks!  [facility]   Note composed: 01/20/2022 1:48 PM

## 2022-01-20 NOTE — TELEPHONE ENCOUNTER
No new care gaps identified.  Powered by [employer]. Reference number: [id number].   1/20/2022 10:03:46 AM CST

## 2022-01-24 LAB
FINAL PATHOLOGIC DIAGNOSIS: NORMAL
GROSS: NORMAL
Lab: NORMAL

## 2022-02-01 ENCOUNTER — TELEPHONE (OUTPATIENT)
Dept: UROLOGY | Facility: CLINIC | Age: 87
End: 2022-02-01
Payer: MEDICARE

## 2022-02-01 NOTE — TELEPHONE ENCOUNTER
Called the patient, after verification of name and , the patient was informed of appt time and date. Pt given the next available date that Dr Rodriguez will be here. Informed pt that he will have to come to the [facility]. Pt voiced understanding         ----- Message from Venus Mendes sent at 2022 12:55 PM CST -----  Regarding: appt  Contact: pt  Type:  Sooner Appointment Request    Caller is requesting a sooner appointment.  Caller declined first available appointment listed below.  Caller will not accept being placed on the waitlist and is requesting a message be sent to doctor.  Name of Caller: pt  When is the first available appointment?   Symptoms: biopsy results  Would the patient rather a call back or a response via [facility]? Call back  Best Call Back Number:[phone]  Additional Information: n/a

## 2022-02-03 ENCOUNTER — TELEPHONE (OUTPATIENT)
Dept: FAMILY MEDICINE | Facility: CLINIC | Age: 87
End: 2022-02-03
Payer: MEDICARE

## 2022-02-03 NOTE — TELEPHONE ENCOUNTER
----- Message from Rachel Wallace sent at 2/3/2022  8:43 AM CST -----  Type:  Same Day Appointment Request    Caller is requesting a same day appointment.  Caller declined first available appointment listed below.    Name of Caller:pt  When is the first available appointment?March  Symptoms:LT ear hurts  Best Call Back Number:[phone]  Additional Information: .    Thank you

## 2022-02-03 NOTE — TELEPHONE ENCOUNTER
Per patient he gargled some salt warm water and it helped.   His ear feels better.   Says he was having an irritation in his teeth/gums (patient has dentures) and thinks he got something stuck and it caused a little pain.   But, he says that he is fine now.

## 2022-02-14 ENCOUNTER — OFFICE VISIT (OUTPATIENT)
Dept: UROLOGY | Facility: CLINIC | Age: 87
End: 2022-02-14
Payer: MEDICARE

## 2022-02-14 VITALS
DIASTOLIC BLOOD PRESSURE: 69 MMHG | WEIGHT: 156.75 LBS | BODY MASS INDEX: 22.49 KG/M2 | SYSTOLIC BLOOD PRESSURE: 124 MMHG

## 2022-02-14 DIAGNOSIS — R35.0 BENIGN PROSTATIC HYPERPLASIA WITH URINARY FREQUENCY: ICD-10-CM

## 2022-02-14 DIAGNOSIS — R97.20 ELEVATED PROSTATE SPECIFIC ANTIGEN (PSA): Primary | ICD-10-CM

## 2022-02-14 DIAGNOSIS — N40.1 BENIGN PROSTATIC HYPERPLASIA WITH URINARY FREQUENCY: ICD-10-CM

## 2022-02-14 PROCEDURE — 99214 OFFICE O/P EST MOD 30 MIN: CPT | Mod: S$GLB,,, | Performed by: UROLOGY

## 2022-02-14 PROCEDURE — 1159F PR MEDICATION LIST DOCUMENTED IN MEDICAL RECORD: ICD-10-PCS | Mod: CPTII,S$GLB,, | Performed by: UROLOGY

## 2022-02-14 PROCEDURE — 3288F PR FALLS RISK ASSESSMENT DOCUMENTED: ICD-10-PCS | Mod: CPTII,S$GLB,, | Performed by: UROLOGY

## 2022-02-14 PROCEDURE — 99999 PR PBB SHADOW E&M-EST. PATIENT-LVL III: ICD-10-PCS | Mod: PBBFAC,,, | Performed by: UROLOGY

## 2022-02-14 PROCEDURE — 1126F AMNT PAIN NOTED NONE PRSNT: CPT | Mod: CPTII,S$GLB,, | Performed by: UROLOGY

## 2022-02-14 PROCEDURE — 99214 PR OFFICE/OUTPT VISIT, EST, LEVL IV, 30-39 MIN: ICD-10-PCS | Mod: S$GLB,,, | Performed by: UROLOGY

## 2022-02-14 PROCEDURE — 1101F PT FALLS ASSESS-DOCD LE1/YR: CPT | Mod: CPTII,S$GLB,, | Performed by: UROLOGY

## 2022-02-14 PROCEDURE — 1159F MED LIST DOCD IN RCRD: CPT | Mod: CPTII,S$GLB,, | Performed by: UROLOGY

## 2022-02-14 PROCEDURE — 1101F PR PT FALLS ASSESS DOC 0-1 FALLS W/OUT INJ PAST YR: ICD-10-PCS | Mod: CPTII,S$GLB,, | Performed by: UROLOGY

## 2022-02-14 PROCEDURE — 3288F FALL RISK ASSESSMENT DOCD: CPT | Mod: CPTII,S$GLB,, | Performed by: UROLOGY

## 2022-02-14 PROCEDURE — 1126F PR PAIN SEVERITY QUANTIFIED, NO PAIN PRESENT: ICD-10-PCS | Mod: CPTII,S$GLB,, | Performed by: UROLOGY

## 2022-02-14 PROCEDURE — 99999 PR PBB SHADOW E&M-EST. PATIENT-LVL III: CPT | Mod: PBBFAC,,, | Performed by: UROLOGY

## 2022-02-14 RX ORDER — DUTASTERIDE 0.5 MG/1
0.5 CAPSULE, LIQUID FILLED ORAL DAILY
Qty: 90 CAPSULE | Refills: 3 | Status: SHIPPED | OUTPATIENT
Start: 2022-02-14 | End: 2022-09-07 | Stop reason: SDUPTHER

## 2022-02-14 NOTE — PROGRESS NOTES
Chief Complaint:   Encounter Diagnosis   Name Primary?    Elevated prostate specific antigen (PSA) Yes       HPI:    2/14/22- patient is doing well today, slight BPH type symptoms.  He has used tamsulosin in the past, but now suffers from vertigo.  Patient is here today to discuss his biopsy, no other complaints.  88-year-old gentleman who comes in with an elevated PSA.  Patient states that he had attempted tamsulosin once before, because severe decrease in blood pressure and had stop this medication.  Although upon further questioning he does not get up at all per night to void, except occasionally 1 time.  He goes about every 2-3 hours during the daytime, with no urgency or incontinence.  He states that he has a good stream, with no lower urinary tract symptoms.  No gross hematuria, he has never been a smoker.  No previous urological history.  He has a remote history of a biopsy done in the 80s or 90s, which he states was negative.  No family history of urological cancers or stones.  He does have history of stone passage, but this was years ago.  He is concerned about his PSA.    Allergies:  Shellfish containing products, Tramadol, Amoxicillin, and Iodine and iodide containing products    Medications:  has a current medication list which includes the following prescription(s): amlodipine, bisacodyl, diazepam, fluorouracil, fluticasone propionate, ketoconazole, lidocaine viscous, meclizine, oxycodone-acetaminophen, pantoprazole, polyethylene glycol, rosuvastatin, sulfamethoxazole-trimethoprim 800-160mg, tramadol-acetaminophen 37.5-325 mg, and triamcinolone acetonide 0.1%.    Review of Systems:  General: No fever, chills, fatigability, or weight loss.  Skin: No rashes, itching, or changes in color or texture of skin.  Chest: Denies FOSTER, cyanosis, wheezing, cough, and sputum production.  Abdomen: Appetite fine. No weight loss. Denies diarrhea, abdominal pain, hematemesis, or blood in stool.  Musculoskeletal: No  joint stiffness or swelling. Denies back pain.  : As above.  All other review of systems negative.    PMH:   has a past medical history of Abnormal exercise tolerance test (7/25/2013), Arthritis, Colon polyp, Diverticulosis, Dyslipidemia (7/25/2013), GERD (gastroesophageal reflux disease), HTN, goal below 130/80 (12/3/2018), Hyperlipidemia (1980), Knee pain, right (6/14/2013), Low back pain with right-sided sciatica (12/3/2018), Meningioma, Personal history of colonic polyps (5/27/2014), RLS (restless legs syndrome) (6/14/2013), Tobacco dependence, and West Nile meningitis (2010).    PSH:   has a past surgical history that includes Appendectomy; Colonoscopy (2/2009); Upper gastrointestinal endoscopy (2/2009); Joint replacement; Back surgery (Bilateral, 2016); Spine surgery; Lumbar paravertebral facet joint nerve block (Bilateral, 8/29/2019); Eye surgery; Cataract extraction, bilateral; Selective injection of anesthetic agent around lumbar spinal nerve root by transforaminal approach (Bilateral, 11/8/2021); and Selective injection of anesthetic agent around lumbar spinal nerve root by transforaminal approach (Bilateral, 1/4/2022).    FamHx: family history includes Cancer in his son; Diabetes in his maternal uncle; Heart disease in his mother.    SocHx:  reports that he quit smoking about 32 years ago. He has a 25.00 pack-year smoking history. He has never used smokeless tobacco. He reports that he does not drink alcohol and does not use drugs.      Physical Exam:  There were no vitals filed for this visit.  General: A&Ox3, no apparent distress, no deformities  Neck: No masses, normal ROM  Lungs: normal inspiration, no use of accessory muscles  Heart: normal pulse, no arrhythmias  Abdomen: Soft, NT, ND, no masses, no hernias, no hepatosplenomegaly  Skin: The skin is warm and dry. No jaundice.  Ext: No c/c/e.  CARMENZA: 1/22- Normal rectal tone. Prost 45 gm with a 1 cm right nodule. SV not palpable. Perineum and anus  normal.    Labs/Studies:   pnbx negative 112g 1/14/22  PSA 29.3 1/22  PSA 14.3 6/21  PSA 21.5 1/21  PSA 13.4 1/20  PSA 9.0 10/18    Impression/Plan:      1. Elevated PSA- patient has done well following his biopsy, all results were negative.  Please see below.    2. BPH- a relatively new finding with his issues of vertigo I do not want to pursue tamsulosin.  Therefore will start Avodart, he understands this will take at least 6 months to take effect.  Therefore I will see him in 6 months with a uroflow, postvoid residual and a PSA.  If stable continue yearly from there.  Call with any complaints in the meantime.   none

## 2022-02-15 ENCOUNTER — PATIENT OUTREACH (OUTPATIENT)
Dept: ADMINISTRATIVE | Facility: OTHER | Age: 87
End: 2022-02-15
Payer: MEDICARE

## 2022-02-15 ENCOUNTER — TELEPHONE (OUTPATIENT)
Dept: ADMINISTRATIVE | Facility: OTHER | Age: 87
End: 2022-02-15
Payer: MEDICARE

## 2022-02-15 NOTE — PROGRESS NOTES
Health Maintenance Due   Topic Date Due    Shingles Vaccine (1 of 2) Never done     Updates were requested from care everywhere.  Chart was reviewed for overdue [facility]active [facility] ([facility]) topics (CRS, Breast Cancer Screening, Eye exam)  Health Maintenance has been updated.  [place] immunization registry triggered.  Immunizations were reconciled.

## 2022-02-16 ENCOUNTER — OFFICE VISIT (OUTPATIENT)
Dept: PAIN MEDICINE | Facility: CLINIC | Age: 87
End: 2022-02-16
Payer: MEDICARE

## 2022-02-16 VITALS
WEIGHT: 157.75 LBS | BODY MASS INDEX: 22.58 KG/M2 | SYSTOLIC BLOOD PRESSURE: 147 MMHG | RESPIRATION RATE: 17 BRPM | DIASTOLIC BLOOD PRESSURE: 71 MMHG | HEART RATE: 64 BPM | HEIGHT: 70 IN

## 2022-02-16 DIAGNOSIS — M51.36 DDD (DEGENERATIVE DISC DISEASE), LUMBAR: ICD-10-CM

## 2022-02-16 DIAGNOSIS — M54.16 BILATERAL LUMBAR RADICULOPATHY: Primary | ICD-10-CM

## 2022-02-16 DIAGNOSIS — M47.816 LUMBAR SPONDYLOSIS: ICD-10-CM

## 2022-02-16 DIAGNOSIS — M54.50 LUMBAR SPINE PAINFUL ON MOVEMENT: ICD-10-CM

## 2022-02-16 PROCEDURE — 1101F PT FALLS ASSESS-DOCD LE1/YR: CPT | Mod: CPTII,S$GLB,, | Performed by: PHYSICIAN ASSISTANT

## 2022-02-16 PROCEDURE — 1125F PR PAIN SEVERITY QUANTIFIED, PAIN PRESENT: ICD-10-PCS | Mod: CPTII,S$GLB,, | Performed by: PHYSICIAN ASSISTANT

## 2022-02-16 PROCEDURE — 99999 PR PBB SHADOW E&M-EST. PATIENT-LVL IV: CPT | Mod: PBBFAC,,, | Performed by: PHYSICIAN ASSISTANT

## 2022-02-16 PROCEDURE — 1160F RVW MEDS BY RX/DR IN RCRD: CPT | Mod: CPTII,S$GLB,, | Performed by: PHYSICIAN ASSISTANT

## 2022-02-16 PROCEDURE — 1159F MED LIST DOCD IN RCRD: CPT | Mod: CPTII,S$GLB,, | Performed by: PHYSICIAN ASSISTANT

## 2022-02-16 PROCEDURE — 3288F FALL RISK ASSESSMENT DOCD: CPT | Mod: CPTII,S$GLB,, | Performed by: PHYSICIAN ASSISTANT

## 2022-02-16 PROCEDURE — 99999 PR PBB SHADOW E&M-EST. PATIENT-LVL IV: ICD-10-PCS | Mod: PBBFAC,,, | Performed by: PHYSICIAN ASSISTANT

## 2022-02-16 PROCEDURE — 99214 PR OFFICE/OUTPT VISIT, EST, LEVL IV, 30-39 MIN: ICD-10-PCS | Mod: S$GLB,,, | Performed by: PHYSICIAN ASSISTANT

## 2022-02-16 PROCEDURE — 1159F PR MEDICATION LIST DOCUMENTED IN MEDICAL RECORD: ICD-10-PCS | Mod: CPTII,S$GLB,, | Performed by: PHYSICIAN ASSISTANT

## 2022-02-16 PROCEDURE — 1125F AMNT PAIN NOTED PAIN PRSNT: CPT | Mod: CPTII,S$GLB,, | Performed by: PHYSICIAN ASSISTANT

## 2022-02-16 PROCEDURE — 1101F PR PT FALLS ASSESS DOC 0-1 FALLS W/OUT INJ PAST YR: ICD-10-PCS | Mod: CPTII,S$GLB,, | Performed by: PHYSICIAN ASSISTANT

## 2022-02-16 PROCEDURE — 1160F PR REVIEW ALL MEDS BY PRESCRIBER/CLIN PHARMACIST DOCUMENTED: ICD-10-PCS | Mod: CPTII,S$GLB,, | Performed by: PHYSICIAN ASSISTANT

## 2022-02-16 PROCEDURE — 3288F PR FALLS RISK ASSESSMENT DOCUMENTED: ICD-10-PCS | Mod: CPTII,S$GLB,, | Performed by: PHYSICIAN ASSISTANT

## 2022-02-16 PROCEDURE — 99214 OFFICE O/P EST MOD 30 MIN: CPT | Mod: S$GLB,,, | Performed by: PHYSICIAN ASSISTANT

## 2022-02-16 RX ORDER — TRAMADOL HYDROCHLORIDE 50 MG/1
50 TABLET ORAL
Qty: 30 TABLET | Refills: 1 | Status: SHIPPED | OUTPATIENT
Start: 2022-02-16 | End: 2022-05-26 | Stop reason: SDUPTHER

## 2022-02-16 NOTE — PROGRESS NOTES
Chief Pain Complaint:  Low Back Pain, Bilat Hip pain, now BLE pain (R>L), right knee pain    Interval History (2/16/2022): Dominguez Ritchie presents today for follow-up visit.  he underwent Bilateral L4/5 + L5/S1 TF DREW on 1/4/22.  The patient reports that he is/was unchanged following the procedure.  he reports 50% pain relief.  The changes lasted <2 weeks.  The changes have NOT continued through this visit.  Patient reports pain as 8/10 today.    Interval History (12/10/2021): Dominguez Ritchie presents today for follow-up visit.  Patient was seen on 11/8/21. At that time he underwent Bilateral L4/5 + L5/S1 TF DREW.  The patient reports that he is/was better following the procedure.    The changes lasted 2-4 weeks.  The changes have continued through this visit.  Patient reports pain as 7/10 today.    Interval History (10/14/2021):  Dominguez Ritchie presents today for follow-up visit.  Patient was last seen on 7/13/20 (over a year ago).  He is here today for evaluation for injection.  He had lumbar MRI since last visit.   Patient reports pain as 7/10 today.    History of Present Illness:   Dominguez Ritchie is a 89 y.o. male  who is presenting with a chief complaint of lumbar back pain. The patient began experiencing this problem insidiously, and the pain has been gradually worsening over the past 8 month(s). The pain is described as throbbing, cramping, aching and heavy and is located in the right lumbar spine. Pain is intermittent and lasts hours. The  pain radiated to the right leg. The patient rates his pain a 6 out of ten and interferes with activities of daily living a 7 out of ten. Pain is exacerbated by flexion/extension of the lumbar spine, getting up from a seated position, prolonged standing, and is improved by rest. Patient reports no prior trauma, no prior spinal surgery     - antecedent trauma, prior spinal surgery: patient reports prior trauma, prior lumbar surgery (surgery in January 2017 with   Anisha at [facility]), left knee replacement  - pertinent negatives: No fever, No chills, No weight loss, No bladder dysfunction, No bowel dysfunction, No saddle anesthesia  - pertinent positives: generalized nonspecific Lower Extremity weakness bilaterally, BLE numbness  - medications, other therapies tried (physical therapy, injections):     >> Tylenol, NSAIDs, gabapentin, Mobic, tramadol, Norco    >> Has previously undergone Physical Therapy    >> Has previously undergone spinal injection/s    - has undergone approximately 3-4 spinal injections previously (uncertain as to the specific type of injections that he has had, seems one was a lumbar rhizotomy)   - Right knee genicular nerve block on 7/25/18 with 70% pain relief for 6 months    - Right SI, Right GTB, Right Piriformis Inejction 10/17/18 with 80% pain relief for 2 months   - Right SI and Right GTB 12/19/18 with 20% relief    - Right Piriformis on 1/31/19 with no relief              -  Bilateral L3, 4,5 MBB on 8/29/19 with minimal relief   - Bilateral L4/5 + L5/S1 TF DREW on 11/8/21 with 100% pain relief x 2 weeks then pain slowly returning, mostly back to baseline 4 weeks post-procedure   - Bilateral L4/5 + L5/S1 TF RDEW on 1/4/22 with 50% pain relief for <2 weeks      Imaging / Labs / Studies (reviewed on 2/16/2022):              9/20/2018 X-Ray Lumbar Complete With Flex And Ext  TECHNIQUE:  Five views of the lumbar spine plus flexion extension views were performed.  COMPARISON:  November 16, 2015  FINDINGS:  Vertebral body heights are unchanged.  Chronic compression deformity of L1 noted.  Alignment is anatomic.  L4-5 disc height loss present.  There is multilevel anterior osteophyte formation.  Lower lumbar spine facet arthropathy noted.  No change in alignment on flexion or extension views.  No pars defects appreciated.  Mild vascular calcifications noted.  Impression: Degenerative findings.      7/12/2018 XR KNEE ORTHO BILAT WITH FLEXION  TECHNIQUE:  AP  standing of both knees, PA flexion standing views of both knees, and Merchant views of both knees were performed.  Lateral views of both knees were also performed.  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  Prosthesis position and alignment are satisfactory without radiographic evidence of hardware loosening or other complicating process.  Moderate degenerative change of the right knee.  No acute fracture or malalignment.       Results for orders placed during the hospital encounter of 11/16/15   X-Ray Lumbar Spine Complete 5 View    Narrative Five views of the lumbar spine  Findings: There is increased vertebral body height loss noted at the L1 level.  50% vertebrae height loss is noted anteriorly at this level.  The alignment is within normal limits. There is moderate disk space narrowing noted at the L4-5 level.  Mild/moderate facet arthropathy is noted from L2-3 through L5-S1 levels. Calcified granulomas are noted within the spleen. No pars defects.       Results for orders placed during the hospital encounter of 03/26/15   X-Ray Lumbar Spine AP And Lateral    Narrative Three views of the lumbar spine  Findings: There is a compression L1 level that is new when compared to the prior exam and demonstrates approximately 25% vertebral height loss.  There is no obvious condensation line seen on the plain films suggesting that this is still a chronic deformity.  The alignment is grossly within normal limits.  There is multilevel spondylosis with mild to moderate to space narrowing noted throughout the lumbar spine greatest at L4-5 level.  Facet arthropathy is noted from the L2-3 through the L5-S1 levels and most prominent at the L5-S1 level.  "    _________________________________________________________________________________________________________________________________________________________________________________________________________________________      Review of Systems:  CONSTITUTIONAL: patient denies any fever, chills, or weight loss  SKIN: patient denies any rash or itching  RESPIRATORY: patient denies having any shortness of breath  GASTROINTESTINAL: patient denies having any diarrhea, constipation, or bowel incontinence  GENITOURINARY: patient denies having any abnormal bladder function    MUSCULOSKELETAL:  - patient complains of the above noted pain/s (see chief pain complaint)    NEUROLOGICAL:   - pain as above  - strength in Lower extremities is decreased, BILATERALLY  - sensation in Lower extremities is abnormal, on the LEFT  - patient denies any loss of bowel or bladder control      PSYCHIATRIC: patient denies any change in mood    Other:  All other systems reviewed and are negative    _________________________________________________________________________________________________________________________________________________________________________________________________________________________     Physical Exam:  Vitals:    02/16/22 1146   BP: (!) 147/71   Pulse: 64   Resp: 17   Weight: 71.6 kg (157 lb 11.8 oz)   Height: 5' 10" (1.778 m)   PainSc:   8    Body mass index is 22.63 kg/m².   (reviewed on 2/16/2022)    General: alert and oriented, in no apparent distress  Gait: antalgic gait. Ambulating with cane.   Skin: No rashes, No discoloration, No obvious lesions  HEENT: EOMI  Respiratory: respirations nonlabored    Musculoskeletal:  Lumbar Spine  - Pain on flexion of lumbar spine Present   - Straight Leg Raise:  Equivocal   - Pain on extension of lumbar spine Present  - TTP over the lumbar facet joints Present  Bilateral L5-S1  - Lumbar facet loading Present  -Pain on palpation over the SI joint Present   - BRENDEN: " Absent    Neuro:  - Extremity Strength:     >> LEFT :: dec with dorsiflexion    >> RIGHT :: dec with dorsiflexion  - Extremity Reflexes:    >> LEFT  :: 2+    >> RIGHT :: 2+     Psych:  Mood and affect is appropriate    _________________________________________________________________________________________________________________________________________________________________________________________________________________________      Assessment:  Dominguez Ritchie is a 89 y.o. male who presents with     ICD-10-CM ICD-9-CM    1. Bilateral lumbar radiculopathy (R>L)  M54.16 724.4    2. DDD (degenerative disc disease), lumbar  M51.36 722.52    3. Lumbar spine painful on movement  M54.50 724.2    4. Lumbar spondylosis  M47.816 721.3      Patient returns for follow-up visit.  He complains of continued low back and right leg pain.  He has been seen at the [facility], underwent a spinal injection that did not help, and then ultimately underwent surgery (unsure of type) with Dr. Lancaster in January 2017.  We previously requested records multiple times from the [facility], however we have not received these. 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 He had treatment for this, but he reports the damage was already present by the time it was discovered.       Plan:  1. Interventional:   - S/p Bilateral L4/5 + L5/S1 TF DREW on 1/4/22 with 50% pain relief for <2 weeks  - Consider lumbar IL DREW in the future if warranted, but it doesn't seem he is obtaining much pain relief to warrant.   - S/p Bilateral L4/5 + L5/S1 TF DREW on 11/8/21 with 100% pain relief x 2 weeks then pain slowly returning, mostly back to baseline 4 weeks post-procedure.  - S/p Bilateral L3, 4,5 MBB on 8/29/19 with minimal relief     2. Pharmacologic:   - Will give tramadol 50mg QD PRN (30 tablets) to take for severe pain.  He was previously taking tramadol 50mg regularly in the past  but reported SE in the past, now states no issues. He gets better pain relief with tramadol 50mg than Ultracet.   - Continue Mobic 15mg QD PRN - from PCP.     - Anticoagulation use: None.   - Opioid contract signed 1/18/2019.     3. Rehabilitative: Encouraged regular exercise.    4. Diagnostic: None.     5. Follow up: PRN     - This condition does not require this patient to take time off of work, and the primary goal of our Pain Management services is to improve the patient's functional capacity.   - I discussed the risks, benefits, and alternatives to potential treatment options. All questions and concerns were fully addressed today in clinic.           Disclaimer:  This note was prepared using voice recognition system and is likely to have sound alike errors that may have been overlooked even after proof reading.  Please call me with any questions.

## 2022-02-18 RX ORDER — AMLODIPINE BESYLATE 2.5 MG/1
2.5 TABLET ORAL DAILY
Qty: 90 TABLET | Refills: 3 | Status: ON HOLD | OUTPATIENT
Start: 2022-02-18 | End: 2022-11-01 | Stop reason: HOSPADM

## 2022-02-18 RX ORDER — AMLODIPINE BESYLATE 2.5 MG/1
2.5 TABLET ORAL DAILY
Qty: 90 TABLET | Refills: 3 | Status: SHIPPED | OUTPATIENT
Start: 2022-02-18 | End: 2022-02-18 | Stop reason: SDUPTHER

## 2022-02-18 NOTE — TELEPHONE ENCOUNTER
----- Message from Janis Saldana sent at 2/18/2022  9:03 AM CST -----  He is needing refill of his blood pressure meds and pantoprazole sod 40mg. Wants both of them sent to [facility].

## 2022-02-18 NOTE — TELEPHONE ENCOUNTER
No new care gaps identified.  Powered by [employer] by [employer]. Reference number: [id number].   2/18/2022 9:34:24 AM CST

## 2022-02-18 NOTE — TELEPHONE ENCOUNTER
Pt requesting refill for amlodipine and protonix, he has 3 refills left on his protonix but needs amlodipine,   Please sign

## 2022-04-25 ENCOUNTER — OFFICE VISIT (OUTPATIENT)
Dept: FAMILY MEDICINE | Facility: CLINIC | Age: 87
End: 2022-04-25
Payer: MEDICARE

## 2022-04-25 VITALS
DIASTOLIC BLOOD PRESSURE: 69 MMHG | HEART RATE: 64 BPM | OXYGEN SATURATION: 96 % | SYSTOLIC BLOOD PRESSURE: 110 MMHG | BODY MASS INDEX: 21.87 KG/M2 | TEMPERATURE: 98 F | WEIGHT: 152.44 LBS

## 2022-04-25 DIAGNOSIS — M54.16 LUMBAR RADICULOPATHY: Primary | ICD-10-CM

## 2022-04-25 DIAGNOSIS — R42 VERTIGO: ICD-10-CM

## 2022-04-25 DIAGNOSIS — G89.29 CHRONIC BILATERAL LOW BACK PAIN WITH BILATERAL SCIATICA: ICD-10-CM

## 2022-04-25 DIAGNOSIS — R29.898 LEG WEAKNESS, BILATERAL: ICD-10-CM

## 2022-04-25 DIAGNOSIS — M54.42 CHRONIC BILATERAL LOW BACK PAIN WITH BILATERAL SCIATICA: ICD-10-CM

## 2022-04-25 DIAGNOSIS — M54.41 CHRONIC BILATERAL LOW BACK PAIN WITH BILATERAL SCIATICA: ICD-10-CM

## 2022-04-25 PROCEDURE — 3288F FALL RISK ASSESSMENT DOCD: CPT | Mod: CPTII,S$GLB,, | Performed by: FAMILY MEDICINE

## 2022-04-25 PROCEDURE — 99214 OFFICE O/P EST MOD 30 MIN: CPT | Mod: S$GLB,,, | Performed by: FAMILY MEDICINE

## 2022-04-25 PROCEDURE — 1159F PR MEDICATION LIST DOCUMENTED IN MEDICAL RECORD: ICD-10-PCS | Mod: CPTII,S$GLB,, | Performed by: FAMILY MEDICINE

## 2022-04-25 PROCEDURE — 99214 PR OFFICE/OUTPT VISIT, EST, LEVL IV, 30-39 MIN: ICD-10-PCS | Mod: S$GLB,,, | Performed by: FAMILY MEDICINE

## 2022-04-25 PROCEDURE — 99999 PR PBB SHADOW E&M-EST. PATIENT-LVL V: CPT | Mod: PBBFAC,,, | Performed by: FAMILY MEDICINE

## 2022-04-25 PROCEDURE — 1126F PR PAIN SEVERITY QUANTIFIED, NO PAIN PRESENT: ICD-10-PCS | Mod: CPTII,S$GLB,, | Performed by: FAMILY MEDICINE

## 2022-04-25 PROCEDURE — 3288F PR FALLS RISK ASSESSMENT DOCUMENTED: ICD-10-PCS | Mod: CPTII,S$GLB,, | Performed by: FAMILY MEDICINE

## 2022-04-25 PROCEDURE — 1126F AMNT PAIN NOTED NONE PRSNT: CPT | Mod: CPTII,S$GLB,, | Performed by: FAMILY MEDICINE

## 2022-04-25 PROCEDURE — 1100F PR PT FALLS ASSESS DOC 2+ FALLS/FALL W/INJURY/YR: ICD-10-PCS | Mod: CPTII,S$GLB,, | Performed by: FAMILY MEDICINE

## 2022-04-25 PROCEDURE — 1159F MED LIST DOCD IN RCRD: CPT | Mod: CPTII,S$GLB,, | Performed by: FAMILY MEDICINE

## 2022-04-25 PROCEDURE — 99999 PR PBB SHADOW E&M-EST. PATIENT-LVL V: ICD-10-PCS | Mod: PBBFAC,,, | Performed by: FAMILY MEDICINE

## 2022-04-25 PROCEDURE — 1100F PTFALLS ASSESS-DOCD GE2>/YR: CPT | Mod: CPTII,S$GLB,, | Performed by: FAMILY MEDICINE

## 2022-04-25 NOTE — PROGRESS NOTES
Chief Complaint:    Chief Complaint   Patient presents with    Follow-up     Bleeding in Ear    Back Pain     Lower back pain    Knee Pain     Both knees    Dizziness       History of Present Illness:  Patient with meningioma, RLS, HLD, HTN, BPH with urinary frequency, prediabetes, osteopenia, sacroiliitis, and chronic bilateral low back pain without sciatica presents today for back/knee pain, dizziness      Woke up two days ago with bleeding in his R ear. Bleeding has resolved.   Follows with Dr. Forde for his chronic back pain. The shots provide temporary relief. His back pain radiates down his knees and makes it difficult to stand up. He has to lean up against something. Patient has bulging discs on several levels and arthritis.   Takes mucinex for his dizziness. Hasn't followed with audiology for it yet.       Has constipation, taking miralax and dulcolax, has not changed diet. Taking mobic  Was given radiation treatment for Brain Tumor, was told he doesn't need to follow with neurology         ROS:  Review of Systems   Constitutional: Negative for activity change, chills, fatigue, fever and unexpected weight change.   HENT: Positive for ear pain. Negative for congestion, ear discharge, hearing loss, postnasal drip and rhinorrhea.    Eyes: Negative for pain and visual disturbance.   Respiratory: Negative for cough, chest tightness and shortness of breath.    Cardiovascular: Negative for chest pain and palpitations.   Gastrointestinal: Negative for abdominal pain, diarrhea and vomiting.   Endocrine: Negative for heat intolerance.   Genitourinary: Negative for dysuria, flank pain, frequency and hematuria.   Musculoskeletal: Positive for arthralgias, back pain and myalgias. Negative for gait problem and neck pain.   Skin: Negative for color change and rash.   Neurological: Negative for dizziness, tremors, seizures, numbness and headaches.   Psychiatric/Behavioral: Negative for agitation, hallucinations,  self-injury, sleep disturbance and suicidal ideas. The patient is not nervous/anxious.    All other systems reviewed and are negative.      Past Medical History:   Diagnosis Date    Abnormal exercise tolerance test 2013    Arthritis     Colon polyp     Diverticulosis     Dyslipidemia 2013    GERD (gastroesophageal reflux disease)     HTN, goal below 130/80 12/3/2018    Hyperlipidemia 1980    HIGH TG    Knee pain, right 2013    Low back pain with right-sided sciatica 12/3/2018    Meningioma     Personal history of colonic polyps 2014    RLS (restless legs syndrome) 2013    Tobacco dependence     resolved    West Nile meningitis     per patient       Social History:  Social History     Socioeconomic History    Marital status:    Tobacco Use    Smoking status: Former Smoker     Packs/day: 1.00     Years: 25.00     Pack years: 25.00     Quit date: 1990     Years since quittin.3    Smokeless tobacco: Never Used   Substance and Sexual Activity    Alcohol use: No     Alcohol/week: 0.0 standard drinks    Drug use: No    Sexual activity: Not Currently     Birth control/protection: None       Family History:   family history includes Cancer in his son; Diabetes in his maternal uncle; Heart disease in his mother.    Health Maintenance   Topic Date Due    Lipid Panel  2023    TETANUS VACCINE  2025       Physical Exam:    Vital Signs  Temp: 97.5 °F (36.4 °C)  Pulse: 64  SpO2: 96 %  BP: 110/69  BP Location: Left arm  Patient Position: Sitting  Pain Score: 0-No pain  Height and Weight  Weight: 69.2 kg (152 lb 7.2 oz)]    Body mass index is 21.87 kg/m².    Physical Exam  Vitals and nursing note reviewed.   Constitutional:       Appearance: Normal appearance. He is well-developed.   HENT:      Head: Normocephalic and atraumatic.      Right Ear: Tympanic membrane, ear canal and external ear normal. No drainage, swelling or tenderness. Tympanic membrane is  not perforated or erythematous.      Left Ear: Tympanic membrane, ear canal and external ear normal. No drainage, swelling or tenderness. Tympanic membrane is not perforated or erythematous.      Ears:      Comments: Bilateral hearing aids  Eyes:      Extraocular Movements: Extraocular movements intact.      Conjunctiva/sclera: Conjunctivae normal.      Pupils: Pupils are equal, round, and reactive to light.   Cardiovascular:      Rate and Rhythm: Normal rate and regular rhythm.      Pulses: Normal pulses.      Heart sounds: Normal heart sounds. No murmur heard.    No gallop.   Pulmonary:      Effort: Pulmonary effort is normal. No respiratory distress.      Breath sounds: Normal breath sounds. No wheezing, rhonchi or rales.   Chest:      Chest wall: No tenderness.   Abdominal:      General: There is no distension.      Palpations: Abdomen is soft. There is no mass.      Tenderness: There is no abdominal tenderness. There is no guarding.   Musculoskeletal:         General: No swelling, deformity or signs of injury. Normal range of motion.      Cervical back: Normal range of motion and neck supple.      Right knee: Tenderness present.      Left knee: Tenderness present.   Lymphadenopathy:      Cervical: No cervical adenopathy.   Skin:     General: Skin is warm and dry.      Capillary Refill: Capillary refill takes less than 2 seconds.      Coloration: Skin is not jaundiced or pale.   Neurological:      General: No focal deficit present.      Mental Status: He is alert and oriented to person, place, and time.      Motor: Weakness present.      Deep Tendon Reflexes: Reflexes are normal and symmetric.      Comments: Ambulates with cane. Strength and sensation decreased.   [place]-Hallpike maneuver positive on the right.      Psychiatric:         Mood and Affect: Mood normal.         Behavior: Behavior normal.         Thought Content: Thought content normal.         Judgment: Judgment normal.         Lab Results   Component  Value Date    CHOL 143 01/03/2022    CHOL 145 06/28/2021    CHOL 168 01/22/2021    TRIG 171 (H) 01/03/2022    TRIG 137 06/28/2021    TRIG 208 (H) 01/22/2021    HDL 51 01/03/2022    HDL 60 06/28/2021    HDL 62 01/22/2021    TOTALCHOLEST 2.8 01/03/2022    TOTALCHOLEST 2.4 06/28/2021    TOTALCHOLEST 2.7 01/22/2021    NONHDLCHOL 92 01/03/2022    NONHDLCHOL 85 06/28/2021    NONHDLCHOL 106 01/22/2021       Lab Results   Component Value Date    HGBA1C 5.6 01/22/2021       Assessment:      ICD-10-CM ICD-9-CM   1. Lumbar radiculopathy  M54.16 724.4   2. Chronic bilateral low back pain with bilateral sciatica  M54.42 724.2    M54.41 724.3    G89.29 338.29   3. Leg weakness, bilateral  R29.898 729.89   4. Vertigo  R42 780.4         Plan:    Refer to neurosurgery for lumbar radiculopathy and chronic bilateral low back pain with bilateral sciatica.  Refer to audiology for dizziness.      Orders Placed This Encounter   Procedures    Ambulatory referral/consult to Neurosurgery    Ambulatory referral/consult to Audiology         Current Outpatient Medications   Medication Sig Dispense Refill    amLODIPine (NORVASC) 2.5 MG tablet Take 1 tablet (2.5 mg total) by mouth once daily. 90 tablet 3    bisacodyL (DULCOLAX) 5 mg EC tablet Take 5 mg by mouth daily as needed for Constipation.      diazePAM (VALIUM) 5 MG tablet Take 1 tablet (5 mg total) by mouth once. for 1 dose 1 tablet 0    dutasteride (AVODART) 0.5 mg capsule Take 1 capsule (0.5 mg total) by mouth once daily. 90 capsule 3    fluorouraciL (EFUDEX) 5 % cream AAA scalp and temples bid x 2 weeks, then AAA of both forearms bid x 4 weeks 40 g 1    fluticasone propionate (CUTIVATE) 0.05 % cream       ketoconazole (NIZORAL) 2 % cream       LIDOCAINE VISCOUS 2 % solution APPLY  SOLUTION EVERY 6 HOURS 100 mL 0    meclizine (ANTIVERT) 25 mg tablet Take 1 tablet by mouth twice daily as needed 90 tablet 0    omeprazole (PRILOSEC) 40 MG capsule Take 1 capsule (40 mg total) by  mouth every morning. 90 capsule 3    oxyCODONE-acetaminophen (PERCOCET) 5-325 mg per tablet Take 1 tablet by mouth every 4 (four) hours as needed for Pain. 10 tablet 0    pantoprazole (PROTONIX) 40 MG tablet Take 1 tablet (40 mg total) by mouth once daily. 90 tablet 3    polyethylene glycol (GLYCOLAX) 17 gram PwPk Take 17 g by mouth once daily. 100 each 6    rosuvastatin (CRESTOR) 5 MG tablet Take 1 tablet (5 mg total) by mouth once daily. 90 tablet 3    sulfamethoxazole-trimethoprim 800-160mg (BACTRIM DS) 800-160 mg Tab Take 1 tablet by mouth 2 (two) times daily. 10 tablet 0    traMADoL (ULTRAM) 50 mg tablet Take 1 tablet (50 mg total) by mouth every 24 hours as needed for Pain. 30 tablet 1    tramadol-acetaminophen 37.5-325 mg (ULTRACET) 37.5-325 mg Tab Take 1 tablet by mouth every 12 (twelve) hours as needed for Pain. 30 tablet 0    triamcinolone acetonide 0.1% (KENALOG) 0.1 % cream Apply topically 2 (two) times daily. 454 g 3     No current facility-administered medications for this visit.       There are no discontinued medications.    No follow-ups on file.      Madie Davis MD  Scribe Attestation:   I, Petty Dasilva, am scribing for, and in the presence of, Dr.Arif Davis I performed the above scribed service and the documentation accurately describes the services I performed. I attest to the accuracy of the note.    I, Dr. Madie Davis, reviewed documentation as scribed above. I performed the services described in this documentation.  I agree that the record reflects my personal performance and is accurate and complete. Madie Davis MD.  04/25/2022

## 2022-05-03 ENCOUNTER — CLINICAL SUPPORT (OUTPATIENT)
Dept: AUDIOLOGY | Facility: CLINIC | Age: 87
End: 2022-05-03
Payer: MEDICARE

## 2022-05-03 DIAGNOSIS — H90.A21 SENSORINEURAL HEARING LOSS (SNHL) OF RIGHT EAR WITH RESTRICTED HEARING OF LEFT EAR: ICD-10-CM

## 2022-05-03 DIAGNOSIS — H81.91 VESTIBULOPATHY, RIGHT: Primary | ICD-10-CM

## 2022-05-03 DIAGNOSIS — R42 VERTIGO: ICD-10-CM

## 2022-05-03 DIAGNOSIS — H90.3 SENSORINEURAL HEARING LOSS, ASYMMETRICAL: ICD-10-CM

## 2022-05-03 PROCEDURE — 92540 BASIC VESTIBULAR EVALUATION: CPT | Mod: ,,, | Performed by: AUDIOLOGIST-HEARING AID FITTER

## 2022-05-03 PROCEDURE — 92567 PR TYMPA2METRY: ICD-10-PCS | Mod: ,,, | Performed by: AUDIOLOGIST-HEARING AID FITTER

## 2022-05-03 PROCEDURE — 92557 PR COMPREHENSIVE HEARING TEST: ICD-10-PCS | Mod: DBM,S$GLB,, | Performed by: AUDIOLOGIST-HEARING AID FITTER

## 2022-05-03 PROCEDURE — 92537 PR CALORIC VSTBLR TEST W/REC BITHERMAL: ICD-10-PCS | Mod: ,,, | Performed by: AUDIOLOGIST-HEARING AID FITTER

## 2022-05-03 PROCEDURE — 92540 PR VESTIBULAR EVAL NYSTAG FOVL&PERPH STIM OSCIL TRACKING: ICD-10-PCS | Mod: ,,, | Performed by: AUDIOLOGIST-HEARING AID FITTER

## 2022-05-03 PROCEDURE — 99999 PR PBB SHADOW E&M-EST. PATIENT-LVL I: ICD-10-PCS | Mod: PBBFAC,,, | Performed by: AUDIOLOGIST-HEARING AID FITTER

## 2022-05-03 PROCEDURE — 99999 PR PBB SHADOW E&M-EST. PATIENT-LVL I: CPT | Mod: PBBFAC,,, | Performed by: AUDIOLOGIST-HEARING AID FITTER

## 2022-05-03 PROCEDURE — 92567 TYMPANOMETRY: CPT | Mod: ,,, | Performed by: AUDIOLOGIST-HEARING AID FITTER

## 2022-05-03 PROCEDURE — 92537 CALORIC VSTBLR TEST W/REC: CPT | Mod: ,,, | Performed by: AUDIOLOGIST-HEARING AID FITTER

## 2022-05-03 PROCEDURE — 92557 COMPREHENSIVE HEARING TEST: CPT | Mod: DBM,S$GLB,, | Performed by: AUDIOLOGIST-HEARING AID FITTER

## 2022-05-03 NOTE — PROGRESS NOTES
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  Patient complains of dizziness and imbalance that has been recurring for at least the past five years. His primary complaint is imbalance. Symptoms persist only when standing and moving. He has weakness in the ankles, knees and back, and ambulates with a cane or holding onto something to brace himself. No illusion the world is spinning; dizziness sensation in his head. May get dizzy when turning onto his left in bed, sensation of lightheaded with no spinning and persist as long as he remains in that position. Has history of migraine headache. Patient had benign tumors on right head and lower neck; secondary from West Nile and Meningitis. It had grown and resulted in increased dizziness. For that reason, he obtained radiation treatment to right side of head at [facility] to shrink tumor around 2012.    Patient had VNG in 2015 that was abnormal, revealing a 64% right ear caloric weakness. Patient tried VRT in the past and thinks it helps, he will continue to perform home exercises at times.     Audiology Report:  Results reveal a moderate-to-severe sensorineural hearing loss 250-8000 Hz for the right ear, and a moderate-to-severe sensorineural hearing loss 250-8000 Hz for the left ear.   Speech Reception Thresholds were 65 dBHL for the right ear and 55 dBHL for the left ear.   Word recognition scores were poor (0%) for the right ear and poor (48%) for the left ear.   Tympanograms were Type A for the right ear and Type A for the left ear.    Videonystagmography Report (VNG):  Oculomotor function tests:  1. Sinusoidal tracking was abnormal, revealing reduced gain and poor accuracy for all tet frequencies.   2. Saccade revealed normal latencies, velocities and accuracies.  3. Optokinetic test was abnormal, with significant reduced OPK function and asymmetry for rightward targets.   Gaze test was  absent for nystagmus.  Spontaneous test was absent for nystagmus.  Head-shake test was absent for after head-shake nystagmus.  Static Positional test was absent for nystagmus.  [place]-Hallpike Right was negative for BPPV.  [place]-Hallpike Left was negative for BPPV.  Bi-thermal caloric test was Abnormal - 48% RE UW.  Fixation suppression following caloric irrigations was normal.  The following values were obtained:  Unilateral weakness (UW): 48% right ear  Directional preponderance (DP): 6% left beating  RC: 10 d/s   d/s  RW: 6 d/s  LW: 23 d/s    Summary: Abnormal VNG, consistent with central oculo-motor and chronic right vestibular dysfunction. Negative for BPPV. Bilateral SNHL with right ear asymmetry and 0% right word recognition. Audiogram and VNG results are consistent with previous testing from 2015.     Recommendations:  1. PCP review. Considering patient history of right-sided tumors with radiation, right ear asymmetry and problems with knees and legs, likely multi-factorial involvement.  2. Continue with a hearing aid in at least the left ear. Patient has not been wearing his right aid due to ear pain; discussed benefit of a hearing aid in the right ear primarily for sound awareness. He is provided a copy of his audiogram to share with his hearing aid provider, [facility].     Patient was counseled on the above findings.  Tracings are to be scanned.

## 2022-05-03 NOTE — Clinical Note
Thank you for this referral. Report for your review. No new findings: Audio and VNG consistent with 2015 results. Chronic right asymmetry. Pt says did VRT in past with minimal improvement, not sure if another referral will help?

## 2022-05-04 ENCOUNTER — PATIENT OUTREACH (OUTPATIENT)
Dept: ADMINISTRATIVE | Facility: OTHER | Age: 87
End: 2022-05-04
Payer: MEDICARE

## 2022-05-04 NOTE — PROGRESS NOTES
Health Maintenance Due   Topic Date Due    Shingles Vaccine (1 of 2) Never done    COVID-19 Vaccine (4 - Booster for Moderna series) 05/20/2022     Updates were requested from care everywhere.  Chart was reviewed for overdue Proactive [facility] Encounters (MOMO) topics (CRS, Breast Cancer Screening, Eye exam)  Health Maintenance has been updated.  LINKS immunization registry triggered.  Immunizations were reconciled.

## 2022-05-05 ENCOUNTER — OFFICE VISIT (OUTPATIENT)
Dept: NEUROSURGERY | Facility: CLINIC | Age: 87
End: 2022-05-05
Payer: MEDICARE

## 2022-05-05 VITALS
BODY MASS INDEX: 21.76 KG/M2 | HEIGHT: 70 IN | RESPIRATION RATE: 16 BRPM | HEART RATE: 61 BPM | SYSTOLIC BLOOD PRESSURE: 119 MMHG | DIASTOLIC BLOOD PRESSURE: 65 MMHG | WEIGHT: 152 LBS

## 2022-05-05 DIAGNOSIS — M54.16 LUMBAR RADICULOPATHY: ICD-10-CM

## 2022-05-05 DIAGNOSIS — M54.42 CHRONIC BILATERAL LOW BACK PAIN WITH BILATERAL SCIATICA: ICD-10-CM

## 2022-05-05 DIAGNOSIS — M54.9 DORSALGIA, UNSPECIFIED: ICD-10-CM

## 2022-05-05 DIAGNOSIS — G89.29 CHRONIC BILATERAL LOW BACK PAIN WITH BILATERAL SCIATICA: ICD-10-CM

## 2022-05-05 DIAGNOSIS — M54.41 CHRONIC BILATERAL LOW BACK PAIN WITH BILATERAL SCIATICA: ICD-10-CM

## 2022-05-05 PROCEDURE — 3288F PR FALLS RISK ASSESSMENT DOCUMENTED: ICD-10-PCS | Mod: CPTII,S$GLB,, | Performed by: PHYSICIAN ASSISTANT

## 2022-05-05 PROCEDURE — 99999 PR PBB SHADOW E&M-EST. PATIENT-LVL IV: ICD-10-PCS | Mod: PBBFAC,,, | Performed by: PHYSICIAN ASSISTANT

## 2022-05-05 PROCEDURE — 99213 PR OFFICE/OUTPT VISIT, EST, LEVL III, 20-29 MIN: ICD-10-PCS | Mod: S$GLB,,, | Performed by: PHYSICIAN ASSISTANT

## 2022-05-05 PROCEDURE — 1101F PR PT FALLS ASSESS DOC 0-1 FALLS W/OUT INJ PAST YR: ICD-10-PCS | Mod: CPTII,S$GLB,, | Performed by: PHYSICIAN ASSISTANT

## 2022-05-05 PROCEDURE — 1125F PR PAIN SEVERITY QUANTIFIED, PAIN PRESENT: ICD-10-PCS | Mod: CPTII,S$GLB,, | Performed by: PHYSICIAN ASSISTANT

## 2022-05-05 PROCEDURE — 1159F PR MEDICATION LIST DOCUMENTED IN MEDICAL RECORD: ICD-10-PCS | Mod: CPTII,S$GLB,, | Performed by: PHYSICIAN ASSISTANT

## 2022-05-05 PROCEDURE — 99213 OFFICE O/P EST LOW 20 MIN: CPT | Mod: S$GLB,,, | Performed by: PHYSICIAN ASSISTANT

## 2022-05-05 PROCEDURE — 3288F FALL RISK ASSESSMENT DOCD: CPT | Mod: CPTII,S$GLB,, | Performed by: PHYSICIAN ASSISTANT

## 2022-05-05 PROCEDURE — 1125F AMNT PAIN NOTED PAIN PRSNT: CPT | Mod: CPTII,S$GLB,, | Performed by: PHYSICIAN ASSISTANT

## 2022-05-05 PROCEDURE — 1101F PT FALLS ASSESS-DOCD LE1/YR: CPT | Mod: CPTII,S$GLB,, | Performed by: PHYSICIAN ASSISTANT

## 2022-05-05 PROCEDURE — 99999 PR PBB SHADOW E&M-EST. PATIENT-LVL IV: CPT | Mod: PBBFAC,,, | Performed by: PHYSICIAN ASSISTANT

## 2022-05-05 PROCEDURE — 1159F MED LIST DOCD IN RCRD: CPT | Mod: CPTII,S$GLB,, | Performed by: PHYSICIAN ASSISTANT

## 2022-05-05 NOTE — PROGRESS NOTES
"Subjective:      Patient ID: Dominguez Ritchie is a 89 y.o. male.    HPI   The patient is here today for evaluation of back pain.  He is referred to our department by PCP Dr. Davis.  Accompanied by his wife today.    He's had pain for years.   Localizes pain across lower back.   He also has pain in both knees, ankles and feet.  In the past he did have pain radiating down his legs. Patient states he was treated for sciatica.   Whenever he walks, the pain gets worse.   If he is sitting he is not bothered by the pain.   + Weakness reported in legs.  Denies numbness/tingling.  Ambulates with a cane outside the home.  Patient did have a fall 6 months ago after slipping on wet tile.  In the past he did participate in therapy but does not report any relief from this.  Rates his pain 7/10.  Currently takes Tramadol for pain.    The patient has had several injections but reports "they just don't last."  Patient had a minimally invasive surgery to his lower back in 2015 ([place]bar[place] [facility]  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, meningitis      - medications, other therapies tried (physical therapy, injections):     >> Tylenol, NSAIDs, gabapentin, Mobic, tramadol, Norco    >> Has previously undergone Physical Therapy    >> Has previously undergone spinal injection/s               - has undergone approximately 3-4 spinal injections previously (uncertain as to the specific type of injections that he has had, seems one was a lumbar rhizotomy)              - Right knee genicular nerve block on 7/25/18 with 70% pain relief for 6 months               - Right SI, Right GTB, Right Piriformis Inejction 10/17/18 with 80% pain relief for 2 months              - Right SI and Right GTB 12/19/18 with 20% relief               - Right Piriformis on 1/31/19 with no relief              -  Bilateral L3, 4,5 MBB on 8/29/19 with minimal relief   - Bilateral L4/5 + L5/S1 TF DREW on 11/8/21 with 100% pain relief x 2 weeks then pain " slowly returning, mostly back to baseline 4 weeks post-procedure   - Bilateral L4/5 + L5/S1 TF DREW on 1/4/22 with 50% pain relief for <2 weeks       Objective:     Body mass index is 21.81 kg/m².  Vitals:    05/05/22 1308   BP: 119/65   Pulse: 61   Resp: 16        Back:  None  Paraspinal muscle spasms   None  Pain with flexion and extention   WNL limited Range of motion    Neg  Straight leg raise     Motor-- decreased strength with PF- 4/5 michael   Right Right Left Left  Level Group   5  5  L2 Hip flexor (Psoas)   5  5  L3 Leg extension (Quads)   5 3/5 DF 5 3/5 DF L4 Dorsiflexion & foot inversion (Tibialis Anterior)   5 3 5 3 L5 Great toe extension ( EHL)   5  5  S1 Foot eversion (Gastroc, PL & PB)     Sensation  NL Decreased (R/L/BL) Level Sensation    X  L2 Anterio-medial thigh   X  L3 Medial thigh around knee   X  L4 Medial foot   X  L5 Dorsum foot   X  S1 Lateral foot     Reflex  2+  Patellar tendon (L4)   2+  Achilles tendon (S1)     Imaging / Labs / Studies (reviewed on 2/16/2022):                  Lab Results   Component Value Date    WBC 7.09 01/03/2022    HCT 40.1 01/03/2022           INDEPENDENT INTERPRETATION OF TEST:  Relevant imaging results reviewed and interpreted by me, discussed with the patient and / or family today.  Assessment:     1. Lumbar radiculopathy    2. Chronic bilateral low back pain with bilateral sciatica    3. Dorsalgia, unspecified      Plan:     Lumbar radiculopathy  -     Ambulatory referral/consult to Neurosurgery  -     X-Ray Lumbar Spine Flexion And Extension Only; Future; Expected date: 05/05/2022    Chronic bilateral low back pain with bilateral sciatica  -     Ambulatory referral/consult to Neurosurgery  -     X-Ray Lumbar Spine Flexion And Extension Only; Future; Expected date: 05/05/2022    Dorsalgia, unspecified  -     CT Lumbar Spine Without Contrast; Future; Expected date: 05/05/2022  -     X-Ray Lumbar Spine Flexion And Extension Only; Future; Expected date:  05/05/2022      The patient will complete a CT of the lumbar spine for better evaluation of bony anatomy as well as flex/ext x-rays to look for instability.  We will ask PM if they still have CD with MRI images in order to download on Epic.  Patient will follow-up after both studies for further discussion.  Please call with any changes.    Thank you for the referral.    Sekou Monae PA-C  [facility]

## 2022-05-19 ENCOUNTER — HOSPITAL ENCOUNTER (OUTPATIENT)
Dept: RADIOLOGY | Facility: HOSPITAL | Age: 87
Discharge: HOME | End: 2022-05-19
Attending: PHYSICIAN ASSISTANT
Payer: MEDICARE

## 2022-05-19 DIAGNOSIS — M54.9 DORSALGIA, UNSPECIFIED: ICD-10-CM

## 2022-05-19 DIAGNOSIS — M54.42 CHRONIC BILATERAL LOW BACK PAIN WITH BILATERAL SCIATICA: ICD-10-CM

## 2022-05-19 DIAGNOSIS — M54.16 LUMBAR RADICULOPATHY: ICD-10-CM

## 2022-05-19 DIAGNOSIS — G89.29 CHRONIC BILATERAL LOW BACK PAIN WITH BILATERAL SCIATICA: ICD-10-CM

## 2022-05-19 DIAGNOSIS — M54.41 CHRONIC BILATERAL LOW BACK PAIN WITH BILATERAL SCIATICA: ICD-10-CM

## 2022-05-19 PROCEDURE — 72131 CT LUMBAR SPINE W/O DYE: CPT | Mod: TC

## 2022-05-19 PROCEDURE — 72120 X-RAY BEND ONLY L-S SPINE: CPT | Mod: TC

## 2022-05-26 ENCOUNTER — OFFICE VISIT (OUTPATIENT)
Dept: NEUROSURGERY | Facility: CLINIC | Age: 87
End: 2022-05-26
Payer: MEDICARE

## 2022-05-26 DIAGNOSIS — G89.29 CHRONIC BILATERAL LOW BACK PAIN WITH BILATERAL SCIATICA: ICD-10-CM

## 2022-05-26 DIAGNOSIS — M54.42 CHRONIC BILATERAL LOW BACK PAIN WITH BILATERAL SCIATICA: ICD-10-CM

## 2022-05-26 DIAGNOSIS — M51.36 DDD (DEGENERATIVE DISC DISEASE), LUMBAR: ICD-10-CM

## 2022-05-26 DIAGNOSIS — M54.41 CHRONIC BILATERAL LOW BACK PAIN WITH BILATERAL SCIATICA: ICD-10-CM

## 2022-05-26 DIAGNOSIS — M48.062 LUMBAR STENOSIS WITH NEUROGENIC CLAUDICATION: ICD-10-CM

## 2022-05-26 DIAGNOSIS — M54.16 LUMBAR RADICULOPATHY: Primary | ICD-10-CM

## 2022-05-26 PROCEDURE — 99213 OFFICE O/P EST LOW 20 MIN: CPT | Mod: S$GLB,,, | Performed by: PHYSICIAN ASSISTANT

## 2022-05-26 PROCEDURE — 99999 PR PBB SHADOW E&M-EST. PATIENT-LVL II: CPT | Mod: PBBFAC,,, | Performed by: PHYSICIAN ASSISTANT

## 2022-05-26 PROCEDURE — 99999 PR PBB SHADOW E&M-EST. PATIENT-LVL II: ICD-10-PCS | Mod: PBBFAC,,, | Performed by: PHYSICIAN ASSISTANT

## 2022-05-26 PROCEDURE — 3288F FALL RISK ASSESSMENT DOCD: CPT | Mod: CPTII,S$GLB,, | Performed by: PHYSICIAN ASSISTANT

## 2022-05-26 PROCEDURE — 1101F PR PT FALLS ASSESS DOC 0-1 FALLS W/OUT INJ PAST YR: ICD-10-PCS | Mod: CPTII,S$GLB,, | Performed by: PHYSICIAN ASSISTANT

## 2022-05-26 PROCEDURE — 99213 PR OFFICE/OUTPT VISIT, EST, LEVL III, 20-29 MIN: ICD-10-PCS | Mod: S$GLB,,, | Performed by: PHYSICIAN ASSISTANT

## 2022-05-26 PROCEDURE — 3288F PR FALLS RISK ASSESSMENT DOCUMENTED: ICD-10-PCS | Mod: CPTII,S$GLB,, | Performed by: PHYSICIAN ASSISTANT

## 2022-05-26 PROCEDURE — 1101F PT FALLS ASSESS-DOCD LE1/YR: CPT | Mod: CPTII,S$GLB,, | Performed by: PHYSICIAN ASSISTANT

## 2022-05-26 RX ORDER — TRAMADOL HYDROCHLORIDE 50 MG/1
50 TABLET ORAL
Qty: 30 TABLET | Refills: 1 | Status: ON HOLD | OUTPATIENT
Start: 2022-05-26 | End: 2022-11-01 | Stop reason: HOSPADM

## 2022-05-26 NOTE — PROGRESS NOTES
"Subjective:      Patient ID: Dominguez Ritchie is a 89 y.o. male.    HPI   The patient is here today for CT and x-ray follow-up.  Reports continued pain.  Rates his pain 6/10 today.  Sitting helps his symptoms.  Walking exacerbates the pain.  No new issues to report today.    Prev note:  The patient is here today for evaluation of back pain.  He is referred to our department by PCP Dr. Davis.  Accompanied by his wife today.   He's had pain for years.   Localizes pain across lower back.   He also has pain in both knees, ankles and feet.  In the past he did have pain radiating down his legs. Patient states he was treated for sciatica.   Whenever he walks, the pain gets worse.   If he is sitting he is not bothered by the pain.   + Weakness reported in legs.  Denies numbness/tingling.  Ambulates with a cane outside the home.  Patient did have a fall 6 months ago after slipping on wet tile.  In the past he did participate in therapy but does not report any relief from this.  Rates his pain 7/10.  Currently takes Tramadol for pain.     The patient has had several injections but reports "they just don't last."  Patient had a minimally invasive surgery to his lower back in 2015 ([place]bar[place]  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, meningitis        - medications, other therapies tried (physical therapy, injections):     >> Tylenol, NSAIDs, gabapentin, Mobic, tramadol, Norco    >> Has previously undergone Physical Therapy    >> Has previously undergone spinal injection/s               - has undergone approximately 3-4 spinal injections previously (uncertain as to the specific type of injections that he has had, seems one was a lumbar rhizotomy)              - Right knee genicular nerve block on 7/25/18 with 70% pain relief for 6 months               - Right SI, Right GTB, Right Piriformis Inejction 10/17/18 with 80% pain relief for 2 months              - Right SI and Right GTB 12/19/18 with 20% relief "               - Right Piriformis on 1/31/19 with no relief              -  Bilateral L3, 4,5 MBB on 8/29/19 with minimal relief   - Bilateral L4/5 + L5/S1 TF DREW on 11/8/21 with 100% pain relief x 2 weeks then pain slowly returning, mostly back to baseline 4 weeks post-procedure   - Bilateral L4/5 + L5/S1 TF DREW on 1/4/22 with 50% pain relief for <2 weeks    Objective:     There is no height or weight on file to calculate BMI.  There were no vitals filed for this visit.   Back:  None   Paraspinal muscle spasms   None   Pain with flexion and extention   WNL limited Range of motion    Neg   Straight leg raise      Motor-- decreased strength with PF- 4/5 michael   Right Right Left Left  Level Group   5   5   L2 Hip flexor (Psoas)   5   5   L3 Leg extension (Quads)   5 3/5 DF 5 3/5 DF L4 Dorsiflexion & foot inversion (Tibialis Anterior)   5 3 5 3 L5 Great toe extension ( EHL)   5   5   S1 Foot eversion (Gastroc, PL & PB)      Sensation  NL Decreased (R/L/BL) Level Sensation    X   L2 Anterio-medial thigh   X   L3 Medial thigh around knee   X   L4 Medial foot   X   L5 Dorsum foot   X   S1 Lateral foot               Lab Results   Component Value Date    WBC 5.92 07/05/2022    HCT 39.7 (L) 07/05/2022   Lumbar x-rays:  FINDINGS:  There are 5 weight bearing lumbar vertebra.  Two lateral views were obtained in flexion and extension.  Stable anterior wedging of the L1 vertebral body.  Stable L4/L5 disc height reduction.  Stable multilevel marginal spondylosis.  The vertebral body heights and intervertebral disc heights are grossly well-maintained. Negative for active spondylolisthesis on the flexion or extension images.  Minimal grade 1 retro spondylolisthesis of L1 on L2 again seen.   The bowel gas pattern is normal.   Vascular calcifications.     Impression:     1.  Two lateral views were obtained, which fail to demonstrate evidence for active subluxation.  Minimal retro spondylolisthesis of L1 on L2 again seen.   2.  Negative  for acute process.   3.  Stable findings as noted above.    CT Lumbar spine-  FINDINGS:  Old anterior wedge compression fracture of the L1 vertebral body with 50% loss of anterior vertebral body height.  No acute fracture.  No malalignment of the lumbar spine.  Unilateral left pars defect of L5.  Degenerative disc disease L4-5.  Severe right L5-S1 facet joint osteoarthritis.  Otherwise mild to moderate facet joint osteoarthritis bilaterally at multiple levels in the lumbar spine.   There is at least moderate central canal stenosis at L2-L3 due to a disc bulge and ligamentum flavum thickening.   Bilateral renal cysts with a large right parapelvic cyst measuring 5.5 cm and a exophytic lower pole left renal cyst measuring 3.3 cm.  Calcific atherosclerotic plaque of the normal caliber abdominal aorta.     Impression:     1. No acute fracture or malalignment lumbar spine.  2. Old anterior wedge compression fracture of L1 with 50% loss of vertebral body height.  3. Unilateral left L5 pars defect.  4. Severe right L5-S1 facet joint osteoarthritis; otherwise mild to moderate facet joint osteoarthritis bilaterally at multiple levels in the lumbar spine.  5. Degenerative disc disease L4-5.  6. At least moderate central canal stenosis at L2-L3 due to a disc bulge and ligamentum flavum thickening.    INDEPENDENT INTERPRETATION OF TEST:  Relevant imaging results reviewed and interpreted by me, discussed with the patient and / or family today.  Assessment:     1. Lumbar radiculopathy    2. Chronic bilateral low back pain with bilateral sciatica    3. DDD (degenerative disc disease), lumbar    4. Lumbar stenosis with neurogenic claudication      Plan:     Lumbar radiculopathy  -     Procedure Order to Pain Management; Future; Expected date: 07/20/2022    Chronic bilateral low back pain with bilateral sciatica    DDD (degenerative disc disease), lumbar  -     Procedure Order to Pain Management; Future; Expected date:  07/20/2022    Lumbar stenosis with neurogenic claudication  -     Procedure Order to Pain Management; Future; Expected date: 07/20/2022      Discussed CT and x-ray findings as mentioned above.  Will send patient for D2P- TFESI L2/3, 4/5 bilateral at the [facility].  Follow-up in 3 weeks after injections for reassessment.  Please call with any changes.      Sekou Monae PA-C  [facility]

## 2022-05-31 RX ORDER — MELOXICAM 7.5 MG/1
7.5 TABLET ORAL DAILY
Qty: 30 TABLET | Refills: 1 | Status: SHIPPED | OUTPATIENT
Start: 2022-05-31 | End: 2022-07-14

## 2022-05-31 NOTE — TELEPHONE ENCOUNTER
----- Message from Gayle Roberson sent at 5/31/2022 10:11 AM CDT -----  Contact: CHINEDU MULLEN [[id number]]  .Type:  RX Refill Request    Who Called: CHINEDU MULLEN [[id number]]  Refill or New Rx: refill   RX Name and Strength:Mobic 15 mg   How is the patient currently taking it? (ex. 1XDay): 1 X daily   Is this a 30 day or 90 day RX: 30    Preferred Pharmacy with phone number:  [id number] - [place], [place] - [address] [address] [address]  [address] [address] [address]  Phone: [phone] Fax: [phone]     Local or Mail Order: local  Ordering Provider: Susan  Would the patient rather a call back or a response via [facility]? Call   Best Call Back Number:[phone] (home)    Additional Information:  pt is requesting a refill on the med listed above

## 2022-07-05 ENCOUNTER — LAB VISIT (OUTPATIENT)
Dept: LAB | Facility: HOSPITAL | Age: 87
End: 2022-07-05
Attending: FAMILY MEDICINE
Payer: MEDICARE

## 2022-07-05 ENCOUNTER — OFFICE VISIT (OUTPATIENT)
Dept: FAMILY MEDICINE | Facility: CLINIC | Age: 87
End: 2022-07-05
Payer: MEDICARE

## 2022-07-05 VITALS
SYSTOLIC BLOOD PRESSURE: 120 MMHG | BODY MASS INDEX: 22.14 KG/M2 | WEIGHT: 154.63 LBS | HEART RATE: 71 BPM | DIASTOLIC BLOOD PRESSURE: 70 MMHG | HEIGHT: 70 IN | OXYGEN SATURATION: 95 % | TEMPERATURE: 98 F

## 2022-07-05 DIAGNOSIS — E78.2 MIXED HYPERLIPIDEMIA: ICD-10-CM

## 2022-07-05 DIAGNOSIS — L29.9 EAR ITCHING: Primary | ICD-10-CM

## 2022-07-05 DIAGNOSIS — I10 ESSENTIAL HYPERTENSION: ICD-10-CM

## 2022-07-05 LAB
ALBUMIN SERPL BCP-MCNC: 3.9 G/DL (ref 3.5–5.2)
ALP SERPL-CCNC: 65 U/L (ref 55–135)
ALT SERPL W/O P-5'-P-CCNC: 12 U/L (ref 10–44)
ANION GAP SERPL CALC-SCNC: 5 MMOL/L (ref 8–16)
AST SERPL-CCNC: 24 U/L (ref 10–40)
BASOPHILS # BLD AUTO: 0.05 K/UL (ref 0–0.2)
BASOPHILS NFR BLD: 0.8 % (ref 0–1.9)
BILIRUB SERPL-MCNC: 0.4 MG/DL (ref 0.1–1)
BUN SERPL-MCNC: 16 MG/DL (ref 8–23)
CALCIUM SERPL-MCNC: 9.2 MG/DL (ref 8.7–10.5)
CHLORIDE SERPL-SCNC: 102 MMOL/L (ref 95–110)
CHOLEST SERPL-MCNC: 151 MG/DL (ref 120–199)
CHOLEST/HDLC SERPL: 2.6 {RATIO} (ref 2–5)
CO2 SERPL-SCNC: 29 MMOL/L (ref 23–29)
CREAT SERPL-MCNC: 0.8 MG/DL (ref 0.5–1.4)
DIFFERENTIAL METHOD: ABNORMAL
EOSINOPHIL # BLD AUTO: 0.2 K/UL (ref 0–0.5)
EOSINOPHIL NFR BLD: 3.2 % (ref 0–8)
ERYTHROCYTE [DISTWIDTH] IN BLOOD BY AUTOMATED COUNT: 13 % (ref 11.5–14.5)
EST. GFR  (AFRICAN AMERICAN): >60 ML/MIN/1.73 M^2
EST. GFR  (NON AFRICAN AMERICAN): >60 ML/MIN/1.73 M^2
GLUCOSE SERPL-MCNC: 109 MG/DL (ref 70–110)
HCT VFR BLD AUTO: 39.7 % (ref 40–54)
HDLC SERPL-MCNC: 58 MG/DL (ref 40–75)
HDLC SERPL: 38.4 % (ref 20–50)
HGB BLD-MCNC: 12.9 G/DL (ref 14–18)
IMM GRANULOCYTES # BLD AUTO: 0.02 K/UL (ref 0–0.04)
IMM GRANULOCYTES NFR BLD AUTO: 0.3 % (ref 0–0.5)
LDLC SERPL CALC-MCNC: 54.6 MG/DL (ref 63–159)
LYMPHOCYTES # BLD AUTO: 1.6 K/UL (ref 1–4.8)
LYMPHOCYTES NFR BLD: 26.9 % (ref 18–48)
MCH RBC QN AUTO: 30.5 PG (ref 27–31)
MCHC RBC AUTO-ENTMCNC: 32.5 G/DL (ref 32–36)
MCV RBC AUTO: 94 FL (ref 82–98)
MONOCYTES # BLD AUTO: 0.7 K/UL (ref 0.3–1)
MONOCYTES NFR BLD: 11.5 % (ref 4–15)
NEUTROPHILS # BLD AUTO: 3.4 K/UL (ref 1.8–7.7)
NEUTROPHILS NFR BLD: 57.3 % (ref 38–73)
NONHDLC SERPL-MCNC: 93 MG/DL
NRBC BLD-RTO: 0 /100 WBC
PLATELET # BLD AUTO: 217 K/UL (ref 150–450)
PMV BLD AUTO: 10.5 FL (ref 9.2–12.9)
POTASSIUM SERPL-SCNC: 4.5 MMOL/L (ref 3.5–5.1)
PROT SERPL-MCNC: 6.4 G/DL (ref 6–8.4)
RBC # BLD AUTO: 4.23 M/UL (ref 4.6–6.2)
SODIUM SERPL-SCNC: 136 MMOL/L (ref 136–145)
TRIGL SERPL-MCNC: 192 MG/DL (ref 30–150)
WBC # BLD AUTO: 5.92 K/UL (ref 3.9–12.7)

## 2022-07-05 PROCEDURE — 99999 PR PBB SHADOW E&M-EST. PATIENT-LVL IV: ICD-10-PCS | Mod: PBBFAC,,, | Performed by: FAMILY MEDICINE

## 2022-07-05 PROCEDURE — 99999 PR PBB SHADOW E&M-EST. PATIENT-LVL IV: CPT | Mod: PBBFAC,,, | Performed by: FAMILY MEDICINE

## 2022-07-05 PROCEDURE — 1125F PR PAIN SEVERITY QUANTIFIED, PAIN PRESENT: ICD-10-PCS | Mod: CPTII,S$GLB,, | Performed by: FAMILY MEDICINE

## 2022-07-05 PROCEDURE — 1101F PT FALLS ASSESS-DOCD LE1/YR: CPT | Mod: CPTII,S$GLB,, | Performed by: FAMILY MEDICINE

## 2022-07-05 PROCEDURE — 1125F AMNT PAIN NOTED PAIN PRSNT: CPT | Mod: CPTII,S$GLB,, | Performed by: FAMILY MEDICINE

## 2022-07-05 PROCEDURE — 80053 COMPREHEN METABOLIC PANEL: CPT | Performed by: FAMILY MEDICINE

## 2022-07-05 PROCEDURE — 99214 PR OFFICE/OUTPT VISIT, EST, LEVL IV, 30-39 MIN: ICD-10-PCS | Mod: S$GLB,,, | Performed by: FAMILY MEDICINE

## 2022-07-05 PROCEDURE — 1101F PR PT FALLS ASSESS DOC 0-1 FALLS W/OUT INJ PAST YR: ICD-10-PCS | Mod: CPTII,S$GLB,, | Performed by: FAMILY MEDICINE

## 2022-07-05 PROCEDURE — 3288F PR FALLS RISK ASSESSMENT DOCUMENTED: ICD-10-PCS | Mod: CPTII,S$GLB,, | Performed by: FAMILY MEDICINE

## 2022-07-05 PROCEDURE — 85025 COMPLETE CBC W/AUTO DIFF WBC: CPT | Performed by: FAMILY MEDICINE

## 2022-07-05 PROCEDURE — 36415 COLL VENOUS BLD VENIPUNCTURE: CPT | Mod: PO | Performed by: FAMILY MEDICINE

## 2022-07-05 PROCEDURE — 80061 LIPID PANEL: CPT | Performed by: FAMILY MEDICINE

## 2022-07-05 PROCEDURE — 1159F MED LIST DOCD IN RCRD: CPT | Mod: CPTII,S$GLB,, | Performed by: FAMILY MEDICINE

## 2022-07-05 PROCEDURE — 1159F PR MEDICATION LIST DOCUMENTED IN MEDICAL RECORD: ICD-10-PCS | Mod: CPTII,S$GLB,, | Performed by: FAMILY MEDICINE

## 2022-07-05 PROCEDURE — 3288F FALL RISK ASSESSMENT DOCD: CPT | Mod: CPTII,S$GLB,, | Performed by: FAMILY MEDICINE

## 2022-07-05 PROCEDURE — 99214 OFFICE O/P EST MOD 30 MIN: CPT | Mod: S$GLB,,, | Performed by: FAMILY MEDICINE

## 2022-07-05 RX ORDER — FLUOCINOLONE ACETONIDE 0.11 MG/ML
5 OIL AURICULAR (OTIC) EVERY 12 HOURS
Qty: 20 ML | Refills: 0 | Status: SHIPPED | OUTPATIENT
Start: 2022-07-05 | End: 2022-07-12

## 2022-07-05 NOTE — PROGRESS NOTES
Chief Complaint:    Chief Complaint   Patient presents with    6 month follow up       History of Present Illness:  Patient with meningioma, RLS, HLD, HTN, BPH with urinary frequency, prediabetes, osteopenia, sacroiliitis, and chronic bilateral low back pain without sciatica presents today for a six month follow up      Did a CT scan for his back. He was supposed to be called for a steroid injection but never was.   Has problems with itchiness and soreness inside his ears. His L ear also drains. Bilateral hearing aids.   Following with Dr. Rodriguez for elevated PSA        Has constipation, taking miralax and dulcolax, has not changed diet. Taking mobic  Was given radiation treatment for Brain Tumor, was told he doesn't need to follow with neurology         ROS:  Review of Systems   Constitutional: Negative for appetite change, chills and fever.   HENT: Positive for ear discharge and ear pain. Negative for congestion, postnasal drip, rhinorrhea, sinus pressure and sinus pain.    Eyes: Negative for pain.   Respiratory: Negative for cough, chest tightness and shortness of breath.    Cardiovascular: Negative for chest pain and palpitations.   Gastrointestinal: Negative for abdominal pain, blood in stool, constipation, diarrhea and nausea.   Genitourinary: Negative for difficulty urinating, dysuria, flank pain and hematuria.   Musculoskeletal: Negative for arthralgias and myalgias.   Skin: Negative for pallor and wound.   Neurological: Negative for dizziness, tremors, speech difficulty, light-headedness and headaches.   Psychiatric/Behavioral: Negative for behavioral problems, dysphoric mood and sleep disturbance. The patient is not nervous/anxious.    All other systems reviewed and are negative.      Past Medical History:   Diagnosis Date    Abnormal exercise tolerance test 7/25/2013    Arthritis     Colon polyp     Diverticulosis     Dyslipidemia 7/25/2013    GERD (gastroesophageal reflux disease)     HTN,  "goal below 130/80 12/3/2018    Hyperlipidemia 1980    HIGH TG    Knee pain, right 2013    Low back pain with right-sided sciatica 12/3/2018    Meningioma     Personal history of colonic polyps 2014    RLS (restless legs syndrome) 2013    Tobacco dependence     resolved    West Nile meningitis     per patient       Social History:  Social History     Socioeconomic History    Marital status:    Tobacco Use    Smoking status: Former Smoker     Packs/day: 1.00     Years: 25.00     Pack years: 25.00     Quit date: 1990     Years since quittin.5    Smokeless tobacco: Never Used   Substance and Sexual Activity    Alcohol use: No     Alcohol/week: 0.0 standard drinks    Drug use: No    Sexual activity: Not Currently     Birth control/protection: None       Family History:   family history includes Cancer in his son; Diabetes in his maternal uncle; Heart disease in his mother.    Health Maintenance   Topic Date Due    Lipid Panel  2023    TETANUS VACCINE  2025       Physical Exam:    Vital Signs  Temp: 98.1 °F (36.7 °C)  Temp src: Tympanic  Pulse: 71  SpO2: 95 %  BP: 120/70  BP Location: Right arm  Patient Position: Sitting  Pain Score:   6  Height and Weight  Height: 5' 10" (177.8 cm)  Weight: 70.1 kg (154 lb 10.4 oz)  BSA (Calculated - sq m): 1.86 sq meters  BMI (Calculated): 22.2  Weight in (lb) to have BMI = 25: 173.9]    Body mass index is 22.19 kg/m².    Physical Exam  Vitals and nursing note reviewed.   Constitutional:       Appearance: Normal appearance.   HENT:      Head: Normocephalic and atraumatic.      Right Ear: Tympanic membrane normal.      Left Ear: Tympanic membrane normal. Tympanic membrane is not injected, scarred or perforated.      Ears:      Comments: Bilateral hearing aids  Eyes:      Extraocular Movements: Extraocular movements intact.      Pupils: Pupils are equal, round, and reactive to light.   Cardiovascular:      Rate and Rhythm: " Normal rate and regular rhythm.      Pulses: Normal pulses.      Heart sounds: Normal heart sounds. No murmur heard.    No gallop.   Pulmonary:      Effort: Pulmonary effort is normal. No respiratory distress.      Breath sounds: Normal breath sounds. No wheezing, rhonchi or rales.   Abdominal:      General: There is no distension.      Palpations: Abdomen is soft.      Tenderness: There is no abdominal tenderness.   Musculoskeletal:         General: No swelling, deformity or signs of injury. Normal range of motion.      Cervical back: Normal range of motion.   Skin:     General: Skin is warm and dry.      Capillary Refill: Capillary refill takes less than 2 seconds.      Coloration: Skin is not jaundiced or pale.   Neurological:      General: No focal deficit present.      Mental Status: He is alert and oriented to person, place, and time.   Psychiatric:         Mood and Affect: Mood normal.         Behavior: Behavior normal.         Lab Results   Component Value Date    CHOL 143 01/03/2022    CHOL 145 06/28/2021    CHOL 168 01/22/2021    TRIG 171 (H) 01/03/2022    TRIG 137 06/28/2021    TRIG 208 (H) 01/22/2021    HDL 51 01/03/2022    HDL 60 06/28/2021    HDL 62 01/22/2021    TOTALCHOLEST 2.8 01/03/2022    TOTALCHOLEST 2.4 06/28/2021    TOTALCHOLEST 2.7 01/22/2021    NONHDLCHOL 92 01/03/2022    NONHDLCHOL 85 06/28/2021    NONHDLCHOL 106 01/22/2021       Lab Results   Component Value Date    HGBA1C 5.6 01/22/2021       Assessment:      ICD-10-CM ICD-9-CM   1. Ear itching  L29.9 698.9   2. Essential hypertension  I10 401.9   3. Mixed hyperlipidemia  E78.2 272.2     Plan:  Secure message PA for lumbar radiculopathy and chronic bilateral low back pain with bilateral sciatica. She will set him up with pain management.  Recommend dermotic oil 0.01% cream for ear itching.   Stay active as tolerated.  See labs below.   Orders Placed This Encounter   Procedures    CBC Auto Differential    Comprehensive Metabolic Panel     Lipid Panel         Current Outpatient Medications   Medication Sig Dispense Refill    amLODIPine (NORVASC) 2.5 MG tablet Take 1 tablet (2.5 mg total) by mouth once daily. 90 tablet 3    bisacodyL (DULCOLAX) 5 mg EC tablet Take 5 mg by mouth daily as needed for Constipation.      fluorouraciL (EFUDEX) 5 % cream AAA scalp and temples bid x 2 weeks, then AAA of both forearms bid x 4 weeks 40 g 1    fluticasone propionate (CUTIVATE) 0.05 % cream       ketoconazole (NIZORAL) 2 % cream       meloxicam (MOBIC) 7.5 MG tablet Take 1 tablet (7.5 mg total) by mouth once daily. 30 tablet 1    omeprazole (PRILOSEC) 40 MG capsule Take 1 capsule (40 mg total) by mouth every morning. 90 capsule 3    oxyCODONE-acetaminophen (PERCOCET) 5-325 mg per tablet Take 1 tablet by mouth every 4 (four) hours as needed for Pain. 10 tablet 0    triamcinolone acetonide 0.1% (KENALOG) 0.1 % cream Apply topically 2 (two) times daily. 454 g 3    diazePAM (VALIUM) 5 MG tablet Take 1 tablet (5 mg total) by mouth once. for 1 dose 1 tablet 0    dutasteride (AVODART) 0.5 mg capsule Take 1 capsule (0.5 mg total) by mouth once daily. 90 capsule 3    fluocinolone acetonide oiL (DERMOTIC OIL) 0.01 % Drop Place 5 drops in ear(s) every 12 (twelve) hours. for 7 days 20 mL 0    LIDOcaine HCl 2% (XYLOCAINE) 2 % Soln APPLY 5 ML  EVERY 6 HOURS (Patient not taking: Reported on 7/5/2022) 100 mL 0    meclizine (ANTIVERT) 25 mg tablet Take 1 tablet by mouth twice daily as needed (Patient not taking: Reported on 7/5/2022) 90 tablet 0    pantoprazole (PROTONIX) 40 MG tablet Take 1 tablet (40 mg total) by mouth once daily. (Patient not taking: Reported on 7/5/2022) 90 tablet 3    polyethylene glycol (GLYCOLAX) 17 gram PwPk Take 17 g by mouth once daily. 100 each 6    rosuvastatin (CRESTOR) 5 MG tablet Take 1 tablet (5 mg total) by mouth once daily. 90 tablet 3    sulfamethoxazole-trimethoprim 800-160mg (BACTRIM DS) 800-160 mg Tab Take 1 tablet by mouth  2 (two) times daily. (Patient not taking: Reported on 7/5/2022) 10 tablet 0    traMADoL (ULTRAM) 50 mg tablet Take 1 tablet (50 mg total) by mouth every 24 hours as needed for Pain. (Patient not taking: Reported on 7/5/2022) 30 tablet 1    tramadol-acetaminophen 37.5-325 mg (ULTRACET) 37.5-325 mg Tab Take 1 tablet by mouth every 12 (twelve) hours as needed for Pain. (Patient not taking: Reported on 7/5/2022) 30 tablet 0     No current facility-administered medications for this visit.       There are no discontinued medications.    Follow up in about 6 months (around 1/5/2023).      Madie Davis MD  Scribe Attestation:   I, Petty Dasilva, am scribing for, and in the presence of, Dr.Arif Davis I performed the above scribed service and the documentation accurately describes the services I performed. I attest to the accuracy of the note.    I, Dr. Madie Davis, reviewed documentation as scribed above. I performed the services described in this documentation.  I agree that the record reflects my personal performance and is accurate and complete. Madie Davis MD.  07/05/2022

## 2022-07-27 ENCOUNTER — TELEPHONE (OUTPATIENT)
Dept: PAIN MEDICINE | Facility: CLINIC | Age: 87
End: 2022-07-27
Payer: MEDICARE

## 2022-07-27 DIAGNOSIS — M54.16 LUMBAR RADICULOPATHY: Primary | ICD-10-CM

## 2022-08-01 ENCOUNTER — TELEPHONE (OUTPATIENT)
Dept: PAIN MEDICINE | Facility: CLINIC | Age: 87
End: 2022-08-01
Payer: MEDICARE

## 2022-08-01 NOTE — TELEPHONE ENCOUNTER
Attempted 3 times to contact patient to schedule d2p.  I have left voicemail with no call back.  I will forward to Silvana Monae to let her know that I have been unsuccessful in contacting patient.

## 2022-08-02 ENCOUNTER — TELEPHONE (OUTPATIENT)
Dept: UROLOGY | Facility: CLINIC | Age: 87
End: 2022-08-02
Payer: MEDICARE

## 2022-08-02 NOTE — TELEPHONE ENCOUNTER
Jamir rescheduled this appt already    ----- Message from Rohini Benitez sent at 8/2/2022  1:23 PM CDT -----  Contact: pt wife - rene  Type:  Sooner Apoointment Request    Caller is requesting a sooner appointment.  Caller declined first available appointment listed below.  Caller will not accept being placed on the waitlist and is requesting a message be sent to doctor.  Name of Caller: pt   When is the first available appointment?09/30  Symptoms: 6mo f/u fow/PVR  Would the patient rather a call back or a response via [facility]? phone  Best Call Back Number: [phone]  Additional Information: pt's appt on 08/17 is being booked out and wants to come in prior to next avail

## 2022-08-02 NOTE — TELEPHONE ENCOUNTER
Spoke w/ pt's wife regarding her message wanting to reschedule the pt's labs on the same day as his appt. I informed her that because Dr. Rodriguez will be out that week, I had to reschedule the pt's appt in September instead, along with the labs so that they can be on the same day, as she requested. Lorenzo RITCHIE

## 2022-08-12 DIAGNOSIS — E78.2 MIXED HYPERLIPIDEMIA: ICD-10-CM

## 2022-08-12 RX ORDER — ROSUVASTATIN CALCIUM 5 MG/1
5 TABLET, COATED ORAL DAILY
Qty: 90 TABLET | Refills: 3 | Status: SHIPPED | OUTPATIENT
Start: 2022-08-12 | End: 2023-09-25

## 2022-08-12 NOTE — TELEPHONE ENCOUNTER
No new care gaps identified.  [facility] Embedded Care Gaps. Reference number: [id number]. 8/12/2022   3:12:11 PM CDT

## 2022-08-12 NOTE — TELEPHONE ENCOUNTER
----- Message from Marisol Kirkland sent at 8/12/2022  2:18 PM CDT -----  Type:  RX Refill Request    Who Called: toro Gramajo  Refill or New Rx refill  RX Name and Strength:Rosuvastatin 5mg  How is the patient currently taking it? (ex. 1XDay):1xday  Is this a 30 day or 90 day RX:90day  Preferred Pharmacy with phone number:[facility] [address]  Local or Mail Order:local  Ordering Provider:Dr will  Would the patient rather a call back or a response via [facility]? Call back  Best Call Back Number: [phone]  Additional Information: na

## 2022-09-07 ENCOUNTER — LAB VISIT (OUTPATIENT)
Dept: LAB | Facility: HOSPITAL | Age: 87
End: 2022-09-07
Attending: UROLOGY
Payer: MEDICARE

## 2022-09-07 ENCOUNTER — OFFICE VISIT (OUTPATIENT)
Dept: UROLOGY | Facility: CLINIC | Age: 87
End: 2022-09-07
Payer: MEDICARE

## 2022-09-07 VITALS
SYSTOLIC BLOOD PRESSURE: 146 MMHG | HEART RATE: 55 BPM | BODY MASS INDEX: 21.81 KG/M2 | WEIGHT: 152.31 LBS | DIASTOLIC BLOOD PRESSURE: 70 MMHG | HEIGHT: 70 IN

## 2022-09-07 DIAGNOSIS — R97.20 ELEVATED PROSTATE SPECIFIC ANTIGEN (PSA): ICD-10-CM

## 2022-09-07 DIAGNOSIS — N40.1 BENIGN PROSTATIC HYPERPLASIA WITH URINARY FREQUENCY: ICD-10-CM

## 2022-09-07 DIAGNOSIS — R97.20 ELEVATED PROSTATE SPECIFIC ANTIGEN (PSA): Primary | ICD-10-CM

## 2022-09-07 DIAGNOSIS — R35.0 BENIGN PROSTATIC HYPERPLASIA WITH URINARY FREQUENCY: ICD-10-CM

## 2022-09-07 LAB — COMPLEXED PSA SERPL-MCNC: 12.3 NG/ML (ref 0–4)

## 2022-09-07 PROCEDURE — 1159F PR MEDICATION LIST DOCUMENTED IN MEDICAL RECORD: ICD-10-PCS | Mod: CPTII,S$GLB,, | Performed by: UROLOGY

## 2022-09-07 PROCEDURE — 99213 PR OFFICE/OUTPT VISIT, EST, LEVL III, 20-29 MIN: ICD-10-PCS | Mod: S$GLB,,, | Performed by: UROLOGY

## 2022-09-07 PROCEDURE — 1159F MED LIST DOCD IN RCRD: CPT | Mod: CPTII,S$GLB,, | Performed by: UROLOGY

## 2022-09-07 PROCEDURE — 99999 PR PBB SHADOW E&M-EST. PATIENT-LVL IV: ICD-10-PCS | Mod: PBBFAC,,, | Performed by: UROLOGY

## 2022-09-07 PROCEDURE — 1126F PR PAIN SEVERITY QUANTIFIED, NO PAIN PRESENT: ICD-10-PCS | Mod: CPTII,S$GLB,, | Performed by: UROLOGY

## 2022-09-07 PROCEDURE — 99213 OFFICE O/P EST LOW 20 MIN: CPT | Mod: S$GLB,,, | Performed by: UROLOGY

## 2022-09-07 PROCEDURE — 36415 COLL VENOUS BLD VENIPUNCTURE: CPT | Performed by: UROLOGY

## 2022-09-07 PROCEDURE — 3288F PR FALLS RISK ASSESSMENT DOCUMENTED: ICD-10-PCS | Mod: CPTII,S$GLB,, | Performed by: UROLOGY

## 2022-09-07 PROCEDURE — 3288F FALL RISK ASSESSMENT DOCD: CPT | Mod: CPTII,S$GLB,, | Performed by: UROLOGY

## 2022-09-07 PROCEDURE — 1101F PT FALLS ASSESS-DOCD LE1/YR: CPT | Mod: CPTII,S$GLB,, | Performed by: UROLOGY

## 2022-09-07 PROCEDURE — 1101F PR PT FALLS ASSESS DOC 0-1 FALLS W/OUT INJ PAST YR: ICD-10-PCS | Mod: CPTII,S$GLB,, | Performed by: UROLOGY

## 2022-09-07 PROCEDURE — 99999 PR PBB SHADOW E&M-EST. PATIENT-LVL IV: CPT | Mod: PBBFAC,,, | Performed by: UROLOGY

## 2022-09-07 PROCEDURE — 1160F RVW MEDS BY RX/DR IN RCRD: CPT | Mod: CPTII,S$GLB,, | Performed by: UROLOGY

## 2022-09-07 PROCEDURE — 84153 ASSAY OF PSA TOTAL: CPT | Performed by: UROLOGY

## 2022-09-07 PROCEDURE — 1126F AMNT PAIN NOTED NONE PRSNT: CPT | Mod: CPTII,S$GLB,, | Performed by: UROLOGY

## 2022-09-07 PROCEDURE — 1160F PR REVIEW ALL MEDS BY PRESCRIBER/CLIN PHARMACIST DOCUMENTED: ICD-10-PCS | Mod: CPTII,S$GLB,, | Performed by: UROLOGY

## 2022-09-07 RX ORDER — DUTASTERIDE 0.5 MG/1
0.5 CAPSULE, LIQUID FILLED ORAL DAILY
Qty: 90 CAPSULE | Refills: 3 | Status: SHIPPED | OUTPATIENT
Start: 2022-09-07 | End: 2023-10-17

## 2022-09-07 NOTE — PROGRESS NOTES
Chief Complaint:   Encounter Diagnoses   Name Primary?    Elevated prostate specific antigen (PSA) Yes    Benign prostatic hyperplasia with urinary frequency        HPI:    9/7/22- here today to discuss his PSA, of note avodart has assisted his voiding.  88-year-old gentleman who comes in with an elevated PSA.  Patient states that he had attempted tamsulosin once before, because severe decrease in blood pressure and had stop this medication.  Although upon further questioning he does not get up at all per night to void, except occasionally 1 time.  He goes about every 2-3 hours during the daytime, with no urgency or incontinence.  He states that he has a good stream, with no lower urinary tract symptoms.  No gross hematuria, he has never been a smoker.  No previous urological history.  He has a remote history of a biopsy done in the 80s or 90s, which he states was negative.  No family history of urological cancers or stones.  He does have history of stone passage, but this was years ago.  He is concerned about his PSA.    Allergies:  Shellfish containing products, Tramadol, Amoxicillin, and Iodine and iodide containing products    Medications:  has a current medication list which includes the following prescription(s): amlodipine, bisacodyl, diazepam, fluorouracil, fluticasone propionate, ketoconazole, lidocaine viscous, meclizine, oxycodone-acetaminophen, pantoprazole, polyethylene glycol, rosuvastatin, sulfamethoxazole-trimethoprim 800-160mg, tramadol-acetaminophen 37.5-325 mg, and triamcinolone acetonide 0.1%.    Review of Systems:  General: No fever, chills, fatigability, or weight loss.  Skin: No rashes, itching, or changes in color or texture of skin.  Chest: Denies FOSTER, cyanosis, wheezing, cough, and sputum production.  Abdomen: Appetite fine. No weight loss. Denies diarrhea, abdominal pain, hematemesis, or blood in stool.  Musculoskeletal: No joint stiffness or swelling. Denies back pain.  : As above.  All  other review of systems negative.    PMH:   has a past medical history of Abnormal exercise tolerance test (7/25/2013), Arthritis, Colon polyp, Diverticulosis, Dyslipidemia (7/25/2013), GERD (gastroesophageal reflux disease), HTN, goal below 130/80 (12/3/2018), Hyperlipidemia (1980), Knee pain, right (6/14/2013), Low back pain with right-sided sciatica (12/3/2018), Meningioma, Personal history of colonic polyps (5/27/2014), RLS (restless legs syndrome) (6/14/2013), Tobacco dependence, and West Nile meningitis (2010).    PSH:   has a past surgical history that includes Appendectomy; Colonoscopy (2/2009); Upper gastrointestinal endoscopy (2/2009); Joint replacement; Back surgery (Bilateral, 2016); Spine surgery; Lumbar paravertebral facet joint nerve block (Bilateral, 8/29/2019); Eye surgery; Cataract extraction, bilateral; Selective injection of anesthetic agent around lumbar spinal nerve root by transforaminal approach (Bilateral, 11/8/2021); and Selective injection of anesthetic agent around lumbar spinal nerve root by transforaminal approach (Bilateral, 1/4/2022).    FamHx: family history includes Cancer in his son; Diabetes in his maternal uncle; Heart disease in his mother.    SocHx:  reports that he quit smoking about 32 years ago. He has a 25.00 pack-year smoking history. He has never used smokeless tobacco. He reports that he does not drink alcohol and does not use drugs.      Physical Exam:  There were no vitals filed for this visit.  General: A&Ox3, no apparent distress, no deformities  Neck: No masses, normal ROM  Lungs: normal inspiration, no use of accessory muscles  Heart: normal pulse, no arrhythmias  Abdomen: Soft, NT, ND, no masses, no hernias, no hepatosplenomegaly  Skin: The skin is warm and dry. No jaundice.  Ext: No c/c/e.  CARMENZA: 1/22- Normal rectal tone. Prost 45 gm with a 1 cm right nodule. SV not palpable. Perineum and anus normal.    Labs/Studies:   pnbx negative 112g 1/14/22  PSA pending  PSA  29.3 1/22  PSA 14.3 6/21  PSA 21.5 1/21  PSA 13.4 1/20  PSA 9.0 10/18    Impression/Plan:      1. Elevated PSA- PSA is pending, will proceed as appropriate.  Have already arranged to repeat in 6 months and then would proceed yearly if stable.    2. BPH- avodart has done well and will continue.  Of note tamsulosin caused vertigo and had to be stopped.

## 2022-09-08 ENCOUNTER — TELEPHONE (OUTPATIENT)
Dept: UROLOGY | Facility: CLINIC | Age: 87
End: 2022-09-08
Payer: MEDICARE

## 2022-09-08 NOTE — TELEPHONE ENCOUNTER
Reached out to pt regarding his message, notified him that Per Dr. Rodriguez, his PSA appears to be stable. Pt vu

## 2022-09-13 ENCOUNTER — OFFICE VISIT (OUTPATIENT)
Dept: FAMILY MEDICINE | Facility: CLINIC | Age: 87
End: 2022-09-13
Payer: MEDICARE

## 2022-09-13 ENCOUNTER — LAB VISIT (OUTPATIENT)
Dept: LAB | Facility: HOSPITAL | Age: 87
End: 2022-09-13
Attending: FAMILY MEDICINE
Payer: MEDICARE

## 2022-09-13 VITALS
HEIGHT: 70 IN | OXYGEN SATURATION: 96 % | WEIGHT: 149.94 LBS | DIASTOLIC BLOOD PRESSURE: 60 MMHG | HEART RATE: 58 BPM | SYSTOLIC BLOOD PRESSURE: 114 MMHG | TEMPERATURE: 98 F | BODY MASS INDEX: 21.47 KG/M2

## 2022-09-13 DIAGNOSIS — M17.11 OSTEOARTHROSIS, LOCALIZED, PRIMARY, KNEE, RIGHT: ICD-10-CM

## 2022-09-13 DIAGNOSIS — R55 PRE-SYNCOPE: ICD-10-CM

## 2022-09-13 DIAGNOSIS — R55 PRE-SYNCOPE: Primary | ICD-10-CM

## 2022-09-13 LAB
ALBUMIN SERPL BCP-MCNC: 3.9 G/DL (ref 3.5–5.2)
ALP SERPL-CCNC: 60 U/L (ref 55–135)
ALT SERPL W/O P-5'-P-CCNC: 10 U/L (ref 10–44)
ANION GAP SERPL CALC-SCNC: 5 MMOL/L (ref 8–16)
AST SERPL-CCNC: 17 U/L (ref 10–40)
BASOPHILS # BLD AUTO: 0.04 K/UL (ref 0–0.2)
BASOPHILS NFR BLD: 0.7 % (ref 0–1.9)
BILIRUB SERPL-MCNC: 0.5 MG/DL (ref 0.1–1)
BUN SERPL-MCNC: 13 MG/DL (ref 8–23)
CALCIUM SERPL-MCNC: 9.6 MG/DL (ref 8.7–10.5)
CHLORIDE SERPL-SCNC: 105 MMOL/L (ref 95–110)
CO2 SERPL-SCNC: 28 MMOL/L (ref 23–29)
CREAT SERPL-MCNC: 0.8 MG/DL (ref 0.5–1.4)
DIFFERENTIAL METHOD: ABNORMAL
EOSINOPHIL # BLD AUTO: 0.1 K/UL (ref 0–0.5)
EOSINOPHIL NFR BLD: 2.2 % (ref 0–8)
ERYTHROCYTE [DISTWIDTH] IN BLOOD BY AUTOMATED COUNT: 13.5 % (ref 11.5–14.5)
EST. GFR  (NO RACE VARIABLE): >60 ML/MIN/1.73 M^2
GLUCOSE SERPL-MCNC: 101 MG/DL (ref 70–110)
HCT VFR BLD AUTO: 38.9 % (ref 40–54)
HGB BLD-MCNC: 13.2 G/DL (ref 14–18)
IMM GRANULOCYTES # BLD AUTO: 0.01 K/UL (ref 0–0.04)
IMM GRANULOCYTES NFR BLD AUTO: 0.2 % (ref 0–0.5)
LYMPHOCYTES # BLD AUTO: 1.6 K/UL (ref 1–4.8)
LYMPHOCYTES NFR BLD: 29 % (ref 18–48)
MCH RBC QN AUTO: 31.7 PG (ref 27–31)
MCHC RBC AUTO-ENTMCNC: 33.9 G/DL (ref 32–36)
MCV RBC AUTO: 94 FL (ref 82–98)
MONOCYTES # BLD AUTO: 0.6 K/UL (ref 0.3–1)
MONOCYTES NFR BLD: 11.3 % (ref 4–15)
NEUTROPHILS # BLD AUTO: 3.1 K/UL (ref 1.8–7.7)
NEUTROPHILS NFR BLD: 56.6 % (ref 38–73)
NRBC BLD-RTO: 0 /100 WBC
PLATELET # BLD AUTO: 237 K/UL (ref 150–450)
PMV BLD AUTO: 10.3 FL (ref 9.2–12.9)
POTASSIUM SERPL-SCNC: 4.8 MMOL/L (ref 3.5–5.1)
PROT SERPL-MCNC: 6.7 G/DL (ref 6–8.4)
RBC # BLD AUTO: 4.16 M/UL (ref 4.6–6.2)
SODIUM SERPL-SCNC: 138 MMOL/L (ref 136–145)
WBC # BLD AUTO: 5.51 K/UL (ref 3.9–12.7)

## 2022-09-13 PROCEDURE — 85025 COMPLETE CBC W/AUTO DIFF WBC: CPT | Performed by: FAMILY MEDICINE

## 2022-09-13 PROCEDURE — 3288F PR FALLS RISK ASSESSMENT DOCUMENTED: ICD-10-PCS | Mod: CPTII,S$GLB,, | Performed by: FAMILY MEDICINE

## 2022-09-13 PROCEDURE — 36415 COLL VENOUS BLD VENIPUNCTURE: CPT | Mod: PO | Performed by: FAMILY MEDICINE

## 2022-09-13 PROCEDURE — 20610 DRAIN/INJ JOINT/BURSA W/O US: CPT | Mod: RT,S$GLB,, | Performed by: FAMILY MEDICINE

## 2022-09-13 PROCEDURE — 99214 OFFICE O/P EST MOD 30 MIN: CPT | Mod: 25,S$GLB,, | Performed by: FAMILY MEDICINE

## 2022-09-13 PROCEDURE — 99214 PR OFFICE/OUTPT VISIT, EST, LEVL IV, 30-39 MIN: ICD-10-PCS | Mod: 25,S$GLB,, | Performed by: FAMILY MEDICINE

## 2022-09-13 PROCEDURE — 1159F MED LIST DOCD IN RCRD: CPT | Mod: CPTII,S$GLB,, | Performed by: FAMILY MEDICINE

## 2022-09-13 PROCEDURE — 99999 PR PBB SHADOW E&M-EST. PATIENT-LVL IV: ICD-10-PCS | Mod: PBBFAC,,, | Performed by: FAMILY MEDICINE

## 2022-09-13 PROCEDURE — 1101F PT FALLS ASSESS-DOCD LE1/YR: CPT | Mod: CPTII,S$GLB,, | Performed by: FAMILY MEDICINE

## 2022-09-13 PROCEDURE — 20610 PR DRAIN/INJECT LARGE JOINT/BURSA: ICD-10-PCS | Mod: RT,S$GLB,, | Performed by: FAMILY MEDICINE

## 2022-09-13 PROCEDURE — 3288F FALL RISK ASSESSMENT DOCD: CPT | Mod: CPTII,S$GLB,, | Performed by: FAMILY MEDICINE

## 2022-09-13 PROCEDURE — 1159F PR MEDICATION LIST DOCUMENTED IN MEDICAL RECORD: ICD-10-PCS | Mod: CPTII,S$GLB,, | Performed by: FAMILY MEDICINE

## 2022-09-13 PROCEDURE — 1101F PR PT FALLS ASSESS DOC 0-1 FALLS W/OUT INJ PAST YR: ICD-10-PCS | Mod: CPTII,S$GLB,, | Performed by: FAMILY MEDICINE

## 2022-09-13 PROCEDURE — 99999 PR PBB SHADOW E&M-EST. PATIENT-LVL IV: CPT | Mod: PBBFAC,,, | Performed by: FAMILY MEDICINE

## 2022-09-13 PROCEDURE — 80053 COMPREHEN METABOLIC PANEL: CPT | Performed by: FAMILY MEDICINE

## 2022-09-13 RX ORDER — METHYLPREDNISOLONE ACETATE 40 MG/ML
40 INJECTION, SUSPENSION INTRA-ARTICULAR; INTRALESIONAL; INTRAMUSCULAR; SOFT TISSUE
Status: COMPLETED | OUTPATIENT
Start: 2022-09-13 | End: 2022-09-13

## 2022-09-13 RX ADMIN — METHYLPREDNISOLONE ACETATE 40 MG: 40 INJECTION, SUSPENSION INTRA-ARTICULAR; INTRALESIONAL; INTRAMUSCULAR; SOFT TISSUE at 02:09

## 2022-09-13 NOTE — PROGRESS NOTES
"  Chief Complaint:    Chief Complaint   Patient presents with    Dizziness       History of Present Illness:  Patient with HTN presents today for dizziness.    Had an episode of blurry vision, near-syncope while sitting down. Says it felt like he was going "blind". Denies palpitations or chest pain. He did have pain in his forehead. His blood pressure was 125/75 when the episode occurred. Didn't check his sugar. He took Pepto Bismol and his symptoms started fading. Symptoms returned the next day. He has a heart murmur. His last echocardiogram showed that one of his valves was tightening.   Reports R knee pain, will see Dr. Roberson on 10/31 for a shot. Wants to know if he can get something sooner. Dr. Sadler did an injection over one year ago. His knee can give out if he's been sitting for a while.       ROS:  Review of Systems   Constitutional:  Negative for appetite change, chills and fever.   HENT:  Negative for congestion, ear pain, postnasal drip, rhinorrhea, sinus pressure and sinus pain.    Eyes:  Positive for visual disturbance. Negative for pain.   Respiratory:  Negative for cough, chest tightness and shortness of breath.    Cardiovascular:  Negative for chest pain and palpitations.   Gastrointestinal:  Negative for abdominal pain, blood in stool, constipation, diarrhea and nausea.   Genitourinary:  Negative for difficulty urinating, dysuria, flank pain and hematuria.   Musculoskeletal:  Positive for arthralgias and myalgias.   Skin:  Negative for pallor and wound.   Neurological:  Positive for dizziness and light-headedness. Negative for tremors, speech difficulty and headaches.   Psychiatric/Behavioral:  Negative for behavioral problems, dysphoric mood and sleep disturbance. The patient is not nervous/anxious.    All other systems reviewed and are negative.    Past Medical History:   Diagnosis Date    Abnormal exercise tolerance test 7/25/2013    Arthritis     Colon polyp     Diverticulosis     Dyslipidemia " "2013    GERD (gastroesophageal reflux disease)     HTN, goal below 130/80 12/3/2018    Hyperlipidemia 1980    HIGH TG    Knee pain, right 2013    Low back pain with right-sided sciatica 12/3/2018    Meningioma     Personal history of colonic polyps 2014    RLS (restless legs syndrome) 2013    Tobacco dependence     resolved    West Nile meningitis     per patient       Social History:  Social History     Socioeconomic History    Marital status:    Tobacco Use    Smoking status: Former     Packs/day: 1.00     Years: 25.00     Pack years: 25.00     Types: Cigarettes     Quit date: 1990     Years since quittin.7    Smokeless tobacco: Never   Substance and Sexual Activity    Alcohol use: No     Alcohol/week: 0.0 standard drinks    Drug use: No    Sexual activity: Not Currently     Birth control/protection: None       Family History:   family history includes Cancer in his son; Diabetes in his maternal uncle; Heart disease in his mother.    Health Maintenance   Topic Date Due    Lipid Panel  2023    TETANUS VACCINE  2025       Physical Exam:    Vital Signs  Temp: 97.7 °F (36.5 °C)  Temp src: Temporal  Pulse: (!) 58  SpO2: 96 %  BP: 114/60  BP Location: Left arm  Patient Position: Sitting  Height and Weight  Height: 5' 10" (177.8 cm)  Weight: 68 kg (149 lb 14.6 oz)  BSA (Calculated - sq m): 1.83 sq meters  BMI (Calculated): 21.5  Weight in (lb) to have BMI = 25: 173.9]    Body mass index is 21.51 kg/m².    Physical Exam  Vitals and nursing note reviewed.   Constitutional:       Appearance: Normal appearance. He is well-developed.   HENT:      Head: Normocephalic and atraumatic.      Right Ear: Tympanic membrane normal.      Left Ear: Tympanic membrane normal. Tympanic membrane is not injected, scarred or perforated.      Ears:      Comments: Bilateral hearing aids  Eyes:      Extraocular Movements: Extraocular movements intact.      Conjunctiva/sclera: Conjunctivae " normal.      Pupils: Pupils are equal, round, and reactive to light.   Cardiovascular:      Rate and Rhythm: Normal rate and regular rhythm.      Pulses: Normal pulses.      Heart sounds: Murmur heard.     No gallop.   Pulmonary:      Effort: Pulmonary effort is normal. No respiratory distress.      Breath sounds: Normal breath sounds. No wheezing, rhonchi or rales.   Chest:      Chest wall: No tenderness.   Abdominal:      General: There is no distension.      Palpations: Abdomen is soft. There is no mass.      Tenderness: There is no abdominal tenderness. There is no guarding.   Musculoskeletal:         General: No swelling, deformity or signs of injury. Normal range of motion.      Cervical back: Normal range of motion and neck supple.      Right knee: Tenderness present.        Legs:    Lymphadenopathy:      Cervical: No cervical adenopathy.   Skin:     General: Skin is warm and dry.      Capillary Refill: Capillary refill takes less than 2 seconds.      Coloration: Skin is not jaundiced or pale.   Neurological:      General: No focal deficit present.      Mental Status: He is alert and oriented to person, place, and time.      Motor: Motor function is intact.      Coordination: Finger-Nose-Finger Test normal.      Gait: Gait is intact.      Deep Tendon Reflexes: Reflexes are normal and symmetric.      Comments: Ambulates with walker. He can't stand up well, says it's nothing new.   Psychiatric:         Mood and Affect: Mood normal.         Behavior: Behavior normal.         Thought Content: Thought content normal.         Judgment: Judgment normal.       Lab Results   Component Value Date    CHOL 151 07/05/2022    CHOL 143 01/03/2022    CHOL 145 06/28/2021    TRIG 192 (H) 07/05/2022    TRIG 171 (H) 01/03/2022    TRIG 137 06/28/2021    HDL 58 07/05/2022    HDL 51 01/03/2022    HDL 60 06/28/2021    TOTALCHOLEST 2.6 07/05/2022    TOTALCHOLEST 2.8 01/03/2022    TOTALCHOLEST 2.4 06/28/2021    NONHDLCHOL 93  07/05/2022    NONHDLCHOL 92 01/03/2022    NONHDLCHOL 85 06/28/2021       Lab Results   Component Value Date    HGBA1C 5.6 01/22/2021       Assessment:      ICD-10-CM ICD-9-CM   1. Pre-syncope  R55 780.2   2. Osteoarthrosis, localized, primary, knee, right  M17.11 715.16     Plan:  Order Cardiac Monitor - 3-15 Day Adult (Cupid Only) and Echo for pre-syncope.  Previous echo had shown aortic stenosis will get an echo to see if there is progression of that which could be explaining his symptoms  Check labs  Administered steroid injection to R knee. Massage area and apply ice after injection. Avoid heavy lifting.  Follow up with Dr. Roberson on 10/31 at 1:00 PM.   See labs below.     Offered steroid injection, risk of steroid injection discussed with the patient which include but not limited to,  1.  Infection  2.  Bleeding  3.  Tendon disruption  4.  Elevation of blood sugar  5.  Fat atrophy  6.  Worsening pain and other unforeseen complication.  Treatment alternatives were also discussed, patient likes to proceed with the steroid injection.  Depo-Medrol 40 mg and lidocaine 2% without epi Cc was used for injection, and the area was identified prepped and injection done sterile condition, patient tolerated procedure well.  Injection given in the right knee  Orders Placed This Encounter   Procedures    CBC Auto Differential    Comprehensive Metabolic Panel    Cardiac Monitor - 3-15 Day Adult (Cupid Only)    Echo       Current Outpatient Medications   Medication Sig Dispense Refill    amLODIPine (NORVASC) 2.5 MG tablet Take 1 tablet (2.5 mg total) by mouth once daily. 90 tablet 3    dutasteride (AVODART) 0.5 mg capsule Take 1 capsule (0.5 mg total) by mouth once daily. 90 capsule 3    fluorouraciL (EFUDEX) 5 % cream AAA scalp and temples bid x 2 weeks, then AAA of both forearms bid x 4 weeks 40 g 1    fluticasone propionate (CUTIVATE) 0.05 % cream       ketoconazole (NIZORAL) 2 % cream       LIDOcaine HCl 2% (XYLOCAINE) 2 %  Soln APPLY 5 ML  EVERY 6 HOURS 100 mL 0    pantoprazole (PROTONIX) 40 MG tablet Take 1 tablet (40 mg total) by mouth once daily. 90 tablet 3    polyethylene glycol (GLYCOLAX) 17 gram PwPk Take 17 g by mouth once daily. 100 each 6    rosuvastatin (CRESTOR) 5 MG tablet Take 1 tablet (5 mg total) by mouth once daily. 90 tablet 3    triamcinolone acetonide 0.1% (KENALOG) 0.1 % cream Apply topically 2 (two) times daily. 454 g 3    bisacodyL (DULCOLAX) 5 mg EC tablet Take 5 mg by mouth daily as needed for Constipation.      diazePAM (VALIUM) 5 MG tablet Take 1 tablet (5 mg total) by mouth once. for 1 dose (Patient not taking: Reported on 9/13/2022) 1 tablet 0    meclizine (ANTIVERT) 25 mg tablet Take 1 tablet by mouth twice daily as needed (Patient not taking: Reported on 9/13/2022) 90 tablet 0    meloxicam (MOBIC) 7.5 MG tablet Take 1 tablet by mouth once daily (Patient not taking: Reported on 9/13/2022) 30 tablet 0    omeprazole (PRILOSEC) 40 MG capsule Take 1 capsule (40 mg total) by mouth every morning. (Patient not taking: Reported on 9/13/2022) 90 capsule 3    oxyCODONE-acetaminophen (PERCOCET) 5-325 mg per tablet Take 1 tablet by mouth every 4 (four) hours as needed for Pain. (Patient not taking: Reported on 9/13/2022) 10 tablet 0    sulfamethoxazole-trimethoprim 800-160mg (BACTRIM DS) 800-160 mg Tab Take 1 tablet by mouth 2 (two) times daily. (Patient not taking: Reported on 9/13/2022) 10 tablet 0    traMADoL (ULTRAM) 50 mg tablet Take 1 tablet (50 mg total) by mouth every 24 hours as needed for Pain. (Patient not taking: Reported on 9/13/2022) 30 tablet 1    tramadol-acetaminophen 37.5-325 mg (ULTRACET) 37.5-325 mg Tab Take 1 tablet by mouth every 12 (twelve) hours as needed for Pain. (Patient not taking: Reported on 9/13/2022) 30 tablet 0     No current facility-administered medications for this visit.       There are no discontinued medications.    No follow-ups on file.      MD Nagi Napieribe  Attestation:   I, Petty Dasilva, am scribing for, and in the presence of, Dr.Arif Davis I performed the above scribed service and the documentation accurately describes the services I performed. I attest to the accuracy of the note.    I, Dr. Madie Davis, reviewed documentation as scribed above. I performed the services described in this documentation.  I agree that the record reflects my personal performance and is accurate and complete. Madie Davis MD.  09/13/2022

## 2022-09-16 ENCOUNTER — HOSPITAL ENCOUNTER (OUTPATIENT)
Dept: CARDIOLOGY | Facility: HOSPITAL | Age: 87
Discharge: HOME | End: 2022-09-16
Attending: FAMILY MEDICINE
Payer: MEDICARE

## 2022-09-16 VITALS
WEIGHT: 149 LBS | DIASTOLIC BLOOD PRESSURE: 60 MMHG | SYSTOLIC BLOOD PRESSURE: 114 MMHG | BODY MASS INDEX: 21.33 KG/M2 | HEIGHT: 70 IN

## 2022-09-16 DIAGNOSIS — R55 PRE-SYNCOPE: ICD-10-CM

## 2022-09-16 PROCEDURE — 93306 TTE W/DOPPLER COMPLETE: CPT

## 2022-09-16 PROCEDURE — 93306 TTE W/DOPPLER COMPLETE: CPT | Mod: 26,,, | Performed by: INTERNAL MEDICINE

## 2022-09-16 PROCEDURE — 93306 ECHO (CUPID ONLY): ICD-10-PCS | Mod: 26,,, | Performed by: INTERNAL MEDICINE

## 2022-09-17 LAB
AORTIC ROOT ANNULUS: 3.15 CM
ASCENDING AORTA: 3.92 CM
AV INDEX (PROSTH): 0.65
AV MEAN GRADIENT: 4 MMHG
AV PEAK GRADIENT: 8 MMHG
AV REGURGITATION PRESSURE HALF TIME: 882.15 MS
AV VALVE AREA: 2.42 CM2
AV VELOCITY RATIO: 0.63
BSA FOR ECHO PROCEDURE: 1.83 M2
CV ECHO LV RWT: 0.46 CM
DOP CALC AO PEAK VEL: 1.42 M/S
DOP CALC AO VTI: 33.7 CM
DOP CALC LVOT AREA: 3.7 CM2
DOP CALC LVOT DIAMETER: 2.18 CM
DOP CALC LVOT PEAK VEL: 0.9 M/S
DOP CALC LVOT STROKE VOLUME: 81.7 CM3
DOP CALC RVOT PEAK VEL: 0.64 M/S
DOP CALC RVOT VTI: 16.3 CM
DOP CALCLVOT PEAK VEL VTI: 21.9 CM
E WAVE DECELERATION TIME: 244.34 MSEC
E/A RATIO: 0.73
E/E' RATIO: 13.09 M/S
ECHO LV POSTERIOR WALL: 1.08 CM (ref 0.6–1.1)
EJECTION FRACTION: 55 %
FRACTIONAL SHORTENING: 40 % (ref 28–44)
INTERVENTRICULAR SEPTUM: 1.05 CM (ref 0.6–1.1)
IVC DIAMETER: 1.44 CM
IVRT: 114.18 MSEC
LA MAJOR: 6.21 CM
LA MINOR: 6.62 CM
LEFT ATRIUM SIZE: 3.88 CM
LEFT ATRIUM VOLUME INDEX MOD: 19.9 ML/M2
LEFT ATRIUM VOLUME MOD: 36.67 CM3
LEFT INTERNAL DIMENSION IN SYSTOLE: 2.81 CM (ref 2.1–4)
LEFT VENTRICLE DIASTOLIC VOLUME INDEX: 54.97 ML/M2
LEFT VENTRICLE DIASTOLIC VOLUME: 101.14 ML
LEFT VENTRICLE MASS INDEX: 97 G/M2
LEFT VENTRICLE SYSTOLIC VOLUME INDEX: 16.1 ML/M2
LEFT VENTRICLE SYSTOLIC VOLUME: 29.69 ML
LEFT VENTRICULAR INTERNAL DIMENSION IN DIASTOLE: 4.68 CM (ref 3.5–6)
LEFT VENTRICULAR MASS: 178.08 G
LV LATERAL E/E' RATIO: 10.29 M/S
LV SEPTAL E/E' RATIO: 18 M/S
LVOT MG: 1.72 MMHG
LVOT MV: 0.61 CM/S
MV PEAK A VEL: 0.98 M/S
MV PEAK E VEL: 0.72 M/S
PISA AR MAX VEL: 3.53 M/S
PISA TR MAX VEL: 2.93 M/S
PV MEAN GRADIENT: 0.93 MMHG
PV PEAK VELOCITY: 0.77 CM/S
RA MAJOR: 6.3 CM
RA PRESSURE: 3 MMHG
RA WIDTH: 3.24 CM
RIGHT VENTRICULAR END-DIASTOLIC DIMENSION: 3.65 CM
SINUS: 3.8 CM
STJ: 3.81 CM
TDI LATERAL: 0.07 M/S
TDI SEPTAL: 0.04 M/S
TDI: 0.06 M/S
TR MAX PG: 34 MMHG
TRICUSPID ANNULAR PLANE SYSTOLIC EXCURSION: 1.87 CM
TV REST PULMONARY ARTERY PRESSURE: 37 MMHG

## 2022-09-27 ENCOUNTER — OFFICE VISIT (OUTPATIENT)
Dept: FAMILY MEDICINE | Facility: CLINIC | Age: 87
End: 2022-09-27
Payer: MEDICARE

## 2022-09-27 VITALS
HEART RATE: 71 BPM | DIASTOLIC BLOOD PRESSURE: 80 MMHG | BODY MASS INDEX: 21.74 KG/M2 | TEMPERATURE: 98 F | SYSTOLIC BLOOD PRESSURE: 125 MMHG | OXYGEN SATURATION: 97 % | WEIGHT: 151.88 LBS | HEIGHT: 70 IN

## 2022-09-27 DIAGNOSIS — R55 POSTURAL DIZZINESS WITH PRESYNCOPE: Primary | ICD-10-CM

## 2022-09-27 DIAGNOSIS — R42 POSTURAL DIZZINESS WITH PRESYNCOPE: Primary | ICD-10-CM

## 2022-09-27 DIAGNOSIS — M54.16 LUMBAR RADICULOPATHY: ICD-10-CM

## 2022-09-27 DIAGNOSIS — R29.6 RECURRENT FALLS WHILE WALKING: ICD-10-CM

## 2022-09-27 PROCEDURE — 99999 PR PBB SHADOW E&M-EST. PATIENT-LVL IV: ICD-10-PCS | Mod: PBBFAC,,, | Performed by: FAMILY MEDICINE

## 2022-09-27 PROCEDURE — 90694 VACC AIIV4 NO PRSRV 0.5ML IM: CPT | Mod: S$GLB,,, | Performed by: FAMILY MEDICINE

## 2022-09-27 PROCEDURE — 1159F PR MEDICATION LIST DOCUMENTED IN MEDICAL RECORD: ICD-10-PCS | Mod: CPTII,S$GLB,, | Performed by: FAMILY MEDICINE

## 2022-09-27 PROCEDURE — 1100F PTFALLS ASSESS-DOCD GE2>/YR: CPT | Mod: CPTII,S$GLB,, | Performed by: FAMILY MEDICINE

## 2022-09-27 PROCEDURE — 1159F MED LIST DOCD IN RCRD: CPT | Mod: CPTII,S$GLB,, | Performed by: FAMILY MEDICINE

## 2022-09-27 PROCEDURE — 99999 PR PBB SHADOW E&M-EST. PATIENT-LVL IV: CPT | Mod: PBBFAC,,, | Performed by: FAMILY MEDICINE

## 2022-09-27 PROCEDURE — 90694 FLU VACCINE - QUADRIVALENT - ADJUVANTED: ICD-10-PCS | Mod: S$GLB,,, | Performed by: FAMILY MEDICINE

## 2022-09-27 PROCEDURE — G0008 ADMIN INFLUENZA VIRUS VAC: HCPCS | Mod: S$GLB,,, | Performed by: FAMILY MEDICINE

## 2022-09-27 PROCEDURE — 1100F PR PT FALLS ASSESS DOC 2+ FALLS/FALL W/INJURY/YR: ICD-10-PCS | Mod: CPTII,S$GLB,, | Performed by: FAMILY MEDICINE

## 2022-09-27 PROCEDURE — 3288F PR FALLS RISK ASSESSMENT DOCUMENTED: ICD-10-PCS | Mod: CPTII,S$GLB,, | Performed by: FAMILY MEDICINE

## 2022-09-27 PROCEDURE — 1125F AMNT PAIN NOTED PAIN PRSNT: CPT | Mod: CPTII,S$GLB,, | Performed by: FAMILY MEDICINE

## 2022-09-27 PROCEDURE — G0008 FLU VACCINE - QUADRIVALENT - ADJUVANTED: ICD-10-PCS | Mod: S$GLB,,, | Performed by: FAMILY MEDICINE

## 2022-09-27 PROCEDURE — 99214 PR OFFICE/OUTPT VISIT, EST, LEVL IV, 30-39 MIN: ICD-10-PCS | Mod: S$GLB,,, | Performed by: FAMILY MEDICINE

## 2022-09-27 PROCEDURE — 99214 OFFICE O/P EST MOD 30 MIN: CPT | Mod: S$GLB,,, | Performed by: FAMILY MEDICINE

## 2022-09-27 PROCEDURE — 3288F FALL RISK ASSESSMENT DOCD: CPT | Mod: CPTII,S$GLB,, | Performed by: FAMILY MEDICINE

## 2022-09-27 PROCEDURE — 1125F PR PAIN SEVERITY QUANTIFIED, PAIN PRESENT: ICD-10-PCS | Mod: CPTII,S$GLB,, | Performed by: FAMILY MEDICINE

## 2022-09-27 NOTE — PROGRESS NOTES
"  Chief Complaint:    Chief Complaint   Patient presents with    Knee Pain    SWELLING FROM FALL     Diss. meds       History of Present Illness:  09/27/2022:-  Patient with HTN presents today for a fall three days ago    He reports swelling in his R knee that has now subsided since he's been icing it. He didn't come to the office because it was closed so he waited until today. Previous history of cortisone injection.   He says his spells occur mostly after eating and there momentary and then goes away and but can come back again.  Also wants clarifications on his medications. They cancelled his Holter monitor because his echo was normal. His dizzy spells would occur after eating so he stopped Crestor and dutasteride.  Patient and his wife would like their flu vaccines today.   Patient would ilke rollator. Using his wife's today.     09/13/2022:-  Patient with HTN presents today for dizziness.    Had an episode of blurry vision, near-syncope while sitting down. Says it felt like he was going "blind". Denies palpitations or chest pain. He did have pain in his forehead. His blood pressure was 125/75 when the episode occurred. Didn't check his sugar. He took Pepto Bismol and his symptoms started fading. Symptoms returned the next day. He has a heart murmur. His last echocardiogram showed that one of his valves was tightening.   Reports R knee pain, will see Dr. Roberson on 10/31 for a shot. Wants to know if he can get something sooner. Dr. Sadler did an injection over one year ago. His knee can give out if he's been sitting for a while.       ROS:  Review of Systems   Constitutional:  Negative for appetite change, chills and fever.   HENT:  Negative for congestion, ear pain, postnasal drip, rhinorrhea, sinus pressure and sinus pain.    Eyes:  Negative for pain.   Respiratory:  Negative for cough, chest tightness and shortness of breath.    Cardiovascular:  Negative for chest pain and palpitations.   Gastrointestinal:  " Negative for abdominal pain, blood in stool, constipation, diarrhea and nausea.   Genitourinary:  Negative for difficulty urinating, dysuria, flank pain and hematuria.   Musculoskeletal:  Positive for arthralgias and myalgias.   Skin:  Negative for pallor and wound.   Neurological:  Negative for dizziness, tremors, speech difficulty, light-headedness and headaches.   Psychiatric/Behavioral:  Negative for behavioral problems, dysphoric mood and sleep disturbance. The patient is not nervous/anxious.    All other systems reviewed and are negative.    Past Medical History:   Diagnosis Date    Abnormal exercise tolerance test 2013    Arthritis     Colon polyp     Diverticulosis     Dyslipidemia 2013    GERD (gastroesophageal reflux disease)     HTN, goal below 130/80 12/3/2018    Hyperlipidemia 1980    HIGH TG    Knee pain, right 2013    Low back pain with right-sided sciatica 12/3/2018    Meningioma     Personal history of colonic polyps 2014    RLS (restless legs syndrome) 2013    Tobacco dependence     resolved    West Nile meningitis     per patient       Social History:  Social History     Socioeconomic History    Marital status:    Tobacco Use    Smoking status: Former     Packs/day: 1.00     Years: 25.00     Pack years: 25.00     Types: Cigarettes     Quit date: 1990     Years since quittin.7    Smokeless tobacco: Never   Substance and Sexual Activity    Alcohol use: No     Alcohol/week: 0.0 standard drinks    Drug use: No    Sexual activity: Not Currently     Birth control/protection: None       Family History:   family history includes Cancer in his son; Diabetes in his maternal uncle; Heart disease in his mother.    Health Maintenance   Topic Date Due    Lipid Panel  2023    TETANUS VACCINE  2025       Physical Exam:    Vital Signs  Temp: 98.1 °F (36.7 °C)  Temp src: Tympanic  Pulse: 71  SpO2: 97 %  BP: 125/80  BP Location: Left arm  Patient Position:  "Sitting  Pain Score:   8  Height and Weight  Height: 5' 10" (177.8 cm)  Weight: 68.9 kg (151 lb 14.4 oz)  BSA (Calculated - sq m): 1.84 sq meters  BMI (Calculated): 21.8  Weight in (lb) to have BMI = 25: 173.9]    Body mass index is 21.79 kg/m².    Physical Exam  Vitals and nursing note reviewed.   Constitutional:       Appearance: Normal appearance.   HENT:      Head: Normocephalic and atraumatic.      Right Ear: Tympanic membrane normal.      Left Ear: Tympanic membrane normal.      Ears:      Comments: Hearing aids  Eyes:      Extraocular Movements: Extraocular movements intact.      Pupils: Pupils are equal, round, and reactive to light.   Cardiovascular:      Rate and Rhythm: Normal rate and regular rhythm.      Pulses: Normal pulses.      Heart sounds: Normal heart sounds. No murmur heard.    No gallop.   Pulmonary:      Effort: Pulmonary effort is normal. No respiratory distress.      Breath sounds: Normal breath sounds. No wheezing, rhonchi or rales.   Abdominal:      General: There is no distension.      Palpations: Abdomen is soft.      Tenderness: There is no abdominal tenderness.   Musculoskeletal:         General: No swelling, deformity or signs of injury. Normal range of motion.      Cervical back: Normal range of motion.   Skin:     General: Skin is warm and dry.      Capillary Refill: Capillary refill takes less than 2 seconds.      Coloration: Skin is not jaundiced or pale.   Neurological:      General: No focal deficit present.      Mental Status: He is alert and oriented to person, place, and time.      Comments: Ambulates with rollator   Psychiatric:         Mood and Affect: Mood normal.         Behavior: Behavior normal.       Lab Results   Component Value Date    CHOL 151 07/05/2022    CHOL 143 01/03/2022    CHOL 145 06/28/2021    TRIG 192 (H) 07/05/2022    TRIG 171 (H) 01/03/2022    TRIG 137 06/28/2021    HDL 58 07/05/2022    HDL 51 01/03/2022    HDL 60 06/28/2021    TOTALCHOLEST 2.6 " 07/05/2022    TOTALCHOLEST 2.8 01/03/2022    TOTALCHOLEST 2.4 06/28/2021    NONHDLCHOL 93 07/05/2022    NONHDLCHOL 92 01/03/2022    NONHDLCHOL 85 06/28/2021       Lab Results   Component Value Date    HGBA1C 5.6 01/22/2021       Assessment:      ICD-10-CM ICD-9-CM   1. Postural dizziness with presyncope  R42 780.4    R55 780.2   2. Recurrent falls while walking  R29.6 781.99   3. Lumbar radiculopathy  M54.16 724.4       Plan:  Order Cardiac Monitor - 3-15 Day Adult (Cupid Only) for postural dizziness with presyncope.  Emphasized to patient that he definite needs to do the Holter monitor.  We might also be use dealing with postprandial syncopal, recommend that he laid down after meals and give sometime at least an hour before getting up and walking around  Get Influenza - Quadrivalent (Adjuvanted) vaccine today.   Drink a couple of glasses of water 15 minutes before eating. Lay down for 1.5 hours after eating. Check blood pressure before and after eating.  If the above interventions do not help in the tested unrevealing will need consultation cardiology.  Order rollator.   Orders Placed This Encounter   Procedures    Influenza - Quadrivalent (Adjuvanted)    Cardiac Monitor - 3-15 Day Adult (Cupid Only)     Current Outpatient Medications   Medication Sig Dispense Refill    amLODIPine (NORVASC) 2.5 MG tablet Take 1 tablet (2.5 mg total) by mouth once daily. 90 tablet 3    bisacodyL (DULCOLAX) 5 mg EC tablet Take 5 mg by mouth daily as needed for Constipation.      dutasteride (AVODART) 0.5 mg capsule Take 1 capsule (0.5 mg total) by mouth once daily. 90 capsule 3    fluorouraciL (EFUDEX) 5 % cream AAA scalp and temples bid x 2 weeks, then AAA of both forearms bid x 4 weeks 40 g 1    fluticasone propionate (CUTIVATE) 0.05 % cream       ketoconazole (NIZORAL) 2 % cream       LIDOcaine HCl 2% (XYLOCAINE) 2 % Soln APPLY 5 ML  EVERY 6 HOURS 100 mL 0    meclizine (ANTIVERT) 25 mg tablet Take 1 tablet by mouth twice daily as  needed 90 tablet 0    meloxicam (MOBIC) 7.5 MG tablet Take 1 tablet by mouth once daily 30 tablet 0    omeprazole (PRILOSEC) 40 MG capsule Take 1 capsule (40 mg total) by mouth every morning. 90 capsule 3    oxyCODONE-acetaminophen (PERCOCET) 5-325 mg per tablet Take 1 tablet by mouth every 4 (four) hours as needed for Pain. 10 tablet 0    pantoprazole (PROTONIX) 40 MG tablet Take 1 tablet (40 mg total) by mouth once daily. 90 tablet 3    polyethylene glycol (GLYCOLAX) 17 gram PwPk Take 17 g by mouth once daily. 100 each 6    rosuvastatin (CRESTOR) 5 MG tablet Take 1 tablet (5 mg total) by mouth once daily. 90 tablet 3    sulfamethoxazole-trimethoprim 800-160mg (BACTRIM DS) 800-160 mg Tab Take 1 tablet by mouth 2 (two) times daily. 10 tablet 0    triamcinolone acetonide 0.1% (KENALOG) 0.1 % cream Apply topically 2 (two) times daily. 454 g 3    diazePAM (VALIUM) 5 MG tablet Take 1 tablet (5 mg total) by mouth once. for 1 dose (Patient not taking: Reported on 9/13/2022) 1 tablet 0    traMADoL (ULTRAM) 50 mg tablet Take 1 tablet (50 mg total) by mouth every 24 hours as needed for Pain. 30 tablet 1    tramadol-acetaminophen 37.5-325 mg (ULTRACET) 37.5-325 mg Tab Take 1 tablet by mouth every 12 (twelve) hours as needed for Pain. (Patient not taking: No sig reported) 30 tablet 0     No current facility-administered medications for this visit.       There are no discontinued medications.    No follow-ups on file.      Madie Davis MD  Scribe Attestation:   I, Petty Dasilva, am scribing for, and in the presence of, Dr.Arif Davis I performed the above scribed service and the documentation accurately describes the services I performed. I attest to the accuracy of the note.    I, Dr. Madie Davis, reviewed documentation as scribed above. I performed the services described in this documentation.  I agree that the record reflects my personal performance and is accurate and complete. Madie Davis MD.  09/27/2022

## 2022-09-27 NOTE — PROGRESS NOTES
High dose influenza administered in left deltoid. Tolerated well and recommended waiting 15 minutes to watch for adverse reactions.

## 2022-10-06 ENCOUNTER — HOSPITAL ENCOUNTER (OUTPATIENT)
Dept: CARDIOLOGY | Facility: HOSPITAL | Age: 87
Discharge: HOME | End: 2022-10-06
Attending: FAMILY MEDICINE
Payer: MEDICARE

## 2022-10-06 DIAGNOSIS — R55 POSTURAL DIZZINESS WITH PRESYNCOPE: ICD-10-CM

## 2022-10-06 DIAGNOSIS — R42 POSTURAL DIZZINESS WITH PRESYNCOPE: ICD-10-CM

## 2022-10-06 PROCEDURE — 93248 CV CARDIAC MONITOR - 3-15 DAY ADULT (CUPID ONLY): ICD-10-PCS | Mod: ,,, | Performed by: INTERNAL MEDICINE

## 2022-10-06 PROCEDURE — 93248 EXT ECG>7D<15D REV&INTERPJ: CPT | Mod: ,,, | Performed by: INTERNAL MEDICINE

## 2022-10-10 ENCOUNTER — TELEPHONE (OUTPATIENT)
Dept: FAMILY MEDICINE | Facility: CLINIC | Age: 87
End: 2022-10-10
Payer: MEDICARE

## 2022-10-10 DIAGNOSIS — R55 POSTURAL DIZZINESS WITH PRESYNCOPE: Primary | ICD-10-CM

## 2022-10-10 DIAGNOSIS — M54.16 LUMBAR RADICULOPATHY: ICD-10-CM

## 2022-10-10 DIAGNOSIS — G89.29 CHRONIC PAIN OF BOTH KNEES: ICD-10-CM

## 2022-10-10 DIAGNOSIS — R42 POSTURAL DIZZINESS WITH PRESYNCOPE: Primary | ICD-10-CM

## 2022-10-10 DIAGNOSIS — M25.561 CHRONIC PAIN OF BOTH KNEES: ICD-10-CM

## 2022-10-10 DIAGNOSIS — M25.562 CHRONIC PAIN OF BOTH KNEES: ICD-10-CM

## 2022-10-19 ENCOUNTER — TELEPHONE (OUTPATIENT)
Dept: FAMILY MEDICINE | Facility: CLINIC | Age: 87
End: 2022-10-19
Payer: MEDICARE

## 2022-10-19 NOTE — TELEPHONE ENCOUNTER
Attempted to call patient, there was no answer and no way to leave a message      Order for walker re-faxed to [facility]

## 2022-10-19 NOTE — TELEPHONE ENCOUNTER
----- Message from Gayle Roberson sent at 10/18/2022  8:44 AM CDT -----  Contact: Mrs. Ritchie  The patient spouse is requesting a callback in regards to the status of the walker that was ordered for the patient.    Please call [facility] at [phone]       Thanks

## 2022-10-28 ENCOUNTER — TELEPHONE (OUTPATIENT)
Dept: CARDIOLOGY | Facility: CLINIC | Age: 87
End: 2022-10-28
Payer: MEDICARE

## 2022-10-28 ENCOUNTER — HOSPITAL ENCOUNTER (INPATIENT)
Facility: HOSPITAL | Age: 87
LOS: 4 days | Discharge: HOME | DRG: 244 | End: 2022-11-01
Attending: EMERGENCY MEDICINE | Admitting: STUDENT IN AN ORGANIZED HEALTH CARE EDUCATION/TRAINING PROGRAM
Payer: MEDICARE

## 2022-10-28 ENCOUNTER — TELEPHONE (OUTPATIENT)
Dept: FAMILY MEDICINE | Facility: CLINIC | Age: 87
End: 2022-10-28
Payer: MEDICARE

## 2022-10-28 DIAGNOSIS — R55 POSTURAL DIZZINESS WITH PRESYNCOPE: Primary | ICD-10-CM

## 2022-10-28 DIAGNOSIS — R42 POSTURAL DIZZINESS WITH PRESYNCOPE: Primary | ICD-10-CM

## 2022-10-28 DIAGNOSIS — R00.1 SINUS BRADYCARDIA: ICD-10-CM

## 2022-10-28 DIAGNOSIS — R94.31 ABNORMAL HOLTER MONITOR FINDING: Primary | ICD-10-CM

## 2022-10-28 DIAGNOSIS — R00.1 BRADYCARDIA: ICD-10-CM

## 2022-10-28 DIAGNOSIS — R55 NEAR SYNCOPE: ICD-10-CM

## 2022-10-28 DIAGNOSIS — I49.9 ARRHYTHMIA: ICD-10-CM

## 2022-10-28 DIAGNOSIS — I49.5 TACHY-BRADY SYNDROME: ICD-10-CM

## 2022-10-28 DIAGNOSIS — I45.5 SINUS PAUSE: ICD-10-CM

## 2022-10-28 LAB
ALBUMIN SERPL BCP-MCNC: 4 G/DL (ref 3.5–5.2)
ALP SERPL-CCNC: 69 U/L (ref 55–135)
ALT SERPL W/O P-5'-P-CCNC: 13 U/L (ref 10–44)
ANION GAP SERPL CALC-SCNC: 9 MMOL/L (ref 8–16)
AST SERPL-CCNC: 20 U/L (ref 10–40)
BASOPHILS # BLD AUTO: 0.04 K/UL (ref 0–0.2)
BASOPHILS NFR BLD: 0.8 % (ref 0–1.9)
BILIRUB SERPL-MCNC: 0.4 MG/DL (ref 0.1–1)
BNP SERPL-MCNC: 23 PG/ML (ref 0–99)
BUN SERPL-MCNC: 12 MG/DL (ref 8–23)
CALCIUM SERPL-MCNC: 9.3 MG/DL (ref 8.7–10.5)
CHLORIDE SERPL-SCNC: 105 MMOL/L (ref 95–110)
CO2 SERPL-SCNC: 25 MMOL/L (ref 23–29)
CREAT SERPL-MCNC: 0.9 MG/DL (ref 0.5–1.4)
DIFFERENTIAL METHOD: ABNORMAL
EOSINOPHIL # BLD AUTO: 0.1 K/UL (ref 0–0.5)
EOSINOPHIL NFR BLD: 1.8 % (ref 0–8)
ERYTHROCYTE [DISTWIDTH] IN BLOOD BY AUTOMATED COUNT: 12.6 % (ref 11.5–14.5)
EST. GFR  (NO RACE VARIABLE): >60 ML/MIN/1.73 M^2
GLUCOSE SERPL-MCNC: 103 MG/DL (ref 70–110)
HCT VFR BLD AUTO: 40.7 % (ref 40–54)
HGB BLD-MCNC: 13.5 G/DL (ref 14–18)
IMM GRANULOCYTES # BLD AUTO: 0.02 K/UL (ref 0–0.04)
IMM GRANULOCYTES NFR BLD AUTO: 0.4 % (ref 0–0.5)
LYMPHOCYTES # BLD AUTO: 1.3 K/UL (ref 1–4.8)
LYMPHOCYTES NFR BLD: 26.1 % (ref 18–48)
MAGNESIUM SERPL-MCNC: 2.1 MG/DL (ref 1.6–2.6)
MCH RBC QN AUTO: 31 PG (ref 27–31)
MCHC RBC AUTO-ENTMCNC: 33.2 G/DL (ref 32–36)
MCV RBC AUTO: 93 FL (ref 82–98)
MONOCYTES # BLD AUTO: 0.6 K/UL (ref 0.3–1)
MONOCYTES NFR BLD: 11.8 % (ref 4–15)
NEUTROPHILS # BLD AUTO: 2.9 K/UL (ref 1.8–7.7)
NEUTROPHILS NFR BLD: 59.1 % (ref 38–73)
NRBC BLD-RTO: 0 /100 WBC
OHS CV HOLTER SINUS AVERAGE HR: 66
OHS CV HOLTER SINUS MAX HR: 112
OHS CV HOLTER SINUS MIN HR: 31
PLATELET # BLD AUTO: 248 K/UL (ref 150–450)
PMV BLD AUTO: 9.7 FL (ref 9.2–12.9)
POTASSIUM SERPL-SCNC: 4.3 MMOL/L (ref 3.5–5.1)
PROT SERPL-MCNC: 7.1 G/DL (ref 6–8.4)
RBC # BLD AUTO: 4.36 M/UL (ref 4.6–6.2)
SODIUM SERPL-SCNC: 139 MMOL/L (ref 136–145)
TROPONIN I SERPL DL<=0.01 NG/ML-MCNC: 0.01 NG/ML (ref 0–0.03)
WBC # BLD AUTO: 4.9 K/UL (ref 3.9–12.7)

## 2022-10-28 PROCEDURE — 84484 ASSAY OF TROPONIN QUANT: CPT | Performed by: EMERGENCY MEDICINE

## 2022-10-28 PROCEDURE — 80053 COMPREHEN METABOLIC PANEL: CPT | Performed by: EMERGENCY MEDICINE

## 2022-10-28 PROCEDURE — 83735 ASSAY OF MAGNESIUM: CPT | Performed by: EMERGENCY MEDICINE

## 2022-10-28 PROCEDURE — 99285 EMERGENCY DEPT VISIT HI MDM: CPT | Mod: 25

## 2022-10-28 PROCEDURE — 20000000 HC ICU ROOM

## 2022-10-28 PROCEDURE — 93005 ELECTROCARDIOGRAM TRACING: CPT

## 2022-10-28 PROCEDURE — 93010 ELECTROCARDIOGRAM REPORT: CPT | Mod: ,,, | Performed by: INTERNAL MEDICINE

## 2022-10-28 PROCEDURE — 83880 ASSAY OF NATRIURETIC PEPTIDE: CPT | Performed by: EMERGENCY MEDICINE

## 2022-10-28 PROCEDURE — 93010 EKG 12-LEAD: ICD-10-PCS | Mod: ,,, | Performed by: INTERNAL MEDICINE

## 2022-10-28 PROCEDURE — 85025 COMPLETE CBC W/AUTO DIFF WBC: CPT | Performed by: EMERGENCY MEDICINE

## 2022-10-28 RX ORDER — ONDANSETRON 2 MG/ML
4 INJECTION INTRAMUSCULAR; INTRAVENOUS EVERY 8 HOURS PRN
Status: DISCONTINUED | OUTPATIENT
Start: 2022-10-28 | End: 2022-11-02 | Stop reason: HOSPADM

## 2022-10-28 RX ORDER — GUAIFENESIN 100 MG/5ML
200 SOLUTION ORAL EVERY 4 HOURS PRN
Status: DISCONTINUED | OUTPATIENT
Start: 2022-10-28 | End: 2022-11-02 | Stop reason: HOSPADM

## 2022-10-28 RX ORDER — MAG HYDROX/ALUMINUM HYD/SIMETH 200-200-20
30 SUSPENSION, ORAL (FINAL DOSE FORM) ORAL EVERY 6 HOURS PRN
Status: DISCONTINUED | OUTPATIENT
Start: 2022-10-28 | End: 2022-11-02 | Stop reason: HOSPADM

## 2022-10-28 RX ORDER — ACETAMINOPHEN 325 MG/1
650 TABLET ORAL EVERY 6 HOURS PRN
Status: DISCONTINUED | OUTPATIENT
Start: 2022-10-28 | End: 2022-11-02 | Stop reason: HOSPADM

## 2022-10-28 RX ORDER — IPRATROPIUM BROMIDE AND ALBUTEROL SULFATE 2.5; .5 MG/3ML; MG/3ML
3 SOLUTION RESPIRATORY (INHALATION) EVERY 4 HOURS PRN
Status: DISCONTINUED | OUTPATIENT
Start: 2022-10-28 | End: 2022-11-02 | Stop reason: HOSPADM

## 2022-10-28 NOTE — Clinical Note
A venogram was performed in the left subclavian vein. The vessel was injected via hand injection  with 15 mL of contrast.

## 2022-10-28 NOTE — TELEPHONE ENCOUNTER
I spoke with the patient and he said that he doesn't have the monitor on any longer he turned it in and he said that he is feeling great today .  He has his good days and bad days     Please sign orders, thanks

## 2022-10-28 NOTE — TELEPHONE ENCOUNTER
Pt and Pt's DaughterLauren were contacted and instructed Per Dr. Renteria to seek medical attention at the ER ASAP in regards to critical Cardiac monitor results.      Pt and Pt's Daughter verbalized understanding with no questions or concerns in regards to results and instructions. Daughter stated that they will be in route to [facility] in about 30mins and will notify Dr. Davis's office/staff for any additional questions or concerns.

## 2022-10-28 NOTE — TELEPHONE ENCOUNTER
Pt and Pt's Daughter,Lauren were contacted and instructed Per Dr. Renteria to seek medical attention at the ER ASAP in regards to critical Cardiac monitor results.     Pt and Pt's Daughter verbalized understanding with no questions or concerns

## 2022-10-28 NOTE — TELEPHONE ENCOUNTER
Glenn from [employer] called in because kristie received 2 critical notifications on patient's heart monitor. The notifications were 6.9 second pause and severe bradycardia. Glenn had no further details.

## 2022-10-28 NOTE — Clinical Note
15 ml of contrast were injected throughout the case. 0 mL of contrast was the total wasted during the case. 15 mL was the total amount used during the case.

## 2022-10-28 NOTE — ED PROVIDER NOTES
SCRIBE #1 NOTE: I, Adan Kimbrough, am scribing for, and in the presence of, Erasto Hendrix MD. I have scribed the entire note.       History     Chief Complaint   Patient presents with    Abnormal ECG     Pt was instructed to wear a heart monitor for one week by his cardiologist. Pt states the cardiologist called him with the results and stated the  pt's heart rhythm had several long pauses that caused concern. Pt states he was placed onto the monitor due to several episodes of dizzy spells. Patient has no complaints upon his arrival at the hospital.       Review of patient's allergies indicates:   Allergen Reactions    Shellfish containing products Nausea And Vomiting    Amoxicillin Rash    Iodine and iodide containing products Nausea And Vomiting         History of Present Illness     HPI    10/28/2022, 5:26 PM  History obtained from the patient      History of Present Illness: Dominguez Ritchie is a 89 y.o. male patient with a PMHx of HLD and  HTN who presents to the Emergency Department for evaluation of Abnormal Heart Rhythm which onset gradually. The pt has had multiple near syncopal events within the past month. The pt decided to visit his PCP to talk about this issue who then directed the pt to a cardiologist. The cardiologist instructed the pt to wear a heart monitor for a week. About a week later after returning the heart monitor to the cardiologist, the pt was called by his cardiologist concerning prolonged pauses being detected. The pt was told to come to the ER for an evaluation. No associated sxs. Patient denies any SOB, neck pain, weakness, N/V, and all other sxs at this time. No Prior Tx. No further complaints or concerns at this time.       Arrival mode: Personal vehicle    PCP: Madie Davis MD        Past Medical History:  Past Medical History:   Diagnosis Date    Abnormal exercise tolerance test 7/25/2013    Arthritis     Colon polyp     Diverticulosis     Dyslipidemia 7/25/2013    GERD  (gastroesophageal reflux disease)     HTN, goal below 130/80 12/3/2018    Hyperlipidemia 1980    HIGH TG    Knee pain, right 2013    Low back pain with right-sided sciatica 12/3/2018    Meningioma     Personal history of colonic polyps 2014    RLS (restless legs syndrome) 2013    Tobacco dependence     resolved    West Nile meningitis     per patient       Past Surgical History:  Past Surgical History:   Procedure Laterality Date    APPENDECTOMY      BACK SURGERY Bilateral 2016    CATARACT EXTRACTION, BILATERAL      COLONOSCOPY  2009    EYE SURGERY      JOINT REPLACEMENT      left knee    LUMBAR PARAVERTEBRAL FACET JOINT NERVE BLOCK Bilateral 2019    Procedure: LMBB L3,4,5;  Surgeon: Nabor Sadler MD;  Location: [facility]IN [facility]GT;  Service: Pain Management;  Laterality: Bilateral;    SELECTIVE INJECTION OF ANESTHETIC AGENT AROUND LUMBAR SPINAL NERVE ROOT BY TRANSFORAMINAL APPROACH Bilateral 2021    Procedure: Bilateral L4/5 +/- L5/S1 TF DREW;  Surgeon: Nabor Sadler MD;  [facility]: [facility];  Service: Pain Management;  Laterality: Bilateral;    SELECTIVE INJECTION OF ANESTHETIC AGENT AROUND LUMBAR SPINAL NERVE ROOT BY TRANSFORAMINAL APPROACH Bilateral 2022    Procedure: Bilateral L4/5 + L5/S1 TF DREW;  Surgeon: Nabor Sadler MD;  Location: [facility]IN [facility];  Service: Pain Management;  Laterality: Bilateral;    SPINE SURGERY      UPPER GASTROINTESTINAL ENDOSCOPY  2009         Family History:  Family History   Problem Relation Age of Onset    Heart disease Mother     Cancer Son         pancreatic     Diabetes Maternal Uncle     Kidney disease Neg Hx     Stroke Neg Hx        Social History:  Social History     Tobacco Use    Smoking status: Former     Packs/day: 1.00     Years: 25.00     Pack years: 25.00     Types: Cigarettes     Quit date: 1990     Years since quittin.8    Smokeless tobacco: Never   Substance and Sexual Activity    Alcohol use: No     Alcohol/week: 0.0 standard  drinks    Drug use: No    Sexual activity: Not Currently     Birth control/protection: None        Review of Systems     Review of Systems   Constitutional:  Negative for chills and fever.   HENT:  Negative for congestion and sore throat.    Respiratory:  Negative for cough and shortness of breath.    Cardiovascular:  Negative for chest pain.   Gastrointestinal:  Negative for abdominal pain, diarrhea, nausea and vomiting.   Genitourinary:  Negative for dysuria.   Musculoskeletal:  Negative for back pain, myalgias and neck pain.   Skin:  Negative for rash.   Neurological:  Negative for syncope and weakness.        Near syncope   Hematological:  Does not bruise/bleed easily.      Physical Exam     Initial Vitals [10/28/22 1650]   BP Pulse Resp Temp SpO2   (!) 170/70 66 16 97.9 °F (36.6 °C) 97 %      MAP       --          Physical Exam  Nursing Notes and Vital Signs Reviewed.  Constitutional: Patient is in no acute distress. Well-developed and well-nourished.  Head: Atraumatic. Normocephalic.  Eyes: PERRL. EOM intact. Conjunctivae are not pale. No scleral icterus.  ENT: Mucous membranes are moist. Oropharynx is clear and symmetric.    Neck: Supple. Full ROM. No lymphadenopathy.  Cardiovascular: Regular rate. Regular rhythm. No murmurs, rubs, or gallops. Distal pulses are 2+ and symmetric.  Pulmonary/Chest: No respiratory distress. Clear to auscultation bilaterally. No wheezing or rales.  Abdominal: Soft and non-distended. There is no tenderness.  No rebound, guarding, or rigidity. Good bowel sounds.  Genitourinary: No CVA tenderness  Musculoskeletal: Moves all extremities. No obvious deformities. No edema. No calf tenderness.  Skin: Warm and dry.  Neurological:  Alert, awake, and appropriate.  Normal speech.  No acute focal neurological deficits are appreciated.  Psychiatric: Normal affect. Good eye contact. Appropriate in content.     ED Course   Procedures  ED Vital Signs:  Vitals:    10/28/22 1650 10/28/22 1730  10/28/22 1739 10/28/22 1830   BP: (!) 170/70 (!) 155/74  (!) 167/74   Pulse: 66 65 65 66   Resp: 16 20  20   Temp: 97.9 °F (36.6 °C)      TempSrc: Oral      SpO2: 97% 98%  98%   Weight: 67.9 kg (149 lb 12.8 oz)          Abnormal Lab Results:  Labs Reviewed   CBC W/ AUTO DIFFERENTIAL - Abnormal; Notable for the following components:       Result Value    RBC 4.36 (*)     Hemoglobin 13.5 (*)     All other components within normal limits   COMPREHENSIVE METABOLIC PANEL   B-TYPE NATRIURETIC PEPTIDE   TROPONIN I   MAGNESIUM        All Lab Results:  Results for orders placed or performed during the hospital encounter of 10/28/22   CBC auto differential   Result Value Ref Range    WBC 4.90 3.90 - 12.70 K/uL    RBC 4.36 (L) 4.60 - 6.20 M/uL    Hemoglobin 13.5 (L) 14.0 - 18.0 g/dL    Hematocrit 40.7 40.0 - 54.0 %    MCV 93 82 - 98 fL    MCH 31.0 27.0 - 31.0 pg    MCHC 33.2 32.0 - 36.0 g/dL    RDW 12.6 11.5 - 14.5 %    Platelets 248 150 - 450 K/uL    MPV 9.7 9.2 - 12.9 fL    Immature Granulocytes 0.4 0.0 - 0.5 %    Gran # (ANC) 2.9 1.8 - 7.7 K/uL    Immature Grans (Abs) 0.02 0.00 - 0.04 K/uL    Lymph # 1.3 1.0 - 4.8 K/uL    Mono # 0.6 0.3 - 1.0 K/uL    Eos # 0.1 0.0 - 0.5 K/uL    Baso # 0.04 0.00 - 0.20 K/uL    nRBC 0 0 /100 WBC    Gran % 59.1 38.0 - 73.0 %    Lymph % 26.1 18.0 - 48.0 %    Mono % 11.8 4.0 - 15.0 %    Eosinophil % 1.8 0.0 - 8.0 %    Basophil % 0.8 0.0 - 1.9 %    Differential Method Automated    Comprehensive metabolic panel   Result Value Ref Range    Sodium 139 136 - 145 mmol/L    Potassium 4.3 3.5 - 5.1 mmol/L    Chloride 105 95 - 110 mmol/L    CO2 25 23 - 29 mmol/L    Glucose 103 70 - 110 mg/dL    BUN 12 8 - 23 mg/dL    Creatinine 0.9 0.5 - 1.4 mg/dL    Calcium 9.3 8.7 - 10.5 mg/dL    Total Protein 7.1 6.0 - 8.4 g/dL    Albumin 4.0 3.5 - 5.2 g/dL    Total Bilirubin 0.4 0.1 - 1.0 mg/dL    Alkaline Phosphatase 69 55 - 135 U/L    AST 20 10 - 40 U/L    ALT 13 10 - 44 U/L    Anion Gap 9 8 - 16 mmol/L    eGFR >60  >60 mL/min/1.73 m^2   Brain natriuretic peptide   Result Value Ref Range    BNP 23 0 - 99 pg/mL   Troponin I   Result Value Ref Range    Troponin I 0.014 0.000 - 0.026 ng/mL   Magnesium   Result Value Ref Range    Magnesium 2.1 1.6 - 2.6 mg/dL        Imaging Results:  Imaging Results              X-Ray Chest AP Portable (Final result)  Result time 10/28/22 18:19:31      Final result by Lisa Combs MD (10/28/22 18:19:31)                   Impression:      No acute abnormality.      Electronically signed by: Garret Gonzalez  Date:    10/28/2022  Time:    18:19               Narrative:    EXAMINATION:  XR CHEST AP PORTABLE    CLINICAL HISTORY:  near syncope;    TECHNIQUE:  Single frontal view of the chest was performed.    COMPARISON:  None    FINDINGS:  Low lung volumes.The lungs are clear, with normal appearance of pulmonary vasculature and no pleural effusion or pneumothorax.    The cardiac silhouette is prominent.  The hilar and mediastinal contours are unremarkable.    Bones are intact.                                       The EKG was ordered, reviewed, and independently interpreted by the ED provider.  Interpretation time: 16:38  Rate: 66 BPM  Rhythm: normal sinus rhythm  Interpretation: Cannot rule out Anterior infarct, age undetermined. No STEMI.             The Emergency Provider reviewed the vital signs and test results, which are outlined above.     ED Discussion       7:50 PM: Discussed pt's case with Luis A Tejeda MD (Cardiology) who recommends admission to hospital medicine ICU with pacing pads if needed.     7:53 PM: Discussed case with Delores Farmer NP   (Hospital Medicine). Dr. Gore agrees with current care and management of pt and accepts admission.   Admitting Service: Hospital Medicine  Admitting Physician: Dr. Gore  Admit to: ICU     7:53 PM: Re-evaluated pt. I have discussed test results, shared treatment plan, and the need for admission with patient and family at bedside. Pt and family  express understanding at this time and agree with all information. All questions answered. Pt and family have no further questions or concerns at this time. Pt is ready for admit.      Medical Decision Making:   Clinical Tests:   Lab Tests: Ordered and Reviewed  Radiological Study: Ordered and Reviewed  Medical Tests: Ordered and Reviewed  Patient directed to the emergency department after Holter monitor reading showed prolonged pauses with severe bradycardia.  Patient asymptomatic here in the emergency department.  I do not have the Holter monitor reports with me to personally review, but I consulted Cardiology to inform them of the situation and get recommendations.  Recommendations as provided above.  Patient admitted to hospital medicine         ED Medication(s):  Medications - No data to display    New Prescriptions    No medications on file               Scribe Attestation:   Scribe #1: I performed the above scribed service and the documentation accurately describes the services I performed. I attest to the accuracy of the note.     Attending:   Physician Attestation Statement for Scribe #1: I, Erasto Hendrix MD, personally performed the services described in this documentation, as scribed by Adan Kimbrough, in my presence, and it is both accurate and complete.           Clinical Impression       ICD-10-CM ICD-9-CM   1. Abnormal Holter monitor finding  R94.31 794.31   2. Near syncope  R55 780.2       Disposition:   Disposition: Admitted  Condition: Fair      Erasto Hendrix MD  10/28/22 [id number]

## 2022-10-29 PROBLEM — I48.0 PAROXYSMAL A-FIB: Status: ACTIVE | Noted: 2022-10-29

## 2022-10-29 PROBLEM — I49.5 TACHY-BRADY SYNDROME: Status: ACTIVE | Noted: 2022-10-29

## 2022-10-29 LAB
ALBUMIN SERPL BCP-MCNC: 3.7 G/DL (ref 3.5–5.2)
ALP SERPL-CCNC: 62 U/L (ref 55–135)
ALT SERPL W/O P-5'-P-CCNC: 14 U/L (ref 10–44)
ANION GAP SERPL CALC-SCNC: 8 MMOL/L (ref 8–16)
APTT BLDCRRT: 29.7 SEC (ref 21–32)
APTT BLDCRRT: 51.6 SEC (ref 21–32)
AST SERPL-CCNC: 18 U/L (ref 10–40)
BASOPHILS # BLD AUTO: 0.03 K/UL (ref 0–0.2)
BASOPHILS # BLD AUTO: 0.04 K/UL (ref 0–0.2)
BASOPHILS NFR BLD: 0.6 % (ref 0–1.9)
BASOPHILS NFR BLD: 0.7 % (ref 0–1.9)
BILIRUB SERPL-MCNC: 0.7 MG/DL (ref 0.1–1)
BUN SERPL-MCNC: 11 MG/DL (ref 8–23)
CALCIUM SERPL-MCNC: 8.9 MG/DL (ref 8.7–10.5)
CHLORIDE SERPL-SCNC: 106 MMOL/L (ref 95–110)
CO2 SERPL-SCNC: 25 MMOL/L (ref 23–29)
CREAT SERPL-MCNC: 0.8 MG/DL (ref 0.5–1.4)
DIFFERENTIAL METHOD: ABNORMAL
DIFFERENTIAL METHOD: ABNORMAL
EOSINOPHIL # BLD AUTO: 0.1 K/UL (ref 0–0.5)
EOSINOPHIL # BLD AUTO: 0.1 K/UL (ref 0–0.5)
EOSINOPHIL NFR BLD: 1.8 % (ref 0–8)
EOSINOPHIL NFR BLD: 2.6 % (ref 0–8)
ERYTHROCYTE [DISTWIDTH] IN BLOOD BY AUTOMATED COUNT: 12.6 % (ref 11.5–14.5)
ERYTHROCYTE [DISTWIDTH] IN BLOOD BY AUTOMATED COUNT: 12.9 % (ref 11.5–14.5)
EST. GFR  (NO RACE VARIABLE): >60 ML/MIN/1.73 M^2
GLUCOSE SERPL-MCNC: 100 MG/DL (ref 70–110)
HCT VFR BLD AUTO: 39.4 % (ref 40–54)
HCT VFR BLD AUTO: 39.5 % (ref 40–54)
HGB BLD-MCNC: 13 G/DL (ref 14–18)
HGB BLD-MCNC: 13.1 G/DL (ref 14–18)
IMM GRANULOCYTES # BLD AUTO: 0.01 K/UL (ref 0–0.04)
IMM GRANULOCYTES # BLD AUTO: 0.01 K/UL (ref 0–0.04)
IMM GRANULOCYTES NFR BLD AUTO: 0.2 % (ref 0–0.5)
IMM GRANULOCYTES NFR BLD AUTO: 0.2 % (ref 0–0.5)
INR PPP: 1 (ref 0.8–1.2)
LYMPHOCYTES # BLD AUTO: 1.3 K/UL (ref 1–4.8)
LYMPHOCYTES # BLD AUTO: 1.3 K/UL (ref 1–4.8)
LYMPHOCYTES NFR BLD: 24.3 % (ref 18–48)
LYMPHOCYTES NFR BLD: 28.9 % (ref 18–48)
MAGNESIUM SERPL-MCNC: 2 MG/DL (ref 1.6–2.6)
MCH RBC QN AUTO: 30.8 PG (ref 27–31)
MCH RBC QN AUTO: 31 PG (ref 27–31)
MCHC RBC AUTO-ENTMCNC: 33 G/DL (ref 32–36)
MCHC RBC AUTO-ENTMCNC: 33.2 G/DL (ref 32–36)
MCV RBC AUTO: 93 FL (ref 82–98)
MCV RBC AUTO: 94 FL (ref 82–98)
MONOCYTES # BLD AUTO: 0.6 K/UL (ref 0.3–1)
MONOCYTES # BLD AUTO: 0.7 K/UL (ref 0.3–1)
MONOCYTES NFR BLD: 12.2 % (ref 4–15)
MONOCYTES NFR BLD: 12.3 % (ref 4–15)
NEUTROPHILS # BLD AUTO: 2.6 K/UL (ref 1.8–7.7)
NEUTROPHILS # BLD AUTO: 3.3 K/UL (ref 1.8–7.7)
NEUTROPHILS NFR BLD: 55.4 % (ref 38–73)
NEUTROPHILS NFR BLD: 60.8 % (ref 38–73)
NRBC BLD-RTO: 0 /100 WBC
NRBC BLD-RTO: 0 /100 WBC
PLATELET # BLD AUTO: 226 K/UL (ref 150–450)
PLATELET # BLD AUTO: 230 K/UL (ref 150–450)
PMV BLD AUTO: 9.2 FL (ref 9.2–12.9)
PMV BLD AUTO: 9.3 FL (ref 9.2–12.9)
POTASSIUM SERPL-SCNC: 4.9 MMOL/L (ref 3.5–5.1)
PROT SERPL-MCNC: 6.2 G/DL (ref 6–8.4)
PROTHROMBIN TIME: 11.2 SEC (ref 9–12.5)
RBC # BLD AUTO: 4.22 M/UL (ref 4.6–6.2)
RBC # BLD AUTO: 4.22 M/UL (ref 4.6–6.2)
SODIUM SERPL-SCNC: 139 MMOL/L (ref 136–145)
TSH SERPL DL<=0.005 MIU/L-ACNC: 1.73 UIU/ML (ref 0.4–4)
WBC # BLD AUTO: 4.63 K/UL (ref 3.9–12.7)
WBC # BLD AUTO: 5.47 K/UL (ref 3.9–12.7)

## 2022-10-29 PROCEDURE — 36415 COLL VENOUS BLD VENIPUNCTURE: CPT | Performed by: STUDENT IN AN ORGANIZED HEALTH CARE EDUCATION/TRAINING PROGRAM

## 2022-10-29 PROCEDURE — 99223 PR INITIAL HOSPITAL CARE,LEVL III: ICD-10-PCS | Mod: ,,, | Performed by: INTERNAL MEDICINE

## 2022-10-29 PROCEDURE — 36415 COLL VENOUS BLD VENIPUNCTURE: CPT | Performed by: INTERNAL MEDICINE

## 2022-10-29 PROCEDURE — 80053 COMPREHEN METABOLIC PANEL: CPT | Performed by: INTERNAL MEDICINE

## 2022-10-29 PROCEDURE — 99223 1ST HOSP IP/OBS HIGH 75: CPT | Mod: ,,, | Performed by: INTERNAL MEDICINE

## 2022-10-29 PROCEDURE — 85610 PROTHROMBIN TIME: CPT | Performed by: STUDENT IN AN ORGANIZED HEALTH CARE EDUCATION/TRAINING PROGRAM

## 2022-10-29 PROCEDURE — 25000003 PHARM REV CODE 250: Performed by: STUDENT IN AN ORGANIZED HEALTH CARE EDUCATION/TRAINING PROGRAM

## 2022-10-29 PROCEDURE — 63600175 PHARM REV CODE 636 W HCPCS: Performed by: STUDENT IN AN ORGANIZED HEALTH CARE EDUCATION/TRAINING PROGRAM

## 2022-10-29 PROCEDURE — 84443 ASSAY THYROID STIM HORMONE: CPT | Performed by: INTERNAL MEDICINE

## 2022-10-29 PROCEDURE — 83735 ASSAY OF MAGNESIUM: CPT | Performed by: INTERNAL MEDICINE

## 2022-10-29 PROCEDURE — 25000003 PHARM REV CODE 250: Performed by: INTERNAL MEDICINE

## 2022-10-29 PROCEDURE — 85730 THROMBOPLASTIN TIME PARTIAL: CPT | Performed by: STUDENT IN AN ORGANIZED HEALTH CARE EDUCATION/TRAINING PROGRAM

## 2022-10-29 PROCEDURE — 85025 COMPLETE CBC W/AUTO DIFF WBC: CPT | Mod: 91 | Performed by: STUDENT IN AN ORGANIZED HEALTH CARE EDUCATION/TRAINING PROGRAM

## 2022-10-29 PROCEDURE — 85025 COMPLETE CBC W/AUTO DIFF WBC: CPT | Performed by: INTERNAL MEDICINE

## 2022-10-29 PROCEDURE — 21400001 HC TELEMETRY ROOM

## 2022-10-29 PROCEDURE — 25000003 PHARM REV CODE 250: Performed by: ANESTHESIOLOGY

## 2022-10-29 RX ORDER — AMLODIPINE BESYLATE 5 MG/1
5 TABLET ORAL DAILY
Status: DISCONTINUED | OUTPATIENT
Start: 2022-10-29 | End: 2022-11-02 | Stop reason: HOSPADM

## 2022-10-29 RX ORDER — ROPINIROLE 0.5 MG/1
0.5 TABLET, FILM COATED ORAL NIGHTLY
Status: DISCONTINUED | OUTPATIENT
Start: 2022-10-29 | End: 2022-11-02 | Stop reason: HOSPADM

## 2022-10-29 RX ORDER — DUTASTERIDE 0.5 MG/1
0.5 CAPSULE, LIQUID FILLED ORAL DAILY
Status: DISCONTINUED | OUTPATIENT
Start: 2022-10-30 | End: 2022-11-02 | Stop reason: HOSPADM

## 2022-10-29 RX ORDER — IBUPROFEN 400 MG/1
400 TABLET ORAL ONCE
Status: COMPLETED | OUTPATIENT
Start: 2022-10-29 | End: 2022-10-29

## 2022-10-29 RX ORDER — PANTOPRAZOLE SODIUM 40 MG/1
40 TABLET, DELAYED RELEASE ORAL DAILY
Status: DISCONTINUED | OUTPATIENT
Start: 2022-10-30 | End: 2022-11-02 | Stop reason: HOSPADM

## 2022-10-29 RX ORDER — HEPARIN SODIUM,PORCINE/D5W 25000/250
0-40 INTRAVENOUS SOLUTION INTRAVENOUS CONTINUOUS
Status: DISCONTINUED | OUTPATIENT
Start: 2022-10-29 | End: 2022-11-02 | Stop reason: HOSPADM

## 2022-10-29 RX ORDER — ATORVASTATIN CALCIUM 10 MG/1
10 TABLET, FILM COATED ORAL DAILY
Status: DISCONTINUED | OUTPATIENT
Start: 2022-10-29 | End: 2022-11-02 | Stop reason: HOSPADM

## 2022-10-29 RX ADMIN — AMLODIPINE BESYLATE 5 MG: 5 TABLET ORAL at 06:10

## 2022-10-29 RX ADMIN — IBUPROFEN 400 MG: 400 TABLET, FILM COATED ORAL at 03:10

## 2022-10-29 RX ADMIN — ROPINIROLE HYDROCHLORIDE 0.5 MG: 0.5 TABLET, FILM COATED ORAL at 09:10

## 2022-10-29 RX ADMIN — HEPARIN SODIUM 12 UNITS/KG/HR: 10000 INJECTION, SOLUTION INTRAVENOUS at 03:10

## 2022-10-29 RX ADMIN — ATORVASTATIN CALCIUM 10 MG: 10 TABLET, FILM COATED ORAL at 06:10

## 2022-10-29 RX ADMIN — ACETAMINOPHEN 650 MG: 325 TABLET ORAL at 12:10

## 2022-10-29 NOTE — SUBJECTIVE & OBJECTIVE
Review of Systems    Constitutional: Negative.  Negative for chills and fever.   HENT: Negative.  Negative for congestion, rhinorrhea, sore throat and trouble swallowing.    Eyes: Negative.  Negative for visual disturbance.   Respiratory: Negative.  Negative for cough, shortness of breath and wheezing.    Cardiovascular: Negative.  Negative for chest pain and palpitations.   Gastrointestinal: Negative.  Negative for abdominal pain, diarrhea, nausea and vomiting.   Endocrine: Negative.    Genitourinary: Negative.  Negative for dysuria and flank pain.   Musculoskeletal: Negative.  Negative for back pain.   Skin: Negative.  Negative for rash.   Allergic/Immunologic: Negative.    Neurological:  denied dizziness;   Negative for speech difficulty, weakness, numbness and headaches.   Hematological: Negative.    Psychiatric/Behavioral: Negative.  Negative for hallucinations.    All other systems reviewed and are negative  Objective:     Vital Signs (Most Recent):  Temp: 98.1 °F (36.7 °C) (10/29/22 1101)  Pulse: 63 (10/29/22 1301)  Resp: (!) 23 (10/29/22 1301)  BP: 131/68 (10/29/22 1301)  SpO2: (!) 94 % (10/29/22 1301)   Vital Signs (24h Range):  Temp:  [97.9 °F (36.6 °C)-98.4 °F (36.9 °C)] 98.1 °F (36.7 °C)  Pulse:  [59-73] 63  Resp:  [14-58] 23  SpO2:  [92 %-98 %] 94 %  BP: (123-172)/(57-89) 131/68     Weight: 68 kg (149 lb 14.6 oz)  Body mass index is 21.51 kg/m².    Intake/Output Summary (Last 24 hours) at 10/29/2022 1430  Last data filed at 10/29/2022 1000  Gross per 24 hour   Intake 118 ml   Output 810 ml   Net -692 ml      Physical Exam    Vitals and nursing note reviewed.   Constitutional:       General: He is awake. He is not in acute distress.     Appearance: He is not ill-appearing.      Comments: Pleasant elderly  male, in no respiratory distress.  Comfortably lying in bed.  Little hard of hearing.  Daughter at the bedside.   HENT:      Head: Normocephalic and atraumatic.      Mouth/Throat:       Mouth: Mucous membranes are moist.   Eyes:      General: No scleral icterus.     Conjunctiva/sclera: Conjunctivae normal.   Cardiovascular:      Rate and Rhythm: Regular rhythm.      Heart sounds: No murmur heard.  Pulmonary:      Effort: Pulmonary effort is normal. No respiratory distress.      Breath sounds: Normal breath sounds. No wheezing.   Abdominal:      Palpations: Abdomen is soft.      Tenderness: There is no abdominal tenderness.   Musculoskeletal:         General: No swelling. Normal range of motion.      Cervical back: Normal range of motion and neck supple.   Skin:     General: Skin is warm.      Coloration: Skin is not jaundiced.   Neurological:      General: No focal deficit present.      Mental Status: He is alert and oriented to person, place, and time. Mental status is at baseline.   Psychiatric:         Attention and Perception: Attention normal.         Speech: Speech normal.         Behavior: Behavior is cooperative.     Significant Labs:     Results for orders placed or performed during the hospital encounter of 10/28/22   CBC auto differential   Result Value Ref Range    WBC 4.90 3.90 - 12.70 K/uL    RBC 4.36 (L) 4.60 - 6.20 M/uL    Hemoglobin 13.5 (L) 14.0 - 18.0 g/dL    Hematocrit 40.7 40.0 - 54.0 %    MCV 93 82 - 98 fL    MCH 31.0 27.0 - 31.0 pg    MCHC 33.2 32.0 - 36.0 g/dL    RDW 12.6 11.5 - 14.5 %    Platelets 248 150 - 450 K/uL    MPV 9.7 9.2 - 12.9 fL    Immature Granulocytes 0.4 0.0 - 0.5 %    Gran # (ANC) 2.9 1.8 - 7.7 K/uL    Immature Grans (Abs) 0.02 0.00 - 0.04 K/uL    Lymph # 1.3 1.0 - 4.8 K/uL    Mono # 0.6 0.3 - 1.0 K/uL    Eos # 0.1 0.0 - 0.5 K/uL    Baso # 0.04 0.00 - 0.20 K/uL    nRBC 0 0 /100 WBC    Gran % 59.1 38.0 - 73.0 %    Lymph % 26.1 18.0 - 48.0 %    Mono % 11.8 4.0 - 15.0 %    Eosinophil % 1.8 0.0 - 8.0 %    Basophil % 0.8 0.0 - 1.9 %    Differential Method Automated    Comprehensive metabolic panel   Result Value Ref Range    Sodium 139 136 - 145 mmol/L     Potassium 4.3 3.5 - 5.1 mmol/L    Chloride 105 95 - 110 mmol/L    CO2 25 23 - 29 mmol/L    Glucose 103 70 - 110 mg/dL    BUN 12 8 - 23 mg/dL    Creatinine 0.9 0.5 - 1.4 mg/dL    Calcium 9.3 8.7 - 10.5 mg/dL    Total Protein 7.1 6.0 - 8.4 g/dL    Albumin 4.0 3.5 - 5.2 g/dL    Total Bilirubin 0.4 0.1 - 1.0 mg/dL    Alkaline Phosphatase 69 55 - 135 U/L    AST 20 10 - 40 U/L    ALT 13 10 - 44 U/L    Anion Gap 9 8 - 16 mmol/L    eGFR >60 >60 mL/min/1.73 m^2   Brain natriuretic peptide   Result Value Ref Range    BNP 23 0 - 99 pg/mL   Troponin I   Result Value Ref Range    Troponin I 0.014 0.000 - 0.026 ng/mL   Magnesium   Result Value Ref Range    Magnesium 2.1 1.6 - 2.6 mg/dL   CBC Auto Differential   Result Value Ref Range    WBC 4.63 3.90 - 12.70 K/uL    RBC 4.22 (L) 4.60 - 6.20 M/uL    Hemoglobin 13.1 (L) 14.0 - 18.0 g/dL    Hematocrit 39.5 (L) 40.0 - 54.0 %    MCV 94 82 - 98 fL    MCH 31.0 27.0 - 31.0 pg    MCHC 33.2 32.0 - 36.0 g/dL    RDW 12.6 11.5 - 14.5 %    Platelets 226 150 - 450 K/uL    MPV 9.3 9.2 - 12.9 fL    Immature Granulocytes 0.2 0.0 - 0.5 %    Gran # (ANC) 2.6 1.8 - 7.7 K/uL    Immature Grans (Abs) 0.01 0.00 - 0.04 K/uL    Lymph # 1.3 1.0 - 4.8 K/uL    Mono # 0.6 0.3 - 1.0 K/uL    Eos # 0.1 0.0 - 0.5 K/uL    Baso # 0.03 0.00 - 0.20 K/uL    nRBC 0 0 /100 WBC    Gran % 55.4 38.0 - 73.0 %    Lymph % 28.9 18.0 - 48.0 %    Mono % 12.3 4.0 - 15.0 %    Eosinophil % 2.6 0.0 - 8.0 %    Basophil % 0.6 0.0 - 1.9 %    Differential Method Automated    Magnesium   Result Value Ref Range    Magnesium 2.0 1.6 - 2.6 mg/dL   Comprehensive Metabolic Panel   Result Value Ref Range    Sodium 139 136 - 145 mmol/L    Potassium 4.9 3.5 - 5.1 mmol/L    Chloride 106 95 - 110 mmol/L    CO2 25 23 - 29 mmol/L    Glucose 100 70 - 110 mg/dL    BUN 11 8 - 23 mg/dL    Creatinine 0.8 0.5 - 1.4 mg/dL    Calcium 8.9 8.7 - 10.5 mg/dL    Total Protein 6.2 6.0 - 8.4 g/dL    Albumin 3.7 3.5 - 5.2 g/dL    Total Bilirubin 0.7 0.1 - 1.0 mg/dL     Alkaline Phosphatase 62 55 - 135 U/L    AST 18 10 - 40 U/L    ALT 14 10 - 44 U/L    Anion Gap 8 8 - 16 mmol/L    eGFR >60 >60 mL/min/1.73 m^2   TSH   Result Value Ref Range    TSH 1.733 0.400 - 4.000 uIU/mL        Significant Imaging:     Imaging Results              X-Ray Chest AP Portable (Final result)  Result time 10/28/22 18:19:31      Final result by Lisa Combs MD (10/28/22 18:19:31)                   Impression:      No acute abnormality.      Electronically signed by: Garret Gonzalez  Date:    10/28/2022  Time:    18:19               Narrative:    EXAMINATION:  XR CHEST AP PORTABLE    CLINICAL HISTORY:  near syncope;    TECHNIQUE:  Single frontal view of the chest was performed.    COMPARISON:  None    FINDINGS:  Low lung volumes.The lungs are clear, with normal appearance of pulmonary vasculature and no pleural effusion or pneumothorax.    The cardiac silhouette is prominent.  The hilar and mediastinal contours are unremarkable.    Bones are intact.

## 2022-10-29 NOTE — H&P
[facility] - Emergency Dept.  [facility] Medicine  History & Physical    Patient Name: Dominguez Ritchie  MRN: [id number]  Patient Class: IP- Inpatient  Admission Date: 10/28/2022  Attending Physician: Nazario Gore, *   Primary Care Provider: Madie Davis MD         Patient information was obtained from patient, relative(s), past medical records and ER records.     Subjective:     Principal Problem:Sinus bradycardia    Chief Complaint:   Chief Complaint   Patient presents with    Abnormal ECG     Pt was instructed to wear a heart monitor for one week by his cardiologist. Pt states the cardiologist called him with the results and stated the  pt's heart rhythm had several long pauses that caused concern. Pt states he was placed onto the monitor due to several episodes of dizzy spells. Patient has no complaints upon his arrival at the hospital.          HPI: Mr. Ritchie is a pleasant 89-year-old  male with PMH significant for BPH, hyperlipidemia, has been having frequent disease pills, presyncopal episodes for the past few weeks.  He was evaluated by his PCP, Holter monitor was ordered.  Patient was advised by Dr. Renteria to present to the ED due to frequent long pauses.  Patient states that he did not feel lightheaded or dizzy for the past few days.  Denies any complaints at this time.  In the ED he is asymptomatic.  Denies lightheadedness, dizziness, CP, SOB, palpitations.  HR in the 60s.  /74.  Patient not on beta-blockers.  Takes amlodipine for BP.  ED physician discussed case with Cardiology on-call, Dr. Blackman, who recommended placing the patient in the ICU overnight.    Discussed code status with patient in front of family member.  Remains full code.      Past Medical History:   Diagnosis Date    Abnormal exercise tolerance test 7/25/2013    Arthritis     Colon polyp     Diverticulosis     Dyslipidemia 7/25/2013    GERD (gastroesophageal reflux disease)     HTN, goal below 130/80  12/3/2018    Hyperlipidemia 1980    HIGH TG    Knee pain, right 6/14/2013    Low back pain with right-sided sciatica 12/3/2018    Meningioma     Personal history of colonic polyps 5/27/2014    RLS (restless legs syndrome) 6/14/2013    Tobacco dependence     resolved    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       Past Surgical History:   Procedure Laterality Date    APPENDECTOMY      BACK SURGERY Bilateral 2016    CATARACT EXTRACTION, BILATERAL      COLONOSCOPY  2/2009    EYE SURGERY      JOINT REPLACEMENT      left knee    LUMBAR PARAVERTEBRAL FACET JOINT NERVE BLOCK Bilateral 8/29/2019    Procedure: LMBB L3,4,5;  Surgeon: Nabor Sadler MD;  Location: [facility] PAIN MGT;  Service: Pain Management;  Laterality: Bilateral;    SELECTIVE INJECTION OF ANESTHETIC AGENT AROUND LUMBAR SPINAL NERVE ROOT BY TRANSFORAMINAL APPROACH Bilateral 11/8/2021    Procedure: Bilateral L4/5 +/- L5/S1 TF DREW;  Surgeon: Nabor Sadler MD;  Location: [facility] PAIN [facility]GT;  Service: Pain Management;  Laterality: Bilateral;    SELECTIVE INJECTION OF ANESTHETIC AGENT AROUND LUMBAR SPINAL NERVE ROOT BY TRANSFORAMINAL APPROACH Bilateral 1/4/2022    Procedure: Bilateral L4/5 + L5/S1 TF DREW;  Surgeon: Nabor Sadler MD;  Location: [facility] PAIN [facility]GT;  Service: Pain Management;  Laterality: Bilateral;    SPINE SURGERY      UPPER GASTROINTESTINAL ENDOSCOPY  2/2009       Review of patient's allergies indicates:   Allergen Reactions    Shellfish containing products Nausea And Vomiting    Amoxicillin Rash    Iodine and iodide containing products Nausea And Vomiting       No current facility-administered medications on file prior to encounter.     Current Outpatient Medications on File Prior to Encounter   Medication Sig    amLODIPine (NORVASC) 2.5 MG tablet Take 1 tablet (2.5 mg total) by mouth once daily.    bisacodyL (DULCOLAX) 5 mg EC tablet Take 5 mg by mouth daily as needed for Constipation.    diazePAM (VALIUM) 5 MG tablet Take 1 tablet (5  mg total) by mouth once. for 1 dose (Patient not taking: Reported on 2022)    dutasteride (AVODART) 0.5 mg capsule Take 1 capsule (0.5 mg total) by mouth once daily.    fluorouraciL (EFUDEX) 5 % cream AAA scalp and temples bid x 2 weeks, then AAA of both forearms bid x 4 weeks    fluticasone propionate (CUTIVATE) 0.05 % cream     ketoconazole (NIZORAL) 2 % cream     LIDOcaine HCl 2% (XYLOCAINE) 2 % Soln APPLY 5 ML  EVERY 6 HOURS    meclizine (ANTIVERT) 25 mg tablet Take 1 tablet by mouth twice daily as needed    meloxicam (MOBIC) 7.5 MG tablet Take 1 tablet by mouth once daily    omeprazole (PRILOSEC) 40 MG capsule Take 1 capsule (40 mg total) by mouth every morning.    oxyCODONE-acetaminophen (PERCOCET) 5-325 mg per tablet Take 1 tablet by mouth every 4 (four) hours as needed for Pain.    pantoprazole (PROTONIX) 40 MG tablet Take 1 tablet (40 mg total) by mouth once daily.    polyethylene glycol (GLYCOLAX) 17 gram PwPk Take 17 g by mouth once daily.    rosuvastatin (CRESTOR) 5 MG tablet Take 1 tablet (5 mg total) by mouth once daily.    sulfamethoxazole-trimethoprim 800-160mg (BACTRIM DS) 800-160 mg Tab Take 1 tablet by mouth 2 (two) times daily.    traMADoL (ULTRAM) 50 mg tablet Take 1 tablet (50 mg total) by mouth every 24 hours as needed for Pain.    tramadol-acetaminophen 37.5-325 mg (ULTRACET) 37.5-325 mg Tab Take 1 tablet by mouth every 12 (twelve) hours as needed for Pain. (Patient not taking: No sig reported)    triamcinolone acetonide 0.1% (KENALOG) 0.1 % cream Apply topically 2 (two) times daily.     Family History       Problem Relation (Age of Onset)    Cancer Son    Diabetes Maternal Uncle    Heart disease Mother          Tobacco Use    Smoking status: Former     Packs/day: 1.00     Years: 25.00     Pack years: 25.00     Types: Cigarettes     Quit date: 1990     Years since quittin.8    Smokeless tobacco: Never   Substance and Sexual Activity    Alcohol use: No      Alcohol/week: 0.0 standard drinks    Drug use: No    Sexual activity: Not Currently     Birth control/protection: None     Review of Systems   Constitutional: Negative.  Negative for chills and fever.   HENT: Negative.  Negative for congestion, rhinorrhea, sore throat and trouble swallowing.    Eyes: Negative.  Negative for visual disturbance.   Respiratory: Negative.  Negative for cough, shortness of breath and wheezing.    Cardiovascular: Negative.  Negative for chest pain and palpitations.   Gastrointestinal: Negative.  Negative for abdominal pain, diarrhea, nausea and vomiting.   Endocrine: Negative.    Genitourinary: Negative.  Negative for dysuria and flank pain.   Musculoskeletal: Negative.  Negative for back pain.   Skin: Negative.  Negative for rash.   Allergic/Immunologic: Negative.    Neurological:  Positive for dizziness and light-headedness (For the last few weeks, none for last few days). Negative for speech difficulty, weakness, numbness and headaches.   Hematological: Negative.    Psychiatric/Behavioral: Negative.  Negative for hallucinations.    All other systems reviewed and are negative.  Objective:     Vital Signs (Most Recent):  Temp: 97.9 °F (36.6 °C) (10/28/22 1650)  Pulse: 66 (10/28/22 1830)  Resp: 20 (10/28/22 1830)  BP: (!) 167/74 (10/28/22 1830)  SpO2: 98 % (10/28/22 1830)   Vital Signs (24h Range):  Temp:  [97.9 °F (36.6 °C)] 97.9 °F (36.6 °C)  Pulse:  [65-66] 66  Resp:  [16-20] 20  SpO2:  [97 %-98 %] 98 %  BP: (155-170)/(70-74) 167/74     Weight: 67.9 kg (149 lb 12.8 oz)  Body mass index is 21.49 kg/m².    Physical Exam  Vitals and nursing note reviewed.   Constitutional:       General: He is awake. He is not in acute distress.     Appearance: He is not ill-appearing.      Comments: Pleasant elderly  male, in no respiratory distress.  Comfortably lying in bed.  Little hard of hearing.  Daughter at the bedside.   HENT:      Head: Normocephalic and atraumatic.      Mouth/Throat:       Mouth: Mucous membranes are moist.   Eyes:      General: No scleral icterus.     Conjunctiva/sclera: Conjunctivae normal.   Cardiovascular:      Rate and Rhythm: Regular rhythm. Bradycardia present.      Heart sounds: No murmur heard.  Pulmonary:      Effort: Pulmonary effort is normal. No respiratory distress.      Breath sounds: Normal breath sounds. No wheezing.   Abdominal:      Palpations: Abdomen is soft.      Tenderness: There is no abdominal tenderness.   Musculoskeletal:         General: No swelling. Normal range of motion.      Cervical back: Normal range of motion and neck supple.   Skin:     General: Skin is warm.      Coloration: Skin is not jaundiced.   Neurological:      General: No focal deficit present.      Mental Status: He is alert and oriented to person, place, and time. Mental status is at baseline.   Psychiatric:         Attention and Perception: Attention normal.         Speech: Speech normal.         Behavior: Behavior is cooperative.           Significant Labs: All pertinent labs within the past 24 hours have been reviewed.  BMP:   Recent Labs   Lab 10/28/22  1758         K 4.3      CO2 25   BUN 12   CREATININE 0.9   CALCIUM 9.3   MG 2.1     CBC:   Recent Labs   Lab 10/28/22  1758   WBC 4.90   HGB 13.5*   HCT 40.7        CMP:   Recent Labs   Lab 10/28/22  1758      K 4.3      CO2 25      BUN 12   CREATININE 0.9   CALCIUM 9.3   PROT 7.1   ALBUMIN 4.0   BILITOT 0.4   ALKPHOS 69   AST 20   ALT 13   ANIONGAP 9     Cardiac Markers:   Recent Labs   Lab 10/28/22  1758   BNP 23     Troponin:   Recent Labs   Lab 10/28/22  1758   TROPONINI 0.014       Significant Imaging: I have reviewed all pertinent imaging results/findings within the past 24 hours.  I have reviewed and interpreted all pertinent imaging results/findings within the past 24 hours.    Imaging Results              X-Ray Chest AP Portable (Final result)  Result time 10/28/22 18:19:31       Final result by Lisa Combs MD (10/28/22 18:19:31)                   Impression:      No acute abnormality.      Electronically signed by: Garret Gonzalez  Date:    10/28/2022  Time:    18:19               Narrative:    EXAMINATION:  XR CHEST AP PORTABLE    CLINICAL HISTORY:  near syncope;    TECHNIQUE:  Single frontal view of the chest was performed.    COMPARISON:  None    FINDINGS:  Low lung volumes.The lungs are clear, with normal appearance of pulmonary vasculature and no pleural effusion or pneumothorax.    The cardiac silhouette is prominent.  The hilar and mediastinal contours are unremarkable.    Bones are intact.                                    I have independently reviewed and interpreted the EKG.     I have independently reviewed all pertinent labs within the past 24 hours.    I have independently reviewed, visualized and interpreted all pertinent imaging results within the past 24 hours and discussed the findings with the ED physician, Dr. Hendrix          Assessment/Plan:     * Sinus bradycardia  -symptomatic bradycardia with frequent long pauses on Holter monitor recently.    -monitor in ICU overnight.    -cardiology consulted, aware.    -patient not on beta-blockers.      Sinus pause  -patient was sent to the ED by cardiologist, due to frequent long pauses on Holter monitor  -currently asymptomatic.    -denies lightheadedness, dizziness (was having frequent presyncopal episodes for past few weeks, but none in last 3-4 days)  -per Dr. Blackman, to be monitored in ICU overnight      Hyperlipidemia  -continue statin      Chronic bilateral low back pain without sciatica           VTE Risk Mitigation (From admission, onward)         Ordered     Place sequential compression device  Until discontinued         10/28/22 2002               Code status:  Full code.    Jr Tesfaye MD  Department of Hospital Medicine   [facility] - Emergency Dept.

## 2022-10-29 NOTE — HPI
89-year-old  male with PMH significant for BPH, hyperlipidemia, has been having frequent disease pills, presyncopal episodes for the past few weeks. Had an event monitor study read yesterday by Dr Renteria with findings of sinus pauses up to 6 seconds with conversion from afib   Currently in sinus rhythm , no events overnight   Had afib burden of 3% on monitor for 13 days

## 2022-10-29 NOTE — ASSESSMENT & PLAN NOTE
During conversion from afib to sinus   See strip above  Reviewed bardy monitor tracings   AC on hold due to possible pacemaker implant next week

## 2022-10-29 NOTE — EICU
EICU Note    88 y/o male with a PMH of HLD, GERD, diverticulosis, HTN and BPH presents with complaints of dizziness for the past few weeks. Patient had a heart monitor placed and was found to have several long pauses and was advised to go to the hospital.    Currently:    BP (!) 151/70   Pulse 65   Temp 98.1 °F (36.7 °C) (Oral)   Resp 15   Wt 67.9 kg (149 lb 12.8 oz)   SpO2 95%   BMI 21.49 kg/m²     Labs:    CBC: WBC 4.9/ Hgb 13.5/ Hct 40.7/ plts 248K    BMP:  Na 139/ K 4.3/ Cl 105/ CO2 25/ BUN 12/ Cr 0.9/ glucose 103    BNP: 23  Troponin: 0.014    Chest Xray 10/28/22: Final Reading  FINDINGS:  Low lung volumes.The lungs are clear, with normal appearance of pulmonary vasculature and no pleural effusion or pneumothorax.  The cardiac silhouette is prominent.  The hilar and mediastinal contours are unremarkable.  Bones are intact.     Impression:  No acute abnormality    Impression;    -Near syncope from sinus pauses    Plan:  -Patient uis not on any rate controlling agent at home  -Montitor for pauses under telemetry  -Cardiology follow up

## 2022-10-29 NOTE — CONSULTS
[facility] ([facility])  Cardiology  Consult Note    Patient Name: Dominguez Ritchie  MRN: [id number]  Admission Date: 10/28/2022  Hospital Length of Stay: 1 days  Code Status: No Order   Attending Provider: Nazario Gore, *   Consulting Provider: Luis A Tejeda MD  Primary Care Physician: Madie Davis MD  Principal Problem:Sinus bradycardia    Patient information was obtained from patient, past medical records and ER records.     Inpatient consult to Cardiology  Consult performed by: Luis A Tejeda MD  Consult ordered by: Jr Tesfaye MD  Reason for consult: Sinus pause         Subjective:     Chief Complaint:  Sinus pause     HPI:   89-year-old  male with PMH significant for BPH, hyperlipidemia, has been having frequent disease pills, presyncopal episodes for the past few weeks. Had an event monitor study read yesterday by Dr Renteria with findings of sinus pauses up to 6 seconds with conversion from afib   Currently in sinus rhythm , no events overnight   Had afib burden of 3% on monitor for 13 days         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       Past Surgical History:   Procedure Laterality Date    APPENDECTOMY      BACK SURGERY Bilateral 2016    CATARACT EXTRACTION, BILATERAL      COLONOSCOPY  2/2009    EYE SURGERY      JOINT REPLACEMENT      left knee    LUMBAR PARAVERTEBRAL FACET JOINT NERVE BLOCK Bilateral 8/29/2019    Procedure: LMBB L3,4,5;  Surgeon:  Nabor Sadler MD;  Location: [facility] PAIN [facility]GT;  Service: Pain Management;  Laterality: Bilateral;    SELECTIVE INJECTION OF ANESTHETIC AGENT AROUND LUMBAR SPINAL NERVE ROOT BY TRANSFORAMINAL APPROACH Bilateral 11/8/2021    Procedure: Bilateral L4/5 +/- L5/S1 TF DREW;  Surgeon: Nabor Sadler MD;  Location: [facility] PAIN [facility]GT;  Service: Pain Management;  Laterality: Bilateral;    SELECTIVE INJECTION OF ANESTHETIC AGENT AROUND LUMBAR SPINAL NERVE ROOT BY TRANSFORAMINAL APPROACH Bilateral 1/4/2022    Procedure: Bilateral L4/5 + L5/S1 TF DREW;  Surgeon: Nabor Sadler MD;  Location: [facility] PAIN [facility]GT;  Service: Pain Management;  Laterality: Bilateral;    SPINE SURGERY      UPPER GASTROINTESTINAL ENDOSCOPY  2/2009       Review of patient's allergies indicates:   Allergen Reactions    Shellfish containing products Nausea And Vomiting    Amoxicillin Rash    Iodine and iodide containing products Nausea And Vomiting       No current facility-administered medications on file prior to encounter.     Current Outpatient Medications on File Prior to Encounter   Medication Sig    amLODIPine (NORVASC) 2.5 MG tablet Take 1 tablet (2.5 mg total) by mouth once daily.    dutasteride (AVODART) 0.5 mg capsule Take 1 capsule (0.5 mg total) by mouth once daily.    pantoprazole (PROTONIX) 40 MG tablet Take 1 tablet (40 mg total) by mouth once daily.    rosuvastatin (CRESTOR) 5 MG tablet Take 1 tablet (5 mg total) by mouth once daily.    bisacodyL (DULCOLAX) 5 mg EC tablet Take 5 mg by mouth daily as needed for Constipation.    diazePAM (VALIUM) 5 MG tablet Take 1 tablet (5 mg total) by mouth once. for 1 dose (Patient not taking: Reported on 9/13/2022)    fluorouraciL (EFUDEX) 5 % cream AAA scalp and temples bid x 2 weeks, then AAA of both forearms bid x 4 weeks    fluticasone propionate (CUTIVATE) 0.05 % cream     ketoconazole (NIZORAL) 2 % cream     LIDOcaine HCl 2% (XYLOCAINE) 2 % Soln APPLY 5 ML  EVERY 6 HOURS    meclizine (ANTIVERT) 25  mg tablet Take 1 tablet by mouth twice daily as needed    meloxicam (MOBIC) 7.5 MG tablet Take 1 tablet by mouth once daily    omeprazole (PRILOSEC) 40 MG capsule Take 1 capsule (40 mg total) by mouth every morning.    oxyCODONE-acetaminophen (PERCOCET) 5-325 mg per tablet Take 1 tablet by mouth every 4 (four) hours as needed for Pain.    polyethylene glycol (GLYCOLAX) 17 gram PwPk Take 17 g by mouth once daily.    sulfamethoxazole-trimethoprim 800-160mg (BACTRIM DS) 800-160 mg Tab Take 1 tablet by mouth 2 (two) times daily.    traMADoL (ULTRAM) 50 mg tablet Take 1 tablet (50 mg total) by mouth every 24 hours as needed for Pain.    tramadol-acetaminophen 37.5-325 mg (ULTRACET) 37.5-325 mg Tab Take 1 tablet by mouth every 12 (twelve) hours as needed for Pain. (Patient not taking: No sig reported)    triamcinolone acetonide 0.1% (KENALOG) 0.1 % cream Apply topically 2 (two) times daily.     Family History       Problem Relation (Age of Onset)    Cancer Son    Diabetes Maternal Uncle    Heart disease Mother          Tobacco Use    Smoking status: Former     Packs/day: 1.00     Years: 25.00     Pack years: 25.00     Types: Cigarettes     Quit date: 1990     Years since quittin.8    Smokeless tobacco: Never   Substance and Sexual Activity    Alcohol use: No     Alcohol/week: 0.0 standard drinks    Drug use: No    Sexual activity: Not Currently     Birth control/protection: None     Review of Systems   Constitutional: Negative for fever and malaise/fatigue.   HENT:  Negative for sore throat.    Eyes:  Negative for blurred vision.   Cardiovascular:  Positive for near-syncope. Negative for chest pain, dyspnea on exertion, orthopnea and syncope.   Respiratory:  Negative for cough and hemoptysis.    Hematologic/Lymphatic: Negative for bleeding problem.   Skin:  Negative for rash.   Musculoskeletal:  Negative for falls.   Gastrointestinal:  Negative for abdominal pain.   Genitourinary: Negative.     Neurological: Negative.    Psychiatric/Behavioral:  Negative for altered mental status and substance abuse.    Objective:     Vital Signs (Most Recent):  Temp: 98.2 °F (36.8 °C) (10/29/22 0701)  Pulse: 65 (10/29/22 1101)  Resp: (!) 34 (10/29/22 1101)  BP: 128/72 (10/29/22 1101)  SpO2: 97 % (10/29/22 1101)   Vital Signs (24h Range):  Temp:  [97.9 °F (36.6 °C)-98.4 °F (36.9 °C)] 98.2 °F (36.8 °C)  Pulse:  [59-73] 65  Resp:  [14-58] 34  SpO2:  [92 %-98 %] 97 %  BP: (123-172)/(57-89) 128/72     Weight: 68 kg (149 lb 14.6 oz)  Body mass index is 21.51 kg/m².    SpO2: 97 %  O2 Device (Oxygen Therapy): room air      Intake/Output Summary (Last 24 hours) at 10/29/2022 1129  Last data filed at 10/29/2022 1000  Gross per 24 hour   Intake 118 ml   Output 810 ml   Net -692 ml       Lines/Drains/Airways       Peripheral Intravenous Line  Duration                  Peripheral IV - Single Lumen 10/28/22 1739 18 G Right Antecubital <1 day                    Physical Exam  Vitals reviewed.   Constitutional:       Appearance: He is well-developed.   HENT:      Head: Normocephalic and atraumatic.   Eyes:      General: No scleral icterus.     Conjunctiva/sclera: Conjunctivae normal.   Cardiovascular:      Rate and Rhythm: Normal rate and regular rhythm.      Pulses: Intact distal pulses.      Heart sounds: Normal heart sounds. No murmur heard.  Pulmonary:      Effort: No respiratory distress.      Breath sounds: No wheezing or rales.   Chest:      Chest wall: No tenderness.   Abdominal:      General: Bowel sounds are normal. There is no distension.      Palpations: Abdomen is soft.      Tenderness: There is no guarding.   Musculoskeletal:         General: Normal range of motion.      Cervical back: Normal range of motion and neck supple.   Skin:     General: Skin is warm.   Neurological:      Mental Status: He is alert and oriented to person, place, and time.       Significant Labs: All pertinent lab results from the last 24 hours  have been reviewed. and   Recent Lab Results         10/29/22  0846   10/29/22  0601   10/28/22  1758        Albumin     4.0       Alkaline Phosphatase     69       ALT     13       Anion Gap     9       AST     20       Baso #   0.03   0.04       Basophil %   0.6   0.8       BILIRUBIN TOTAL     0.4  Comment: For infants and newborns, interpretation of results should be based  on gestational age, weight and in agreement with clinical  observations.    Premature Infant recommended reference ranges:  Up to 24 hours.............<8.0 mg/dL  Up to 48 hours............<12.0 mg/dL  3-5 days..................<15.0 mg/dL  6-29 days.................<15.0 mg/dL         BNP     23  Comment: Values of less than 100 pg/ml are consistent with non-CHF populations.       BUN     12       Calcium     9.3       Chloride     105       CO2     25       Creatinine     0.9       Differential Method   Automated   Automated       eGFR     >60       Eos #   0.1   0.1       Eosinophil %   2.6   1.8       Glucose     103       Gran # (ANC)   2.6   2.9       Gran %   55.4   59.1       Hematocrit   39.5   40.7       Hemoglobin   13.1   13.5       Immature Grans (Abs)   0.01  Comment: Mild elevation in immature granulocytes is non specific and   can be seen in a variety of conditions including stress response,   acute inflammation, trauma and pregnancy. Correlation with other   laboratory and clinical findings is essential.     0.02  Comment: Mild elevation in immature granulocytes is non specific and   can be seen in a variety of conditions including stress response,   acute inflammation, trauma and pregnancy. Correlation with other   laboratory and clinical findings is essential.         Immature Granulocytes   0.2   0.4       Lymph #   1.3   1.3       Lymph %   28.9   26.1       Magnesium     2.1       MCH   31.0   31.0       MCHC   33.2   33.2       MCV   94   93       Mono #   0.6   0.6       Mono %   12.3   11.8       MPV   9.3   9.7        nRBC   0   0       Platelets   226   248       Potassium     4.3       PROTEIN TOTAL     7.1       RBC   4.22   4.36       RDW   12.6   12.6       Sodium     139       Troponin I     0.014  Comment: The reference interval for Troponin I represents the 99th percentile   cutoff   for our facility and is consistent with 3rd generation assay   performance.         TSH 1.733           WBC   4.63   4.90               Significant Imaging: EKG: nsr   Conclusion 10.6.2022        Patient Reported Event #1 Patient activated. The symptom(s) included dizziness. The corresponding rhythm to the patient reported event was sinus bradycardia.   Critical, 6 sec pauses, contacted patient and ordering physician   Patient to go to ED     Predominant Rhythm  Sinus rhythm with heart rates varying between 31 and 112 BPM with an average of 66BPM.     - Predominant rhythm: NSR  - Atrial Fibrillation (AF) 3.0%  - Atrial Tachycardia (AT) 148 episodes, Longest 18 beats @ Avg 111 bpm up to 125 bpm, Fastest 3 beats @ Avg 153 bpm up to 168 bpm  - Ventricular Tachycardia (VT) 2 episodes, Longest/Fastest 6 beats @ Avg 115 bpm up to 151 bpm  - Pauses 27 up to 6.9 seconds  - Junctional Rhythm (JR)  - Junctional Escape Beat(s) (ADILENE)  - Ectopic Atrial Beat(s) (EAB)  - PAC 3.23 %  - PVC 0.42 %  - Ventricular Escape Beat(s) (VEB)  - Nonconducted Atrial Run    Other considerations  - Heart rates <40 bpm occurred during JR accounting for <1% of the total time.  - Frequent intermittent transitions of Sinus and AF were observed             Assessment and Plan:     Paroxysmal A-fib  Iv heparin for now   doac on discharge       Tachy-susanna syndrome  See sinus pause    Sinus pause  During conversion from afib to sinus   See strip above  Reviewed bardy monitor tracings   Oral AC on hold due to possible pacemaker implant early next week         VTE Risk Mitigation (From admission, onward)         Ordered     Place sequential compression device  Until  discontinued         10/28/22 2002                Thank you for your consult. I will follow-up with patient. Please contact us if you have any additional questions.    Luis A Tejeda MD  Cardiology   [facility] ([facility])

## 2022-10-29 NOTE — ASSESSMENT & PLAN NOTE
-patient was sent to the ED by cardiologist, due to frequent long pauses on Holter monitor  -currently asymptomatic.    -denies lightheadedness, dizziness (was having frequent presyncopal episodes for past few weeks, but none in last 3-4 days)  -per Dr. Blackman, to be monitored in ICU overnight

## 2022-10-29 NOTE — HPI
Mr. Ritchie is a pleasant 89-year-old  male with PMH significant for BPH, hyperlipidemia, has been having frequent disease pills, presyncopal episodes for the past few weeks.  He was evaluated by his PCP, Holter monitor was ordered.  Patient was advised by Dr. Renteria to present to the ED due to frequent long pauses.  Patient states that he did not feel lightheaded or dizzy for the past few days.  Denies any complaints at this time.  In the ED he is asymptomatic.  Denies lightheadedness, dizziness, CP, SOB, palpitations.  HR in the 60s.  /74.  Patient not on beta-blockers.  Takes amlodipine for BP.  ED physician discussed case with Cardiology on-call, Dr. Blackman, who recommended placing the patient in the ICU overnight.    Discussed code status with patient in front of family member.  Remains full code.

## 2022-10-29 NOTE — PLAN OF CARE
Patient remained safe and stable for the duration of the shift. See assessment fowsheets for more details. Currently sitting up in bed with safety and fall prevention measures in place. Vital signs are stable. Normal sinus rhythm on cardiac monitor #[id number]. Anticipating transfer to telemetry room 240. Will monitor for needs/changes.

## 2022-10-29 NOTE — SUBJECTIVE & OBJECTIVE
Past Medical History:   Diagnosis Date    Abnormal exercise tolerance test 7/25/2013    Arthritis     Colon polyp     Diverticulosis     Dyslipidemia 7/25/2013    GERD (gastroesophageal reflux disease)     HTN, goal below 130/80 12/3/2018    Hyperlipidemia 1980    HIGH TG    Knee pain, right 6/14/2013    Low back pain with right-sided sciatica 12/3/2018    Meningioma     Personal history of colonic polyps 5/27/2014    RLS (restless legs syndrome) 6/14/2013    Tobacco dependence     resolved    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       Past Surgical History:   Procedure Laterality Date    APPENDECTOMY      BACK SURGERY Bilateral 2016    CATARACT EXTRACTION, BILATERAL      COLONOSCOPY  2/2009    EYE SURGERY      JOINT REPLACEMENT      left knee    LUMBAR PARAVERTEBRAL FACET JOINT NERVE BLOCK Bilateral 8/29/2019    Procedure: LMBB L3,4,5;  Surgeon: Nabor Sadler MD;  Location: [facility]IN [facility]GT;  Service: Pain Management;  Laterality: Bilateral;    SELECTIVE INJECTION OF ANESTHETIC AGENT AROUND LUMBAR SPINAL NERVE ROOT BY TRANSFORAMINAL APPROACH Bilateral 11/8/2021    Procedure: Bilateral L4/5 +/- L5/S1 TF DREW;  Surgeon: Nabor Sadler MD;  Location: [facility]IN [facility]GT;  Service: Pain Management;  Laterality: Bilateral;    SELECTIVE INJECTION OF ANESTHETIC AGENT AROUND LUMBAR SPINAL NERVE ROOT BY TRANSFORAMINAL APPROACH Bilateral 1/4/2022    Procedure: Bilateral L4/5 + L5/S1 TF DREW;  Surgeon: Nabor Sadler MD;  Location: [facility];  Service: Pain Management;  Laterality: Bilateral;    SPINE SURGERY      UPPER GASTROINTESTINAL ENDOSCOPY  2/2009       Review of patient's allergies indicates:   Allergen Reactions    Shellfish containing products Nausea And Vomiting    Amoxicillin Rash    Iodine and iodide containing products Nausea And Vomiting       No current facility-administered medications on file prior to encounter.     Current Outpatient Medications on File Prior to Encounter   Medication Sig    amLODIPine (NORVASC)  2.5 MG tablet Take 1 tablet (2.5 mg total) by mouth once daily.    dutasteride (AVODART) 0.5 mg capsule Take 1 capsule (0.5 mg total) by mouth once daily.    pantoprazole (PROTONIX) 40 MG tablet Take 1 tablet (40 mg total) by mouth once daily.    rosuvastatin (CRESTOR) 5 MG tablet Take 1 tablet (5 mg total) by mouth once daily.    bisacodyL (DULCOLAX) 5 mg EC tablet Take 5 mg by mouth daily as needed for Constipation.    diazePAM (VALIUM) 5 MG tablet Take 1 tablet (5 mg total) by mouth once. for 1 dose (Patient not taking: Reported on 9/13/2022)    fluorouraciL (EFUDEX) 5 % cream AAA scalp and temples bid x 2 weeks, then AAA of both forearms bid x 4 weeks    fluticasone propionate (CUTIVATE) 0.05 % cream     ketoconazole (NIZORAL) 2 % cream     LIDOcaine HCl 2% (XYLOCAINE) 2 % Soln APPLY 5 ML  EVERY 6 HOURS    meclizine (ANTIVERT) 25 mg tablet Take 1 tablet by mouth twice daily as needed    meloxicam (MOBIC) 7.5 MG tablet Take 1 tablet by mouth once daily    omeprazole (PRILOSEC) 40 MG capsule Take 1 capsule (40 mg total) by mouth every morning.    oxyCODONE-acetaminophen (PERCOCET) 5-325 mg per tablet Take 1 tablet by mouth every 4 (four) hours as needed for Pain.    polyethylene glycol (GLYCOLAX) 17 gram PwPk Take 17 g by mouth once daily.    sulfamethoxazole-trimethoprim 800-160mg (BACTRIM DS) 800-160 mg Tab Take 1 tablet by mouth 2 (two) times daily.    traMADoL (ULTRAM) 50 mg tablet Take 1 tablet (50 mg total) by mouth every 24 hours as needed for Pain.    tramadol-acetaminophen 37.5-325 mg (ULTRACET) 37.5-325 mg Tab Take 1 tablet by mouth every 12 (twelve) hours as needed for Pain. (Patient not taking: No sig reported)    triamcinolone acetonide 0.1% (KENALOG) 0.1 % cream Apply topically 2 (two) times daily.     Family History       Problem Relation (Age of Onset)    Cancer Son    Diabetes Maternal Uncle    Heart disease Mother          Tobacco Use    Smoking status: Former     Packs/day: 1.00     Years: 25.00      Pack years: 25.00     Types: Cigarettes     Quit date: 1990     Years since quittin.8    Smokeless tobacco: Never   Substance and Sexual Activity    Alcohol use: No     Alcohol/week: 0.0 standard drinks    Drug use: No    Sexual activity: Not Currently     Birth control/protection: None     Review of Systems   Constitutional: Negative for fever and malaise/fatigue.   HENT:  Negative for sore throat.    Eyes:  Negative for blurred vision.   Cardiovascular:  Positive for near-syncope. Negative for chest pain, dyspnea on exertion, orthopnea and syncope.   Respiratory:  Negative for cough and hemoptysis.    Hematologic/Lymphatic: Negative for bleeding problem.   Skin:  Negative for rash.   Musculoskeletal:  Negative for falls.   Gastrointestinal:  Negative for abdominal pain.   Genitourinary: Negative.    Neurological: Negative.    Psychiatric/Behavioral:  Negative for altered mental status and substance abuse.    Objective:     Vital Signs (Most Recent):  Temp: 98.2 °F (36.8 °C) (10/29/22 0701)  Pulse: 65 (10/29/22 1101)  Resp: (!) 34 (10/29/22 1101)  BP: 128/72 (10/29/22 1101)  SpO2: 97 % (10/29/22 1101)   Vital Signs (24h Range):  Temp:  [97.9 °F (36.6 °C)-98.4 °F (36.9 °C)] 98.2 °F (36.8 °C)  Pulse:  [59-73] 65  Resp:  [14-58] 34  SpO2:  [92 %-98 %] 97 %  BP: (123-172)/(57-89) 128/72     Weight: 68 kg (149 lb 14.6 oz)  Body mass index is 21.51 kg/m².    SpO2: 97 %  O2 Device (Oxygen Therapy): room air      Intake/Output Summary (Last 24 hours) at 10/29/2022 1129  Last data filed at 10/29/2022 1000  Gross per 24 hour   Intake 118 ml   Output 810 ml   Net -692 ml       Lines/Drains/Airways       Peripheral Intravenous Line  Duration                  Peripheral IV - Single Lumen 10/28/22 1739 18 G Right Antecubital <1 day                    Physical Exam  Vitals reviewed.   Constitutional:       Appearance: He is well-developed.   HENT:      Head: Normocephalic and atraumatic.   Eyes:      General: No  scleral icterus.     Conjunctiva/sclera: Conjunctivae normal.   Cardiovascular:      Rate and Rhythm: Normal rate and regular rhythm.      Pulses: Intact distal pulses.      Heart sounds: Normal heart sounds. No murmur heard.  Pulmonary:      Effort: No respiratory distress.      Breath sounds: No wheezing or rales.   Chest:      Chest wall: No tenderness.   Abdominal:      General: Bowel sounds are normal. There is no distension.      Palpations: Abdomen is soft.      Tenderness: There is no guarding.   Musculoskeletal:         General: Normal range of motion.      Cervical back: Normal range of motion and neck supple.   Skin:     General: Skin is warm.   Neurological:      Mental Status: He is alert and oriented to person, place, and time.       Significant Labs: All pertinent lab results from the last 24 hours have been reviewed. and   Recent Lab Results         10/29/22  0846   10/29/22  0601   10/28/22  1758        Albumin     4.0       Alkaline Phosphatase     69       ALT     13       Anion Gap     9       AST     20       Baso #   0.03   0.04       Basophil %   0.6   0.8       BILIRUBIN TOTAL     0.4  Comment: For infants and newborns, interpretation of results should be based  on gestational age, weight and in agreement with clinical  observations.    Premature Infant recommended reference ranges:  Up to 24 hours.............<8.0 mg/dL  Up to 48 hours............<12.0 mg/dL  3-5 days..................<15.0 mg/dL  6-29 days.................<15.0 mg/dL         BNP     23  Comment: Values of less than 100 pg/ml are consistent with non-CHF populations.       BUN     12       Calcium     9.3       Chloride     105       CO2     25       Creatinine     0.9       Differential Method   Automated   Automated       eGFR     >60       Eos #   0.1   0.1       Eosinophil %   2.6   1.8       Glucose     103       Gran # (ANC)   2.6   2.9       Gran %   55.4   59.1       Hematocrit   39.5   40.7       Hemoglobin   13.1    13.5       Immature Grans (Abs)   0.01  Comment: Mild elevation in immature granulocytes is non specific and   can be seen in a variety of conditions including stress response,   acute inflammation, trauma and pregnancy. Correlation with other   laboratory and clinical findings is essential.     0.02  Comment: Mild elevation in immature granulocytes is non specific and   can be seen in a variety of conditions including stress response,   acute inflammation, trauma and pregnancy. Correlation with other   laboratory and clinical findings is essential.         Immature Granulocytes   0.2   0.4       Lymph #   1.3   1.3       Lymph %   28.9   26.1       Magnesium     2.1       MCH   31.0   31.0       MCHC   33.2   33.2       MCV   94   93       Mono #   0.6   0.6       Mono %   12.3   11.8       MPV   9.3   9.7       nRBC   0   0       Platelets   226   248       Potassium     4.3       PROTEIN TOTAL     7.1       RBC   4.22   4.36       RDW   12.6   12.6       Sodium     139       Troponin I     0.014  Comment: The reference interval for Troponin I represents the 99th percentile   cutoff   for our facility and is consistent with 3rd generation assay   performance.         TSH 1.733           WBC   4.63   4.90               Significant Imaging: EKG: nsr   Conclusion 10.6.2022       Patient Reported Event #1 Patient activated. The symptom(s) included dizziness. The corresponding rhythm to the patient reported event was sinus bradycardia.  Critical, 6 sec pauses, contacted patient and ordering physician  Patient to go to ED     Predominant Rhythm  Sinus rhythm with heart rates varying between 31 and 112 BPM with an average of 66BPM.     - Predominant rhythm: NSR  - Atrial Fibrillation (AF) 3.0%  - Atrial Tachycardia (AT) 148 episodes, Longest 18 beats @ Avg 111 bpm up to 125 bpm, Fastest 3 beats @ Avg 153 bpm up to 168 bpm  - Ventricular Tachycardia (VT) 2 episodes, Longest/Fastest 6 beats @ Avg 115 bpm up to 151 bpm  -  Pauses 27 up to 6.9 seconds  - Junctional Rhythm (JR)  - Junctional Escape Beat(s) (ADILENE)  - Ectopic Atrial Beat(s) (EAB)  - PAC 3.23 %  - PVC 0.42 %  - Ventricular Escape Beat(s) (VEB)  - Nonconducted Atrial Run    Other considerations  - Heart rates <40 bpm occurred during JR accounting for <1% of the total time.  - Frequent intermittent transitions of Sinus and AF were observed

## 2022-10-29 NOTE — HOSPITAL COURSE
10/29     Examination done at bedside, alert and oriented, denied any acute issues  Hemodynamically stable   As per cardiology- Iv heparin for now   doac on discharge , Oral AC on hold due to possible pacemaker implant early next week;       10/30     Examination done at bedside, patient alert and oriented, denied any acute issues   Hemodynamically stable, into AFib with rate controlled in 70s --> possible pacemaker placement in a.m. by Cardiology  Currently on heparin drip    10/31     Examination done at bedside, alert and oriented, hemodynamically stable, labs reviewed.  Cardiology plans for pacemaker placement tomorrow morning, explained to patient in detail, agreed to stay and undergo the procedure.  NPO since midnight;  11/1   Examination done bedside, denied any acute issues.  Hemodynamically stable, ppm placement today.  Status post pacemaker placement, patient hemodynamically stable as per Cardiology okay for discharge.    Ordered Eliquis for recent diagnosis of AFib, medications and patient preferred pharmacy.    Updated plan to patient and family at bedside, agreed to the plan of discharge today.    Patient appeared alert, oriented x3, hemodynamically stable, tolerated diet- discharge today.

## 2022-10-29 NOTE — ASSESSMENT & PLAN NOTE
-patient was sent to the ED by cardiologist, due to frequent long pauses on Holter monitor  -currently asymptomatic.    -denies lightheadedness, dizziness (was having frequent presyncopal episodes for past few weeks, but none in last 3-4 days)  -per Dr. Blackman, to be monitored in ICU overnight    10/29:     conversion from afib to sinus   Oral AC on hold due to possible pacemaker implant early next week   Med surg tele

## 2022-10-29 NOTE — ASSESSMENT & PLAN NOTE
-symptomatic bradycardia with frequent long pauses on Holter monitor recently.    -monitor in ICU overnight.    -cardiology consulted, aware.    -patient not on beta-blockers.

## 2022-10-29 NOTE — PLAN OF CARE
Problem: Adult Inpatient Plan of Care  Goal: Plan of Care Review  Outcome: Ongoing, Progressing  Pt AAOx4. Afebrile. NSR with PVCs on monitor, no significant pauses or bradycardia noted this shift. BP stable. Pt voids independently per urinal with adequate output. Pt repositions independently in bed frequently. POC reviewed with pt, verbalized understanding. Will continue with current POC and update as needed.

## 2022-10-29 NOTE — SUBJECTIVE & OBJECTIVE
Past Medical History:   Diagnosis Date    Abnormal exercise tolerance test 7/25/2013    Arthritis     Colon polyp     Diverticulosis     Dyslipidemia 7/25/2013    GERD (gastroesophageal reflux disease)     HTN, goal below 130/80 12/3/2018    Hyperlipidemia 1980    HIGH TG    Knee pain, right 6/14/2013    Low back pain with right-sided sciatica 12/3/2018    Meningioma     Personal history of colonic polyps 5/27/2014    RLS (restless legs syndrome) 6/14/2013    Tobacco dependence     resolved    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       Past Surgical History:   Procedure Laterality Date    APPENDECTOMY      BACK SURGERY Bilateral 2016    CATARACT EXTRACTION, BILATERAL      COLONOSCOPY  2/2009    EYE SURGERY      JOINT REPLACEMENT      left knee    LUMBAR PARAVERTEBRAL FACET JOINT NERVE BLOCK Bilateral 8/29/2019    Procedure: LMBB L3,4,5;  Surgeon: Nabor Sadler MD;  Location: [facility];  Service: Pain Management;  Laterality: Bilateral;    SELECTIVE INJECTION OF ANESTHETIC AGENT AROUND LUMBAR SPINAL NERVE ROOT BY TRANSFORAMINAL APPROACH Bilateral 11/8/2021    Procedure: Bilateral L4/5 +/- L5/S1 TF DREW;  Surgeon: Nabor Sadler MD;  Location: [facility];  Service: Pain Management;  Laterality: Bilateral;    SELECTIVE INJECTION OF ANESTHETIC AGENT AROUND LUMBAR SPINAL NERVE ROOT BY TRANSFORAMINAL APPROACH Bilateral 1/4/2022    Procedure: Bilateral L4/5 + L5/S1 TF DREW;  Surgeon: Nabor Sadler MD;  Location: [facility];  Service: Pain Management;  Laterality: Bilateral;    SPINE SURGERY      UPPER GASTROINTESTINAL ENDOSCOPY  2/2009       Review of patient's allergies indicates:   Allergen Reactions    Shellfish containing products Nausea And Vomiting    Amoxicillin Rash    Iodine and iodide containing products Nausea And Vomiting       No current facility-administered medications on file prior to encounter.     Current Outpatient Medications on File Prior to Encounter   Medication Sig    amLODIPine (NORVASC)  2.5 MG tablet Take 1 tablet (2.5 mg total) by mouth once daily.    bisacodyL (DULCOLAX) 5 mg EC tablet Take 5 mg by mouth daily as needed for Constipation.    diazePAM (VALIUM) 5 MG tablet Take 1 tablet (5 mg total) by mouth once. for 1 dose (Patient not taking: Reported on 9/13/2022)    dutasteride (AVODART) 0.5 mg capsule Take 1 capsule (0.5 mg total) by mouth once daily.    fluorouraciL (EFUDEX) 5 % cream AAA scalp and temples bid x 2 weeks, then AAA of both forearms bid x 4 weeks    fluticasone propionate (CUTIVATE) 0.05 % cream     ketoconazole (NIZORAL) 2 % cream     LIDOcaine HCl 2% (XYLOCAINE) 2 % Soln APPLY 5 ML  EVERY 6 HOURS    meclizine (ANTIVERT) 25 mg tablet Take 1 tablet by mouth twice daily as needed    meloxicam (MOBIC) 7.5 MG tablet Take 1 tablet by mouth once daily    omeprazole (PRILOSEC) 40 MG capsule Take 1 capsule (40 mg total) by mouth every morning.    oxyCODONE-acetaminophen (PERCOCET) 5-325 mg per tablet Take 1 tablet by mouth every 4 (four) hours as needed for Pain.    pantoprazole (PROTONIX) 40 MG tablet Take 1 tablet (40 mg total) by mouth once daily.    polyethylene glycol (GLYCOLAX) 17 gram PwPk Take 17 g by mouth once daily.    rosuvastatin (CRESTOR) 5 MG tablet Take 1 tablet (5 mg total) by mouth once daily.    sulfamethoxazole-trimethoprim 800-160mg (BACTRIM DS) 800-160 mg Tab Take 1 tablet by mouth 2 (two) times daily.    traMADoL (ULTRAM) 50 mg tablet Take 1 tablet (50 mg total) by mouth every 24 hours as needed for Pain.    tramadol-acetaminophen 37.5-325 mg (ULTRACET) 37.5-325 mg Tab Take 1 tablet by mouth every 12 (twelve) hours as needed for Pain. (Patient not taking: No sig reported)    triamcinolone acetonide 0.1% (KENALOG) 0.1 % cream Apply topically 2 (two) times daily.     Family History       Problem Relation (Age of Onset)    Cancer Son    Diabetes Maternal Uncle    Heart disease Mother          Tobacco Use    Smoking status: Former     Packs/day: 1.00     Years: 25.00      Pack years: 25.00     Types: Cigarettes     Quit date: 1990     Years since quittin.8    Smokeless tobacco: Never   Substance and Sexual Activity    Alcohol use: No     Alcohol/week: 0.0 standard drinks    Drug use: No    Sexual activity: Not Currently     Birth control/protection: None     Review of Systems   Constitutional: Negative.  Negative for chills and fever.   HENT: Negative.  Negative for congestion, rhinorrhea, sore throat and trouble swallowing.    Eyes: Negative.  Negative for visual disturbance.   Respiratory: Negative.  Negative for cough, shortness of breath and wheezing.    Cardiovascular: Negative.  Negative for chest pain and palpitations.   Gastrointestinal: Negative.  Negative for abdominal pain, diarrhea, nausea and vomiting.   Endocrine: Negative.    Genitourinary: Negative.  Negative for dysuria and flank pain.   Musculoskeletal: Negative.  Negative for back pain.   Skin: Negative.  Negative for rash.   Allergic/Immunologic: Negative.    Neurological:  Positive for dizziness and light-headedness (For the last few weeks, none for last few days). Negative for speech difficulty, weakness, numbness and headaches.   Hematological: Negative.    Psychiatric/Behavioral: Negative.  Negative for hallucinations.    All other systems reviewed and are negative.  Objective:     Vital Signs (Most Recent):  Temp: 97.9 °F (36.6 °C) (10/28/22 1650)  Pulse: 66 (10/28/22 1830)  Resp: 20 (10/28/22 1830)  BP: (!) 167/74 (10/28/22 1830)  SpO2: 98 % (10/28/22 1830)   Vital Signs (24h Range):  Temp:  [97.9 °F (36.6 °C)] 97.9 °F (36.6 °C)  Pulse:  [65-66] 66  Resp:  [16-20] 20  SpO2:  [97 %-98 %] 98 %  BP: (155-170)/(70-74) 167/74     Weight: 67.9 kg (149 lb 12.8 oz)  Body mass index is 21.49 kg/m².    Physical Exam  Vitals and nursing note reviewed.   Constitutional:       General: He is awake. He is not in acute distress.     Appearance: He is not ill-appearing.      Comments: Pleasant elderly   male, in no respiratory distress.  Comfortably lying in bed.  Little hard of hearing.  Daughter at the bedside.   HENT:      Head: Normocephalic and atraumatic.      Mouth/Throat:      Mouth: Mucous membranes are moist.   Eyes:      General: No scleral icterus.     Conjunctiva/sclera: Conjunctivae normal.   Cardiovascular:      Rate and Rhythm: Regular rhythm. Bradycardia present.      Heart sounds: No murmur heard.  Pulmonary:      Effort: Pulmonary effort is normal. No respiratory distress.      Breath sounds: Normal breath sounds. No wheezing.   Abdominal:      Palpations: Abdomen is soft.      Tenderness: There is no abdominal tenderness.   Musculoskeletal:         General: No swelling. Normal range of motion.      Cervical back: Normal range of motion and neck supple.   Skin:     General: Skin is warm.      Coloration: Skin is not jaundiced.   Neurological:      General: No focal deficit present.      Mental Status: He is alert and oriented to person, place, and time. Mental status is at baseline.   Psychiatric:         Attention and Perception: Attention normal.         Speech: Speech normal.         Behavior: Behavior is cooperative.           Significant Labs: All pertinent labs within the past 24 hours have been reviewed.  BMP:   Recent Labs   Lab 10/28/22  1758         K 4.3      CO2 25   BUN 12   CREATININE 0.9   CALCIUM 9.3   MG 2.1     CBC:   Recent Labs   Lab 10/28/22  1758   WBC 4.90   HGB 13.5*   HCT 40.7        CMP:   Recent Labs   Lab 10/28/22  1758      K 4.3      CO2 25      BUN 12   CREATININE 0.9   CALCIUM 9.3   PROT 7.1   ALBUMIN 4.0   BILITOT 0.4   ALKPHOS 69   AST 20   ALT 13   ANIONGAP 9     Cardiac Markers:   Recent Labs   Lab 10/28/22  1758   BNP 23     Troponin:   Recent Labs   Lab 10/28/22  1758   TROPONINI 0.014       Significant Imaging: I have reviewed all pertinent imaging results/findings within the past 24 hours.  I have reviewed and  interpreted all pertinent imaging results/findings within the past 24 hours.    Imaging Results              X-Ray Chest AP Portable (Final result)  Result time 10/28/22 18:19:31      Final result by Lisa Combs MD (10/28/22 18:19:31)                   Impression:      No acute abnormality.      Electronically signed by: Garret Gonzalez  Date:    10/28/2022  Time:    18:19               Narrative:    EXAMINATION:  XR CHEST AP PORTABLE    CLINICAL HISTORY:  near syncope;    TECHNIQUE:  Single frontal view of the chest was performed.    COMPARISON:  None    FINDINGS:  Low lung volumes.The lungs are clear, with normal appearance of pulmonary vasculature and no pleural effusion or pneumothorax.    The cardiac silhouette is prominent.  The hilar and mediastinal contours are unremarkable.    Bones are intact.                                    I have independently reviewed and interpreted the EKG.     I have independently reviewed all pertinent labs within the past 24 hours.    I have independently reviewed, visualized and interpreted all pertinent imaging results within the past 24 hours and discussed the findings with the ED physician, Dr. Hendrix

## 2022-10-29 NOTE — PROGRESS NOTES
[facility] - Intensive Care ([facility] Medicine  Progress Note    Patient Name: Dominguez Ritchie  MRN: [id number]  Patient Class: IP- Inpatient   Admission Date: 10/28/2022  Length of Stay: 1 days  Attending Physician: Nazario Gore, *  Primary Care Provider: Madie Davis MD        Subjective:     Principal Problem:Sinus bradycardia        HPI:  Mr. Ritchie is a pleasant 89-year-old  male with PMH significant for BPH, hyperlipidemia, has been having frequent disease pills, presyncopal episodes for the past few weeks.  He was evaluated by his PCP, Holter monitor was ordered.  Patient was advised by Dr. Rneteria to present to the ED due to frequent long pauses.  Patient states that he did not feel lightheaded or dizzy for the past few days.  Denies any complaints at this time.  In the ED he is asymptomatic.  Denies lightheadedness, dizziness, CP, SOB, palpitations.  HR in the 60s.  /74.  Patient not on beta-blockers.  Takes amlodipine for BP.  ED physician discussed case with Cardiology on-call, Dr. Blackman, who recommended placing the patient in the ICU overnight.    Discussed code status with patient in front of family member.  Remains full code.      Overview/Hospital Course:  10/29     Examination done at bedside, alert and oriented, denied any acute issues  Hemodynamically stable   As per cardiology- Iv heparin for now   doac on discharge , Oral AC on hold due to possible pacemaker implant early next week;             Review of Systems    Constitutional: Negative.  Negative for chills and fever.   HENT: Negative.  Negative for congestion, rhinorrhea, sore throat and trouble swallowing.    Eyes: Negative.  Negative for visual disturbance.   Respiratory: Negative.  Negative for cough, shortness of breath and wheezing.    Cardiovascular: Negative.  Negative for chest pain and palpitations.   Gastrointestinal: Negative.  Negative for abdominal pain, diarrhea, nausea and vomiting.    Endocrine: Negative.    Genitourinary: Negative.  Negative for dysuria and flank pain.   Musculoskeletal: Negative.  Negative for back pain.   Skin: Negative.  Negative for rash.   Allergic/Immunologic: Negative.    Neurological:  denied dizziness;   Negative for speech difficulty, weakness, numbness and headaches.   Hematological: Negative.    Psychiatric/Behavioral: Negative.  Negative for hallucinations.    All other systems reviewed and are negative  Objective:     Vital Signs (Most Recent):  Temp: 98.1 °F (36.7 °C) (10/29/22 1101)  Pulse: 63 (10/29/22 1301)  Resp: (!) 23 (10/29/22 1301)  BP: 131/68 (10/29/22 1301)  SpO2: (!) 94 % (10/29/22 1301)   Vital Signs (24h Range):  Temp:  [97.9 °F (36.6 °C)-98.4 °F (36.9 °C)] 98.1 °F (36.7 °C)  Pulse:  [59-73] 63  Resp:  [14-58] 23  SpO2:  [92 %-98 %] 94 %  BP: (123-172)/(57-89) 131/68     Weight: 68 kg (149 lb 14.6 oz)  Body mass index is 21.51 kg/m².    Intake/Output Summary (Last 24 hours) at 10/29/2022 1430  Last data filed at 10/29/2022 1000  Gross per 24 hour   Intake 118 ml   Output 810 ml   Net -692 ml      Physical Exam    Vitals and nursing note reviewed.   Constitutional:       General: He is awake. He is not in acute distress.     Appearance: He is not ill-appearing.      Comments: Pleasant elderly  male, in no respiratory distress.  Comfortably lying in bed.  Little hard of hearing.  Daughter at the bedside.   HENT:      Head: Normocephalic and atraumatic.      Mouth/Throat:      Mouth: Mucous membranes are moist.   Eyes:      General: No scleral icterus.     Conjunctiva/sclera: Conjunctivae normal.   Cardiovascular:      Rate and Rhythm: Regular rhythm.      Heart sounds: No murmur heard.  Pulmonary:      Effort: Pulmonary effort is normal. No respiratory distress.      Breath sounds: Normal breath sounds. No wheezing.   Abdominal:      Palpations: Abdomen is soft.      Tenderness: There is no abdominal tenderness.   Musculoskeletal:          General: No swelling. Normal range of motion.      Cervical back: Normal range of motion and neck supple.   Skin:     General: Skin is warm.      Coloration: Skin is not jaundiced.   Neurological:      General: No focal deficit present.      Mental Status: He is alert and oriented to person, place, and time. Mental status is at baseline.   Psychiatric:         Attention and Perception: Attention normal.         Speech: Speech normal.         Behavior: Behavior is cooperative.     Significant Labs:     Results for orders placed or performed during the hospital encounter of 10/28/22   CBC auto differential   Result Value Ref Range    WBC 4.90 3.90 - 12.70 K/uL    RBC 4.36 (L) 4.60 - 6.20 M/uL    Hemoglobin 13.5 (L) 14.0 - 18.0 g/dL    Hematocrit 40.7 40.0 - 54.0 %    MCV 93 82 - 98 fL    MCH 31.0 27.0 - 31.0 pg    MCHC 33.2 32.0 - 36.0 g/dL    RDW 12.6 11.5 - 14.5 %    Platelets 248 150 - 450 K/uL    MPV 9.7 9.2 - 12.9 fL    Immature Granulocytes 0.4 0.0 - 0.5 %    Gran # (ANC) 2.9 1.8 - 7.7 K/uL    Immature Grans (Abs) 0.02 0.00 - 0.04 K/uL    Lymph # 1.3 1.0 - 4.8 K/uL    Mono # 0.6 0.3 - 1.0 K/uL    Eos # 0.1 0.0 - 0.5 K/uL    Baso # 0.04 0.00 - 0.20 K/uL    nRBC 0 0 /100 WBC    Gran % 59.1 38.0 - 73.0 %    Lymph % 26.1 18.0 - 48.0 %    Mono % 11.8 4.0 - 15.0 %    Eosinophil % 1.8 0.0 - 8.0 %    Basophil % 0.8 0.0 - 1.9 %    Differential Method Automated    Comprehensive metabolic panel   Result Value Ref Range    Sodium 139 136 - 145 mmol/L    Potassium 4.3 3.5 - 5.1 mmol/L    Chloride 105 95 - 110 mmol/L    CO2 25 23 - 29 mmol/L    Glucose 103 70 - 110 mg/dL    BUN 12 8 - 23 mg/dL    Creatinine 0.9 0.5 - 1.4 mg/dL    Calcium 9.3 8.7 - 10.5 mg/dL    Total Protein 7.1 6.0 - 8.4 g/dL    Albumin 4.0 3.5 - 5.2 g/dL    Total Bilirubin 0.4 0.1 - 1.0 mg/dL    Alkaline Phosphatase 69 55 - 135 U/L    AST 20 10 - 40 U/L    ALT 13 10 - 44 U/L    Anion Gap 9 8 - 16 mmol/L    eGFR >60 >60 mL/min/1.73 m^2   Brain natriuretic  peptide   Result Value Ref Range    BNP 23 0 - 99 pg/mL   Troponin I   Result Value Ref Range    Troponin I 0.014 0.000 - 0.026 ng/mL   Magnesium   Result Value Ref Range    Magnesium 2.1 1.6 - 2.6 mg/dL   CBC Auto Differential   Result Value Ref Range    WBC 4.63 3.90 - 12.70 K/uL    RBC 4.22 (L) 4.60 - 6.20 M/uL    Hemoglobin 13.1 (L) 14.0 - 18.0 g/dL    Hematocrit 39.5 (L) 40.0 - 54.0 %    MCV 94 82 - 98 fL    MCH 31.0 27.0 - 31.0 pg    MCHC 33.2 32.0 - 36.0 g/dL    RDW 12.6 11.5 - 14.5 %    Platelets 226 150 - 450 K/uL    MPV 9.3 9.2 - 12.9 fL    Immature Granulocytes 0.2 0.0 - 0.5 %    Gran # (ANC) 2.6 1.8 - 7.7 K/uL    Immature Grans (Abs) 0.01 0.00 - 0.04 K/uL    Lymph # 1.3 1.0 - 4.8 K/uL    Mono # 0.6 0.3 - 1.0 K/uL    Eos # 0.1 0.0 - 0.5 K/uL    Baso # 0.03 0.00 - 0.20 K/uL    nRBC 0 0 /100 WBC    Gran % 55.4 38.0 - 73.0 %    Lymph % 28.9 18.0 - 48.0 %    Mono % 12.3 4.0 - 15.0 %    Eosinophil % 2.6 0.0 - 8.0 %    Basophil % 0.6 0.0 - 1.9 %    Differential Method Automated    Magnesium   Result Value Ref Range    Magnesium 2.0 1.6 - 2.6 mg/dL   Comprehensive Metabolic Panel   Result Value Ref Range    Sodium 139 136 - 145 mmol/L    Potassium 4.9 3.5 - 5.1 mmol/L    Chloride 106 95 - 110 mmol/L    CO2 25 23 - 29 mmol/L    Glucose 100 70 - 110 mg/dL    BUN 11 8 - 23 mg/dL    Creatinine 0.8 0.5 - 1.4 mg/dL    Calcium 8.9 8.7 - 10.5 mg/dL    Total Protein 6.2 6.0 - 8.4 g/dL    Albumin 3.7 3.5 - 5.2 g/dL    Total Bilirubin 0.7 0.1 - 1.0 mg/dL    Alkaline Phosphatase 62 55 - 135 U/L    AST 18 10 - 40 U/L    ALT 14 10 - 44 U/L    Anion Gap 8 8 - 16 mmol/L    eGFR >60 >60 mL/min/1.73 m^2   TSH   Result Value Ref Range    TSH 1.733 0.400 - 4.000 uIU/mL        Significant Imaging:     Imaging Results              X-Ray Chest AP Portable (Final result)  Result time 10/28/22 18:19:31      Final result by Lisa Combs MD (10/28/22 18:19:31)                   Impression:      No acute abnormality.      Electronically  signed by: Garret Lisa  Date:    10/28/2022  Time:    18:19               Narrative:    EXAMINATION:  XR CHEST AP PORTABLE    CLINICAL HISTORY:  near syncope;    TECHNIQUE:  Single frontal view of the chest was performed.    COMPARISON:  None    FINDINGS:  Low lung volumes.The lungs are clear, with normal appearance of pulmonary vasculature and no pleural effusion or pneumothorax.    The cardiac silhouette is prominent.  The hilar and mediastinal contours are unremarkable.    Bones are intact.                                         Assessment/Plan:      * Sinus bradycardia  -symptomatic bradycardia with frequent long pauses on Holter monitor recently.    -monitor in ICU overnight.    -cardiology consulted, aware.    -patient not on beta-blockers.      Paroxysmal A-fib  Iv heparin for now   doac on discharge as per cardiology;          Sinus pause  -patient was sent to the ED by cardiologist, due to frequent long pauses on Holter monitor  -currently asymptomatic.    -denies lightheadedness, dizziness (was having frequent presyncopal episodes for past few weeks, but none in last 3-4 days)  -per Dr. Blackman, to be monitored in ICU overnight    10/29:     conversion from afib to sinus   Oral AC on hold due to possible pacemaker implant early next week   Med surg tele      Chronic bilateral low back pain without sciatica         Hyperlipidemia  -continue statin        VTE Risk Mitigation (From admission, onward)         Ordered     Place sequential compression device  Until discontinued         10/28/22 2002                Discharge Planning   SUSHILA:      Code Status: Not on file   Is the patient medically ready for discharge?:     Reason for patient still in hospital (select all that apply): med/surg tele for cardiac monitoring/ procedure;   Discharge Plan A: Home            Critical care time spent on the evaluation and treatment of severe organ dysfunction, review of pertinent labs and imaging studies, discussions  with consulting providers and discussions with patient/family: 56 minutes.      Nazario Gore MD  Department of Hospital Medicine   [facility] - Intensive Care ([facility])

## 2022-10-29 NOTE — PLAN OF CARE
[facility] - Intensive Care (Hospital)  Initial Discharge Assessment       Primary Care Provider: Madie Davis MD    Admission Diagnosis: Bradycardia [R00.1]  Near syncope [R55]  Abnormal Holter monitor finding [R94.31]    Admission Date: 10/28/2022  Expected Discharge Date:     Discharge Barriers Identified: None    Payor: [employer] MANAGED MEDICARE / Plan: GI Track MEDICARE HMO / Product Type: Capitation /     Extended Emergency Contact Information  Primary Emergency Contact: aLuren Santos   [place]  Mobile Phone: [phone]  Relation: Daughter    Discharge Plan A: Home  Discharge Plan B: Home      [facility] [address] [facility] [address] [facility] [address]  [address] [facility] [address]  [facility] [address]  Phone: [phone] Fax: [phone]      Initial Assessment (most recent)       Adult Discharge Assessment - 10/29/22 0938          Discharge Assessment    Assessment Type Discharge Planning Assessment     Confirmed/corrected address, phone number and insurance Yes     Confirmed Demographics Correct on Facesheet     Source of Information patient     If unable to respond/provide information was family/caregiver contacted? Yes     Contact Name/Number Lauren Danielle [phone]     When was your last doctors appointment? 10/14/22     Does patient/caregiver understand observation status Yes     Communicated SUSHILA with patient/caregiver Yes     Reason For Admission Bradycardia     Lives With alone     Do you expect to return to your current living situation? Yes     Do you have help at home or someone to help you manage your care at home? Yes     Who are your caregiver(s) and their phone number(s)? Lauren Santos [phone]     Prior to hospitilization cognitive status: Alert/Oriented     Current cognitive status: Alert/Oriented     Walking or Climbing Stairs Difficulty none     Dressing/Bathing Difficulty none     Home Accessibility not wheelchair accessible     Home Layout Able to live on 1st  floor     Equipment Currently Used at Home cane, straight;walker, rolling     Readmission within 30 days? No     Patient currently being followed by outpatient case management? No     Do you currently have service(s) that help you manage your care at home? No     Do you take prescription medications? Yes     Do you have prescription coverage? Yes     Coverage [employer]     Do you have any problems affording any of your prescribed medications? No     Is the patient taking medications as prescribed? yes     Who is going to help you get home at discharge? Lauren Santos     How do you get to doctors appointments? car, drives self;family or friend will provide     Are you on dialysis? No     Do you take coumadin? No     Discharge Plan A Home     Discharge Plan B Home     DME Needed Upon Discharge  none     Discharge Plan discussed with: Patient     Discharge Barriers Identified None                          SW met with pt at bedside. Pt was verified name, address,  and emergency contact. Pt's emergency contact is his daughter, Lauren Santos [phone]. Pt's PCP is Dr. Madie Davis. Pt has insurance with [employer] Medicare. Pt wants to use his own pharmacy for medication. Pt's pharmacy is [facility] in [place].     Rocael Velazquez LMSW

## 2022-10-29 NOTE — NURSING
Transferred to telemetry room 240. No distress noted. Telemetry monitor 8550 in use. Normal sinus rhythm present. Personal belongings including but not limited to cane, cell phone, hearing aids with , and dentures transported with the patient. Safety and fall prevention measures are in place. Care relinquished to VERONICA Christiansen.

## 2022-10-30 LAB
ANION GAP SERPL CALC-SCNC: 8 MMOL/L (ref 8–16)
APTT BLDCRRT: 49.1 SEC (ref 21–32)
BASOPHILS # BLD AUTO: 0.04 K/UL (ref 0–0.2)
BASOPHILS NFR BLD: 0.7 % (ref 0–1.9)
BUN SERPL-MCNC: 17 MG/DL (ref 8–23)
CALCIUM SERPL-MCNC: 9.3 MG/DL (ref 8.7–10.5)
CHLORIDE SERPL-SCNC: 105 MMOL/L (ref 95–110)
CO2 SERPL-SCNC: 26 MMOL/L (ref 23–29)
CREAT SERPL-MCNC: 0.8 MG/DL (ref 0.5–1.4)
DIFFERENTIAL METHOD: ABNORMAL
EOSINOPHIL # BLD AUTO: 0.2 K/UL (ref 0–0.5)
EOSINOPHIL NFR BLD: 2.8 % (ref 0–8)
ERYTHROCYTE [DISTWIDTH] IN BLOOD BY AUTOMATED COUNT: 12.7 % (ref 11.5–14.5)
EST. GFR  (NO RACE VARIABLE): >60 ML/MIN/1.73 M^2
GLUCOSE SERPL-MCNC: 115 MG/DL (ref 70–110)
HCT VFR BLD AUTO: 39.7 % (ref 40–54)
HGB BLD-MCNC: 13.3 G/DL (ref 14–18)
IMM GRANULOCYTES # BLD AUTO: 0.02 K/UL (ref 0–0.04)
IMM GRANULOCYTES NFR BLD AUTO: 0.3 % (ref 0–0.5)
LYMPHOCYTES # BLD AUTO: 1.7 K/UL (ref 1–4.8)
LYMPHOCYTES NFR BLD: 28 % (ref 18–48)
MAGNESIUM SERPL-MCNC: 2 MG/DL (ref 1.6–2.6)
MCH RBC QN AUTO: 31.1 PG (ref 27–31)
MCHC RBC AUTO-ENTMCNC: 33.5 G/DL (ref 32–36)
MCV RBC AUTO: 93 FL (ref 82–98)
MONOCYTES # BLD AUTO: 0.7 K/UL (ref 0.3–1)
MONOCYTES NFR BLD: 12.3 % (ref 4–15)
NEUTROPHILS # BLD AUTO: 3.4 K/UL (ref 1.8–7.7)
NEUTROPHILS NFR BLD: 55.9 % (ref 38–73)
NRBC BLD-RTO: 0 /100 WBC
PLATELET # BLD AUTO: 230 K/UL (ref 150–450)
PMV BLD AUTO: 9.6 FL (ref 9.2–12.9)
POCT GLUCOSE: 105 MG/DL (ref 70–110)
POTASSIUM SERPL-SCNC: 4.3 MMOL/L (ref 3.5–5.1)
RBC # BLD AUTO: 4.28 M/UL (ref 4.6–6.2)
SODIUM SERPL-SCNC: 139 MMOL/L (ref 136–145)
WBC # BLD AUTO: 6.01 K/UL (ref 3.9–12.7)

## 2022-10-30 PROCEDURE — 99233 SBSQ HOSP IP/OBS HIGH 50: CPT | Mod: ,,, | Performed by: INTERNAL MEDICINE

## 2022-10-30 PROCEDURE — 25000003 PHARM REV CODE 250: Performed by: STUDENT IN AN ORGANIZED HEALTH CARE EDUCATION/TRAINING PROGRAM

## 2022-10-30 PROCEDURE — 99233 PR SUBSEQUENT HOSPITAL CARE,LEVL III: ICD-10-PCS | Mod: ,,, | Performed by: INTERNAL MEDICINE

## 2022-10-30 PROCEDURE — 85025 COMPLETE CBC W/AUTO DIFF WBC: CPT | Performed by: STUDENT IN AN ORGANIZED HEALTH CARE EDUCATION/TRAINING PROGRAM

## 2022-10-30 PROCEDURE — 83735 ASSAY OF MAGNESIUM: CPT | Performed by: STUDENT IN AN ORGANIZED HEALTH CARE EDUCATION/TRAINING PROGRAM

## 2022-10-30 PROCEDURE — 80048 BASIC METABOLIC PNL TOTAL CA: CPT | Performed by: STUDENT IN AN ORGANIZED HEALTH CARE EDUCATION/TRAINING PROGRAM

## 2022-10-30 PROCEDURE — 63600175 PHARM REV CODE 636 W HCPCS: Performed by: STUDENT IN AN ORGANIZED HEALTH CARE EDUCATION/TRAINING PROGRAM

## 2022-10-30 PROCEDURE — 36415 COLL VENOUS BLD VENIPUNCTURE: CPT | Performed by: STUDENT IN AN ORGANIZED HEALTH CARE EDUCATION/TRAINING PROGRAM

## 2022-10-30 PROCEDURE — 21400001 HC TELEMETRY ROOM

## 2022-10-30 PROCEDURE — 85730 THROMBOPLASTIN TIME PARTIAL: CPT | Performed by: STUDENT IN AN ORGANIZED HEALTH CARE EDUCATION/TRAINING PROGRAM

## 2022-10-30 RX ADMIN — PANTOPRAZOLE SODIUM 40 MG: 40 TABLET, DELAYED RELEASE ORAL at 09:10

## 2022-10-30 RX ADMIN — ATORVASTATIN CALCIUM 10 MG: 10 TABLET, FILM COATED ORAL at 09:10

## 2022-10-30 RX ADMIN — HEPARIN SODIUM 12 UNITS/KG/HR: 10000 INJECTION, SOLUTION INTRAVENOUS at 03:10

## 2022-10-30 RX ADMIN — DUTASTERIDE 0.5 MG: 0.5 CAPSULE, LIQUID FILLED ORAL at 09:10

## 2022-10-30 RX ADMIN — ROPINIROLE HYDROCHLORIDE 0.5 MG: 0.5 TABLET, FILM COATED ORAL at 08:10

## 2022-10-30 RX ADMIN — AMLODIPINE BESYLATE 5 MG: 5 TABLET ORAL at 09:10

## 2022-10-30 NOTE — ASSESSMENT & PLAN NOTE
During conversion from afib to sinus   See strip above  Reviewed bardy monitor tracings   Oral AC on hold due to possible pacemaker implant next week   On iv heparin for his afib  Had one episode of post conversion pause of 3 seconds earlier today   Currently in SR  NPO after MN possible PPM in AM

## 2022-10-30 NOTE — HOSPITAL COURSE
89-year-old  male with PMH significant for BPH, hyperlipidemia, has been having frequent disease pills, presyncopal episodes for the past few weeks. Had an event monitor study read yesterday by Dr Renteria with findings of sinus pauses up to 6 seconds with conversion from afib   Currently in sinus rhythm , no events overnight   Had afib burden of 3% on monitor for 13 days     10.30.2022  HAD ANOTHER EPISODE OF AFIB WITH SHORT PAUSE DURING CONVERSION OF 3 SECONDS     10/31/22-Patient seen and examined today, resting in bed. No issues overnight. Labs stable. Plans for PPM tomorrow.    11/1/22-Patient seen and examined today, sitting up in bed. No AEON. Awaiting PPM. Labs stable.

## 2022-10-30 NOTE — NURSING
"POC reviewed with patient. Pt verbalized understanding  AAO x4. VSS  NR on tele monitor.   PIV saline locked, clean, dry, intact  No other c/o at this time.  Pt remains free of injuries and falls; fall precaution in place.   Bed low, side rails x2, call light in reach, personal belongings at bedside.  Reminded to call for assistance.  Repositioned independently.  Hourly rounding complete. Will continue to monitor.        Afib this morning, with pauses at 1040.   Patient converted to NSR after pauses  Remains NSR  Plans for pacemaker this stay, hopefully Monday?  Patient's daughter is requesting notification when patient has a time for pacemaker placement. Number in chart  New IV today  Up in chair  Telesitter remains, no confusion this shift        BP (!) 126/57 (BP Location: Right arm, Patient Position: Lying)   Pulse 69   Temp 98.1 °F (36.7 °C) (Oral)   Resp 18   Ht 5' 10" (1.778 m)   Wt 68.3 kg (150 lb 9.2 oz)   SpO2 (!) 94%   BMI 21.61 kg/m²       "

## 2022-10-30 NOTE — NURSING
Patient's daughter Tatum would like to be called when there is a time and date set for pacemaker placement. Number listed in chart.  Informed Tatum that as of now there is no time, but plans are for Monday or possibly Tuesday per Dr. Tejeda at bedside.   Will pass along to oncoming staff tonight if no procedure is scheduled by the end of this shift.

## 2022-10-30 NOTE — PROGRESS NOTES
[facility] - Telemetry [facility] Medicine  Progress Note    Patient Name: Dominguez Ritchie  MRN: [id number]  Patient Class: IP- Inpatient   Admission Date: 10/28/2022  Length of Stay: 2 days  Attending Physician: Nazario Gore, *  Primary Care Provider: aMdie Davis MD        Subjective:     Principal Problem:Sinus bradycardia        HPI:  Mr. Ritchie is a pleasant 89-year-old  male with PMH significant for BPH, hyperlipidemia, has been having frequent disease pills, presyncopal episodes for the past few weeks.  He was evaluated by his PCP, Holter monitor was ordered.  Patient was advised by Dr. Renteria to present to the ED due to frequent long pauses.  Patient states that he did not feel lightheaded or dizzy for the past few days.  Denies any complaints at this time.  In the ED he is asymptomatic.  Denies lightheadedness, dizziness, CP, SOB, palpitations.  HR in the 60s.  /74.  Patient not on beta-blockers.  Takes amlodipine for BP.  ED physician discussed case with Cardiology on-call, Dr. Blackman, who recommended placing the patient in the ICU overnight.    Discussed code status with patient in front of family member.  Remains full code.      Overview/Hospital Course:  10/29     Examination done at bedside, alert and oriented, denied any acute issues  Hemodynamically stable   As per cardiology- Iv heparin for now   doac on discharge , Oral AC on hold due to possible pacemaker implant early next week;       10/30     Examination done at bedside, patient alert and oriented, denied any acute issues   Hemodynamically stable, into AFib with rate controlled in 70s --> possible pacemaker placement in a.m. by Cardiology  Currently on heparin drip          Review of Systems    Constitutional: Negative.  Negative for chills and fever.   HENT: Negative.  Negative for congestion, rhinorrhea, sore throat and trouble swallowing.    Eyes: Negative.  Negative for visual disturbance.   Respiratory:  Negative.  Negative for cough, shortness of breath and wheezing.    Cardiovascular: Negative.  Negative for chest pain and palpitations.   Gastrointestinal: Negative.  Negative for abdominal pain, diarrhea, nausea and vomiting.   Endocrine: Negative.    Genitourinary: Negative.  Negative for dysuria and flank pain.   Musculoskeletal: Negative.  Negative for back pain.   Skin: Negative.  Negative for rash.   Allergic/Immunologic: Negative.    Neurological:  denied dizziness;   Negative for speech difficulty, weakness, numbness and headaches.   Hematological: Negative.    Psychiatric/Behavioral: Negative.  Negative for hallucinations.    All other systems reviewed and are negative  Objective:     Vital Signs (Most Recent):  Temp: 97.6 °F (36.4 °C) (10/30/22 0825)  Pulse: 73 (10/30/22 0912)  Resp: 18 (10/30/22 0825)  BP: 112/61 (10/30/22 0931)  SpO2: 97 % (10/30/22 0825) Vital Signs (24h Range):  Temp:  [97.5 °F (36.4 °C)-98.6 °F (37 °C)] 97.6 °F (36.4 °C)  Pulse:  [61-90] 73  Resp:  [16-44] 18  SpO2:  [93 %-97 %] 97 %  BP: ()/(50-74) 112/61     Weight: 68.3 kg (150 lb 9.2 oz)  Body mass index is 21.61 kg/m².    Intake/Output Summary (Last 24 hours) at 10/30/2022 1013  Last data filed at 10/29/2022 1730  Gross per 24 hour   Intake 322.35 ml   Output 600 ml   Net -277.65 ml      Physical Exam    Constitutional:       General: He is awake. He is not in acute distress.     Appearance: He is not ill-appearing.      Comments: Pleasant elderly  male, in no respiratory distress.  Comfortably lying in bed.  Little hard of hearing.  Daughter at the bedside.   HENT:      Head: Normocephalic and atraumatic.      Mouth/Throat:      Mouth: Mucous membranes are moist.   Eyes:      General: No scleral icterus.     Conjunctiva/sclera: Conjunctivae normal.   Cardiovascular:      Rate and Rhythm: Regular rhythm.      Heart sounds: No murmur heard.  Pulmonary:      Effort: Pulmonary effort is normal. No respiratory  distress.      Breath sounds: Normal breath sounds. No wheezing.   Abdominal:      Palpations: Abdomen is soft.      Tenderness: There is no abdominal tenderness.   Musculoskeletal:         General: No swelling. Normal range of motion.      Cervical back: Normal range of motion and neck supple.   Skin:     General: Skin is warm.      Coloration: Skin is not jaundiced.   Neurological:      General: No focal deficit present.      Mental Status: He is alert and oriented to person, place, and time. Mental status is at baseline.   Psychiatric:         Attention and Perception: Attention normal.         Speech: Speech normal.         Behavior: Behavior is cooperative.     Significant Labs: All pertinent labs within the past 24 hours have been reviewed.  CBC:   Recent Labs   Lab 10/29/22  0601 10/29/22  1456 10/30/22  0458   WBC 4.63 5.47 6.01   HGB 13.1* 13.0* 13.3*   HCT 39.5* 39.4* 39.7*    230 230     CMP:   Recent Labs   Lab 10/28/22  1758 10/29/22  0601 10/30/22  0458    139 139   K 4.3 4.9 4.3    106 105   CO2 25 25 26    100 115*   BUN 12 11 17   CREATININE 0.9 0.8 0.8   CALCIUM 9.3 8.9 9.3   PROT 7.1 6.2  --    ALBUMIN 4.0 3.7  --    BILITOT 0.4 0.7  --    ALKPHOS 69 62  --    AST 20 18  --    ALT 13 14  --    ANIONGAP 9 8 8       Significant Imaging:     Imaging Results              X-Ray Chest AP Portable (Final result)  Result time 10/28/22 18:19:31      Final result by Lisa Combs MD (10/28/22 18:19:31)                   Impression:      No acute abnormality.      Electronically signed by: Garret Gonzalez  Date:    10/28/2022  Time:    18:19               Narrative:    EXAMINATION:  XR CHEST AP PORTABLE    CLINICAL HISTORY:  near syncope;    TECHNIQUE:  Single frontal view of the chest was performed.    COMPARISON:  None    FINDINGS:  Low lung volumes.The lungs are clear, with normal appearance of pulmonary vasculature and no pleural effusion or pneumothorax.    The cardiac  silhouette is prominent.  The hilar and mediastinal contours are unremarkable.    Bones are intact.                                         Assessment/Plan:      * Sinus bradycardia  -symptomatic bradycardia with frequent long pauses on Holter monitor recently.    -monitor in ICU overnight.    -cardiology consulted, aware.    -patient not on beta-blockers.      Paroxysmal A-fib  Iv heparin for now   doac on discharge as per cardiology;          Sinus pause  -patient was sent to the ED by cardiologist, due to frequent long pauses on Holter monitor  -currently asymptomatic.    -denies lightheadedness, dizziness (was having frequent presyncopal episodes for past few weeks, but none in last 3-4 days)  -per Dr. Blackman, to be monitored in ICU overnight    10/29:     conversion from afib to sinus   Oral AC on hold due to possible pacemaker implant early next week   Med surg tele    10/30:    into AFib with rate controlled in 70s --> possible pacemaker placement in a.m. by Cardiology  Currently on heparin drip      Chronic bilateral low back pain without sciatica         Hyperlipidemia  -continue statin        VTE Risk Mitigation (From admission, onward)         Ordered     heparin 25,000 units in dextrose 5% (100 units/ml) IV bolus from bag - ADDITIONAL PRN BOLUS - 60 units/kg  As needed (PRN)        Question:  Heparin Infusion Adjustment (DO NOT MODIFY ANSWER)  Answer:  \\[email or web address]\Images\Pharmacy\HeparinInfusions\heparin LOW INTENSITY nomogram for [facility] [id number].pdf    10/29/22 1434     heparin 25,000 units in dextrose 5% (100 units/ml) IV bolus from bag - ADDITIONAL PRN BOLUS - 30 units/kg  As needed (PRN)        Question:  Heparin Infusion Adjustment (DO NOT MODIFY ANSWER)  Answer:  \\[email or web address]\epic\Images\Pharmacy\HeparinInfusions\heparin LOW INTENSITY nomogram for [facility] [id number].pdf    10/29/22 1434     heparin 25,000 units in dextrose 5% 250 mL (100 units/mL) infusion LOW INTENSITY nomogram - [facility]  Continuous         Question Answer Comment   Heparin Infusion Adjustment (DO NOT MODIFY ANSWER) \\[email or web address]\epic\Images\Pharmacy\HeparinInfusions\heparin LOW INTENSITY nomogram for [facility] [id number].pdf    Begin at (in units/kg/hr) 12        10/29/22 1434     Place sequential compression device  Until discontinued         10/28/22 2002                Discharge Planning   SUSHILA:      Code Status: Not on file   Is the patient medically ready for discharge?:     Reason for patient still in hospital (select all that apply):  Possible pacemaker placement by Cardiology in a.m.  Discharge Plan A: Home                  Nazario Gore MD  Department of Hospital Medicine   [facility] - Telemetry ([facility])

## 2022-10-30 NOTE — SUBJECTIVE & OBJECTIVE
Review of Systems    Constitutional: Negative.  Negative for chills and fever.   HENT: Negative.  Negative for congestion, rhinorrhea, sore throat and trouble swallowing.    Eyes: Negative.  Negative for visual disturbance.   Respiratory: Negative.  Negative for cough, shortness of breath and wheezing.    Cardiovascular: Negative.  Negative for chest pain and palpitations.   Gastrointestinal: Negative.  Negative for abdominal pain, diarrhea, nausea and vomiting.   Endocrine: Negative.    Genitourinary: Negative.  Negative for dysuria and flank pain.   Musculoskeletal: Negative.  Negative for back pain.   Skin: Negative.  Negative for rash.   Allergic/Immunologic: Negative.    Neurological:  denied dizziness;   Negative for speech difficulty, weakness, numbness and headaches.   Hematological: Negative.    Psychiatric/Behavioral: Negative.  Negative for hallucinations.    All other systems reviewed and are negative  Objective:     Vital Signs (Most Recent):  Temp: 97.6 °F (36.4 °C) (10/30/22 0825)  Pulse: 73 (10/30/22 0912)  Resp: 18 (10/30/22 0825)  BP: 112/61 (10/30/22 0931)  SpO2: 97 % (10/30/22 0825) Vital Signs (24h Range):  Temp:  [97.5 °F (36.4 °C)-98.6 °F (37 °C)] 97.6 °F (36.4 °C)  Pulse:  [61-90] 73  Resp:  [16-44] 18  SpO2:  [93 %-97 %] 97 %  BP: ()/(50-74) 112/61     Weight: 68.3 kg (150 lb 9.2 oz)  Body mass index is 21.61 kg/m².    Intake/Output Summary (Last 24 hours) at 10/30/2022 1013  Last data filed at 10/29/2022 1730  Gross per 24 hour   Intake 322.35 ml   Output 600 ml   Net -277.65 ml      Physical Exam    Constitutional:       General: He is awake. He is not in acute distress.     Appearance: He is not ill-appearing.      Comments: Pleasant elderly  male, in no respiratory distress.  Comfortably lying in bed.  Little hard of hearing.  Daughter at the bedside.   HENT:      Head: Normocephalic and atraumatic.      Mouth/Throat:      Mouth: Mucous membranes are moist.   Eyes:       General: No scleral icterus.     Conjunctiva/sclera: Conjunctivae normal.   Cardiovascular:      Rate and Rhythm: Regular rhythm.      Heart sounds: No murmur heard.  Pulmonary:      Effort: Pulmonary effort is normal. No respiratory distress.      Breath sounds: Normal breath sounds. No wheezing.   Abdominal:      Palpations: Abdomen is soft.      Tenderness: There is no abdominal tenderness.   Musculoskeletal:         General: No swelling. Normal range of motion.      Cervical back: Normal range of motion and neck supple.   Skin:     General: Skin is warm.      Coloration: Skin is not jaundiced.   Neurological:      General: No focal deficit present.      Mental Status: He is alert and oriented to person, place, and time. Mental status is at baseline.   Psychiatric:         Attention and Perception: Attention normal.         Speech: Speech normal.         Behavior: Behavior is cooperative.     Significant Labs: All pertinent labs within the past 24 hours have been reviewed.  CBC:   Recent Labs   Lab 10/29/22  0601 10/29/22  1456 10/30/22  0458   WBC 4.63 5.47 6.01   HGB 13.1* 13.0* 13.3*   HCT 39.5* 39.4* 39.7*    230 230     CMP:   Recent Labs   Lab 10/28/22  1758 10/29/22  0601 10/30/22  0458    139 139   K 4.3 4.9 4.3    106 105   CO2 25 25 26    100 115*   BUN 12 11 17   CREATININE 0.9 0.8 0.8   CALCIUM 9.3 8.9 9.3   PROT 7.1 6.2  --    ALBUMIN 4.0 3.7  --    BILITOT 0.4 0.7  --    ALKPHOS 69 62  --    AST 20 18  --    ALT 13 14  --    ANIONGAP 9 8 8       Significant Imaging:     Imaging Results              X-Ray Chest AP Portable (Final result)  Result time 10/28/22 18:19:31      Final result by Lisa Combs MD (10/28/22 18:19:31)                   Impression:      No acute abnormality.      Electronically signed by: Garret Gonzalez  Date:    10/28/2022  Time:    18:19               Narrative:    EXAMINATION:  XR CHEST AP PORTABLE    CLINICAL HISTORY:  near  syncope;    TECHNIQUE:  Single frontal view of the chest was performed.    COMPARISON:  None    FINDINGS:  Low lung volumes.The lungs are clear, with normal appearance of pulmonary vasculature and no pleural effusion or pneumothorax.    The cardiac silhouette is prominent.  The hilar and mediastinal contours are unremarkable.    Bones are intact.

## 2022-10-30 NOTE — SUBJECTIVE & OBJECTIVE
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       Past Surgical History:   Procedure Laterality Date    APPENDECTOMY      BACK SURGERY Bilateral 2016    CATARACT EXTRACTION, BILATERAL      COLONOSCOPY  2/2009    EYE SURGERY      JOINT REPLACEMENT      left knee    LUMBAR PARAVERTEBRAL FACET JOINT NERVE BLOCK Bilateral 8/29/2019    Procedure: LMBB L3,4,5;  Surgeon: Nabor Sadler MD;  Location: [facility];  Service: Pain Management;  Laterality: Bilateral;    SELECTIVE INJECTION OF ANESTHETIC AGENT AROUND LUMBAR SPINAL NERVE ROOT BY TRANSFORAMINAL APPROACH Bilateral 11/8/2021    Procedure: Bilateral L4/5 +/- L5/S1 TF DREW;  Surgeon: Nabor Sadler MD;  Location: [facility];  Service: Pain Management;  Laterality: Bilateral;    SELECTIVE INJECTION OF ANESTHETIC AGENT AROUND LUMBAR SPINAL NERVE ROOT BY TRANSFORAMINAL APPROACH Bilateral 1/4/2022    Procedure: Bilateral L4/5 + L5/S1 TF DREW;  Surgeon: Nabor Sadler MD;  Location: [facility];  Service: Pain Management;  Laterality: Bilateral;    SPINE SURGERY      UPPER GASTROINTESTINAL ENDOSCOPY  2/2009       Review of patient's allergies indicates:   Allergen Reactions    Shellfish containing products Nausea And Vomiting    Amoxicillin Rash    Iodine and iodide containing products Nausea And Vomiting       No current facility-administered medications on file prior to encounter.     Current Outpatient Medications on File Prior to Encounter   Medication  Sig    amLODIPine (NORVASC) 2.5 MG tablet Take 1 tablet (2.5 mg total) by mouth once daily.    dutasteride (AVODART) 0.5 mg capsule Take 1 capsule (0.5 mg total) by mouth once daily.    pantoprazole (PROTONIX) 40 MG tablet Take 1 tablet (40 mg total) by mouth once daily.    rosuvastatin (CRESTOR) 5 MG tablet Take 1 tablet (5 mg total) by mouth once daily.    bisacodyL (DULCOLAX) 5 mg EC tablet Take 5 mg by mouth daily as needed for Constipation.    diazePAM (VALIUM) 5 MG tablet Take 1 tablet (5 mg total) by mouth once. for 1 dose (Patient not taking: Reported on 9/13/2022)    fluorouraciL (EFUDEX) 5 % cream AAA scalp and temples bid x 2 weeks, then AAA of both forearms bid x 4 weeks    fluticasone propionate (CUTIVATE) 0.05 % cream     ketoconazole (NIZORAL) 2 % cream     LIDOcaine HCl 2% (XYLOCAINE) 2 % Soln APPLY 5 ML  EVERY 6 HOURS    meclizine (ANTIVERT) 25 mg tablet Take 1 tablet by mouth twice daily as needed    meloxicam (MOBIC) 7.5 MG tablet Take 1 tablet by mouth once daily    omeprazole (PRILOSEC) 40 MG capsule Take 1 capsule (40 mg total) by mouth every morning.    oxyCODONE-acetaminophen (PERCOCET) 5-325 mg per tablet Take 1 tablet by mouth every 4 (four) hours as needed for Pain.    polyethylene glycol (GLYCOLAX) 17 gram PwPk Take 17 g by mouth once daily.    sulfamethoxazole-trimethoprim 800-160mg (BACTRIM DS) 800-160 mg Tab Take 1 tablet by mouth 2 (two) times daily.    traMADoL (ULTRAM) 50 mg tablet Take 1 tablet (50 mg total) by mouth every 24 hours as needed for Pain.    tramadol-acetaminophen 37.5-325 mg (ULTRACET) 37.5-325 mg Tab Take 1 tablet by mouth every 12 (twelve) hours as needed for Pain. (Patient not taking: No sig reported)    triamcinolone acetonide 0.1% (KENALOG) 0.1 % cream Apply topically 2 (two) times daily.     Family History       Problem Relation (Age of Onset)    Cancer Son    Diabetes Maternal Uncle    Heart disease Mother          Tobacco Use    Smoking status: Former      Packs/day: 1.00     Years: 25.00     Pack years: 25.00     Types: Cigarettes     Quit date: 1990     Years since quittin.8    Smokeless tobacco: Never   Substance and Sexual Activity    Alcohol use: No     Alcohol/week: 0.0 standard drinks    Drug use: No    Sexual activity: Not Currently     Birth control/protection: None     Review of Systems   Constitutional: Negative for fever and malaise/fatigue.   HENT:  Negative for sore throat.    Eyes:  Negative for blurred vision.   Cardiovascular:  Positive for near-syncope. Negative for chest pain, dyspnea on exertion, orthopnea and syncope.   Respiratory:  Negative for cough and hemoptysis.    Hematologic/Lymphatic: Negative for bleeding problem.   Skin:  Negative for rash.   Musculoskeletal:  Negative for falls.   Gastrointestinal:  Negative for abdominal pain.   Genitourinary: Negative.    Neurological: Negative.    Psychiatric/Behavioral:  Negative for altered mental status and substance abuse.    Objective:     Vital Signs (Most Recent):  Temp: 98.1 °F (36.7 °C) (10/30/22 1547)  Pulse: 69 (10/30/22 1547)  Resp: 18 (10/30/22 1234)  BP: (!) 126/57 (10/30/22 1547)  SpO2: (!) 94 % (10/30/22 1547)   Vital Signs (24h Range):  Temp:  [97.5 °F (36.4 °C)-98.6 °F (37 °C)] 98.1 °F (36.7 °C)  Pulse:  [66-90] 69  Resp:  [16-18] 18  SpO2:  [93 %-97 %] 94 %  BP: ()/(50-61) 126/57     Weight: 68.3 kg (150 lb 9.2 oz)  Body mass index is 21.61 kg/m².    SpO2: (!) 94 %  O2 Device (Oxygen Therapy): room air      Intake/Output Summary (Last 24 hours) at 10/30/2022 1616  Last data filed at 10/29/2022 1730  Gross per 24 hour   Intake --   Output 360 ml   Net -360 ml         Lines/Drains/Airways       Peripheral Intravenous Line  Duration                  Peripheral IV - Single Lumen 10/30/22 0951 22 G Anterior;Distal;Right Forearm <1 day                    Physical Exam  Vitals reviewed.   Constitutional:       Appearance: He is well-developed.   HENT:      Head:  Normocephalic and atraumatic.   Eyes:      General: No scleral icterus.     Conjunctiva/sclera: Conjunctivae normal.   Cardiovascular:      Rate and Rhythm: Normal rate and regular rhythm.      Pulses: Intact distal pulses.      Heart sounds: Normal heart sounds. No murmur heard.  Pulmonary:      Effort: No respiratory distress.      Breath sounds: No wheezing or rales.   Chest:      Chest wall: No tenderness.   Abdominal:      General: Bowel sounds are normal. There is no distension.      Palpations: Abdomen is soft.      Tenderness: There is no guarding.   Musculoskeletal:         General: Normal range of motion.      Cervical back: Normal range of motion and neck supple.   Skin:     General: Skin is warm.   Neurological:      Mental Status: He is alert and oriented to person, place, and time.       Significant Labs: All pertinent lab results from the last 24 hours have been reviewed. and   Recent Lab Results         10/30/22  0458   10/30/22  0332   10/30/22  0255   10/29/22  2053        Anion Gap 8             aPTT     49.1  Comment: aPTT therapeutic range = 39-69 seconds   51.6  Comment: aPTT therapeutic range = 39-69 seconds       Baso # 0.04             Basophil % 0.7             BUN 17             Calcium 9.3             Chloride 105             CO2 26             Creatinine 0.8             Differential Method Automated             eGFR >60             Eos # 0.2             Eosinophil % 2.8             Glucose 115             Gran # (ANC) 3.4             Gran % 55.9             Hematocrit 39.7             Hemoglobin 13.3             Immature Grans (Abs) 0.02  Comment: Mild elevation in immature granulocytes is non specific and   can be seen in a variety of conditions including stress response,   acute inflammation, trauma and pregnancy. Correlation with other   laboratory and clinical findings is essential.               Immature Granulocytes 0.3             Lymph # 1.7             Lymph % 28.0              Magnesium 2.0             MCH 31.1             MCHC 33.5             MCV 93             Mono # 0.7             Mono % 12.3             MPV 9.6             nRBC 0             Platelets 230             POCT Glucose   105           Potassium 4.3             RBC 4.28             RDW 12.7             Sodium 139             WBC 6.01                     Significant Imaging: EKG: nsr   Conclusion 10.6.2022       Patient Reported Event #1 Patient activated. The symptom(s) included dizziness. The corresponding rhythm to the patient reported event was sinus bradycardia.  Critical, 6 sec pauses, contacted patient and ordering physician  Patient to go to ED     Predominant Rhythm  Sinus rhythm with heart rates varying between 31 and 112 BPM with an average of 66BPM.     - Predominant rhythm: NSR  - Atrial Fibrillation (AF) 3.0%  - Atrial Tachycardia (AT) 148 episodes, Longest 18 beats @ Avg 111 bpm up to 125 bpm, Fastest 3 beats @ Avg 153 bpm up to 168 bpm  - Ventricular Tachycardia (VT) 2 episodes, Longest/Fastest 6 beats @ Avg 115 bpm up to 151 bpm  - Pauses 27 up to 6.9 seconds  - Junctional Rhythm (JR)  - Junctional Escape Beat(s) (ADILENE)  - Ectopic Atrial Beat(s) (EAB)  - PAC 3.23 %  - PVC 0.42 %  - Ventricular Escape Beat(s) (VEB)  - Nonconducted Atrial Run    Other considerations  - Heart rates <40 bpm occurred during JR accounting for <1% of the total time.  - Frequent intermittent transitions of Sinus and AF were observed

## 2022-10-30 NOTE — PROGRESS NOTES
[facility] - Telemetry ([facility])  Cardiology  Progress Note    Patient Name: Dominguez Ritchie  MRN: [id number]  Admission Date: 10/28/2022  Hospital Length of Stay: 2 days  Code Status: No Order   Attending Physician: Nazario Gore, *   Primary Care Physician: Madie Davis MD  Expected Discharge Date:   Principal Problem:Sinus bradycardia    Subjective:     Hospital Course:   89-year-old  male with PMH significant for BPH, hyperlipidemia, has been having frequent disease pills, presyncopal episodes for the past few weeks. Had an event monitor study read yesterday by Dr Renteria with findings of sinus pauses up to 6 seconds with conversion from afib   Currently in sinus rhythm , no events overnight   Had afib burden of 3% on monitor for 13 days     10.30.2022  HAD ANOTHER EPISODE OF AFIB WITH SHORT PAUSE DURING CONVERSION OF 3 SECONDS       Past Medical History:   Diagnosis Date    Abnormal exercise tolerance test 7/25/2013    Arthritis     Colon polyp     Diverticulosis     Dyslipidemia 7/25/2013    GERD (gastroesophageal reflux disease)     HTN, goal below 130/80 12/3/2018    Hyperlipidemia 1980    HIGH TG    Knee pain, right 6/14/2013    Low back pain with right-sided sciatica 12/3/2018    Meningioma     Paroxysmal A-fib 10/29/2022    Personal history of colonic polyps 5/27/2014    RLS (restless legs syndrome) 6/14/2013    Tobacco dependence     resolved    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       Past Surgical History:   Procedure Laterality Date    APPENDECTOMY      BACK SURGERY Bilateral 2016    CATARACT EXTRACTION, BILATERAL      COLONOSCOPY  2/2009    EYE SURGERY      JOINT REPLACEMENT      left knee    LUMBAR PARAVERTEBRAL FACET JOINT NERVE BLOCK Bilateral 8/29/2019    Procedure: LMBB L3,4,5;  Surgeon: Nabor Sadler MD;  Location: [facility];  Service: Pain Management;  Laterality: Bilateral;    SELECTIVE INJECTION OF ANESTHETIC AGENT AROUND LUMBAR SPINAL NERVE  ROOT BY TRANSFORAMINAL APPROACH Bilateral 11/8/2021    Procedure: Bilateral L4/5 +/- L5/S1 TF DREW;  Surgeon: Nabor Sadler MD;  Location: [facility]IN [facility];  Service: Pain Management;  Laterality: Bilateral;    SELECTIVE INJECTION OF ANESTHETIC AGENT AROUND LUMBAR SPINAL NERVE ROOT BY TRANSFORAMINAL APPROACH Bilateral 1/4/2022    Procedure: Bilateral L4/5 + L5/S1 TF DREW;  Surgeon: Nabor Sadler MD;  Location: [facility];  Service: Pain Management;  Laterality: Bilateral;    SPINE SURGERY      UPPER GASTROINTESTINAL ENDOSCOPY  2/2009       Review of patient's allergies indicates:   Allergen Reactions    Shellfish containing products Nausea And Vomiting    Amoxicillin Rash    Iodine and iodide containing products Nausea And Vomiting       No current facility-administered medications on file prior to encounter.     Current Outpatient Medications on File Prior to Encounter   Medication Sig    amLODIPine (NORVASC) 2.5 MG tablet Take 1 tablet (2.5 mg total) by mouth once daily.    dutasteride (AVODART) 0.5 mg capsule Take 1 capsule (0.5 mg total) by mouth once daily.    pantoprazole (PROTONIX) 40 MG tablet Take 1 tablet (40 mg total) by mouth once daily.    rosuvastatin (CRESTOR) 5 MG tablet Take 1 tablet (5 mg total) by mouth once daily.    bisacodyL (DULCOLAX) 5 mg EC tablet Take 5 mg by mouth daily as needed for Constipation.    diazePAM (VALIUM) 5 MG tablet Take 1 tablet (5 mg total) by mouth once. for 1 dose (Patient not taking: Reported on 9/13/2022)    fluorouraciL (EFUDEX) 5 % cream AAA scalp and temples bid x 2 weeks, then AAA of both forearms bid x 4 weeks    fluticasone propionate (CUTIVATE) 0.05 % cream     ketoconazole (NIZORAL) 2 % cream     LIDOcaine HCl 2% (XYLOCAINE) 2 % Soln APPLY 5 ML  EVERY 6 HOURS    meclizine (ANTIVERT) 25 mg tablet Take 1 tablet by mouth twice daily as needed    meloxicam (MOBIC) 7.5 MG tablet Take 1 tablet by mouth once daily    omeprazole (PRILOSEC) 40 MG capsule  Take 1 capsule (40 mg total) by mouth every morning.    oxyCODONE-acetaminophen (PERCOCET) 5-325 mg per tablet Take 1 tablet by mouth every 4 (four) hours as needed for Pain.    polyethylene glycol (GLYCOLAX) 17 gram PwPk Take 17 g by mouth once daily.    sulfamethoxazole-trimethoprim 800-160mg (BACTRIM DS) 800-160 mg Tab Take 1 tablet by mouth 2 (two) times daily.    traMADoL (ULTRAM) 50 mg tablet Take 1 tablet (50 mg total) by mouth every 24 hours as needed for Pain.    tramadol-acetaminophen 37.5-325 mg (ULTRACET) 37.5-325 mg Tab Take 1 tablet by mouth every 12 (twelve) hours as needed for Pain. (Patient not taking: No sig reported)    triamcinolone acetonide 0.1% (KENALOG) 0.1 % cream Apply topically 2 (two) times daily.     Family History       Problem Relation (Age of Onset)    Cancer Son    Diabetes Maternal Uncle    Heart disease Mother          Tobacco Use    Smoking status: Former     Packs/day: 1.00     Years: 25.00     Pack years: 25.00     Types: Cigarettes     Quit date: 1990     Years since quittin.8    Smokeless tobacco: Never   Substance and Sexual Activity    Alcohol use: No     Alcohol/week: 0.0 standard drinks    Drug use: No    Sexual activity: Not Currently     Birth control/protection: None     Review of Systems   Constitutional: Negative for fever and malaise/fatigue.   HENT:  Negative for sore throat.    Eyes:  Negative for blurred vision.   Cardiovascular:  Positive for near-syncope. Negative for chest pain, dyspnea on exertion, orthopnea and syncope.   Respiratory:  Negative for cough and hemoptysis.    Hematologic/Lymphatic: Negative for bleeding problem.   Skin:  Negative for rash.   Musculoskeletal:  Negative for falls.   Gastrointestinal:  Negative for abdominal pain.   Genitourinary: Negative.    Neurological: Negative.    Psychiatric/Behavioral:  Negative for altered mental status and substance abuse.    Objective:     Vital Signs (Most Recent):  Temp: 98.1 °F  (36.7 °C) (10/30/22 1547)  Pulse: 69 (10/30/22 1547)  Resp: 18 (10/30/22 1234)  BP: (!) 126/57 (10/30/22 1547)  SpO2: (!) 94 % (10/30/22 1547)   Vital Signs (24h Range):  Temp:  [97.5 °F (36.4 °C)-98.6 °F (37 °C)] 98.1 °F (36.7 °C)  Pulse:  [66-90] 69  Resp:  [16-18] 18  SpO2:  [93 %-97 %] 94 %  BP: ()/(50-61) 126/57     Weight: 68.3 kg (150 lb 9.2 oz)  Body mass index is 21.61 kg/m².    SpO2: (!) 94 %  O2 Device (Oxygen Therapy): room air      Intake/Output Summary (Last 24 hours) at 10/30/2022 1616  Last data filed at 10/29/2022 1730  Gross per 24 hour   Intake --   Output 360 ml   Net -360 ml         Lines/Drains/Airways       Peripheral Intravenous Line  Duration                  Peripheral IV - Single Lumen 10/30/22 0951 22 G Anterior;Distal;Right Forearm <1 day                    Physical Exam  Vitals reviewed.   Constitutional:       Appearance: He is well-developed.   HENT:      Head: Normocephalic and atraumatic.   Eyes:      General: No scleral icterus.     Conjunctiva/sclera: Conjunctivae normal.   Cardiovascular:      Rate and Rhythm: Normal rate and regular rhythm.      Pulses: Intact distal pulses.      Heart sounds: Normal heart sounds. No murmur heard.  Pulmonary:      Effort: No respiratory distress.      Breath sounds: No wheezing or rales.   Chest:      Chest wall: No tenderness.   Abdominal:      General: Bowel sounds are normal. There is no distension.      Palpations: Abdomen is soft.      Tenderness: There is no guarding.   Musculoskeletal:         General: Normal range of motion.      Cervical back: Normal range of motion and neck supple.   Skin:     General: Skin is warm.   Neurological:      Mental Status: He is alert and oriented to person, place, and time.       Significant Labs: All pertinent lab results from the last 24 hours have been reviewed. and   Recent Lab Results         10/30/22  0458   10/30/22  0332   10/30/22  0255   10/29/22  2053        Anion Gap 8             aPTT      49.1  Comment: aPTT therapeutic range = 39-69 seconds   51.6  Comment: aPTT therapeutic range = 39-69 seconds       Baso # 0.04             Basophil % 0.7             BUN 17             Calcium 9.3             Chloride 105             CO2 26             Creatinine 0.8             Differential Method Automated             eGFR >60             Eos # 0.2             Eosinophil % 2.8             Glucose 115             Gran # (ANC) 3.4             Gran % 55.9             Hematocrit 39.7             Hemoglobin 13.3             Immature Grans (Abs) 0.02  Comment: Mild elevation in immature granulocytes is non specific and   can be seen in a variety of conditions including stress response,   acute inflammation, trauma and pregnancy. Correlation with other   laboratory and clinical findings is essential.               Immature Granulocytes 0.3             Lymph # 1.7             Lymph % 28.0             Magnesium 2.0             MCH 31.1             MCHC 33.5             MCV 93             Mono # 0.7             Mono % 12.3             MPV 9.6             nRBC 0             Platelets 230             POCT Glucose   105           Potassium 4.3             RBC 4.28             RDW 12.7             Sodium 139             WBC 6.01                     Significant Imaging: EKG: nsr   Conclusion 10.6.2022        Patient Reported Event #1 Patient activated. The symptom(s) included dizziness. The corresponding rhythm to the patient reported event was sinus bradycardia.   Critical, 6 sec pauses, contacted patient and ordering physician   Patient to go to ED     Predominant Rhythm  Sinus rhythm with heart rates varying between 31 and 112 BPM with an average of 66BPM.     - Predominant rhythm: NSR  - Atrial Fibrillation (AF) 3.0%  - Atrial Tachycardia (AT) 148 episodes, Longest 18 beats @ Avg 111 bpm up to 125 bpm, Fastest 3 beats @ Avg 153 bpm up to 168 bpm  - Ventricular Tachycardia (VT) 2 episodes, Longest/Fastest 6 beats @ Avg  115 bpm up to 151 bpm  - Pauses 27 up to 6.9 seconds  - Junctional Rhythm (JR)  - Junctional Escape Beat(s) (ADILENE)  - Ectopic Atrial Beat(s) (EAB)  - PAC 3.23 %  - PVC 0.42 %  - Ventricular Escape Beat(s) (VEB)  - Nonconducted Atrial Run    Other considerations  - Heart rates <40 bpm occurred during JR accounting for <1% of the total time.  - Frequent intermittent transitions of Sinus and AF were observed             Assessment and Plan:         Paroxysmal A-fib  Iv heparin for now   doac on discharge   See sinus pause      Tachy-susanna syndrome  See sinus pause    Sinus pause  During conversion from afib to sinus   See strip above  Reviewed bardy monitor tracings   Oral AC on hold due to possible pacemaker implant next week   On iv heparin for his afib  Had one episode of post conversion pause of 3 seconds earlier today   Currently in SR  NPO after MN possible PPM in AM         VTE Risk Mitigation (From admission, onward)         Ordered     heparin 25,000 units in dextrose 5% (100 units/ml) IV bolus from bag - ADDITIONAL PRN BOLUS - 60 units/kg  As needed (PRN)        Question:  Heparin Infusion Adjustment (DO NOT MODIFY ANSWER)  Answer:  [email or web address]\Images\[email or web address]\HeparinInfusions\heparin LOW INTENSITY nomogram for [facility] [id number]    10/29/22 1434     heparin 25,000 units in dextrose 5% (100 units/ml) IV bolus from bag - ADDITIONAL PRN BOLUS - 30 units/kg  As needed (PRN)        Question:  Heparin Infusion Adjustment (DO NOT MODIFY ANSWER)  Answer:  [email or web address]\Images\[email or web address]\HeparinInfusions\heparin LOW INTENSITY nomogram for [facility] [id number]    10/29/22 1434     heparin 25,000 units in dextrose 5% 250 mL (100 units/mL) infusion LOW INTENSITY nomogram - [facility]  Continuous        Question Answer Comment   Heparin Infusion Adjustment (DO NOT MODIFY ANSWER) \\[email or web address]\Images\Pharmacy\HeparinInfusions\heparin LOW INTENSITY nomogram for [facility] [id number]    Begin at (in units/kg/hr) 12        10/29/22  1434     Place sequential compression device  Until discontinued         10/28/22 2002                Luis A Tejeda MD  Cardiology  [facility] - Telemetry ([facility])

## 2022-10-31 ENCOUNTER — TELEPHONE (OUTPATIENT)
Dept: PAIN MEDICINE | Facility: CLINIC | Age: 87
End: 2022-10-31
Payer: MEDICARE

## 2022-10-31 LAB
ANION GAP SERPL CALC-SCNC: 8 MMOL/L (ref 8–16)
APTT BLDCRRT: 47.9 SEC (ref 21–32)
BASOPHILS # BLD AUTO: 0.03 K/UL (ref 0–0.2)
BASOPHILS NFR BLD: 0.6 % (ref 0–1.9)
BUN SERPL-MCNC: 17 MG/DL (ref 8–23)
CALCIUM SERPL-MCNC: 9.2 MG/DL (ref 8.7–10.5)
CHLORIDE SERPL-SCNC: 105 MMOL/L (ref 95–110)
CO2 SERPL-SCNC: 26 MMOL/L (ref 23–29)
CREAT SERPL-MCNC: 0.8 MG/DL (ref 0.5–1.4)
DIFFERENTIAL METHOD: ABNORMAL
EOSINOPHIL # BLD AUTO: 0.1 K/UL (ref 0–0.5)
EOSINOPHIL NFR BLD: 2.4 % (ref 0–8)
ERYTHROCYTE [DISTWIDTH] IN BLOOD BY AUTOMATED COUNT: 12.6 % (ref 11.5–14.5)
EST. GFR  (NO RACE VARIABLE): >60 ML/MIN/1.73 M^2
GLUCOSE SERPL-MCNC: 100 MG/DL (ref 70–110)
HCT VFR BLD AUTO: 39.5 % (ref 40–54)
HGB BLD-MCNC: 13.1 G/DL (ref 14–18)
IMM GRANULOCYTES # BLD AUTO: 0.01 K/UL (ref 0–0.04)
IMM GRANULOCYTES NFR BLD AUTO: 0.2 % (ref 0–0.5)
LYMPHOCYTES # BLD AUTO: 1.5 K/UL (ref 1–4.8)
LYMPHOCYTES NFR BLD: 28.4 % (ref 18–48)
MAGNESIUM SERPL-MCNC: 2 MG/DL (ref 1.6–2.6)
MCH RBC QN AUTO: 31 PG (ref 27–31)
MCHC RBC AUTO-ENTMCNC: 33.2 G/DL (ref 32–36)
MCV RBC AUTO: 94 FL (ref 82–98)
MONOCYTES # BLD AUTO: 0.6 K/UL (ref 0.3–1)
MONOCYTES NFR BLD: 11.7 % (ref 4–15)
NEUTROPHILS # BLD AUTO: 3 K/UL (ref 1.8–7.7)
NEUTROPHILS NFR BLD: 56.7 % (ref 38–73)
NRBC BLD-RTO: 0 /100 WBC
PLATELET # BLD AUTO: 219 K/UL (ref 150–450)
PMV BLD AUTO: 9.6 FL (ref 9.2–12.9)
POTASSIUM SERPL-SCNC: 4 MMOL/L (ref 3.5–5.1)
RBC # BLD AUTO: 4.22 M/UL (ref 4.6–6.2)
SODIUM SERPL-SCNC: 139 MMOL/L (ref 136–145)
WBC # BLD AUTO: 5.32 K/UL (ref 3.9–12.7)

## 2022-10-31 PROCEDURE — 85730 THROMBOPLASTIN TIME PARTIAL: CPT | Performed by: STUDENT IN AN ORGANIZED HEALTH CARE EDUCATION/TRAINING PROGRAM

## 2022-10-31 PROCEDURE — 99233 PR SUBSEQUENT HOSPITAL CARE,LEVL III: ICD-10-PCS | Mod: ,,, | Performed by: INTERNAL MEDICINE

## 2022-10-31 PROCEDURE — 99233 SBSQ HOSP IP/OBS HIGH 50: CPT | Mod: ,,, | Performed by: INTERNAL MEDICINE

## 2022-10-31 PROCEDURE — 36415 COLL VENOUS BLD VENIPUNCTURE: CPT | Performed by: STUDENT IN AN ORGANIZED HEALTH CARE EDUCATION/TRAINING PROGRAM

## 2022-10-31 PROCEDURE — 63600175 PHARM REV CODE 636 W HCPCS: Performed by: PHYSICIAN ASSISTANT

## 2022-10-31 PROCEDURE — 25000003 PHARM REV CODE 250: Performed by: STUDENT IN AN ORGANIZED HEALTH CARE EDUCATION/TRAINING PROGRAM

## 2022-10-31 PROCEDURE — 25000003 PHARM REV CODE 250: Performed by: PHYSICIAN ASSISTANT

## 2022-10-31 PROCEDURE — 85025 COMPLETE CBC W/AUTO DIFF WBC: CPT | Performed by: STUDENT IN AN ORGANIZED HEALTH CARE EDUCATION/TRAINING PROGRAM

## 2022-10-31 PROCEDURE — 63600175 PHARM REV CODE 636 W HCPCS: Performed by: STUDENT IN AN ORGANIZED HEALTH CARE EDUCATION/TRAINING PROGRAM

## 2022-10-31 PROCEDURE — 83735 ASSAY OF MAGNESIUM: CPT | Performed by: STUDENT IN AN ORGANIZED HEALTH CARE EDUCATION/TRAINING PROGRAM

## 2022-10-31 PROCEDURE — 80048 BASIC METABOLIC PNL TOTAL CA: CPT | Performed by: STUDENT IN AN ORGANIZED HEALTH CARE EDUCATION/TRAINING PROGRAM

## 2022-10-31 PROCEDURE — 21400001 HC TELEMETRY ROOM

## 2022-10-31 RX ORDER — DIPHENHYDRAMINE HCL 50 MG
50 CAPSULE ORAL EVERY 6 HOURS
Status: COMPLETED | OUTPATIENT
Start: 2022-10-31 | End: 2022-11-01

## 2022-10-31 RX ORDER — FAMOTIDINE 20 MG/1
20 TABLET, FILM COATED ORAL EVERY 6 HOURS
Status: COMPLETED | OUTPATIENT
Start: 2022-10-31 | End: 2022-11-01

## 2022-10-31 RX ORDER — PREDNISONE 50 MG/1
50 TABLET ORAL EVERY 6 HOURS
Status: COMPLETED | OUTPATIENT
Start: 2022-10-31 | End: 2022-11-01

## 2022-10-31 RX ADMIN — PREDNISONE 50 MG: 50 TABLET ORAL at 05:10

## 2022-10-31 RX ADMIN — ATORVASTATIN CALCIUM 10 MG: 10 TABLET, FILM COATED ORAL at 09:10

## 2022-10-31 RX ADMIN — FAMOTIDINE 20 MG: 20 TABLET ORAL at 05:10

## 2022-10-31 RX ADMIN — HEPARIN SODIUM 12 UNITS/KG/HR: 10000 INJECTION, SOLUTION INTRAVENOUS at 07:10

## 2022-10-31 RX ADMIN — PANTOPRAZOLE SODIUM 40 MG: 40 TABLET, DELAYED RELEASE ORAL at 09:10

## 2022-10-31 RX ADMIN — DUTASTERIDE 0.5 MG: 0.5 CAPSULE, LIQUID FILLED ORAL at 09:10

## 2022-10-31 RX ADMIN — ROPINIROLE HYDROCHLORIDE 0.5 MG: 0.5 TABLET, FILM COATED ORAL at 08:10

## 2022-10-31 RX ADMIN — AMLODIPINE BESYLATE 5 MG: 5 TABLET ORAL at 09:10

## 2022-10-31 RX ADMIN — DIPHENHYDRAMINE HYDROCHLORIDE 50 MG: 50 CAPSULE ORAL at 05:10

## 2022-10-31 NOTE — TELEPHONE ENCOUNTER
Attempt to reach patient to schedule appointment with one of our pain providers because the pt can not make it to his appt on 10/  31/22 because he in hospital.The patient did not answer. Left voice message on patients voice box to call back at earliest convenience to schedule apt.     Yg Leblanc  Medical Assistant

## 2022-10-31 NOTE — PROGRESS NOTES
[facility]  Progress Note    Patient Name: Dominguez Ritchie  MRN: [id number]  Patient Class: IP- Inpatient   Admission Date: 10/28/2022  Length of Stay: 3 days  Attending Physician: Nazario Gore, *  Primary Care Provider: Madie Davis MD        Subjective:     Principal Problem:Sinus bradycardia        HPI:  Mr. Ritchie is a pleasant 89-year-old  male with PMH significant for BPH, hyperlipidemia, has been having frequent disease pills, presyncopal episodes for the past few weeks.  He was evaluated by his PCP, Holter monitor was ordered.  Patient was advised by Dr. Renteria to present to the ED due to frequent long pauses.  Patient states that he did not feel lightheaded or dizzy for the past few days.  Denies any complaints at this time.  In the ED he is asymptomatic.  Denies lightheadedness, dizziness, CP, SOB, palpitations.  HR in the 60s.  /74.  Patient not on beta-blockers.  Takes amlodipine for BP.  ED physician discussed case with Cardiology on-call, Dr. Blackman, who recommended placing the patient in the ICU overnight.    Discussed code status with patient in front of family member.  Remains full code.      Overview/Hospital Course:  10/29     Examination done at bedside, alert and oriented, denied any acute issues  Hemodynamically stable   As per cardiology- Iv heparin for now   doac on discharge , Oral AC on hold due to possible pacemaker implant early next week;       10/30     Examination done at bedside, patient alert and oriented, denied any acute issues   Hemodynamically stable, into AFib with rate controlled in 70s --> possible pacemaker placement in a.m. by Cardiology  Currently on heparin drip    10/31     Examination done at bedside, alert and oriented, hemodynamically stable, labs reviewed.  Cardiology plans for pacemaker placement tomorrow morning, explained to patient in detail, agreed to stay and undergo the procedure.  NPO since  midnight          Review of Systems    Constitutional: Negative.  Negative for chills and fever.   HENT: Negative.  Negative for congestion, rhinorrhea, sore throat and trouble swallowing.    Eyes: Negative.  Negative for visual disturbance.   Respiratory: Negative.  Negative for cough, shortness of breath and wheezing.    Cardiovascular: Negative.  Negative for chest pain and palpitations.   Gastrointestinal: Negative.  Negative for abdominal pain, diarrhea, nausea and vomiting.   Endocrine: Negative.    Genitourinary: Negative.  Negative for dysuria and flank pain.   Musculoskeletal: Negative.  Negative for back pain.   Skin: Negative.  Negative for rash.   Allergic/Immunologic: Negative.    Neurological:  denied dizziness;   Negative for speech difficulty, weakness, numbness and headaches.   Hematological: Negative.    Psychiatric/Behavioral: Negative.  Negative for hallucinations.    All other systems reviewed and are negative  Objective:     Vital Signs (Most Recent):  Temp: 98.2 °F (36.8 °C) (10/31/22 0900)  Pulse: 68 (10/31/22 0900)  Resp: 18 (10/31/22 0900)  BP: 135/69 (10/31/22 0900)  SpO2: (!) 93 % (10/31/22 0900)   Vital Signs (24h Range):  Temp:  [97.8 °F (36.6 °C)-98.5 °F (36.9 °C)] 98.2 °F (36.8 °C)  Pulse:  [64-77] 68  Resp:  [16-18] 18  SpO2:  [93 %-94 %] 93 %  BP: (112-140)/(57-69) 135/69     Weight: 69.6 kg (153 lb 7 oz)  Body mass index is 22.02 kg/m².    Intake/Output Summary (Last 24 hours) at 10/31/2022 1218  Last data filed at 10/31/2022 0444  Gross per 24 hour   Intake --   Output 300 ml   Net -300 ml      Physical Exam    Constitutional:       General: He is awake. He is not in acute distress.     Appearance: He is not ill-appearing.      Comments: Pleasant elderly  male, in no respiratory distress.  Comfortably lying in bed.  Little hard of hearing.  Daughter at the bedside.   HENT:      Head: Normocephalic and atraumatic.      Mouth/Throat:      Mouth: Mucous membranes are moist.    Eyes:      General: No scleral icterus.     Conjunctiva/sclera: Conjunctivae normal.   Cardiovascular:      Rate and Rhythm: Regular rhythm.      Heart sounds: No murmur heard.  Pulmonary:      Effort: Pulmonary effort is normal. No respiratory distress.      Breath sounds: Normal breath sounds. No wheezing.   Abdominal:      Palpations: Abdomen is soft.      Tenderness: There is no abdominal tenderness.   Musculoskeletal:         General: No swelling. Normal range of motion.      Cervical back: Normal range of motion and neck supple.   Skin:     General: Skin is warm.      Coloration: Skin is not jaundiced.   Neurological:      General: No focal deficit present.      Mental Status: He is alert and oriented to person, place, and time. Mental status is at baseline.   Psychiatric:         Attention and Perception: Attention normal.         Speech: Speech normal.         Behavior: Behavior is cooperative.     Significant Labs: All pertinent labs within the past 24 hours have been reviewed.  CBC:   Recent Labs   Lab 10/29/22  1456 10/30/22  0458 10/31/22  0436   WBC 5.47 6.01 5.32   HGB 13.0* 13.3* 13.1*   HCT 39.4* 39.7* 39.5*    230 219     CMP:   Recent Labs   Lab 10/30/22  0458 10/31/22  0436    139   K 4.3 4.0    105   CO2 26 26   * 100   BUN 17 17   CREATININE 0.8 0.8   CALCIUM 9.3 9.2   ANIONGAP 8 8       Significant Imaging:   Imaging Results              X-Ray Chest AP Portable (Final result)  Result time 10/28/22 18:19:31      Final result by Lisa Combs MD (10/28/22 18:19:31)                   Impression:      No acute abnormality.      Electronically signed by: Garret Gonzalez  Date:    10/28/2022  Time:    18:19               Narrative:    EXAMINATION:  XR CHEST AP PORTABLE    CLINICAL HISTORY:  near syncope;    TECHNIQUE:  Single frontal view of the chest was performed.    COMPARISON:  None    FINDINGS:  Low lung volumes.The lungs are clear, with normal appearance of pulmonary  vasculature and no pleural effusion or pneumothorax.    The cardiac silhouette is prominent.  The hilar and mediastinal contours are unremarkable.    Bones are intact.                                         Assessment/Plan:      * Sinus bradycardia  -symptomatic bradycardia with frequent long pauses on Holter monitor recently.    -monitor in ICU overnight.    -cardiology consulted, aware.    -patient not on beta-blockers.      Paroxysmal A-fib  Iv heparin for now   doac on discharge as per cardiology;          Sinus pause  -patient was sent to the ED by cardiologist, due to frequent long pauses on Holter monitor  -currently asymptomatic.    -denies lightheadedness, dizziness (was having frequent presyncopal episodes for past few weeks, but none in last 3-4 days)  -per Dr. Blackman, to be monitored in ICU overnight    10/29:     conversion from afib to sinus   Oral AC on hold due to possible pacemaker implant early next week   Med surg tele    10/30:    into AFib with rate controlled in 70s --> possible pacemaker placement in a.m. by Cardiology  Currently on heparin drip      Chronic bilateral low back pain without sciatica         Hyperlipidemia  -continue statin        VTE Risk Mitigation (From admission, onward)         Ordered     heparin 25,000 units in dextrose 5% (100 units/ml) IV bolus from bag - ADDITIONAL PRN BOLUS - 60 units/kg  As needed (PRN)        Question:  Heparin Infusion Adjustment (DO NOT MODIFY ANSWER)  Answer:  \\[facility]\Images\Pharmacy\HeparinInfusions\heparin LOW INTENSITY nomogram for [facility] [id number].pdf    10/29/22 1434     heparin 25,000 units in dextrose 5% (100 units/ml) IV bolus from bag - ADDITIONAL PRN BOLUS - 30 units/kg  As needed (PRN)        Question:  Heparin Infusion Adjustment (DO NOT MODIFY ANSWER)  Answer:  \[employer]\Images\Pharmacy\HeparinInfusions\heparin LOW INTENSITY nomogram for [facility] [id number].pdf    10/29/22 1434     heparin 25,000 units in dextrose 5% 250 mL  (100 units/mL) infusion LOW INTENSITY nomogram - [facility]  Continuous        Question Answer Comment   Heparin Infusion Adjustment (DO NOT MODIFY ANSWER) \\[email or web address]\epic\Images\Pharmacy\HeparinInfusions\heparin LOW INTENSITY nomogram for [facility] [id number].pdf    Begin at (in units/kg/hr) 12        10/29/22 1434     Place sequential compression device  Until discontinued         10/28/22 2002                Discharge Planning   SUSHILA:      Code Status: Not on file   Is the patient medically ready for discharge?:     Reason for patient still in hospital (select all that apply):  Pacemaker placement in a.m., NPO since midnight  Discharge Plan A: Home                  Nazario Gore MD  Department of Hospital Medicine   [facility] Tele[facility] ([facility])

## 2022-10-31 NOTE — SUBJECTIVE & OBJECTIVE
Review of Systems   Constitutional: Negative.   HENT: Negative.     Eyes: Negative.    Cardiovascular: Negative.    Respiratory: Negative.     Endocrine: Negative.    Hematologic/Lymphatic: Negative.    Skin: Negative.    Musculoskeletal: Negative.    Gastrointestinal: Negative.    Genitourinary: Negative.    Neurological: Negative.    Psychiatric/Behavioral: Negative.     Allergic/Immunologic: Negative.    Objective:     Vital Signs (Most Recent):  Temp: 98.1 °F (36.7 °C) (10/31/22 1229)  Pulse: 90 (10/31/22 1229)  Resp: 18 (10/31/22 1229)  BP: (!) 145/69 (10/31/22 1229)  SpO2: (!) 93 % (10/31/22 1229)   Vital Signs (24h Range):  Temp:  [97.8 °F (36.6 °C)-98.5 °F (36.9 °C)] 98.1 °F (36.7 °C)  Pulse:  [64-90] 90  Resp:  [16-18] 18  SpO2:  [93 %-94 %] 93 %  BP: (112-145)/(57-69) 145/69     Weight: 69.6 kg (153 lb 7 oz)  Body mass index is 22.02 kg/m².     SpO2: (!) 93 %  O2 Device (Oxygen Therapy): room air      Intake/Output Summary (Last 24 hours) at 10/31/2022 1231  Last data filed at 10/31/2022 0444  Gross per 24 hour   Intake --   Output 300 ml   Net -300 ml       Lines/Drains/Airways       Peripheral Intravenous Line  Duration                  Peripheral IV - Single Lumen 10/30/22 0951 22 G Anterior;Distal;Right Forearm 1 day                    Physical Exam  Vitals and nursing note reviewed.   Constitutional:       General: He is not in acute distress.     Appearance: Normal appearance. He is well-developed. He is not diaphoretic.   HENT:      Head: Normocephalic and atraumatic.   Eyes:      General:         Right eye: No discharge.         Left eye: No discharge.      Pupils: Pupils are equal, round, and reactive to light.   Neck:      Thyroid: No thyromegaly.      Vascular: No JVD.      Trachea: No tracheal deviation.   Cardiovascular:      Rate and Rhythm: Normal rate and regular rhythm.      Chest Wall: PMI is not displaced.      Pulses: Intact distal pulses.      Heart sounds: Normal heart sounds. No  murmur heard.    No friction rub. No gallop. No S3 or S4 sounds.   Pulmonary:      Effort: Pulmonary effort is normal. No respiratory distress.      Breath sounds: Normal breath sounds. No wheezing or rales.   Abdominal:      General: There is no distension.      Tenderness: There is no abdominal tenderness. There is no rebound.   Musculoskeletal:      Cervical back: Neck supple.   Skin:     General: Skin is warm and dry.      Findings: No erythema.   Neurological:      Mental Status: He is alert and oriented to person, place, and time.   Psychiatric:         Behavior: Behavior normal.       Significant Labs: CMP   Recent Labs   Lab 10/30/22  0458 10/31/22  0436    139   K 4.3 4.0    105   CO2 26 26   * 100   BUN 17 17   CREATININE 0.8 0.8   CALCIUM 9.3 9.2   ANIONGAP 8 8   , CBC   Recent Labs   Lab 10/29/22  1456 10/30/22  0458 10/31/22  0436   WBC 5.47 6.01 5.32   HGB 13.0* 13.3* 13.1*   HCT 39.4* 39.7* 39.5*    230 219   , Troponin No results for input(s): TROPONINI in the last 48 hours., and All pertinent lab results from the last 24 hours have been reviewed.    Significant Imaging: Echocardiogram: Transthoracic echo (TTE) complete ([facility] Only):   Results for orders placed or performed during the hospital encounter of 09/16/22   Echo   Result Value Ref Range    BSA 1.83 m2    TDI SEPTAL 0.04 m/s    LV LATERAL E/E' RATIO 10.29 m/s    LV SEPTAL E/E' RATIO 18.00 m/s    IVC diameter 1.44 cm    Left Ventricular Outflow Tract Mean Velocity 0.61 cm/s    Left Ventricular Outflow Tract Mean Gradient 1.72 mmHg    TDI LATERAL 0.07 m/s    PV PEAK VELOCITY 0.77 cm/s    LVIDd 4.68 3.5 - 6.0 cm    IVS 1.05 0.6 - 1.1 cm    Posterior Wall 1.08 0.6 - 1.1 cm    Ao root annulus 3.15 cm    LVIDs 2.81 2.1 - 4.0 cm    FS 40 28 - 44 %    Sinus 3.80 cm    STJ 3.81 cm    Ascending aorta 3.92 cm    LV mass 178.08 g    LA size 3.88 cm    RVDD 3.65 cm    TAPSE 1.87 cm    Left Ventricle Relative Wall Thickness 0.46  cm    AV regurgitation pressure 1/2 time [id number] ms    AV mean gradient 4 mmHg    AV valve area 2.42 cm2    AV Velocity Ratio 0.63     AV index (prosthetic) 0.65     E/A ratio 0.73     Mean e' 0.06 m/s    E wave deceleration time 244.34 msec    IVRT 114.18 msec    LVOT diameter 2.18 cm    LVOT area 3.7 cm2    LVOT peak nir 0.90 m/s    LVOT peak VTI 21.90 cm    Ao peak nir 1.42 m/s    Ao VTI 33.7 cm    RVOT peak nir 0.64 m/s    RVOT peak VTI 16.3 cm    LVOT stroke volume 81.70 cm3    AV peak gradient 8 mmHg    PV mean gradient 0.93 mmHg    E/E' ratio 13.09 m/s    MV Peak E Nir 0.72 m/s    AR Max Nir 3.53 m/s    TR Max Nir 2.93 m/s    MV Peak A Nir 0.98 m/s    LV Systolic Volume 29.69 mL    LV Systolic Volume Index 16.1 mL/m2    LV Diastolic Volume 101.14 mL    LV Diastolic Volume Index 54.97 mL/m2    LV Mass Index 97 g/m2    RA Major Axis 6.30 cm    Left Atrium Minor Axis 6.62 cm    Left Atrium Major Axis 6.21 cm    Triscuspid Valve Regurgitation Peak Gradient 34 mmHg    LA Volume Index (Mod) 19.9 mL/m2    LA volume (mod) 36.67 cm3    RA Width 3.24 cm    Right Atrial Pressure (from IVC) 3 mmHg    EF 55 %    TV rest pulmonary artery pressure 37 mmHg    Narrative    · The left ventricle is normal in size with concentric remodeling and   normal systolic function.  · Indeterminate left ventricular diastolic function.  · The estimated PA systolic pressure is 37 mmHg.  · Normal right ventricular size with normal right ventricular systolic   function.  · Normal central venous pressure (3 mmHg).  · The estimated ejection fraction is 55%.  · Mild aortic regurgitation.  · Mild mitral regurgitation.  · Mild tricuspid regurgitation.      , EKG: Reviewed, and X-Ray: CXR: X-Ray Chest 1 View (CXR): No results found for this visit on 10/28/22. and X-Ray Chest PA and Lateral (CXR): No results found for this visit on 10/28/22.

## 2022-10-31 NOTE — PROGRESS NOTES
[facility] ([facility])  Cardiology  Progress Note    Patient Name: Dominguez Ritchie  MRN: [id number]  Admission Date: 10/28/2022  Hospital Length of Stay: 3 days  Code Status: No Order   Attending Physician: Nazario Gore, *   Primary Care Physician: Madie Davis MD  Expected Discharge Date:   Principal Problem:Sinus bradycardia    Subjective:     Hospital Course:   89-year-old  male with PMH significant for BPH, hyperlipidemia, has been having frequent disease pills, presyncopal episodes for the past few weeks. Had an event monitor study read yesterday by Dr Renteria with findings of sinus pauses up to 6 seconds with conversion from afib   Currently in sinus rhythm , no events overnight   Had afib burden of 3% on monitor for 13 days     10.30.2022  HAD ANOTHER EPISODE OF AFIB WITH SHORT PAUSE DURING CONVERSION OF 3 SECONDS     10/31/22-Patient seen and examined today, resting in bed. No issues overnight. Labs stable. Plans for PPM tomorrow.        Review of Systems   Constitutional: Negative.   HENT: Negative.     Eyes: Negative.    Cardiovascular: Negative.    Respiratory: Negative.     Endocrine: Negative.    Hematologic/Lymphatic: Negative.    Skin: Negative.    Musculoskeletal: Negative.    Gastrointestinal: Negative.    Genitourinary: Negative.    Neurological: Negative.    Psychiatric/Behavioral: Negative.     Allergic/Immunologic: Negative.    Objective:     Vital Signs (Most Recent):  Temp: 98.1 °F (36.7 °C) (10/31/22 1229)  Pulse: 90 (10/31/22 1229)  Resp: 18 (10/31/22 1229)  BP: (!) 145/69 (10/31/22 1229)  SpO2: (!) 93 % (10/31/22 1229)   Vital Signs (24h Range):  Temp:  [97.8 °F (36.6 °C)-98.5 °F (36.9 °C)] 98.1 °F (36.7 °C)  Pulse:  [64-90] 90  Resp:  [16-18] 18  SpO2:  [93 %-94 %] 93 %  BP: (112-145)/(57-69) 145/69     Weight: 69.6 kg (153 lb 7 oz)  Body mass index is 22.02 kg/m².     SpO2: (!) 93 %  O2 Device (Oxygen Therapy): room air      Intake/Output Summary (Last 24  hours) at 10/31/2022 1231  Last data filed at 10/31/2022 0444  Gross per 24 hour   Intake --   Output 300 ml   Net -300 ml       Lines/Drains/Airways       Peripheral Intravenous Line  Duration                  Peripheral IV - Single Lumen 10/30/22 0951 22 G Anterior;Distal;Right Forearm 1 day                    Physical Exam  Vitals and nursing note reviewed.   Constitutional:       General: He is not in acute distress.     Appearance: Normal appearance. He is well-developed. He is not diaphoretic.   HENT:      Head: Normocephalic and atraumatic.   Eyes:      General:         Right eye: No discharge.         Left eye: No discharge.      Pupils: Pupils are equal, round, and reactive to light.   Neck:      Thyroid: No thyromegaly.      Vascular: No JVD.      Trachea: No tracheal deviation.   Cardiovascular:      Rate and Rhythm: Normal rate and regular rhythm.      Chest Wall: PMI is not displaced.      Pulses: Intact distal pulses.      Heart sounds: Normal heart sounds. No murmur heard.    No friction rub. No gallop. No S3 or S4 sounds.   Pulmonary:      Effort: Pulmonary effort is normal. No respiratory distress.      Breath sounds: Normal breath sounds. No wheezing or rales.   Abdominal:      General: There is no distension.      Tenderness: There is no abdominal tenderness. There is no rebound.   Musculoskeletal:      Cervical back: Neck supple.   Skin:     General: Skin is warm and dry.      Findings: No erythema.   Neurological:      Mental Status: He is alert and oriented to person, place, and time.   Psychiatric:         Behavior: Behavior normal.       Significant Labs: CMP   Recent Labs   Lab 10/30/22  0458 10/31/22  0436    139   K 4.3 4.0    105   CO2 26 26   * 100   BUN 17 17   CREATININE 0.8 0.8   CALCIUM 9.3 9.2   ANIONGAP 8 8   , CBC   Recent Labs   Lab 10/29/22  1456 10/30/22  0458 10/31/22  0436   WBC 5.47 6.01 5.32   HGB 13.0* 13.3* 13.1*   HCT 39.4* 39.7* 39.5*    230  219   , Troponin No results for input(s): TROPONINI in the last 48 hours., and All pertinent lab results from the last 24 hours have been reviewed.    Significant Imaging: Echocardiogram: Transthoracic echo (TTE) complete ([facility] Only):   Results for orders placed or performed during the hospital encounter of 09/16/22   Echo   Result Value Ref Range    BSA 1.83 m2    TDI SEPTAL 0.04 m/s    LV LATERAL E/E' RATIO 10.29 m/s    LV SEPTAL E/E' RATIO 18.00 m/s    IVC diameter 1.44 cm    Left Ventricular Outflow Tract Mean Velocity 0.61 cm/s    Left Ventricular Outflow Tract Mean Gradient 1.72 mmHg    TDI LATERAL 0.07 m/s    PV PEAK VELOCITY 0.77 cm/s    LVIDd 4.68 3.5 - 6.0 cm    IVS 1.05 0.6 - 1.1 cm    Posterior Wall 1.08 0.6 - 1.1 cm    Ao root annulus 3.15 cm    LVIDs 2.81 2.1 - 4.0 cm    FS 40 28 - 44 %    Sinus 3.80 cm    STJ 3.81 cm    Ascending aorta 3.92 cm    LV mass 178.08 g    LA size 3.88 cm    RVDD 3.65 cm    TAPSE 1.87 cm    Left Ventricle Relative Wall Thickness 0.46 cm    AV regurgitation pressure 1/2 time [id number] ms    AV mean gradient 4 mmHg    AV valve area 2.42 cm2    AV Velocity Ratio 0.63     AV index (prosthetic) 0.65     E/A ratio 0.73     Mean e' 0.06 m/s    E wave deceleration time 244.34 msec    IVRT 114.18 msec    LVOT diameter 2.18 cm    LVOT area 3.7 cm2    LVOT peak nir 0.90 m/s    LVOT peak VTI 21.90 cm    Ao peak nir 1.42 m/s    Ao VTI 33.7 cm    RVOT peak nir 0.64 m/s    RVOT peak VTI 16.3 cm    LVOT stroke volume 81.70 cm3    AV peak gradient 8 mmHg    PV mean gradient 0.93 mmHg    E/E' ratio 13.09 m/s    MV Peak E Nir 0.72 m/s    AR Max Nir 3.53 m/s    TR Max Nir 2.93 m/s    MV Peak A Nir 0.98 m/s    LV Systolic Volume 29.69 mL    LV Systolic Volume Index 16.1 mL/m2    LV Diastolic Volume 101.14 mL    LV Diastolic Volume Index 54.97 mL/m2    LV Mass Index 97 g/m2    RA Major Axis 6.30 cm    Left Atrium Minor Axis 6.62 cm    Left Atrium Major Axis 6.21 cm    Triscuspid Valve  Regurgitation Peak Gradient 34 mmHg    LA Volume Index (Mod) 19.9 mL/m2    LA volume (mod) 36.67 cm3    RA Width 3.24 cm    Right Atrial Pressure (from IVC) 3 mmHg    EF 55 %    TV rest pulmonary artery pressure 37 mmHg    Narrative    · The left ventricle is normal in size with concentric remodeling and   normal systolic function.  · Indeterminate left ventricular diastolic function.  · The estimated PA systolic pressure is 37 mmHg.  · Normal right ventricular size with normal right ventricular systolic   function.  · Normal central venous pressure (3 mmHg).  · The estimated ejection fraction is 55%.  · Mild aortic regurgitation.  · Mild mitral regurgitation.  · Mild tricuspid regurgitation.      , EKG: Reviewed, and X-Ray: CXR: X-Ray Chest 1 View (CXR): No results found for this visit on 10/28/22. and X-Ray Chest PA and Lateral (CXR): No results found for this visit on 10/28/22.    Assessment and Plan:   Patient who presents with tachy-susanna syndrome. PPM planned tomorrow. NPO after MN.    Paroxysmal A-fib  Iv heparin for now   doac on discharge   See sinus pause      Tachy-susanna syndrome  See sinus pause    Sinus pause  During conversion from afib to sinus   See strip above  Reviewed bardy monitor tracings   Oral AC on hold due to possible pacemaker implant next week   On iv heparin for his afib  Had one episode of post conversion pause of 3 seconds earlier today   Currently in SR  NPO after MN possible PPM in AM     10/31/22  -Stable this AM  -AV fabricio agents on hold  -Continue heparin gtt  -PPM planned tomorrow, addressed all of patient's questions/concerns  -Keep NPO after MN        VTE Risk Mitigation (From admission, onward)         Ordered     heparin 25,000 units in dextrose 5% (100 units/ml) IV bolus from bag - ADDITIONAL PRN BOLUS - 60 units/kg  As needed (PRN)        Question:  Heparin Infusion Adjustment (DO NOT MODIFY ANSWER)  Answer:  [email or web address]\Images\Pharmacy\HeparinInfusions\heparin LOW  INTENSITY nomogram for [facility] [id number]    10/29/22 1434     heparin 25,000 units in dextrose 5% (100 units/ml) IV bolus from bag - ADDITIONAL PRN BOLUS - 30 units/kg  As needed (PRN)        Question:  Heparin Infusion Adjustment (DO NOT MODIFY ANSWER)  Answer:  \\[email or web address]\Images\Pharmacy\HeparinInfusions\heparin LOW INTENSITY nomogram for [facility] [id number].pdf    10/29/22 1434     heparin 25,000 units in dextrose 5% 250 mL (100 units/mL) infusion LOW INTENSITY nomogram - [facility]  Continuous        Question Answer Comment   Heparin Infusion Adjustment (DO NOT MODIFY ANSWER) \\[email or web address]\Images\Pharmacy\HeparinInfusions\heparin LOW INTENSITY nomogram for [facility] [id number].pdf    Begin at (in units/kg/hr) 12        10/29/22 1434     Place sequential compression device  Until discontinued         10/28/22 2002                Paz Cintron PA-C  Cardiology  [facility] - Tele[facility] ([facility])

## 2022-10-31 NOTE — PLAN OF CARE
Problem: Adult Inpatient Plan of Care  Goal: Plan of Care Review  Outcome: Ongoing, Progressing  Goal: Patient-Specific Goal (Individualized)  Outcome: Ongoing, Progressing  Goal: Optimal Comfort and Wellbeing  Outcome: Ongoing, Progressing     Problem: Fall Injury Risk  Goal: Absence of Fall and Fall-Related Injury  Outcome: Ongoing, Progressing   POC reviewed, verbalized understanding. Pt remained free from falls, fall precautions in place. VSS. No other c/o at this time. PIV intact. Call bell and personal belongings within reach. Hourly rounding complete. Reminded to call for assistance. Will continue to monitor. Pt aware that he is npo at midnight. Heparin drip monitored per protocol.

## 2022-10-31 NOTE — ASSESSMENT & PLAN NOTE
During conversion from afib to sinus   See strip above  Reviewed bardy monitor tracings   Oral AC on hold due to possible pacemaker implant next week   On iv heparin for his afib  Had one episode of post conversion pause of 3 seconds earlier today   Currently in SR  NPO after MN possible PPM in AM     10/31/22  -Stable this AM  -AV fabricio agents on hold  -Continue heparin gtt  -PPM planned tomorrow, addressed all of patient's questions/concerns  -Keep NPO after MN

## 2022-10-31 NOTE — SUBJECTIVE & OBJECTIVE
Review of Systems    Constitutional: Negative.  Negative for chills and fever.   HENT: Negative.  Negative for congestion, rhinorrhea, sore throat and trouble swallowing.    Eyes: Negative.  Negative for visual disturbance.   Respiratory: Negative.  Negative for cough, shortness of breath and wheezing.    Cardiovascular: Negative.  Negative for chest pain and palpitations.   Gastrointestinal: Negative.  Negative for abdominal pain, diarrhea, nausea and vomiting.   Endocrine: Negative.    Genitourinary: Negative.  Negative for dysuria and flank pain.   Musculoskeletal: Negative.  Negative for back pain.   Skin: Negative.  Negative for rash.   Allergic/Immunologic: Negative.    Neurological:  denied dizziness;   Negative for speech difficulty, weakness, numbness and headaches.   Hematological: Negative.    Psychiatric/Behavioral: Negative.  Negative for hallucinations.    All other systems reviewed and are negative  Objective:     Vital Signs (Most Recent):  Temp: 98.2 °F (36.8 °C) (10/31/22 0900)  Pulse: 68 (10/31/22 0900)  Resp: 18 (10/31/22 0900)  BP: 135/69 (10/31/22 0900)  SpO2: (!) 93 % (10/31/22 0900)   Vital Signs (24h Range):  Temp:  [97.8 °F (36.6 °C)-98.5 °F (36.9 °C)] 98.2 °F (36.8 °C)  Pulse:  [64-77] 68  Resp:  [16-18] 18  SpO2:  [93 %-94 %] 93 %  BP: (112-140)/(57-69) 135/69     Weight: 69.6 kg (153 lb 7 oz)  Body mass index is 22.02 kg/m².    Intake/Output Summary (Last 24 hours) at 10/31/2022 1218  Last data filed at 10/31/2022 0444  Gross per 24 hour   Intake --   Output 300 ml   Net -300 ml      Physical Exam    Constitutional:       General: He is awake. He is not in acute distress.     Appearance: He is not ill-appearing.      Comments: Pleasant elderly  male, in no respiratory distress.  Comfortably lying in bed.  Little hard of hearing.  Daughter at the bedside.   HENT:      Head: Normocephalic and atraumatic.      Mouth/Throat:      Mouth: Mucous membranes are moist.   Eyes:       General: No scleral icterus.     Conjunctiva/sclera: Conjunctivae normal.   Cardiovascular:      Rate and Rhythm: Regular rhythm.      Heart sounds: No murmur heard.  Pulmonary:      Effort: Pulmonary effort is normal. No respiratory distress.      Breath sounds: Normal breath sounds. No wheezing.   Abdominal:      Palpations: Abdomen is soft.      Tenderness: There is no abdominal tenderness.   Musculoskeletal:         General: No swelling. Normal range of motion.      Cervical back: Normal range of motion and neck supple.   Skin:     General: Skin is warm.      Coloration: Skin is not jaundiced.   Neurological:      General: No focal deficit present.      Mental Status: He is alert and oriented to person, place, and time. Mental status is at baseline.   Psychiatric:         Attention and Perception: Attention normal.         Speech: Speech normal.         Behavior: Behavior is cooperative.     Significant Labs: All pertinent labs within the past 24 hours have been reviewed.  CBC:   Recent Labs   Lab 10/29/22  1456 10/30/22  0458 10/31/22  0436   WBC 5.47 6.01 5.32   HGB 13.0* 13.3* 13.1*   HCT 39.4* 39.7* 39.5*    230 219     CMP:   Recent Labs   Lab 10/30/22  0458 10/31/22  0436    139   K 4.3 4.0    105   CO2 26 26   * 100   BUN 17 17   CREATININE 0.8 0.8   CALCIUM 9.3 9.2   ANIONGAP 8 8       Significant Imaging:   Imaging Results              X-Ray Chest AP Portable (Final result)  Result time 10/28/22 18:19:31      Final result by Lisa Combs MD (10/28/22 18:19:31)                   Impression:      No acute abnormality.      Electronically signed by: Garret Gonzalez  Date:    10/28/2022  Time:    18:19               Narrative:    EXAMINATION:  XR CHEST AP PORTABLE    CLINICAL HISTORY:  near syncope;    TECHNIQUE:  Single frontal view of the chest was performed.    COMPARISON:  None    FINDINGS:  Low lung volumes.The lungs are clear, with normal appearance of pulmonary vasculature  and no pleural effusion or pneumothorax.    The cardiac silhouette is prominent.  The hilar and mediastinal contours are unremarkable.    Bones are intact.

## 2022-11-01 VITALS
TEMPERATURE: 98 F | HEART RATE: 82 BPM | BODY MASS INDEX: 21.97 KG/M2 | HEIGHT: 70 IN | SYSTOLIC BLOOD PRESSURE: 140 MMHG | DIASTOLIC BLOOD PRESSURE: 81 MMHG | RESPIRATION RATE: 19 BRPM | WEIGHT: 153.44 LBS | OXYGEN SATURATION: 93 %

## 2022-11-01 DIAGNOSIS — I49.5 TACHY-BRADY SYNDROME: Primary | ICD-10-CM

## 2022-11-01 DIAGNOSIS — Z95.0 STATUS POST PLACEMENT OF CARDIAC PACEMAKER: ICD-10-CM

## 2022-11-01 DIAGNOSIS — I45.5 SINUS PAUSE: ICD-10-CM

## 2022-11-01 LAB
ANION GAP SERPL CALC-SCNC: 8 MMOL/L (ref 8–16)
APTT BLDCRRT: 42.7 SEC (ref 21–32)
BASOPHILS # BLD AUTO: 0.01 K/UL (ref 0–0.2)
BASOPHILS NFR BLD: 0.2 % (ref 0–1.9)
BUN SERPL-MCNC: 17 MG/DL (ref 8–23)
CALCIUM SERPL-MCNC: 9.6 MG/DL (ref 8.7–10.5)
CHLORIDE SERPL-SCNC: 104 MMOL/L (ref 95–110)
CO2 SERPL-SCNC: 25 MMOL/L (ref 23–29)
CREAT SERPL-MCNC: 0.8 MG/DL (ref 0.5–1.4)
DIFFERENTIAL METHOD: ABNORMAL
EOSINOPHIL # BLD AUTO: 0 K/UL (ref 0–0.5)
EOSINOPHIL NFR BLD: 0 % (ref 0–8)
ERYTHROCYTE [DISTWIDTH] IN BLOOD BY AUTOMATED COUNT: 12.4 % (ref 11.5–14.5)
EST. GFR  (NO RACE VARIABLE): >60 ML/MIN/1.73 M^2
GLUCOSE SERPL-MCNC: 138 MG/DL (ref 70–110)
HCT VFR BLD AUTO: 40.7 % (ref 40–54)
HGB BLD-MCNC: 13.8 G/DL (ref 14–18)
IMM GRANULOCYTES # BLD AUTO: 0.01 K/UL (ref 0–0.04)
IMM GRANULOCYTES NFR BLD AUTO: 0.2 % (ref 0–0.5)
LYMPHOCYTES # BLD AUTO: 0.8 K/UL (ref 1–4.8)
LYMPHOCYTES NFR BLD: 19.5 % (ref 18–48)
MAGNESIUM SERPL-MCNC: 1.9 MG/DL (ref 1.6–2.6)
MCH RBC QN AUTO: 30.9 PG (ref 27–31)
MCHC RBC AUTO-ENTMCNC: 33.9 G/DL (ref 32–36)
MCV RBC AUTO: 91 FL (ref 82–98)
MONOCYTES # BLD AUTO: 0.1 K/UL (ref 0.3–1)
MONOCYTES NFR BLD: 2.7 % (ref 4–15)
NEUTROPHILS # BLD AUTO: 3.2 K/UL (ref 1.8–7.7)
NEUTROPHILS NFR BLD: 77.4 % (ref 38–73)
NRBC BLD-RTO: 0 /100 WBC
PLATELET # BLD AUTO: 235 K/UL (ref 150–450)
PMV BLD AUTO: 9.4 FL (ref 9.2–12.9)
POTASSIUM SERPL-SCNC: 4.7 MMOL/L (ref 3.5–5.1)
RBC # BLD AUTO: 4.47 M/UL (ref 4.6–6.2)
SODIUM SERPL-SCNC: 137 MMOL/L (ref 136–145)
WBC # BLD AUTO: 4.11 K/UL (ref 3.9–12.7)

## 2022-11-01 PROCEDURE — 99152 MOD SED SAME PHYS/QHP 5/>YRS: CPT | Mod: ,,, | Performed by: INTERNAL MEDICINE

## 2022-11-01 PROCEDURE — 83735 ASSAY OF MAGNESIUM: CPT | Performed by: STUDENT IN AN ORGANIZED HEALTH CARE EDUCATION/TRAINING PROGRAM

## 2022-11-01 PROCEDURE — 25000003 PHARM REV CODE 250: Performed by: INTERNAL MEDICINE

## 2022-11-01 PROCEDURE — 99233 SBSQ HOSP IP/OBS HIGH 50: CPT | Mod: ,,, | Performed by: INTERNAL MEDICINE

## 2022-11-01 PROCEDURE — 36415 COLL VENOUS BLD VENIPUNCTURE: CPT | Performed by: STUDENT IN AN ORGANIZED HEALTH CARE EDUCATION/TRAINING PROGRAM

## 2022-11-01 PROCEDURE — 80048 BASIC METABOLIC PNL TOTAL CA: CPT | Performed by: STUDENT IN AN ORGANIZED HEALTH CARE EDUCATION/TRAINING PROGRAM

## 2022-11-01 PROCEDURE — 33206 INSERT HEART PM ATRIAL: CPT | Performed by: INTERNAL MEDICINE

## 2022-11-01 PROCEDURE — 27201423 OPTIME MED/SURG SUP & DEVICES STERILE SUPPLY: Performed by: INTERNAL MEDICINE

## 2022-11-01 PROCEDURE — 33206 PR INSER HART PACER XVENOUS ATRIAL: ICD-10-PCS | Mod: KX,,, | Performed by: INTERNAL MEDICINE

## 2022-11-01 PROCEDURE — 93010 EKG 12-LEAD: ICD-10-PCS | Mod: ,,, | Performed by: INTERNAL MEDICINE

## 2022-11-01 PROCEDURE — 63600175 PHARM REV CODE 636 W HCPCS: Performed by: PHYSICIAN ASSISTANT

## 2022-11-01 PROCEDURE — C1894 INTRO/SHEATH, NON-LASER: HCPCS | Performed by: INTERNAL MEDICINE

## 2022-11-01 PROCEDURE — 93010 ELECTROCARDIOGRAM REPORT: CPT | Mod: ,,, | Performed by: INTERNAL MEDICINE

## 2022-11-01 PROCEDURE — 63600175 PHARM REV CODE 636 W HCPCS: Performed by: INTERNAL MEDICINE

## 2022-11-01 PROCEDURE — 85730 THROMBOPLASTIN TIME PARTIAL: CPT | Performed by: STUDENT IN AN ORGANIZED HEALTH CARE EDUCATION/TRAINING PROGRAM

## 2022-11-01 PROCEDURE — 25000003 PHARM REV CODE 250: Performed by: STUDENT IN AN ORGANIZED HEALTH CARE EDUCATION/TRAINING PROGRAM

## 2022-11-01 PROCEDURE — C1769 GUIDE WIRE: HCPCS | Performed by: INTERNAL MEDICINE

## 2022-11-01 PROCEDURE — 99233 PR SUBSEQUENT HOSPITAL CARE,LEVL III: ICD-10-PCS | Mod: ,,, | Performed by: INTERNAL MEDICINE

## 2022-11-01 PROCEDURE — 25000003 PHARM REV CODE 250: Performed by: PHYSICIAN ASSISTANT

## 2022-11-01 PROCEDURE — 25500020 PHARM REV CODE 255: Performed by: INTERNAL MEDICINE

## 2022-11-01 PROCEDURE — C1898 LEAD, PMKR, OTHER THAN TRANS: HCPCS | Performed by: INTERNAL MEDICINE

## 2022-11-01 PROCEDURE — 99153 MOD SED SAME PHYS/QHP EA: CPT | Performed by: INTERNAL MEDICINE

## 2022-11-01 PROCEDURE — 99152 MOD SED SAME PHYS/QHP 5/>YRS: CPT | Performed by: INTERNAL MEDICINE

## 2022-11-01 PROCEDURE — 99223 PR INITIAL HOSPITAL CARE,LEVL III: ICD-10-PCS | Mod: 57,,, | Performed by: INTERNAL MEDICINE

## 2022-11-01 PROCEDURE — 33206 INSERT HEART PM ATRIAL: CPT | Mod: KX,,, | Performed by: INTERNAL MEDICINE

## 2022-11-01 PROCEDURE — C1785 PMKR, DUAL, RATE-RESP: HCPCS | Performed by: INTERNAL MEDICINE

## 2022-11-01 PROCEDURE — 99223 1ST HOSP IP/OBS HIGH 75: CPT | Mod: 57,,, | Performed by: INTERNAL MEDICINE

## 2022-11-01 PROCEDURE — 93005 ELECTROCARDIOGRAM TRACING: CPT

## 2022-11-01 PROCEDURE — 99152 PR MOD CONSCIOUS SEDATION, SAME PHYS, 5+ YRS, FIRST 15 MIN: ICD-10-PCS | Mod: ,,, | Performed by: INTERNAL MEDICINE

## 2022-11-01 PROCEDURE — 85025 COMPLETE CBC W/AUTO DIFF WBC: CPT | Performed by: STUDENT IN AN ORGANIZED HEALTH CARE EDUCATION/TRAINING PROGRAM

## 2022-11-01 DEVICE — IMPLANTABLE DEVICE
Type: IMPLANTABLE DEVICE | Site: HEART | Status: FUNCTIONAL
Brand: SOLIA

## 2022-11-01 DEVICE — IMPLANTABLE DEVICE
Type: IMPLANTABLE DEVICE | Site: CHEST  WALL | Status: FUNCTIONAL
Brand: EDORA 8 SR-T

## 2022-11-01 RX ORDER — ROPINIROLE 0.5 MG/1
0.5 TABLET, FILM COATED ORAL NIGHTLY
Qty: 30 TABLET | Refills: 0
Start: 2022-11-01 | End: 2022-11-08

## 2022-11-01 RX ORDER — IODIXANOL 320 MG/ML
INJECTION, SOLUTION INTRAVASCULAR
Status: DISCONTINUED | OUTPATIENT
Start: 2022-11-01 | End: 2022-11-01 | Stop reason: HOSPADM

## 2022-11-01 RX ORDER — CEFAZOLIN SODIUM 1 G/3ML
INJECTION, POWDER, FOR SOLUTION INTRAMUSCULAR; INTRAVENOUS
Status: DISCONTINUED | OUTPATIENT
Start: 2022-11-01 | End: 2022-11-01 | Stop reason: HOSPADM

## 2022-11-01 RX ORDER — AMLODIPINE BESYLATE 5 MG/1
5 TABLET ORAL DAILY
Qty: 30 TABLET | Refills: 0 | Status: SHIPPED | OUTPATIENT
Start: 2022-11-02 | End: 2023-02-02

## 2022-11-01 RX ORDER — VANCOMYCIN HYDROCHLORIDE 1 G/20ML
INJECTION, POWDER, LYOPHILIZED, FOR SOLUTION INTRAVENOUS
Status: DISCONTINUED | OUTPATIENT
Start: 2022-11-01 | End: 2022-11-01 | Stop reason: HOSPADM

## 2022-11-01 RX ORDER — MIDAZOLAM HYDROCHLORIDE 1 MG/ML
INJECTION, SOLUTION INTRAMUSCULAR; INTRAVENOUS
Status: DISCONTINUED | OUTPATIENT
Start: 2022-11-01 | End: 2022-11-01 | Stop reason: HOSPADM

## 2022-11-01 RX ORDER — FENTANYL CITRATE 50 UG/ML
INJECTION, SOLUTION INTRAMUSCULAR; INTRAVENOUS
Status: DISCONTINUED | OUTPATIENT
Start: 2022-11-01 | End: 2022-11-01 | Stop reason: HOSPADM

## 2022-11-01 RX ORDER — DIPHENHYDRAMINE HYDROCHLORIDE 50 MG/ML
INJECTION INTRAMUSCULAR; INTRAVENOUS
Status: DISCONTINUED | OUTPATIENT
Start: 2022-11-01 | End: 2022-11-01 | Stop reason: HOSPADM

## 2022-11-01 RX ORDER — LIDOCAINE HYDROCHLORIDE 20 MG/ML
INJECTION, SOLUTION EPIDURAL; INFILTRATION; INTRACAUDAL; PERINEURAL
Status: DISCONTINUED | OUTPATIENT
Start: 2022-11-01 | End: 2022-11-01 | Stop reason: HOSPADM

## 2022-11-01 RX ADMIN — DUTASTERIDE 0.5 MG: 0.5 CAPSULE, LIQUID FILLED ORAL at 08:11

## 2022-11-01 RX ADMIN — PANTOPRAZOLE SODIUM 40 MG: 40 TABLET, DELAYED RELEASE ORAL at 08:11

## 2022-11-01 RX ADMIN — ATORVASTATIN CALCIUM 10 MG: 10 TABLET, FILM COATED ORAL at 08:11

## 2022-11-01 RX ADMIN — AMLODIPINE BESYLATE 5 MG: 5 TABLET ORAL at 08:11

## 2022-11-01 RX ADMIN — PREDNISONE 50 MG: 50 TABLET ORAL at 12:11

## 2022-11-01 RX ADMIN — PREDNISONE 50 MG: 50 TABLET ORAL at 05:11

## 2022-11-01 RX ADMIN — DIPHENHYDRAMINE HYDROCHLORIDE 50 MG: 50 CAPSULE ORAL at 12:11

## 2022-11-01 RX ADMIN — FAMOTIDINE 20 MG: 20 TABLET ORAL at 12:11

## 2022-11-01 RX ADMIN — DIPHENHYDRAMINE HYDROCHLORIDE 50 MG: 50 CAPSULE ORAL at 05:11

## 2022-11-01 RX ADMIN — FAMOTIDINE 20 MG: 20 TABLET ORAL at 05:11

## 2022-11-01 NOTE — SUBJECTIVE & OBJECTIVE
Review of Systems   Constitutional: Negative.   HENT: Negative.     Eyes: Negative.    Cardiovascular: Negative.    Respiratory: Negative.     Endocrine: Negative.    Hematologic/Lymphatic: Negative.    Skin: Negative.    Musculoskeletal: Negative.    Gastrointestinal: Negative.    Genitourinary: Negative.    Neurological: Negative.    Psychiatric/Behavioral: Negative.     Allergic/Immunologic: Negative.    Objective:     Vital Signs (Most Recent):  Temp: 98.2 °F (36.8 °C) (11/01/22 1125)  Pulse: 84 (11/01/22 1125)  Resp: 18 (11/01/22 1125)  BP: 113/72 (11/01/22 1125)  SpO2: (!) 93 % (11/01/22 1125)   Vital Signs (24h Range):  Temp:  [96.5 °F (35.8 °C)-98.2 °F (36.8 °C)] 98.2 °F (36.8 °C)  Pulse:  [58-84] 84  Resp:  [16-18] 18  SpO2:  [92 %-95 %] 93 %  BP: (113-137)/(57-72) 113/72     Weight: 69.6 kg (153 lb 7 oz)  Body mass index is 22.02 kg/m².     SpO2: (!) 93 %  O2 Device (Oxygen Therapy): room air      Intake/Output Summary (Last 24 hours) at 11/1/2022 1430  Last data filed at 11/1/2022 0510  Gross per 24 hour   Intake --   Output 1600 ml   Net -1600 ml       Lines/Drains/Airways       Peripheral Intravenous Line  Duration                  Peripheral IV - Single Lumen 10/30/22 0951 22 G Anterior;Distal;Right Forearm 2 days                    Physical Exam  Vitals and nursing note reviewed.   Constitutional:       General: He is not in acute distress.     Appearance: Normal appearance. He is well-developed. He is not diaphoretic.   HENT:      Head: Normocephalic and atraumatic.   Eyes:      General:         Right eye: No discharge.         Left eye: No discharge.      Pupils: Pupils are equal, round, and reactive to light.   Neck:      Thyroid: No thyromegaly.      Vascular: No JVD.      Trachea: No tracheal deviation.   Cardiovascular:      Rate and Rhythm: Normal rate and regular rhythm.      Heart sounds: Normal heart sounds, S1 normal and S2 normal. No murmur heard.  Pulmonary:      Effort: Pulmonary  effort is normal. No respiratory distress.      Breath sounds: Normal breath sounds. No wheezing or rales.   Abdominal:      General: There is no distension.      Palpations: Abdomen is soft.      Tenderness: There is no rebound.   Musculoskeletal:      Cervical back: Neck supple.      Right lower leg: No edema.      Left lower leg: No edema.   Skin:     General: Skin is warm and dry.      Findings: No erythema.   Neurological:      General: No focal deficit present.      Mental Status: He is alert and oriented to person, place, and time.   Psychiatric:         Mood and Affect: Mood normal.         Behavior: Behavior normal.         Thought Content: Thought content normal.       Significant Labs: CMP   Recent Labs   Lab 10/31/22  0436 11/01/22  0556    137   K 4.0 4.7    104   CO2 26 25    138*   BUN 17 17   CREATININE 0.8 0.8   CALCIUM 9.2 9.6   ANIONGAP 8 8   , CBC   Recent Labs   Lab 10/31/22  0436 11/01/22  0556   WBC 5.32 4.11   HGB 13.1* 13.8*   HCT 39.5* 40.7    235   , Troponin No results for input(s): TROPONINI in the last 48 hours., and All pertinent lab results from the last 24 hours have been reviewed.    Significant Imaging: Echocardiogram: Transthoracic echo (TTE) complete (Cupid Only):   Results for orders placed or performed during the hospital encounter of 09/16/22   Echo   Result Value Ref Range    BSA 1.83 m2    TDI SEPTAL 0.04 m/s    LV LATERAL E/E' RATIO 10.29 m/s    LV SEPTAL E/E' RATIO 18.00 m/s    IVC diameter 1.44 cm    Left Ventricular Outflow Tract Mean Velocity 0.61 cm/s    Left Ventricular Outflow Tract Mean Gradient 1.72 mmHg    TDI LATERAL 0.07 m/s    PV PEAK VELOCITY 0.77 cm/s    LVIDd 4.68 3.5 - 6.0 cm    IVS 1.05 0.6 - 1.1 cm    Posterior Wall 1.08 0.6 - 1.1 cm    Ao root annulus 3.15 cm    LVIDs 2.81 2.1 - 4.0 cm    FS 40 28 - 44 %    Sinus 3.80 cm    STJ 3.81 cm    Ascending aorta 3.92 cm    LV mass 178.08 g    LA size 3.88 cm    RVDD 3.65 cm    TAPSE  1.87 cm    Left Ventricle Relative Wall Thickness 0.46 cm    AV regurgitation pressure 1/2 time [id number] ms    AV mean gradient 4 mmHg    AV valve area 2.42 cm2    AV Velocity Ratio 0.63     AV index (prosthetic) 0.65     E/A ratio 0.73     Mean e' 0.06 m/s    E wave deceleration time 244.34 msec    IVRT 114.18 msec    LVOT diameter 2.18 cm    LVOT area 3.7 cm2    LVOT peak nir 0.90 m/s    LVOT peak VTI 21.90 cm    Ao peak nir 1.42 m/s    Ao VTI 33.7 cm    RVOT peak nir 0.64 m/s    RVOT peak VTI 16.3 cm    LVOT stroke volume 81.70 cm3    AV peak gradient 8 mmHg    PV mean gradient 0.93 mmHg    E/E' ratio 13.09 m/s    MV Peak E Nir 0.72 m/s    AR Max Nir 3.53 m/s    TR Max Nir 2.93 m/s    MV Peak A Nir 0.98 m/s    LV Systolic Volume 29.69 mL    LV Systolic Volume Index 16.1 mL/m2    LV Diastolic Volume 101.14 mL    LV Diastolic Volume Index 54.97 mL/m2    LV Mass Index 97 g/m2    RA Major Axis 6.30 cm    Left Atrium Minor Axis 6.62 cm    Left Atrium Major Axis 6.21 cm    Triscuspid Valve Regurgitation Peak Gradient 34 mmHg    LA Volume Index (Mod) 19.9 mL/m2    LA volume (mod) 36.67 cm3    RA Width 3.24 cm    Right Atrial Pressure (from IVC) 3 mmHg    EF 55 %    TV rest pulmonary artery pressure 37 mmHg    Narrative    · The left ventricle is normal in size with concentric remodeling and   normal systolic function.  · Indeterminate left ventricular diastolic function.  · The estimated PA systolic pressure is 37 mmHg.  · Normal right ventricular size with normal right ventricular systolic   function.  · Normal central venous pressure (3 mmHg).  · The estimated ejection fraction is 55%.  · Mild aortic regurgitation.  · Mild mitral regurgitation.  · Mild tricuspid regurgitation.      , EKG: Reviewed, and X-Ray: CXR: X-Ray Chest 1 View (CXR): No results found for this visit on 10/28/22. and X-Ray Chest PA and Lateral (CXR): No results found for this visit on 10/28/22.

## 2022-11-01 NOTE — PLAN OF CARE
Problem: Adult Inpatient Plan of Care  Goal: Plan of Care Review  Outcome: Ongoing, Progressing  Flowsheets (Taken 11/1/2022 0511)  Plan of Care Reviewed With: patient

## 2022-11-01 NOTE — ASSESSMENT & PLAN NOTE
-patient was sent to the ED by cardiologist, due to frequent long pauses on Holter monitor  -currently asymptomatic.    -denies lightheadedness, dizziness (was having frequent presyncopal episodes for past few weeks, but none in last 3-4 days)  -per Dr. Blackman, to be monitored in ICU overnight    10/29:     conversion from afib to sinus   Oral AC on hold due to possible pacemaker implant early next week   Med surg tele    10/30:    into AFib with rate controlled in 70s --> possible pacemaker placement in a.m. by Cardiology  Currently on heparin drip    11/1:  PPM today

## 2022-11-01 NOTE — SUBJECTIVE & OBJECTIVE
Review of Systems    Constitutional: Negative.  Negative for chills and fever.   HENT: Negative.  Negative for congestion, rhinorrhea, sore throat and trouble swallowing.    Eyes: Negative.  Negative for visual disturbance.   Respiratory: Negative.  Negative for cough, shortness of breath and wheezing.    Cardiovascular: Negative.  Negative for chest pain and palpitations.   Gastrointestinal: Negative.  Negative for abdominal pain, diarrhea, nausea and vomiting.   Endocrine: Negative.    Genitourinary: Negative.  Negative for dysuria and flank pain.   Musculoskeletal: Negative.  Negative for back pain.   Skin: Negative.  Negative for rash.   Allergic/Immunologic: Negative.    Neurological:  denied dizziness;   Negative for speech difficulty, weakness, numbness and headaches.   Hematological: Negative.    Psychiatric/Behavioral: Negative.  Negative for hallucinations.    All other systems reviewed and are negative  Objective:     Vital Signs (Most Recent):  Temp: 98.2 °F (36.8 °C) (11/01/22 1125)  Pulse: 84 (11/01/22 1125)  Resp: 18 (11/01/22 1125)  BP: 113/72 (11/01/22 1125)  SpO2: (!) 93 % (11/01/22 1125)   Vital Signs (24h Range):  Temp:  [96.5 °F (35.8 °C)-98.2 °F (36.8 °C)] 98.2 °F (36.8 °C)  Pulse:  [72-84] 84  Resp:  [16-18] 18  SpO2:  [92 %-95 %] 93 %  BP: (113-136)/(57-72) 113/72     Weight: 69.6 kg (153 lb 7 oz)  Body mass index is 22.02 kg/m².    Intake/Output Summary (Last 24 hours) at 11/1/2022 1646  Last data filed at 11/1/2022 0510  Gross per 24 hour   Intake --   Output 1100 ml   Net -1100 ml      Physical Exam    Constitutional:       General: He is awake. He is not in acute distress.     Appearance: He is not ill-appearing.      Comments: Pleasant elderly  male, in no respiratory distress.  Comfortably lying in bed.  Little hard of hearing.  Daughter at the bedside.   HENT:      Head: Normocephalic and atraumatic.      Mouth/Throat:      Mouth: Mucous membranes are moist.   Eyes:       General: No scleral icterus.     Conjunctiva/sclera: Conjunctivae normal.   Cardiovascular:      Rate and Rhythm: Regular rhythm.      Heart sounds: No murmur heard.  Pulmonary:      Effort: Pulmonary effort is normal. No respiratory distress.      Breath sounds: Normal breath sounds. No wheezing.   Abdominal:      Palpations: Abdomen is soft.      Tenderness: There is no abdominal tenderness.   Musculoskeletal:         General: No swelling. Normal range of motion.      Cervical back: Normal range of motion and neck supple.   Skin:     General: Skin is warm.      Coloration: Skin is not jaundiced.   Neurological:      General: No focal deficit present.      Mental Status: He is alert and oriented to person, place, and time. Mental status is at baseline.   Psychiatric:         Attention and Perception: Attention normal.         Speech: Speech normal.         Behavior: Behavior is cooperative.     Significant Labs: All pertinent labs within the past 24 hours have been reviewed.  CBC:   Recent Labs   Lab 10/31/22  0436 11/01/22  0556   WBC 5.32 4.11   HGB 13.1* 13.8*   HCT 39.5* 40.7    235     CMP:   Recent Labs   Lab 10/31/22  0436 11/01/22  0556    137   K 4.0 4.7    104   CO2 26 25    138*   BUN 17 17   CREATININE 0.8 0.8   CALCIUM 9.2 9.6   ANIONGAP 8 8       Significant Imaging:     Imaging Results              X-Ray Chest AP Portable (Final result)  Result time 10/28/22 18:19:31      Final result by Lisa Combs MD (10/28/22 18:19:31)                   Impression:      No acute abnormality.      Electronically signed by: Garret Gonzalez  Date:    10/28/2022  Time:    18:19               Narrative:    EXAMINATION:  XR CHEST AP PORTABLE    CLINICAL HISTORY:  near syncope;    TECHNIQUE:  Single frontal view of the chest was performed.    COMPARISON:  None    FINDINGS:  Low lung volumes.The lungs are clear, with normal appearance of pulmonary vasculature and no pleural effusion or  pneumothorax.    The cardiac silhouette is prominent.  The hilar and mediastinal contours are unremarkable.    Bones are intact.

## 2022-11-01 NOTE — NURSING
1745 transferred via bed to rm 240.  Placed on tele moniter.  1800 Care handed off to Kellee MARTIN

## 2022-11-01 NOTE — DISCHARGE INSTRUCTIONS
PACEMAKER/ICD INSTRUCTIONS    SAFETY  BECAUSE OF THE AFTEREFFECTS OF THE SEDATION YOU RECEIVED, WE ADVISE YOU TO REFRAIN FROM THE FOLLOWING ACTIVITIES FOR 24 HOURS.   1. DO NOT DRIVE OR OPERATE MACHINERY FOR 24 HOURS   2. DO NOT OPERATE APPLIANCES IF ALONE.     3.DON'T SIGN LEGAL PAPERS FOR 24 HOURS.     4. WE ADVISE THAT SOMEONE STAY WITH YOU AFTER THE PROCEDURE FOR AT LEAST 8 HOURS      DISCOMFORT: FOR GENERAL DISCOMFORT AT THE PUNCTURE, YOU MAY TAKE TYLENOL, 1-2 TABS EVERY 4-6 HOURS AS NEEDED.  DO NOT TAKE MORE THAN 4000 MG  IN 24 HOUR PERIOD.    ACTIVITY/ WOUND INSTRUCTIONS     AFFECTED ARM  DO NOT LIFT ARM ABOVE SHOULDER HEIGHT FOR 7 DAYS.                                  DO NOT  SWING ARM FOR 6 WEEKS                                DO NOT REMOVE DRESSING.  IT WILL BE REMOVED AT FOLLOW UP APPOINTMENT                                YOU MAY SHOWER IN 48 HOURS.  DO NOT REMOVE THE DRESSING FOR SHOWER. USE HIBICLENS                                        SOAP ON CHEST AND NECK AREA  FOR 1 WEEK.  FACE AWAY FROM SPRAY WHEN SHOWERING                                                                                REMOVE SLING FOR SHOWER, THEN REAPPLY AFTER SHOWER.                                USE ICE PACK FOR 48 HOURS .                                WEAR SLING AND SWATHE FOR  UNTIL POST OP CLINIC VISIT ON Friday.  THEN,AROUND THE CLOCK THEN ONLY WHILE SLEEPING AT NIGHT FOR 6 WEEKS.              6. CALL YOUR HEALTHCARE PROVIDER IF YOU START TO HAVE THE FOLLOWING SYMPTOMS:                       1. High fever (101 degrees or higher)                 2. Redness,drainage or swelling  or intense pain at the incision site       3.  Drowsiness that doesn't get better       4. Weakness or dizziness that doesn't get better            5.  Repeated vomiting                                                                                                                 NOZIN INSTRUCTIONS     GOAL: TO REDUCE THE RISK OF POST-PROCEDURAL INFECTIONS BY BACTERIA IN THE NASAL CAVITY.  THINK OF IT AS A HAND  FOR YOUR NOSE.       HOW TO USE:  1. SHAKE NOZIN BOTTLE WELL                            2. TAKE A COTTON SWAB AND APPLY FOUR DROPS TO TIP.                            3. INSERT COTTON SWAB INTO ONE NOSTRIL, BEING SURE NOT TO GO DEEPER INTO NOSE THAN THE TIP OF THE SWAB.                            4.SWAB NOSTRIL 6 TIMES CLOCKWISE AND 6 TIMES COUNERCLOCKWISE.  MAKE SURE TO SWAB THE INSIDE FRONT POCKET OF NOSTRIL.                             5. TAKE SWAB OUT AND APPLY 2 DROPS TO THE SAME COTTON TIP.  REPEAT STEPS 3 AND 4 IN THE OTHER NOSTRIL      DO STEPS 1-5 TWICE A DAY FOR 7 DAYS.

## 2022-11-01 NOTE — INTERVAL H&P NOTE
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    I have discussed the procedure in detail with the patient. I described its benefits and risks. I reviewed alternative therapies and discussed their potential value. The patient was given ample opportunity to express concerns and ask questions and I provided appropriate responses and  answers to such.The patient understands and agrees to proceed.  Consent form was signed by patient and myself and appropriately witnessed.       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

## 2022-11-01 NOTE — ASSESSMENT & PLAN NOTE
During conversion from afib to sinus   See strip above  Reviewed bardy monitor tracings   Oral AC on hold due to possible pacemaker implant next week   On iv heparin for his afib  Had one episode of post conversion pause of 3 seconds earlier today   Currently in SR  NPO after MN possible PPM in AM     10/31/22  -Stable this AM  -AV fabricio agents on hold  -Continue heparin gtt  -PPM planned tomorrow, addressed all of patient's questions/concerns  -Keep NPO after MN    11/1/22  -Stable overnight  -PPM planned today by Dr. Mccrary. All risks, benefits, and treatment alternatives explained to patient in detail. All questions answered. He has agreed to proceed.

## 2022-11-01 NOTE — PROGRESS NOTES
[facility] Cath Lab ([facility] Medicine  Progress Note    Patient Name: Dominguez Ritchie  MRN: [id number]  Patient Class: IP- Inpatient   Admission Date: 10/28/2022  Length of Stay: 4 days  Attending Physician: Nazario Gore, *  Primary Care Provider: Madie Davis MD        Subjective:     Principal Problem:Sinus bradycardia        HPI:  Mr. Ritchie is a pleasant 89-year-old  male with PMH significant for BPH, hyperlipidemia, has been having frequent disease pills, presyncopal episodes for the past few weeks.  He was evaluated by his PCP, Holter monitor was ordered.  Patient was advised by Dr. Renteria to present to the ED due to frequent long pauses.  Patient states that he did not feel lightheaded or dizzy for the past few days.  Denies any complaints at this time.  In the ED he is asymptomatic.  Denies lightheadedness, dizziness, CP, SOB, palpitations.  HR in the 60s.  /74.  Patient not on beta-blockers.  Takes amlodipine for BP.  ED physician discussed case with Cardiology on-call, Dr. Blackman, who recommended placing the patient in the ICU overnight.    Discussed code status with patient in front of family member.  Remains full code.      Overview/Hospital Course:  10/29     Examination done at bedside, alert and oriented, denied any acute issues  Hemodynamically stable   As per cardiology- Iv heparin for now   doac on discharge , Oral AC on hold due to possible pacemaker implant early next week;       10/30     Examination done at bedside, patient alert and oriented, denied any acute issues   Hemodynamically stable, into AFib with rate controlled in 70s --> possible pacemaker placement in a.m. by Cardiology  Currently on heparin drip    10/31     Examination done at bedside, alert and oriented, hemodynamically stable, labs reviewed.  Cardiology plans for pacemaker placement tomorrow morning, explained to patient in detail, agreed to stay and undergo the procedure.  NPO since  midnight;  11/1   Examination done bedside, denied any acute issues.  Hemodynamically stable, ppm placement today.          Review of Systems    Constitutional: Negative.  Negative for chills and fever.   HENT: Negative.  Negative for congestion, rhinorrhea, sore throat and trouble swallowing.    Eyes: Negative.  Negative for visual disturbance.   Respiratory: Negative.  Negative for cough, shortness of breath and wheezing.    Cardiovascular: Negative.  Negative for chest pain and palpitations.   Gastrointestinal: Negative.  Negative for abdominal pain, diarrhea, nausea and vomiting.   Endocrine: Negative.    Genitourinary: Negative.  Negative for dysuria and flank pain.   Musculoskeletal: Negative.  Negative for back pain.   Skin: Negative.  Negative for rash.   Allergic/Immunologic: Negative.    Neurological:  denied dizziness;   Negative for speech difficulty, weakness, numbness and headaches.   Hematological: Negative.    Psychiatric/Behavioral: Negative.  Negative for hallucinations.    All other systems reviewed and are negative  Objective:     Vital Signs (Most Recent):  Temp: 98.2 °F (36.8 °C) (11/01/22 1125)  Pulse: 84 (11/01/22 1125)  Resp: 18 (11/01/22 1125)  BP: 113/72 (11/01/22 1125)  SpO2: (!) 93 % (11/01/22 1125)   Vital Signs (24h Range):  Temp:  [96.5 °F (35.8 °C)-98.2 °F (36.8 °C)] 98.2 °F (36.8 °C)  Pulse:  [72-84] 84  Resp:  [16-18] 18  SpO2:  [92 %-95 %] 93 %  BP: (113-136)/(57-72) 113/72     Weight: 69.6 kg (153 lb 7 oz)  Body mass index is 22.02 kg/m².    Intake/Output Summary (Last 24 hours) at 11/1/2022 1646  Last data filed at 11/1/2022 0510  Gross per 24 hour   Intake --   Output 1100 ml   Net -1100 ml      Physical Exam    Constitutional:       General: He is awake. He is not in acute distress.     Appearance: He is not ill-appearing.      Comments: Pleasant elderly  male, in no respiratory distress.  Comfortably lying in bed.  Little hard of hearing.  Daughter at the bedside.    HENT:      Head: Normocephalic and atraumatic.      Mouth/Throat:      Mouth: Mucous membranes are moist.   Eyes:      General: No scleral icterus.     Conjunctiva/sclera: Conjunctivae normal.   Cardiovascular:      Rate and Rhythm: Regular rhythm.      Heart sounds: No murmur heard.  Pulmonary:      Effort: Pulmonary effort is normal. No respiratory distress.      Breath sounds: Normal breath sounds. No wheezing.   Abdominal:      Palpations: Abdomen is soft.      Tenderness: There is no abdominal tenderness.   Musculoskeletal:         General: No swelling. Normal range of motion.      Cervical back: Normal range of motion and neck supple.   Skin:     General: Skin is warm.      Coloration: Skin is not jaundiced.   Neurological:      General: No focal deficit present.      Mental Status: He is alert and oriented to person, place, and time. Mental status is at baseline.   Psychiatric:         Attention and Perception: Attention normal.         Speech: Speech normal.         Behavior: Behavior is cooperative.     Significant Labs: All pertinent labs within the past 24 hours have been reviewed.  CBC:   Recent Labs   Lab 10/31/22  0436 11/01/22  0556   WBC 5.32 4.11   HGB 13.1* 13.8*   HCT 39.5* 40.7    235     CMP:   Recent Labs   Lab 10/31/22  0436 11/01/22  0556    137   K 4.0 4.7    104   CO2 26 25    138*   BUN 17 17   CREATININE 0.8 0.8   CALCIUM 9.2 9.6   ANIONGAP 8 8       Significant Imaging:     Imaging Results              X-Ray Chest AP Portable (Final result)  Result time 10/28/22 18:19:31      Final result by Lisa Combs MD (10/28/22 18:19:31)                   Impression:      No acute abnormality.      Electronically signed by: Garret Gonzalez  Date:    10/28/2022  Time:    18:19               Narrative:    EXAMINATION:  XR CHEST AP PORTABLE    CLINICAL HISTORY:  near syncope;    TECHNIQUE:  Single frontal view of the chest was  performed.    COMPARISON:  None    FINDINGS:  Low lung volumes.The lungs are clear, with normal appearance of pulmonary vasculature and no pleural effusion or pneumothorax.    The cardiac silhouette is prominent.  The hilar and mediastinal contours are unremarkable.    Bones are intact.                                         Assessment/Plan:      * Sinus bradycardia  -symptomatic bradycardia with frequent long pauses on Holter monitor recently.    -monitor in ICU overnight.    -cardiology consulted, aware.    -patient not on beta-blockers.      Paroxysmal A-fib  Iv heparin for now   doac on discharge as per cardiology;          Sinus pause  -patient was sent to the ED by cardiologist, due to frequent long pauses on Holter monitor  -currently asymptomatic.    -denies lightheadedness, dizziness (was having frequent presyncopal episodes for past few weeks, but none in last 3-4 days)  -per Dr. Blackman, to be monitored in ICU overnight    10/29:     conversion from afib to sinus   Oral AC on hold due to possible pacemaker implant early next week   Med surg tele    10/30:    into AFib with rate controlled in 70s --> possible pacemaker placement in a.m. by Cardiology  Currently on heparin drip    11/1:  PPM today       Chronic bilateral low back pain without sciatica         Hyperlipidemia  -continue statin        VTE Risk Mitigation (From admission, onward)         Ordered     heparin 25,000 units in dextrose 5% (100 units/ml) IV bolus from bag - ADDITIONAL PRN BOLUS - 60 units/kg  As needed (PRN)        Question:  Heparin Infusion Adjustment (DO NOT MODIFY ANSWER)  Answer:  \\[email or web address]\epic\Images\Pharmacy\HeparinInfusions\heparin LOW INTENSITY nomogram for [facility] [id number].pdf    10/29/22 1434     heparin 25,000 units in dextrose 5% (100 units/ml) IV bolus from bag - ADDITIONAL PRN BOLUS - 30 units/kg  As needed (PRN)        Question:  Heparin Infusion Adjustment (DO NOT MODIFY ANSWER)  Answer:   \\[email or web address]\epic\Images\Pharmacy\HeparinInfusions\heparin LOW INTENSITY nomogram for [facility] [id number].pdf    10/29/22 1434     heparin 25,000 units in dextrose 5% 250 mL (100 units/mL) infusion LOW INTENSITY nomogram - [facility]  Continuous        Question Answer Comment   Heparin Infusion Adjustment (DO NOT MODIFY ANSWER) \\[email or web address]\epic\Images\Pharmacy\HeparinInfusions\heparin LOW INTENSITY nomogram for [facility] [id number].pdf    Begin at (in units/kg/hr) 12        10/29/22 1434     Place sequential compression device  Until discontinued         10/28/22 2002                Discharge Planning   SUSHILA: 11/1/2022     Code Status: Full Code   Is the patient medically ready for discharge?:     Reason for patient still in hospital (select all that apply): PPM placement today;   Discharge Plan A: Home                  Nazario Gore MD  Department of Hospital Medicine   [facility] Lab ([facility])

## 2022-11-01 NOTE — PROGRESS NOTES
[facility] - Telemetry ([facility])  Cardiology  Progress Note    Patient Name: Dominguez Ritchie  MRN: [id number]  Admission Date: 10/28/2022  Hospital Length of Stay: 4 days  Code Status: No Order   Attending Physician: Nazario Gore, *   Primary Care Physician: Madie Davis MD  Expected Discharge Date: 11/1/2022  Principal Problem:Sinus bradycardia    Subjective:     Hospital Course:   89-year-old  male with PMH significant for BPH, hyperlipidemia, has been having frequent disease pills, presyncopal episodes for the past few weeks. Had an event monitor study read yesterday by Dr Renteria with findings of sinus pauses up to 6 seconds with conversion from afib   Currently in sinus rhythm , no events overnight   Had afib burden of 3% on monitor for 13 days     10.30.2022  HAD ANOTHER EPISODE OF AFIB WITH SHORT PAUSE DURING CONVERSION OF 3 SECONDS     10/31/22-Patient seen and examined today, resting in bed. No issues overnight. Labs stable. Plans for PPM tomorrow.    11/1/22-Patient seen and examined today, sitting up in bed. No AEON. Awaiting PPM. Labs stable.           Review of Systems   Constitutional: Negative.   HENT: Negative.     Eyes: Negative.    Cardiovascular: Negative.    Respiratory: Negative.     Endocrine: Negative.    Hematologic/Lymphatic: Negative.    Skin: Negative.    Musculoskeletal: Negative.    Gastrointestinal: Negative.    Genitourinary: Negative.    Neurological: Negative.    Psychiatric/Behavioral: Negative.     Allergic/Immunologic: Negative.    Objective:     Vital Signs (Most Recent):  Temp: 98.2 °F (36.8 °C) (11/01/22 1125)  Pulse: 84 (11/01/22 1125)  Resp: 18 (11/01/22 1125)  BP: 113/72 (11/01/22 1125)  SpO2: (!) 93 % (11/01/22 1125)   Vital Signs (24h Range):  Temp:  [96.5 °F (35.8 °C)-98.2 °F (36.8 °C)] 98.2 °F (36.8 °C)  Pulse:  [58-84] 84  Resp:  [16-18] 18  SpO2:  [92 %-95 %] 93 %  BP: (113-137)/(57-72) 113/72     Weight: 69.6 kg (153 lb 7 oz)  Body mass index is  22.02 kg/m².     SpO2: (!) 93 %  O2 Device (Oxygen Therapy): room air      Intake/Output Summary (Last 24 hours) at 11/1/2022 1430  Last data filed at 11/1/2022 0510  Gross per 24 hour   Intake --   Output 1600 ml   Net -1600 ml       Lines/Drains/Airways       Peripheral Intravenous Line  Duration                  Peripheral IV - Single Lumen 10/30/22 0951 22 G Anterior;Distal;Right Forearm 2 days                    Physical Exam  Vitals and nursing note reviewed.   Constitutional:       General: He is not in acute distress.     Appearance: Normal appearance. He is well-developed. He is not diaphoretic.   HENT:      Head: Normocephalic and atraumatic.   Eyes:      General:         Right eye: No discharge.         Left eye: No discharge.      Pupils: Pupils are equal, round, and reactive to light.   Neck:      Thyroid: No thyromegaly.      Vascular: No JVD.      Trachea: No tracheal deviation.   Cardiovascular:      Rate and Rhythm: Normal rate and regular rhythm.      Heart sounds: Normal heart sounds, S1 normal and S2 normal. No murmur heard.  Pulmonary:      Effort: Pulmonary effort is normal. No respiratory distress.      Breath sounds: Normal breath sounds. No wheezing or rales.   Abdominal:      General: There is no distension.      Palpations: Abdomen is soft.      Tenderness: There is no rebound.   Musculoskeletal:      Cervical back: Neck supple.      Right lower leg: No edema.      Left lower leg: No edema.   Skin:     General: Skin is warm and dry.      Findings: No erythema.   Neurological:      General: No focal deficit present.      Mental Status: He is alert and oriented to person, place, and time.   Psychiatric:         Mood and Affect: Mood normal.         Behavior: Behavior normal.         Thought Content: Thought content normal.       Significant Labs: CMP   Recent Labs   Lab 10/31/22  0436 11/01/22  0556    137   K 4.0 4.7    104   CO2 26 25    138*   BUN 17 17   CREATININE 0.8  0.8   CALCIUM 9.2 9.6   ANIONGAP 8 8   , CBC   Recent Labs   Lab 10/31/22  0436 11/01/22  0556   WBC 5.32 4.11   HGB 13.1* 13.8*   HCT 39.5* 40.7    235   , Troponin No results for input(s): TROPONINI in the last 48 hours., and All pertinent lab results from the last 24 hours have been reviewed.    Significant Imaging: Echocardiogram: Transthoracic echo (TTE) complete ([facility] Only):   Results for orders placed or performed during the hospital encounter of 09/16/22   Echo   Result Value Ref Range    BSA 1.83 m2    TDI SEPTAL 0.04 m/s    LV LATERAL E/E' RATIO 10.29 m/s    LV SEPTAL E/E' RATIO 18.00 m/s    IVC diameter 1.44 cm    Left Ventricular Outflow Tract Mean Velocity 0.61 cm/s    Left Ventricular Outflow Tract Mean Gradient 1.72 mmHg    TDI LATERAL 0.07 m/s    PV PEAK VELOCITY 0.77 cm/s    LVIDd 4.68 3.5 - 6.0 cm    IVS 1.05 0.6 - 1.1 cm    Posterior Wall 1.08 0.6 - 1.1 cm    Ao root annulus 3.15 cm    LVIDs 2.81 2.1 - 4.0 cm    FS 40 28 - 44 %    Sinus 3.80 cm    STJ 3.81 cm    Ascending aorta 3.92 cm    LV mass 178.08 g    LA size 3.88 cm    RVDD 3.65 cm    TAPSE 1.87 cm    Left Ventricle Relative Wall Thickness 0.46 cm    AV regurgitation pressure 1/2 time [id number] ms    AV mean gradient 4 mmHg    AV valve area 2.42 cm2    AV Velocity Ratio 0.63     AV index (prosthetic) 0.65     E/A ratio 0.73     Mean e' 0.06 m/s    E wave deceleration time 244.34 msec    IVRT 114.18 msec    LVOT diameter 2.18 cm    LVOT area 3.7 cm2    LVOT peak nir 0.90 m/s    LVOT peak VTI 21.90 cm    Ao peak nir 1.42 m/s    Ao VTI 33.7 cm    RVOT peak nir 0.64 m/s    RVOT peak VTI 16.3 cm    LVOT stroke volume 81.70 cm3    AV peak gradient 8 mmHg    PV mean gradient 0.93 mmHg    E/E' ratio 13.09 m/s    MV Peak E Nir 0.72 m/s    AR Max Nir 3.53 m/s    TR Max Nir 2.93 m/s    MV Peak A Nir 0.98 m/s    LV Systolic Volume 29.69 mL    LV Systolic Volume Index 16.1 mL/m2    LV Diastolic Volume 101.14 mL    LV Diastolic Volume  Index 54.97 mL/m2    LV Mass Index 97 g/m2    RA Major Axis 6.30 cm    Left Atrium Minor Axis 6.62 cm    Left Atrium Major Axis 6.21 cm    Triscuspid Valve Regurgitation Peak Gradient 34 mmHg    LA Volume Index (Mod) 19.9 mL/m2    LA volume (mod) 36.67 cm3    RA Width 3.24 cm    Right Atrial Pressure (from IVC) 3 mmHg    EF 55 %    TV rest pulmonary artery pressure 37 mmHg    Narrative    · The left ventricle is normal in size with concentric remodeling and   normal systolic function.  · Indeterminate left ventricular diastolic function.  · The estimated PA systolic pressure is 37 mmHg.  · Normal right ventricular size with normal right ventricular systolic   function.  · Normal central venous pressure (3 mmHg).  · The estimated ejection fraction is 55%.  · Mild aortic regurgitation.  · Mild mitral regurgitation.  · Mild tricuspid regurgitation.      , EKG: Reviewed, and X-Ray: CXR: X-Ray Chest 1 View (CXR): No results found for this visit on 10/28/22. and X-Ray Chest PA and Lateral (CXR): No results found for this visit on 10/28/22.    Assessment and Plan:   Patient who presents with tachy-susanna syndrome. Stable this AM. PPM today.    Paroxysmal A-fib  Iv heparin for now   doac on discharge   See sinus pause        Tachy-susanna syndrome  See sinus pause    Sinus pause  During conversion from afib to sinus   See strip above  Reviewed bardy monitor tracings   Oral AC on hold due to possible pacemaker implant next week   On iv heparin for his afib  Had one episode of post conversion pause of 3 seconds earlier today   Currently in SR  NPO after MN possible PPM in AM     10/31/22  -Stable this AM  -AV fabricio agents on hold  -Continue heparin gtt  -PPM planned tomorrow, addressed all of patient's questions/concerns  -Keep NPO after MN    11/1/22  -Stable overnight  -PPM planned today by Dr. Mccrary. All risks, benefits, and treatment alternatives explained to patient in detail. All questions answered. He has agreed to  proceed.        VTE Risk Mitigation (From admission, onward)         Ordered     heparin 25,000 units in dextrose 5% (100 units/ml) IV bolus from bag - ADDITIONAL PRN BOLUS - 60 units/kg  As needed (PRN)        Question:  Heparin Infusion Adjustment (DO NOT MODIFY ANSWER)  Answer:  \\[email or web address]\epic\Images\Pharmacy\HeparinInfusions\heparin LOW INTENSITY nomogram for [facility] [id number].pdf    10/29/22 1434     heparin 25,000 units in dextrose 5% (100 units/ml) IV bolus from bag - ADDITIONAL PRN BOLUS - 30 units/kg  As needed (PRN)        Question:  Heparin Infusion Adjustment (DO NOT MODIFY ANSWER)  Answer:  \\[email or web address]\epic\Images\Pharmacy\HeparinInfusions\heparin LOW INTENSITY nomogram for [facility] [id number].pdf    10/29/22 1434     heparin 25,000 units in dextrose 5% 250 mL (100 units/mL) infusion LOW INTENSITY nomogram - [facility]  Continuous        Question Answer Comment   Heparin Infusion Adjustment (DO NOT MODIFY ANSWER) \\[email or web address]\epic\Images\Pharmacy\HeparinInfusions\heparin LOW INTENSITY nomogram for [facility] [id number].pdf    Begin at (in units/kg/hr) 12        10/29/22 1434     Place sequential compression device  Until discontinued         10/28/22 2002                Paz Cintron PA-C  Cardiology  [facility] - Telemetry ([facility])

## 2022-11-02 ENCOUNTER — TELEPHONE (OUTPATIENT)
Dept: CARDIOLOGY | Facility: CLINIC | Age: 87
End: 2022-11-02
Payer: MEDICARE

## 2022-11-02 NOTE — TELEPHONE ENCOUNTER
Pt and Pt granddaughter were contacted in regards to scheduling hosp f/u with . Pt and Granddaughter confirmed appt date/time/location and Verbalized understanding with no questions or concerns.       ----- Message from Danya Morales RN sent at 11/2/2022  9:55 AM CDT -----  Regarding: FW: needs f/u with primary cardiologist  Sky richardson,    Can someone please establish this pt with one of the following cardiologists?    Thanks,  Danya Morales  ----- Message -----  From: Ruthie Camarena RN  Sent: 11/1/2022   4:13 PM CDT  To: Danya Morales RN  Subject: needs f/u with primary cardiologist              Finn implanted single chamber pacer on 11/1 but pt doesn't have primary cardiologist.  Needs to est care with either Annalee or Ileana.

## 2022-11-02 NOTE — DISCHARGE SUMMARY
'[place] - Telemetry ([facility] Medicine  Discharge Summary      Patient Name: Dominguez Ritchie  MRN: [id number]  Patient Class: IP- Inpatient  Admission Date: 10/28/2022  Hospital Length of Stay: 4 days  Discharge Date and Time: No discharge date for patient encounter.  Attending Physician: Nazario Gore, *   Discharging Provider: Nazario Gore MD  Primary Care Provider: Madie Davis MD      HPI:   Mr. Ritchie is a pleasant 89-year-old  male with PMH significant for BPH, hyperlipidemia, has been having frequent disease pills, presyncopal episodes for the past few weeks.  He was evaluated by his PCP, Holter monitor was ordered.  Patient was advised by Dr. Renteria to present to the ED due to frequent long pauses.  Patient states that he did not feel lightheaded or dizzy for the past few days.  Denies any complaints at this time.  In the ED he is asymptomatic.  Denies lightheadedness, dizziness, CP, SOB, palpitations.  HR in the 60s.  /74.  Patient not on beta-blockers.  Takes amlodipine for BP.  ED physician discussed case with Cardiology on-call, Dr. Blackman, who recommended placing the patient in the ICU overnight.    Discussed code status with patient in front of family member.  Remains full code.      Procedure(s) (LRB):  INSERTION, CARDIAC PACEMAKER, DUAL CHAMBER (Left)  Implant PPM (Left)      Hospital Course:   10/29     Examination done at bedside, alert and oriented, denied any acute issues  Hemodynamically stable   As per cardiology- Iv heparin for now   doac on discharge , Oral AC on hold due to possible pacemaker implant early next week;       10/30     Examination done at bedside, patient alert and oriented, denied any acute issues   Hemodynamically stable, into AFib with rate controlled in 70s --> possible pacemaker placement in a.m. by Cardiology  Currently on heparin drip    10/31     Examination done at bedside, alert and oriented, hemodynamically stable, labs  reviewed.  Cardiology plans for pacemaker placement tomorrow morning, explained to patient in detail, agreed to stay and undergo the procedure.  NPO since midnight;  11/1   Examination done bedside, denied any acute issues.  Hemodynamically stable, ppm placement today.  Status post pacemaker placement, patient hemodynamically stable as per Cardiology okay for discharge.    Ordered Eliquis for recent diagnosis of AFib, medications and patient preferred pharmacy.    Updated plan to patient and family at bedside, agreed to the plan of discharge today.    Patient appeared alert, oriented x3, hemodynamically stable, tolerated diet- discharge today.       Goals of Care Treatment Preferences:  Code Status: Full Code      Consults:   Consults (From admission, onward)        Status Ordering Provider     Inpatient consult to Cardiology  Once        Provider:  Luis A Tejeda MD    Completed SARA DURAND          No new Assessment & Plan notes have been filed under this hospital service since the last note was generated.  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    Specialty: Family Medicine  Contact information:  [address] [address] [address]  [phone]             Kane Mantilla Md, MD Follow up.    Specialties: Cardiology, Internal Medicine  Why: Follow-up within 1-2 weeks upon discharge  Contact information:  [address] [facility]  [address]  [phone]                       Patient Instructions:   No discharge procedures on file.    Significant Diagnostic Studies:     Results for orders placed or performed during the hospital encounter of 10/28/22   CBC auto differential   Result Value Ref Range    WBC 4.90 3.90 - 12.70 K/uL    RBC 4.36 (L) 4.60 - 6.20 M/uL    Hemoglobin 13.5 (L) 14.0 - 18.0 g/dL    Hematocrit 40.7 40.0 - 54.0 %    MCV 93 82 - 98 fL    MCH 31.0 27.0 - 31.0 pg    MCHC 33.2 32.0 - 36.0 g/dL    RDW 12.6 11.5 - 14.5 %    Platelets 248 150 - 450 K/uL    MPV 9.7 9.2 - 12.9 fL    Immature Granulocytes 0.4 0.0 - 0.5 %    Gran # (ANC) 2.9 1.8 - 7.7 K/uL    Immature Grans (Abs) 0.02 0.00 - 0.04 K/uL    Lymph # 1.3 1.0 - 4.8 K/uL    Mono # 0.6 0.3 - 1.0 K/uL    Eos # 0.1 0.0 - 0.5 K/uL    Baso # 0.04 0.00 - 0.20 K/uL    nRBC 0 0 /100 WBC    Gran % 59.1 38.0 - 73.0 %    Lymph % 26.1 18.0 - 48.0 %    Mono % 11.8 4.0 - 15.0 %    Eosinophil % 1.8 0.0 - 8.0 %    Basophil % 0.8 0.0 - 1.9 %    Differential Method Automated    Comprehensive metabolic panel   Result Value Ref Range    Sodium 139 136 - 145 mmol/L    Potassium 4.3 3.5 - 5.1 mmol/L    Chloride 105 95 - 110 mmol/L    CO2 25 23 - 29 mmol/L    Glucose 103 70 - 110 mg/dL    BUN 12 8 - 23 mg/dL    Creatinine 0.9 0.5 - 1.4 mg/dL    Calcium 9.3 8.7 - 10.5 mg/dL    Total Protein 7.1 6.0 - 8.4 g/dL    Albumin 4.0 3.5 - 5.2 g/dL    Total Bilirubin 0.4 0.1 - 1.0 mg/dL    Alkaline Phosphatase 69 55 - 135 U/L    AST 20 10 - 40 U/L    ALT 13 10 - 44 U/L    Anion Gap 9 8 - 16 mmol/L    eGFR >60 >60 mL/min/1.73 m^2   Brain natriuretic peptide   Result Value Ref Range    BNP 23 0 - 99 pg/mL   Troponin I   Result Value Ref Range    Troponin I 0.014 0.000 - 0.026 ng/mL   Magnesium   Result Value Ref Range    Magnesium 2.1 1.6 - 2.6 mg/dL   CBC Auto Differential   Result Value Ref Range    WBC 4.63 3.90 - 12.70 K/uL    RBC 4.22 (L) 4.60 - 6.20 M/uL    Hemoglobin 13.1 (L) 14.0 - 18.0 g/dL    Hematocrit  39.5 (L) 40.0 - 54.0 %    MCV 94 82 - 98 fL    MCH 31.0 27.0 - 31.0 pg    MCHC 33.2 32.0 - 36.0 g/dL    RDW 12.6 11.5 - 14.5 %    Platelets 226 150 - 450 K/uL    MPV 9.3 9.2 - 12.9 fL    Immature Granulocytes 0.2 0.0 - 0.5 %    Gran # (ANC) 2.6 1.8 - 7.7 K/uL    Immature Grans (Abs) 0.01 0.00 - 0.04 K/uL    Lymph # 1.3 1.0 - 4.8 K/uL    Mono # 0.6 0.3 - 1.0 K/uL    Eos # 0.1 0.0 - 0.5 K/uL    Baso # 0.03 0.00 - 0.20 K/uL    nRBC 0 0 /100 WBC    Gran % 55.4 38.0 - 73.0 %    Lymph % 28.9 18.0 - 48.0 %    Mono % 12.3 4.0 - 15.0 %    Eosinophil % 2.6 0.0 - 8.0 %    Basophil % 0.6 0.0 - 1.9 %    Differential Method Automated    Magnesium   Result Value Ref Range    Magnesium 2.0 1.6 - 2.6 mg/dL   Comprehensive Metabolic Panel   Result Value Ref Range    Sodium 139 136 - 145 mmol/L    Potassium 4.9 3.5 - 5.1 mmol/L    Chloride 106 95 - 110 mmol/L    CO2 25 23 - 29 mmol/L    Glucose 100 70 - 110 mg/dL    BUN 11 8 - 23 mg/dL    Creatinine 0.8 0.5 - 1.4 mg/dL    Calcium 8.9 8.7 - 10.5 mg/dL    Total Protein 6.2 6.0 - 8.4 g/dL    Albumin 3.7 3.5 - 5.2 g/dL    Total Bilirubin 0.7 0.1 - 1.0 mg/dL    Alkaline Phosphatase 62 55 - 135 U/L    AST 18 10 - 40 U/L    ALT 14 10 - 44 U/L    Anion Gap 8 8 - 16 mmol/L    eGFR >60 >60 mL/min/1.73 m^2   TSH   Result Value Ref Range    TSH 1.733 0.400 - 4.000 uIU/mL   APTT   Result Value Ref Range    aPTT 29.7 21.0 - 32.0 sec   Protime-INR   Result Value Ref Range    Prothrombin Time 11.2 9.0 - 12.5 sec    INR 1.0 0.8 - 1.2   CBC auto differential   Result Value Ref Range    WBC 5.47 3.90 - 12.70 K/uL    RBC 4.22 (L) 4.60 - 6.20 M/uL    Hemoglobin 13.0 (L) 14.0 - 18.0 g/dL    Hematocrit 39.4 (L) 40.0 - 54.0 %    MCV 93 82 - 98 fL    MCH 30.8 27.0 - 31.0 pg    MCHC 33.0 32.0 - 36.0 g/dL    RDW 12.9 11.5 - 14.5 %    Platelets 230 150 - 450 K/uL    MPV 9.2 9.2 - 12.9 fL    Immature Granulocytes 0.2 0.0 - 0.5 %    Gran # (ANC) 3.3 1.8 - 7.7 K/uL    Immature Grans (Abs) 0.01 0.00 - 0.04 K/uL     Lymph # 1.3 1.0 - 4.8 K/uL    Mono # 0.7 0.3 - 1.0 K/uL    Eos # 0.1 0.0 - 0.5 K/uL    Baso # 0.04 0.00 - 0.20 K/uL    nRBC 0 0 /100 WBC    Gran % 60.8 38.0 - 73.0 %    Lymph % 24.3 18.0 - 48.0 %    Mono % 12.2 4.0 - 15.0 %    Eosinophil % 1.8 0.0 - 8.0 %    Basophil % 0.7 0.0 - 1.9 %    Differential Method Automated    APTT   Result Value Ref Range    aPTT 51.6 (H) 21.0 - 32.0 sec   CBC auto differential   Result Value Ref Range    WBC 6.01 3.90 - 12.70 K/uL    RBC 4.28 (L) 4.60 - 6.20 M/uL    Hemoglobin 13.3 (L) 14.0 - 18.0 g/dL    Hematocrit 39.7 (L) 40.0 - 54.0 %    MCV 93 82 - 98 fL    MCH 31.1 (H) 27.0 - 31.0 pg    MCHC 33.5 32.0 - 36.0 g/dL    RDW 12.7 11.5 - 14.5 %    Platelets 230 150 - 450 K/uL    MPV 9.6 9.2 - 12.9 fL    Immature Granulocytes 0.3 0.0 - 0.5 %    Gran # (ANC) 3.4 1.8 - 7.7 K/uL    Immature Grans (Abs) 0.02 0.00 - 0.04 K/uL    Lymph # 1.7 1.0 - 4.8 K/uL    Mono # 0.7 0.3 - 1.0 K/uL    Eos # 0.2 0.0 - 0.5 K/uL    Baso # 0.04 0.00 - 0.20 K/uL    nRBC 0 0 /100 WBC    Gran % 55.9 38.0 - 73.0 %    Lymph % 28.0 18.0 - 48.0 %    Mono % 12.3 4.0 - 15.0 %    Eosinophil % 2.8 0.0 - 8.0 %    Basophil % 0.7 0.0 - 1.9 %    Differential Method Automated    Basic metabolic panel   Result Value Ref Range    Sodium 139 136 - 145 mmol/L    Potassium 4.3 3.5 - 5.1 mmol/L    Chloride 105 95 - 110 mmol/L    CO2 26 23 - 29 mmol/L    Glucose 115 (H) 70 - 110 mg/dL    BUN 17 8 - 23 mg/dL    Creatinine 0.8 0.5 - 1.4 mg/dL    Calcium 9.3 8.7 - 10.5 mg/dL    Anion Gap 8 8 - 16 mmol/L    eGFR >60 >60 mL/min/1.73 m^2   Magnesium   Result Value Ref Range    Magnesium 2.0 1.6 - 2.6 mg/dL   APTT   Result Value Ref Range    aPTT 49.1 (H) 21.0 - 32.0 sec   CBC auto differential   Result Value Ref Range    WBC 5.32 3.90 - 12.70 K/uL    RBC 4.22 (L) 4.60 - 6.20 M/uL    Hemoglobin 13.1 (L) 14.0 - 18.0 g/dL    Hematocrit 39.5 (L) 40.0 - 54.0 %    MCV 94 82 - 98 fL    MCH 31.0 27.0 - 31.0 pg    MCHC 33.2 32.0 - 36.0 g/dL    RDW  12.6 11.5 - 14.5 %    Platelets 219 150 - 450 K/uL    MPV 9.6 9.2 - 12.9 fL    Immature Granulocytes 0.2 0.0 - 0.5 %    Gran # (ANC) 3.0 1.8 - 7.7 K/uL    Immature Grans (Abs) 0.01 0.00 - 0.04 K/uL    Lymph # 1.5 1.0 - 4.8 K/uL    Mono # 0.6 0.3 - 1.0 K/uL    Eos # 0.1 0.0 - 0.5 K/uL    Baso # 0.03 0.00 - 0.20 K/uL    nRBC 0 0 /100 WBC    Gran % 56.7 38.0 - 73.0 %    Lymph % 28.4 18.0 - 48.0 %    Mono % 11.7 4.0 - 15.0 %    Eosinophil % 2.4 0.0 - 8.0 %    Basophil % 0.6 0.0 - 1.9 %    Differential Method Automated    Basic metabolic panel   Result Value Ref Range    Sodium 139 136 - 145 mmol/L    Potassium 4.0 3.5 - 5.1 mmol/L    Chloride 105 95 - 110 mmol/L    CO2 26 23 - 29 mmol/L    Glucose 100 70 - 110 mg/dL    BUN 17 8 - 23 mg/dL    Creatinine 0.8 0.5 - 1.4 mg/dL    Calcium 9.2 8.7 - 10.5 mg/dL    Anion Gap 8 8 - 16 mmol/L    eGFR >60 >60 mL/min/1.73 m^2   Magnesium   Result Value Ref Range    Magnesium 2.0 1.6 - 2.6 mg/dL   APTT   Result Value Ref Range    aPTT 47.9 (H) 21.0 - 32.0 sec   CBC auto differential   Result Value Ref Range    WBC 4.11 3.90 - 12.70 K/uL    RBC 4.47 (L) 4.60 - 6.20 M/uL    Hemoglobin 13.8 (L) 14.0 - 18.0 g/dL    Hematocrit 40.7 40.0 - 54.0 %    MCV 91 82 - 98 fL    MCH 30.9 27.0 - 31.0 pg    MCHC 33.9 32.0 - 36.0 g/dL    RDW 12.4 11.5 - 14.5 %    Platelets 235 150 - 450 K/uL    MPV 9.4 9.2 - 12.9 fL    Immature Granulocytes 0.2 0.0 - 0.5 %    Gran # (ANC) 3.2 1.8 - 7.7 K/uL    Immature Grans (Abs) 0.01 0.00 - 0.04 K/uL    Lymph # 0.8 (L) 1.0 - 4.8 K/uL    Mono # 0.1 (L) 0.3 - 1.0 K/uL    Eos # 0.0 0.0 - 0.5 K/uL    Baso # 0.01 0.00 - 0.20 K/uL    nRBC 0 0 /100 WBC    Gran % 77.4 (H) 38.0 - 73.0 %    Lymph % 19.5 18.0 - 48.0 %    Mono % 2.7 (L) 4.0 - 15.0 %    Eosinophil % 0.0 0.0 - 8.0 %    Basophil % 0.2 0.0 - 1.9 %    Differential Method Automated    Basic metabolic panel   Result Value Ref Range    Sodium 137 136 - 145 mmol/L    Potassium 4.7 3.5 - 5.1 mmol/L    Chloride 104 95 -  110 mmol/L    CO2 25 23 - 29 mmol/L    Glucose 138 (H) 70 - 110 mg/dL    BUN 17 8 - 23 mg/dL    Creatinine 0.8 0.5 - 1.4 mg/dL    Calcium 9.6 8.7 - 10.5 mg/dL    Anion Gap 8 8 - 16 mmol/L    eGFR >60 >60 mL/min/1.73 m^2   Magnesium   Result Value Ref Range    Magnesium 1.9 1.6 - 2.6 mg/dL   APTT   Result Value Ref Range    aPTT 42.7 (H) 21.0 - 32.0 sec   POCT glucose   Result Value Ref Range    POCT Glucose 105 70 - 110 mg/dL     Imaging Results          X-Ray Chest AP Portable (Final result)  Result time 10/28/22 18:19:31    Final result by Lisa Combs MD (10/28/22 18:19:31)                 Impression:      No acute abnormality.      Electronically signed by: Garret Gonzalez  Date:    10/28/2022  Time:    18:19             Narrative:    EXAMINATION:  XR CHEST AP PORTABLE    CLINICAL HISTORY:  near syncope;    TECHNIQUE:  Single frontal view of the chest was performed.    COMPARISON:  None    FINDINGS:  Low lung volumes.The lungs are clear, with normal appearance of pulmonary vasculature and no pleural effusion or pneumothorax.    The cardiac silhouette is prominent.  The hilar and mediastinal contours are unremarkable.    Bones are intact.                                Pending Diagnostic Studies:     None         Medications:         Medication List      START taking these medications    apixaban 5 mg Tab  Commonly known as: ELIQUIS  Take 1 tablet (5 mg total) by mouth 2 (two) times daily.     rOPINIRole 0.5 MG tablet  Commonly known as: REQUIP  Take 1 tablet (0.5 mg total) by mouth every evening.        CHANGE how you take these medications    amLODIPine 5 MG tablet  Commonly known as: NORVASC  Take 1 tablet (5 mg total) by mouth once daily.  Start taking on: November 2, 2022  What changed:   · medication strength  · how much to take        CONTINUE taking these medications    bisacodyL 5 mg EC tablet  Commonly known as: DULCOLAX     dutasteride 0.5 mg capsule  Commonly known as: AVODART  Take 1 capsule (0.5 mg  total) by mouth once daily.     fluorouraciL 5 % cream  Commonly known as: EFUDEX  AAA scalp and temples bid x 2 weeks, then AAA of both forearms bid x 4 weeks     fluticasone propionate 0.05 % cream  Commonly known as: CUTIVATE     ketoconazole 2 % cream  Commonly known as: NIZORAL     LIDOcaine HCl 2% 2 % Soln  Commonly known as: XYLOCAINE  APPLY 5 ML  EVERY 6 HOURS     meclizine 25 mg tablet  Commonly known as: ANTIVERT  Take 1 tablet by mouth twice daily as needed     omeprazole 40 MG capsule  Commonly known as: PRILOSEC  Take 1 capsule (40 mg total) by mouth every morning.     rosuvastatin 5 MG tablet  Commonly known as: CRESTOR  Take 1 tablet (5 mg total) by mouth once daily.     triamcinolone acetonide 0.1% 0.1 % cream  Commonly known as: KENALOG  Apply topically 2 (two) times daily.        STOP taking these medications    diazePAM 5 MG tablet  Commonly known as: VALIUM     meloxicam 7.5 MG tablet  Commonly known as: MOBIC     oxyCODONE-acetaminophen 5-325 mg per tablet  Commonly known as: PERCOCET     pantoprazole 40 MG tablet  Commonly known as: PROTONIX     polyethylene glycol 17 gram Pwpk  Commonly known as: GLYCOLAX     sulfamethoxazole-trimethoprim 800-160mg 800-160 mg Tab  Commonly known as: BACTRIM DS     traMADoL 50 mg tablet  Commonly known as: ULTRAM     tramadol-acetaminophen 37.5-325 mg 37.5-325 mg Tab  Commonly known as: ULTRACET           Where to Get Your Medications      These medications were sent to [facility] [address] [place] [address] [place] [address]  [address] [address] [address]    Phone: [phone]   · amLODIPine 5 MG tablet  · apixaban 5 mg Tab     Information about where to get these medications is not yet available    Ask your nurse or doctor about these medications  · rOPINIRole 0.5 MG tablet         Indwelling Lines/Drains at time of discharge:   Lines/Drains/Airways     None                 Time spent on the discharge of patient: 58 minutes          Nazario Gore MD  Department of Hospital Medicine  '[place] - Telemetry ([facility])

## 2022-11-02 NOTE — NURSING
Discharge instructions and follow up appointments reviewed with patient, Spouse Rox, and caregiver Candy.  IV removed. Patient and family verbalizes understanding and denies questions at this time. Patient to leave room accompanied by tech via wheelchair to discharge ramp.

## 2022-11-03 ENCOUNTER — PATIENT OUTREACH (OUTPATIENT)
Dept: ADMINISTRATIVE | Facility: CLINIC | Age: 87
End: 2022-11-03
Payer: MEDICARE

## 2022-11-03 DIAGNOSIS — I45.5 SINUS PAUSE: ICD-10-CM

## 2022-11-03 DIAGNOSIS — I49.5 TACHY-BRADY SYNDROME: Primary | ICD-10-CM

## 2022-11-03 NOTE — PROGRESS NOTES
C3 nurse spoke with Dominguez Ritchie  and wife for a TCC post hospital discharge follow up call. The patient has a scheduled HOSFU appointment with Madie Davis MD  on 11/9/22 @ 6804.

## 2022-11-04 ENCOUNTER — CLINICAL SUPPORT (OUTPATIENT)
Dept: CARDIOLOGY | Facility: HOSPITAL | Age: 87
End: 2022-11-04
Attending: INTERNAL MEDICINE
Payer: MEDICARE

## 2022-11-04 ENCOUNTER — TELEPHONE (OUTPATIENT)
Dept: PAIN MEDICINE | Facility: CLINIC | Age: 87
End: 2022-11-04
Payer: MEDICARE

## 2022-11-04 DIAGNOSIS — Z95.0 STATUS POST PLACEMENT OF CARDIAC PACEMAKER: ICD-10-CM

## 2022-11-04 DIAGNOSIS — I45.5 SINUS PAUSE: ICD-10-CM

## 2022-11-04 DIAGNOSIS — I49.5 TACHY-BRADY SYNDROME: ICD-10-CM

## 2022-11-04 PROCEDURE — 99999 PR PBB SHADOW E&M-EST. PATIENT-LVL II: ICD-10-PCS | Mod: PBBFAC,,,

## 2022-11-04 PROCEDURE — 93279 CARDIAC DEVICE CHECK - IN CLINIC & HOSPITAL: ICD-10-PCS | Mod: 26,,, | Performed by: INTERNAL MEDICINE

## 2022-11-04 PROCEDURE — 99999 PR PBB SHADOW E&M-EST. PATIENT-LVL II: CPT | Mod: PBBFAC,,,

## 2022-11-04 PROCEDURE — 93279 PRGRMG DEV EVAL PM/LDLS PM: CPT

## 2022-11-04 PROCEDURE — 93279 PRGRMG DEV EVAL PM/LDLS PM: CPT | Mod: 26,,, | Performed by: INTERNAL MEDICINE

## 2022-11-04 NOTE — TELEPHONE ENCOUNTER
----- Message from Jared Wilkerson sent at 11/4/2022 10:56 AM CDT -----  Contact: Daughter  .Type:  Sooner Apoointment Request    Caller is requesting a sooner appointment.  Caller declined first available appointment listed below.  Caller will not accept being placed on the waitlist and is requesting a message be sent to doctor.  Name of Caller:Lauren  When is the first available appointment? January   Symptoms: knee pain   Would the patient rather a call back or a response via [facility]? Call back   Best Call Back Number:.[phone]  Additional Information:

## 2022-11-08 ENCOUNTER — OFFICE VISIT (OUTPATIENT)
Dept: PAIN MEDICINE | Facility: CLINIC | Age: 87
End: 2022-11-08
Payer: MEDICARE

## 2022-11-08 ENCOUNTER — PATIENT OUTREACH (OUTPATIENT)
Dept: ADMINISTRATIVE | Facility: HOSPITAL | Age: 87
End: 2022-11-08
Payer: MEDICARE

## 2022-11-08 ENCOUNTER — HOSPITAL ENCOUNTER (OUTPATIENT)
Dept: CARDIOLOGY | Facility: HOSPITAL | Age: 87
Discharge: HOME | End: 2022-11-08
Attending: INTERNAL MEDICINE
Payer: MEDICARE

## 2022-11-08 ENCOUNTER — OFFICE VISIT (OUTPATIENT)
Dept: CARDIOLOGY | Facility: CLINIC | Age: 87
End: 2022-11-08
Payer: MEDICARE

## 2022-11-08 VITALS — BODY MASS INDEX: 21.34 KG/M2 | HEIGHT: 70 IN | WEIGHT: 149.06 LBS

## 2022-11-08 VITALS
BODY MASS INDEX: 21.34 KG/M2 | SYSTOLIC BLOOD PRESSURE: 122 MMHG | HEIGHT: 70 IN | OXYGEN SATURATION: 95 % | WEIGHT: 149.06 LBS | HEART RATE: 73 BPM | DIASTOLIC BLOOD PRESSURE: 68 MMHG

## 2022-11-08 DIAGNOSIS — I35.8 AORTIC VALVE SCLEROSIS: ICD-10-CM

## 2022-11-08 DIAGNOSIS — I45.5 SINUS PAUSE: ICD-10-CM

## 2022-11-08 DIAGNOSIS — E78.2 MIXED HYPERLIPIDEMIA: Primary | ICD-10-CM

## 2022-11-08 DIAGNOSIS — G89.29 CHRONIC PAIN OF RIGHT KNEE: ICD-10-CM

## 2022-11-08 DIAGNOSIS — I49.5 TACHY-BRADY SYNDROME: ICD-10-CM

## 2022-11-08 DIAGNOSIS — R00.1 BRADYCARDIA: ICD-10-CM

## 2022-11-08 DIAGNOSIS — M54.16 BILATERAL LUMBAR RADICULOPATHY: Primary | ICD-10-CM

## 2022-11-08 DIAGNOSIS — M25.561 CHRONIC PAIN OF RIGHT KNEE: ICD-10-CM

## 2022-11-08 DIAGNOSIS — R42 POSTURAL DIZZINESS WITH PRESYNCOPE: ICD-10-CM

## 2022-11-08 DIAGNOSIS — Z96.652 HISTORY OF LEFT KNEE REPLACEMENT: ICD-10-CM

## 2022-11-08 DIAGNOSIS — I48.0 PAROXYSMAL A-FIB: ICD-10-CM

## 2022-11-08 DIAGNOSIS — R55 POSTURAL DIZZINESS WITH PRESYNCOPE: ICD-10-CM

## 2022-11-08 PROCEDURE — 1111F PR DISCHARGE MEDS RECONCILED W/ CURRENT OUTPATIENT MED LIST: ICD-10-PCS | Mod: CPTII,S$GLB,, | Performed by: INTERNAL MEDICINE

## 2022-11-08 PROCEDURE — 1111F PR DISCHARGE MEDS RECONCILED W/ CURRENT OUTPATIENT MED LIST: ICD-10-PCS | Mod: CPTII,S$GLB,, | Performed by: PHYSICIAN ASSISTANT

## 2022-11-08 PROCEDURE — 1126F PR PAIN SEVERITY QUANTIFIED, NO PAIN PRESENT: ICD-10-PCS | Mod: CPTII,S$GLB,, | Performed by: INTERNAL MEDICINE

## 2022-11-08 PROCEDURE — 99214 PR OFFICE/OUTPT VISIT, EST, LEVL IV, 30-39 MIN: ICD-10-PCS | Mod: S$GLB,,, | Performed by: INTERNAL MEDICINE

## 2022-11-08 PROCEDURE — 93010 EKG 12-LEAD: ICD-10-PCS | Mod: ,,, | Performed by: INTERNAL MEDICINE

## 2022-11-08 PROCEDURE — 99999 PR PBB SHADOW E&M-EST. PATIENT-LVL III: ICD-10-PCS | Mod: PBBFAC,,, | Performed by: PHYSICIAN ASSISTANT

## 2022-11-08 PROCEDURE — 99214 PR OFFICE/OUTPT VISIT, EST, LEVL IV, 30-39 MIN: ICD-10-PCS | Mod: S$GLB,,, | Performed by: PHYSICIAN ASSISTANT

## 2022-11-08 PROCEDURE — 3288F FALL RISK ASSESSMENT DOCD: CPT | Mod: CPTII,S$GLB,, | Performed by: PHYSICIAN ASSISTANT

## 2022-11-08 PROCEDURE — 1111F DSCHRG MED/CURRENT MED MERGE: CPT | Mod: CPTII,S$GLB,, | Performed by: INTERNAL MEDICINE

## 2022-11-08 PROCEDURE — 1160F PR REVIEW ALL MEDS BY PRESCRIBER/CLIN PHARMACIST DOCUMENTED: ICD-10-PCS | Mod: CPTII,S$GLB,, | Performed by: PHYSICIAN ASSISTANT

## 2022-11-08 PROCEDURE — 99214 OFFICE O/P EST MOD 30 MIN: CPT | Mod: S$GLB,,, | Performed by: PHYSICIAN ASSISTANT

## 2022-11-08 PROCEDURE — 99999 PR PBB SHADOW E&M-EST. PATIENT-LVL III: CPT | Mod: PBBFAC,,, | Performed by: PHYSICIAN ASSISTANT

## 2022-11-08 PROCEDURE — 99499 RISK ADDL DX/OHS AUDIT: ICD-10-PCS | Mod: HCNC,S$GLB,, | Performed by: INTERNAL MEDICINE

## 2022-11-08 PROCEDURE — 1159F MED LIST DOCD IN RCRD: CPT | Mod: CPTII,S$GLB,, | Performed by: PHYSICIAN ASSISTANT

## 2022-11-08 PROCEDURE — 1101F PT FALLS ASSESS-DOCD LE1/YR: CPT | Mod: CPTII,S$GLB,, | Performed by: PHYSICIAN ASSISTANT

## 2022-11-08 PROCEDURE — 3288F PR FALLS RISK ASSESSMENT DOCUMENTED: ICD-10-PCS | Mod: CPTII,S$GLB,, | Performed by: INTERNAL MEDICINE

## 2022-11-08 PROCEDURE — 1159F PR MEDICATION LIST DOCUMENTED IN MEDICAL RECORD: ICD-10-PCS | Mod: CPTII,S$GLB,, | Performed by: PHYSICIAN ASSISTANT

## 2022-11-08 PROCEDURE — 93010 ELECTROCARDIOGRAM REPORT: CPT | Mod: ,,, | Performed by: INTERNAL MEDICINE

## 2022-11-08 PROCEDURE — 1160F RVW MEDS BY RX/DR IN RCRD: CPT | Mod: CPTII,S$GLB,, | Performed by: PHYSICIAN ASSISTANT

## 2022-11-08 PROCEDURE — 99999 PR PBB SHADOW E&M-EST. PATIENT-LVL III: ICD-10-PCS | Mod: PBBFAC,,, | Performed by: INTERNAL MEDICINE

## 2022-11-08 PROCEDURE — 3288F PR FALLS RISK ASSESSMENT DOCUMENTED: ICD-10-PCS | Mod: CPTII,S$GLB,, | Performed by: PHYSICIAN ASSISTANT

## 2022-11-08 PROCEDURE — 1126F AMNT PAIN NOTED NONE PRSNT: CPT | Mod: CPTII,S$GLB,, | Performed by: INTERNAL MEDICINE

## 2022-11-08 PROCEDURE — 1101F PT FALLS ASSESS-DOCD LE1/YR: CPT | Mod: CPTII,S$GLB,, | Performed by: INTERNAL MEDICINE

## 2022-11-08 PROCEDURE — 1111F DSCHRG MED/CURRENT MED MERGE: CPT | Mod: CPTII,S$GLB,, | Performed by: PHYSICIAN ASSISTANT

## 2022-11-08 PROCEDURE — 1125F PR PAIN SEVERITY QUANTIFIED, PAIN PRESENT: ICD-10-PCS | Mod: CPTII,S$GLB,, | Performed by: PHYSICIAN ASSISTANT

## 2022-11-08 PROCEDURE — 99999 PR PBB SHADOW E&M-EST. PATIENT-LVL III: CPT | Mod: PBBFAC,,, | Performed by: INTERNAL MEDICINE

## 2022-11-08 PROCEDURE — 99214 OFFICE O/P EST MOD 30 MIN: CPT | Mod: S$GLB,,, | Performed by: INTERNAL MEDICINE

## 2022-11-08 PROCEDURE — 1101F PR PT FALLS ASSESS DOC 0-1 FALLS W/OUT INJ PAST YR: ICD-10-PCS | Mod: CPTII,S$GLB,, | Performed by: INTERNAL MEDICINE

## 2022-11-08 PROCEDURE — 1125F AMNT PAIN NOTED PAIN PRSNT: CPT | Mod: CPTII,S$GLB,, | Performed by: PHYSICIAN ASSISTANT

## 2022-11-08 PROCEDURE — 1101F PR PT FALLS ASSESS DOC 0-1 FALLS W/OUT INJ PAST YR: ICD-10-PCS | Mod: CPTII,S$GLB,, | Performed by: PHYSICIAN ASSISTANT

## 2022-11-08 PROCEDURE — 99499 UNLISTED E&M SERVICE: CPT | Mod: HCNC,S$GLB,, | Performed by: INTERNAL MEDICINE

## 2022-11-08 PROCEDURE — 3288F FALL RISK ASSESSMENT DOCD: CPT | Mod: CPTII,S$GLB,, | Performed by: INTERNAL MEDICINE

## 2022-11-08 PROCEDURE — 93005 ELECTROCARDIOGRAM TRACING: CPT

## 2022-11-08 RX ORDER — TRAMADOL HYDROCHLORIDE 50 MG/1
50 TABLET ORAL
Qty: 30 TABLET | Refills: 0 | Status: SHIPPED | OUTPATIENT
Start: 2022-11-08 | End: 2022-12-01 | Stop reason: SDUPTHER

## 2022-11-08 RX ORDER — PANTOPRAZOLE SODIUM 40 MG/1
40 TABLET, DELAYED RELEASE ORAL DAILY
COMMUNITY
End: 2022-11-21 | Stop reason: SDUPTHER

## 2022-11-08 NOTE — H&P (VIEW-ONLY)
Chief Pain Complaint:  right knee pain  Low Back Pain, occasional now BLE pain (R>L),     Interval History (11/8/2022):  Dominguez Ritchie presents today for follow-up visit.  Patient was last seen about 9 months ago.  He is here today c/o right knee pain. He had genicular nerve block several years ago with Dr. Sadler with great pain relief. Patient reports pain as 7/10 today.    Interval History (2/16/2022): Dominguez Ritchie presents today for follow-up visit.  he underwent Bilateral L4/5 + L5/S1 TF DREW on 1/4/22.  The patient reports that he is/was unchanged following the procedure.  he reports 50% pain relief.  The changes lasted <2 weeks.  The changes have NOT continued through this visit.  Patient reports pain as 8/10 today.    Interval History (12/10/2021): Dominguez Ritchei presents today for follow-up visit.  Patient was seen on 11/8/21. At that time he underwent Bilateral L4/5 + L5/S1 TF DREW.  The patient reports that he is/was better following the procedure.    The changes lasted 2-4 weeks.  The changes have continued through this visit.  Patient reports pain as 7/10 today.    Interval History (10/14/2021):  Dominguez Ritchie presents today for follow-up visit.  Patient was last seen on 7/13/20 (over a year ago).  He is here today for evaluation for injection.  He had lumbar MRI since last visit.   Patient reports pain as 7/10 today.    History of Present Illness:   Dominguez Ritchie is a 89 y.o. male  who is presenting with a chief complaint of lumbar back pain. The patient began experiencing this problem insidiously, and the pain has been gradually worsening over the past 8 month(s). The pain is described as throbbing, cramping, aching and heavy and is located in the right lumbar spine. Pain is intermittent and lasts hours. The  pain radiated to the right leg. The patient rates his pain a 6 out of ten and interferes with activities of daily living a 7 out of ten. Pain is exacerbated by flexion/extension of  the lumbar spine, getting up from a seated position, prolonged standing, and is improved by rest. Patient reports no prior trauma, no prior spinal surgery     - antecedent trauma, prior spinal surgery: patient reports prior trauma, prior lumbar surgery (surgery in January 2017 with Dr. Lancaster at [facility]), left knee replacement  - pertinent negatives: No fever, No chills, No weight loss, No bladder dysfunction, No bowel dysfunction, No saddle anesthesia  - pertinent positives: generalized nonspecific Lower Extremity weakness bilaterally, BLE numbness  - medications, other therapies tried (physical therapy, injections):     >> Tylenol, NSAIDs, gabapentin, Mobic, tramadol, Norco    >> Has previously undergone Physical Therapy    >> Has previously undergone spinal injection/s    - has undergone approximately 3-4 spinal injections previously (uncertain as to the specific type of injections that he has had, seems one was a lumbar rhizotomy)   - Right knee genicular nerve block on 7/25/18 with 75% pain relief for 6 months    - Right SI, Right GTB, Right Piriformis Inejction 10/17/18 with 80% pain relief for 2 months   - Right SI and Right GTB 12/19/18 with 20% relief    - Right Piriformis on 1/31/19 with no relief              -  Bilateral L3, 4,5 MBB on 8/29/19 with minimal relief   - Bilateral L4/5 + L5/S1 TF DREW on 11/8/21 with 100% pain relief x 2 weeks then pain slowly returning, mostly back to baseline 4 weeks post-procedure   - Bilateral L4/5 + L5/S1 TF DREW on 1/4/22 with 50% pain relief for <2 weeks      Imaging / Labs / Studies (reviewed on 11/8/2022):              9/20/2018 X-Ray Lumbar Complete With Flex And Ext  TECHNIQUE:  Five views of the lumbar spine plus flexion extension views were performed.  COMPARISON:  November 16, 2015  FINDINGS:  Vertebral body heights are unchanged.  Chronic compression deformity of L1 noted.  Alignment is anatomic.  L4-5 disc height loss present.  There is multilevel anterior  osteophyte formation.  Lower lumbar spine facet arthropathy noted.  No change in alignment on flexion or extension views.  No pars defects appreciated.  Mild vascular calcifications noted.  Impression: Degenerative findings.      7/12/2018 XR KNEE ORTHO BILAT WITH FLEXION  TECHNIQUE:  AP standing of both knees, PA flexion standing views of both knees, and Merchant views of both knees were performed.  Lateral views of both knees were also performed.  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  Prosthesis position and alignment are satisfactory without radiographic evidence of hardware loosening or other complicating process.  Moderate degenerative change of the right knee.  No acute fracture or malalignment.       Results for orders placed during the hospital encounter of 11/16/15   X-Ray Lumbar Spine Complete 5 View    Narrative Five views of the lumbar spine  Findings: There is increased vertebral body height loss noted at the L1 level.  50% vertebrae height loss is noted anteriorly at this level.  The alignment is within normal limits. There is moderate disk space narrowing noted at the L4-5 level.  Mild/moderate facet arthropathy is noted from L2-3 through L5-S1 levels. Calcified granulomas are noted within the spleen. No pars defects.       Results for orders placed during the hospital encounter of 03/26/15   X-Ray Lumbar Spine AP And Lateral    Narrative Three views of the lumbar spine  Findings: There is a compression L1 level that is new when compared to the prior exam and demonstrates approximately 25% vertebral height loss.  There is no obvious condensation line seen on the plain films suggesting that this is still a chronic deformity.  The alignment is grossly within normal limits.  There is multilevel spondylosis with mild to moderate to space narrowing noted throughout the lumbar spine greatest at L4-5 level.  Facet arthropathy is noted from the L2-3  "through the L5-S1 levels and most prominent at the L5-S1 level.     _________________________________________________________________________________________________________________________________________________________________________________________________________________________      Review of Systems:  CONSTITUTIONAL: patient denies any fever, chills, or weight loss  SKIN: patient denies any rash or itching  RESPIRATORY: patient denies having any shortness of breath  GASTROINTESTINAL: patient denies having any diarrhea, constipation, or bowel incontinence  GENITOURINARY: patient denies having any abnormal bladder function    MUSCULOSKELETAL:  - patient complains of the above noted pain/s (see chief pain complaint)    NEUROLOGICAL:   - pain as above  - strength in Lower extremities is decreased, BILATERALLY  - sensation in Lower extremities is abnormal, on the LEFT  - patient denies any loss of bowel or bladder control      PSYCHIATRIC: patient denies any change in mood    Other:  All other systems reviewed and are negative    _________________________________________________________________________________________________________________________________________________________________________________________________________________________     Physical Exam:  Vitals:    11/08/22 1122   Weight: 67.6 kg (149 lb 0.5 oz)   Height: 5' 10" (1.778 m)   PainSc:   7   PainLoc: Knee    Body mass index is 21.38 kg/m².   (reviewed on 11/8/2022)    General: alert and oriented, in no apparent distress  Gait: antalgic gait. Ambulating with cane.   Skin: No rashes, No discoloration, No obvious lesions  HEENT: EOMI  Respiratory: respirations nonlabored    Musculoskeletal:  Lumbar Spine  - Pain on flexion of lumbar spine Present   - Straight Leg Raise:  Equivocal   - Pain on extension of lumbar spine Present  - TTP over the lumbar facet joints Present  Bilateral L5-S1  - Lumbar facet loading Present  -Pain on palpation over " the SI joint Present   - BRENDEN: Absent    Right Knee:  - Scars: Absent   - TTP: Present over medial/ lateral joint line  - ROM: Preserved  - Pain with extension: Present  - Pain with flexion: Present  - Crepitus: Present    Neuro:  - Extremity Strength:     >> LEFT :: dec with dorsiflexion    >> RIGHT :: dec with dorsiflexion  - Extremity Reflexes:    >> LEFT  :: 2+    >> RIGHT :: 2+     Psych:  Mood and affect is appropriate    _________________________________________________________________________________________________________________________________________________________________________________________________________________________      Assessment:  Dominguez Ritchie is a 89 y.o. male who presents with     ICD-10-CM ICD-9-CM    1. Bilateral lumbar radiculopathy (R>L)  M54.16 724.4       2. Chronic pain of right knee  M25.561 719.46 IR Peripheral Nerve Injection    G89.29 338.29 Case Request-RAD/Other Procedure Area: Right Genicular nerve block w/Light RN IV Sedation      3. History of left knee replacement  Z96.652 V43.65         Patient returns for follow-up visit.  He complains of continued low back and right leg pain.  He has been seen at the [facility], underwent a spinal injection that did not help, and then ultimately underwent surgery (unsure of type) with Dr. Lancaster in January 2017.  We previously requested records multiple times from the [facility], however we have not received these. 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 He had treatment for this, but he reports the damage was already present by the time it was discovered.       Plan:  1. Interventional:   - Schedule repeat right therapeutic genicular nerve block.  Consider RFA, although is getting greater than 1 year pain relief from genicular blocks.  - Discuss lumbar injections at next visit.  - S/p Bilateral L4/5 + L5/S1 TF DREW on 1/4/22 with 50% pain relief for  <2 weeks  - S/p Bilateral L4/5 + L5/S1 TF DREW on 11/8/21 with 100% pain relief x 2 weeks then pain slowly returning, mostly back to baseline 4 weeks post-procedure.  - S/p Bilateral L3, 4,5 MBB on 8/29/19 with minimal relief     2. Pharmacologic:   - Will give tramadol 50mg QD PRN (30 tablets) to take for severe pain.  He was previously taking tramadol 50mg regularly in the past but reported SE in the past, now states no issues. He gets better pain relief with tramadol 50mg than Ultracet.   - Continue Mobic 15mg QD PRN - from PCP.     - Anticoagulation use: None.   - Opioid contract signed 1/18/2019.   - LA  reviewed and appropriate.       3. Rehabilitative: Encouraged regular exercise.    4. Diagnostic: None.     5. Follow up: 4 weeks post-procedure      - This condition does not require this patient to take time off of work, and the primary goal of our Pain Management services is to improve the patient's functional capacity.   - I discussed the risks, benefits, and alternatives to potential treatment options. All questions and concerns were fully addressed today in clinic.           Disclaimer:  This note was prepared using voice recognition system and is likely to have sound alike errors that may have been overlooked even after proof reading.  Please call me with any questions.

## 2022-11-08 NOTE — PROGRESS NOTES
Subjective:   Patient ID:  Dominguez Ritchie is a 89 y.o. male who presents for evaluation of No chief complaint on file.      HPI  89-year-old male with past medical history below.    He received a atrial lead pacemaker last week after presenting to the hospital due to abnormal finding on his Holter monitor with long pauses during his conversion from rapid AFib to normal sinus.    He denies any more dizziness.    He denies any chest pain, dyspnea on exertion, palpitations.    He is in sinus rhythm today and paced in the atrium.    His wound looks healing well.  His recent interrogation was normal function.    He is following instructions and arm restrictions    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       Past Surgical History:   Procedure Laterality Date    A-V CARDIAC PACEMAKER INSERTION Left 11/1/2022    Procedure: INSERTION, CARDIAC PACEMAKER, DUAL CHAMBER;  Surgeon: Finn Mccrary MD;  Location: [facility] CATH LAB;  Service: Cardiology;  Laterality: Left;  biotronik AAIR    APPENDECTOMY      BACK SURGERY Bilateral 2016    CATARACT EXTRACTION, BILATERAL      COLONOSCOPY  2/2009    EYE SURGERY      INSERTION OF PERMANENT PACEMAKER Left 11/1/2022    Procedure: Implant PPM;  Surgeon: Finn Mccrary MD;  Location: [facility] CATH LAB;  Service: Cardiology;  Laterality: Left;    JOINT REPLACEMENT      left knee    LUMBAR PARAVERTEBRAL FACET JOINT NERVE BLOCK Bilateral 8/29/2019    Procedure:  LMBB L3,4,5;  Surgeon: Nabor Sadler MD;  [facility];  Service: Pain Management;  Laterality: Bilateral;    SELECTIVE INJECTION OF ANESTHETIC AGENT AROUND LUMBAR SPINAL NERVE ROOT BY TRANSFORAMINAL APPROACH Bilateral 2021    Procedure: Bilateral L4/5 +/- L5/S1 TF DREW;  Surgeon: Nabor Sadler MD;  Location: [facility];  Service: Pain Management;  Laterality: Bilateral;    SELECTIVE INJECTION OF ANESTHETIC AGENT AROUND LUMBAR SPINAL NERVE ROOT BY TRANSFORAMINAL APPROACH Bilateral 2022    Procedure: Bilateral L4/5 + L5/S1 TF DREW;  Surgeon: Nabor Sadler MD;  Location: [facility]IN [facility]GT;  Service: Pain Management;  Laterality: Bilateral;    SPINE SURGERY      UPPER GASTROINTESTINAL ENDOSCOPY  2009       Social History     Tobacco Use    Smoking status: Former     Packs/day: 1.00     Years: 25.00     Pack years: 25.00     Types: Cigarettes     Quit date: 1990     Years since quittin.8    Smokeless tobacco: Never   Substance Use Topics    Alcohol use: No     Alcohol/week: 0.0 standard drinks    Drug use: No       Family History   Problem Relation Age of Onset    Heart disease Mother     Cancer Son         pancreatic     Diabetes Maternal Uncle     Kidney disease Neg Hx     Stroke Neg Hx        Review of Systems   Constitutional: Negative for fever and malaise/fatigue.   HENT:  Negative for sore throat.    Eyes:  Negative for blurred vision.   Cardiovascular:  Negative for chest pain, claudication, cyanosis, dyspnea on exertion, irregular heartbeat, leg swelling, near-syncope, orthopnea, palpitations, paroxysmal nocturnal dyspnea and syncope.   Respiratory:  Negative for cough and hemoptysis.    Hematologic/Lymphatic: Negative for bleeding problem.   Skin:  Negative for rash.   Musculoskeletal:  Negative for falls.   Gastrointestinal:  Negative for abdominal pain.   Genitourinary: Negative.    Neurological: Negative.    Psychiatric/Behavioral:  Negative for altered mental status and substance  abuse.      Current Outpatient Medications on File Prior to Visit   Medication Sig    amLODIPine (NORVASC) 5 MG tablet Take 1 tablet (5 mg total) by mouth once daily.    apixaban (ELIQUIS) 5 mg Tab Take 1 tablet (5 mg total) by mouth 2 (two) times daily.    dutasteride (AVODART) 0.5 mg capsule Take 1 capsule (0.5 mg total) by mouth once daily.    pantoprazole (PROTONIX) 40 MG tablet Take 40 mg by mouth once daily.    rosuvastatin (CRESTOR) 5 MG tablet Take 1 tablet (5 mg total) by mouth once daily.    bisacodyL (DULCOLAX) 5 mg EC tablet Take 5 mg by mouth daily as needed for Constipation.    meclizine (ANTIVERT) 25 mg tablet Take 1 tablet by mouth twice daily as needed    triamcinolone acetonide 0.1% (KENALOG) 0.1 % cream Apply topically 2 (two) times daily.    [DISCONTINUED] fluorouraciL (EFUDEX) 5 % cream AAA scalp and temples bid x 2 weeks, then AAA of both forearms bid x 4 weeks    [DISCONTINUED] fluticasone propionate (CUTIVATE) 0.05 % cream     [DISCONTINUED] ketoconazole (NIZORAL) 2 % cream     [DISCONTINUED] LIDOcaine HCl 2% (XYLOCAINE) 2 % Soln APPLY 5 ML  EVERY 6 HOURS    [DISCONTINUED] omeprazole (PRILOSEC) 40 MG capsule Take 1 capsule (40 mg total) by mouth every morning.    [DISCONTINUED] rOPINIRole (REQUIP) 0.5 MG tablet Take 1 tablet (0.5 mg total) by mouth every evening.     No current facility-administered medications on file prior to visit.       Objective:   Objective:  Wt Readings from Last 3 Encounters:   11/08/22 67.6 kg (149 lb 0.5 oz)   11/08/22 67.6 kg (149 lb 0.5 oz)   10/31/22 69.6 kg (153 lb 7 oz)     Temp Readings from Last 3 Encounters:   11/01/22 97.8 °F (36.6 °C) (Oral)   09/27/22 98.1 °F (36.7 °C) (Tympanic)   09/13/22 97.7 °F (36.5 °C) (Temporal)     BP Readings from Last 3 Encounters:   11/08/22 122/68   11/01/22 (!) 140/81   09/27/22 125/80     Pulse Readings from Last 3 Encounters:   11/08/22 73   11/01/22 82   09/27/22 71       Physical Exam  Vitals reviewed.   Constitutional:        Appearance: He is well-developed.   HENT:      Head: Normocephalic and atraumatic.   Eyes:      General: No scleral icterus.     Conjunctiva/sclera: Conjunctivae normal.   Cardiovascular:      Rate and Rhythm: Normal rate and regular rhythm.      Pulses: Intact distal pulses.      Heart sounds: Normal heart sounds. No murmur heard.  Pulmonary:      Effort: No respiratory distress.      Breath sounds: No wheezing or rales.   Chest:      Chest wall: No tenderness.   Abdominal:      General: Bowel sounds are normal. There is no distension.      Palpations: Abdomen is soft.      Tenderness: There is no guarding.   Musculoskeletal:         General: Normal range of motion.      Cervical back: Normal range of motion and neck supple.   Skin:     General: Skin is warm.   Neurological:      Mental Status: He is alert and oriented to person, place, and time.       Lab Results   Component Value Date    CHOL 151 07/05/2022    CHOL 143 01/03/2022    CHOL 145 06/28/2021     Lab Results   Component Value Date    HDL 58 07/05/2022    HDL 51 01/03/2022    HDL 60 06/28/2021     Lab Results   Component Value Date    LDLCALC 54.6 (L) 07/05/2022    LDLCALC 57.8 (L) 01/03/2022    LDLCALC 57.6 (L) 06/28/2021     Lab Results   Component Value Date    TRIG 192 (H) 07/05/2022    TRIG 171 (H) 01/03/2022    TRIG 137 06/28/2021     Lab Results   Component Value Date    CHOLHDL 38.4 07/05/2022    CHOLHDL 35.7 01/03/2022    CHOLHDL 41.4 06/28/2021       Chemistry        Component Value Date/Time     11/01/2022 0556    K 4.7 11/01/2022 0556     11/01/2022 0556    CO2 25 11/01/2022 0556    BUN 17 11/01/2022 0556    CREATININE 0.8 11/01/2022 0556     (H) 11/01/2022 0556        Component Value Date/Time    CALCIUM 9.6 11/01/2022 0556    ALKPHOS 62 10/29/2022 0601    AST 18 10/29/2022 0601    ALT 14 10/29/2022 0601    BILITOT 0.7 10/29/2022 0601    ESTGFRAFRICA >60.0 07/05/2022 1444    EGFRNONAA >60.0 07/05/2022 1444           Lab Results   Component Value Date    TSH 1.733 10/29/2022     Lab Results   Component Value Date    INR 1.0 10/29/2022    INR 1.0 10/19/2020    INR 0.9 01/16/2017     Lab Results   Component Value Date    WBC 4.11 11/01/2022    HGB 13.8 (L) 11/01/2022    HCT 40.7 11/01/2022    MCV 91 11/01/2022     11/01/2022     BNP  @LABRCNTIP(BNP,BNPTRIAGEBLO)@  CrCl cannot be calculated (Patient's most recent lab result is older than the maximum 7 days allowed.).     Imaging:  ======  Results for orders placed during the hospital encounter of 09/16/22    Echo    Interpretation Summary  · The left ventricle is normal in size with concentric remodeling and normal systolic function.  · Indeterminate left ventricular diastolic function.  · The estimated PA systolic pressure is 37 mmHg.  · Normal right ventricular size with normal right ventricular systolic function.  · Normal central venous pressure (3 mmHg).  · The estimated ejection fraction is 55%.  · Mild aortic regurgitation.  · Mild mitral regurgitation.  · Mild tricuspid regurgitation.    No results found for this or any previous visit.    Results for orders placed in visit on 04/01/13    US Carotid Bilateral    Narrative  DATE OF EXAM: Apr 8 2013    [facility]   0519  -  US EXTRACRANIAL COMPLETE BILAT:   \  [id number]    CLINICAL HISTORY:   \401.9  HYPERTENSION NOS    PROCEDURE COMMENT:   \    ICD 9 CODE(S):   (\)    CPT 4 CODE(S)/MODIFIER(S):   (\)    Comparison: none    Findings:    Real-time, color flow and Doppler imaging performed.    Minimal calcific plaque noted within the left bulb.    ICA                                    R  L  Peak systolic velocity:         124   Cm/sec               115   cm/sec  Peak diastolic velocity:        30   Cm/sec               33   cm/sec  Systolic velocity ratio:          1.3                               1.2  Diastolic velocity ratio:         1.4                               1.5      Vertebral artery flow:                Antegrade                antegrade    ECA flow:                                   Antegrade  antegrade    spectral waveforms appear within normal limits bilaterally.    Impression  1.  No hemodynamically significant plaque formation or stenosis identified.    2.  Further evaluation and/or follow-up imaging as clinically warranted.  A      Electronically signed by: FILEMON BRUNO III, MD  Date:     04/09/13  Time:    09:05        : LISA  Transcribe Date/Time: Apr 9 2013  9:05A  Dictated by : FILEMON BRUNO III, MD  Report reviewed by:  Read On:  \  Images were reviewed, findings were verified and document was  electronically  SIGNED BY: FILEMON BRUNO III, MD On: Apr 9 2013  9:05A    Results for orders placed during the hospital encounter of 01/16/17    X-Ray Chest PA And Lateral    Narrative  Comparison: 01/21/2011    2 views    Findings:    Heart size within normal limits.  Pulmonary vasculature is normal and symmetric.  Mild tortuosity of the aorta underlying atherosclerotic calcification.  Calcified AP window node again identified.  Trachea is normal in position along for slight rotation.  No consolidation or effusion.  Granuloma identified within the LEFT upper lobe.  Mild osteopenia and spondylosis with wedge-shaped compression deformity L1 compression deformity noted.  This was identified on L-spine series from 03/26/2015.  IMPRESSION:      1.  Stable exam without acute infiltrate.  See above.    2.  Known L1 compression deformity.      Electronically signed by: FILEMON BRUNO III, MD  Date:     01/16/17  Time:    16:34    No results found for this or any previous visit.    No valid procedures specified.    Diagnostic Results:  ECG: Reviewed    The ASCVD Risk score (Brennan DK, et al., 2019) failed to calculate for the following reasons:    The 2019 ASCVD risk score is only valid for ages 40 to 79    Assessment and Plan:   Mixed hyperlipidemia    Postural dizziness with presyncope  -      Ambulatory referral/consult to Cardiology    Bradycardia  -     Ambulatory referral/consult to Cardiology    Sinus pause    Paroxysmal A-fib    Aortic valve sclerosis    Blood pressure controlled.    In sinus rhythm and also has paced atrial beats.    On Norvasc, Eliquis and Crestor.    No bleeding.    Site looking well.  Continue to follow with device Clinic.    Patient okay to stop his Eliquis for 3 days prior to his back injection.  And 2 days prior to his knee injection  Follow up in 6 months

## 2022-11-08 NOTE — PROGRESS NOTES
Attempted to contact the patient to confirm hospital follow up with PCP on 11/9/22, no answer, left voicemail.

## 2022-11-08 NOTE — PROGRESS NOTES
Chief Pain Complaint:  right knee pain  Low Back Pain, occasional now BLE pain (R>L),     Interval History (11/8/2022):  Dominguez Ritchie presents today for follow-up visit.  Patient was last seen about 9 months ago.  He is here today c/o right knee pain. He had genicular nerve block several years ago with Dr. Sadler with great pain relief. Patient reports pain as 7/10 today.    Interval History (2/16/2022): Dominguez Ritchie presents today for follow-up visit.  he underwent Bilateral L4/5 + L5/S1 TF DREW on 1/4/22.  The patient reports that he is/was unchanged following the procedure.  he reports 50% pain relief.  The changes lasted <2 weeks.  The changes have NOT continued through this visit.  Patient reports pain as 8/10 today.    Interval History (12/10/2021): Dominguez Ritchie presents today for follow-up visit.  Patient was seen on 11/8/21. At that time he underwent Bilateral L4/5 + L5/S1 TF DREW.  The patient reports that he is/was better following the procedure.    The changes lasted 2-4 weeks.  The changes have continued through this visit.  Patient reports pain as 7/10 today.    Interval History (10/14/2021):  Dominguez Ritchie presents today for follow-up visit.  Patient was last seen on 7/13/20 (over a year ago).  He is here today for evaluation for injection.  He had lumbar MRI since last visit.   Patient reports pain as 7/10 today.    History of Present Illness:   Dominguez Ritchie is a 89 y.o. male  who is presenting with a chief complaint of lumbar back pain. The patient began experiencing this problem insidiously, and the pain has been gradually worsening over the past 8 month(s). The pain is described as throbbing, cramping, aching and heavy and is located in the right lumbar spine. Pain is intermittent and lasts hours. The  pain radiated to the right leg. The patient rates his pain a 6 out of ten and interferes with activities of daily living a 7 out of ten. Pain is exacerbated by flexion/extension of  the lumbar spine, getting up from a seated position, prolonged standing, and is improved by rest. Patient reports no prior trauma, no prior spinal surgery     - antecedent trauma, prior spinal surgery: patient reports prior trauma, prior lumbar surgery (surgery in January 2017 with Dr. Lancaster at [facility]), left knee replacement  - pertinent negatives: No fever, No chills, No weight loss, No bladder dysfunction, No bowel dysfunction, No saddle anesthesia  - pertinent positives: generalized nonspecific Lower Extremity weakness bilaterally, BLE numbness  - medications, other therapies tried (physical therapy, injections):     >> Tylenol, NSAIDs, gabapentin, Mobic, tramadol, Norco    >> Has previously undergone Physical Therapy    >> Has previously undergone spinal injection/s    - has undergone approximately 3-4 spinal injections previously (uncertain as to the specific type of injections that he has had, seems one was a lumbar rhizotomy)   - Right knee genicular nerve block on 7/25/18 with 75% pain relief for 6 months    - Right SI, Right GTB, Right Piriformis Inejction 10/17/18 with 80% pain relief for 2 months   - Right SI and Right GTB 12/19/18 with 20% relief    - Right Piriformis on 1/31/19 with no relief              -  Bilateral L3, 4,5 MBB on 8/29/19 with minimal relief   - Bilateral L4/5 + L5/S1 TF DRWE on 11/8/21 with 100% pain relief x 2 weeks then pain slowly returning, mostly back to baseline 4 weeks post-procedure   - Bilateral L4/5 + L5/S1 TF DREW on 1/4/22 with 50% pain relief for <2 weeks      Imaging / Labs / Studies (reviewed on 11/8/2022):              9/20/2018 X-Ray Lumbar Complete With Flex And Ext  TECHNIQUE:  Five views of the lumbar spine plus flexion extension views were performed.  COMPARISON:  November 16, 2015  FINDINGS:  Vertebral body heights are unchanged.  Chronic compression deformity of L1 noted.  Alignment is anatomic.  L4-5 disc height loss present.  There is multilevel anterior  osteophyte formation.  Lower lumbar spine facet arthropathy noted.  No change in alignment on flexion or extension views.  No pars defects appreciated.  Mild vascular calcifications noted.  Impression: Degenerative findings.      7/12/2018 XR KNEE ORTHO BILAT WITH FLEXION  TECHNIQUE:  AP standing of both knees, PA flexion standing views of both knees, and Merchant views of both knees were performed.  Lateral views of both knees were also performed.  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  Prosthesis position and alignment are satisfactory without radiographic evidence of hardware loosening or other complicating process.  Moderate degenerative change of the right knee.  No acute fracture or malalignment.       Results for orders placed during the hospital encounter of 11/16/15   X-Ray Lumbar Spine Complete 5 View    Narrative Five views of the lumbar spine  Findings: There is increased vertebral body height loss noted at the L1 level.  50% vertebrae height loss is noted anteriorly at this level.  The alignment is within normal limits. There is moderate disk space narrowing noted at the L4-5 level.  Mild/moderate facet arthropathy is noted from L2-3 through L5-S1 levels. Calcified granulomas are noted within the spleen. No pars defects.       Results for orders placed during the hospital encounter of 03/26/15   X-Ray Lumbar Spine AP And Lateral    Narrative Three views of the lumbar spine  Findings: There is a compression L1 level that is new when compared to the prior exam and demonstrates approximately 25% vertebral height loss.  There is no obvious condensation line seen on the plain films suggesting that this is still a chronic deformity.  The alignment is grossly within normal limits.  There is multilevel spondylosis with mild to moderate to space narrowing noted throughout the lumbar spine greatest at L4-5 level.  Facet arthropathy is noted from the L2-3  "through the L5-S1 levels and most prominent at the L5-S1 level.     _________________________________________________________________________________________________________________________________________________________________________________________________________________________      Review of Systems:  CONSTITUTIONAL: patient denies any fever, chills, or weight loss  SKIN: patient denies any rash or itching  RESPIRATORY: patient denies having any shortness of breath  GASTROINTESTINAL: patient denies having any diarrhea, constipation, or bowel incontinence  GENITOURINARY: patient denies having any abnormal bladder function    MUSCULOSKELETAL:  - patient complains of the above noted pain/s (see chief pain complaint)    NEUROLOGICAL:   - pain as above  - strength in Lower extremities is decreased, BILATERALLY  - sensation in Lower extremities is abnormal, on the LEFT  - patient denies any loss of bowel or bladder control      PSYCHIATRIC: patient denies any change in mood    Other:  All other systems reviewed and are negative    _________________________________________________________________________________________________________________________________________________________________________________________________________________________     Physical Exam:  Vitals:    11/08/22 1122   Weight: 67.6 kg (149 lb 0.5 oz)   Height: 5' 10" (1.778 m)   PainSc:   7   PainLoc: Knee    Body mass index is 21.38 kg/m².   (reviewed on 11/8/2022)    General: alert and oriented, in no apparent distress  Gait: antalgic gait. Ambulating with cane.   Skin: No rashes, No discoloration, No obvious lesions  HEENT: EOMI  Respiratory: respirations nonlabored    Musculoskeletal:  Lumbar Spine  - Pain on flexion of lumbar spine Present   - Straight Leg Raise:  Equivocal   - Pain on extension of lumbar spine Present  - TTP over the lumbar facet joints Present  Bilateral L5-S1  - Lumbar facet loading Present  -Pain on palpation over " the SI joint Present   - BRENDEN: Absent    Right Knee:  - Scars: Absent   - TTP: Present over medial/ lateral joint line  - ROM: Preserved  - Pain with extension: Present  - Pain with flexion: Present  - Crepitus: Present    Neuro:  - Extremity Strength:     >> LEFT :: dec with dorsiflexion    >> RIGHT :: dec with dorsiflexion  - Extremity Reflexes:    >> LEFT  :: 2+    >> RIGHT :: 2+     Psych:  Mood and affect is appropriate    _________________________________________________________________________________________________________________________________________________________________________________________________________________________      Assessment:  Dominguez Ritchie is a 89 y.o. male who presents with     ICD-10-CM ICD-9-CM    1. Bilateral lumbar radiculopathy (R>L)  M54.16 724.4       2. Chronic pain of right knee  M25.561 719.46 IR Peripheral Nerve Injection    G89.29 338.29 Case Request-RAD/Other Procedure Area: Right Genicular nerve block w/Light RN IV Sedation      3. History of left knee replacement  Z96.652 V43.65         Patient returns for follow-up visit.  He complains of continued low back and right leg pain.  He has been seen at the [facility], underwent a spinal injection that did not help, and then ultimately underwent surgery (unsure of type) with Dr. Lancaster in January 2017.  We previously requested records multiple times from the [facility], however we have not received these. 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 He had treatment for this, but he reports the damage was already present by the time it was discovered.       Plan:  1. Interventional:   - Schedule repeat right therapeutic genicular nerve block.  Consider RFA, although is getting greater than 1 year pain relief from genicular blocks.  - Discuss lumbar injections at next visit.  - S/p Bilateral L4/5 + L5/S1 TF DREW on 1/4/22 with 50% pain relief for  <2 weeks  - S/p Bilateral L4/5 + L5/S1 TF DREW on 11/8/21 with 100% pain relief x 2 weeks then pain slowly returning, mostly back to baseline 4 weeks post-procedure.  - S/p Bilateral L3, 4,5 MBB on 8/29/19 with minimal relief     2. Pharmacologic:   - Will give tramadol 50mg QD PRN (30 tablets) to take for severe pain.  He was previously taking tramadol 50mg regularly in the past but reported SE in the past, now states no issues. He gets better pain relief with tramadol 50mg than Ultracet.   - Continue Mobic 15mg QD PRN - from PCP.     - Anticoagulation use: None.   - Opioid contract signed 1/18/2019.   - LA  reviewed and appropriate.       3. Rehabilitative: Encouraged regular exercise.    4. Diagnostic: None.     5. Follow up: 4 weeks post-procedure      - This condition does not require this patient to take time off of work, and the primary goal of our Pain Management services is to improve the patient's functional capacity.   - I discussed the risks, benefits, and alternatives to potential treatment options. All questions and concerns were fully addressed today in clinic.           Disclaimer:  This note was prepared using voice recognition system and is likely to have sound alike errors that may have been overlooked even after proof reading.  Please call me with any questions.

## 2022-11-09 ENCOUNTER — OFFICE VISIT (OUTPATIENT)
Dept: FAMILY MEDICINE | Facility: CLINIC | Age: 87
End: 2022-11-09
Payer: MEDICARE

## 2022-11-09 VITALS
SYSTOLIC BLOOD PRESSURE: 120 MMHG | HEART RATE: 60 BPM | BODY MASS INDEX: 21.2 KG/M2 | HEIGHT: 70 IN | WEIGHT: 148.06 LBS | TEMPERATURE: 98 F | OXYGEN SATURATION: 97 % | DIASTOLIC BLOOD PRESSURE: 62 MMHG

## 2022-11-09 DIAGNOSIS — Z95.0 S/P PLACEMENT OF CARDIAC PACEMAKER: Primary | ICD-10-CM

## 2022-11-09 DIAGNOSIS — I48.0 PAROXYSMAL A-FIB: ICD-10-CM

## 2022-11-09 DIAGNOSIS — Z79.01 CURRENT USE OF LONG TERM ANTICOAGULATION: ICD-10-CM

## 2022-11-09 PROCEDURE — 3288F FALL RISK ASSESSMENT DOCD: CPT | Mod: CPTII,S$GLB,, | Performed by: FAMILY MEDICINE

## 2022-11-09 PROCEDURE — 1111F DSCHRG MED/CURRENT MED MERGE: CPT | Mod: CPTII,S$GLB,, | Performed by: FAMILY MEDICINE

## 2022-11-09 PROCEDURE — 1101F PR PT FALLS ASSESS DOC 0-1 FALLS W/OUT INJ PAST YR: ICD-10-PCS | Mod: CPTII,S$GLB,, | Performed by: FAMILY MEDICINE

## 2022-11-09 PROCEDURE — 99214 OFFICE O/P EST MOD 30 MIN: CPT | Mod: S$GLB,,, | Performed by: FAMILY MEDICINE

## 2022-11-09 PROCEDURE — 99999 PR PBB SHADOW E&M-EST. PATIENT-LVL III: ICD-10-PCS | Mod: PBBFAC,,, | Performed by: FAMILY MEDICINE

## 2022-11-09 PROCEDURE — 1159F MED LIST DOCD IN RCRD: CPT | Mod: CPTII,S$GLB,, | Performed by: FAMILY MEDICINE

## 2022-11-09 PROCEDURE — 1101F PT FALLS ASSESS-DOCD LE1/YR: CPT | Mod: CPTII,S$GLB,, | Performed by: FAMILY MEDICINE

## 2022-11-09 PROCEDURE — 99214 PR OFFICE/OUTPT VISIT, EST, LEVL IV, 30-39 MIN: ICD-10-PCS | Mod: S$GLB,,, | Performed by: FAMILY MEDICINE

## 2022-11-09 PROCEDURE — 1111F PR DISCHARGE MEDS RECONCILED W/ CURRENT OUTPATIENT MED LIST: ICD-10-PCS | Mod: CPTII,S$GLB,, | Performed by: FAMILY MEDICINE

## 2022-11-09 PROCEDURE — 1159F PR MEDICATION LIST DOCUMENTED IN MEDICAL RECORD: ICD-10-PCS | Mod: CPTII,S$GLB,, | Performed by: FAMILY MEDICINE

## 2022-11-09 PROCEDURE — 3288F PR FALLS RISK ASSESSMENT DOCUMENTED: ICD-10-PCS | Mod: CPTII,S$GLB,, | Performed by: FAMILY MEDICINE

## 2022-11-09 PROCEDURE — 99999 PR PBB SHADOW E&M-EST. PATIENT-LVL III: CPT | Mod: PBBFAC,,, | Performed by: FAMILY MEDICINE

## 2022-11-09 NOTE — PROGRESS NOTES
"  Chief Complaint:    Chief Complaint   Patient presents with    Follow-up     Pacemaker placement 1 week ago       History of Present Illness:  11/09/2022:-  Patient presents today for a hospital follow-up on 10/28 for abnormal Holter monitor finding    An atrial lead pacemaker was placed on 11/01. Put on Eliquis. Doing better. No more syncopal episodes. His blood pressure is running normally.      09/27/2022:-  Patient with HTN presents today for a fall three days ago    He reports swelling in his R knee that has now subsided since he's been icing it. He didn't come to the office because it was closed so he waited until today. Previous history of cortisone injection.   He says his spells occur mostly after eating and there momentary and then goes away and but can come back again.  Also wants clarifications on his medications. They cancelled his Holter monitor because his echo was normal. His dizzy spells would occur after eating so he stopped Crestor and dutasteride.  Patient and his wife would like their flu vaccines today.   Patient would ilke rollator. Using his wife's today.     09/13/2022:-  Patient with HTN presents today for dizziness.    Had an episode of blurry vision, near-syncope while sitting down. Says it felt like he was going "blind". Denies palpitations or chest pain. He did have pain in his forehead. His blood pressure was 125/75 when the episode occurred. Didn't check his sugar. He took Pepto Bismol and his symptoms started fading. Symptoms returned the next day. He has a heart murmur. His last echocardiogram showed that one of his valves was tightening.   Reports R knee pain, will see Dr. Roberson on 10/31 for a shot. Wants to know if he can get something sooner. Dr. Sadler did an injection over one year ago. His knee can give out if he's been sitting for a while.       ROS:  Review of Systems   Constitutional:  Negative for appetite change, chills and fever.   HENT:  Negative for congestion, ear " pain, postnasal drip, rhinorrhea, sinus pressure and sinus pain.    Eyes:  Negative for pain.   Respiratory:  Negative for cough, chest tightness and shortness of breath.    Cardiovascular:  Negative for chest pain and palpitations.   Gastrointestinal:  Negative for abdominal pain, blood in stool, constipation, diarrhea and nausea.   Genitourinary:  Negative for difficulty urinating, dysuria, flank pain and hematuria.   Musculoskeletal:  Negative for arthralgias and myalgias.   Skin:  Negative for pallor and wound.   Neurological:  Negative for dizziness, tremors, speech difficulty, light-headedness and headaches.   Psychiatric/Behavioral:  Negative for behavioral problems, dysphoric mood and sleep disturbance. The patient is not nervous/anxious.    All other systems reviewed and are negative.    Past Medical History:   Diagnosis Date    Abnormal exercise tolerance test 2013    Arthritis     Colon polyp     Diverticulosis     Dyslipidemia 2013    GERD (gastroesophageal reflux disease)     HTN, goal below 130/80 12/3/2018    Hyperlipidemia 1980    HIGH TG    Knee pain, right 2013    Low back pain with right-sided sciatica 12/3/2018    Meningioma     Paroxysmal A-fib 10/29/2022    Personal history of colonic polyps 2014    RLS (restless legs syndrome) 2013    Tobacco dependence     resolved    West Nile meningitis     per patient       Social History:  Social History     Socioeconomic History    Marital status:    Tobacco Use    Smoking status: Former     Packs/day: 1.00     Years: 25.00     Pack years: 25.00     Types: Cigarettes     Quit date: 1990     Years since quittin.8    Smokeless tobacco: Never   Substance and Sexual Activity    Alcohol use: No     Alcohol/week: 0.0 standard drinks    Drug use: No    Sexual activity: Not Currently     Birth control/protection: None       Family History:   family history includes Cancer in his son; Diabetes in his maternal uncle;  "Heart disease in his mother.    Health Maintenance   Topic Date Due    Lipid Panel  07/05/2023    TETANUS VACCINE  11/16/2025       Physical Exam:    Vital Signs  Temp: 98.1 °F (36.7 °C)  Temp src: Temporal  Pulse: 60  SpO2: 97 %  BP: 120/62  BP Location: Left arm  Patient Position: Sitting  Height and Weight  Height: 5' 10" (177.8 cm)  Weight: 67.1 kg (148 lb 0.6 oz)  BSA (Calculated - sq m): 1.82 sq meters  BMI (Calculated): 21.2  Weight in (lb) to have BMI = 25: 173.9]    Body mass index is 21.24 kg/m².    Physical Exam  Vitals and nursing note reviewed.   Constitutional:       Appearance: Normal appearance.   HENT:      Head: Normocephalic and atraumatic.      Right Ear: Tympanic membrane normal.      Left Ear: Tympanic membrane normal.      Ears:      Comments: Hearing aids  Eyes:      Extraocular Movements: Extraocular movements intact.      Pupils: Pupils are equal, round, and reactive to light.   Cardiovascular:      Rate and Rhythm: Normal rate and regular rhythm.      Pulses: Normal pulses.      Heart sounds: Normal heart sounds. No murmur heard.    No gallop.   Pulmonary:      Effort: Pulmonary effort is normal. No respiratory distress.      Breath sounds: Normal breath sounds. No wheezing, rhonchi or rales.   Abdominal:      General: There is no distension.      Palpations: Abdomen is soft.      Tenderness: There is no abdominal tenderness.   Musculoskeletal:         General: No swelling, deformity or signs of injury. Normal range of motion.      Cervical back: Normal range of motion.   Skin:     General: Skin is warm and dry.      Capillary Refill: Capillary refill takes less than 2 seconds.      Coloration: Skin is not jaundiced or pale.   Neurological:      General: No focal deficit present.      Mental Status: He is alert and oriented to person, place, and time.      Comments: Ambulates with rollator   Psychiatric:         Mood and Affect: Mood normal.         Behavior: Behavior normal.       Lab " Results   Component Value Date    CHOL 151 07/05/2022    CHOL 143 01/03/2022    CHOL 145 06/28/2021    TRIG 192 (H) 07/05/2022    TRIG 171 (H) 01/03/2022    TRIG 137 06/28/2021    HDL 58 07/05/2022    HDL 51 01/03/2022    HDL 60 06/28/2021    TOTALCHOLEST 2.6 07/05/2022    TOTALCHOLEST 2.8 01/03/2022    TOTALCHOLEST 2.4 06/28/2021    NONHDLCHOL 93 07/05/2022    NONHDLCHOL 92 01/03/2022    NONHDLCHOL 85 06/28/2021       Lab Results   Component Value Date    HGBA1C 5.6 01/22/2021       Assessment:      ICD-10-CM ICD-9-CM   1. S/P placement of cardiac pacemaker  Z95.0 V45.01   2. Paroxysmal A-fib  I48.0 427.31   3. Current use of long term anticoagulation  Z79.01 V58.61     Plan:  Reviewed and discussed hospital visit.   Continue current meds.    No orders of the defined types were placed in this encounter.    Current Outpatient Medications   Medication Sig Dispense Refill    amLODIPine (NORVASC) 5 MG tablet Take 1 tablet (5 mg total) by mouth once daily. 30 tablet 0    apixaban (ELIQUIS) 5 mg Tab Take 1 tablet (5 mg total) by mouth 2 (two) times daily. 60 tablet 0    bisacodyL (DULCOLAX) 5 mg EC tablet Take 5 mg by mouth daily as needed for Constipation.      dutasteride (AVODART) 0.5 mg capsule Take 1 capsule (0.5 mg total) by mouth once daily. 90 capsule 3    meclizine (ANTIVERT) 25 mg tablet Take 1 tablet by mouth twice daily as needed 90 tablet 0    pantoprazole (PROTONIX) 40 MG tablet Take 40 mg by mouth once daily.      rosuvastatin (CRESTOR) 5 MG tablet Take 1 tablet (5 mg total) by mouth once daily. 90 tablet 3    traMADoL (ULTRAM) 50 mg tablet Take 1 tablet (50 mg total) by mouth every 24 hours as needed for Pain. 30 tablet 0    triamcinolone acetonide 0.1% (KENALOG) 0.1 % cream Apply topically 2 (two) times daily. 454 g 3     No current facility-administered medications for this visit.       There are no discontinued medications.    No follow-ups on file.      Madie Davis MD  Scribe Attestation:   I,  Petty Dasilva, am scribing for, and in the presence of, Dr.Arif Davis I performed the above scribed service and the documentation accurately describes the services I performed. I attest to the accuracy of the note.    I, Dr. Madie Davis, reviewed documentation as scribed above. I performed the services described in this documentation.  I agree that the record reflects my personal performance and is accurate and complete. Madie Davis MD.  11/09/2022

## 2022-11-10 NOTE — PRE-PROCEDURE INSTRUCTIONS
Spoke with patient spouse regarding procedure scheduled on 11.18     Arrival time 0700     Has patient been sick with fever or on antibiotics within the last 7 days? No     Does the patient have any open wounds, sores or rashes? No     Does the patient have any recent fractures? no     Has patient received a vaccination within the last 7 days? No     Received the COVID vaccination?      Has the patient stopped all medications as directed? na     Does patient have a pacemaker and or defibrillator? Yes PM place 11/1     Does the patient have a ride to and from procedure and someone reliable to remain with patient? wife     Is the patient diabetic? no     Does the patient have sleep apnea? Or use O2 at home? No and no      Is the patient receiving sedation? yes     Is the patient instructed to remain NPO beginning at midnight the night before their procedure? yes     Procedure location confirmed with patient? Yes     Covid- Denies signs/symptoms. Instructed to notify PAT/MD if any changes.

## 2022-11-15 ENCOUNTER — PATIENT MESSAGE (OUTPATIENT)
Dept: PAIN MEDICINE | Facility: HOSPITAL | Age: 87
End: 2022-11-15
Payer: MEDICARE

## 2022-11-18 ENCOUNTER — HOSPITAL ENCOUNTER (OUTPATIENT)
Facility: HOSPITAL | Age: 87
Discharge: HOME | End: 2022-11-18
Attending: ANESTHESIOLOGY | Admitting: ANESTHESIOLOGY
Payer: MEDICARE

## 2022-11-18 VITALS
BODY MASS INDEX: 22.13 KG/M2 | DIASTOLIC BLOOD PRESSURE: 86 MMHG | OXYGEN SATURATION: 99 % | WEIGHT: 154.56 LBS | SYSTOLIC BLOOD PRESSURE: 174 MMHG | HEART RATE: 71 BPM | RESPIRATION RATE: 18 BRPM | HEIGHT: 70 IN | TEMPERATURE: 98 F

## 2022-11-18 DIAGNOSIS — M17.11 OSTEOARTHRITIS OF RIGHT KNEE: ICD-10-CM

## 2022-11-18 PROCEDURE — 25000003 PHARM REV CODE 250: Performed by: ANESTHESIOLOGY

## 2022-11-18 PROCEDURE — 64454 NJX AA&/STRD GNCLR NRV BRNCH: CPT | Mod: RT,,, | Performed by: ANESTHESIOLOGY

## 2022-11-18 PROCEDURE — 64454 PR NERVE BLOCK INJ, ANES/STEROID, GENICULAR NERVE, W/IMG: ICD-10-PCS | Mod: RT,,, | Performed by: ANESTHESIOLOGY

## 2022-11-18 PROCEDURE — 64454 NJX AA&/STRD GNCLR NRV BRNCH: CPT | Mod: RT | Performed by: ANESTHESIOLOGY

## 2022-11-18 PROCEDURE — 63600175 PHARM REV CODE 636 W HCPCS: Performed by: ANESTHESIOLOGY

## 2022-11-18 PROCEDURE — 64450 NJX AA&/STRD OTHER PN/BRANCH: CPT | Performed by: ANESTHESIOLOGY

## 2022-11-18 RX ORDER — TRIAMCINOLONE ACETONIDE 40 MG/ML
INJECTION, SUSPENSION INTRA-ARTICULAR; INTRAMUSCULAR
Status: DISCONTINUED | OUTPATIENT
Start: 2022-11-18 | End: 2022-11-18 | Stop reason: HOSPADM

## 2022-11-18 RX ORDER — INDOMETHACIN 25 MG/1
CAPSULE ORAL
Status: DISCONTINUED | OUTPATIENT
Start: 2022-11-18 | End: 2022-11-18 | Stop reason: HOSPADM

## 2022-11-18 RX ORDER — BUPIVACAINE HYDROCHLORIDE 5 MG/ML
INJECTION, SOLUTION EPIDURAL; INTRACAUDAL
Status: DISCONTINUED | OUTPATIENT
Start: 2022-11-18 | End: 2022-11-18 | Stop reason: HOSPADM

## 2022-11-18 NOTE — PLAN OF CARE
Discharge instructions reviewed with patient, verbalized understanding. 3 injection sites to right knee; clean, dry and intact. Voiced no complaints. Vital signs stable. Discharging home with ride.

## 2022-11-18 NOTE — DISCHARGE INSTRUCTIONS

## 2022-11-18 NOTE — DISCHARGE SUMMARY
Discharge Note  Short Stay      SUMMARY     Admit Date: 11/18/2022    Attending Physician: Benitez Roberson MD        Discharge Physician: Benitez Roberson MD        Discharge Date: 11/18/2022 7:58 AM    Procedure(s) (LRB):  Right Genicular nerve block w/Light RN IV Sedation (Right)    Final Diagnosis: Chronic pain of right knee [M25.561, G89.29]    Disposition: Home or self care    Patient Instructions:   Current Discharge Medication List        CONTINUE these medications which have NOT CHANGED    Details   amLODIPine (NORVASC) 5 MG tablet Take 1 tablet (5 mg total) by mouth once daily.  Qty: 30 tablet, Refills: 0    Comments: .      apixaban (ELIQUIS) 5 mg Tab Take 1 tablet (5 mg total) by mouth 2 (two) times daily.  Qty: 60 tablet, Refills: 0      bisacodyL (DULCOLAX) 5 mg EC tablet Take 5 mg by mouth daily as needed for Constipation.      dutasteride (AVODART) 0.5 mg capsule Take 1 capsule (0.5 mg total) by mouth once daily.  Qty: 90 capsule, Refills: 3      meclizine (ANTIVERT) 25 mg tablet Take 1 tablet by mouth twice daily as needed  Qty: 90 tablet, Refills: 0    Associated Diagnoses: Vertigo      pantoprazole (PROTONIX) 40 MG tablet Take 40 mg by mouth once daily.      rosuvastatin (CRESTOR) 5 MG tablet Take 1 tablet (5 mg total) by mouth once daily.  Qty: 90 tablet, Refills: 3    Associated Diagnoses: Mixed hyperlipidemia      traMADoL (ULTRAM) 50 mg tablet Take 1 tablet (50 mg total) by mouth every 24 hours as needed for Pain.  Qty: 30 tablet, Refills: 0    Comments: Greater than 7 day supply medically necessary.      triamcinolone acetonide 0.1% (KENALOG) 0.1 % cream Apply topically 2 (two) times daily.  Qty: 454 g, Refills: 3                 Discharge Diagnosis: Chronic pain of right knee [M25.561, G89.29]  Condition on Discharge: Stable with no complications to procedure   Diet on Discharge: Same as before.  Activity: as per instruction sheet.  Discharge to: Home with a responsible adult.  Follow up: 2-4  weeks       Please call the office at [phone] if you experience any weakness or loss of sensation, fever > 101.5, pain uncontrolled with oral medications, persistent nausea/vomiting/or diarrhea, redness or drainage from the incisions, or any other worrisome concerns. If physician on call was not reached or could not communicate with our office for any reason please go to the nearest emergency department

## 2022-11-18 NOTE — OP NOTE
Dominguez Ritchie  89 y.o. male      Vitals:    11/18/22 0754   BP: (!) 160/70   Pulse: 75   Resp: (!) 21   Temp:        Procedure Date: 11/18/22    Procedure Note:    Right  Geniculate nerve block under fluoroscopy                               Surgeon: Benitez Roberson MD    Assistant: None    Pre-Op Diagnosis:  Right  Chronic pain of right knee [M25.561, G89.29]    Post-Op Diagnosis: Chronic pain of right knee [M25.561, G89.29]    Sedation:  lo9cal    The patient was monitored with continuous pulse oximetry, EKG, and intermittent blood pressure monitors.  The patient was hemodynamically stable throughout the entire process was responsive to voice, and breathing spontaneously.  Supplemental O2 was provided at 2L/min via nasal cannula.  Patient was comfortable for the duration of the procedure. (See nurse documentation and case log for sedation time)      EBL: None    Complications: None    Specimens: None    Description of procedure:    After written consent was obtained, patient placed in supine position.  The area over the medial and lateral aspect of the superior epi-condyle of the femur and the medial tibial metaphysis were prepped with chlorhexidine.  The procedure targets the superior medial genicular nerve (midpoint of the femur at the superior medial epicondyle), the superior lateral genicular nerve (midpoint of the femur at the superior lateral epicondyle), and the inferior medial genicular nerve (midpoint of the tibia at the inferior medial epicondyle).The area was draped in the usual sterile fashion.  Approximately 8 mL total 1% lidocaine was infiltrated into the skin overlying the 3 predetermined entry points. A 22 gauge spinal needle was then advanced under fluoroscopy in the AP and lateral views into the positions of the geniculate nerves at these levels. After negative aspiration and no paresthesias there was injection of 2 mL of 0.25% bupivacaine and 40 mg Kenalog into each of these 3 areas for a total  volume of 6 mL. Needle was withdrawn and a sterile band-aid applied to the skin.    Patient tolerated the procedure well, and was reporting improvement of pain symptoms after the injection.  Patient was discharged from the clinic in stable condition.

## 2022-11-30 NOTE — PROGRESS NOTES
Established Patient Interventional Pain Clinic Visit    Chief Pain Complaint:  right knee pain  Low Back Pain, occasional now BLE pain (R>L),     Interventional history 12/01/2022   Mr. Ritchie is a 89-year-old gentleman with past medical history significant for dyslipidemia, hypertension, atrial fibrillation, history of meningioma, prediabetes, GERD, multi joint osteoarthritis/osteopenia, nicotine dependence who presents to establish care, previous Dr. Sadler patient.  Patient presents status post right genicular nerve block 11/18/2022    Patient reports 100% relief and right knee pain following right-sided genicular nerve block.  He reports improved range of motion and reduced pain.  Today patient's primary concern is lower back and right leg pain.  Pain is intermittent and today is rated a 5/10.  Patient reports pain which radiates into the right hip down the lateral and posterior aspect of the right lower extremity in L4-S1 distribution to the lateral malleolus.  Pain is exacerbated with standing for a few minutes and ambulating with his Rollator.  Patient does endorse associated weakness in the right lower extremity associated with his pain.  Patient and his wife report recent fall secondary to tripping and not secondary to weakness.  Patient has initiated physical therapy in the past with exacerbation of pain.  Pain has been improved with prior transforaminal epidural steroid injections, and tramadol which he takes very sparingly.  Patient is requesting refill for this medication.      Of note, patient received an atrial lead pacemaker 11/01/2022 secondary to abnormal pause in between atrial fibrillation and regular sinus rhythm.  Of note patient has been maintained on Eliquis.  Patient follows with Dr. Tejeda.    Patient denies night fever/night sweats, urinary incontinence, bowel incontinence, significant weight loss, and loss of sensations.  Patient reports significant motor weakness.      Pain Disability  Index Review:     Last 3 PDI Scores 12/1/2022 2/16/2022 12/10/2021   Pain Disability Index (PDI) 35 48 49       Non-Pharmacologic Treatments:  Physical Therapy/Home Exercise: yes  Ice/Heat:no  TENS: no  Acupuncture: no  Massage: no  Chiropractic: no    Other: no      Pain Medications:  - Opioids: Ultram (Tramadol HCL)  - Anti-Coagulants: Eliquis    Spinal injections:  - 3-4 spinal injections prior to 2018 (lumbar rhizotomy)  -07/25/18: Right knee genicular nerve block on with 75% pain relief x 6 months   -10/17/18: Right SI, Right GTB, Right Piriformis Inejction  with 80% pain relief for 2 months  -12/19/18: Right SI and Right GTB  with 20% relief   -01/31/19: Right Piriformis on  with no relief   -08/29/19: Bilateral L3, 4,5 MBB on  with minimal relief   -11/8/21: Bilateral L4/5 + L5/S1 TF DREW on with 100% pain relief x 2 weeks  -01/4/22: Bilateral L4/5 + L5/S1 TF DREW on  with 50% pain relief for <2 weeks    Dr. Roberson:  -11/18/2022: Right genicular nerve block      Interval History (11/8/2022):  Dominguez Ritchie presents today for follow-up visit.  Patient was last seen about 9 months ago.  He is here today c/o right knee pain. He had genicular nerve block several years ago with Dr. Sadler with great pain relief. Patient reports pain as 7/10 today.    Interval History (2/16/2022): Dominguez Ritchie presents today for follow-up visit.  he underwent Bilateral L4/5 + L5/S1 TF DREW on 1/4/22.  The patient reports that he is/was unchanged following the procedure.  he reports 50% pain relief.  The changes lasted <2 weeks.  The changes have NOT continued through this visit.  Patient reports pain as 8/10 today.    Interval History (12/10/2021): Dominguez Ritchie presents today for follow-up visit.  Patient was seen on 11/8/21. At that time he underwent Bilateral L4/5 + L5/S1 TF DREW.  The patient reports that he is/was better following the procedure.    The changes lasted 2-4 weeks.  The changes have continued through this visit.   Patient reports pain as 7/10 today.    Interval History (10/14/2021):  Dominguez Ritchie presents today for follow-up visit.  Patient was last seen on 7/13/20 (over a year ago).  He is here today for evaluation for injection.  He had lumbar MRI since last visit.   Patient reports pain as 7/10 today.    History of Present Illness:   Dominguez Ritchie is a 89 y.o. male  who is presenting with a chief complaint of lumbar back pain. The patient began experiencing this problem insidiously, and the pain has been gradually worsening over the past 8 month(s). The pain is described as throbbing, cramping, aching and heavy and is located in the right lumbar spine. Pain is intermittent and lasts hours. The  pain radiated to the right leg. The patient rates his pain a 6 out of ten and interferes with activities of daily living a 7 out of ten. Pain is exacerbated by flexion/extension of the lumbar spine, getting up from a seated position, prolonged standing, and is improved by rest. Patient reports no prior trauma, no prior spinal surgery     - antecedent trauma, prior spinal surgery: patient reports prior trauma, prior lumbar surgery (surgery in January 2017 with Dr. Lancaster at [facility] [place] knee replacement  - pertinent negatives: No fever, No chills, No weight loss, No bladder dysfunction, No bowel dysfunction, No saddle anesthesia  - pertinent positives: generalized nonspecific Lower Extremity weakness bilaterally, BLE numbness  - medications, other therapies tried (physical therapy, injections):     >> Tylenol, NSAIDs, gabapentin, Mobic, tramadol, Norco    >> Has previously undergone Physical Therapy        Imaging / Labs / Studies (reviewed on 12/1/2022):              9/20/2018 X-Ray Lumbar Complete With Flex And Ext  TECHNIQUE:  Five views of the lumbar spine plus flexion extension views were performed.  COMPARISON:  November 16, 2015  FINDINGS:  Vertebral body heights are unchanged.  Chronic compression deformity of L1 noted.   "Alignment is anatomic.  L4-5 disc height loss present.  There is multilevel anterior osteophyte formation.  Lower lumbar spine facet arthropathy noted.  No change in alignment on flexion or extension views.  No pars defects appreciated.  Mild vascular calcifications noted.  Impression: Degenerative findings.      7/12/2018 XR KNEE ORTHO BILAT WITH FLEXION  TECHNIQUE:  AP standing of both knees, PA flexion standing views of both knees, and Merchant views of both knees were performed.  Lateral views of both knees were also performed.  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  Prosthesis position and alignment are satisfactory without radiographic evidence of hardware loosening or other complicating process.  Moderate degenerative change of the right knee.  No acute fracture or malalignment.       Review of Systems:  CONSTITUTIONAL: patient denies any fever, chills, or weight loss  SKIN: patient denies any rash or itching  RESPIRATORY: patient denies having any shortness of breath  GASTROINTESTINAL: patient denies having any diarrhea, constipation, or bowel incontinence  GENITOURINARY: patient denies having any abnormal bladder function    MUSCULOSKELETAL:  - patient complains of the above noted pain/s (see chief pain complaint)    NEUROLOGICAL:   - pain as above  - strength in Lower extremities is decreased, BILATERALLY  - sensation in Lower extremities is abnormal, on the LEFT  - patient denies any loss of bowel or bladder control      PSYCHIATRIC: patient denies any change in mood    Other:  All other systems reviewed and are negative    Physical Exam:  Vitals:    12/01/22 0922   BP: 127/65   Pulse: 62   Resp: 17   Weight: 67 kg (147 lb 11.3 oz)   Height: 5' 10" (1.778 m)      Body mass index is 21.19 kg/m².   (reviewed on 12/1/2022)    General: alert and oriented, in no apparent distress  Gait: antalgic gait. Ambulating with cane.   Skin: No rashes, No " discoloration, No obvious lesions  HEENT: EOMI  Respiratory: respirations nonlabored    Musculoskeletal:  Lumbar Spine  - Pain on flexion of lumbar spine Present   - Straight Leg Raise:  Equivocal   - Pain on extension of lumbar spine Present  - TTP over the lumbar facet joints Present  Bilateral L5-S1  - Lumbar facet loading Present  -Pain on palpation over the SI joint Present   - BRENDEN: Absent    Right Knee:  - Scars: Absent   - TTP: Present over medial/ lateral joint line  - ROM: Preserved  - Pain with extension: Present  - Pain with flexion: Present  - Crepitus: Present    Neuro:  - Extremity Strength:     >> LEFT :: dec with dorsiflexion    >> RIGHT :: dec with dorsiflexion  - Extremity Reflexes:    >> LEFT  :: 2+    >> RIGHT :: 2+     Psych:  Mood and affect is appropriate      Assessment:  Dominguez Ritchie is a 89 y.o. male who presents with     ICD-10-CM ICD-9-CM    1. Bilateral lumbar radiculopathy (R>L)  M54.16 724.4 IR Epidural Transforaminal Inj 1st Vert Lumbar Uni      Case Request-RAD/Other Procedure Area: Right-sided L4/5 and L5/S1 transforaminal epidural steroid injection with RN IV sedation      2. Primary osteoarthritis of right knee  M17.11 715.16       3. History of left knee replacement  Z96.652 V43.65       4. DDD (degenerative disc disease), lumbar  M51.36 722.52       5. H/O laminectomy  Z98.890 V45.89           Plan:  1. Interventional:   -schedule for right-sided L4/5 and L5/S1 transforaminal epidural steroid injection to see if this helps with radicular pain.  We have discussed the procedure, benefits and potential risk in detail.  Patient has elected to pursue this procedure.    - Anticoagulation use:  Yes, Eliquis  Per ASA guidelines for secondary prophylaxis, patient will need to pause Eliquis 3 days prior to transforaminal epidural steroid injection.  Will obtain cardiac clearance from Dr. Tejeda.    2. Pharmacologic:   -An opioid pain contract:  was completed  12/1/22 after having an  extensive conversation about chronic opioid use for pain management. We discussed the risks and benefits of opioids.  I discouraged the patient from escalation of opioid use and try to minimize its use to decrease chance of dependence, tolerance, and opioid-based hyperalgesia.  I reviewed the  in great detail and there are no inconsistencies and it is appropriate with patient's history.  There are no signs of aberrant drug behavior.  The opioid risk tool was also completed.  Pt counseled about the side effects of long term use of opioids including dependence, tolerance, addiction, respiratory depression, somnelence , immune and endocrine dysfunction.  The patient expressed understanding.    - Refill tramadol 50mg QD PRN (30 tablets) to take for severe pain.   - Continue Mobic 15mg QD PRN - from PCP.       - LA  reviewed and appropriate.       3. Rehabilitative: -We discussed initiating physical therapy to help manage the patient/s painful condition. The patient was counseled that muscle strengthening will improve the long term prognosis in regards to pain and may also help increase range of motion and mobility. They were told that one of the goals of physical therapy is that they learn how to do the exercises so that they can do them independently at home daily upon completion.       4. Diagnostic:  Reviewed relevant imaging and answered patient's questions.      5. Follow up: 4 weeks post-procedure        The above plan and management options were discussed at length with patient. Patient is in agreement with the above and verbalized understanding.    - I discussed the goals of interventional chronic pain management with the patient on today's visit. We discussed a multimodal and systematic approach to pain.  This includes diagnostic and therapeutic injections, adjuvant pharmacologic treatment, physical therapy, and at times psychiatry.  I emphasized the importance of regular exercise, core strengthening and  stretching, diet and weight loss as a cornerstone of long-term pain management.    - This condition does not require this patient to take time off of work, and the primary goal of our Pain Management services is to improve the patient's functional capacity.  - Patient Questions: Answered all of the patient's questions regarding diagnoses, therapy, treatment and next steps        Benitez Roberson MD    Disclaimer:  This note was prepared using voice recognition system and is likely to have sound alike errors that may have been overlooked even after proof reading.  Please call me with any questions.

## 2022-11-30 NOTE — H&P (VIEW-ONLY)
Established Patient Interventional Pain Clinic Visit    Chief Pain Complaint:  right knee pain  Low Back Pain, occasional now BLE pain (R>L),     Interventional history 12/01/2022   Mr. Ritchie is a 89-year-old gentleman with past medical history significant for dyslipidemia, hypertension, atrial fibrillation, history of meningioma, prediabetes, GERD, multi joint osteoarthritis/osteopenia, nicotine dependence who presents to establish care, previous Dr. Sadler patient.  Patient presents status post right genicular nerve block 11/18/2022    Patient reports 100% relief and right knee pain following right-sided genicular nerve block.  He reports improved range of motion and reduced pain.  Today patient's primary concern is lower back and right leg pain.  Pain is intermittent and today is rated a 5/10.  Patient reports pain which radiates into the right hip down the lateral and posterior aspect of the right lower extremity in L4-S1 distribution to the lateral malleolus.  Pain is exacerbated with standing for a few minutes and ambulating with his Rollator.  Patient does endorse associated weakness in the right lower extremity associated with his pain.  Patient and his wife report recent fall secondary to tripping and not secondary to weakness.  Patient has initiated physical therapy in the past with exacerbation of pain.  Pain has been improved with prior transforaminal epidural steroid injections, and tramadol which he takes very sparingly.  Patient is requesting refill for this medication.      Of note, patient received an atrial lead pacemaker 11/01/2022 secondary to abnormal pause in between atrial fibrillation and regular sinus rhythm.  Of note patient has been maintained on Eliquis.  Patient follows with Dr. Tejeda.    Patient denies night fever/night sweats, urinary incontinence, bowel incontinence, significant weight loss, and loss of sensations.  Patient reports significant motor weakness.      Pain Disability  Index Review:     Last 3 PDI Scores 12/1/2022 2/16/2022 12/10/2021   Pain Disability Index (PDI) 35 48 49       Non-Pharmacologic Treatments:  Physical Therapy/Home Exercise: yes  Ice/Heat:no  TENS: no  Acupuncture: no  Massage: no  Chiropractic: no    Other: no      Pain Medications:  - Opioids: Ultram (Tramadol HCL)  - Anti-Coagulants: Eliquis    Spinal injections:  - 3-4 spinal injections prior to 2018 (lumbar rhizotomy)  -07/25/18: Right knee genicular nerve block on with 75% pain relief x 6 months   -10/17/18: Right SI, Right GTB, Right Piriformis Inejction  with 80% pain relief for 2 months  -12/19/18: Right SI and Right GTB  with 20% relief   -01/31/19: Right Piriformis on  with no relief   -08/29/19: Bilateral L3, 4,5 MBB on  with minimal relief   -11/8/21: Bilateral L4/5 + L5/S1 TF DREW on with 100% pain relief x 2 weeks  -01/4/22: Bilateral L4/5 + L5/S1 TF DREW on  with 50% pain relief for <2 weeks    Dr. Roberson:  -11/18/2022: Right genicular nerve block      Interval History (11/8/2022):  Dominguez Ritchie presents today for follow-up visit.  Patient was last seen about 9 months ago.  He is here today c/o right knee pain. He had genicular nerve block several years ago with Dr. Sadler with great pain relief. Patient reports pain as 7/10 today.    Interval History (2/16/2022): Dominguez Ritchie presents today for follow-up visit.  he underwent Bilateral L4/5 + L5/S1 TF DREW on 1/4/22.  The patient reports that he is/was unchanged following the procedure.  he reports 50% pain relief.  The changes lasted <2 weeks.  The changes have NOT continued through this visit.  Patient reports pain as 8/10 today.    Interval History (12/10/2021): Dominguez Ritchie presents today for follow-up visit.  Patient was seen on 11/8/21. At that time he underwent Bilateral L4/5 + L5/S1 TF DREW.  The patient reports that he is/was better following the procedure.    The changes lasted 2-4 weeks.  The changes have continued through this visit.   Patient reports pain as 7/10 today.    Interval History (10/14/2021):  Dominguez Ritchie presents today for follow-up visit.  Patient was last seen on 7/13/20 (over a year ago).  He is here today for evaluation for injection.  He had lumbar MRI since last visit.   Patient reports pain as 7/10 today.    History of Present Illness:   Dominguez Ritchie is a 89 y.o. male  who is presenting with a chief complaint of lumbar back pain. The patient began experiencing this problem insidiously, and the pain has been gradually worsening over the past 8 month(s). The pain is described as throbbing, cramping, aching and heavy and is located in the right lumbar spine. Pain is intermittent and lasts hours. The  pain radiated to the right leg. The patient rates his pain a 6 out of ten and interferes with activities of daily living a 7 out of ten. Pain is exacerbated by flexion/extension of the lumbar spine, getting up from a seated position, prolonged standing, and is improved by rest. Patient reports no prior trauma, no prior spinal surgery     - antecedent trauma, prior spinal surgery: patient reports prior trauma, prior lumbar [place] [facility] [place]  - pertinent negatives: No fever, No chills, No weight loss, No bladder dysfunction, No bowel dysfunction, No saddle anesthesia  - pertinent positives: generalized nonspecific Lower Extremity weakness bilaterally, BLE numbness  - medications, other therapies tried (physical therapy, injections):     >> Tylenol, NSAIDs, gabapentin, Mobic, tramadol, Norco    >> Has previously undergone Physical Therapy        Imaging / Labs / Studies (reviewed on 12/1/2022):              9/20/2018 X-Ray Lumbar Complete With Flex And Ext  TECHNIQUE:  Five views of the lumbar spine plus flexion extension views were performed.  COMPARISON:  November 16, 2015  FINDINGS:  Vertebral body heights are unchanged.  Chronic compression deformity of L1 noted.   "Alignment is anatomic.  L4-5 disc height loss present.  There is multilevel anterior osteophyte formation.  Lower lumbar spine facet arthropathy noted.  No change in alignment on flexion or extension views.  No pars defects appreciated.  Mild vascular calcifications noted.  Impression: Degenerative findings.      7/12/2018 XR KNEE ORTHO BILAT WITH FLEXION  TECHNIQUE:  AP standing of both knees, PA flexion standing views of both knees, and Merchant views of both knees were performed.  Lateral views of both knees were also performed.  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  Prosthesis position and alignment are satisfactory without radiographic evidence of hardware loosening or other complicating process.  Moderate degenerative change of the right knee.  No acute fracture or malalignment.       Review of Systems:  CONSTITUTIONAL: patient denies any fever, chills, or weight loss  SKIN: patient denies any rash or itching  RESPIRATORY: patient denies having any shortness of breath  GASTROINTESTINAL: patient denies having any diarrhea, constipation, or bowel incontinence  GENITOURINARY: patient denies having any abnormal bladder function    MUSCULOSKELETAL:  - patient complains of the above noted pain/s (see chief pain complaint)    NEUROLOGICAL:   - pain as above  - strength in Lower extremities is decreased, BILATERALLY  - sensation in Lower extremities is abnormal, on the LEFT  - patient denies any loss of bowel or bladder control      PSYCHIATRIC: patient denies any change in mood    Other:  All other systems reviewed and are negative    Physical Exam:  Vitals:    12/01/22 0922   BP: 127/65   Pulse: 62   Resp: 17   Weight: 67 kg (147 lb 11.3 oz)   Height: 5' 10" (1.778 m)      Body mass index is 21.19 kg/m².   (reviewed on 12/1/2022)    General: alert and oriented, in no apparent distress  Gait: antalgic gait. Ambulating with cane.   Skin: No rashes, No " discoloration, No obvious lesions  HEENT: EOMI  Respiratory: respirations nonlabored    Musculoskeletal:  Lumbar Spine  - Pain on flexion of lumbar spine Present   - Straight Leg Raise:  Equivocal   - Pain on extension of lumbar spine Present  - TTP over the lumbar facet joints Present  Bilateral L5-S1  - Lumbar facet loading Present  -Pain on palpation over the SI joint Present   - BRENDEN: Absent    Right Knee:  - Scars: Absent   - TTP: Present over medial/ lateral joint line  - ROM: Preserved  - Pain with extension: Present  - Pain with flexion: Present  - Crepitus: Present    Neuro:  - Extremity Strength:     >> LEFT :: dec with dorsiflexion    >> RIGHT :: dec with dorsiflexion  - Extremity Reflexes:    >> LEFT  :: 2+    >> RIGHT :: 2+     Psych:  Mood and affect is appropriate      Assessment:  Dominguez Ritchie is a 89 y.o. male who presents with     ICD-10-CM ICD-9-CM    1. Bilateral lumbar radiculopathy (R>L)  M54.16 724.4 IR Epidural Transforaminal Inj 1st Vert Lumbar Uni      Case Request-RAD/Other Procedure Area: Right-sided L4/5 and L5/S1 transforaminal epidural steroid injection with RN IV sedation      2. Primary osteoarthritis of right knee  M17.11 715.16       3. History of left knee replacement  Z96.652 V43.65       4. DDD (degenerative disc disease), lumbar  M51.36 722.52       5. H/O laminectomy  Z98.890 V45.89           Plan:  1. Interventional:   -schedule for right-sided L4/5 and L5/S1 transforaminal epidural steroid injection to see if this helps with radicular pain.  We have discussed the procedure, benefits and potential risk in detail.  Patient has elected to pursue this procedure.    - Anticoagulation use:  Yes, Eliquis  Per ASA guidelines for secondary prophylaxis, patient will need to pause Eliquis 3 days prior to transforaminal epidural steroid injection.  Will obtain cardiac clearance from Dr. Tejeda.    2. Pharmacologic:   -An opioid pain contract:  was completed  12/1/22 after having an  extensive conversation about chronic opioid use for pain management. We discussed the risks and benefits of opioids.  I discouraged the patient from escalation of opioid use and try to minimize its use to decrease chance of dependence, tolerance, and opioid-based hyperalgesia.  I reviewed the  in great detail and there are no inconsistencies and it is appropriate with patient's history.  There are no signs of aberrant drug behavior.  The opioid risk tool was also completed.  Pt counseled about the side effects of long term use of opioids including dependence, tolerance, addiction, respiratory depression, somnelence , immune and endocrine dysfunction.  The patient expressed understanding.    - Refill tramadol 50mg QD PRN (30 tablets) to take for severe pain.   - Continue Mobic 15mg QD PRN - from PCP.       - LA  reviewed and appropriate.       3. Rehabilitative: -We discussed initiating physical therapy to help manage the patient/s painful condition. The patient was counseled that muscle strengthening will improve the long term prognosis in regards to pain and may also help increase range of motion and mobility. They were told that one of the goals of physical therapy is that they learn how to do the exercises so that they can do them independently at home daily upon completion.       4. Diagnostic:  Reviewed relevant imaging and answered patient's questions.      5. Follow up: 4 weeks post-procedure        The above plan and management options were discussed at length with patient. Patient is in agreement with the above and verbalized understanding.    - I discussed the goals of interventional chronic pain management with the patient on today's visit. We discussed a multimodal and systematic approach to pain.  This includes diagnostic and therapeutic injections, adjuvant pharmacologic treatment, physical therapy, and at times psychiatry.  I emphasized the importance of regular exercise, core strengthening and  stretching, diet and weight loss as a cornerstone of long-term pain management.    - This condition does not require this patient to take time off of work, and the primary goal of our Pain Management services is to improve the patient's functional capacity.  - Patient Questions: Answered all of the patient's questions regarding diagnoses, therapy, treatment and next steps        Benitez Roberson MD    Disclaimer:  This note was prepared using voice recognition system and is likely to have sound alike errors that may have been overlooked even after proof reading.  Please call me with any questions.

## 2022-12-01 ENCOUNTER — OFFICE VISIT (OUTPATIENT)
Dept: PAIN MEDICINE | Facility: CLINIC | Age: 87
End: 2022-12-01
Payer: MEDICARE

## 2022-12-01 VITALS
WEIGHT: 147.69 LBS | BODY MASS INDEX: 21.14 KG/M2 | RESPIRATION RATE: 17 BRPM | DIASTOLIC BLOOD PRESSURE: 65 MMHG | HEART RATE: 62 BPM | SYSTOLIC BLOOD PRESSURE: 127 MMHG | HEIGHT: 70 IN

## 2022-12-01 DIAGNOSIS — Z96.652 HISTORY OF LEFT KNEE REPLACEMENT: ICD-10-CM

## 2022-12-01 DIAGNOSIS — Z98.890 H/O LAMINECTOMY: ICD-10-CM

## 2022-12-01 DIAGNOSIS — M51.36 DDD (DEGENERATIVE DISC DISEASE), LUMBAR: ICD-10-CM

## 2022-12-01 DIAGNOSIS — M54.16 BILATERAL LUMBAR RADICULOPATHY: Primary | ICD-10-CM

## 2022-12-01 DIAGNOSIS — M17.11 PRIMARY OSTEOARTHRITIS OF RIGHT KNEE: ICD-10-CM

## 2022-12-01 PROCEDURE — 1101F PT FALLS ASSESS-DOCD LE1/YR: CPT | Mod: CPTII,S$GLB,, | Performed by: ANESTHESIOLOGY

## 2022-12-01 PROCEDURE — 1101F PR PT FALLS ASSESS DOC 0-1 FALLS W/OUT INJ PAST YR: ICD-10-PCS | Mod: CPTII,S$GLB,, | Performed by: ANESTHESIOLOGY

## 2022-12-01 PROCEDURE — 1159F PR MEDICATION LIST DOCUMENTED IN MEDICAL RECORD: ICD-10-PCS | Mod: CPTII,S$GLB,, | Performed by: ANESTHESIOLOGY

## 2022-12-01 PROCEDURE — 1111F PR DISCHARGE MEDS RECONCILED W/ CURRENT OUTPATIENT MED LIST: ICD-10-PCS | Mod: CPTII,S$GLB,, | Performed by: ANESTHESIOLOGY

## 2022-12-01 PROCEDURE — 1159F MED LIST DOCD IN RCRD: CPT | Mod: CPTII,S$GLB,, | Performed by: ANESTHESIOLOGY

## 2022-12-01 PROCEDURE — 99214 PR OFFICE/OUTPT VISIT, EST, LEVL IV, 30-39 MIN: ICD-10-PCS | Mod: S$GLB,,, | Performed by: ANESTHESIOLOGY

## 2022-12-01 PROCEDURE — 99999 PR PBB SHADOW E&M-EST. PATIENT-LVL IV: CPT | Mod: PBBFAC,,, | Performed by: ANESTHESIOLOGY

## 2022-12-01 PROCEDURE — 99999 PR PBB SHADOW E&M-EST. PATIENT-LVL IV: ICD-10-PCS | Mod: PBBFAC,,, | Performed by: ANESTHESIOLOGY

## 2022-12-01 PROCEDURE — 3288F FALL RISK ASSESSMENT DOCD: CPT | Mod: CPTII,S$GLB,, | Performed by: ANESTHESIOLOGY

## 2022-12-01 PROCEDURE — 99214 OFFICE O/P EST MOD 30 MIN: CPT | Mod: S$GLB,,, | Performed by: ANESTHESIOLOGY

## 2022-12-01 PROCEDURE — 1111F DSCHRG MED/CURRENT MED MERGE: CPT | Mod: CPTII,S$GLB,, | Performed by: ANESTHESIOLOGY

## 2022-12-01 PROCEDURE — 3288F PR FALLS RISK ASSESSMENT DOCUMENTED: ICD-10-PCS | Mod: CPTII,S$GLB,, | Performed by: ANESTHESIOLOGY

## 2022-12-01 RX ORDER — TRAMADOL HYDROCHLORIDE 50 MG/1
50 TABLET ORAL
Qty: 30 TABLET | Refills: 0 | Status: SHIPPED | OUTPATIENT
Start: 2022-12-01 | End: 2023-04-10

## 2022-12-05 ENCOUNTER — TELEPHONE (OUTPATIENT)
Dept: PAIN MEDICINE | Facility: CLINIC | Age: 87
End: 2022-12-05
Payer: MEDICARE

## 2022-12-08 ENCOUNTER — TELEPHONE (OUTPATIENT)
Dept: PAIN MEDICINE | Facility: CLINIC | Age: 87
End: 2022-12-08
Payer: MEDICARE

## 2022-12-22 NOTE — PRE-PROCEDURE INSTRUCTIONS
Spoke with patient daughter regarding procedure scheduled on 12.30     Arrival time 0730     Has patient been sick with fever or on antibiotics within the last 7 days? No     Does the patient have any open wounds, sores or rashes? No     Does the patient have any recent fractures? no     Has patient received a vaccination within the last 7 days? No     Received the COVID vaccination?      Has the patient stopped all medications as directed? eliquis 3 days     Does patient have a pacemaker and or defibrillator? pm     Does the patient have a ride to and from procedure and someone reliable to remain with patient? daughter      Is the patient diabetic? no     Does the patient have sleep apnea? Or use O2 at home? no      Is the patient receiving sedation? unsure     Is the patient instructed to remain NPO beginning at midnight the night before their procedure? yes     Procedure location confirmed with patient? Yes     Covid- Denies signs/symptoms. Instructed to notify PAT/MD if any changes.

## 2022-12-30 ENCOUNTER — HOSPITAL ENCOUNTER (OUTPATIENT)
Facility: HOSPITAL | Age: 87
Discharge: HOME | End: 2022-12-30
Attending: ANESTHESIOLOGY | Admitting: ANESTHESIOLOGY
Payer: MEDICARE

## 2022-12-30 VITALS
TEMPERATURE: 98 F | DIASTOLIC BLOOD PRESSURE: 74 MMHG | RESPIRATION RATE: 15 BRPM | OXYGEN SATURATION: 94 % | HEIGHT: 70 IN | BODY MASS INDEX: 21.41 KG/M2 | SYSTOLIC BLOOD PRESSURE: 166 MMHG | WEIGHT: 149.56 LBS | HEART RATE: 65 BPM

## 2022-12-30 DIAGNOSIS — M54.16 LUMBAR RADICULOPATHY: ICD-10-CM

## 2022-12-30 PROCEDURE — 63600175 PHARM REV CODE 636 W HCPCS: Performed by: ANESTHESIOLOGY

## 2022-12-30 PROCEDURE — 25000003 PHARM REV CODE 250: Performed by: ANESTHESIOLOGY

## 2022-12-30 PROCEDURE — 64484 NJX AA&/STRD TFRM EPI L/S EA: CPT | Mod: RT,,, | Performed by: ANESTHESIOLOGY

## 2022-12-30 PROCEDURE — 64484 NJX AA&/STRD TFRM EPI L/S EA: CPT | Performed by: ANESTHESIOLOGY

## 2022-12-30 PROCEDURE — 64483 NJX AA&/STRD TFRM EPI L/S 1: CPT | Mod: RT,,, | Performed by: ANESTHESIOLOGY

## 2022-12-30 PROCEDURE — A9585 GADOBUTROL INJECTION: HCPCS | Performed by: ANESTHESIOLOGY

## 2022-12-30 PROCEDURE — 64483 NJX AA&/STRD TFRM EPI L/S 1: CPT | Performed by: ANESTHESIOLOGY

## 2022-12-30 PROCEDURE — 64483 PR EPIDURAL INJ, ANES/STEROID, TRANSFORAMINAL, LUMB/SACR, SNGL LEVL: ICD-10-PCS | Mod: RT,,, | Performed by: ANESTHESIOLOGY

## 2022-12-30 PROCEDURE — 64484 PRA INJECT ANES/STEROID FORAMEN LUMBAR/SACRAL W IMG GUIDE ,EA ADD LEVEL: ICD-10-PCS | Mod: RT,,, | Performed by: ANESTHESIOLOGY

## 2022-12-30 PROCEDURE — 25500020 PHARM REV CODE 255: Performed by: ANESTHESIOLOGY

## 2022-12-30 RX ORDER — BUPIVACAINE HYDROCHLORIDE 2.5 MG/ML
INJECTION, SOLUTION EPIDURAL; INFILTRATION; INTRACAUDAL
Status: DISCONTINUED | OUTPATIENT
Start: 2022-12-30 | End: 2022-12-30 | Stop reason: HOSPADM

## 2022-12-30 RX ORDER — FENTANYL CITRATE 50 UG/ML
INJECTION, SOLUTION INTRAMUSCULAR; INTRAVENOUS
Status: DISCONTINUED | OUTPATIENT
Start: 2022-12-30 | End: 2022-12-30 | Stop reason: HOSPADM

## 2022-12-30 RX ORDER — APIXABAN 5 MG/1
5 TABLET, FILM COATED ORAL 2 TIMES DAILY
COMMUNITY
Start: 2022-12-02 | End: 2023-02-13

## 2022-12-30 RX ORDER — GADOBUTROL 604.72 MG/ML
INJECTION INTRAVENOUS
Status: DISCONTINUED | OUTPATIENT
Start: 2022-12-30 | End: 2022-12-30 | Stop reason: HOSPADM

## 2022-12-30 RX ORDER — DEXAMETHASONE SODIUM PHOSPHATE 10 MG/ML
INJECTION INTRAMUSCULAR; INTRAVENOUS
Status: DISCONTINUED | OUTPATIENT
Start: 2022-12-30 | End: 2022-12-30 | Stop reason: HOSPADM

## 2022-12-30 RX ORDER — INDOMETHACIN 25 MG/1
CAPSULE ORAL
Status: DISCONTINUED | OUTPATIENT
Start: 2022-12-30 | End: 2022-12-30 | Stop reason: HOSPADM

## 2022-12-30 NOTE — DISCHARGE SUMMARY
Discharge Note  Short Stay      SUMMARY     Admit Date: 12/30/2022    Attending Physician: Benitez Roberson MD        Discharge Physician: Benitez Roberson MD        Discharge Date: 12/30/2022 8:19 AM    Procedure(s) (LRB):  Right-sided L4/5 and L5/S1 transforaminal epidural steroid injection with RN IV sedation (Right)    Final Diagnosis: Bilateral lumbar radiculopathy [M54.16]    Disposition: Home or self care    Patient Instructions:   Current Discharge Medication List        CONTINUE these medications which have NOT CHANGED    Details   amLODIPine (NORVASC) 5 MG tablet Take 1 tablet (5 mg total) by mouth once daily.  Qty: 30 tablet, Refills: 0    Comments: .      !! apixaban (ELIQUIS) 5 mg Tab Take 5 mg by mouth 2 (two) times daily.      bisacodyL (DULCOLAX) 5 mg EC tablet Take 5 mg by mouth daily as needed for Constipation.      dutasteride (AVODART) 0.5 mg capsule Take 1 capsule (0.5 mg total) by mouth once daily.  Qty: 90 capsule, Refills: 3      meclizine (ANTIVERT) 25 mg tablet Take 1 tablet by mouth twice daily as needed  Qty: 90 tablet, Refills: 0    Associated Diagnoses: Vertigo      pantoprazole (PROTONIX) 40 MG tablet Take 1 tablet (40 mg total) by mouth once daily.  Qty: 90 tablet, Refills: 0      rosuvastatin (CRESTOR) 5 MG tablet Take 1 tablet (5 mg total) by mouth once daily.  Qty: 90 tablet, Refills: 3    Associated Diagnoses: Mixed hyperlipidemia      traMADoL (ULTRAM) 50 mg tablet Take 1 tablet (50 mg total) by mouth every 24 hours as needed for Pain.  Qty: 30 tablet, Refills: 0    Comments: Greater than 7 day supply medically necessary.      triamcinolone acetonide 0.1% (KENALOG) 0.1 % cream Apply topically 2 (two) times daily.  Qty: 454 g, Refills: 3      !! ELIQUIS 5 mg Tab Take 5 mg by mouth 2 (two) times daily.       !! - Potential duplicate medications found. Please discuss with provider.              Discharge Diagnosis: Bilateral lumbar radiculopathy [M54.16]  Condition on Discharge: Stable  with no complications to procedure   Diet on Discharge: Same as before.  Activity: as per instruction sheet.  Discharge to: Home with a responsible adult.  Follow up: 2-4 weeks       Please call the office at [phone] if you experience any weakness or loss of sensation, fever > 101.5, pain uncontrolled with oral medications, persistent nausea/vomiting/or diarrhea, redness or drainage from the incisions, or any other worrisome concerns. If physician on call was not reached or could not communicate with our office for any reason please go to the nearest emergency department

## 2022-12-30 NOTE — OP NOTE
Dominguez LUCIO Chau  89 y.o. male      Vitals:    12/30/22 0815   BP: (!) 159/76   Pulse: 68   Resp: 19   Temp:      Procedure Date: 12/30/22      INFORMED CONSENT: The procedure, risks, benefits and options were discussed with patient. There are no contraindications to the procedure. The patient expressed understanding and agreed to proceed. The personnel performing the procedure was discussed. I verify that I personally obtained consent prior to the start of the procedure and the signed consent can be found on the patient's chart.       Anesthesia:   Conscious sedation provided by M.D    The patient was monitored with continuous pulse oximetry, EKG, and intermittent blood pressure monitors.  The patient was hemodynamically stable throughout the entire process was responsive to voice, and breathing spontaneously.  Supplemental O2 was provided at 2L/min via nasal cannula.  Patient was comfortable for the duration of the procedure. (See nurse documentation and case log for sedation time)    There was a total of 0mg IV Midazolam and 50mcg Fentanyl titrated for the procedure    Pre Procedure diagnosis: Bilateral lumbar radiculopathy [M54.16]  Post-Procedure diagnosis: SAME     Complications: None    Specimens: None      DESCRIPTION OF PROCEDURE: The patient was brought to the procedure room. IV access was obtained prior to the procedure. The patient was positioned prone on the fluoroscopy table. Continuous hemodynamic monitoring was initiated including blood pressure, EKG, and pulse oximetry. . The skin was prepped with chlorhexidine and draped in a sterile fashion. Skin anesthesia was achieved using a total of 10mL of lidocaine, 5mL over each respective injection site.     The  L4/5 and L5/S1  transforaminal spaces were identified with fluoroscopy in the  AP, oblique, and lateral views.  A 22 gauge spinal quinke needle was then advanced into the area of the trans foraminal spaces right with confirmation of proper needle  position using AP, oblique, and lateral fluoroscopic views. Once the needle tip was in the area of the transforaminal space, and there was no blood, CSF or paraesthesias,  1.5 mL of Omnipaque 300mg/ml was injected on right for a total of 3mL.  Fluoroscopic imaging in the AP and lateral views revealed a clear outline of the spinal nerve with proximal spread of agent through the neural foramen into the epidural space. A total combination of 2 mL of Bupivicaine 0.25% and 10 mg dexamethasone was injected on each side for a total of 6 mL of injected medications with displacement of the contrast dye confirming that the medication went into the area of the transforaminal spaces right. A sterile dressing was applied.   Patient tolerated the procedure well.    Patient was taken back to the recovery room for further observation.     The patient was discharged to home in stable condition

## 2022-12-30 NOTE — DISCHARGE INSTRUCTIONS

## 2023-01-30 DIAGNOSIS — I49.5 TACHY-BRADY SYNDROME: Primary | ICD-10-CM

## 2023-01-30 RX ORDER — ROPINIROLE 0.5 MG/1
0.5 TABLET, FILM COATED ORAL 3 TIMES DAILY
Qty: 30 TABLET | Refills: 2 | Status: SHIPPED | OUTPATIENT
Start: 2023-01-30 | End: 2023-07-26

## 2023-01-30 NOTE — TELEPHONE ENCOUNTER
Patient is requesting refill of ropinirole 0.5 mg not on his mar anymore will you fill   Please advise

## 2023-02-06 ENCOUNTER — HOSPITAL ENCOUNTER (OUTPATIENT)
Dept: CARDIOLOGY | Facility: HOSPITAL | Age: 88
Discharge: HOME | End: 2023-02-06
Attending: INTERNAL MEDICINE
Payer: MEDICARE

## 2023-02-06 ENCOUNTER — CLINICAL SUPPORT (OUTPATIENT)
Dept: CARDIOLOGY | Facility: HOSPITAL | Age: 88
End: 2023-02-06
Payer: MEDICARE

## 2023-02-06 ENCOUNTER — OFFICE VISIT (OUTPATIENT)
Dept: CARDIOLOGY | Facility: CLINIC | Age: 88
End: 2023-02-06
Payer: MEDICARE

## 2023-02-06 VITALS
DIASTOLIC BLOOD PRESSURE: 64 MMHG | SYSTOLIC BLOOD PRESSURE: 124 MMHG | BODY MASS INDEX: 21.9 KG/M2 | HEART RATE: 69 BPM | WEIGHT: 153 LBS | OXYGEN SATURATION: 99 % | HEIGHT: 70 IN

## 2023-02-06 DIAGNOSIS — I45.5 SINUS PAUSE: ICD-10-CM

## 2023-02-06 DIAGNOSIS — Z95.0 PRESENCE OF CARDIAC PACEMAKER: ICD-10-CM

## 2023-02-06 DIAGNOSIS — I49.5 TACHY-BRADY SYNDROME: Primary | ICD-10-CM

## 2023-02-06 DIAGNOSIS — I49.5 TACHY-BRADY SYNDROME: ICD-10-CM

## 2023-02-06 DIAGNOSIS — D32.9 MENINGIOMA: ICD-10-CM

## 2023-02-06 DIAGNOSIS — I48.0 PAROXYSMAL A-FIB: ICD-10-CM

## 2023-02-06 DIAGNOSIS — I10 ESSENTIAL HYPERTENSION: ICD-10-CM

## 2023-02-06 DIAGNOSIS — Z95.0 STATUS POST PLACEMENT OF CARDIAC PACEMAKER: ICD-10-CM

## 2023-02-06 DIAGNOSIS — E78.2 MIXED HYPERLIPIDEMIA: ICD-10-CM

## 2023-02-06 PROCEDURE — 3288F PR FALLS RISK ASSESSMENT DOCUMENTED: ICD-10-PCS | Mod: HCNC,CPTII,S$GLB, | Performed by: INTERNAL MEDICINE

## 2023-02-06 PROCEDURE — 1160F PR REVIEW ALL MEDS BY PRESCRIBER/CLIN PHARMACIST DOCUMENTED: ICD-10-PCS | Mod: HCNC,CPTII,S$GLB, | Performed by: INTERNAL MEDICINE

## 2023-02-06 PROCEDURE — 1101F PT FALLS ASSESS-DOCD LE1/YR: CPT | Mod: HCNC,CPTII,S$GLB, | Performed by: INTERNAL MEDICINE

## 2023-02-06 PROCEDURE — 93279 PRGRMG DEV EVAL PM/LDLS PM: CPT | Mod: 26,HCNC,, | Performed by: INTERNAL MEDICINE

## 2023-02-06 PROCEDURE — 93294 CARDIAC DEVICE CHECK - REMOTE: ICD-10-PCS | Mod: HCNC,S$GLB,, | Performed by: INTERNAL MEDICINE

## 2023-02-06 PROCEDURE — 1159F MED LIST DOCD IN RCRD: CPT | Mod: HCNC,CPTII,S$GLB, | Performed by: INTERNAL MEDICINE

## 2023-02-06 PROCEDURE — 99999 PR PBB SHADOW E&M-EST. PATIENT-LVL III: ICD-10-PCS | Mod: PBBFAC,HCNC,, | Performed by: INTERNAL MEDICINE

## 2023-02-06 PROCEDURE — 93279 PRGRMG DEV EVAL PM/LDLS PM: CPT | Mod: HCNC

## 2023-02-06 PROCEDURE — 99999 PR PBB SHADOW E&M-EST. PATIENT-LVL III: CPT | Mod: PBBFAC,HCNC,, | Performed by: INTERNAL MEDICINE

## 2023-02-06 PROCEDURE — 93294 REM INTERROG EVL PM/LDLS PM: CPT | Mod: HCNC,S$GLB,, | Performed by: INTERNAL MEDICINE

## 2023-02-06 PROCEDURE — 93005 ELECTROCARDIOGRAM TRACING: CPT | Mod: HCNC

## 2023-02-06 PROCEDURE — 93279 CARDIAC DEVICE CHECK - IN CLINIC & HOSPITAL: ICD-10-PCS | Mod: 26,HCNC,, | Performed by: INTERNAL MEDICINE

## 2023-02-06 PROCEDURE — 1126F PR PAIN SEVERITY QUANTIFIED, NO PAIN PRESENT: ICD-10-PCS | Mod: HCNC,CPTII,S$GLB, | Performed by: INTERNAL MEDICINE

## 2023-02-06 PROCEDURE — 3288F FALL RISK ASSESSMENT DOCD: CPT | Mod: HCNC,CPTII,S$GLB, | Performed by: INTERNAL MEDICINE

## 2023-02-06 PROCEDURE — 1159F PR MEDICATION LIST DOCUMENTED IN MEDICAL RECORD: ICD-10-PCS | Mod: HCNC,CPTII,S$GLB, | Performed by: INTERNAL MEDICINE

## 2023-02-06 PROCEDURE — 1126F AMNT PAIN NOTED NONE PRSNT: CPT | Mod: HCNC,CPTII,S$GLB, | Performed by: INTERNAL MEDICINE

## 2023-02-06 PROCEDURE — 93010 ELECTROCARDIOGRAM REPORT: CPT | Mod: HCNC,,, | Performed by: INTERNAL MEDICINE

## 2023-02-06 PROCEDURE — 93010 EKG 12-LEAD: ICD-10-PCS | Mod: HCNC,,, | Performed by: INTERNAL MEDICINE

## 2023-02-06 PROCEDURE — 99215 OFFICE O/P EST HI 40 MIN: CPT | Mod: HCNC,S$GLB,, | Performed by: INTERNAL MEDICINE

## 2023-02-06 PROCEDURE — 93296 REM INTERROG EVL PM/IDS: CPT | Mod: HCNC | Performed by: INTERNAL MEDICINE

## 2023-02-06 PROCEDURE — 99215 PR OFFICE/OUTPT VISIT, EST, LEVL V, 40-54 MIN: ICD-10-PCS | Mod: HCNC,S$GLB,, | Performed by: INTERNAL MEDICINE

## 2023-02-06 PROCEDURE — 1160F RVW MEDS BY RX/DR IN RCRD: CPT | Mod: HCNC,CPTII,S$GLB, | Performed by: INTERNAL MEDICINE

## 2023-02-06 PROCEDURE — 1101F PR PT FALLS ASSESS DOC 0-1 FALLS W/OUT INJ PAST YR: ICD-10-PCS | Mod: HCNC,CPTII,S$GLB, | Performed by: INTERNAL MEDICINE

## 2023-02-06 NOTE — PROGRESS NOTES
Subjective:   Patient ID:  Dominguez Ritchie is a 89 y.o. male     Chief complaint:SSS/PPM    HPI  New patient to my outpatient clinic. (02/12/2023 ).   Previously seen in hospital for PPM implant.   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  >>  I implanted an AAI-CLS unit  >>  Device and lead implanted on 11/1/22   PPM: BIOTRONIK  Edora 8 SRT model [id number], [id number]   RA lead: Solia S45, model [id number], [id number]     Since then , he has been a lot > energetic - rico filiberto;y on - he seems to think that the effects have abated some recently.   PPM eval today - alll in good repair but AC does not work properly >> manual reprogramming of thresholds (they are excellent).   Has short runs opf PAF - all secs - he is already on OAC w/o issues.     I have reviewed the actual image of the ECG tracing obtained today and it shows AR pacing with normal intervals    Current Outpatient Medications   Medication Sig    amLODIPine (NORVASC) 5 MG tablet Take 1 tablet by mouth once daily    apixaban (ELIQUIS) 5 mg Tab Take 5 mg by mouth 2 (two) times daily.    bisacodyL (DULCOLAX) 5 mg EC tablet Take 5 mg by mouth daily as needed for Constipation.    dutasteride (AVODART) 0.5 mg capsule Take 1 capsule (0.5 mg total) by mouth once daily.    ELIQUIS 5 mg Tab Take 5 mg by mouth 2 (two) times daily.    meclizine (ANTIVERT) 25 mg tablet Take 1 tablet by mouth twice daily as needed    pantoprazole (PROTONIX) 40 MG tablet Take 1 tablet (40 mg total) by mouth once daily.    rOPINIRole (REQUIP) 0.5 MG tablet Take 1 tablet (0.5 mg total) by mouth 3 (three) times daily.    rosuvastatin (CRESTOR) 5 MG tablet Take 1 tablet (5 mg total) by mouth once daily.    traMADoL (ULTRAM) 50 mg tablet Take 1 tablet (50 mg total) by mouth every 24 hours as needed for  Pain.    triamcinolone acetonide 0.1% (KENALOG) 0.1 % cream Apply topically 2 (two) times daily.     No current facility-administered medications for this visit.       Review of Systems     Constitutional: Reviewed  for decreased appetite, weight gain and weight loss.   HENT: Reviewed for nosebleeds.    Eyes:  Reviewed for blurred vision and visual disturbance.   Cardiovascular: Reviewed for chest pain, claudication, cyanosis,dyspnea on exertion, leg swelling, orthopnea,paroxysmal nocturnal dyspnearregular heartbeats, palpitations, near-syncope, and syncope.   Respiratory: Reviewed for cough, shortness of breath, wheezing, sleep disturbances due to breathing and snoring, .    Endocrine: Reviewed for heat intolerance.   Hematologic/Lymphatic: Reviewed for easy bruisability/bleeding.   Skin: Reviewed for rash.   Musculoskeletal: Reviewed for muscle weakness and myalgias.   Gastrointestinal: Reviewed for abdominal pain, anorexia, melena, nausea and vomiting.   Genitourinary: Reviewed for hesitancy, frequency, nocturia and incontinence.   Neurological: Reviewed for excessive daytime sleepiness, dizziness, vertigo, weakness, headaches, loss of balance and seizures,   Psychiatric/Behavioral:  Reviewed for insomnia, altered mental status, depression, anxiety and nervousness.       All symptoms reviewed above were negative except for intermittent fatigue, some weight loss, hearing loss, urinary frequency, daytime sleepiness intermittent lightheadedness and arthritic complaints.    Social History     Tobacco Use   Smoking Status Former    Packs/day: 1.00    Years: 25.00    Pack years: 25.00    Types: Cigarettes    Quit date: 1990    Years since quittin.1   Smokeless Tobacco Never       reports no history of alcohol use.   Past Medical History:   Diagnosis Date    Abnormal exercise tolerance test 2013    Arthritis     Colon polyp     Diverticulosis     Dyslipidemia 2013    GERD (gastroesophageal reflux  disease)     HTN, goal below 130/80 12/3/2018    Hyperlipidemia 1980    HIGH TG    Knee pain, right 2013    Low back pain with right-sided sciatica 12/3/2018    Meningioma     Paroxysmal A-fib 10/29/2022    Personal history of colonic polyps 2014    RLS (restless legs syndrome) 2013    Tobacco dependence     resolved    West Nile meningitis     per patient     Family History   Problem Relation Age of Onset    Heart disease Mother     Cancer Son         pancreatic     Diabetes Maternal Uncle     Kidney disease Neg Hx     Stroke Neg Hx      Social History     Socioeconomic History    Marital status:    Tobacco Use    Smoking status: Former     Packs/day: 1.00     Years: 25.00     Pack years: 25.00     Types: Cigarettes     Quit date: 1990     Years since quittin.1    Smokeless tobacco: Never   Substance and Sexual Activity    Alcohol use: No     Alcohol/week: 0.0 standard drinks    Drug use: No    Sexual activity: Not Currently     Birth control/protection: None     Past Surgical History:   Procedure Laterality Date    A-V CARDIAC PACEMAKER INSERTION Left 2022    Procedure: INSERTION, CARDIAC PACEMAKER, DUAL CHAMBER;  Surgeon: Finn Mccrary MD;  Location: [facility] CATH LAB;  Service: Cardiology;  Laterality: Left;  biotronik AAIR    APPENDECTOMY      BACK SURGERY Bilateral     CATARACT EXTRACTION, BILATERAL      COLONOSCOPY  2009    EYE SURGERY      INJECTION OF ANESTHETIC AGENT AROUND NERVE Right 2022    Procedure: Right Genicular nerve block w/Light RN IV Sedation;  Surgeon: Benitez Roberson MD;  Location: [facility] PAIN MGT;  Service: Pain Management;  Laterality: Right;    INSERTION OF PERMANENT PACEMAKER Left 2022    Procedure: Implant PPM;  Surgeon: Finn Mccrary MD;  Location: [facility] CATH LAB;  Service: Cardiology;  Laterality: Left;    JOINT REPLACEMENT      left knee    LUMBAR PARAVERTEBRAL FACET JOINT NERVE BLOCK Bilateral 2019    Procedure: LMBB  L3,4,5;  Surgeon: Nabor Sadler MD;  Location: [facility] PAIN MGT;  Service: Pain Management;  Laterality: Bilateral;    SELECTIVE INJECTION OF ANESTHETIC AGENT AROUND LUMBAR SPINAL NERVE ROOT BY TRANSFORAMINAL APPROACH Bilateral 11/8/2021    Procedure: Bilateral L4/5 +/- L5/S1 TF DREW;  Surgeon: Nabor Sadler MD;  Location: [facility] PAIN MGT;  Service: Pain Management;  Laterality: Bilateral;    SELECTIVE INJECTION OF ANESTHETIC AGENT AROUND LUMBAR SPINAL NERVE ROOT BY TRANSFORAMINAL APPROACH Bilateral 1/4/2022    Procedure: Bilateral L4/5 + L5/S1 TF DREW;  Surgeon: Nabor Sadler MD;  Location: [facility] PAIN MGT;  Service: Pain Management;  Laterality: Bilateral;    SELECTIVE INJECTION OF ANESTHETIC AGENT AROUND LUMBAR SPINAL NERVE ROOT BY TRANSFORAMINAL APPROACH Right 12/30/2022    Procedure: Right-sided L4/5 and L5/S1 transforaminal epidural steroid injection with RN IV sedation;  Surgeon: Benitez Roberson MD;  Location: [facility] PAIN MGT;  Service: Pain Management;  Laterality: Right;    SPINE SURGERY      UPPER GASTROINTESTINAL ENDOSCOPY  2/2009       Objective:   Physical Exam  Vitals and nursing note reviewed.   Constitutional:       Appearance: Normal appearance. He is well-developed.   HENT:      Head: Normocephalic and atraumatic.      Right Ear: External ear normal.      Left Ear: External ear normal.   Eyes:      Conjunctiva/sclera: Conjunctivae normal.      Left eye: Left conjunctiva is not injected. No hemorrhage.     Pupils: Pupils are equal, round, and reactive to light.   Neck:      Thyroid: No thyromegaly.      Vascular: No JVD.   Cardiovascular:      Rate and Rhythm: Normal rate and regular rhythm.      Chest Wall: PMI is not displaced.      Pulses: Intact distal pulses.           Carotid pulses are 2+ on the right side and 2+ on the left side.       Radial pulses are 2+ on the right side and 2+ on the left side.        Dorsalis pedis pulses are 2+ on the right side and 2+ on the left side.        Posterior tibial pulses  "are 2+ on the right side and 2+ on the left side.      Heart sounds: Normal heart sounds. No midsystolic click and no opening snap. No murmur heard.    No friction rub. No gallop.   Pulmonary:      Effort: Pulmonary effort is normal. No respiratory distress.      Breath sounds: Normal breath sounds. No wheezing or rales.   Chest:      Chest wall: No tenderness.      Comments: Device pocket is in excellent repair.  Abdominal:      Palpations: Abdomen is soft. Abdomen is not rigid. There is no hepatomegaly.      Tenderness: There is no abdominal tenderness.   Musculoskeletal:         General: No tenderness. Normal range of motion.      Cervical back: Neck supple.      Right knee: No swelling.      Left knee: No swelling.      Right lower leg: No swelling.      Left lower leg: No swelling.      Right ankle: No swelling.      Left ankle: No swelling.      Right foot: No swelling.      Left foot: No swelling.   Skin:     General: Skin is warm and dry.      Findings: No rash.   Neurological:      Mental Status: He is alert and oriented to person, place, and time.      Cranial Nerves: No cranial nerve deficit.      Coordination: Coordination normal.      Deep Tendon Reflexes: Reflexes are normal and symmetric.   Psychiatric:         Behavior: Behavior normal.   /64   Pulse 69   Ht 5' 10" (1.778 m)   Wt 69.4 kg (153 lb)   SpO2 99%   BMI 21.95 kg/m²       Results for orders placed during the hospital encounter of 09/16/22    Echo    Interpretation Summary  · The left ventricle is normal in size with concentric remodeling and normal systolic function.  · Indeterminate left ventricular diastolic function.  · The estimated PA systolic pressure is 37 mmHg.  · Normal right ventricular size with normal right ventricular systolic function.  · Normal central venous pressure (3 mmHg).  · The estimated ejection fraction is 55%.  · Mild aortic regurgitation.  · Mild mitral regurgitation.  · Mild tricuspid " regurgitation.    WBC   Date Value Ref Range Status   11/01/2022 4.11 3.90 - 12.70 K/uL Final     Hematocrit   Date Value Ref Range Status   11/01/2022 40.7 40.0 - 54.0 % Final     Hemoglobin   Date Value Ref Range Status   11/01/2022 13.8 (L) 14.0 - 18.0 g/dL Final     Lab Results   Component Value Date     11/01/2022     Lab Results   Component Value Date    CREATININE 0.8 11/01/2022    EGFRNORACEVR >60 11/01/2022    K 4.7 11/01/2022     Lab Results   Component Value Date    BNP 23 10/28/2022            Assessment:    Doing very well, much improved since pacemaker implantation.  1. Tachy-susanna syndrome    2. Sinus pause    3. Paroxysmal A-fib    4. Mixed hyperlipidemia    5. Essential hypertension    6. Meningioma        Plan:    I discussed routine device follow up including quarterly to bi-annual device checks for device function as well as yearly follow up in the EP clinic. The patient  was advised to call with any concerns regarding their device. Device clinic follow up as scheduled. RTC 1y          No orders of the defined types were placed in this encounter.    Follow up in about 1 year (around 2/6/2024), or if symptoms worsen or fail to improve.  There are no discontinued medications.  Outpatient Encounter Medications as of 2/6/2023   Medication Sig Dispense Refill    amLODIPine (NORVASC) 5 MG tablet Take 1 tablet by mouth once daily 30 tablet 0    apixaban (ELIQUIS) 5 mg Tab Take 5 mg by mouth 2 (two) times daily.      bisacodyL (DULCOLAX) 5 mg EC tablet Take 5 mg by mouth daily as needed for Constipation.      dutasteride (AVODART) 0.5 mg capsule Take 1 capsule (0.5 mg total) by mouth once daily. 90 capsule 3    ELIQUIS 5 mg Tab Take 5 mg by mouth 2 (two) times daily.      meclizine (ANTIVERT) 25 mg tablet Take 1 tablet by mouth twice daily as needed 90 tablet 0    pantoprazole (PROTONIX) 40 MG tablet Take 1 tablet (40 mg total) by mouth once daily. 90 tablet 0    rOPINIRole (REQUIP) 0.5 MG tablet  Take 1 tablet (0.5 mg total) by mouth 3 (three) times daily. 30 tablet 2    rosuvastatin (CRESTOR) 5 MG tablet Take 1 tablet (5 mg total) by mouth once daily. 90 tablet 3    traMADoL (ULTRAM) 50 mg tablet Take 1 tablet (50 mg total) by mouth every 24 hours as needed for Pain. 30 tablet 0    triamcinolone acetonide 0.1% (KENALOG) 0.1 % cream Apply topically 2 (two) times daily. 454 g 3    [DISCONTINUED] amLODIPine (NORVASC) 5 MG tablet Take 1 tablet (5 mg total) by mouth once daily. 30 tablet 0     No facility-administered encounter medications on file as of 2/6/2023.     Medication List with Changes/Refills   Current Medications    AMLODIPINE (NORVASC) 5 MG TABLET    Take 1 tablet by mouth once daily    APIXABAN (ELIQUIS) 5 MG TAB    Take 5 mg by mouth 2 (two) times daily.    BISACODYL (DULCOLAX) 5 MG EC TABLET    Take 5 mg by mouth daily as needed for Constipation.    DUTASTERIDE (AVODART) 0.5 MG CAPSULE    Take 1 capsule (0.5 mg total) by mouth once daily.    ELIQUIS 5 MG TAB    Take 5 mg by mouth 2 (two) times daily.    MECLIZINE (ANTIVERT) 25 MG TABLET    Take 1 tablet by mouth twice daily as needed    PANTOPRAZOLE (PROTONIX) 40 MG TABLET    Take 1 tablet (40 mg total) by mouth once daily.    ROPINIROLE (REQUIP) 0.5 MG TABLET    Take 1 tablet (0.5 mg total) by mouth 3 (three) times daily.    ROSUVASTATIN (CRESTOR) 5 MG TABLET    Take 1 tablet (5 mg total) by mouth once daily.    TRAMADOL (ULTRAM) 50 MG TABLET    Take 1 tablet (50 mg total) by mouth every 24 hours as needed for Pain.    TRIAMCINOLONE ACETONIDE 0.1% (KENALOG) 0.1 % CREAM    Apply topically 2 (two) times daily.          This note is at least partially dictated using the M*Modal Fluency Direct word recognition program. There are word recognition mistakes that are occasionally missed on review.

## 2023-02-08 ENCOUNTER — PES CALL (OUTPATIENT)
Dept: ADMINISTRATIVE | Facility: OTHER | Age: 88
End: 2023-02-08
Payer: MEDICARE

## 2023-02-09 DIAGNOSIS — Z00.00 ENCOUNTER FOR MEDICARE ANNUAL WELLNESS EXAM: ICD-10-CM

## 2023-03-08 ENCOUNTER — LAB VISIT (OUTPATIENT)
Dept: LAB | Facility: HOSPITAL | Age: 88
End: 2023-03-08
Attending: UROLOGY
Payer: MEDICARE

## 2023-03-08 DIAGNOSIS — R97.20 ELEVATED PROSTATE SPECIFIC ANTIGEN (PSA): ICD-10-CM

## 2023-03-08 PROCEDURE — 84153 ASSAY OF PSA TOTAL: CPT | Mod: HCNC | Performed by: UROLOGY

## 2023-03-08 PROCEDURE — 36415 COLL VENOUS BLD VENIPUNCTURE: CPT | Mod: HCNC,PO | Performed by: UROLOGY

## 2023-03-09 LAB — COMPLEXED PSA SERPL-MCNC: 10.5 NG/ML (ref 0–4)

## 2023-03-15 ENCOUNTER — OFFICE VISIT (OUTPATIENT)
Dept: UROLOGY | Facility: CLINIC | Age: 88
End: 2023-03-15
Payer: MEDICARE

## 2023-03-15 VITALS
HEIGHT: 70 IN | BODY MASS INDEX: 21.95 KG/M2 | HEART RATE: 78 BPM | SYSTOLIC BLOOD PRESSURE: 133 MMHG | TEMPERATURE: 97 F | DIASTOLIC BLOOD PRESSURE: 67 MMHG | RESPIRATION RATE: 18 BRPM

## 2023-03-15 DIAGNOSIS — N40.1 BENIGN PROSTATIC HYPERPLASIA WITH URINARY FREQUENCY: ICD-10-CM

## 2023-03-15 DIAGNOSIS — R97.20 ELEVATED PROSTATE SPECIFIC ANTIGEN (PSA): Primary | ICD-10-CM

## 2023-03-15 DIAGNOSIS — R35.0 BENIGN PROSTATIC HYPERPLASIA WITH URINARY FREQUENCY: ICD-10-CM

## 2023-03-15 PROCEDURE — 3288F FALL RISK ASSESSMENT DOCD: CPT | Mod: HCNC,CPTII,S$GLB, | Performed by: UROLOGY

## 2023-03-15 PROCEDURE — 1101F PT FALLS ASSESS-DOCD LE1/YR: CPT | Mod: HCNC,CPTII,S$GLB, | Performed by: UROLOGY

## 2023-03-15 PROCEDURE — 1159F PR MEDICATION LIST DOCUMENTED IN MEDICAL RECORD: ICD-10-PCS | Mod: HCNC,CPTII,S$GLB, | Performed by: UROLOGY

## 2023-03-15 PROCEDURE — 99999 PR PBB SHADOW E&M-EST. PATIENT-LVL III: CPT | Mod: PBBFAC,HCNC,, | Performed by: UROLOGY

## 2023-03-15 PROCEDURE — 1160F PR REVIEW ALL MEDS BY PRESCRIBER/CLIN PHARMACIST DOCUMENTED: ICD-10-PCS | Mod: HCNC,CPTII,S$GLB, | Performed by: UROLOGY

## 2023-03-15 PROCEDURE — 99213 PR OFFICE/OUTPT VISIT, EST, LEVL III, 20-29 MIN: ICD-10-PCS | Mod: HCNC,S$GLB,, | Performed by: UROLOGY

## 2023-03-15 PROCEDURE — 1160F RVW MEDS BY RX/DR IN RCRD: CPT | Mod: HCNC,CPTII,S$GLB, | Performed by: UROLOGY

## 2023-03-15 PROCEDURE — 99999 PR PBB SHADOW E&M-EST. PATIENT-LVL III: ICD-10-PCS | Mod: PBBFAC,HCNC,, | Performed by: UROLOGY

## 2023-03-15 PROCEDURE — 3288F PR FALLS RISK ASSESSMENT DOCUMENTED: ICD-10-PCS | Mod: HCNC,CPTII,S$GLB, | Performed by: UROLOGY

## 2023-03-15 PROCEDURE — 1159F MED LIST DOCD IN RCRD: CPT | Mod: HCNC,CPTII,S$GLB, | Performed by: UROLOGY

## 2023-03-15 PROCEDURE — 1101F PR PT FALLS ASSESS DOC 0-1 FALLS W/OUT INJ PAST YR: ICD-10-PCS | Mod: HCNC,CPTII,S$GLB, | Performed by: UROLOGY

## 2023-03-15 PROCEDURE — 99213 OFFICE O/P EST LOW 20 MIN: CPT | Mod: HCNC,S$GLB,, | Performed by: UROLOGY

## 2023-03-15 NOTE — PROGRESS NOTES
Chief Complaint:   Encounter Diagnoses   Name Primary?    Elevated prostate specific antigen (PSA) Yes    Benign prostatic hyperplasia with urinary frequency        HPI:    3/15/23- PSA is coming down appropriately, doing well on Avodart.    88-year-old gentleman who comes in with an elevated PSA.  Patient states that he had attempted tamsulosin once before, because severe decrease in blood pressure and had stop this medication.  Although upon further questioning he does not get up at all per night to void, except occasionally 1 time.  He goes about every 2-3 hours during the daytime, with no urgency or incontinence.  He states that he has a good stream, with no lower urinary tract symptoms.  No gross hematuria, he has never been a smoker.  No previous urological history.  He has a remote history of a biopsy done in the 80s or 90s, which he states was negative.  No family history of urological cancers or stones.  He does have history of stone passage, but this was years ago.  He is concerned about his PSA.    Allergies:  Shellfish containing products, Tramadol, Amoxicillin, and Iodine and iodide containing products    Medications:  has a current medication list which includes the following prescription(s): amlodipine, bisacodyl, diazepam, fluorouracil, fluticasone propionate, ketoconazole, lidocaine viscous, meclizine, oxycodone-acetaminophen, pantoprazole, polyethylene glycol, rosuvastatin, sulfamethoxazole-trimethoprim 800-160mg, tramadol-acetaminophen 37.5-325 mg, and triamcinolone acetonide 0.1%.    Review of Systems:  General: No fever, chills, fatigability, or weight loss.  Skin: No rashes, itching, or changes in color or texture of skin.  Chest: Denies FOSTER, cyanosis, wheezing, cough, and sputum production.  Abdomen: Appetite fine. No weight loss. Denies diarrhea, abdominal pain, hematemesis, or blood in stool.  Musculoskeletal: No joint stiffness or swelling. Denies back pain.  : As above.  All other review  of systems negative.    PMH:   has a past medical history of Abnormal exercise tolerance test (7/25/2013), Arthritis, Colon polyp, Diverticulosis, Dyslipidemia (7/25/2013), GERD (gastroesophageal reflux disease), HTN, goal below 130/80 (12/3/2018), Hyperlipidemia (1980), Knee pain, right (6/14/2013), Low back pain with right-sided sciatica (12/3/2018), Meningioma, Personal history of colonic polyps (5/27/2014), RLS (restless legs syndrome) (6/14/2013), Tobacco dependence, and West Nile meningitis (2010).    PSH:   has a past surgical history that includes Appendectomy; Colonoscopy (2/2009); Upper gastrointestinal endoscopy (2/2009); Joint replacement; Back surgery (Bilateral, 2016); Spine surgery; Lumbar paravertebral facet joint nerve block (Bilateral, 8/29/2019); Eye surgery; Cataract extraction, bilateral; Selective injection of anesthetic agent around lumbar spinal nerve root by transforaminal approach (Bilateral, 11/8/2021); and Selective injection of anesthetic agent around lumbar spinal nerve root by transforaminal approach (Bilateral, 1/4/2022).    FamHx: family history includes Cancer in his son; Diabetes in his maternal uncle; Heart disease in his mother.    SocHx:  reports that he quit smoking about 32 years ago. He has a 25.00 pack-year smoking history. He has never used smokeless tobacco. He reports that he does not drink alcohol and does not use drugs.      Physical Exam:  There were no vitals filed for this visit.  General: A&Ox3, no apparent distress, no deformities  Neck: No masses, normal ROM  Lungs: normal inspiration, no use of accessory muscles  Heart: normal pulse, no arrhythmias  Abdomen: Soft, NT, ND, no masses, no hernias, no hepatosplenomegaly  Skin: The skin is warm and dry. No jaundice.  Ext: No c/c/e.  CARMENZA: 1/22- Normal rectal tone. Prost 45 gm with a 1 cm right nodule. SV not palpable. Perineum and anus normal.    Labs/Studies:   pnbx negative 112g 1/14/22  PSA 10.5 3/23  PSA 29.3  1/22  PSA 14.3 6/21  PSA 21.5 1/21  PSA 13.4 1/20  PSA 9.0 10/18    Impression/Plan:      1. Elevated PSA- PSA is coming down appropriately, repeat in 1 year.    2. BPH- avodart has done well and will continue.  Of note tamsulosin caused vertigo and had to be stopped.

## 2023-03-16 ENCOUNTER — OFFICE VISIT (OUTPATIENT)
Dept: FAMILY MEDICINE | Facility: CLINIC | Age: 88
End: 2023-03-16
Payer: MEDICARE

## 2023-03-16 ENCOUNTER — LAB VISIT (OUTPATIENT)
Dept: LAB | Facility: HOSPITAL | Age: 88
End: 2023-03-16
Attending: FAMILY MEDICINE
Payer: MEDICARE

## 2023-03-16 VITALS
RESPIRATION RATE: 18 BRPM | WEIGHT: 152.88 LBS | OXYGEN SATURATION: 96 % | HEART RATE: 73 BPM | HEIGHT: 70 IN | BODY MASS INDEX: 21.89 KG/M2 | SYSTOLIC BLOOD PRESSURE: 117 MMHG | DIASTOLIC BLOOD PRESSURE: 58 MMHG | TEMPERATURE: 99 F

## 2023-03-16 DIAGNOSIS — D32.9 MENINGIOMA: ICD-10-CM

## 2023-03-16 DIAGNOSIS — R97.20 ELEVATED PROSTATE SPECIFIC ANTIGEN (PSA): ICD-10-CM

## 2023-03-16 DIAGNOSIS — R73.03 PREDIABETES: ICD-10-CM

## 2023-03-16 DIAGNOSIS — Z78.9 STATIN INTOLERANCE: ICD-10-CM

## 2023-03-16 DIAGNOSIS — R73.03 PREDIABETES: Primary | ICD-10-CM

## 2023-03-16 DIAGNOSIS — I10 ESSENTIAL HYPERTENSION: ICD-10-CM

## 2023-03-16 DIAGNOSIS — I48.0 PAROXYSMAL A-FIB: ICD-10-CM

## 2023-03-16 LAB
ALBUMIN SERPL BCP-MCNC: 3.9 G/DL (ref 3.5–5.2)
ALP SERPL-CCNC: 71 U/L (ref 55–135)
ALT SERPL W/O P-5'-P-CCNC: 21 U/L (ref 10–44)
ANION GAP SERPL CALC-SCNC: 5 MMOL/L (ref 8–16)
AST SERPL-CCNC: 21 U/L (ref 10–40)
BASOPHILS # BLD AUTO: 0.06 K/UL (ref 0–0.2)
BASOPHILS NFR BLD: 1 % (ref 0–1.9)
BILIRUB SERPL-MCNC: 0.5 MG/DL (ref 0.1–1)
BUN SERPL-MCNC: 12 MG/DL (ref 8–23)
CALCIUM SERPL-MCNC: 9.3 MG/DL (ref 8.7–10.5)
CHLORIDE SERPL-SCNC: 104 MMOL/L (ref 95–110)
CHOLEST SERPL-MCNC: 139 MG/DL (ref 120–199)
CHOLEST/HDLC SERPL: 2.3 {RATIO} (ref 2–5)
CO2 SERPL-SCNC: 29 MMOL/L (ref 23–29)
CREAT SERPL-MCNC: 0.8 MG/DL (ref 0.5–1.4)
DIFFERENTIAL METHOD: ABNORMAL
EOSINOPHIL # BLD AUTO: 0.1 K/UL (ref 0–0.5)
EOSINOPHIL NFR BLD: 2 % (ref 0–8)
ERYTHROCYTE [DISTWIDTH] IN BLOOD BY AUTOMATED COUNT: 13.1 % (ref 11.5–14.5)
EST. GFR  (NO RACE VARIABLE): >60 ML/MIN/1.73 M^2
ESTIMATED AVG GLUCOSE: 111 MG/DL (ref 68–131)
GLUCOSE SERPL-MCNC: 91 MG/DL (ref 70–110)
HBA1C MFR BLD: 5.5 % (ref 4–5.6)
HCT VFR BLD AUTO: 40.2 % (ref 40–54)
HDLC SERPL-MCNC: 61 MG/DL (ref 40–75)
HDLC SERPL: 43.9 % (ref 20–50)
HGB BLD-MCNC: 12.7 G/DL (ref 14–18)
IMM GRANULOCYTES # BLD AUTO: 0.01 K/UL (ref 0–0.04)
IMM GRANULOCYTES NFR BLD AUTO: 0.2 % (ref 0–0.5)
LDLC SERPL CALC-MCNC: 38.4 MG/DL (ref 63–159)
LYMPHOCYTES # BLD AUTO: 1.4 K/UL (ref 1–4.8)
LYMPHOCYTES NFR BLD: 23.3 % (ref 18–48)
MCH RBC QN AUTO: 31.4 PG (ref 27–31)
MCHC RBC AUTO-ENTMCNC: 31.6 G/DL (ref 32–36)
MCV RBC AUTO: 100 FL (ref 82–98)
MONOCYTES # BLD AUTO: 0.8 K/UL (ref 0.3–1)
MONOCYTES NFR BLD: 13.2 % (ref 4–15)
NEUTROPHILS # BLD AUTO: 3.6 K/UL (ref 1.8–7.7)
NEUTROPHILS NFR BLD: 60.3 % (ref 38–73)
NONHDLC SERPL-MCNC: 78 MG/DL
NRBC BLD-RTO: 0 /100 WBC
PLATELET # BLD AUTO: 232 K/UL (ref 150–450)
PMV BLD AUTO: 10.7 FL (ref 9.2–12.9)
POTASSIUM SERPL-SCNC: 4.6 MMOL/L (ref 3.5–5.1)
PROT SERPL-MCNC: 6.2 G/DL (ref 6–8.4)
RBC # BLD AUTO: 4.04 M/UL (ref 4.6–6.2)
SODIUM SERPL-SCNC: 138 MMOL/L (ref 136–145)
TRIGL SERPL-MCNC: 198 MG/DL (ref 30–150)
WBC # BLD AUTO: 5.93 K/UL (ref 3.9–12.7)

## 2023-03-16 PROCEDURE — 1159F MED LIST DOCD IN RCRD: CPT | Mod: HCNC,CPTII,S$GLB, | Performed by: FAMILY MEDICINE

## 2023-03-16 PROCEDURE — 83036 HEMOGLOBIN GLYCOSYLATED A1C: CPT | Mod: HCNC | Performed by: FAMILY MEDICINE

## 2023-03-16 PROCEDURE — 99999 PR PBB SHADOW E&M-EST. PATIENT-LVL IV: CPT | Mod: PBBFAC,HCNC,, | Performed by: FAMILY MEDICINE

## 2023-03-16 PROCEDURE — 99214 PR OFFICE/OUTPT VISIT, EST, LEVL IV, 30-39 MIN: ICD-10-PCS | Mod: HCNC,S$GLB,, | Performed by: FAMILY MEDICINE

## 2023-03-16 PROCEDURE — 1101F PT FALLS ASSESS-DOCD LE1/YR: CPT | Mod: HCNC,CPTII,S$GLB, | Performed by: FAMILY MEDICINE

## 2023-03-16 PROCEDURE — 36415 COLL VENOUS BLD VENIPUNCTURE: CPT | Mod: HCNC,PO | Performed by: FAMILY MEDICINE

## 2023-03-16 PROCEDURE — 80053 COMPREHEN METABOLIC PANEL: CPT | Mod: HCNC | Performed by: FAMILY MEDICINE

## 2023-03-16 PROCEDURE — 1101F PR PT FALLS ASSESS DOC 0-1 FALLS W/OUT INJ PAST YR: ICD-10-PCS | Mod: HCNC,CPTII,S$GLB, | Performed by: FAMILY MEDICINE

## 2023-03-16 PROCEDURE — 99214 OFFICE O/P EST MOD 30 MIN: CPT | Mod: HCNC,S$GLB,, | Performed by: FAMILY MEDICINE

## 2023-03-16 PROCEDURE — 1126F AMNT PAIN NOTED NONE PRSNT: CPT | Mod: HCNC,CPTII,S$GLB, | Performed by: FAMILY MEDICINE

## 2023-03-16 PROCEDURE — 1159F PR MEDICATION LIST DOCUMENTED IN MEDICAL RECORD: ICD-10-PCS | Mod: HCNC,CPTII,S$GLB, | Performed by: FAMILY MEDICINE

## 2023-03-16 PROCEDURE — 3288F FALL RISK ASSESSMENT DOCD: CPT | Mod: HCNC,CPTII,S$GLB, | Performed by: FAMILY MEDICINE

## 2023-03-16 PROCEDURE — 3288F PR FALLS RISK ASSESSMENT DOCUMENTED: ICD-10-PCS | Mod: HCNC,CPTII,S$GLB, | Performed by: FAMILY MEDICINE

## 2023-03-16 PROCEDURE — 80061 LIPID PANEL: CPT | Mod: DBM,HCNC | Performed by: FAMILY MEDICINE

## 2023-03-16 PROCEDURE — 85025 COMPLETE CBC W/AUTO DIFF WBC: CPT | Mod: HCNC | Performed by: FAMILY MEDICINE

## 2023-03-16 PROCEDURE — 1126F PR PAIN SEVERITY QUANTIFIED, NO PAIN PRESENT: ICD-10-PCS | Mod: HCNC,CPTII,S$GLB, | Performed by: FAMILY MEDICINE

## 2023-03-16 PROCEDURE — 99999 PR PBB SHADOW E&M-EST. PATIENT-LVL IV: ICD-10-PCS | Mod: PBBFAC,HCNC,, | Performed by: FAMILY MEDICINE

## 2023-03-16 NOTE — PROGRESS NOTES
"  Chief Complaint:    Chief Complaint   Patient presents with    Follow-up       History of Present Illness:    03/16/2023:-  Presents today for six-month follow-up   Doing okay blood pressure is running on the low side.  He is taking amlodipine   He also complains that he is taking Eliquis and he bleeds a lot slight bump taking 5 mg b.i.d. he has a atrial lead pacemaker, also suffers with paroxysmal AFib and susanna-tachy syndrome    11/09/2022:-  Patient presents today for a hospital follow-up on 10/28 for abnormal Holter monitor finding    An atrial lead pacemaker was placed on 11/01. Put on Eliquis. Doing better. No more syncopal episodes. His blood pressure is running normally.      09/27/2022:-  Patient with HTN presents today for a fall three days ago    He reports swelling in his R knee that has now subsided since he's been icing it. He didn't come to the office because it was closed so he waited until today. Previous history of cortisone injection.   He says his spells occur mostly after eating and there momentary and then goes away and but can come back again.  Also wants clarifications on his medications. They cancelled his Holter monitor because his echo was normal. His dizzy spells would occur after eating so he stopped Crestor and dutasteride.  Patient and his wife would like their flu vaccines today.   Patient would ilke rollator. Using his wife's today.     09/13/2022:-  Patient with HTN presents today for dizziness.    Had an episode of blurry vision, near-syncope while sitting down. Says it felt like he was going "blind". Denies palpitations or chest pain. He did have pain in his forehead. His blood pressure was 125/75 when the episode occurred. Didn't check his sugar. He took Pepto Bismol and his symptoms started fading. Symptoms returned the next day. He has a heart murmur. His last echocardiogram showed that one of his valves was tightening.   Reports R knee pain, will see Dr. Roberson on 10/31 for a " shot. Wants to know if he can get something sooner. Dr. Sadler did an injection over one year ago. His knee can give out if he's been sitting for a while.       ROS:  Review of Systems   Constitutional:  Negative for appetite change, chills and fever.   HENT:  Negative for congestion, ear pain, postnasal drip, rhinorrhea, sinus pressure and sinus pain.    Eyes:  Negative for pain.   Respiratory:  Negative for cough, chest tightness and shortness of breath.    Cardiovascular:  Negative for chest pain and palpitations.   Gastrointestinal:  Negative for abdominal pain, blood in stool, constipation, diarrhea and nausea.   Genitourinary:  Negative for difficulty urinating, dysuria, flank pain and hematuria.   Musculoskeletal:  Negative for arthralgias and myalgias.   Skin:  Negative for pallor and wound.   Neurological:  Negative for dizziness, tremors, speech difficulty, light-headedness and headaches.   Psychiatric/Behavioral:  Negative for behavioral problems, dysphoric mood and sleep disturbance. The patient is not nervous/anxious.    All other systems reviewed and are negative.    Past Medical History:   Diagnosis Date    Abnormal exercise tolerance test 2013    Arthritis     Colon polyp     Diverticulosis     Dyslipidemia 2013    GERD (gastroesophageal reflux disease)     HTN, goal below 130/80 12/3/2018    Hyperlipidemia 1980    HIGH TG    Knee pain, right 2013    Low back pain with right-sided sciatica 12/3/2018    Meningioma     Paroxysmal A-fib 10/29/2022    Personal history of colonic polyps 2014    RLS (restless legs syndrome) 2013    Tobacco dependence     resolved    West Nile meningitis     per patient       Social History:  Social History     Socioeconomic History    Marital status:    Tobacco Use    Smoking status: Former     Packs/day: 1.00     Years: 25.00     Pack years: 25.00     Types: Cigarettes     Quit date: 1990     Years since quittin.2    Smokeless  "tobacco: Never   Substance and Sexual Activity    Alcohol use: No     Alcohol/week: 0.0 standard drinks    Drug use: No    Sexual activity: Not Currently     Birth control/protection: None       Family History:   family history includes Cancer in his son; Diabetes in his maternal uncle; Heart disease in his mother.    Health Maintenance   Topic Date Due    Lipid Panel  07/05/2023    TETANUS VACCINE  11/16/2025       Physical Exam:    Vital Signs  Temp: 98.6 °F (37 °C)  Temp Source: Tympanic  Pulse: 73  Resp: 18  SpO2: 96 %  BP: (!) 117/58  BP Location: Right arm  Patient Position: Sitting  Pain Score: 0-No pain  Height and Weight  Height: 5' 10" (177.8 cm)  Weight: 69.3 kg (152 lb 14.2 oz)  BSA (Calculated - sq m): 1.85 sq meters  BMI (Calculated): 21.9  Weight in (lb) to have BMI = 25: 173.9]    Body mass index is 21.94 kg/m².    Physical Exam  Vitals and nursing note reviewed.   Constitutional:       Appearance: Normal appearance.   HENT:      Head: Normocephalic and atraumatic.      Right Ear: Tympanic membrane normal.      Left Ear: Tympanic membrane normal.      Ears:      Comments: Hearing aids  Eyes:      Extraocular Movements: Extraocular movements intact.      Pupils: Pupils are equal, round, and reactive to light.   Cardiovascular:      Rate and Rhythm: Normal rate and regular rhythm.      Pulses: Normal pulses.      Heart sounds: Normal heart sounds. No murmur heard.    No gallop.   Pulmonary:      Effort: Pulmonary effort is normal. No respiratory distress.      Breath sounds: Normal breath sounds. No wheezing, rhonchi or rales.   Abdominal:      General: There is no distension.      Palpations: Abdomen is soft.      Tenderness: There is no abdominal tenderness.   Musculoskeletal:         General: No swelling, deformity or signs of injury. Normal range of motion.      Cervical back: Normal range of motion.   Skin:     General: Skin is warm and dry.      Capillary Refill: Capillary refill takes less than " 2 seconds.      Coloration: Skin is not jaundiced or pale.   Neurological:      General: No focal deficit present.      Mental Status: He is alert and oriented to person, place, and time.      Comments: Ambulates with rollator   Psychiatric:         Mood and Affect: Mood normal.         Behavior: Behavior normal.       Lab Results   Component Value Date    CHOL 151 07/05/2022    CHOL 143 01/03/2022    CHOL 145 06/28/2021    TRIG 192 (H) 07/05/2022    TRIG 171 (H) 01/03/2022    TRIG 137 06/28/2021    HDL 58 07/05/2022    HDL 51 01/03/2022    HDL 60 06/28/2021    TOTALCHOLEST 2.6 07/05/2022    TOTALCHOLEST 2.8 01/03/2022    TOTALCHOLEST 2.4 06/28/2021    NONHDLCHOL 93 07/05/2022    NONHDLCHOL 92 01/03/2022    NONHDLCHOL 85 06/28/2021       Lab Results   Component Value Date    HGBA1C 5.6 01/22/2021       Assessment:      ICD-10-CM ICD-9-CM   1. Prediabetes  R73.03 790.29   2. Paroxysmal A-fib  I48.0 427.31   3. Statin intolerance  Z78.9 995.27   4. Essential hypertension  I10 401.9   5. Elevated prostate specific antigen (PSA)  R97.20 790.93     Plan:  He can stop the amlodipine and see what his blood pressure does   Med decrease the Eliquis to 2.5 mg b.i.d. due to excessive bleeding, after checking with Cardiology  Elevated PSA following with urology on Avodart now on 1 year follow-up   Meningioma of the brain says he does not follow-up with Neurosurgery anymore was told that the meningioma was not increasing and does not need a follow-up   Statin intolerance   Follow-up 6 months or sooner  No orders of the defined types were placed in this encounter.      Current Outpatient Medications   Medication Sig Dispense Refill    amLODIPine (NORVASC) 5 MG tablet Take 1 tablet by mouth once daily 30 tablet 0    apixaban (ELIQUIS) 5 mg Tab Take 5 mg by mouth 2 (two) times daily.      bisacodyL (DULCOLAX) 5 mg EC tablet Take 5 mg by mouth daily as needed for Constipation.      dutasteride (AVODART) 0.5 mg capsule Take 1 capsule  (0.5 mg total) by mouth once daily. 90 capsule 3    ELIQUIS 5 mg Tab Take 1 tablet by mouth twice daily 60 tablet 0    meclizine (ANTIVERT) 25 mg tablet Take 1 tablet by mouth twice daily as needed 90 tablet 0    pantoprazole (PROTONIX) 40 MG tablet Take 1 tablet by mouth once daily 90 tablet 2    rOPINIRole (REQUIP) 0.5 MG tablet Take 1 tablet (0.5 mg total) by mouth 3 (three) times daily. 30 tablet 2    rosuvastatin (CRESTOR) 5 MG tablet Take 1 tablet (5 mg total) by mouth once daily. 90 tablet 3    traMADoL (ULTRAM) 50 mg tablet Take 1 tablet (50 mg total) by mouth every 24 hours as needed for Pain. 30 tablet 0    triamcinolone acetonide 0.1% (KENALOG) 0.1 % cream Apply topically 2 (two) times daily. 454 g 3     No current facility-administered medications for this visit.       There are no discontinued medications.    No follow-ups on file.      Madie Davis MD  Scribe Attestation:   I, Petty Dasilva, am scribing for, and in the presence of, Dr.Arif Davis I performed the above scribed service and the documentation accurately describes the services I performed. I attest to the accuracy of the note.    I, Dr. Madie Davis, reviewed documentation as scribed above. I performed the services described in this documentation.  I agree that the record reflects my personal performance and is accurate and complete. Madie Davis MD.  03/16/2023

## 2023-04-10 ENCOUNTER — OFFICE VISIT (OUTPATIENT)
Dept: FAMILY MEDICINE | Facility: CLINIC | Age: 88
End: 2023-04-10
Payer: MEDICARE

## 2023-04-10 VITALS
HEART RATE: 73 BPM | HEIGHT: 70 IN | RESPIRATION RATE: 20 BRPM | DIASTOLIC BLOOD PRESSURE: 70 MMHG | OXYGEN SATURATION: 97 % | BODY MASS INDEX: 22.07 KG/M2 | SYSTOLIC BLOOD PRESSURE: 120 MMHG | WEIGHT: 154.19 LBS

## 2023-04-10 DIAGNOSIS — I10 ESSENTIAL HYPERTENSION: ICD-10-CM

## 2023-04-10 DIAGNOSIS — E78.2 MIXED HYPERLIPIDEMIA: ICD-10-CM

## 2023-04-10 DIAGNOSIS — Z00.00 ENCOUNTER FOR PREVENTIVE HEALTH EXAMINATION: Primary | ICD-10-CM

## 2023-04-10 DIAGNOSIS — M15.9 OSTEOARTHRITIS OF MULTIPLE JOINTS, UNSPECIFIED OSTEOARTHRITIS TYPE: ICD-10-CM

## 2023-04-10 DIAGNOSIS — R26.9 GAIT ABNORMALITY: ICD-10-CM

## 2023-04-10 DIAGNOSIS — R97.20 ELEVATED PROSTATE SPECIFIC ANTIGEN (PSA): ICD-10-CM

## 2023-04-10 DIAGNOSIS — D32.9 MENINGIOMA: ICD-10-CM

## 2023-04-10 DIAGNOSIS — M46.1 SACROILIITIS: ICD-10-CM

## 2023-04-10 DIAGNOSIS — K21.9 GASTROESOPHAGEAL REFLUX DISEASE WITHOUT ESOPHAGITIS: ICD-10-CM

## 2023-04-10 DIAGNOSIS — Z99.89 DEPENDENCE ON OTHER ENABLING MACHINES AND DEVICES: ICD-10-CM

## 2023-04-10 DIAGNOSIS — G25.81 RLS (RESTLESS LEGS SYNDROME): ICD-10-CM

## 2023-04-10 DIAGNOSIS — Z00.00 ENCOUNTER FOR MEDICARE ANNUAL WELLNESS EXAM: ICD-10-CM

## 2023-04-10 DIAGNOSIS — I48.0 PAROXYSMAL A-FIB: ICD-10-CM

## 2023-04-10 DIAGNOSIS — D69.2 SENILE PURPURA: ICD-10-CM

## 2023-04-10 PROCEDURE — 1125F PR PAIN SEVERITY QUANTIFIED, PAIN PRESENT: ICD-10-PCS | Mod: HCNC,CPTII,S$GLB, | Performed by: NURSE PRACTITIONER

## 2023-04-10 PROCEDURE — 3288F PR FALLS RISK ASSESSMENT DOCUMENTED: ICD-10-PCS | Mod: HCNC,CPTII,S$GLB, | Performed by: NURSE PRACTITIONER

## 2023-04-10 PROCEDURE — 99999 PR PBB SHADOW E&M-EST. PATIENT-LVL V: ICD-10-PCS | Mod: PBBFAC,HCNC,, | Performed by: NURSE PRACTITIONER

## 2023-04-10 PROCEDURE — 1101F PR PT FALLS ASSESS DOC 0-1 FALLS W/OUT INJ PAST YR: ICD-10-PCS | Mod: HCNC,CPTII,S$GLB, | Performed by: NURSE PRACTITIONER

## 2023-04-10 PROCEDURE — 3288F FALL RISK ASSESSMENT DOCD: CPT | Mod: HCNC,CPTII,S$GLB, | Performed by: NURSE PRACTITIONER

## 2023-04-10 PROCEDURE — 1101F PT FALLS ASSESS-DOCD LE1/YR: CPT | Mod: HCNC,CPTII,S$GLB, | Performed by: NURSE PRACTITIONER

## 2023-04-10 PROCEDURE — G0439 PR MEDICARE ANNUAL WELLNESS SUBSEQUENT VISIT: ICD-10-PCS | Mod: HCNC,S$GLB,, | Performed by: NURSE PRACTITIONER

## 2023-04-10 PROCEDURE — G0439 PPPS, SUBSEQ VISIT: HCPCS | Mod: HCNC,S$GLB,, | Performed by: NURSE PRACTITIONER

## 2023-04-10 PROCEDURE — 1159F PR MEDICATION LIST DOCUMENTED IN MEDICAL RECORD: ICD-10-PCS | Mod: HCNC,CPTII,S$GLB, | Performed by: NURSE PRACTITIONER

## 2023-04-10 PROCEDURE — 1160F PR REVIEW ALL MEDS BY PRESCRIBER/CLIN PHARMACIST DOCUMENTED: ICD-10-PCS | Mod: HCNC,CPTII,S$GLB, | Performed by: NURSE PRACTITIONER

## 2023-04-10 PROCEDURE — 1170F PR FUNCTIONAL STATUS ASSESSED: ICD-10-PCS | Mod: HCNC,CPTII,S$GLB, | Performed by: NURSE PRACTITIONER

## 2023-04-10 PROCEDURE — 1125F AMNT PAIN NOTED PAIN PRSNT: CPT | Mod: HCNC,CPTII,S$GLB, | Performed by: NURSE PRACTITIONER

## 2023-04-10 PROCEDURE — 99999 PR PBB SHADOW E&M-EST. PATIENT-LVL V: CPT | Mod: PBBFAC,HCNC,, | Performed by: NURSE PRACTITIONER

## 2023-04-10 PROCEDURE — 1159F MED LIST DOCD IN RCRD: CPT | Mod: HCNC,CPTII,S$GLB, | Performed by: NURSE PRACTITIONER

## 2023-04-10 PROCEDURE — 1160F RVW MEDS BY RX/DR IN RCRD: CPT | Mod: HCNC,CPTII,S$GLB, | Performed by: NURSE PRACTITIONER

## 2023-04-10 PROCEDURE — 1170F FXNL STATUS ASSESSED: CPT | Mod: HCNC,CPTII,S$GLB, | Performed by: NURSE PRACTITIONER

## 2023-04-10 NOTE — PATIENT INSTRUCTIONS
Counseling and Referral of Other Preventative  (Italic type indicates deductible and co-insurance are waived)    Patient Name: Dominguez Ritchie  Today's Date: 4/10/2023    Health Maintenance       Date Due Completion Date    Shingles Vaccine (1 of 2) Never done ---    COVID-19 Vaccine (4 - Booster for Moderna series) 03/17/2022 1/20/2022    Hemoglobin A1c (Prediabetes) 03/16/2024 3/16/2023    Lipid Panel 03/16/2024 3/16/2023    TETANUS VACCINE 11/16/2025 11/16/2015        No orders of the defined types were placed in this encounter.      The following information is provided to all patients.  This information is to help you find resources for any of the problems found today that may be affecting your health:                Living healthy guide: www.[facility].[place].gov      Understanding Diabetes: www.diabetes.org      Eating healthy: www.cdc.gov/healthyweight      [facility] home safety checklist: www.cdc.gov/steadi/patient.html      Agency on Aging: www.goea.[place].[facility]      Alcoholics anonymous (AA): www.aa.org      Physical Activity: www.nestor.nih.gov/ci8bswa      Tobacco use: www.quitwithusla.org

## 2023-04-10 NOTE — PROGRESS NOTES
"  Dominguez Ritchie presented for a  Medicare AWV and comprehensive Health Risk Assessment today. The following components were reviewed and updated:    Medical history  Family History  Social history  Allergies and Current Medications  Health Risk Assessment  Health Maintenance  Care Team         ** See Completed Assessments for Annual Wellness Visit within the encounter summary.**         The following assessments were completed:  Living Situation  CAGE  Depression Screening  Timed Get Up and Go  Whisper Test  Cognitive Function Screening    Dominguez Ritchie presented for a  Medicare AWV and comprehensive Health Risk Assessment today. The following components were reviewed and updated:    Medical history  Family History  Social history  Allergies and Current Medications  Health Risk Assessment  Health Maintenance  Care Team         ** See Completed Assessments for Annual Wellness Visit within the encounter summary.**         The following assessments were completed:  Living Situation  CAGE  Depression Screening  Timed Get Up and Go  Whisper Test  Cognitive Function Screening  Nutrition Screening  ADL Screening  PAQ Screening        Vitals:    04/10/23 1355   BP: 120/70   Pulse: 73   Resp: 20   SpO2: 97%   Weight: 70 kg (154 lb 3.4 oz)   Height: 5' 10" (1.778 m)     Body mass index is 22.13 kg/m².  [unfilled]          Diagnoses and health risks identified today and associated recommendations/orders:        Provided Dom[place] with a 5-10 year written screening schedule and personal prevention plan. Recommendations were developed using the USPSTF age appropriate recommendations. Education, counseling, and referrals were provided as needed. After Visit Summary printed and given to patient which includes a list of additional screenings\tests needed.    No follow-ups on file.    Douglas Marshall NP    Dominguez Ritchie presented for a  Medicare AWV and comprehensive Health Risk Assessment today. The following components were reviewed and " "updated:    Medical history  Family History  Social history  Allergies and Current Medications  Health Risk Assessment  Health Maintenance  Care Team         ** See Completed Assessments for Annual Wellness Visit within the encounter summary.**         The following assessments were completed:  Living Situation  CAGE  Depression Screening  Timed Get Up and Go  Whisper Test  Cognitive Function Screening    Nutrition Screening  ADL Screening  PAQ Screening          Vitals:    04/10/23 1355   BP: 120/70   Pulse: 73   Resp: 20   SpO2: 97%   Weight: 70 kg (154 lb 3.4 oz)   Height: 5' 10" (1.778 m)     Body mass index is 22.13 kg/m².  [unfilled]          Diagnoses and health risks identified today and associated recommendations/orders:    1. Encounter for preventive health examination  Screenings performed, as noted above.  Personal preventative testing needs reviewed.      2. Encounter for Medicare annual wellness exam  Screenings performed, as noted above.  Personal preventative testing needs reviewed.    - Ambulatory Referral/Consult to Enhanced Annual Wellness Visit (eAWV)    3. Sacroiliitis  Monitored, stable, follow up with pcp, stay active    4. Dependence on other enabling machines and devices  Uses cane or walker when outside of the house    5. Senile purpura  Monitored, stable, wear long sleeves    6. Essential hypertension  Treated with amlodipine, stable, cont tx    7. Mixed hyperlipidemia  Treated with crestor, stable, cont tx    8. Paroxysmal A-fib  Treated with Eliquis, stable, cont tx    9. Elevated prostate specific antigen (PSA)  Treated with Avodart, stable, cont to follow up with Dr Rodriguez, urology    10. Meningioma  Monitored, stable, was treated with rad tx several years ago    11. Gait abnormality  Uses cane or walker when outside of the house, no recent falls    12. Osteoarthritis of multiple joints, unspecified osteoarthritis type  Monitored, stable, stay active, follow up with pcp    13. " "Gastroesophageal reflux disease without esophagitis  Treated with protonix, stable, cont tx    Review for Substance Use Disorders: patient does not use substances per chart    Review for opioid screening: Patient is not prescribed opioids     Discussed Shingrix      Provided [place] with a 5-10 year written screening schedule and personal prevention plan. Recommendations were developed using the USPSTF age appropriate recommendations. Education, counseling, and referrals were provided as needed. After Visit Summary printed and given to patient which includes a list of additional screenings\tests needed.    No follow-ups on file.    Douglas Marshall NP          Nutrition Screening  ADL Screening  PAQ Screening        Vitals:    04/10/23 1355   BP: 120/70   Pulse: 73   Resp: 20   SpO2: 97%   Weight: 70 kg (154 lb 3.4 oz)   Height: 5' 10" (1.778 m)     Body mass index is 22.13 kg/m².  Physical Exam  Constitutional:       General: He is not in acute distress.     Appearance: Normal appearance. He is well-developed. He is not ill-appearing, toxic-appearing or diaphoretic.   HENT:      Head: Normocephalic and atraumatic.      Right Ear: External ear normal.      Left Ear: External ear normal.      Mouth/Throat:      Pharynx: Oropharynx is clear.   Eyes:      Extraocular Movements: Extraocular movements intact.      Conjunctiva/sclera: Conjunctivae normal.   Neck:      Vascular: No carotid bruit.   Cardiovascular:      Rate and Rhythm: Normal rate and regular rhythm.      Pulses: Normal pulses.      Heart sounds: Normal heart sounds. No murmur heard.    No friction rub. No gallop.   Pulmonary:      Effort: Pulmonary effort is normal. No respiratory distress.      Breath sounds: Normal breath sounds. No stridor. No wheezing, rhonchi or rales.   Chest:      Chest wall: No tenderness.   Abdominal:      General: Bowel sounds are normal. There is no distension.      Palpations: Abdomen is soft. There is no mass.      Tenderness: " There is no abdominal tenderness.      Hernia: No hernia is present.   Musculoskeletal:         General: No tenderness. Normal range of motion.      Cervical back: Normal range of motion and neck supple. No tenderness.   Lymphadenopathy:      Cervical: No cervical adenopathy.   Skin:     General: Skin is warm and dry.      Comments: Resolving bruises on forearms   Neurological:      Mental Status: He is alert and oriented to person, place, and time.      Cranial Nerves: No cranial nerve deficit.   Psychiatric:         Mood and Affect: Mood normal.         Behavior: Behavior normal.             Diagnoses and health risks identified today and associated recommendations/orders:        Provided [place] with a 5-10 year written screening schedule and personal prevention plan. Recommendations were developed using the USPSTF age appropriate recommendations. Education, counseling, and referrals were provided as needed. After Visit Summary printed and given to patient which includes a list of additional screenings\tests needed.    No follow-ups on file.    Douglas Marshall, JOHN        I offered to discuss advanced care planning, including how to pick a person who would make decisions for you if you were unable to make them for yourself, called a health care power of , and what kind of decisions you might make such as use of life sustaining treatments such as ventilators and tube feeding when faced with a life limiting illness recorded on a living will that they will need to know. (How you want to be cared for as you near the end of your natural life)     X Patient is interested in learning more about how to make advanced directives.  I provided them paperwork and offered to discuss this with them.

## 2023-04-21 DIAGNOSIS — I45.5 SINUS PAUSE: ICD-10-CM

## 2023-04-21 DIAGNOSIS — I49.5 TACHY-BRADY SYNDROME: Primary | ICD-10-CM

## 2023-04-24 ENCOUNTER — TELEPHONE (OUTPATIENT)
Dept: CARDIOLOGY | Facility: CLINIC | Age: 88
End: 2023-04-24

## 2023-04-24 ENCOUNTER — OFFICE VISIT (OUTPATIENT)
Dept: CARDIOLOGY | Facility: CLINIC | Age: 88
End: 2023-04-24
Payer: MEDICARE

## 2023-04-24 ENCOUNTER — HOSPITAL ENCOUNTER (OUTPATIENT)
Dept: CARDIOLOGY | Facility: HOSPITAL | Age: 88
Discharge: HOME | End: 2023-04-24
Attending: INTERNAL MEDICINE
Payer: MEDICARE

## 2023-04-24 VITALS
WEIGHT: 153.44 LBS | HEIGHT: 70 IN | BODY MASS INDEX: 21.97 KG/M2 | HEART RATE: 85 BPM | DIASTOLIC BLOOD PRESSURE: 72 MMHG | SYSTOLIC BLOOD PRESSURE: 134 MMHG | OXYGEN SATURATION: 96 %

## 2023-04-24 DIAGNOSIS — I45.5 SINUS PAUSE: ICD-10-CM

## 2023-04-24 DIAGNOSIS — I35.8 AORTIC VALVE SCLEROSIS: ICD-10-CM

## 2023-04-24 DIAGNOSIS — I10 ESSENTIAL HYPERTENSION: ICD-10-CM

## 2023-04-24 DIAGNOSIS — I49.5 TACHY-BRADY SYNDROME: Primary | ICD-10-CM

## 2023-04-24 DIAGNOSIS — I48.0 PAROXYSMAL A-FIB: ICD-10-CM

## 2023-04-24 DIAGNOSIS — R00.1 SINUS BRADYCARDIA: ICD-10-CM

## 2023-04-24 DIAGNOSIS — D32.9 MENINGIOMA: ICD-10-CM

## 2023-04-24 DIAGNOSIS — I49.5 TACHY-BRADY SYNDROME: ICD-10-CM

## 2023-04-24 DIAGNOSIS — Z95.0 CARDIAC PACEMAKER IN SITU: ICD-10-CM

## 2023-04-24 PROCEDURE — 1159F PR MEDICATION LIST DOCUMENTED IN MEDICAL RECORD: ICD-10-PCS | Mod: CPTII,S$GLB,, | Performed by: INTERNAL MEDICINE

## 2023-04-24 PROCEDURE — 1160F RVW MEDS BY RX/DR IN RCRD: CPT | Mod: CPTII,S$GLB,, | Performed by: INTERNAL MEDICINE

## 2023-04-24 PROCEDURE — 1101F PT FALLS ASSESS-DOCD LE1/YR: CPT | Mod: CPTII,S$GLB,, | Performed by: INTERNAL MEDICINE

## 2023-04-24 PROCEDURE — 99215 PR OFFICE/OUTPT VISIT, EST, LEVL V, 40-54 MIN: ICD-10-PCS | Mod: S$GLB,,, | Performed by: INTERNAL MEDICINE

## 2023-04-24 PROCEDURE — 93005 ELECTROCARDIOGRAM TRACING: CPT | Mod: HCNC

## 2023-04-24 PROCEDURE — 1126F PR PAIN SEVERITY QUANTIFIED, NO PAIN PRESENT: ICD-10-PCS | Mod: CPTII,S$GLB,, | Performed by: INTERNAL MEDICINE

## 2023-04-24 PROCEDURE — 1160F PR REVIEW ALL MEDS BY PRESCRIBER/CLIN PHARMACIST DOCUMENTED: ICD-10-PCS | Mod: CPTII,S$GLB,, | Performed by: INTERNAL MEDICINE

## 2023-04-24 PROCEDURE — 1101F PR PT FALLS ASSESS DOC 0-1 FALLS W/OUT INJ PAST YR: ICD-10-PCS | Mod: CPTII,S$GLB,, | Performed by: INTERNAL MEDICINE

## 2023-04-24 PROCEDURE — 1126F AMNT PAIN NOTED NONE PRSNT: CPT | Mod: CPTII,S$GLB,, | Performed by: INTERNAL MEDICINE

## 2023-04-24 PROCEDURE — 99215 OFFICE O/P EST HI 40 MIN: CPT | Mod: S$GLB,,, | Performed by: INTERNAL MEDICINE

## 2023-04-24 PROCEDURE — 93010 ELECTROCARDIOGRAM REPORT: CPT | Mod: HCNC,,, | Performed by: INTERNAL MEDICINE

## 2023-04-24 PROCEDURE — 1159F MED LIST DOCD IN RCRD: CPT | Mod: CPTII,S$GLB,, | Performed by: INTERNAL MEDICINE

## 2023-04-24 PROCEDURE — 3288F FALL RISK ASSESSMENT DOCD: CPT | Mod: CPTII,S$GLB,, | Performed by: INTERNAL MEDICINE

## 2023-04-24 PROCEDURE — 93010 EKG 12-LEAD: ICD-10-PCS | Mod: HCNC,,, | Performed by: INTERNAL MEDICINE

## 2023-04-24 PROCEDURE — 3288F PR FALLS RISK ASSESSMENT DOCUMENTED: ICD-10-PCS | Mod: CPTII,S$GLB,, | Performed by: INTERNAL MEDICINE

## 2023-04-24 PROCEDURE — 99999 PR PBB SHADOW E&M-EST. PATIENT-LVL IV: ICD-10-PCS | Mod: PBBFAC,,, | Performed by: INTERNAL MEDICINE

## 2023-04-24 PROCEDURE — 99999 PR PBB SHADOW E&M-EST. PATIENT-LVL IV: CPT | Mod: PBBFAC,,, | Performed by: INTERNAL MEDICINE

## 2023-04-24 NOTE — Clinical Note
Maria Del Carmen, You can send my note to Dr. Divine Perla at [facility] for preoperative clearance. Also, for the follow-up, please place him on Mireille's schedule rather than mine.

## 2023-04-24 NOTE — PROGRESS NOTES
Subjective:   Patient ID:  Dominguez Ritchie is a 89 y.o. male     Chief complaint:surgery clearance      HPI  New to my clinic   Saw him in the hospital for TBS Sxc with dizzy spells rico with post conversion pauses  Had PPM   MRI conditional   Device and lead implanted on 11/1/22, [employer]   PPM: BIOTRONIK  Edora 8 SRT model 159655, [id number]   RA lead: Solia S45, model 306511, [id number]   2/6/23-underlying SB @ 59 w/PACs, intact conduction, narrow QRS  I have reviewed the actual image of the ECG tracing obtained today and it shows AR pacing with normal intervals  >>  Doing very well, much improved since pacemaker implantation    Update 05/07/2023 :  He is back today asking for cardiovascular clearance for surgical treatment of scalp melanoma.  Specifically, a wide local excision, staged reconstruction and sentinel lymph node biopsy are planned.  He reports occasionally low blood pressure of 99/62 but often is in the mid 130s.    Current Outpatient Medications   Medication Sig    amLODIPine (NORVASC) 5 MG tablet Take 1 tablet by mouth once daily    apixaban (ELIQUIS) 5 mg Tab Take 5 mg by mouth 2 (two) times daily.    bisacodyL (DULCOLAX) 5 mg EC tablet Take 5 mg by mouth daily as needed for Constipation.    dutasteride (AVODART) 0.5 mg capsule Take 1 capsule (0.5 mg total) by mouth once daily.    pantoprazole (PROTONIX) 40 MG tablet Take 1 tablet by mouth once daily    rOPINIRole (REQUIP) 0.5 MG tablet Take 1 tablet (0.5 mg total) by mouth 3 (three) times daily.    rosuvastatin (CRESTOR) 5 MG tablet Take 1 tablet (5 mg total) by mouth once daily.     No current facility-administered medications for this visit.       Review of Systems     Constitutional: Reviewed  for decreased appetite, weight gain and weight loss.   HENT: Reviewed for nosebleeds.    Eyes:  Reviewed for blurred vision and visual disturbance.   Cardiovascular: Reviewed for chest pain, claudication, cyanosis,dyspnea on exertion, leg swelling,  orthopnea,paroxysmal nocturnal dyspnearregular heartbeats, palpitations, near-syncope, and syncope.   Respiratory: Reviewed for cough, shortness of breath, wheezing, sleep disturbances due to breathing and snoring, .    Endocrine: Reviewed for heat intolerance.   Hematologic/Lymphatic: Reviewed for easy bruisability/bleeding.   Skin: Reviewed for rash.   Musculoskeletal: Reviewed for muscle weakness and myalgias.   Gastrointestinal: Reviewed for abdominal pain, anorexia, melena, nausea and vomiting.   Genitourinary: Reviewed for hesitancy, frequency, nocturia and incontinence.   Neurological: Reviewed for excessive daytime sleepiness, dizziness, vertigo, weakness, headaches, loss of balance and seizures,   Psychiatric/Behavioral:  Reviewed for insomnia, altered mental status, depression, anxiety and nervousness.       All symptoms reviewed above were negative except for , hearing loss, ankle swelling, easy bruisability, daytime sleepiness, lightheadedness and arthritic complaints.       Social History     Tobacco Use   Smoking Status Former    Packs/day: 1.00    Years: 25.00    Pack years: 25.00    Types: Cigarettes    Quit date: 1990    Years since quittin.3   Smokeless Tobacco Never        Objective:     Physical Exam  Vitals and nursing note reviewed.   Constitutional:       Appearance: Normal appearance. He is well-developed.   HENT:      Head: Normocephalic and atraumatic.      Right Ear: External ear normal.      Left Ear: External ear normal.   Eyes:      Conjunctiva/sclera: Conjunctivae normal.      Left eye: Left conjunctiva is not injected. No hemorrhage.     Pupils: Pupils are equal, round, and reactive to light.   Neck:      Thyroid: No thyromegaly.      Vascular: No JVD.   Cardiovascular:      Rate and Rhythm: Normal rate and regular rhythm.      Chest Wall: PMI is not displaced.      Pulses: Intact distal pulses.           Carotid pulses are 2+ on the right side and 2+ on the left side.        "Radial pulses are 2+ on the right side and 2+ on the left side.        Dorsalis pedis pulses are 2+ on the right side and 2+ on the left side.        Posterior tibial pulses are 2+ on the right side and 2+ on the left side.      Heart sounds: Normal heart sounds. No midsystolic click and no opening snap. No murmur heard.    No friction rub. No gallop.      Comments: Orthostatic testing in clinic:   Sitting /70, pulse 65   Standing 1 minute /90, pulse 65   Standing 3 minutes /90, pulse 65   Standing 5 minutes /80, pulse 65   Note that the pacemaker is set at a low rate of 65 beats per minute.  Pulmonary:      Effort: Pulmonary effort is normal. No respiratory distress.      Breath sounds: Normal breath sounds. No wheezing or rales.   Chest:      Chest wall: No tenderness.      Comments: Device pocket is in excellent repair.  Abdominal:      Palpations: Abdomen is soft. Abdomen is not rigid. There is no hepatomegaly.      Tenderness: There is no abdominal tenderness.   Musculoskeletal:         General: No tenderness. Normal range of motion.      Cervical back: Neck supple.      Right knee: No swelling.      Left knee: No swelling.      Right lower leg: No swelling.      Left lower leg: No swelling.      Right ankle: No swelling.      Left ankle: No swelling.      Right foot: No swelling.      Left foot: No swelling.   Skin:     General: Skin is warm and dry.      Findings: No rash.   Neurological:      Mental Status: He is alert and oriented to person, place, and time.      Cranial Nerves: No cranial nerve deficit.      Coordination: Coordination normal.      Deep Tendon Reflexes: Reflexes are normal and symmetric.   Psychiatric:         Behavior: Behavior normal.     /72   Pulse 85   Ht 5' 10" (1.778 m)   Wt 69.6 kg (153 lb 7 oz)   SpO2 96%   BMI 22.02 kg/m²       Results for orders placed during the hospital encounter of 09/16/22    Echo    Interpretation Summary  · The left " ventricle is normal in size with concentric remodeling and normal systolic function.  · Indeterminate left ventricular diastolic function.  · The estimated PA systolic pressure is 37 mmHg.  · Normal right ventricular size with normal right ventricular systolic function.  · Normal central venous pressure (3 mmHg).  · The estimated ejection fraction is 55%.  · Mild aortic regurgitation.  · Mild mitral regurgitation.  · Mild tricuspid regurgitation.    WBC   Date Value Ref Range Status   03/16/2023 5.93 3.90 - 12.70 K/uL Final     Hematocrit   Date Value Ref Range Status   03/16/2023 40.2 40.0 - 54.0 % Final     Hemoglobin   Date Value Ref Range Status   03/16/2023 12.7 (L) 14.0 - 18.0 g/dL Final     Lab Results   Component Value Date     03/16/2023     Lab Results   Component Value Date    CREATININE 0.8 03/16/2023    EGFRNORACEVR >60.0 03/16/2023    K 4.6 03/16/2023     Lab Results   Component Value Date    BNP 23 10/28/2022            reports no history of alcohol use.  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     Past Surgical History:   Procedure Laterality Date    A-V CARDIAC PACEMAKER INSERTION Left 11/1/2022    Procedure: INSERTION, CARDIAC PACEMAKER, DUAL CHAMBER;  Surgeon: Finn Mccrary MD;  Location: [facility] CATH LAB;  Service: Cardiology;  Laterality: Left;  biotronik AAIR    APPENDECTOMY      BACK SURGERY Bilateral 2016    CATARACT EXTRACTION, BILATERAL      COLONOSCOPY   2/2009    EYE SURGERY      INJECTION OF ANESTHETIC AGENT AROUND NERVE Right 11/18/2022    Procedure: Right Genicular nerve block w/Light RN IV Sedation;  Surgeon: Benitez Roberson MD;  Location: [facility] PAIN MGT;  Service: Pain Management;  Laterality: Right;    INSERTION OF PERMANENT PACEMAKER Left 11/1/2022    Procedure: Implant PPM;  Surgeon: Finn Mccrary MD;  Location: [facility] CATH LAB;  Service: Cardiology;  Laterality: Left;    JOINT REPLACEMENT      left knee    LUMBAR PARAVERTEBRAL FACET JOINT NERVE BLOCK Bilateral 8/29/2019    Procedure: LMBB L3,4,5;  Surgeon: Nabor Sadler MD;  Location: [facility] PAIN MGT;  Service: Pain Management;  Laterality: Bilateral;    SELECTIVE INJECTION OF ANESTHETIC AGENT AROUND LUMBAR SPINAL NERVE ROOT BY TRANSFORAMINAL APPROACH Bilateral 11/8/2021    Procedure: Bilateral L4/5 +/- L5/S1 TF DREW;  Surgeon: Nabor Sadler MD;  Location: [facility] PAIN MGT;  Service: Pain Management;  Laterality: Bilateral;    SELECTIVE INJECTION OF ANESTHETIC AGENT AROUND LUMBAR SPINAL NERVE ROOT BY TRANSFORAMINAL APPROACH Bilateral 1/4/2022    Procedure: Bilateral L4/5 + L5/S1 TF DREW;  Surgeon: Nabor Sadler MD;  Location: [facility] PAIN MGT;  Service: Pain Management;  Laterality: Bilateral;    SELECTIVE INJECTION OF ANESTHETIC AGENT AROUND LUMBAR SPINAL NERVE ROOT BY TRANSFORAMINAL APPROACH Right 12/30/2022    Procedure: Right-sided L4/5 and L5/S1 transforaminal epidural steroid injection with RN IV sedation;  Surgeon: Benitez Roberson MD;  Location: [facility] PAIN MGT;  Service: Pain Management;  Laterality: Right;    SPINE SURGERY      UPPER GASTROINTESTINAL ENDOSCOPY  2/2009     Family History   Problem Relation Age of Onset    Heart disease Mother     Cancer Son         pancreatic     Diabetes Maternal Uncle     Kidney disease Neg Hx     Stroke Neg Hx        Assessment:    Patient is at an acceptable cardiovascular risk for the proposed surgical intervention.  I am presuming that general anesthesia will be  administered.  This should also be okay.    Patient can discontinue Eliquis for 5 doses prior to planned surgery and for as long as felt needed the postop (per device monitoring, he has not had atrial fibrillation as of late).  S to the pacemaker itself, the application of a magnet at opportune times (during cautery application) is all that is required.  1. Tachy-susanna syndrome    2. Paroxysmal A-fib    3. Sinus pause    4. Essential hypertension    5. Aortic valve sclerosis    6. Sinus bradycardia    7. Meningioma    8. Cardiac pacemaker in situ        Plan:    Cleared for the proposed surgical intervention and associated general anesthesia at acceptable cardiovascular risk.  No active cardiac issues.  Age is his biggest risk factor.  Orders Placed This Encounter   Procedures    ANTI-XA HEPARIN MONITORING     Standing Status:   Future     Standing Expiration Date:   6/22/2024       Follow up in about 6 months (around 10/24/2023), or if symptoms worsen or fail to improve, for n may.    There are no discontinued medications.         Medication List with Changes/Refills   Current Medications    AMLODIPINE (NORVASC) 5 MG TABLET    Take 1 tablet by mouth once daily    APIXABAN (ELIQUIS) 5 MG TAB    Take 5 mg by mouth 2 (two) times daily.    BISACODYL (DULCOLAX) 5 MG EC TABLET    Take 5 mg by mouth daily as needed for Constipation.    DUTASTERIDE (AVODART) 0.5 MG CAPSULE    Take 1 capsule (0.5 mg total) by mouth once daily.    PANTOPRAZOLE (PROTONIX) 40 MG TABLET    Take 1 tablet by mouth once daily    ROPINIROLE (REQUIP) 0.5 MG TABLET    Take 1 tablet (0.5 mg total) by mouth 3 (three) times daily.    ROSUVASTATIN (CRESTOR) 5 MG TABLET    Take 1 tablet (5 mg total) by mouth once daily.       This note is at least partially dictated using the M*Modal Fluency Direct word recognition program. There are word recognition mistakes that are occasionally missed on review.

## 2023-05-02 ENCOUNTER — TELEPHONE (OUTPATIENT)
Dept: CARDIOLOGY | Facility: CLINIC | Age: 88
End: 2023-05-02
Payer: MEDICARE

## 2023-05-02 NOTE — TELEPHONE ENCOUNTER
I need something in writing stating this pt is cleared for surgery with head and neck of [facility] dr sheyla md. Also need to know how long he can be off his eluquis.     [facility] Dr. Divine Perla attn holly  [phone] is fax and [phone] phone number pb

## 2023-05-07 ENCOUNTER — CLINICAL SUPPORT (OUTPATIENT)
Dept: CARDIOLOGY | Facility: HOSPITAL | Age: 88
End: 2023-05-07
Payer: MEDICARE

## 2023-05-07 DIAGNOSIS — Z95.0 PRESENCE OF CARDIAC PACEMAKER: ICD-10-CM

## 2023-05-07 PROCEDURE — 93296 REM INTERROG EVL PM/IDS: CPT | Performed by: INTERNAL MEDICINE

## 2023-05-10 ENCOUNTER — OFFICE VISIT (OUTPATIENT)
Dept: CARDIOLOGY | Facility: CLINIC | Age: 88
End: 2023-05-10
Payer: MEDICARE

## 2023-05-10 VITALS
HEART RATE: 70 BPM | SYSTOLIC BLOOD PRESSURE: 112 MMHG | WEIGHT: 154.13 LBS | DIASTOLIC BLOOD PRESSURE: 64 MMHG | OXYGEN SATURATION: 97 % | BODY MASS INDEX: 22.06 KG/M2 | HEIGHT: 70 IN

## 2023-05-10 DIAGNOSIS — I10 ESSENTIAL HYPERTENSION: ICD-10-CM

## 2023-05-10 DIAGNOSIS — Z95.0 STATUS POST PLACEMENT OF CARDIAC PACEMAKER: ICD-10-CM

## 2023-05-10 DIAGNOSIS — E78.2 MIXED HYPERLIPIDEMIA: ICD-10-CM

## 2023-05-10 DIAGNOSIS — I48.0 PAROXYSMAL A-FIB: Primary | ICD-10-CM

## 2023-05-10 PROCEDURE — 1125F AMNT PAIN NOTED PAIN PRSNT: CPT | Mod: CPTII,S$GLB,, | Performed by: INTERNAL MEDICINE

## 2023-05-10 PROCEDURE — 99214 OFFICE O/P EST MOD 30 MIN: CPT | Mod: S$GLB,,, | Performed by: INTERNAL MEDICINE

## 2023-05-10 PROCEDURE — 1159F PR MEDICATION LIST DOCUMENTED IN MEDICAL RECORD: ICD-10-PCS | Mod: CPTII,S$GLB,, | Performed by: INTERNAL MEDICINE

## 2023-05-10 PROCEDURE — 1101F PR PT FALLS ASSESS DOC 0-1 FALLS W/OUT INJ PAST YR: ICD-10-PCS | Mod: CPTII,S$GLB,, | Performed by: INTERNAL MEDICINE

## 2023-05-10 PROCEDURE — 3288F PR FALLS RISK ASSESSMENT DOCUMENTED: ICD-10-PCS | Mod: CPTII,S$GLB,, | Performed by: INTERNAL MEDICINE

## 2023-05-10 PROCEDURE — 99999 PR PBB SHADOW E&M-EST. PATIENT-LVL III: ICD-10-PCS | Mod: PBBFAC,,, | Performed by: INTERNAL MEDICINE

## 2023-05-10 PROCEDURE — 3288F FALL RISK ASSESSMENT DOCD: CPT | Mod: CPTII,S$GLB,, | Performed by: INTERNAL MEDICINE

## 2023-05-10 PROCEDURE — 1159F MED LIST DOCD IN RCRD: CPT | Mod: CPTII,S$GLB,, | Performed by: INTERNAL MEDICINE

## 2023-05-10 PROCEDURE — 1125F PR PAIN SEVERITY QUANTIFIED, PAIN PRESENT: ICD-10-PCS | Mod: CPTII,S$GLB,, | Performed by: INTERNAL MEDICINE

## 2023-05-10 PROCEDURE — 99999 PR PBB SHADOW E&M-EST. PATIENT-LVL III: CPT | Mod: PBBFAC,,, | Performed by: INTERNAL MEDICINE

## 2023-05-10 PROCEDURE — 99214 PR OFFICE/OUTPT VISIT, EST, LEVL IV, 30-39 MIN: ICD-10-PCS | Mod: S$GLB,,, | Performed by: INTERNAL MEDICINE

## 2023-05-10 PROCEDURE — 1101F PT FALLS ASSESS-DOCD LE1/YR: CPT | Mod: CPTII,S$GLB,, | Performed by: INTERNAL MEDICINE

## 2023-05-10 NOTE — PROGRESS NOTES
Subjective:   Patient ID:  Dominguez Ritchie is a 89 y.o. male who presents for evaluation of No chief complaint on file.        HPI  5.10.2023  Comes in for six-month follow-up.    He had his scalp lesion excised.  And a sentinel lymph node as well.    Denies any chest pain.  No syncope or presyncope.    No palpitations.    He is complaining of very easy bruising and he states that he has been taking half of the Eliquis dose.    No lower extremity swelling orthopnea or dyspnea.    States amlodipine was stopped due to low blood pressure by PCP.      11.2022  89-year-old male with past medical history below.    He received a atrial lead pacemaker last week after presenting to the hospital due to abnormal finding on his Holter monitor with long pauses during his conversion from rapid AFib to normal sinus.    He denies any more dizziness.    He denies any chest pain, dyspnea on exertion, palpitations.    He is in sinus rhythm today and paced in the atrium.    His wound looks healing well.  His recent interrogation was normal function.    He is following instructions and arm restrictions    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       Past Surgical History:   Procedure Laterality Date    A-V CARDIAC PACEMAKER INSERTION Left 11/1/2022    Procedure: INSERTION, CARDIAC PACEMAKER, DUAL CHAMBER;  Surgeon: Finn Mccrary MD;  Location: [facility] CATH LAB;  Service: Cardiology;  Laterality: Left;  biotronik  AAIR    APPENDECTOMY      BACK SURGERY Bilateral 2016    CATARACT EXTRACTION, BILATERAL      COLONOSCOPY  2009    EYE SURGERY      INJECTION OF ANESTHETIC AGENT AROUND NERVE Right 2022    Procedure: Right Genicular nerve block w/Light RN IV Sedation;  Surgeon: Benitez Roberson MD;  Location: [facility] PAIN MGT;  Service: Pain Management;  Laterality: Right;    INSERTION OF PERMANENT PACEMAKER Left 2022    Procedure: Implant PPM;  Surgeon: Finn Mccrary MD;  Location: [facility] CATH LAB;  Service: Cardiology;  Laterality: Left;    JOINT REPLACEMENT      left knee    LUMBAR PARAVERTEBRAL FACET JOINT NERVE BLOCK Bilateral 2019    Procedure: LMBB L3,4,5;  Surgeon: Nabor Sadler MD;  Location: [facility];  Service: Pain Management;  Laterality: Bilateral;    SELECTIVE INJECTION OF ANESTHETIC AGENT AROUND LUMBAR SPINAL NERVE ROOT BY TRANSFORAMINAL APPROACH Bilateral 2021    Procedure: Bilateral L4/5 +/- L5/S1 TF DREW;  Surgeon: Nabor Sadler MD;  Location: [facility] PAIN MGT;  Service: Pain Management;  Laterality: Bilateral;    SELECTIVE INJECTION OF ANESTHETIC AGENT AROUND LUMBAR SPINAL NERVE ROOT BY TRANSFORAMINAL APPROACH Bilateral 2022    Procedure: Bilateral L4/5 + L5/S1 TF DREW;  Surgeon: Nabor Sadler MD;  Location: [facility] PAIN MGT;  Service: Pain Management;  Laterality: Bilateral;    SELECTIVE INJECTION OF ANESTHETIC AGENT AROUND LUMBAR SPINAL NERVE ROOT BY TRANSFORAMINAL APPROACH Right 2022    Procedure: Right-sided L4/5 and L5/S1 transforaminal epidural steroid injection with RN IV sedation;  Surgeon: Benitez Roberson MD;  Location: [facility] PAIN MGT;  Service: Pain Management;  Laterality: Right;    SPINE SURGERY      UPPER GASTROINTESTINAL ENDOSCOPY  2009       Social History     Tobacco Use    Smoking status: Former     Packs/day: 1.00     Years: 25.00     Pack years: 25.00     Types: Cigarettes     Quit date: 1990     Years since quittin.3    Smokeless tobacco: Never   Substance Use  Topics    Alcohol use: No     Alcohol/week: 0.0 standard drinks    Drug use: No       Family History   Problem Relation Age of Onset    Heart disease Mother     Cancer Son         pancreatic     Diabetes Maternal Uncle     Kidney disease Neg Hx     Stroke Neg Hx        Review of Systems   Constitutional: Negative for fever and malaise/fatigue.   HENT:  Negative for sore throat.    Eyes:  Negative for blurred vision.   Cardiovascular:  Negative for chest pain, claudication, cyanosis, dyspnea on exertion, irregular heartbeat, leg swelling, near-syncope, orthopnea, palpitations, paroxysmal nocturnal dyspnea and syncope.   Respiratory:  Negative for cough and hemoptysis.    Hematologic/Lymphatic: Negative for bleeding problem.   Skin:  Negative for rash.   Musculoskeletal:  Negative for falls.   Gastrointestinal:  Negative for abdominal pain.   Genitourinary: Negative.    Neurological: Negative.    Psychiatric/Behavioral:  Negative for altered mental status and substance abuse.      Current Outpatient Medications on File Prior to Visit   Medication Sig    bisacodyL (DULCOLAX) 5 mg EC tablet Take 5 mg by mouth daily as needed for Constipation.    dutasteride (AVODART) 0.5 mg capsule Take 1 capsule (0.5 mg total) by mouth once daily.    pantoprazole (PROTONIX) 40 MG tablet Take 1 tablet by mouth once daily    rOPINIRole (REQUIP) 0.5 MG tablet Take 1 tablet (0.5 mg total) by mouth 3 (three) times daily.    rosuvastatin (CRESTOR) 5 MG tablet Take 1 tablet (5 mg total) by mouth once daily.    [DISCONTINUED] apixaban (ELIQUIS) 5 mg Tab Take 5 mg by mouth 2 (two) times daily.    [DISCONTINUED] amLODIPine (NORVASC) 5 MG tablet Take 1 tablet by mouth once daily (Patient not taking: Reported on 5/10/2023)     No current facility-administered medications on file prior to visit.       Objective:   Objective:  Wt Readings from Last 3 Encounters:   05/10/23 69.9 kg (154 lb 1.6 oz)   04/24/23 69.6 kg (153 lb 7 oz)   04/10/23 70 kg  (154 lb 3.4 oz)     Temp Readings from Last 3 Encounters:   03/16/23 98.6 °F (37 °C) (Tympanic)   03/15/23 97 °F (36.1 °C) (Oral)   12/30/22 97.5 °F (36.4 °C)     BP Readings from Last 3 Encounters:   05/10/23 112/64   04/24/23 134/72   04/10/23 120/70     Pulse Readings from Last 3 Encounters:   05/10/23 70   04/24/23 85   04/10/23 73       Physical Exam  Vitals reviewed.   Constitutional:       Appearance: He is well-developed.   HENT:      Head: Normocephalic and atraumatic.   Eyes:      General: No scleral icterus.     Conjunctiva/sclera: Conjunctivae normal.   Cardiovascular:      Rate and Rhythm: Normal rate and regular rhythm.      Pulses: Intact distal pulses.      Heart sounds: Normal heart sounds. No murmur heard.  Pulmonary:      Effort: No respiratory distress.      Breath sounds: No wheezing or rales.   Chest:      Chest wall: No tenderness.   Abdominal:      General: Bowel sounds are normal. There is no distension.      Palpations: Abdomen is soft.      Tenderness: There is no guarding.   Musculoskeletal:         General: Normal range of motion.      Cervical back: Normal range of motion and neck supple.   Skin:     General: Skin is warm.   Neurological:      Mental Status: He is alert and oriented to person, place, and time.       Lab Results   Component Value Date    CHOL 139 03/16/2023    CHOL 151 07/05/2022    CHOL 143 01/03/2022     Lab Results   Component Value Date    HDL 61 03/16/2023    HDL 58 07/05/2022    HDL 51 01/03/2022     Lab Results   Component Value Date    LDLCALC 38.4 (L) 03/16/2023    LDLCALC 54.6 (L) 07/05/2022    LDLCALC 57.8 (L) 01/03/2022     Lab Results   Component Value Date    TRIG 198 (H) 03/16/2023    TRIG 192 (H) 07/05/2022    TRIG 171 (H) 01/03/2022     Lab Results   Component Value Date    CHOLHDL 43.9 03/16/2023    CHOLHDL 38.4 07/05/2022    CHOLHDL 35.7 01/03/2022       Chemistry        Component Value Date/Time     03/16/2023 1413    K 4.6 03/16/2023 1413    CL  104 03/16/2023 1413    CO2 29 03/16/2023 1413    BUN 12 03/16/2023 1413    CREATININE 0.8 03/16/2023 1413    GLU 91 03/16/2023 1413        Component Value Date/Time    CALCIUM 9.3 03/16/2023 1413    ALKPHOS 71 03/16/2023 1413    AST 21 03/16/2023 1413    ALT 21 03/16/2023 1413    BILITOT 0.5 03/16/2023 1413    ESTGFRAFRICA >60.0 07/05/2022 1444    EGFRNONAA >60.0 07/05/2022 1444          Lab Results   Component Value Date    TSH 1.733 10/29/2022     Lab Results   Component Value Date    INR 1.0 10/29/2022    INR 1.0 10/19/2020    INR 0.9 01/16/2017     Lab Results   Component Value Date    WBC 5.93 03/16/2023    HGB 12.7 (L) 03/16/2023    HCT 40.2 03/16/2023     (H) 03/16/2023     03/16/2023     BNP  @LABRCNTIP(BNP,BNPTRIAGEBLO)@  CrCl cannot be calculated (Patient's most recent lab result is older than the maximum 7 days allowed.).     Imaging:  ======  Results for orders placed during the hospital encounter of 09/16/22    Echo    Interpretation Summary  · The left ventricle is normal in size with concentric remodeling and normal systolic function.  · Indeterminate left ventricular diastolic function.  · The estimated PA systolic pressure is 37 mmHg.  · Normal right ventricular size with normal right ventricular systolic function.  · Normal central venous pressure (3 mmHg).  · The estimated ejection fraction is 55%.  · Mild aortic regurgitation.  · Mild mitral regurgitation.  · Mild tricuspid regurgitation.    No results found for this or any previous visit.    Results for orders placed in visit on 04/01/13    US Carotid Bilateral    Narrative  DATE OF EXAM: Apr 8 2013    BOC   0519  -  US EXTRACRANIAL COMPLETE BILAT:   \  [id number]    CLINICAL HISTORY:   \401.9  HYPERTENSION NOS    PROCEDURE COMMENT:   \    ICD 9 CODE(S):   (\)    CPT 4 CODE(S)/MODIFIER(S):   (\)    Comparison: none    Findings:    Real-time, color flow and Doppler imaging performed.    Minimal calcific plaque noted within the left  bulb.    ICA                                    R  L  Peak systolic velocity:         124   Cm/sec               115   cm/sec  Peak diastolic velocity:        30   Cm/sec               33   cm/sec  Systolic velocity ratio:          1.3                               1.2  Diastolic velocity ratio:         1.4                               1.5      Vertebral artery flow:               Antegrade                antegrade    ECA flow:                                   Antegrade  antegrade    spectral waveforms appear within normal limits bilaterally.    Impression  1.  No hemodynamically significant plaque formation or stenosis identified.    2.  Further evaluation and/or follow-up imaging as clinically warranted.  A      Electronically signed by: FILEMON BRUNO III, MD  Date:     04/09/13  Time:    09:05        : LISA  Transcribe Date/Time: Apr 9 2013  9:05A  Dictated by : FILEMON BRUNO III, MD  Report reviewed by:  Read On:  \  Images were reviewed, findings were verified and document was  electronically  SIGNED BY: FILEMON BRUNO III, MD On: Apr 9 2013  9:05A    Results for orders placed during the hospital encounter of 01/16/17    X-Ray Chest PA And Lateral    Narrative  Comparison: 01/21/2011    2 views    Findings:    Heart size within normal limits.  Pulmonary vasculature is normal and symmetric.  Mild tortuosity of the aorta underlying atherosclerotic calcification.  Calcified AP window node again identified.  Trachea is normal in position along for slight rotation.  No consolidation or effusion.  Granuloma identified within the LEFT upper lobe.  Mild osteopenia and spondylosis with wedge-shaped compression deformity L1 compression deformity noted.  This was identified on L-spine series from 03/26/2015.  IMPRESSION:      1.  Stable exam without acute infiltrate.  See above.    2.  Known L1 compression deformity.      Electronically signed by: FILEMON BRUNO III, MD  Date:      01/16/17  Time:    16:34    No results found for this or any previous visit.    No valid procedures specified.    Diagnostic Results:  ECG: Reviewed    The ASCVD Risk score (Brennan DK, et al., 2019) failed to calculate for the following reasons:    The 2019 ASCVD risk score is only valid for ages 40 to 79    Assessment and Plan:   Paroxysmal A-fib  -     apixaban (ELIQUIS) 2.5 mg Tab; Take 1 tablet (2.5 mg total) by mouth 2 (two) times daily.  Dispense: 60 tablet; Refill: 11    Mixed hyperlipidemia    Status post placement of cardiac pacemaker    Essential hypertension        Blood pressure controlled.  Lesion  Device function normal.    Follow with heme Onc.    Will decrease Eliquis to 2.5 mg b.i.d..  Due to very easy bruising states not tolerated by the patient.    Follow with device Clinic and EP as well.    Follow-up in 6 months.

## 2023-06-14 ENCOUNTER — HOSPITAL ENCOUNTER (INPATIENT)
Facility: HOSPITAL | Age: 88
LOS: 3 days | Discharge: HOME HEALTH | DRG: 177 | End: 2023-06-17
Attending: EMERGENCY MEDICINE | Admitting: HOSPITALIST
Payer: MEDICARE

## 2023-06-14 ENCOUNTER — OFFICE VISIT (OUTPATIENT)
Dept: FAMILY MEDICINE | Facility: CLINIC | Age: 88
End: 2023-06-14
Payer: MEDICARE

## 2023-06-14 VITALS
OXYGEN SATURATION: 82 % | DIASTOLIC BLOOD PRESSURE: 65 MMHG | WEIGHT: 152.56 LBS | TEMPERATURE: 100 F | HEART RATE: 80 BPM | BODY MASS INDEX: 21.36 KG/M2 | HEIGHT: 71 IN | SYSTOLIC BLOOD PRESSURE: 142 MMHG

## 2023-06-14 DIAGNOSIS — R09.02 HYPOXIA: ICD-10-CM

## 2023-06-14 DIAGNOSIS — U07.1 ACUTE COVID-19: Primary | ICD-10-CM

## 2023-06-14 DIAGNOSIS — U07.1 ACUTE HYPOXEMIC RESPIRATORY FAILURE DUE TO COVID-19: ICD-10-CM

## 2023-06-14 DIAGNOSIS — U07.1 COVID-19: Primary | ICD-10-CM

## 2023-06-14 DIAGNOSIS — J96.01 ACUTE HYPOXEMIC RESPIRATORY FAILURE DUE TO COVID-19: ICD-10-CM

## 2023-06-14 LAB
ALBUMIN SERPL BCP-MCNC: 3.1 G/DL (ref 3.5–5.2)
ALLENS TEST: ABNORMAL
ALP SERPL-CCNC: 81 U/L (ref 55–135)
ALT SERPL W/O P-5'-P-CCNC: 20 U/L (ref 10–44)
ANION GAP SERPL CALC-SCNC: 11 MMOL/L (ref 8–16)
APTT PPP: 32.4 SEC (ref 21–32)
AST SERPL-CCNC: 30 U/L (ref 10–40)
BASOPHILS # BLD AUTO: 0.02 K/UL (ref 0–0.2)
BASOPHILS NFR BLD: 0.3 % (ref 0–1.9)
BILIRUB SERPL-MCNC: 0.3 MG/DL (ref 0.1–1)
BNP SERPL-MCNC: 214 PG/ML (ref 0–99)
BUN SERPL-MCNC: 14 MG/DL (ref 8–23)
CALCIUM SERPL-MCNC: 9 MG/DL (ref 8.7–10.5)
CHLORIDE SERPL-SCNC: 103 MMOL/L (ref 95–110)
CK SERPL-CCNC: 67 U/L (ref 20–200)
CO2 SERPL-SCNC: 22 MMOL/L (ref 23–29)
CREAT SERPL-MCNC: 0.8 MG/DL (ref 0.5–1.4)
CRP SERPL-MCNC: 185.3 MG/L (ref 0–8.2)
CTP QC/QA: YES
CTP QC/QA: YES
D DIMER PPP IA.FEU-MCNC: 0.5 MG/L FEU
DELSYS: ABNORMAL
DIFFERENTIAL METHOD: ABNORMAL
EOSINOPHIL # BLD AUTO: 0.1 K/UL (ref 0–0.5)
EOSINOPHIL NFR BLD: 1.3 % (ref 0–8)
ERYTHROCYTE [DISTWIDTH] IN BLOOD BY AUTOMATED COUNT: 12.8 % (ref 11.5–14.5)
ERYTHROCYTE [SEDIMENTATION RATE] IN BLOOD BY WESTERGREN METHOD: 50 MM/HR (ref 0–10)
EST. GFR  (NO RACE VARIABLE): >60 ML/MIN/1.73 M^2
FERRITIN SERPL-MCNC: 106 NG/ML (ref 20–300)
FLUAV AG NPH QL: NEGATIVE
FLUBV AG NPH QL: NEGATIVE
GLUCOSE SERPL-MCNC: 113 MG/DL (ref 70–110)
GLUCOSE SERPL-MCNC: 113 MG/DL (ref 70–110)
HCO3 UR-SCNC: 25.4 MMOL/L (ref 24–28)
HCT VFR BLD AUTO: 33.7 % (ref 40–54)
HCT VFR BLD CALC: 29 %PCV (ref 36–54)
HGB BLD-MCNC: 11 G/DL (ref 14–18)
IMM GRANULOCYTES # BLD AUTO: 0.03 K/UL (ref 0–0.04)
IMM GRANULOCYTES NFR BLD AUTO: 0.5 % (ref 0–0.5)
INR PPP: 1 (ref 0.8–1.2)
LACTATE SERPL-SCNC: 1.4 MMOL/L (ref 0.5–2.2)
LDH SERPL L TO P-CCNC: 321 U/L (ref 110–260)
LYMPHOCYTES # BLD AUTO: 0.8 K/UL (ref 1–4.8)
LYMPHOCYTES NFR BLD: 11.9 % (ref 18–48)
MCH RBC QN AUTO: 30.3 PG (ref 27–31)
MCHC RBC AUTO-ENTMCNC: 32.6 G/DL (ref 32–36)
MCV RBC AUTO: 93 FL (ref 82–98)
MODE: ABNORMAL
MONOCYTES # BLD AUTO: 0.6 K/UL (ref 0.3–1)
MONOCYTES NFR BLD: 9.9 % (ref 4–15)
NEUTROPHILS # BLD AUTO: 4.8 K/UL (ref 1.8–7.7)
NEUTROPHILS NFR BLD: 76.1 % (ref 38–73)
NRBC BLD-RTO: 0 /100 WBC
PCO2 BLDA: 35.1 MMHG (ref 35–45)
PH SMN: 7.47 [PH] (ref 7.35–7.45)
PLATELET # BLD AUTO: 204 K/UL (ref 150–450)
PMV BLD AUTO: 9.7 FL (ref 9.2–12.9)
PO2 BLDA: 53 MMHG (ref 80–100)
POC BE: 2 MMOL/L
POC IONIZED CALCIUM: 1.19 MMOL/L (ref 1.06–1.42)
POC SATURATED O2: 89 % (ref 95–100)
POTASSIUM BLD-SCNC: 3.5 MMOL/L (ref 3.5–5.1)
POTASSIUM SERPL-SCNC: 3.5 MMOL/L (ref 3.5–5.1)
PROT SERPL-MCNC: 6.5 G/DL (ref 6–8.4)
PROTHROMBIN TIME: 10.5 SEC (ref 9–12.5)
RBC # BLD AUTO: 3.63 M/UL (ref 4.6–6.2)
SAMPLE: ABNORMAL
SARS-COV-2 RDRP RESP QL NAA+PROBE: POSITIVE
SITE: ABNORMAL
SODIUM BLD-SCNC: 135 MMOL/L (ref 136–145)
SODIUM SERPL-SCNC: 136 MMOL/L (ref 136–145)
TROPONIN I SERPL DL<=0.01 NG/ML-MCNC: 0.01 NG/ML (ref 0–0.03)
WBC # BLD AUTO: 6.36 K/UL (ref 3.9–12.7)

## 2023-06-14 PROCEDURE — 87635: ICD-10-PCS | Mod: QW,S$GLB,, | Performed by: FAMILY MEDICINE

## 2023-06-14 PROCEDURE — 25000242 PHARM REV CODE 250 ALT 637 W/ HCPCS: Performed by: NURSE PRACTITIONER

## 2023-06-14 PROCEDURE — 83880 ASSAY OF NATRIURETIC PEPTIDE: CPT | Performed by: EMERGENCY MEDICINE

## 2023-06-14 PROCEDURE — 93005 ELECTROCARDIOGRAM TRACING: CPT

## 2023-06-14 PROCEDURE — 86140 C-REACTIVE PROTEIN: CPT | Performed by: EMERGENCY MEDICINE

## 2023-06-14 PROCEDURE — 99285 EMERGENCY DEPT VISIT HI MDM: CPT | Mod: 25

## 2023-06-14 PROCEDURE — 1159F MED LIST DOCD IN RCRD: CPT | Mod: CPTII,S$GLB,, | Performed by: FAMILY MEDICINE

## 2023-06-14 PROCEDURE — 84295 ASSAY OF SERUM SODIUM: CPT

## 2023-06-14 PROCEDURE — 82803 BLOOD GASES ANY COMBINATION: CPT

## 2023-06-14 PROCEDURE — 27000221 HC OXYGEN, UP TO 24 HOURS

## 2023-06-14 PROCEDURE — 85610 PROTHROMBIN TIME: CPT | Performed by: NURSE PRACTITIONER

## 2023-06-14 PROCEDURE — 99900035 HC TECH TIME PER 15 MIN (STAT)

## 2023-06-14 PROCEDURE — 1125F AMNT PAIN NOTED PAIN PRSNT: CPT | Mod: CPTII,S$GLB,, | Performed by: FAMILY MEDICINE

## 2023-06-14 PROCEDURE — 87804 POCT INFLUENZA A/B: ICD-10-PCS | Mod: QW,S$GLB,, | Performed by: FAMILY MEDICINE

## 2023-06-14 PROCEDURE — 94640 AIRWAY INHALATION TREATMENT: CPT

## 2023-06-14 PROCEDURE — 87502 INFLUENZA DNA AMP PROBE: CPT

## 2023-06-14 PROCEDURE — 93010 EKG 12-LEAD: ICD-10-PCS | Mod: ,,, | Performed by: INTERNAL MEDICINE

## 2023-06-14 PROCEDURE — 84132 ASSAY OF SERUM POTASSIUM: CPT

## 2023-06-14 PROCEDURE — 99999 PR PBB SHADOW E&M-EST. PATIENT-LVL III: ICD-10-PCS | Mod: PBBFAC,,, | Performed by: FAMILY MEDICINE

## 2023-06-14 PROCEDURE — 83615 LACTATE (LD) (LDH) ENZYME: CPT | Performed by: EMERGENCY MEDICINE

## 2023-06-14 PROCEDURE — 1159F PR MEDICATION LIST DOCUMENTED IN MEDICAL RECORD: ICD-10-PCS | Mod: CPTII,S$GLB,, | Performed by: FAMILY MEDICINE

## 2023-06-14 PROCEDURE — 3288F FALL RISK ASSESSMENT DOCD: CPT | Mod: CPTII,S$GLB,, | Performed by: FAMILY MEDICINE

## 2023-06-14 PROCEDURE — 3288F PR FALLS RISK ASSESSMENT DOCUMENTED: ICD-10-PCS | Mod: CPTII,S$GLB,, | Performed by: FAMILY MEDICINE

## 2023-06-14 PROCEDURE — 1100F PTFALLS ASSESS-DOCD GE2>/YR: CPT | Mod: CPTII,S$GLB,, | Performed by: FAMILY MEDICINE

## 2023-06-14 PROCEDURE — 63600175 PHARM REV CODE 636 W HCPCS: Mod: JZ,TB | Performed by: NURSE PRACTITIONER

## 2023-06-14 PROCEDURE — 1125F PR PAIN SEVERITY QUANTIFIED, PAIN PRESENT: ICD-10-PCS | Mod: CPTII,S$GLB,, | Performed by: FAMILY MEDICINE

## 2023-06-14 PROCEDURE — 27000207 HC ISOLATION

## 2023-06-14 PROCEDURE — 11000001 HC ACUTE MED/SURG PRIVATE ROOM

## 2023-06-14 PROCEDURE — 87635 SARS-COV-2 COVID-19 AMP PRB: CPT | Mod: QW,S$GLB,, | Performed by: FAMILY MEDICINE

## 2023-06-14 PROCEDURE — 99213 PR OFFICE/OUTPT VISIT, EST, LEVL III, 20-29 MIN: ICD-10-PCS | Mod: S$GLB,,, | Performed by: FAMILY MEDICINE

## 2023-06-14 PROCEDURE — 85730 THROMBOPLASTIN TIME PARTIAL: CPT | Performed by: NURSE PRACTITIONER

## 2023-06-14 PROCEDURE — 82728 ASSAY OF FERRITIN: CPT | Performed by: EMERGENCY MEDICINE

## 2023-06-14 PROCEDURE — 84484 ASSAY OF TROPONIN QUANT: CPT | Performed by: EMERGENCY MEDICINE

## 2023-06-14 PROCEDURE — 85014 HEMATOCRIT: CPT

## 2023-06-14 PROCEDURE — 36600 WITHDRAWAL OF ARTERIAL BLOOD: CPT

## 2023-06-14 PROCEDURE — 87804 INFLUENZA ASSAY W/OPTIC: CPT | Mod: QW,S$GLB,, | Performed by: FAMILY MEDICINE

## 2023-06-14 PROCEDURE — 83605 ASSAY OF LACTIC ACID: CPT | Performed by: EMERGENCY MEDICINE

## 2023-06-14 PROCEDURE — 82330 ASSAY OF CALCIUM: CPT

## 2023-06-14 PROCEDURE — 82550 ASSAY OF CK (CPK): CPT | Performed by: EMERGENCY MEDICINE

## 2023-06-14 PROCEDURE — 85025 COMPLETE CBC W/AUTO DIFF WBC: CPT | Performed by: EMERGENCY MEDICINE

## 2023-06-14 PROCEDURE — 85651 RBC SED RATE NONAUTOMATED: CPT | Performed by: EMERGENCY MEDICINE

## 2023-06-14 PROCEDURE — 99213 OFFICE O/P EST LOW 20 MIN: CPT | Mod: S$GLB,,, | Performed by: FAMILY MEDICINE

## 2023-06-14 PROCEDURE — 99999 PR PBB SHADOW E&M-EST. PATIENT-LVL III: CPT | Mod: PBBFAC,,, | Performed by: FAMILY MEDICINE

## 2023-06-14 PROCEDURE — 85379 FIBRIN DEGRADATION QUANT: CPT | Performed by: NURSE PRACTITIONER

## 2023-06-14 PROCEDURE — 93010 ELECTROCARDIOGRAM REPORT: CPT | Mod: ,,, | Performed by: INTERNAL MEDICINE

## 2023-06-14 PROCEDURE — 80053 COMPREHEN METABOLIC PANEL: CPT | Performed by: EMERGENCY MEDICINE

## 2023-06-14 PROCEDURE — 87040 BLOOD CULTURE FOR BACTERIA: CPT | Mod: 59 | Performed by: EMERGENCY MEDICINE

## 2023-06-14 PROCEDURE — 36415 COLL VENOUS BLD VENIPUNCTURE: CPT | Performed by: EMERGENCY MEDICINE

## 2023-06-14 PROCEDURE — 25000003 PHARM REV CODE 250: Performed by: NURSE PRACTITIONER

## 2023-06-14 PROCEDURE — 1100F PR PT FALLS ASSESS DOC 2+ FALLS/FALL W/INJURY/YR: ICD-10-PCS | Mod: CPTII,S$GLB,, | Performed by: FAMILY MEDICINE

## 2023-06-14 RX ORDER — ENOXAPARIN SODIUM 100 MG/ML
1 INJECTION SUBCUTANEOUS 2 TIMES DAILY
Status: DISCONTINUED | OUTPATIENT
Start: 2023-06-14 | End: 2023-06-14

## 2023-06-14 RX ORDER — ALBUTEROL SULFATE 90 UG/1
2 AEROSOL, METERED RESPIRATORY (INHALATION) EVERY 6 HOURS
Status: DISCONTINUED | OUTPATIENT
Start: 2023-06-14 | End: 2023-06-17 | Stop reason: HOSPADM

## 2023-06-14 RX ORDER — PANTOPRAZOLE SODIUM 40 MG/1
40 TABLET, DELAYED RELEASE ORAL DAILY
Status: DISCONTINUED | OUTPATIENT
Start: 2023-06-15 | End: 2023-06-17 | Stop reason: HOSPADM

## 2023-06-14 RX ORDER — ASCORBIC ACID 500 MG
500 TABLET ORAL 2 TIMES DAILY
Status: DISCONTINUED | OUTPATIENT
Start: 2023-06-14 | End: 2023-06-17 | Stop reason: HOSPADM

## 2023-06-14 RX ORDER — GLUCAGON 1 MG
1 KIT INJECTION
Status: DISCONTINUED | OUTPATIENT
Start: 2023-06-14 | End: 2023-06-17 | Stop reason: HOSPADM

## 2023-06-14 RX ORDER — ATORVASTATIN CALCIUM 10 MG/1
20 TABLET, FILM COATED ORAL DAILY
Status: DISCONTINUED | OUTPATIENT
Start: 2023-06-15 | End: 2023-06-17 | Stop reason: HOSPADM

## 2023-06-14 RX ORDER — SODIUM CHLORIDE 0.9 % (FLUSH) 0.9 %
10 SYRINGE (ML) INJECTION
Status: DISCONTINUED | OUTPATIENT
Start: 2023-06-14 | End: 2023-06-17 | Stop reason: HOSPADM

## 2023-06-14 RX ORDER — ACETAMINOPHEN 325 MG/1
650 TABLET ORAL EVERY 4 HOURS PRN
Status: DISCONTINUED | OUTPATIENT
Start: 2023-06-14 | End: 2023-06-17 | Stop reason: HOSPADM

## 2023-06-14 RX ORDER — AMOXICILLIN 250 MG
1 CAPSULE ORAL 2 TIMES DAILY PRN
Status: DISCONTINUED | OUTPATIENT
Start: 2023-06-14 | End: 2023-06-17 | Stop reason: HOSPADM

## 2023-06-14 RX ADMIN — ALBUTEROL SULFATE 2 PUFF: 90 AEROSOL, METERED RESPIRATORY (INHALATION) at 08:06

## 2023-06-14 RX ADMIN — REMDESIVIR 200 MG: 100 INJECTION, POWDER, LYOPHILIZED, FOR SOLUTION INTRAVENOUS at 08:06

## 2023-06-14 RX ADMIN — OXYCODONE HYDROCHLORIDE AND ACETAMINOPHEN 500 MG: 500 TABLET ORAL at 09:06

## 2023-06-14 RX ADMIN — APIXABAN 2.5 MG: 2.5 TABLET, FILM COATED ORAL at 09:06

## 2023-06-14 NOTE — Clinical Note
Diagnosis: COVID-19 [[id number]]   Admitting Provider:: HAMZAH MICHELLE [[id number]]   Future Attending Provider: HAMZAH MICHELLE [[id number]]   Reason for IP Medical Treatment  (Clinical interventions that can only be accomplished in the IP setting? ) :: COVID-19 infection with hypoxia   I certify that Inpatient services for greater than or equal to 2 midnights are medically necessary:: Yes   Plans for Post-Acute care--if anticipated (pick the single best option):: A. No post acute care anticipated at this time   Special Needs:: Fall Risk [15]

## 2023-06-14 NOTE — PROGRESS NOTES
"  Chief Complaint:    Chief Complaint   Patient presents with    Cough       History of Present Illness:    06/14/2023:-  Presents today with cough and HA,  Tested positive for COVID today, has been sick for 1 week. Says he has been having a productive cough and SOB but denies any fever  States he felt sick before this started but it had went away.  O2 levels at 82 right now.         03/16/2023:-  Presents today for six-month follow-up   Doing okay blood pressure is running on the low side.  He is taking amlodipine   He also complains that he is taking Eliquis and he bleeds a lot slight bump taking 5 mg b.i.d. he has a atrial lead pacemaker, also suffers with paroxysmal AFib and susanna-tachy syndrome    11/09/2022:-  Patient presents today for a hospital follow-up on 10/28 for abnormal Holter monitor finding    An atrial lead pacemaker was placed on 11/01. Put on Eliquis. Doing better. No more syncopal episodes. His blood pressure is running normally.      09/27/2022:-  Patient with HTN presents today for a fall three days ago    He reports swelling in his R knee that has now subsided since he's been icing it. He didn't come to the office because it was closed so he waited until today. Previous history of cortisone injection.   He says his spells occur mostly after eating and there momentary and then goes away and but can come back again.  Also wants clarifications on his medications. They cancelled his Holter monitor because his echo was normal. His dizzy spells would occur after eating so he stopped Crestor and dutasteride.  Patient and his wife would like their flu vaccines today.   Patient would ilke rollator. Using his wife's today.     09/13/2022:-  Patient with HTN presents today for dizziness.    Had an episode of blurry vision, near-syncope while sitting down. Says it felt like he was going "blind". Denies palpitations or chest pain. He did have pain in his forehead. His blood pressure was 125/75 when the " episode occurred. Didn't check his sugar. He took Pepto Bismol and his symptoms started fading. Symptoms returned the next day. He has a heart murmur. His last echocardiogram showed that one of his valves was tightening.   Reports R knee pain, will see Dr. Roberson on 10/31 for a shot. Wants to know if he can get something sooner. Dr. Sadler did an injection over one year ago. His knee can give out if he's been sitting for a while.       ROS:  Review of Systems   Constitutional:  Negative for appetite change, chills and fever.   HENT:  Negative for congestion, ear pain, postnasal drip, rhinorrhea, sinus pressure and sinus pain.    Eyes:  Negative for pain.   Respiratory:  Positive for cough and shortness of breath. Negative for chest tightness.    Cardiovascular:  Negative for chest pain and palpitations.   Gastrointestinal:  Negative for abdominal pain, blood in stool, constipation, diarrhea and nausea.   Genitourinary:  Negative for difficulty urinating, dysuria, flank pain and hematuria.   Musculoskeletal:  Negative for arthralgias and myalgias.   Skin:  Negative for pallor and wound.   Neurological:  Negative for dizziness, tremors, speech difficulty, light-headedness and headaches.   Psychiatric/Behavioral:  Negative for behavioral problems, dysphoric mood and sleep disturbance. The patient is not nervous/anxious.    All other systems reviewed and are negative.    Past Medical History:   Diagnosis Date    Abnormal exercise tolerance test 7/25/2013    Arthritis     Colon polyp     Diverticulosis     Dyslipidemia 7/25/2013    GERD (gastroesophageal reflux disease)     HTN, goal below 130/80 12/3/2018    Hyperlipidemia 1980    HIGH TG    Knee pain, right 6/14/2013    Low back pain with right-sided sciatica 12/3/2018    Meningioma     Paroxysmal A-fib 10/29/2022    Personal history of colonic polyps 5/27/2014    RLS (restless legs syndrome) 6/14/2013    Tobacco dependence     resolved    West Nile meningitis 2010    per  patient       Social History:  Social History     Socioeconomic History    Marital status:    Tobacco Use    Smoking status: Former     Packs/day: 1.00     Years: 25.00     Pack years: 25.00     Types: Cigarettes     Quit date: 1990     Years since quittin.4    Smokeless tobacco: Never   Substance and Sexual Activity    Alcohol use: No     Alcohol/week: 0.0 standard drinks    Drug use: No    Sexual activity: Not Currently     Birth control/protection: None     Social Determinants of Health     Financial Resource Strain: Low Risk     Difficulty of Paying Living Expenses: Not hard at all   Food Insecurity: No Food Insecurity    Worried About Running Out of Food in the Last Year: Never true    Ran Out of Food in the Last Year: Never true   Transportation Needs: No Transportation Needs    Lack of Transportation (Medical): No    Lack of Transportation (Non-Medical): No   Physical Activity: Sufficiently Active    Days of Exercise per Week: 7 days    Minutes of Exercise per Session: 30 min   Stress: No Stress Concern Present    Feeling of Stress : Not at all   Social Connections: Moderately Integrated    Frequency of Communication with Friends and Family: Twice a week    Frequency of Social Gatherings with Friends and Family: Three times a week    Attends [employer] Services: More than 4 times per year    Active Member of Clubs or Organizations: No    Attends Club or Organization Meetings: Never    Marital Status:    Housing Stability: Low Risk     Unable to Pay for Housing in the Last Year: No    Number of Places Lived in the Last Year: 1    Unstable Housing in the Last Year: No       Family History:   family history includes Cancer in his son; Diabetes in his maternal uncle; Heart disease in his mother.    Health Maintenance   Topic Date Due    Lipid Panel  2024    TETANUS VACCINE  2025       Physical Exam:    Vital Signs  Temp: 99.6 °F (37.6 °C)  Pulse: 80  SpO2: (!) 82 %  BP: (!)  "142/65  BP Location: Right arm  Patient Position: Sitting  Pain Score:   8  Height and Weight  Height: 5' 11" (180.3 cm)  Weight: 69.2 kg (152 lb 8.9 oz)  BSA (Calculated - sq m): 1.86 sq meters  BMI (Calculated): 21.3  Weight in (lb) to have BMI = 25: 178.9]    Body mass index is 21.28 kg/m².    Physical Exam  Constitutional:       Appearance: Normal appearance.   HENT:      Head: Normocephalic and atraumatic.      Right Ear: Tympanic membrane normal.      Left Ear: Tympanic membrane normal.   Eyes:      Extraocular Movements: Extraocular movements intact.      Pupils: Pupils are equal, round, and reactive to light.   Cardiovascular:      Rate and Rhythm: Normal rate and regular rhythm.      Pulses: Normal pulses.      Heart sounds: Normal heart sounds. No murmur heard.    No gallop.   Pulmonary:      Effort: Pulmonary effort is normal. No respiratory distress.      Breath sounds: Normal breath sounds. No wheezing, rhonchi or rales.   Abdominal:      General: There is no distension.      Palpations: Abdomen is soft.      Tenderness: There is no abdominal tenderness.   Musculoskeletal:         General: No swelling, deformity or signs of injury. Normal range of motion.      Cervical back: Normal range of motion.   Skin:     General: Skin is warm and dry.      Capillary Refill: Capillary refill takes less than 2 seconds.      Coloration: Skin is not jaundiced or pale.   Neurological:      General: No focal deficit present.      Mental Status: He is alert and oriented to person, place, and time.   Psychiatric:         Mood and Affect: Mood normal.         Behavior: Behavior normal.       Lab Results   Component Value Date    CHOL 139 03/16/2023    CHOL 151 07/05/2022    CHOL 143 01/03/2022    TRIG 198 (H) 03/16/2023    TRIG 192 (H) 07/05/2022    TRIG 171 (H) 01/03/2022    HDL 61 03/16/2023    HDL 58 07/05/2022    HDL 51 01/03/2022    TOTALCHOLEST 2.3 03/16/2023    TOTALCHOLEST 2.6 07/05/2022    TOTALCHOLEST 2.8 " 01/03/2022    NONHDLCHOL 78 03/16/2023    NONHDLCHOL 93 07/05/2022    NONHDLCHOL 92 01/03/2022       Lab Results   Component Value Date    HGBA1C 5.5 03/16/2023       Assessment:      ICD-10-CM ICD-9-CM   1. Acute COVID-19  U07.1 079.89   2. Hypoxia  R09.02 799.02     Plan:  Patient has tested positive for COVID  O2 levels are low, 82  Given 2 L oxygen  Immediately head to ED via ambulance   Follow-up after ER visit  Orders Placed This Encounter   Procedures    POCT COVID-19 Rapid Screening    POCT Influenza A/B         Current Outpatient Medications   Medication Sig Dispense Refill    apixaban (ELIQUIS) 2.5 mg Tab Take 1 tablet (2.5 mg total) by mouth 2 (two) times daily. 60 tablet 11    bisacodyL (DULCOLAX) 5 mg EC tablet Take 5 mg by mouth daily as needed for Constipation.      dutasteride (AVODART) 0.5 mg capsule Take 1 capsule (0.5 mg total) by mouth once daily. 90 capsule 3    pantoprazole (PROTONIX) 40 MG tablet Take 1 tablet by mouth once daily 90 tablet 2    rOPINIRole (REQUIP) 0.5 MG tablet Take 1 tablet (0.5 mg total) by mouth 3 (three) times daily. 30 tablet 2    rosuvastatin (CRESTOR) 5 MG tablet Take 1 tablet (5 mg total) by mouth once daily. 90 tablet 3     No current facility-administered medications for this visit.       There are no discontinued medications.    No follow-ups on file.      Madie aDvis MD  Scribe Attestation:   I, Asher Randall and Irineo Vega, am scribing for, and in the presence of, Dr.Arif Davis I performed the above scribed service and the documentation accurately describes the services I performed. I attest to the accuracy of the note.    I, Dr. Madie Davis, reviewed documentation as scribed above. I performed the services described in this documentation.  I agree that the record reflects my personal performance and is accurate and complete. Madie Davis MD.  06/14/2023

## 2023-06-15 PROBLEM — U07.1 COVID-19: Status: ACTIVE | Noted: 2023-06-15

## 2023-06-15 PROBLEM — J96.01 ACUTE RESPIRATORY FAILURE WITH HYPOXIA: Status: ACTIVE | Noted: 2023-06-15

## 2023-06-15 LAB
BASOPHILS # BLD AUTO: 0.02 K/UL (ref 0–0.2)
BASOPHILS NFR BLD: 0.4 % (ref 0–1.9)
DIFFERENTIAL METHOD: ABNORMAL
EOSINOPHIL # BLD AUTO: 0.1 K/UL (ref 0–0.5)
EOSINOPHIL NFR BLD: 2.7 % (ref 0–8)
ERYTHROCYTE [DISTWIDTH] IN BLOOD BY AUTOMATED COUNT: 12.7 % (ref 11.5–14.5)
HCT VFR BLD AUTO: 30.8 % (ref 40–54)
HGB BLD-MCNC: 10 G/DL (ref 14–18)
IMM GRANULOCYTES # BLD AUTO: 0.01 K/UL (ref 0–0.04)
IMM GRANULOCYTES NFR BLD AUTO: 0.2 % (ref 0–0.5)
LYMPHOCYTES # BLD AUTO: 0.9 K/UL (ref 1–4.8)
LYMPHOCYTES NFR BLD: 19.4 % (ref 18–48)
MCH RBC QN AUTO: 29.9 PG (ref 27–31)
MCHC RBC AUTO-ENTMCNC: 32.5 G/DL (ref 32–36)
MCV RBC AUTO: 92 FL (ref 82–98)
MONOCYTES # BLD AUTO: 0.6 K/UL (ref 0.3–1)
MONOCYTES NFR BLD: 12.3 % (ref 4–15)
NEUTROPHILS # BLD AUTO: 3.1 K/UL (ref 1.8–7.7)
NEUTROPHILS NFR BLD: 65 % (ref 38–73)
NRBC BLD-RTO: 0 /100 WBC
PLATELET # BLD AUTO: 220 K/UL (ref 150–450)
PMV BLD AUTO: 9.8 FL (ref 9.2–12.9)
RBC # BLD AUTO: 3.35 M/UL (ref 4.6–6.2)
WBC # BLD AUTO: 4.79 K/UL (ref 3.9–12.7)

## 2023-06-15 PROCEDURE — 94799 UNLISTED PULMONARY SVC/PX: CPT

## 2023-06-15 PROCEDURE — 27000221 HC OXYGEN, UP TO 24 HOURS

## 2023-06-15 PROCEDURE — 94640 AIRWAY INHALATION TREATMENT: CPT

## 2023-06-15 PROCEDURE — 85025 COMPLETE CBC W/AUTO DIFF WBC: CPT | Performed by: NURSE PRACTITIONER

## 2023-06-15 PROCEDURE — 94761 N-INVAS EAR/PLS OXIMETRY MLT: CPT

## 2023-06-15 PROCEDURE — 25000003 PHARM REV CODE 250: Performed by: NURSE PRACTITIONER

## 2023-06-15 PROCEDURE — 27000207 HC ISOLATION

## 2023-06-15 PROCEDURE — 94760 N-INVAS EAR/PLS OXIMETRY 1: CPT

## 2023-06-15 PROCEDURE — 99900035 HC TECH TIME PER 15 MIN (STAT)

## 2023-06-15 PROCEDURE — 63600175 PHARM REV CODE 636 W HCPCS: Mod: JZ,TB | Performed by: NURSE PRACTITIONER

## 2023-06-15 PROCEDURE — 21400001 HC TELEMETRY ROOM

## 2023-06-15 PROCEDURE — 36415 COLL VENOUS BLD VENIPUNCTURE: CPT | Performed by: NURSE PRACTITIONER

## 2023-06-15 RX ORDER — FUROSEMIDE 20 MG/1
20 TABLET ORAL ONCE
Status: DISCONTINUED | OUTPATIENT
Start: 2023-06-15 | End: 2023-06-15

## 2023-06-15 RX ADMIN — APIXABAN 2.5 MG: 2.5 TABLET, FILM COATED ORAL at 09:06

## 2023-06-15 RX ADMIN — ALBUTEROL SULFATE 2 PUFF: 90 AEROSOL, METERED RESPIRATORY (INHALATION) at 07:06

## 2023-06-15 RX ADMIN — ATORVASTATIN CALCIUM 20 MG: 10 TABLET, FILM COATED ORAL at 08:06

## 2023-06-15 RX ADMIN — APIXABAN 2.5 MG: 2.5 TABLET, FILM COATED ORAL at 08:06

## 2023-06-15 RX ADMIN — PANTOPRAZOLE SODIUM 40 MG: 40 TABLET, DELAYED RELEASE ORAL at 08:06

## 2023-06-15 RX ADMIN — ALBUTEROL SULFATE 2 PUFF: 90 AEROSOL, METERED RESPIRATORY (INHALATION) at 08:06

## 2023-06-15 RX ADMIN — DEXAMETHASONE 6 MG: 4 TABLET ORAL at 08:06

## 2023-06-15 RX ADMIN — REMDESIVIR 100 MG: 100 INJECTION, POWDER, LYOPHILIZED, FOR SOLUTION INTRAVENOUS at 04:06

## 2023-06-15 RX ADMIN — THERA TABS 1 TABLET: TAB at 08:06

## 2023-06-15 RX ADMIN — OXYCODONE HYDROCHLORIDE AND ACETAMINOPHEN 500 MG: 500 TABLET ORAL at 08:06

## 2023-06-15 RX ADMIN — OXYCODONE HYDROCHLORIDE AND ACETAMINOPHEN 500 MG: 500 TABLET ORAL at 09:06

## 2023-06-15 RX ADMIN — ALBUTEROL SULFATE 2 PUFF: 90 AEROSOL, METERED RESPIRATORY (INHALATION) at 12:06

## 2023-06-15 NOTE — ASSESSMENT & PLAN NOTE
Patient is identified as High risk for severe complications of COVID 19 based on COVID risk score of 5   Initiate standard COVID protocols; COVID-19 testing ,Infection Control notification  and isolation- respiratory, contact and droplet per protocol    Diagnostics: (leukopenia, hyponatremia, hyperferritinemia, elevated troponin, elevated d-dimer, age, and comorbidities are significant predictors of poor clinical outcome)  CBC, CMP, Ferritin, CRP and Portable CXR    Management: Initiate targeted therapy with Remdesivir, 200mg IV x1, followed by 100mg IV daily x5 days total and Dexamethasone PO/IV 6mg daily x10 days, Maintain oxygen saturations 92-96% via Nasal Cannula 3 LPM and monitor with continuous/intermittent pulse oximetry. , Inhaled bronchodilators as needed for shortness of breath. and Continuous cardiac monitoring.

## 2023-06-15 NOTE — SUBJECTIVE & OBJECTIVE
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       Past Surgical History:   Procedure Laterality Date    A-V CARDIAC PACEMAKER INSERTION Left 11/1/2022    Procedure: INSERTION, CARDIAC PACEMAKER, DUAL CHAMBER;  Surgeon: Finn Mccrary MD;  Location: [facility] CATH LAB;  Service: Cardiology;  Laterality: Left;  biotronik AAIR    APPENDECTOMY      BACK SURGERY Bilateral 2016    CATARACT EXTRACTION, BILATERAL      COLONOSCOPY  2/2009    EYE SURGERY      INJECTION OF ANESTHETIC AGENT AROUND NERVE Right 11/18/2022    Procedure: Right Genicular nerve block w/Light RN IV Sedation;  Surgeon: Benitez Roberson MD;  Location: [facility] PAIN MGT;  Service: Pain Management;  Laterality: Right;    INSERTION OF PERMANENT PACEMAKER Left 11/1/2022    Procedure: Implant PPM;  Surgeon: Finn Mccrary MD;  Location: [facility] CATH LAB;  Service: Cardiology;  Laterality: Left;    JOINT REPLACEMENT      left knee    LUMBAR PARAVERTEBRAL FACET JOINT NERVE BLOCK Bilateral 8/29/2019    Procedure: LMBB L3,4,5;  Surgeon: Nabor Sadler MD;  Location: [facility] PAIN MGT;  Service: Pain Management;  Laterality: Bilateral;    SELECTIVE INJECTION OF ANESTHETIC AGENT AROUND LUMBAR SPINAL NERVE ROOT BY TRANSFORAMINAL APPROACH Bilateral 11/8/2021    Procedure: Bilateral L4/5 +/- L5/S1 TF DREW;  Surgeon: Nabor Sadler MD;  Location: [facility] PAIN MGT;  Service: Pain Management;  Laterality: Bilateral;    SELECTIVE INJECTION OF ANESTHETIC AGENT AROUND  LUMBAR SPINAL NERVE ROOT BY TRANSFORAMINAL APPROACH Bilateral 2022    Procedure: Bilateral L4/5 + L5/S1 TF DREW;  Surgeon: Nabor Sadler MD;  Location: [facility] PAIN MGT;  Service: Pain Management;  Laterality: Bilateral;    SELECTIVE INJECTION OF ANESTHETIC AGENT AROUND LUMBAR SPINAL NERVE ROOT BY TRANSFORAMINAL APPROACH Right 2022    Procedure: Right-sided L4/5 and L5/S1 transforaminal epidural steroid injection with RN IV sedation;  Surgeon: Benitez Roberson MD;  Location: [facility] PAIN MGT;  Service: Pain Management;  Laterality: Right;    SPINE SURGERY      UPPER GASTROINTESTINAL ENDOSCOPY  2009       Review of patient's allergies indicates:   Allergen Reactions    Shellfish containing products Nausea And Vomiting    Amoxicillin Rash    Iodine and iodide containing products Nausea And Vomiting       No current facility-administered medications on file prior to encounter.     Current Outpatient Medications on File Prior to Encounter   Medication Sig    apixaban (ELIQUIS) 2.5 mg Tab Take 1 tablet (2.5 mg total) by mouth 2 (two) times daily.    bisacodyL (DULCOLAX) 5 mg EC tablet Take 5 mg by mouth daily as needed for Constipation.    dutasteride (AVODART) 0.5 mg capsule Take 1 capsule (0.5 mg total) by mouth once daily.    pantoprazole (PROTONIX) 40 MG tablet Take 1 tablet by mouth once daily    rOPINIRole (REQUIP) 0.5 MG tablet Take 1 tablet (0.5 mg total) by mouth 3 (three) times daily.    rosuvastatin (CRESTOR) 5 MG tablet Take 1 tablet (5 mg total) by mouth once daily.     Family History       Problem Relation (Age of Onset)    Cancer Son    Diabetes Maternal Uncle    Heart disease Mother          Tobacco Use    Smoking status: Former     Packs/day: 1.00     Years: 25.00     Pack years: 25.00     Types: Cigarettes     Quit date: 1990     Years since quittin.4    Smokeless tobacco: Never   Substance and Sexual Activity    Alcohol use: No     Alcohol/week: 0.0 standard drinks    Drug use: No    Sexual  activity: Not Currently     Birth control/protection: None     Review of Systems   Constitutional:  Negative for chills and fever.   HENT:  Positive for congestion.    Eyes:  Negative for photophobia.   Respiratory:  Positive for cough and shortness of breath. Negative for chest tightness and wheezing.    Cardiovascular:  Negative for chest pain, palpitations and leg swelling.   Gastrointestinal:  Negative for abdominal pain, diarrhea, nausea and vomiting.   Genitourinary:  Negative for dysuria, flank pain and hematuria.   Musculoskeletal:  Negative for back pain, myalgias and neck pain.   Skin:  Negative for rash and wound.   Neurological:  Positive for weakness. Negative for dizziness, syncope and headaches.   Psychiatric/Behavioral:  Negative for agitation.    Objective:     Vital Signs (Most Recent):  Temp: 97.8 °F (36.6 °C) (06/15/23 0119)  Pulse: 72 (06/15/23 0119)  Resp: 16 (06/15/23 0119)  BP: (!) 160/78 (06/15/23 0119)  SpO2: (!) 92 % (06/15/23 0119) Vital Signs (24h Range):  Temp:  [97.8 °F (36.6 °C)-99.6 °F (37.6 °C)] 97.8 °F (36.6 °C)  Pulse:  [63-86] 72  Resp:  [16-24] 16  SpO2:  [82 %-98 %] 92 %  BP: (111-160)/(60-80) 160/78     Weight: 67.7 kg (149 lb 4 oz)  Body mass index is 21.42 kg/m².     Physical Exam  Vitals and nursing note reviewed.   Constitutional:       General: He is not in acute distress.     Appearance: He is well-developed.      Comments: Elderly, frail    HENT:      Head: Normocephalic and atraumatic.   Eyes:      Conjunctiva/sclera: Conjunctivae normal.      Pupils: Pupils are equal, round, and reactive to light.   Neck:      Vascular: No JVD.   Cardiovascular:      Rate and Rhythm: Normal rate and regular rhythm.      Heart sounds: Normal heart sounds.   Pulmonary:      Effort: Pulmonary effort is normal. No respiratory distress.      Breath sounds: Normal breath sounds. No wheezing.   Abdominal:      General: Bowel sounds are normal. There is no distension.      Palpations: Abdomen  is soft.      Tenderness: There is no abdominal tenderness. There is no guarding.   Musculoskeletal:         General: No tenderness. Normal range of motion.      Cervical back: Normal range of motion and neck supple.   Skin:     General: Skin is warm and dry.      Capillary Refill: Capillary refill takes less than 2 seconds.      Findings: No erythema.   Neurological:      Mental Status: He is alert and oriented to person, place, and time.   Psychiatric:         Behavior: Behavior normal.            CRANIAL NERVES     CN III, IV, VI   Pupils are equal, round, and reactive to light.     Significant Labs: All pertinent labs within the past 24 hours have been reviewed.  CBC:   Recent Labs   Lab 06/14/23  1737 06/14/23  1743   WBC  --  6.36   HGB  --  11.0*   HCT 29* 33.7*   PLT  --  204     CMP:   Recent Labs   Lab 06/14/23  1743      K 3.5      CO2 22*   *   BUN 14   CREATININE 0.8   CALCIUM 9.0   PROT 6.5   ALBUMIN 3.1*   BILITOT 0.3   ALKPHOS 81   AST 30   ALT 20   ANIONGAP 11     Troponin:   Recent Labs   Lab 06/14/23  1743   TROPONINI 0.015     Significant Imaging: I have reviewed all pertinent imaging results/findings within the past 24 hours.

## 2023-06-15 NOTE — SUBJECTIVE & OBJECTIVE
Interval History:     Review of Systems   Constitutional:  Negative for chills and fever.   HENT:  Positive for congestion.    Eyes:  Negative for photophobia.   Respiratory:  Positive for cough and shortness of breath. Negative for chest tightness and wheezing.    Cardiovascular:  Negative for chest pain, palpitations and leg swelling.   Gastrointestinal:  Negative for abdominal pain, diarrhea, nausea and vomiting.   Genitourinary:  Negative for dysuria, flank pain and hematuria.   Musculoskeletal:  Negative for back pain, myalgias and neck pain.   Skin:  Negative for rash and wound.   Neurological:  Positive for weakness. Negative for dizziness, syncope and headaches.   Psychiatric/Behavioral:  Negative for agitation.    Objective:     Vital Signs (Most Recent):  Temp: 98.7 °F (37.1 °C) (06/15/23 0714)  Pulse: 78 (06/15/23 1219)  Resp: 18 (06/15/23 1219)  BP: (!) 151/67 (06/15/23 0714)  SpO2: (!) 92 % (06/15/23 0811) Vital Signs (24h Range):  Temp:  [97.8 °F (36.6 °C)-99.6 °F (37.6 °C)] 98.7 °F (37.1 °C)  Pulse:  [63-88] 78  Resp:  [14-24] 18  SpO2:  [82 %-98 %] 92 %  BP: (111-160)/(60-80) 151/67     Weight: 67.7 kg (149 lb 4 oz)  Body mass index is 21.42 kg/m².    Intake/Output Summary (Last 24 hours) at 6/15/2023 1222  Last data filed at 6/14/2023 2207  Gross per 24 hour   Intake 490 ml   Output 400 ml   Net 90 ml         Physical Exam  Vitals and nursing note reviewed.   Constitutional:       General: He is not in acute distress.     Appearance: He is well-developed.      Comments: Elderly, frail    HENT:      Head: Normocephalic and atraumatic.   Eyes:      Conjunctiva/sclera: Conjunctivae normal.      Pupils: Pupils are equal, round, and reactive to light.   Neck:      Vascular: No JVD.   Cardiovascular:      Rate and Rhythm: Normal rate and regular rhythm.      Heart sounds: Normal heart sounds.   Pulmonary:      Effort: Pulmonary effort is normal. No respiratory distress.      Breath sounds: Normal breath  sounds. No wheezing.   Abdominal:      General: Bowel sounds are normal. There is no distension.      Palpations: Abdomen is soft.      Tenderness: There is no abdominal tenderness. There is no guarding.   Musculoskeletal:         General: No tenderness. Normal range of motion.      Cervical back: Normal range of motion and neck supple.   Skin:     General: Skin is warm and dry.      Capillary Refill: Capillary refill takes less than 2 seconds.      Findings: No erythema.   Neurological:      Mental Status: He is alert and oriented to person, place, and time.   Psychiatric:         Behavior: Behavior normal.           Significant Labs: All pertinent labs within the past 24 hours have been reviewed.  Recent Lab Results  (Last 5 results in the past 24 hours)        06/15/23  0526   06/14/23 2047 06/14/23  2041 06/14/23 2032 06/14/23  1743        Albumin         3.1       Alkaline Phosphatase         81       ALT         20       Anion Gap         11       aPTT   32.4  Comment: Refer to local heparin nomogram for intensity/dose specific   therapeutic   range.               AST         30       Baso # 0.02         0.02       Basophil % 0.4         0.3       BILIRUBIN TOTAL         0.3  Comment: For infants and newborns, interpretation of results should be based  on gestational age, weight and in agreement with clinical  observations.    Premature Infant recommended reference ranges:  Up to 24 hours.............<8.0 mg/dL  Up to 48 hours............<12.0 mg/dL  3-5 days..................<15.0 mg/dL  6-29 days.................<15.0 mg/dL         Blood Culture, Routine         No Growth to date  [P]       BNP     214  Comment: Values of less than 100 pg/ml are consistent with non-CHF populations.           BUN         14       Calcium         9.0       Chloride         103       CO2         22       CPK         67       Creatinine         0.8       CRP         185.3       D-Dimer   0.50  Comment: The quantitative  D-dimer assay should be used as an aid in   the diagnosis of deep vein thrombosis and pulmonary embolism  in patients with the appropriate presentation and clinical  history. The upper limit of the reference interval and the clinical   cut off   point are identical. Causes of a positive (>0.50 mg/L FEU) D-Dimer   test  include, but are not limited to: DVT, PE, DIC, thrombolytic   therapy, anticoagulant therapy, recent surgery, trauma, or   pregnancy, disseminated malignancy, aortic aneurysm, cirrhosis,  and severe infection. False negative results may occur in   patients with distal DVT.               Differential Method Automated         Automated       eGFR         >60       Eos # 0.1         0.1       Eosinophil % 2.7         1.3       Ferritin         106       Glucose         113       Gran # (ANC) 3.1         4.8       Gran % 65.0         76.1       Hematocrit 30.8         33.7       Hemoglobin 10.0         11.0       Immature Grans (Abs) 0.01  Comment: Mild elevation in immature granulocytes is non specific and   can be seen in a variety of conditions including stress response,   acute inflammation, trauma and pregnancy. Correlation with other   laboratory and clinical findings is essential.           0.03  Comment: Mild elevation in immature granulocytes is non specific and   can be seen in a variety of conditions including stress response,   acute inflammation, trauma and pregnancy. Correlation with other   laboratory and clinical findings is essential.         Immature Granulocytes 0.2         0.5       INR   1.0  Comment: Coumadin Therapy:  2.0 - 3.0 for INR for all indicators except mechanical heart valves  and antiphospholipid syndromes which should use 2.5 - 3.5.               Lactate, Lucien         1.4  Comment: Falsely low lactic acid results can be found in samples   containing >=13.0 mg/dL total bilirubin and/or >=3.5 mg/dL   direct bilirubin.         LD         321  Comment: Results are increased in  hemolyzed samples.       Lymph # 0.9         0.8       Lymph % 19.4         11.9       MCH 29.9         30.3       MCHC 32.5         32.6       MCV 92         93       Mono # 0.6         0.6       Mono % 12.3         9.9       MPV 9.8         9.7       nRBC 0         0       Platelets 220         204       Potassium         3.5       PROTEIN TOTAL         6.5       Protime   10.5             RBC 3.35         3.63       RDW 12.7         12.8       Sed Rate       50         Sodium         136       Troponin I         0.015  Comment: The reference interval for Troponin I represents the 99th percentile   cutoff   for our facility and is consistent with 3rd generation assay   performance.         WBC 4.79         6.36                               [P] - Preliminary Result               Significant Imaging: I have reviewed all pertinent imaging results/findings within the past 24 hours.

## 2023-06-15 NOTE — H&P
[facility] Medicine  History & Physical    Patient Name: Dominguez Ritchie  MRN: [id number]  Patient Class: IP- Inpatient  Admission Date: 6/14/2023  Attending Physician: Davon Avery MD   Primary Care Provider: Madie Davis MD         Patient information was obtained from patient, past medical records and ER records.     Subjective:     Principal Problem:Acute respiratory failure with hypoxia    Chief Complaint:   Chief Complaint   Patient presents with    COVID-19 Concerns     Pt. Was sent from [facility] urgent care for SOB and testing + for Covid. RA sats 86%. 96% on 4L O2.        HPI: Dominguez Ritchie is a 89 year old male with a past medical history of hypertension, hyperlipidemia, paroxysmal atrial fibrillation and tachy-susanna syndrome status post cardiac pacemaker. He was referred to ED by primary care for acute respiratory failure with hypoxia 2/2 COVID 19.  Patient reports 1 week history of productive cough, congestion, headache and SOB. Dyspnea worse today. He reports exposure to COVID positive granddaughter. No n/v, abdominal pain, chest pain or fever. ED evaluation: O2 sats 82% room air on arrival, improved with 2 liters O2 per NC.. ABG: pO2 53, pH 7.4. CXR shows bibasilar pulmonary opacity with volume loss. EKG shows paced rhythm. Patient placed in observation under hospital medicine.          Past Medical History:   Diagnosis Date    Abnormal exercise tolerance test 7/25/2013    Arthritis     Colon polyp     Diverticulosis     Dyslipidemia 7/25/2013    GERD (gastroesophageal reflux disease)     HTN, goal below 130/80 12/3/2018    Hyperlipidemia 1980    HIGH TG    Knee pain, right 6/14/2013    Low back pain with right-sided sciatica 12/3/2018    Meningioma     Paroxysmal A-fib 10/29/2022    Personal history of colonic polyps 5/27/2014    RLS (restless legs syndrome) 6/14/2013    Tobacco dependence     resolved    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       Past  Surgical History:   Procedure Laterality Date    A-V CARDIAC PACEMAKER INSERTION Left 11/1/2022    Procedure: INSERTION, CARDIAC PACEMAKER, DUAL CHAMBER;  Surgeon: Finn Mccrary MD;  Location: [facility] CATH LAB;  Service: Cardiology;  Laterality: Left;  biotronik AAIR    APPENDECTOMY      BACK SURGERY Bilateral 2016    CATARACT EXTRACTION, BILATERAL      COLONOSCOPY  2/2009    EYE SURGERY      INJECTION OF ANESTHETIC AGENT AROUND NERVE Right 11/18/2022    Procedure: Right Genicular nerve block w/Light RN IV Sedation;  Surgeon: Benitez Roberson MD;  Location: [facility]IN [facility]GT;  Service: Pain Management;  Laterality: Right;    INSERTION OF PERMANENT PACEMAKER Left 11/1/2022    Procedure: Implant PPM;  Surgeon: Finn Mccrary MD;  Location: [facility] CATH LAB;  Service: Cardiology;  Laterality: Left;    JOINT REPLACEMENT      left knee    LUMBAR PARAVERTEBRAL FACET JOINT NERVE BLOCK Bilateral 8/29/2019    Procedure: LMBB L3,4,5;  Surgeon: Nabor Sadler MD;  Location: [facility] PAIN MGT;  Service: Pain Management;  Laterality: Bilateral;    SELECTIVE INJECTION OF ANESTHETIC AGENT AROUND LUMBAR SPINAL NERVE ROOT BY TRANSFORAMINAL APPROACH Bilateral 11/8/2021    Procedure: Bilateral L4/5 +/- L5/S1 TF DREW;  Surgeon: Nabor Sadler MD;  Location: [facility];  Service: Pain Management;  Laterality: Bilateral;    SELECTIVE INJECTION OF ANESTHETIC AGENT AROUND LUMBAR SPINAL NERVE ROOT BY TRANSFORAMINAL APPROACH Bilateral 1/4/2022    Procedure: Bilateral L4/5 + L5/S1 TF DREW;  Surgeon: Nabor Sadler MD;  Location: [facility];  Service: Pain Management;  Laterality: Bilateral;    SELECTIVE INJECTION OF ANESTHETIC AGENT AROUND LUMBAR SPINAL NERVE ROOT BY TRANSFORAMINAL APPROACH Right 12/30/2022    Procedure: Right-sided L4/5 and L5/S1 transforaminal epidural steroid injection with RN IV sedation;  Surgeon: Benitez Roberson MD;  Location: [facility];  Service: Pain Management;  Laterality: Right;    SPINE SURGERY       UPPER GASTROINTESTINAL ENDOSCOPY  2009       Review of patient's allergies indicates:   Allergen Reactions    Shellfish containing products Nausea And Vomiting    Amoxicillin Rash    Iodine and iodide containing products Nausea And Vomiting       No current facility-administered medications on file prior to encounter.     Current Outpatient Medications on File Prior to Encounter   Medication Sig    apixaban (ELIQUIS) 2.5 mg Tab Take 1 tablet (2.5 mg total) by mouth 2 (two) times daily.    bisacodyL (DULCOLAX) 5 mg EC tablet Take 5 mg by mouth daily as needed for Constipation.    dutasteride (AVODART) 0.5 mg capsule Take 1 capsule (0.5 mg total) by mouth once daily.    pantoprazole (PROTONIX) 40 MG tablet Take 1 tablet by mouth once daily    rOPINIRole (REQUIP) 0.5 MG tablet Take 1 tablet (0.5 mg total) by mouth 3 (three) times daily.    rosuvastatin (CRESTOR) 5 MG tablet Take 1 tablet (5 mg total) by mouth once daily.     Family History       Problem Relation (Age of Onset)    Cancer Son    Diabetes Maternal Uncle    Heart disease Mother          Tobacco Use    Smoking status: Former     Packs/day: 1.00     Years: 25.00     Pack years: 25.00     Types: Cigarettes     Quit date: 1990     Years since quittin.4    Smokeless tobacco: Never   Substance and Sexual Activity    Alcohol use: No     Alcohol/week: 0.0 standard drinks    Drug use: No    Sexual activity: Not Currently     Birth control/protection: None     Review of Systems   Constitutional:  Negative for chills and fever.   HENT:  Positive for congestion.    Eyes:  Negative for photophobia.   Respiratory:  Positive for cough and shortness of breath. Negative for chest tightness and wheezing.    Cardiovascular:  Negative for chest pain, palpitations and leg swelling.   Gastrointestinal:  Negative for abdominal pain, diarrhea, nausea and vomiting.   Genitourinary:  Negative for dysuria, flank pain and hematuria.   Musculoskeletal:   Negative for back pain, myalgias and neck pain.   Skin:  Negative for rash and wound.   Neurological:  Positive for weakness. Negative for dizziness, syncope and headaches.   Psychiatric/Behavioral:  Negative for agitation.    Objective:     Vital Signs (Most Recent):  Temp: 97.8 °F (36.6 °C) (06/15/23 0119)  Pulse: 72 (06/15/23 0119)  Resp: 16 (06/15/23 0119)  BP: (!) 160/78 (06/15/23 0119)  SpO2: (!) 92 % (06/15/23 0119) Vital Signs (24h Range):  Temp:  [97.8 °F (36.6 °C)-99.6 °F (37.6 °C)] 97.8 °F (36.6 °C)  Pulse:  [63-86] 72  Resp:  [16-24] 16  SpO2:  [82 %-98 %] 92 %  BP: (111-160)/(60-80) 160/78     Weight: 67.7 kg (149 lb 4 oz)  Body mass index is 21.42 kg/m².     Physical Exam  Vitals and nursing note reviewed.   Constitutional:       General: He is not in acute distress.     Appearance: He is well-developed.      Comments: Elderly, frail    HENT:      Head: Normocephalic and atraumatic.   Eyes:      Conjunctiva/sclera: Conjunctivae normal.      Pupils: Pupils are equal, round, and reactive to light.   Neck:      Vascular: No JVD.   Cardiovascular:      Rate and Rhythm: Normal rate and regular rhythm.      Heart sounds: Normal heart sounds.   Pulmonary:      Effort: Pulmonary effort is normal. No respiratory distress.      Breath sounds: Normal breath sounds. No wheezing.   Abdominal:      General: Bowel sounds are normal. There is no distension.      Palpations: Abdomen is soft.      Tenderness: There is no abdominal tenderness. There is no guarding.   Musculoskeletal:         General: No tenderness. Normal range of motion.      Cervical back: Normal range of motion and neck supple.   Skin:     General: Skin is warm and dry.      Capillary Refill: Capillary refill takes less than 2 seconds.      Findings: No erythema.   Neurological:      Mental Status: He is alert and oriented to person, place, and time.   Psychiatric:         Behavior: Behavior normal.            CRANIAL NERVES     CN III, IV, VI    Pupils are equal, round, and reactive to light.     Significant Labs: All pertinent labs within the past 24 hours have been reviewed.  CBC:   Recent Labs   Lab 06/14/23  1737 06/14/23  1743   WBC  --  6.36   HGB  --  11.0*   HCT 29* 33.7*   PLT  --  204     CMP:   Recent Labs   Lab 06/14/23  1743      K 3.5      CO2 22*   *   BUN 14   CREATININE 0.8   CALCIUM 9.0   PROT 6.5   ALBUMIN 3.1*   BILITOT 0.3   ALKPHOS 81   AST 30   ALT 20   ANIONGAP 11     Troponin:   Recent Labs   Lab 06/14/23  1743   TROPONINI 0.015     Significant Imaging: I have reviewed all pertinent imaging results/findings within the past 24 hours.    Assessment/Plan:     * Acute respiratory failure with hypoxia  Patient with Hypoxic Respiratory failure which is Acute.  he is not on home oxygen. Supplemental oxygen was provided and noted- Oxygen Concentration (%):  [36] 36    .   Signs/symptoms of respiratory failure include- increased work of breathing. Contributing diagnoses includes -COVID 19 Labs and images were reviewed. Patient Has recent ABG, which has been reviewed.  -continue supplemental O2, wean as tolerated   - Remdesivir, prednisone  - decongestants     COVID-19  Patient is identified as High risk for severe complications of COVID 19 based on COVID risk score of 5   Initiate standard COVID protocols; COVID-19 testing ,Infection Control notification  and isolation- respiratory, contact and droplet per protocol    Diagnostics: (leukopenia, hyponatremia, hyperferritinemia, elevated troponin, elevated d-dimer, age, and comorbidities are significant predictors of poor clinical outcome)  CBC, CMP, Ferritin, CRP and Portable CXR    Management: Initiate targeted therapy with Remdesivir, 200mg IV x1, followed by 100mg IV daily x5 days total and Dexamethasone PO/IV 6mg daily x10 days, Maintain oxygen saturations 92-96% via Nasal Cannula 3 LPM and monitor with continuous/intermittent pulse oximetry. , Inhaled bronchodilators  as needed for shortness of breath. and Continuous cardiac monitoring.    Paroxysmal A-fib  -continue apixaban 2.5 mg BID   - patient states BB discontinued 2/2 hypotension   -currently paced rhythm on tele, continue to monitor       Tachy-susanna syndrome  S/p pacemaker placement   -no acute issues       Essential hypertension  BP stable   -patient reports he is no longer taking antihypertensives         VTE Risk Mitigation (From admission, onward)         Ordered     apixaban tablet 2.5 mg  2 times daily         06/14/23 1958                           Ade Dalal NP  Department of Hospital Medicine  [place] - Telemetry ([facility])

## 2023-06-15 NOTE — HOSPITAL COURSE
89 y/o WM admitted with a Dx of COVID PNA  and hypoxia . Started on remdesevir and decadron . Ferritin level wnl and inflammatory marker  elevated .  6/16 He report feeling better . He is on 3 lt by arthur OVALLES . PT/OT consulted .   6/17 Pt was seen and examined at bedside .He was determined to be suitable for d/c  He did not qualify for home o2 with a o2 sat > 94% at rest and on exertion . He is s/p remdesevir daily x 4 days  and decadron 6 mg po qd x 4 days  .He is tolerating po intake  and report feeling better . There was no acute event since admission . He was advised to f/u with his PCP in 1 week .

## 2023-06-15 NOTE — ASSESSMENT & PLAN NOTE
-continue apixaban 2.5 mg BID   - patient states BB discontinued 2/2 hypotension   -currently paced rhythm on tele, continue to monitor

## 2023-06-15 NOTE — PROGRESS NOTES
[place] - Telemetry ([facility])  [facility] Medicine  Progress Note    Patient Name: Dominguez Ritchie  MRN: [id number]  Patient Class: IP- Inpatient   Admission Date: 6/14/2023  Length of Stay: 1 days  Attending Physician: Jayro Rubio, *  Primary Care Provider: Madie Davis MD        Subjective:     Principal Problem:Acute respiratory failure with hypoxia        HPI:  Dominguez Ritchie is a 89 year old male with a past medical history of hypertension, hyperlipidemia, paroxysmal atrial fibrillation and tachy-susanna syndrome status post cardiac pacemaker. He was referred to ED by primary care for acute respiratory failure with hypoxia 2/2 COVID 19.  Patient reports 1 week history of productive cough, congestion, headache and SOB. Dyspnea worse today. He reports exposure to COVID positive granddaughter. No n/v, abdominal pain, chest pain or fever. ED evaluation: O2 sats 82% room air on arrival, improved with 2 liters O2 per NC.. ABG: pO2 53, pH 7.4. CXR shows bibasilar pulmonary opacity with volume loss. EKG shows paced rhythm. Patient placed in observation under hospital medicine.          Overview/Hospital Course:  89 y/o WM admitted with a Dx of COVID PNA  and hypoxia . Started on remdesevir and decadron . Ferritin level wnl and inflammatory marker  elevated .      Interval History:     Review of Systems   Constitutional:  Negative for chills and fever.   HENT:  Positive for congestion.    Eyes:  Negative for photophobia.   Respiratory:  Positive for cough and shortness of breath. Negative for chest tightness and wheezing.    Cardiovascular:  Negative for chest pain, palpitations and leg swelling.   Gastrointestinal:  Negative for abdominal pain, diarrhea, nausea and vomiting.   Genitourinary:  Negative for dysuria, flank pain and hematuria.   Musculoskeletal:  Negative for back pain, myalgias and neck pain.   Skin:  Negative for rash and wound.   Neurological:  Positive for weakness. Negative for dizziness, syncope and  headaches.   Psychiatric/Behavioral:  Negative for agitation.    Objective:     Vital Signs (Most Recent):  Temp: 98.7 °F (37.1 °C) (06/15/23 0714)  Pulse: 78 (06/15/23 1219)  Resp: 18 (06/15/23 1219)  BP: (!) 151/67 (06/15/23 0714)  SpO2: (!) 92 % (06/15/23 0811) Vital Signs (24h Range):  Temp:  [97.8 °F (36.6 °C)-99.6 °F (37.6 °C)] 98.7 °F (37.1 °C)  Pulse:  [63-88] 78  Resp:  [14-24] 18  SpO2:  [82 %-98 %] 92 %  BP: (111-160)/(60-80) 151/67     Weight: 67.7 kg (149 lb 4 oz)  Body mass index is 21.42 kg/m².    Intake/Output Summary (Last 24 hours) at 6/15/2023 1222  Last data filed at 6/14/2023 2207  Gross per 24 hour   Intake 490 ml   Output 400 ml   Net 90 ml         Physical Exam  Vitals and nursing note reviewed.   Constitutional:       General: He is not in acute distress.     Appearance: He is well-developed.      Comments: Elderly, frail    HENT:      Head: Normocephalic and atraumatic.   Eyes:      Conjunctiva/sclera: Conjunctivae normal.      Pupils: Pupils are equal, round, and reactive to light.   Neck:      Vascular: No JVD.   Cardiovascular:      Rate and Rhythm: Normal rate and regular rhythm.      Heart sounds: Normal heart sounds.   Pulmonary:      Effort: Pulmonary effort is normal. No respiratory distress.      Breath sounds: Normal breath sounds. No wheezing.   Abdominal:      General: Bowel sounds are normal. There is no distension.      Palpations: Abdomen is soft.      Tenderness: There is no abdominal tenderness. There is no guarding.   Musculoskeletal:         General: No tenderness. Normal range of motion.      Cervical back: Normal range of motion and neck supple.   Skin:     General: Skin is warm and dry.      Capillary Refill: Capillary refill takes less than 2 seconds.      Findings: No erythema.   Neurological:      Mental Status: He is alert and oriented to person, place, and time.   Psychiatric:         Behavior: Behavior normal.           Significant Labs: All pertinent labs within  the past 24 hours have been reviewed.  Recent Lab Results  (Last 5 results in the past 24 hours)        06/15/23  0526   06/14/23 2047 06/14/23 2041 06/14/23 2032 06/14/23  1743        Albumin         3.1       Alkaline Phosphatase         81       ALT         20       Anion Gap         11       aPTT   32.4  Comment: Refer to local heparin nomogram for intensity/dose specific   therapeutic   range.               AST         30       Baso # 0.02         0.02       Basophil % 0.4         0.3       BILIRUBIN TOTAL         0.3  Comment: For infants and newborns, interpretation of results should be based  on gestational age, weight and in agreement with clinical  observations.    Premature Infant recommended reference ranges:  Up to 24 hours.............<8.0 mg/dL  Up to 48 hours............<12.0 mg/dL  3-5 days..................<15.0 mg/dL  6-29 days.................<15.0 mg/dL         Blood Culture, Routine         No Growth to date  [P]       BNP     214  Comment: Values of less than 100 pg/ml are consistent with non-CHF populations.           BUN         14       Calcium         9.0       Chloride         103       CO2         22       CPK         67       Creatinine         0.8       CRP         185.3       D-Dimer   0.50  Comment: The quantitative D-dimer assay should be used as an aid in   the diagnosis of deep vein thrombosis and pulmonary embolism  in patients with the appropriate presentation and clinical  history. The upper limit of the reference interval and the clinical   cut off   point are identical. Causes of a positive (>0.50 mg/L FEU) D-Dimer   test  include, but are not limited to: DVT, PE, DIC, thrombolytic   therapy, anticoagulant therapy, recent surgery, trauma, or   pregnancy, disseminated malignancy, aortic aneurysm, cirrhosis,  and severe infection. False negative results may occur in   patients with distal DVT.               Differential Method Automated         Automated       eGFR          >60       Eos # 0.1         0.1       Eosinophil % 2.7         1.3       Ferritin         106       Glucose         113       Gran # (ANC) 3.1         4.8       Gran % 65.0         76.1       Hematocrit 30.8         33.7       Hemoglobin 10.0         11.0       Immature Grans (Abs) 0.01  Comment: Mild elevation in immature granulocytes is non specific and   can be seen in a variety of conditions including stress response,   acute inflammation, trauma and pregnancy. Correlation with other   laboratory and clinical findings is essential.           0.03  Comment: Mild elevation in immature granulocytes is non specific and   can be seen in a variety of conditions including stress response,   acute inflammation, trauma and pregnancy. Correlation with other   laboratory and clinical findings is essential.         Immature Granulocytes 0.2         0.5       INR   1.0  Comment: Coumadin Therapy:  2.0 - 3.0 for INR for all indicators except mechanical heart valves  and antiphospholipid syndromes which should use 2.5 - 3.5.               Lactate, Lucien         1.4  Comment: Falsely low lactic acid results can be found in samples   containing >=13.0 mg/dL total bilirubin and/or >=3.5 mg/dL   direct bilirubin.         LD         321  Comment: Results are increased in hemolyzed samples.       Lymph # 0.9         0.8       Lymph % 19.4         11.9       MCH 29.9         30.3       MCHC 32.5         32.6       MCV 92         93       Mono # 0.6         0.6       Mono % 12.3         9.9       MPV 9.8         9.7       nRBC 0         0       Platelets 220         204       Potassium         3.5       PROTEIN TOTAL         6.5       Protime   10.5             RBC 3.35         3.63       RDW 12.7         12.8       Sed Rate       50         Sodium         136       Troponin I         0.015  Comment: The reference interval for Troponin I represents the 99th percentile   cutoff   for our facility and is consistent with 3rd generation  assay   performance.         WBC 4.79         6.36                               [P] - Preliminary Result               Significant Imaging: I have reviewed all pertinent imaging results/findings within the past 24 hours.      Assessment/Plan:      * Acute respiratory failure with hypoxia  Patient with Hypoxic Respiratory failure which is Acute.  he is not on home oxygen. Supplemental oxygen was provided and noted- Oxygen Concentration (%):  [36] 36    .   Signs/symptoms of respiratory failure include- increased work of breathing. Contributing diagnoses includes -COVID 19 Labs and images were reviewed. Patient Has recent ABG, which has been reviewed.  -continue supplemental O2, wean as tolerated   - Remdesivir, prednisone  - decongestants     COVID-19  Patient is identified as High risk for severe complications of COVID 19 based on COVID risk score of 5   Initiate standard COVID protocols; COVID-19 testing ,Infection Control notification  and isolation- respiratory, contact and droplet per protocol    Diagnostics: (leukopenia, hyponatremia, hyperferritinemia, elevated troponin, elevated d-dimer, age, and comorbidities are significant predictors of poor clinical outcome)  CBC, CMP, Ferritin, CRP and Portable CXR    Management: Initiate targeted therapy with Remdesivir, 200mg IV x1, followed by 100mg IV daily x5 days total and Dexamethasone PO/IV 6mg daily x10 days, Maintain oxygen saturations 92-96% via Nasal Cannula 3.5 LPM and monitor with continuous/intermittent pulse oximetry. , Inhaled bronchodilators as needed for shortness of breath. and Continuous cardiac monitoring.    Paroxysmal A-fib  -continue apixaban 2.5 mg BID   - patient states BB discontinued 2/2 hypotension   -currently paced rhythm on tele, continue to monitor       Tachy-susanna syndrome  S/p pacemaker placement   -no acute issues       Essential hypertension  BP stable   -patient reports he is no longer taking antihypertensives         VTE Risk  Mitigation (From admission, onward)         Ordered     apixaban tablet 2.5 mg  2 times daily         06/14/23 1958                Discharge Planning   SUSHILA:      Code Status: Full Code   Is the patient medically ready for discharge?:     Reason for patient still in hospital (select all that apply): Treatment                     Jayro Rubio MD  Department of Hospital Medicine   [facility] [facility] ([facility])

## 2023-06-15 NOTE — HPI
Dominguez Ritchie is a 89 year old male with a past medical history of hypertension, hyperlipidemia, paroxysmal atrial fibrillation and tachy-susanna syndrome status post cardiac pacemaker. He was referred to ED by primary care for acute respiratory failure with hypoxia 2/2 COVID 19.  Patient reports 1 week history of productive cough, congestion, headache and SOB. Dyspnea worse today. He reports exposure to COVID positive granddaughter. No n/v, abdominal pain, chest pain or fever. ED evaluation: O2 sats 82% room air on arrival, improved with 2 liters O2 per NC.. ABG: pO2 53, pH 7.4. CXR shows bibasilar pulmonary opacity with volume loss. EKG shows paced rhythm. Patient placed in observation under hospital medicine.

## 2023-06-15 NOTE — ED PROVIDER NOTES
SCRIBE #1 NOTE: I, Jurgen Hou, am scribing for, and in the presence of, Richard Conley MD. I have scribed the entire note.       History     Chief Complaint   Patient presents with    COVID-19 Concerns     Pt. Was sent from [facility] urgent care for SOB and testing + for Covid. RA sats 86%. 96% on 4L O2.     Review of patient's allergies indicates:   Allergen Reactions    Shellfish containing products Nausea And Vomiting    Amoxicillin Rash    Iodine and iodide containing products Nausea And Vomiting         History of Present Illness     HPI    6/14/2023, 7:29 PM  History obtained from the patient      History of Present Illness: Dominguez Ritchie is a 89 y.o. male patient with a PMHx of HTN, HLD, paroxysmal Afib, who presents to the Emergency Department for evaluation of SOB which onset gradually 1 week ago. Pt was sent from [facility] urgent care for + COVID test. Per [facility], pt's sats were 82% on room air. Sats now 96% on 4L O2. Symptoms are constant and moderate in severity. No mitigating or exacerbating factors reported. Associated sxs include dry cough. Patient denies any dysuria, CP, back pain, nausea, fever, and all other sxs at this time. No prior Tx reported. No further complaints or concerns at this time.       Arrival mode:  [facility]    PCP: Madie Davis MD        Past Medical History:  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       Past Surgical History:  Past Surgical History:   Procedure  Laterality Date    A-V CARDIAC PACEMAKER INSERTION Left 11/1/2022    Procedure: INSERTION, CARDIAC PACEMAKER, DUAL CHAMBER;  Surgeon: Finn Mccrary MD;  Location: [facility] CATH LAB;  Service: Cardiology;  Laterality: Left;  biotronik AAIR    APPENDECTOMY      BACK SURGERY Bilateral 2016    CATARACT EXTRACTION, BILATERAL      COLONOSCOPY  2/2009    EYE SURGERY      INJECTION OF ANESTHETIC AGENT AROUND NERVE Right 11/18/2022    Procedure: Right Genicular nerve block w/Light RN IV Sedation;  Surgeon: Benitez Roberson MD;  Location: [facility] PAIN MGT;  Service: Pain Management;  Laterality: Right;    INSERTION OF PERMANENT PACEMAKER Left 11/1/2022    Procedure: Implant PPM;  Surgeon: Finn Mccrary MD;  Location: [facility] CATH LAB;  Service: Cardiology;  Laterality: Left;    JOINT REPLACEMENT      left knee    LUMBAR PARAVERTEBRAL FACET JOINT NERVE BLOCK Bilateral 8/29/2019    Procedure: LMBB L3,4,5;  Surgeon: Nabor Sadler MD;  Location: [facility] PAIN MGT;  Service: Pain Management;  Laterality: Bilateral;    SELECTIVE INJECTION OF ANESTHETIC AGENT AROUND LUMBAR SPINAL NERVE ROOT BY TRANSFORAMINAL APPROACH Bilateral 11/8/2021    Procedure: Bilateral L4/5 +/- L5/S1 TF DREW;  Surgeon: Nabor Sadler MD;  Location: [facility]IN [facility]GT;  Service: Pain Management;  Laterality: Bilateral;    SELECTIVE INJECTION OF ANESTHETIC AGENT AROUND LUMBAR SPINAL NERVE ROOT BY TRANSFORAMINAL APPROACH Bilateral 1/4/2022    Procedure: Bilateral L4/5 + L5/S1 TF DREW;  Surgeon: Nabor Sadler MD;  Location: [facility];  Service: Pain Management;  Laterality: Bilateral;    SELECTIVE INJECTION OF ANESTHETIC AGENT AROUND LUMBAR SPINAL NERVE ROOT BY TRANSFORAMINAL APPROACH Right 12/30/2022    Procedure: Right-sided L4/5 and L5/S1 transforaminal epidural steroid injection with RN IV sedation;  Surgeon: Benitez Roberson MD;  Location: [facility]IN [facility]GT;  Service: Pain Management;  Laterality: Right;    SPINE SURGERY      UPPER GASTROINTESTINAL ENDOSCOPY  2/2009          Family History:  Family History   Problem Relation Age of Onset    Heart disease Mother     Cancer Son         pancreatic     Diabetes Maternal Uncle     Kidney disease Neg Hx     Stroke Neg Hx        Social History:  Social History     Tobacco Use    Smoking status: Former     Packs/day: 1.00     Years: 25.00     Pack years: 25.00     Types: Cigarettes     Quit date: 1990     Years since quittin.4    Smokeless tobacco: Never   Substance and Sexual Activity    Alcohol use: No     Alcohol/week: 0.0 standard drinks    Drug use: No    Sexual activity: Not Currently     Birth control/protection: None        Review of Systems     Review of Systems   Constitutional:  Negative for fever.   HENT:  Negative for sore throat.    Respiratory:  Positive for cough and shortness of breath.    Cardiovascular:  Negative for chest pain.   Gastrointestinal:  Negative for nausea.   Genitourinary:  Negative for dysuria.   Musculoskeletal:  Negative for back pain.   Skin:  Negative for rash.   Neurological:  Negative for weakness.   Hematological:  Does not bruise/bleed easily.   All other systems reviewed and are negative.     Physical Exam     Initial Vitals [23 1658]   BP Pulse Resp Temp SpO2   111/60 77 20 99.3 °F (37.4 °C) 98 %      MAP       --          Physical Exam  Nursing Notes and Vital Signs Reviewed.  Constitutional: Patient is in no acute distress. Well-developed and well-nourished.  Head: Atraumatic. Normocephalic.  Eyes: PERRL. EOM intact. Conjunctivae are not pale. No scleral icterus.  ENT: Mucous membranes are moist. Oropharynx is clear and symmetric.    Neck: Supple. Full ROM. No lymphadenopathy.  Cardiovascular: Regular rate. Regular rhythm. No murmurs, rubs, or gallops. Distal pulses are 2+ and symmetric.  Pulmonary/Chest: No respiratory distress. Clear to auscultation bilaterally. No wheezing or rales.  Abdominal: Soft and non-distended.  There is no tenderness.  No rebound, guarding, or rigidity.  Good bowel sounds.  Genitourinary: No CVA tenderness  Musculoskeletal: Moves all extremities. No obvious deformities. Trace pitting edema bilateral lower extremities. No calf tenderness.  Skin: Warm and dry.  Neurological:  Alert, awake, and appropriate.  Normal speech.  No acute focal neurological deficits are appreciated.  Psychiatric: Normal affect. Good eye contact. Appropriate in content.     ED Course   Procedures  ED Vital Signs:  Vitals:    06/14/23 1658 06/14/23 1737 06/14/23 1739 06/14/23 1748   BP: 111/60 (!) 155/69     Pulse: 77 76 68    Resp: 20 20 20    Temp: 99.3 °F (37.4 °C)      TempSrc: Oral      SpO2: 98% (!) 89% 96%    Weight:    67.1 kg (147 lb 14.9 oz)    06/14/23 1800 06/14/23 1900   BP: (!) 157/68 (!) 145/72   Pulse: 70 72   Resp: (!) 22 20   Temp:  98.9 °F (37.2 °C)   TempSrc:  Oral   SpO2: 95% (!) 94%   Weight:         Abnormal Lab Results:  Labs Reviewed   CBC W/ AUTO DIFFERENTIAL - Abnormal; Notable for the following components:       Result Value    RBC 3.63 (*)     Hemoglobin 11.0 (*)     Hematocrit 33.7 (*)     Lymph # 0.8 (*)     Gran % 76.1 (*)     Lymph % 11.9 (*)     All other components within normal limits   COMPREHENSIVE METABOLIC PANEL - Abnormal; Notable for the following components:    CO2 22 (*)     Glucose 113 (*)     Albumin 3.1 (*)     All other components within normal limits   C-REACTIVE PROTEIN - Abnormal; Notable for the following components:    .3 (*)     All other components within normal limits   LACTATE DEHYDROGENASE - Abnormal; Notable for the following components:     (*)     All other components within normal limits   ISTAT PROCEDURE - Abnormal; Notable for the following components:    POC PH 7.467 (*)     POC PO2 53 (*)     POC SATURATED O2 89 (*)     POC Glucose 113 (*)     POC Sodium 135 (*)     POC Hematocrit 29 (*)     All other components within normal limits   CULTURE, BLOOD   CULTURE, BLOOD   FERRITIN   CK   LACTIC ACID, PLASMA   TROPONIN I         All Lab Results:  Results for orders placed or performed during the hospital encounter of 06/14/23   CBC auto differential   Result Value Ref Range    WBC 6.36 3.90 - 12.70 K/uL    RBC 3.63 (L) 4.60 - 6.20 M/uL    Hemoglobin 11.0 (L) 14.0 - 18.0 g/dL    Hematocrit 33.7 (L) 40.0 - 54.0 %    MCV 93 82 - 98 fL    MCH 30.3 27.0 - 31.0 pg    MCHC 32.6 32.0 - 36.0 g/dL    RDW 12.8 11.5 - 14.5 %    Platelets 204 150 - 450 K/uL    MPV 9.7 9.2 - 12.9 fL    Immature Granulocytes 0.5 0.0 - 0.5 %    Gran # (ANC) 4.8 1.8 - 7.7 K/uL    Immature Grans (Abs) 0.03 0.00 - 0.04 K/uL    Lymph # 0.8 (L) 1.0 - 4.8 K/uL    Mono # 0.6 0.3 - 1.0 K/uL    Eos # 0.1 0.0 - 0.5 K/uL    Baso # 0.02 0.00 - 0.20 K/uL    nRBC 0 0 /100 WBC    Gran % 76.1 (H) 38.0 - 73.0 %    Lymph % 11.9 (L) 18.0 - 48.0 %    Mono % 9.9 4.0 - 15.0 %    Eosinophil % 1.3 0.0 - 8.0 %    Basophil % 0.3 0.0 - 1.9 %    Differential Method Automated    Comprehensive metabolic panel   Result Value Ref Range    Sodium 136 136 - 145 mmol/L    Potassium 3.5 3.5 - 5.1 mmol/L    Chloride 103 95 - 110 mmol/L    CO2 22 (L) 23 - 29 mmol/L    Glucose 113 (H) 70 - 110 mg/dL    BUN 14 8 - 23 mg/dL    Creatinine 0.8 0.5 - 1.4 mg/dL    Calcium 9.0 8.7 - 10.5 mg/dL    Total Protein 6.5 6.0 - 8.4 g/dL    Albumin 3.1 (L) 3.5 - 5.2 g/dL    Total Bilirubin 0.3 0.1 - 1.0 mg/dL    Alkaline Phosphatase 81 55 - 135 U/L    AST 30 10 - 40 U/L    ALT 20 10 - 44 U/L    Anion Gap 11 8 - 16 mmol/L    eGFR >60 >60 mL/min/1.73 m^2   C-Reactive Protein   Result Value Ref Range    .3 (H) 0.0 - 8.2 mg/L   Ferritin   Result Value Ref Range    Ferritin 106 20.0 - 300.0 ng/mL   Lactate Dehydrogenase   Result Value Ref Range     (H) 110 - 260 U/L   CK   Result Value Ref Range    CPK 67 20 - 200 U/L   Lactic Acid, Plasma   Result Value Ref Range    Lactate (Lactic Acid) 1.4 0.5 - 2.2 mmol/L   Troponin I   Result Value Ref Range    Troponin I 0.015 0.000 - 0.026 ng/mL   ISTAT PROCEDURE   Result  Value Ref Range    POC PH 7.467 (H) 7.35 - 7.45    POC PCO2 35.1 35 - 45 mmHg    POC PO2 53 (LL) 80 - 100 mmHg    POC HCO3 25.4 24 - 28 mmol/L    POC BE 2 -2 to 2 mmol/L    POC SATURATED O2 89 (L) 95 - 100 %    POC Glucose 113 (H) 70 - 110 mg/dL    POC Sodium 135 (L) 136 - 145 mmol/L    POC Potassium 3.5 3.5 - 5.1 mmol/L    POC Ionized Calcium 1.19 1.06 - 1.42 mmol/L    POC Hematocrit 29 (L) 36 - 54 %PCV    Sample ARTERIAL     Site RR     Allens Test Pass     DelSys Room Air     Mode SPONT        Imaging Results:  Imaging Results              X-Ray Chest AP Portable (Final result)  Result time 06/14/23 17:35:35      Final result by Emma Ozuna MD (06/14/23 17:35:35)                   Impression:      Bibasilar pulmonary opacity with volume loss      Electronically signed by: Emma Ozuna  Date:    06/14/2023  Time:    17:35               Narrative:    EXAMINATION:  XR CHEST AP PORTABLE    CLINICAL HISTORY:  COVID-19;    TECHNIQUE:  Single frontal portable view of the chest was performed.    COMPARISON:  October 2022    FINDINGS:  There is mild bibasilar interstitial/hazy pulmonary opacity and underinflation or volume loss.  No shift of the mediastinum.  No convincing pneumothorax.  Cardiac silhouette size stable with pacemaker                                       The EKG was ordered, reviewed, and independently interpreted by the ED provider.  Interpretation time: 18:08  Rate: 73 BPM  Rhythm: Atrial-paced rhythm  Interpretation: No acute ST changes. No STEMI.  When compared to EKG performed 24-April-2023, there are no significant changes.           The Emergency Provider reviewed the vital signs and test results, which are outlined above.     ED Discussion     7:47 PM: Discussed case with Ade Dalal NP (Hospital Medicine). Dr. Avery agrees with current care and management of pt and accepts admission.   Admitting Service: Hospital Medicine  Admitting Physician: Dr. Avery  Admit to: Inpatient  med/tele     7:48 PM: Re-evaluated pt. I have discussed test results, shared treatment plan, and the need for admission with patient and family at bedside. Pt and family express understanding at this time and agree with all information. All questions answered. Pt and family have no further questions or concerns at this time. Pt is ready for admit.      Medical Decision Making  Problems Addressed:  COVID-19: acute illness or injury that poses a threat to life or bodily functions    Amount and/or Complexity of Data Reviewed  Independent Historian: caregiver     Details: EMS reports pt is off his baseline WOB/FOSTER per PCP and he responded to O2 NC c stable sats in mid 90's.  External Data Reviewed: notes.     Details: PCP noted O2 sats at 82%  Labs: ordered. Decision-making details documented in ED Course.     Details: WBC 6 point 4 BUN/creatinine 14/0.8 CRP elevated at 185, serum lactate normal 1.4, troponin 0.015.  ABG showed PaO2 of 53 correlating with an O2 sat around 89% on room air  Radiology: ordered and independent interpretation performed. Decision-making details documented in ED Course.     Details: Bilateral patchy infiltrates consistent with COVID  ECG/medicine tests: ordered and independent interpretation performed. Decision-making details documented in ED Course.     Details: No STEMI  Discussion of management or test interpretation with external provider(s): I discussed the case with Hospital Medicine and they agree with the current management Dr. Avery will handle all additional orders    Risk  Decision regarding hospitalization.  Risk Details: Patient is an elderly 89-year-old white male with a history of hyperlipidemia, AFib, and high cholesterol that was following up in Dr. Davis's office and complained of feeling bad for about 1 week and having a dry cough.  PCP noted the patient's oxygen saturations around 82% so he sent the patient by ambulance to the emergency department for admission.  Labs  of note here today show an ABG with a PaO2 53 correlating with an 89% sat on room air.  CRP was 185 and LDH is elevated 321.  Of note serum lactate is normal at 1.4 and troponin negative at 0.015.  Patient is resting comfortably with stable vital signs but will need admission to the hospital due to his hypoxia down to 82%.     Critical Care  Total time providing critical care: 50 minutes            ED Medication(s):  Medications - No data to display    New Prescriptions    No medications on file               Scribe Attestation:   Scribe #1: I performed the above scribed service and the documentation accurately describes the services I performed. I attest to the accuracy of the note.     Attending:   Physician Attestation Statement for Scribe #1: I, Richard Conley MD, personally performed the services described in this documentation, as scribed by Jurgen Hou, in my presence, and it is both accurate and complete.         Attending Attestation:         Attending Critical Care:   Critical Care Times:   Direct Patient Care (initial evaluation, reassessments, and time considering the case)................................................................15 minutes.   Additional History from reviewing old medical records or taking additional history from the family, EMS, PCP, etc.......................10 minutes.   Ordering, Reviewing, and Interpreting Diagnostic Studies...............................................................................................................10 minutes.   Documentation..................................................................................................................................................................................10 minutes.   Consultation with other Physicians. .................................................................................................................................................5 minutes.    ==============================================================  Total Critical Care Time - exclusive of procedural time: 50 minutes.  ==============================================================  Critical care was necessary to treat or prevent imminent or life-threatening deterioration of the following conditions: respiratory failure.   Critical care was time spent personally by me on the following activities: obtaining history from patient or relative, examination of patient, review of old charts, ordering lab, x-rays, and/or EKG, development of treatment plan with patient or relative, ordering and performing treatments and interventions, evaluation of patient's response to treatment, discussions with primary provider, interpretation of cardiac measurements and re-evaluation of patient's conition.   Critical Care Condition: potentially life-threatening          Clinical Impression     Final diagnoses:  [U07.1] COVID-19 (Primary)  [U07.1, J96.01] Acute hypoxemic respiratory failure due to COVID-19     Disposition:   Disposition: Admitted  Condition: Alem Conley MD  06/15/23 [id number]

## 2023-06-15 NOTE — ASSESSMENT & PLAN NOTE
Patient with Hypoxic Respiratory failure which is Acute.  he is not on home oxygen. Supplemental oxygen was provided and noted- Oxygen Concentration (%):  [36] 36    .   Signs/symptoms of respiratory failure include- increased work of breathing. Contributing diagnoses includes -COVID 19 Labs and images were reviewed. Patient Has recent ABG, which has been reviewed.  -continue supplemental O2, wean as tolerated   - Remdesivir, prednisone  - decongestants

## 2023-06-16 LAB
ALBUMIN SERPL BCP-MCNC: 2.7 G/DL (ref 3.5–5.2)
ALP SERPL-CCNC: 70 U/L (ref 55–135)
ALT SERPL W/O P-5'-P-CCNC: 18 U/L (ref 10–44)
ANION GAP SERPL CALC-SCNC: 9 MMOL/L (ref 8–16)
AST SERPL-CCNC: 24 U/L (ref 10–40)
BASOPHILS # BLD AUTO: 0.01 K/UL (ref 0–0.2)
BASOPHILS NFR BLD: 0.2 % (ref 0–1.9)
BILIRUB SERPL-MCNC: 0.3 MG/DL (ref 0.1–1)
BUN SERPL-MCNC: 14 MG/DL (ref 8–23)
CALCIUM SERPL-MCNC: 9.1 MG/DL (ref 8.7–10.5)
CHLORIDE SERPL-SCNC: 104 MMOL/L (ref 95–110)
CO2 SERPL-SCNC: 23 MMOL/L (ref 23–29)
CREAT SERPL-MCNC: 0.7 MG/DL (ref 0.5–1.4)
CRP SERPL-MCNC: 104 MG/L (ref 0–8.2)
D DIMER PPP IA.FEU-MCNC: 0.39 MG/L FEU
DIFFERENTIAL METHOD: ABNORMAL
EOSINOPHIL # BLD AUTO: 0 K/UL (ref 0–0.5)
EOSINOPHIL NFR BLD: 0 % (ref 0–8)
ERYTHROCYTE [DISTWIDTH] IN BLOOD BY AUTOMATED COUNT: 12.5 % (ref 11.5–14.5)
ERYTHROCYTE [SEDIMENTATION RATE] IN BLOOD BY WESTERGREN METHOD: 50 MM/HR (ref 0–10)
EST. GFR  (NO RACE VARIABLE): >60 ML/MIN/1.73 M^2
GLUCOSE SERPL-MCNC: 122 MG/DL (ref 70–110)
HCT VFR BLD AUTO: 30.8 % (ref 40–54)
HGB BLD-MCNC: 10.4 G/DL (ref 14–18)
IMM GRANULOCYTES # BLD AUTO: 0.04 K/UL (ref 0–0.04)
IMM GRANULOCYTES NFR BLD AUTO: 0.7 % (ref 0–0.5)
LYMPHOCYTES # BLD AUTO: 0.6 K/UL (ref 1–4.8)
LYMPHOCYTES NFR BLD: 10.2 % (ref 18–48)
MCH RBC QN AUTO: 30.4 PG (ref 27–31)
MCHC RBC AUTO-ENTMCNC: 33.8 G/DL (ref 32–36)
MCV RBC AUTO: 90 FL (ref 82–98)
MONOCYTES # BLD AUTO: 0.7 K/UL (ref 0.3–1)
MONOCYTES NFR BLD: 11.4 % (ref 4–15)
NEUTROPHILS # BLD AUTO: 4.4 K/UL (ref 1.8–7.7)
NEUTROPHILS NFR BLD: 77.5 % (ref 38–73)
NRBC BLD-RTO: 0 /100 WBC
PLATELET # BLD AUTO: 235 K/UL (ref 150–450)
PMV BLD AUTO: 10.1 FL (ref 9.2–12.9)
POTASSIUM SERPL-SCNC: 4.4 MMOL/L (ref 3.5–5.1)
PROT SERPL-MCNC: 5.8 G/DL (ref 6–8.4)
RBC # BLD AUTO: 3.42 M/UL (ref 4.6–6.2)
SODIUM SERPL-SCNC: 136 MMOL/L (ref 136–145)
WBC # BLD AUTO: 5.71 K/UL (ref 3.9–12.7)

## 2023-06-16 PROCEDURE — 97161 PT EVAL LOW COMPLEX 20 MIN: CPT

## 2023-06-16 PROCEDURE — 85651 RBC SED RATE NONAUTOMATED: CPT | Performed by: INTERNAL MEDICINE

## 2023-06-16 PROCEDURE — 94640 AIRWAY INHALATION TREATMENT: CPT

## 2023-06-16 PROCEDURE — 97530 THERAPEUTIC ACTIVITIES: CPT

## 2023-06-16 PROCEDURE — 36415 COLL VENOUS BLD VENIPUNCTURE: CPT | Performed by: NURSE PRACTITIONER

## 2023-06-16 PROCEDURE — 21400001 HC TELEMETRY ROOM

## 2023-06-16 PROCEDURE — 80053 COMPREHEN METABOLIC PANEL: CPT | Performed by: INTERNAL MEDICINE

## 2023-06-16 PROCEDURE — 97116 GAIT TRAINING THERAPY: CPT

## 2023-06-16 PROCEDURE — 63600175 PHARM REV CODE 636 W HCPCS: Performed by: NURSE PRACTITIONER

## 2023-06-16 PROCEDURE — 27000221 HC OXYGEN, UP TO 24 HOURS

## 2023-06-16 PROCEDURE — 94799 UNLISTED PULMONARY SVC/PX: CPT

## 2023-06-16 PROCEDURE — 85379 FIBRIN DEGRADATION QUANT: CPT | Performed by: NURSE PRACTITIONER

## 2023-06-16 PROCEDURE — 25000003 PHARM REV CODE 250: Performed by: NURSE PRACTITIONER

## 2023-06-16 PROCEDURE — 36415 COLL VENOUS BLD VENIPUNCTURE: CPT | Performed by: INTERNAL MEDICINE

## 2023-06-16 PROCEDURE — 94761 N-INVAS EAR/PLS OXIMETRY MLT: CPT

## 2023-06-16 PROCEDURE — 97166 OT EVAL MOD COMPLEX 45 MIN: CPT

## 2023-06-16 PROCEDURE — 85025 COMPLETE CBC W/AUTO DIFF WBC: CPT | Performed by: NURSE PRACTITIONER

## 2023-06-16 PROCEDURE — 86140 C-REACTIVE PROTEIN: CPT | Performed by: INTERNAL MEDICINE

## 2023-06-16 PROCEDURE — 99900035 HC TECH TIME PER 15 MIN (STAT)

## 2023-06-16 PROCEDURE — 27000207 HC ISOLATION

## 2023-06-16 RX ADMIN — DEXAMETHASONE 6 MG: 4 TABLET ORAL at 09:06

## 2023-06-16 RX ADMIN — OXYCODONE HYDROCHLORIDE AND ACETAMINOPHEN 500 MG: 500 TABLET ORAL at 09:06

## 2023-06-16 RX ADMIN — APIXABAN 2.5 MG: 2.5 TABLET, FILM COATED ORAL at 09:06

## 2023-06-16 RX ADMIN — ALBUTEROL SULFATE 2 PUFF: 90 AEROSOL, METERED RESPIRATORY (INHALATION) at 12:06

## 2023-06-16 RX ADMIN — ALBUTEROL SULFATE 2 PUFF: 90 AEROSOL, METERED RESPIRATORY (INHALATION) at 01:06

## 2023-06-16 RX ADMIN — REMDESIVIR 100 MG: 100 INJECTION, POWDER, LYOPHILIZED, FOR SOLUTION INTRAVENOUS at 10:06

## 2023-06-16 RX ADMIN — ALBUTEROL SULFATE 2 PUFF: 90 AEROSOL, METERED RESPIRATORY (INHALATION) at 07:06

## 2023-06-16 RX ADMIN — PANTOPRAZOLE SODIUM 40 MG: 40 TABLET, DELAYED RELEASE ORAL at 09:06

## 2023-06-16 RX ADMIN — THERA TABS 1 TABLET: TAB at 09:06

## 2023-06-16 RX ADMIN — ALBUTEROL SULFATE 2 PUFF: 90 AEROSOL, METERED RESPIRATORY (INHALATION) at 08:06

## 2023-06-16 RX ADMIN — ATORVASTATIN CALCIUM 20 MG: 10 TABLET, FILM COATED ORAL at 09:06

## 2023-06-16 NOTE — SUBJECTIVE & OBJECTIVE
Interval History:     Review of Systems   Constitutional:  Negative for chills and fever.   HENT:  Positive for congestion.    Eyes:  Negative for photophobia.   Respiratory:  Positive for cough and shortness of breath. Negative for chest tightness and wheezing.    Cardiovascular:  Negative for chest pain, palpitations and leg swelling.   Gastrointestinal:  Negative for abdominal pain, diarrhea, nausea and vomiting.   Genitourinary:  Negative for dysuria, flank pain and hematuria.   Musculoskeletal:  Negative for back pain, myalgias and neck pain.   Skin:  Negative for rash and wound.   Neurological:  Positive for weakness. Negative for dizziness, syncope and headaches.   Psychiatric/Behavioral:  Negative for agitation.    Objective:     Vital Signs (Most Recent):  Temp: 97.9 °F (36.6 °C) (06/16/23 0715)  Pulse: 76 (06/16/23 0830)  Resp: 18 (06/16/23 0830)  BP: 132/68 (06/16/23 0715)  SpO2: 95 % (06/16/23 0830) Vital Signs (24h Range):  Temp:  [97.1 °F (36.2 °C)-98 °F (36.7 °C)] 97.9 °F (36.6 °C)  Pulse:  [65-88] 76  Resp:  [17-18] 18  SpO2:  [93 %-97 %] 95 %  BP: (132-168)/(64-82) 132/68     Weight: 67.7 kg (149 lb 4 oz)  Body mass index is 21.42 kg/m².    Intake/Output Summary (Last 24 hours) at 6/16/2023 1137  Last data filed at 6/15/2023 1831  Gross per 24 hour   Intake 360 ml   Output 200 ml   Net 160 ml         Physical Exam  Vitals and nursing note reviewed.   Constitutional:       General: He is not in acute distress.     Appearance: He is well-developed.      Comments: Elderly, frail    HENT:      Head: Normocephalic and atraumatic.   Eyes:      Conjunctiva/sclera: Conjunctivae normal.      Pupils: Pupils are equal, round, and reactive to light.   Neck:      Vascular: No JVD.   Cardiovascular:      Rate and Rhythm: Normal rate and regular rhythm.      Heart sounds: Normal heart sounds.   Pulmonary:      Effort: Pulmonary effort is normal. No respiratory distress.      Breath sounds: Normal breath sounds. No  wheezing.   Abdominal:      General: Bowel sounds are normal. There is no distension.      Palpations: Abdomen is soft.      Tenderness: There is no abdominal tenderness. There is no guarding.   Musculoskeletal:         General: No tenderness. Normal range of motion.      Cervical back: Normal range of motion and neck supple.   Skin:     General: Skin is warm and dry.      Capillary Refill: Capillary refill takes less than 2 seconds.      Findings: No erythema.   Neurological:      Mental Status: He is alert and oriented to person, place, and time.   Psychiatric:         Behavior: Behavior normal.           Significant Labs: All pertinent labs within the past 24 hours have been reviewed.  Recent Lab Results         06/16/23  0531        Albumin 2.7       Alkaline Phosphatase 70       ALT 18       Anion Gap 9       AST 24       Baso # 0.01       Basophil % 0.2       BILIRUBIN TOTAL 0.3  Comment: For infants and newborns, interpretation of results should be based  on gestational age, weight and in agreement with clinical  observations.    Premature Infant recommended reference ranges:  Up to 24 hours.............<8.0 mg/dL  Up to 48 hours............<12.0 mg/dL  3-5 days..................<15.0 mg/dL  6-29 days.................<15.0 mg/dL         BUN 14       Calcium 9.1       Chloride 104       CO2 23       Creatinine 0.7       .0       Differential Method Automated       eGFR >60       Eos # 0.0       Eosinophil % 0.0       Glucose 122       Gran # (ANC) 4.4       Gran % 77.5       Hematocrit 30.8       Hemoglobin 10.4       Immature Grans (Abs) 0.04  Comment: Mild elevation in immature granulocytes is non specific and   can be seen in a variety of conditions including stress response,   acute inflammation, trauma and pregnancy. Correlation with other   laboratory and clinical findings is essential.         Immature Granulocytes 0.7       Lymph # 0.6       Lymph % 10.2       MCH 30.4       MCHC 33.8       MCV  90       Mono # 0.7       Mono % 11.4       MPV 10.1       nRBC 0       Platelets 235       Potassium 4.4       PROTEIN TOTAL 5.8       RBC 3.42       RDW 12.5       Sed Rate 50       Sodium 136       WBC 5.71               Significant Imaging: I have reviewed all pertinent imaging results/findings within the past 24 hours.

## 2023-06-16 NOTE — PT/OT/SLP EVAL
Physical Therapy Evaluation    Patient Name:  Dominguez Ritchie   MRN:  [id number]    Recommendations:     Discharge Recommendations: home health PT   Discharge Equipment Recommendations: walker, rolling   Barriers to discharge: None    Assessment:     Dominguez Ritchie is a 89 y.o. male admitted with a medical diagnosis of Acute respiratory failure with hypoxia.  He presents with the following impairments/functional limitations: weakness, impaired balance, pain, gait instability, decreased safety awareness, decreased lower extremity function, impaired functional mobility, impaired cardiopulmonary response to activity .    Rehab Prognosis: Good; patient would benefit from acute skilled PT services to address these deficits and reach maximum level of function.    Recent Surgery: * No surgery found *      Plan:     During this hospitalization, patient to be seen 3 x/week to address the identified rehab impairments via gait training, therapeutic activities, therapeutic exercises and progress toward the following goals:    Plan of Care Expires:  06/30/23    Subjective     Chief Complaint: LE ARTHRITIS PAIN  Patient/Family Comments/goals: NONE STATED  Pain/Comfort:  Pain Rating 1: 4/10  Location - Side 1: Bilateral  Location - Orientation 1: generalized  Location 1: leg  Pain Addressed 1: Reposition, Distraction    Patients cultural, spiritual, Taoism conflicts given the current situation:      Living Environment:  PT LIVES WITH WIFE IN A SINGLE STORY HOME WITH NO STEPS TO ENTER AND A WALK IN SHOWER.  Prior to admission, patients level of function was MOD I WITH ROLLATOR AND SPC FOR AMBULATION AND ADLS. PT REPORTS NOT USING AD WHEN NAVIGATING HOME. PT DOES DRIVE.  Equipment used at home: rollator, cane, straight, grab bar, shower chair, other (see comments) (HANDHELD SHOWER HEAD).  DME owned (not currently used): none.  Upon discharge, patient will have assistance from WIFE.    Objective:     Communicated with NURSE MICHAEL TENORIO AND  "Epic CHART REVIEW prior to session.  Patient found up in chair with peripheral IV, oxygen, telemetry  upon PT entry to room.    General Precautions: Standard, fall, airborne, contact, droplet  Orthopedic Precautions:N/A   Braces: N/A  Respiratory Status: Nasal cannula, flow 3 L/min    Exams:  Cognitive Exam:  Patient is oriented to Person, Place, Time, and Situation  RLE ROM: WFL except LIMITED ANKLE PF/DF  RLE Strength: WFL  LLE ROM: WFL except LIMITED ANKLE PF/DF  LLE Strength: WFL    Functional Mobility:  Transfers:     Sit to Stand:  contact guard assistance with rolling walker  Gait: PT ' WITH RW AND CGA IN ROOM.      AM-PAC 6 CLICK MOBILITY  Total Score:18       Treatment & Education:  PT FOUND SITTING UP IN CHAIR. ROM/MMT PERFORMED WITH PATIENT SITTING IN CHAIR. PT PERFORMED SIT TO STAND WITH RW AND CGA. PT GT ACROSS ROOM AND BACK X6 FOR ~150' WITH RW AND CGA. PT EXHIBITED A POST LEAN WHEN TURNING AROUND DURING GT BUT SELF-CORRECTED WITHOUT LOB. PT T/F BACK INTO CHAIR WITH RW AND CGA. PT REPORTS FEELING UNSTEADY AND "WOBBLY" WHEN STANDING WITH NO SUPPORT. PT EDUCATED ON HEP INCLUDING AP, LAQ, AND MIP X10 EACH. PT ALSO EDUCATED ON BENEFITS OF SPENDING AS MUCH TIME OOB AS TOLERABLE FOR STRENGTH MAINTENANCE. PT INSTRUCTED TO CALL FOR NURSE WHEN READY TO RETURN TO BED OR GO TO BATHROOM.    Patient left up in chair with all lines intact, call button in reach, and chair alarm on.    GOALS:   Multidisciplinary Problems       Physical Therapy Goals          Problem: Physical Therapy    Goal Priority Disciplines Outcome Goal Variances Interventions   Physical Therapy Goal     PT, PT/OT      Description: LT23  1. PT WILL PERFORM SIT TO STAND WITH RW AND SPV TO IMPROVE FUNC MOB  2. PT WILL GT TRAIN 300' WITH RW AND SPV TO ALLOW FOR COMPLETION OF ADLS  3. PT WILL COMPLETE FUNC REACHING TASKS BUE X10 REPS IN MULTIPLE PLANES TO PROGRESS DYNAMIC BALANCE                       History:     Past Medical History: "   Diagnosis Date    Abnormal exercise tolerance test 7/25/2013    Acute respiratory failure with hypoxia 6/15/2023    Arthritis     Colon polyp     Diverticulosis     Dyslipidemia 7/25/2013    GERD (gastroesophageal reflux disease)     HTN, goal below 130/80 12/3/2018    Hyperlipidemia 1980    HIGH TG    Knee pain, right 6/14/2013    Low back pain with right-sided sciatica 12/3/2018    Meningioma     Paroxysmal A-fib 10/29/2022    Personal history of colonic polyps 5/27/2014    RLS (restless legs syndrome) 6/14/2013    Tobacco dependence     resolved    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       Past Surgical History:   Procedure Laterality Date    A-V CARDIAC PACEMAKER INSERTION Left 11/1/2022    Procedure: INSERTION, CARDIAC PACEMAKER, DUAL CHAMBER;  Surgeon: Finn Mccrary MD;  Location: [facility] CATH LAB;  Service: Cardiology;  Laterality: Left;  biotronik AAIR    APPENDECTOMY      BACK SURGERY Bilateral 2016    CATARACT EXTRACTION, BILATERAL      COLONOSCOPY  2/2009    EYE SURGERY      INJECTION OF ANESTHETIC AGENT AROUND NERVE Right 11/18/2022    Procedure: Right Genicular nerve block w/Light RN IV Sedation;  Surgeon: Benitez Roberson MD;  Location: [facility] PAIN MGT;  Service: Pain Management;  Laterality: Right;    INSERTION OF PERMANENT PACEMAKER Left 11/1/2022    Procedure: Implant PPM;  Surgeon: Finn Mccrary MD;  Location: [facility] CATH LAB;  Service: Cardiology;  Laterality: Left;    JOINT REPLACEMENT      left knee    LUMBAR PARAVERTEBRAL FACET JOINT NERVE BLOCK Bilateral 8/29/2019    Procedure: LMBB L3,4,5;  Surgeon: Nabor aSdler MD;  Location: [facility] PAIN MGT;  Service: Pain Management;  Laterality: Bilateral;    SELECTIVE INJECTION OF ANESTHETIC AGENT AROUND LUMBAR SPINAL NERVE ROOT BY TRANSFORAMINAL APPROACH Bilateral 11/8/2021    Procedure: Bilateral L4/5 +/- L5/S1 TF DREW;  Surgeon: Nabor Sadler MD;  Location: [facility] PAIN MGT;  Service: Pain Management;  Laterality: Bilateral;    SELECTIVE  INJECTION OF ANESTHETIC AGENT AROUND LUMBAR SPINAL NERVE ROOT BY TRANSFORAMINAL APPROACH Bilateral 1/4/2022    Procedure: Bilateral L4/5 + L5/S1 TF DREW;  Surgeon: Nabor Sadler MD;  Location: [facility] PAIN MGT;  Service: Pain Management;  Laterality: Bilateral;    SELECTIVE INJECTION OF ANESTHETIC AGENT AROUND LUMBAR SPINAL NERVE ROOT BY TRANSFORAMINAL APPROACH Right 12/30/2022    Procedure: Right-sided L4/5 and L5/S1 transforaminal epidural steroid injection with RN IV sedation;  Surgeon: Benitez Roberson MD;  Location: [facility] PAIN MGT;  Service: Pain Management;  Laterality: Right;    SPINE SURGERY      UPPER GASTROINTESTINAL ENDOSCOPY  2/2009       Time Tracking:     PT Received On: 06/16/23  PT Start Time: 1005     PT Stop Time: 1028  PT Total Time (min): 23 min     Billable Minutes: Evaluation 13 and Gait Training 10      06/16/2023

## 2023-06-16 NOTE — ASSESSMENT & PLAN NOTE
Patient with Hypoxic Respiratory failure which is Acute.  he is not on home oxygen. Supplemental oxygen was provided and noted- Oxygen Concentration (%):  [32] 32    .   Signs/symptoms of respiratory failure include- increased work of breathing. Contributing diagnoses includes -COVID 19 Labs and images were reviewed. Patient Has recent ABG, which has been reviewed.  -continue supplemental O2, wean as tolerated   - Remdesivir, prednisone  - decongestants

## 2023-06-16 NOTE — PT/OT/SLP EVAL
Occupational Therapy   Evaluation    Name: Dominguez Ritchie  MRN: [id number]  Admitting Diagnosis: Acute respiratory failure with hypoxia  Recent Surgery: * No surgery found *      Recommendations:     Discharge Recommendations: home health OT  Discharge Equipment Recommendations:  walker, rolling  Barriers to discharge:       Assessment:     Dominguez Ritchie is a 89 y.o. male with a medical diagnosis of Acute respiratory failure with hypoxia.  Performance deficits affecting function: weakness, impaired endurance, impaired self care skills, impaired functional mobility, decreased lower extremity function, gait instability, decreased safety awareness, impaired balance, impaired cardiopulmonary response to activity.      Rehab Prognosis: Good; patient would benefit from acute skilled OT services to address these deficits and reach maximum level of function.       Plan:     Patient to be seen 2 x/week to address the above listed problems via self-care/home management, therapeutic activities, therapeutic exercises  Plan of Care Expires: 06/30/23  Plan of Care Reviewed with: patient    Subjective     Chief Complaint:  balance impairments  Patient/Family Comments/goals: return home    Occupational Profile:  Living Environment: Lives with spouse in one story home, no steps to enter  Previous level of function: independent with ADL/IADLs, driving  Roles and Routines: drives  Equipment Used at Home: shower chair, cane, straight, rollator (hand held shower head)  Assistance upon Discharge: family    Pain/Comfort:  Pain Rating 1: 4/10  Location - Side 1: Bilateral  Location - Orientation 1: generalized  Location 1: leg  Pain Addressed 1: Reposition, Distraction  Pain Rating Post-Intervention 1: 3/10    Patients cultural, spiritual, Sikhism conflicts given the current situation:      Objective:     Communicated with: nurse prior to session.  Patient found up in chair with telemetry, peripheral IV, oxygen upon OT entry to  room.    General Precautions: Standard, fall, airborne, contact, droplet  Orthopedic Precautions: N/A  Braces: N/A  Respiratory Status: Nasal cannula, flow 3 L/min    Occupational Performance:    Bed Mobility:    Patient completed Scooting/Bridging with stand by assistance    Functional Mobility/Transfers:  Patient completed Sit <> Stand Transfer with contact guard assistance  with  rolling walker   Patient completed Bed <> Chair Transfer using Stand Pivot technique with contact guard assistance with rolling walker  Functional Mobility: Pt ambulated ~150 feet in room with RW CGA with occasional posterior weight shift but able to self-correct    Activities of Daily Living:  Lower Body Dressing: set-up socks    Cognitive/Visual Perceptual:  Cognitive/Psychosocial Skills:     -       Oriented to: Person, Place, and Situation   -       Follows Commands/attention:Follows multistep  commands  -       Safety awareness/insight to disability: intact     Physical Exam:  Balance:    -       fair+  Upper Extremity Range of Motion:     -       Right Upper Extremity: WFL  -       Left Upper Extremity: WFL  Upper Extremity Strength:    -       Right Upper Extremity: WFL  -       Left Upper Extremity: WFL    AMPAC 6 Click ADL:  AMPAC Total Score: 20    Treatment & Education:  Pt participated in initial OT evaluation to assess current level of function. Pt will benefit from skilled OT services to increased functional independence and safety prior to discharge.    Patient left up in chair with all lines intact, call button in reach, chair alarm on, and nurse notified    GOALS:   Multidisciplinary Problems       Occupational Therapy Goals          Problem: Occupational Therapy    Goal Priority Disciplines Outcome Interventions   Occupational Therapy Goal     OT, PT/OT Ongoing, Progressing    Description: Goals to be met by: 6/30/23     Patient will increase functional independence with ADLs by performing:    Toileting from toilet with  Stand-by Assistance for hygiene and clothing management.   Toilet transfer to toilet with Stand-by Assistance.  Upper extremity exercise program x10 reps per handout, with supervision.                         History:     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         Past Surgical History:   Procedure Laterality Date    A-V CARDIAC PACEMAKER INSERTION Left 11/1/2022    Procedure: INSERTION, CARDIAC PACEMAKER, DUAL CHAMBER;  Surgeon: Finn Mccrary MD;  Location: [facility] CATH LAB;  Service: Cardiology;  Laterality: Left;  biotronik AAIR    APPENDECTOMY      BACK SURGERY Bilateral 2016    CATARACT EXTRACTION, BILATERAL      COLONOSCOPY  2/2009    EYE SURGERY      INJECTION OF ANESTHETIC AGENT AROUND NERVE Right 11/18/2022    Procedure: Right Genicular nerve block w/Light RN IV Sedation;  Surgeon: Benitez Roberson MD;  Location: [facility] PAIN MGT;  Service: Pain Management;  Laterality: Right;    INSERTION OF PERMANENT PACEMAKER Left 11/1/2022    Procedure: Implant PPM;  Surgeon: Finn Mccrary MD;  Location: [facility] CATH LAB;  Service: Cardiology;  Laterality: Left;    JOINT REPLACEMENT      left knee    LUMBAR PARAVERTEBRAL FACET JOINT NERVE BLOCK Bilateral 8/29/2019    Procedure: LMBB L3,4,5;  Surgeon: Nabor Sadler MD;  Location: [facility] PAIN MGT;  Service: Pain Management;  Laterality: Bilateral;    SELECTIVE INJECTION OF ANESTHETIC AGENT  AROUND LUMBAR SPINAL NERVE ROOT BY TRANSFORAMINAL APPROACH Bilateral 11/8/2021    Procedure: Bilateral L4/5 +/- L5/S1 TF DREW;  Surgeon: Nabor Sadler MD;  Location: [facility] PAIN MGT;  Service: Pain Management;  Laterality: Bilateral;    SELECTIVE INJECTION OF ANESTHETIC AGENT AROUND LUMBAR SPINAL NERVE ROOT BY TRANSFORAMINAL APPROACH Bilateral 1/4/2022    Procedure: Bilateral L4/5 + L5/S1 TF DREW;  Surgeon: Nabor Sadler MD;  Location: [facility] PAIN MGT;  Service: Pain Management;  Laterality: Bilateral;    SELECTIVE INJECTION OF ANESTHETIC AGENT AROUND LUMBAR SPINAL NERVE ROOT BY TRANSFORAMINAL APPROACH Right 12/30/2022    Procedure: Right-sided L4/5 and L5/S1 transforaminal epidural steroid injection with RN IV sedation;  Surgeon: Benitez Roberson MD;  Location: [facility] PAIN MGT;  Service: Pain Management;  Laterality: Right;    SPINE SURGERY      UPPER GASTROINTESTINAL ENDOSCOPY  2/2009       Time Tracking:     OT Date of Treatment: 06/16/23  OT Start Time: 1000  OT Stop Time: 1025  OT Total Time (min): 25 min    Billable Minutes:Evaluation 15  Therapeutic Activity 10      SHAYY Green  6/16/2023

## 2023-06-16 NOTE — PLAN OF CARE
[facility] - Telemetry (Hospital)  Initial Discharge Assessment       Primary Care Provider: Madie Davis MD    Admission Diagnosis: COVID-19 [U07.1]    Admission Date: 6/14/2023  Expected Discharge Date:     Transition of Care Barriers: None    Payor: HU[employer] MANAGED MEDICARE / Plan: HU[employer] MEDICARE HMO / Product Type: Capitation /     Extended Emergency Contact Information  Primary Emergency Contact: Lauren Santos   [place]  Mobile Phone: [phone]  Relation: Daughter  Secondary Emergency Contact: Rox Ritchie   [place]  Relation: Spouse    Discharge Plan A: Home Health         [facility] [address] [place], [place] - [address] [address] [address] [facility] [address]  [address] [address]  [facility] [address]  Phone: [phone] Fax: [phone]      Initial Assessment (most recent)       Adult Discharge Assessment - 06/16/23 1241          Discharge Assessment    Assessment Type Discharge Planning Assessment     Confirmed/corrected address, phone number and insurance Yes     Confirmed Demographics Correct on Facesheet     Source of Information health record     Communicated SUSHILA with patient/caregiver Date not available/Unable to determine     Reason For Admission Acute respiratory failure with hypoxia     People in Home alone     Facility Arrived From: home     Do you expect to return to your current living situation? Yes     Do you have help at home or someone to help you manage your care at home? Yes     Who are your caregiver(s) and their phone number(s)? Lauren Santos (Daughter)     Prior to hospitilization cognitive status: Alert/Oriented     Current cognitive status: Alert/Oriented     Walking or Climbing Stairs ambulation difficulty, requires equipment     Mobility Management rollator, cane     Dressing/Bathing bathing difficulty, requires equipment     Dressing/Bathing Management shower chair     Home Accessibility wheelchair accessible     Home Layout Able to live on 1st floor     Equipment  Currently Used at Home shower chair;grab bar;rollator     Readmission within 30 days? No     Patient currently being followed by outpatient case management? No     Do you currently have service(s) that help you manage your care at home? No     Do you take prescription medications? Yes     Do you have prescription coverage? Yes     Do you have any problems affording any of your prescribed medications? No     Is the patient taking medications as prescribed? yes     Who is going to help you get home at discharge? Daughter     How do you get to doctors appointments? family or friend will provide;car, drives self     Are you on dialysis? No     Do you take coumadin? No     Discharge Plan A Home Health     DME Needed Upon Discharge  none     Discharge Plan discussed with: Patient     Transition of Care Barriers None                   Anticipated DC dispo: home health   Prior Level of Function: independent with ADLs   PCP: Madie Davis MD    Comments:  Chart review completed for discharge planning. Patient lives with alone. Daughter will be help at home and can provide transport at time of discharge. PT recs are for HH. CM discharge needs depends on hospital progress. CM will continue following to assist with other needs.

## 2023-06-16 NOTE — PLAN OF CARE
Ongoing (interventions implemented as appropriate)  Pt is alert and oriented.  VSS  Pt able to make needs known.  Pt remained afebrile throughout this shift.   Pt remained free of falls this shift.   Pt denies pain this shift.  Plan of care reviewed. Patient verbalizes understanding.   Pt moving/turing independent. Frequent weight shifting encouraged.  Patient normal sinus rhythm on monitor.   Bed low, side rails up x 2, wheels locked, call light in reach.   Hourly rounding completed.   Will continue to observe.

## 2023-06-16 NOTE — PLAN OF CARE
Goals to be met by: 6/30/23     Patient will increase functional independence with ADLs by performing:    Toileting from toilet with Stand-by Assistance for hygiene and clothing management.   Toilet transfer to toilet with Stand-by Assistance.  Upper extremity exercise program x10 reps per handout, with supervision.

## 2023-06-16 NOTE — PROGRESS NOTES
[facility] ([facility])  [facility] Medicine  Progress Note    Patient Name: Dominguez Ritchie  MRN: [id number]  Patient Class: IP- Inpatient   Admission Date: 6/14/2023  Length of Stay: 2 days  Attending Physician: Jayro Rubio, *  Primary Care Provider: Madie Davis MD        Subjective:     Principal Problem:Acute respiratory failure with hypoxia        HPI:  Dominguez Ritchie is a 89 year old male with a past medical history of hypertension, hyperlipidemia, paroxysmal atrial fibrillation and tachy-susanna syndrome status post cardiac pacemaker. He was referred to ED by primary care for acute respiratory failure with hypoxia 2/2 COVID 19.  Patient reports 1 week history of productive cough, congestion, headache and SOB. Dyspnea worse today. He reports exposure to COVID positive granddaughter. No n/v, abdominal pain, chest pain or fever. ED evaluation: O2 sats 82% room air on arrival, improved with 2 liters O2 per NC.. ABG: pO2 53, pH 7.4. CXR shows bibasilar pulmonary opacity with volume loss. EKG shows paced rhythm. Patient placed in observation under hospital medicine.          Overview/Hospital Course:  89 y/o WM admitted with a Dx of COVID PNA  and hypoxia . Started on remdesevir and decadron . Ferritin level wnl and inflammatory marker  elevated .  6/16 He report feeling better . He is on 3 lt by arthur OVALLES . PT/OT consulted .       Interval History:     Review of Systems   Constitutional:  Negative for chills and fever.   HENT:  Positive for congestion.    Eyes:  Negative for photophobia.   Respiratory:  Positive for cough and shortness of breath. Negative for chest tightness and wheezing.    Cardiovascular:  Negative for chest pain, palpitations and leg swelling.   Gastrointestinal:  Negative for abdominal pain, diarrhea, nausea and vomiting.   Genitourinary:  Negative for dysuria, flank pain and hematuria.   Musculoskeletal:  Negative for back pain, myalgias and neck pain.   Skin:  Negative for rash and  wound.   Neurological:  Positive for weakness. Negative for dizziness, syncope and headaches.   Psychiatric/Behavioral:  Negative for agitation.    Objective:     Vital Signs (Most Recent):  Temp: 97.9 °F (36.6 °C) (06/16/23 0715)  Pulse: 76 (06/16/23 0830)  Resp: 18 (06/16/23 0830)  BP: 132/68 (06/16/23 0715)  SpO2: 95 % (06/16/23 0830) Vital Signs (24h Range):  Temp:  [97.1 °F (36.2 °C)-98 °F (36.7 °C)] 97.9 °F (36.6 °C)  Pulse:  [65-88] 76  Resp:  [17-18] 18  SpO2:  [93 %-97 %] 95 %  BP: (132-168)/(64-82) 132/68     Weight: 67.7 kg (149 lb 4 oz)  Body mass index is 21.42 kg/m².    Intake/Output Summary (Last 24 hours) at 6/16/2023 1137  Last data filed at 6/15/2023 1831  Gross per 24 hour   Intake 360 ml   Output 200 ml   Net 160 ml         Physical Exam  Vitals and nursing note reviewed.   Constitutional:       General: He is not in acute distress.     Appearance: He is well-developed.      Comments: Elderly, frail    HENT:      Head: Normocephalic and atraumatic.   Eyes:      Conjunctiva/sclera: Conjunctivae normal.      Pupils: Pupils are equal, round, and reactive to light.   Neck:      Vascular: No JVD.   Cardiovascular:      Rate and Rhythm: Normal rate and regular rhythm.      Heart sounds: Normal heart sounds.   Pulmonary:      Effort: Pulmonary effort is normal. No respiratory distress.      Breath sounds: Normal breath sounds. No wheezing.   Abdominal:      General: Bowel sounds are normal. There is no distension.      Palpations: Abdomen is soft.      Tenderness: There is no abdominal tenderness. There is no guarding.   Musculoskeletal:         General: No tenderness. Normal range of motion.      Cervical back: Normal range of motion and neck supple.   Skin:     General: Skin is warm and dry.      Capillary Refill: Capillary refill takes less than 2 seconds.      Findings: No erythema.   Neurological:      Mental Status: He is alert and oriented to person, place, and time.   Psychiatric:          Behavior: Behavior normal.           Significant Labs: All pertinent labs within the past 24 hours have been reviewed.  Recent Lab Results         06/16/23  0531        Albumin 2.7       Alkaline Phosphatase 70       ALT 18       Anion Gap 9       AST 24       Baso # 0.01       Basophil % 0.2       BILIRUBIN TOTAL 0.3  Comment: For infants and newborns, interpretation of results should be based  on gestational age, weight and in agreement with clinical  observations.    Premature Infant recommended reference ranges:  Up to 24 hours.............<8.0 mg/dL  Up to 48 hours............<12.0 mg/dL  3-5 days..................<15.0 mg/dL  6-29 days.................<15.0 mg/dL         BUN 14       Calcium 9.1       Chloride 104       CO2 23       Creatinine 0.7       .0       Differential Method Automated       eGFR >60       Eos # 0.0       Eosinophil % 0.0       Glucose 122       Gran # (ANC) 4.4       Gran % 77.5       Hematocrit 30.8       Hemoglobin 10.4       Immature Grans (Abs) 0.04  Comment: Mild elevation in immature granulocytes is non specific and   can be seen in a variety of conditions including stress response,   acute inflammation, trauma and pregnancy. Correlation with other   laboratory and clinical findings is essential.         Immature Granulocytes 0.7       Lymph # 0.6       Lymph % 10.2       MCH 30.4       MCHC 33.8       MCV 90       Mono # 0.7       Mono % 11.4       MPV 10.1       nRBC 0       Platelets 235       Potassium 4.4       PROTEIN TOTAL 5.8       RBC 3.42       RDW 12.5       Sed Rate 50       Sodium 136       WBC 5.71               Significant Imaging: I have reviewed all pertinent imaging results/findings within the past 24 hours.      Assessment/Plan:      * Acute respiratory failure with hypoxia  Patient with Hypoxic Respiratory failure which is Acute.  he is not on home oxygen. Supplemental oxygen was provided and noted- Oxygen Concentration (%):  [32] 32    .    Signs/symptoms of respiratory failure include- increased work of breathing. Contributing diagnoses includes -COVID 19 Labs and images were reviewed. Patient Has recent ABG, which has been reviewed.  -continue supplemental O2, wean as tolerated   - Remdesivir, prednisone  - decongestants     COVID-19  Patient is identified as High risk for severe complications of COVID 19 based on COVID risk score of 5   Initiate standard COVID protocols; COVID-19 testing ,Infection Control notification  and isolation- respiratory, contact and droplet per protocol    Diagnostics: (leukopenia, hyponatremia, hyperferritinemia, elevated troponin, elevated d-dimer, age, and comorbidities are significant predictors of poor clinical outcome)  CBC, CMP, Ferritin, CRP and Portable CXR    Management: Initiate targeted therapy with Remdesivir, 200mg IV x1, followed by 100mg IV daily x5 days total and Dexamethasone PO/IV 6mg daily x10 days, Maintain oxygen saturations 92-96% via Nasal Cannula 3 LPM and monitor with continuous/intermittent pulse oximetry. , Inhaled bronchodilators as needed for shortness of breath. and Continuous cardiac monitoring.    Paroxysmal A-fib  -continue apixaban 2.5 mg BID   - patient states BB discontinued 2/2 hypotension   -currently paced rhythm on tele, continue to monitor       Tachy-susanna syndrome  S/p pacemaker placement   -no acute issues       Essential hypertension  BP stable   -patient reports he is no longer taking antihypertensives         VTE Risk Mitigation (From admission, onward)         Ordered     apixaban tablet 2.5 mg  2 times daily         06/14/23 1958                Discharge Planning   SUSHILA:      Code Status: Full Code   Is the patient medically ready for discharge?:     Reason for patient still in hospital (select all that apply): Treatment                     Jayro Rubio MD  Department of Hospital Medicine   [facility] ([facility])

## 2023-06-16 NOTE — PLAN OF CARE
Problem: Adult Inpatient Plan of Care  Goal: Plan of Care Review  Outcome: Ongoing, Progressing     Problem: Fall Injury Risk  Goal: Absence of Fall and Fall-Related Injury  Outcome: Ongoing, Progressing  Intervention: Identify and Manage Contributors  Flowsheets (Taken 6/16/2023 0532)  Medication Review/Management: medications reviewed  Intervention: Promote Injury-Free Environment  Flowsheets (Taken 6/16/2023 0532)  Safety Promotion/Fall Prevention:   assistive device/personal item within reach   side rails raised x 2   medications reviewed   Fall Risk reviewed with patient/family

## 2023-06-17 VITALS
RESPIRATION RATE: 18 BRPM | HEIGHT: 70 IN | SYSTOLIC BLOOD PRESSURE: 117 MMHG | TEMPERATURE: 98 F | WEIGHT: 147.69 LBS | DIASTOLIC BLOOD PRESSURE: 56 MMHG | HEART RATE: 76 BPM | OXYGEN SATURATION: 93 % | BODY MASS INDEX: 21.14 KG/M2

## 2023-06-17 LAB
ALBUMIN SERPL BCP-MCNC: 2.7 G/DL (ref 3.5–5.2)
ALP SERPL-CCNC: 78 U/L (ref 55–135)
ALT SERPL W/O P-5'-P-CCNC: 20 U/L (ref 10–44)
ANION GAP SERPL CALC-SCNC: 8 MMOL/L (ref 8–16)
AST SERPL-CCNC: 24 U/L (ref 10–40)
BASOPHILS # BLD AUTO: 0.01 K/UL (ref 0–0.2)
BASOPHILS NFR BLD: 0.1 % (ref 0–1.9)
BILIRUB SERPL-MCNC: 0.3 MG/DL (ref 0.1–1)
BUN SERPL-MCNC: 17 MG/DL (ref 8–23)
CALCIUM SERPL-MCNC: 8.9 MG/DL (ref 8.7–10.5)
CHLORIDE SERPL-SCNC: 104 MMOL/L (ref 95–110)
CO2 SERPL-SCNC: 25 MMOL/L (ref 23–29)
CREAT SERPL-MCNC: 0.7 MG/DL (ref 0.5–1.4)
DIFFERENTIAL METHOD: ABNORMAL
EOSINOPHIL # BLD AUTO: 0 K/UL (ref 0–0.5)
EOSINOPHIL NFR BLD: 0 % (ref 0–8)
ERYTHROCYTE [DISTWIDTH] IN BLOOD BY AUTOMATED COUNT: 12.6 % (ref 11.5–14.5)
EST. GFR  (NO RACE VARIABLE): >60 ML/MIN/1.73 M^2
GLUCOSE SERPL-MCNC: 112 MG/DL (ref 70–110)
HCT VFR BLD AUTO: 32.9 % (ref 40–54)
HGB BLD-MCNC: 10.8 G/DL (ref 14–18)
IMM GRANULOCYTES # BLD AUTO: 0.04 K/UL (ref 0–0.04)
IMM GRANULOCYTES NFR BLD AUTO: 0.4 % (ref 0–0.5)
LYMPHOCYTES # BLD AUTO: 0.9 K/UL (ref 1–4.8)
LYMPHOCYTES NFR BLD: 9 % (ref 18–48)
MCH RBC QN AUTO: 29.5 PG (ref 27–31)
MCHC RBC AUTO-ENTMCNC: 32.8 G/DL (ref 32–36)
MCV RBC AUTO: 90 FL (ref 82–98)
MONOCYTES # BLD AUTO: 0.9 K/UL (ref 0.3–1)
MONOCYTES NFR BLD: 9.3 % (ref 4–15)
NEUTROPHILS # BLD AUTO: 7.8 K/UL (ref 1.8–7.7)
NEUTROPHILS NFR BLD: 81.2 % (ref 38–73)
NRBC BLD-RTO: 0 /100 WBC
PLATELET # BLD AUTO: 290 K/UL (ref 150–450)
PMV BLD AUTO: 9.7 FL (ref 9.2–12.9)
POTASSIUM SERPL-SCNC: 4.3 MMOL/L (ref 3.5–5.1)
PROT SERPL-MCNC: 5.6 G/DL (ref 6–8.4)
RBC # BLD AUTO: 3.66 M/UL (ref 4.6–6.2)
SODIUM SERPL-SCNC: 137 MMOL/L (ref 136–145)
WBC # BLD AUTO: 9.58 K/UL (ref 3.9–12.7)

## 2023-06-17 PROCEDURE — 25000003 PHARM REV CODE 250: Performed by: NURSE PRACTITIONER

## 2023-06-17 PROCEDURE — 99900035 HC TECH TIME PER 15 MIN (STAT)

## 2023-06-17 PROCEDURE — 85025 COMPLETE CBC W/AUTO DIFF WBC: CPT | Performed by: NURSE PRACTITIONER

## 2023-06-17 PROCEDURE — 94761 N-INVAS EAR/PLS OXIMETRY MLT: CPT

## 2023-06-17 PROCEDURE — 36415 COLL VENOUS BLD VENIPUNCTURE: CPT | Performed by: INTERNAL MEDICINE

## 2023-06-17 PROCEDURE — 63600175 PHARM REV CODE 636 W HCPCS: Mod: JZ,TB | Performed by: NURSE PRACTITIONER

## 2023-06-17 PROCEDURE — 94640 AIRWAY INHALATION TREATMENT: CPT

## 2023-06-17 PROCEDURE — 80053 COMPREHEN METABOLIC PANEL: CPT | Performed by: INTERNAL MEDICINE

## 2023-06-17 PROCEDURE — 94799 UNLISTED PULMONARY SVC/PX: CPT

## 2023-06-17 RX ORDER — DEXAMETHASONE 6 MG/1
6 TABLET ORAL DAILY
Qty: 7 TABLET | Refills: 0 | Status: SHIPPED | OUTPATIENT
Start: 2023-06-18 | End: 2023-06-25

## 2023-06-17 RX ORDER — GUAIFENESIN AND DEXTROMETHORPHAN HYDROBROMIDE 1200; 60 MG/1; MG/1
1 TABLET, EXTENDED RELEASE ORAL 2 TIMES DAILY
Qty: 10 TABLET | Refills: 0 | Status: SHIPPED | OUTPATIENT
Start: 2023-06-17 | End: 2023-06-22

## 2023-06-17 RX ADMIN — THERA TABS 1 TABLET: TAB at 08:06

## 2023-06-17 RX ADMIN — ATORVASTATIN CALCIUM 20 MG: 10 TABLET, FILM COATED ORAL at 08:06

## 2023-06-17 RX ADMIN — ALBUTEROL SULFATE 2 PUFF: 90 AEROSOL, METERED RESPIRATORY (INHALATION) at 01:06

## 2023-06-17 RX ADMIN — OXYCODONE HYDROCHLORIDE AND ACETAMINOPHEN 500 MG: 500 TABLET ORAL at 08:06

## 2023-06-17 RX ADMIN — DEXAMETHASONE 6 MG: 4 TABLET ORAL at 08:06

## 2023-06-17 RX ADMIN — APIXABAN 2.5 MG: 2.5 TABLET, FILM COATED ORAL at 08:06

## 2023-06-17 RX ADMIN — REMDESIVIR 100 MG: 100 INJECTION, POWDER, LYOPHILIZED, FOR SOLUTION INTRAVENOUS at 08:06

## 2023-06-17 RX ADMIN — PANTOPRAZOLE SODIUM 40 MG: 40 TABLET, DELAYED RELEASE ORAL at 08:06

## 2023-06-17 RX ADMIN — ALBUTEROL SULFATE 2 PUFF: 90 AEROSOL, METERED RESPIRATORY (INHALATION) at 07:06

## 2023-06-17 NOTE — PLAN OF CARE
[facility] - Tele[facility] (Hospital)  Discharge Final Note    Primary Care Provider: Madie Davis MD    Expected Discharge Date: 6/17/2023    Final Discharge Note (most recent)       Final Note - 06/17/23 1022          Final Note    Assessment Type Final Discharge Note     Anticipated Discharge Disposition Home-Health Care Svc        Post-Acute Status    Post-Acute Authorization Home Health     Home Health Status Referrals Sent     Discharge Delays None known at this time                   Referral to  sent via [facility]. No other needs. KM,MSW    Important Message from Medicare  Important Message from Medicare regarding Discharge Appeal Rights: Given to patient/caregiver, Explained to patient/caregiver, Signed/date by patient/caregiver     Date IMM was signed: 06/16/23  Time IMM was signed: 2795    Contact Info       Madie Davis MD   Specialty: Family Medicine   Relationship: PCP - General    [address] [place] [id number]   Phone: [phone]       Next Steps: Follow up in 1 week(s)

## 2023-06-17 NOTE — DISCHARGE SUMMARY
[facility] ([facility])  [facility] Medicine  Discharge Summary      Patient Name: Dominguez Ritchie  MRN: [id number]  ZAHRA: [id number]  Patient Class: IP- Inpatient  Admission Date: 6/14/2023  Hospital Length of Stay: 3 days  Discharge Date and Time:  06/17/2023 9:34 AM  Attending Physician: Jayro Rubio, *   Discharging Provider: Jayro Rubio MD  Primary Care Provider: Madie Davis MD    Primary Care Team: Networked reference to record PCT     HPI:   Dominguez Ritchie is a 89 year old male with a past medical history of hypertension, hyperlipidemia, paroxysmal atrial fibrillation and tachy-susanna syndrome status post cardiac pacemaker. He was referred to ED by primary care for acute respiratory failure with hypoxia 2/2 COVID 19.  Patient reports 1 week history of productive cough, congestion, headache and SOB. Dyspnea worse today. He reports exposure to COVID positive granddaughter. No n/v, abdominal pain, chest pain or fever. ED evaluation: O2 sats 82% room air on arrival, improved with 2 liters O2 per NC.. ABG: pO2 53, pH 7.4. CXR shows bibasilar pulmonary opacity with volume loss. EKG shows paced rhythm. Patient placed in observation under hospital medicine.          * No surgery found *      Hospital Course:   89 y/o WM admitted with a Dx of COVID PNA  and hypoxia . Started on remdesevir and decadron . Ferritin level wnl and inflammatory marker  elevated .  6/16 He report feeling better . He is on 3 lt by arthur OVALLES . PT/OT consulted .   6/17 Pt was seen and examined at bedside .He was determined to be suitable for d/c  He did not qualify for home o2 with a o2 sat > 94% at rest and on exertion . He is s/p remdesevir daily x 4 days  and decadron 6 mg po qd x 4 days  .He is tolerating po intake  and report feeling better . There was no acute event since admission . He was advised to f/u with his PCP in 1 week .        Goals of Care Treatment Preferences:  Code Status: Full Code      Consults:      Pulmonary  * Acute respiratory failure with hypoxia  Patient with Hypoxic Respiratory failure which is Acute.  he is not on home oxygen. Supplemental oxygen was provided and noted- Oxygen Concentration (%):  [28] 28    .   Signs/symptoms of respiratory failure include- increased work of breathing. Contributing diagnoses includes -COVID 19 Labs and images were reviewed. Patient Has recent ABG, which has been reviewed.  -continue supplemental O2, wean as tolerated   - Remdesivir, prednisone  - decongestants     Cardiac/Vascular  Paroxysmal A-fib  -continue apixaban 2.5 mg BID   - patient states BB discontinued 2/2 hypotension   -currently paced rhythm on tele, continue to monitor       Tachy-susanna syndrome  S/p pacemaker placement   -no acute issues       Essential hypertension  BP stable   -patient reports he is no longer taking antihypertensives       ID  COVID-19  Patient is identified as High risk for severe complications of COVID 19 based on COVID risk score of 5   Initiate standard COVID protocols; COVID-19 testing ,Infection Control notification  and isolation- respiratory, contact and droplet per protocol    Diagnostics: (leukopenia, hyponatremia, hyperferritinemia, elevated troponin, elevated d-dimer, age, and comorbidities are significant predictors of poor clinical outcome)  CBC, CMP, Ferritin, CRP and Portable CXR    Management: Initiate targeted therapy with Remdesivir, 200mg IV x1, followed by 100mg IV daily x5 days total and Dexamethasone PO/IV 6mg daily x10 days, Maintain oxygen saturations 92-96% via Nasal Cannula 2 LPM and monitor with continuous/intermittent pulse oximetry. , Inhaled bronchodilators as needed for shortness of breath. and Continuous cardiac monitoring.      Final Active Diagnoses:    Diagnosis Date Noted POA    PRINCIPAL PROBLEM:  Acute respiratory failure with hypoxia [J96.01] 06/15/2023 Yes    COVID-19 [U07.1] 06/15/2023 Yes    Tachy-susanna syndrome [I49.5] 10/29/2022 Yes     Paroxysmal A-fib [I48.0] 10/29/2022 Yes    Essential hypertension [I10] 12/03/2018 Yes      Problems Resolved During this Admission:       Discharged Condition: stable    Disposition: Home or Self Care    Follow Up:   Follow-up Information     Madie Davis MD Follow up in 1 week(s).    Specialty: Family Medicine  Contact information:  [address] [address] [address]  [phone]                       Patient Instructions:      Diet Cardiac     Activity as tolerated       Significant Diagnostic Studies: Labs:   BMP:   Recent Labs   Lab 06/16/23  0531 06/17/23  0520   * 112*    137   K 4.4 4.3    104   CO2 23 25   BUN 14 17   CREATININE 0.7 0.7   CALCIUM 9.1 8.9    and CMP   Recent Labs   Lab 06/16/23  0531 06/17/23  0520    137   K 4.4 4.3    104   CO2 23 25   * 112*   BUN 14 17   CREATININE 0.7 0.7   CALCIUM 9.1 8.9   PROT 5.8* 5.6*   ALBUMIN 2.7* 2.7*   BILITOT 0.3 0.3   ALKPHOS 70 78   AST 24 24   ALT 18 20   ANIONGAP 9 8     Microbiology:   Blood Culture   Lab Results   Component Value Date    LABBLOO No Growth to date 06/14/2023    LABBLOO No Growth to date 06/14/2023    LABBLOO No Growth to date 06/14/2023       Pending Diagnostic Studies:     None         Medications:  Reconciled Home Medications:      Medication List      START taking these medications    dexAMETHasone 6 MG tablet  Commonly known as: DECADRON  Take 1 tablet (6 mg total) by mouth once daily. for 7 days  Start taking on: June 18, 2023     dextromethorphan-guaiFENesin 60-1,200 mg per 12 hr tablet  Commonly known as: MUCINEX DM  Take 1 tablet by mouth 2 (two) times a day. for 5 days        CONTINUE taking these medications    apixaban 2.5 mg Tab  Commonly known as: ELIQUIS  Take 1 tablet (2.5 mg total) by mouth 2 (two) times daily.     bisacodyL 5 mg EC tablet  Commonly known as: DULCOLAX  Take 5 mg by mouth daily as needed for Constipation.     dutasteride 0.5 mg capsule  Commonly known as:  AVODART  Take 1 capsule (0.5 mg total) by mouth once daily.     pantoprazole 40 MG tablet  Commonly known as: PROTONIX  Take 1 tablet by mouth once daily     rOPINIRole 0.5 MG tablet  Commonly known as: REQUIP  Take 1 tablet (0.5 mg total) by mouth 3 (three) times daily.     rosuvastatin 5 MG tablet  Commonly known as: CRESTOR  Take 1 tablet (5 mg total) by mouth once daily.            Indwelling Lines/Drains at time of discharge:   Lines/Drains/Airways     None                 Time spent on the discharge of patient: 30 minutes         Jayro Rubio MD  Department of Hospital Medicine  [facility] - Tele[facility] ([facility])

## 2023-06-17 NOTE — PLAN OF CARE
Problem: Adult Inpatient Plan of Care  Goal: Plan of Care Review  Outcome: Ongoing, Progressing     Problem: Fall Injury Risk  Goal: Absence of Fall and Fall-Related Injury  Outcome: Ongoing, Progressing  Intervention: Identify and Manage Contributors  Flowsheets (Taken 6/17/2023 0542)  Medication Review/Management: medications reviewed  Intervention: Promote Injury-Free Environment  Flowsheets (Taken 6/17/2023 0542)  Safety Promotion/Fall Prevention:   assistive device/personal item within reach   Fall Risk reviewed with patient/family   side rails raised x 2

## 2023-06-17 NOTE — ASSESSMENT & PLAN NOTE
Patient is identified as High risk for severe complications of COVID 19 based on COVID risk score of 5   Initiate standard COVID protocols; COVID-19 testing ,Infection Control notification  and isolation- respiratory, contact and droplet per protocol    Diagnostics: (leukopenia, hyponatremia, hyperferritinemia, elevated troponin, elevated d-dimer, age, and comorbidities are significant predictors of poor clinical outcome)  CBC, CMP, Ferritin, CRP and Portable CXR    Management: Initiate targeted therapy with Remdesivir, 200mg IV x1, followed by 100mg IV daily x5 days total and Dexamethasone PO/IV 6mg daily x10 days, Maintain oxygen saturations 92-96% via Nasal Cannula 2 LPM and monitor with continuous/intermittent pulse oximetry. , Inhaled bronchodilators as needed for shortness of breath. and Continuous cardiac monitoring.

## 2023-06-17 NOTE — ASSESSMENT & PLAN NOTE
Patient with Hypoxic Respiratory failure which is Acute.  he is not on home oxygen. Supplemental oxygen was provided and noted- Oxygen Concentration (%):  [28] 28    .   Signs/symptoms of respiratory failure include- increased work of breathing. Contributing diagnoses includes -COVID 19 Labs and images were reviewed. Patient Has recent ABG, which has been reviewed.  -continue supplemental O2, wean as tolerated   - Remdesivir, prednisone  - decongestants

## 2023-06-17 NOTE — RESPIRATORY THERAPY
Home Oxygen Evaluation     1) Patient's O2 Sat on room air while at rest: 94%  If at rest Sat is 89% or above, continue.     2) Patient's O2 Sat on room air while exercisin%  If exercise Sat is 88% or below, continue.

## 2023-06-17 NOTE — PROGRESS NOTES
Provided avs to pt and his daughter   Reviewed both verbalized understanding of med regimen and follow up. Daughter to make follow up apt     Tele monitor removed and returned to [facility]   Piv removed   Pt to be wheeled down to waiting car

## 2023-06-19 ENCOUNTER — PATIENT OUTREACH (OUTPATIENT)
Dept: ADMINISTRATIVE | Facility: CLINIC | Age: 88
End: 2023-06-19
Payer: MEDICARE

## 2023-06-19 ENCOUNTER — PES CALL (OUTPATIENT)
Dept: ADMINISTRATIVE | Facility: CLINIC | Age: 88
End: 2023-06-19
Payer: MEDICARE

## 2023-06-19 NOTE — PROGRESS NOTES
C3 nurse attempted to contact Dominguez Ritchie  for a TCC post hospital discharge follow up call. No answer. Left voicemail with callback information. The patient has a scheduled HOSFU appointment with Mar Layton NP on 06/21/2023 @ 0800.

## 2023-06-20 LAB
BACTERIA BLD CULT: NORMAL
BACTERIA BLD CULT: NORMAL

## 2023-06-20 NOTE — PROGRESS NOTES
C3 nurse spoke with Dominguez Ritchie  for a TCC post hospital discharge follow up call. The patient has a scheduled HOSFU appointment with Mar Layton on 6/21/23 @ 0800.

## 2023-06-20 NOTE — TELEPHONE ENCOUNTER
Osteo bi flex OTC for his knees 1 tablet BID.  Dimenhydrinate 50 mg 1 tablet in AM PRN for Dizziness.  Vitamin B-12 OTC 1 tablet daily.

## 2023-06-21 ENCOUNTER — OFFICE VISIT (OUTPATIENT)
Dept: HOME HEALTH SERVICES | Facility: CLINIC | Age: 88
End: 2023-06-21
Payer: MEDICARE

## 2023-06-21 VITALS
HEART RATE: 68 BPM | OXYGEN SATURATION: 97 % | SYSTOLIC BLOOD PRESSURE: 128 MMHG | TEMPERATURE: 98 F | DIASTOLIC BLOOD PRESSURE: 60 MMHG | RESPIRATION RATE: 18 BRPM

## 2023-06-21 DIAGNOSIS — U07.1 COVID-19: ICD-10-CM

## 2023-06-21 PROCEDURE — 1159F PR MEDICATION LIST DOCUMENTED IN MEDICAL RECORD: ICD-10-PCS | Mod: CPTII,S$GLB,, | Performed by: NURSE PRACTITIONER

## 2023-06-21 PROCEDURE — 1111F DSCHRG MED/CURRENT MED MERGE: CPT | Mod: CPTII,S$GLB,, | Performed by: NURSE PRACTITIONER

## 2023-06-21 PROCEDURE — 99496 TRANSITIONAL CARE MANAGE SERVICE 7 DAY DISCHARGE: ICD-10-PCS | Mod: S$GLB,,, | Performed by: NURSE PRACTITIONER

## 2023-06-21 PROCEDURE — 99496 TRANSJ CARE MGMT HIGH F2F 7D: CPT | Mod: S$GLB,,, | Performed by: NURSE PRACTITIONER

## 2023-06-21 PROCEDURE — 1159F MED LIST DOCD IN RCRD: CPT | Mod: CPTII,S$GLB,, | Performed by: NURSE PRACTITIONER

## 2023-06-21 PROCEDURE — 1160F RVW MEDS BY RX/DR IN RCRD: CPT | Mod: CPTII,S$GLB,, | Performed by: NURSE PRACTITIONER

## 2023-06-21 PROCEDURE — 1111F PR DISCHARGE MEDS RECONCILED W/ CURRENT OUTPATIENT MED LIST: ICD-10-PCS | Mod: CPTII,S$GLB,, | Performed by: NURSE PRACTITIONER

## 2023-06-21 PROCEDURE — 1160F PR REVIEW ALL MEDS BY PRESCRIBER/CLIN PHARMACIST DOCUMENTED: ICD-10-PCS | Mod: CPTII,S$GLB,, | Performed by: NURSE PRACTITIONER

## 2023-06-21 NOTE — PROGRESS NOTES
[facility]  Transition of Care Home Visit    Visit Date: 6/21/2023  Encounter Provider: Mar Craig   PCP:  Madie Davis MD    PRESENTING HISTORY      Patient ID: Dominguez Ritchie is a 89 y.o. male.    Consult Requested By:  Dr. Jayro Crocker*  Reason for Consult:  Hospital Follow Up.    Dominguez is being seen at home due to being seen at home due to physical debility that presents a taxing effort to leave the home, to mitigate high risk of hospital readmission and/or due to the limited availability of reliable or safe options for transportation to the point of access to the provider. Prior to treatment on this visit the chart was reviewed and patient verbal consent was obtained.      Chief Complaint: Transitional Care    Patient was admitted to hospital on 06/15 and discharged to home on 06/17    History of Present Illness: Mr. Dominguez Ritchie is a 89 y.o. male who was recently admitted to the hospital with a PMHx of HTN, HLD, paroxysmal Afib, who presents to the Emergency Department for evaluation of SOB which onset gradually 1 week ago. Pt was sent from [facility] for + COVID test. Per [facility], pt's sats were 82% on room air. Sats now 96% on 4L O2. Symptoms are constant and moderate in severity. No mitigating or exacerbating factors reported. Associated sxs include dry cough. Patient denies any dysuria, CP, back pain, nausea, fever, and all other sxs at this time. No prior Tx reported. No further complaints or concerns at this time.       ___________________________________________________________________    Today:    HPI:  Patient is being seen today for a transitional care visit after hospitalization. See hospital course for details. Upon arrival patient was ambulatory to the door with his rollator. Patient reports that he is doing much better since his discharge from the hospital. Patient reports that he is still using his incentive spirometer and has no complaints of shortness of breath, chest  pain or lingering cough. Medications reviewed with patient and patient reports compliance to all medications. VSS.          Review of Systems   Constitutional:  Negative for chills and fever.   HENT:  Positive for hearing loss (has hearing aid).    Respiratory: Negative.     Cardiovascular:  Positive for leg swelling.   Gastrointestinal: Negative.    Genitourinary: Negative.    Musculoskeletal:  Positive for arthralgias.   Skin: Negative.    Neurological: Negative.    Psychiatric/Behavioral: Negative.       Assessments:  Environmental: Patient lives in a single level townhouse. There are no steps at the entrance. Lighting is bright and temperature is comfortable  Functional Status: Patient is ambulatory with a rollator and is independent with ALDs  Safety: Fall Precautions  Nutritional: Adequate food in the home  Home Health/DME/Supplies: [facility], DMEs: rollator    PAST HISTORY:     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       Past Surgical History:   Procedure Laterality Date    A-V CARDIAC PACEMAKER INSERTION Left 11/1/2022    Procedure: INSERTION, CARDIAC PACEMAKER, DUAL CHAMBER;  Surgeon: Finn Mccrary MD;  Location: [facility] CATH LAB;  Service: Cardiology;  Laterality: Left;  biotronik AAIR    APPENDECTOMY      BACK SURGERY Bilateral 2016    CATARACT EXTRACTION, BILATERAL      COLONOSCOPY  2/2009    EYE SURGERY       INJECTION OF ANESTHETIC AGENT AROUND NERVE Right 11/18/2022    Procedure: Right Genicular nerve block w/Light RN IV Sedation;  Surgeon: Benitez Roberson MD;  Location: [facility] PAIN MGT;  Service: Pain Management;  Laterality: Right;    INSERTION OF PERMANENT PACEMAKER Left 11/1/2022    Procedure: Implant PPM;  Surgeon: Finn Mccrary MD;  Location: [facility] CATH LAB;  Service: Cardiology;  Laterality: Left;    JOINT REPLACEMENT      left knee    LUMBAR PARAVERTEBRAL FACET JOINT NERVE BLOCK Bilateral 8/29/2019    Procedure: LMBB L3,4,5;  Surgeon: Nabor Sadler MD;  Location: [facility]IN [facility];  Service: Pain Management;  Laterality: Bilateral;    SELECTIVE INJECTION OF ANESTHETIC AGENT AROUND LUMBAR SPINAL NERVE ROOT BY TRANSFORAMINAL APPROACH Bilateral 11/8/2021    Procedure: Bilateral L4/5 +/- L5/S1 TF DREW;  Surgeon: Nabor Sadler MD;  Location: [facility]IN [facility];  Service: Pain Management;  Laterality: Bilateral;    SELECTIVE INJECTION OF ANESTHETIC AGENT AROUND LUMBAR SPINAL NERVE ROOT BY TRANSFORAMINAL APPROACH Bilateral 1/4/2022    Procedure: Bilateral L4/5 + L5/S1 TF DREW;  Surgeon: Nabor Sadler MD;  Location: [facility] PAIN MGT;  Service: Pain Management;  Laterality: Bilateral;    SELECTIVE INJECTION OF ANESTHETIC AGENT AROUND LUMBAR SPINAL NERVE ROOT BY TRANSFORAMINAL APPROACH Right 12/30/2022    Procedure: Right-sided L4/5 and L5/S1 transforaminal epidural steroid injection with RN IV sedation;  Surgeon: Benitez Roberson MD;  Location: [facility] PAIN [facility]GT;  Service: Pain Management;  Laterality: Right;    SPINE SURGERY      UPPER GASTROINTESTINAL ENDOSCOPY  2/2009       Family History   Problem Relation Age of Onset    Heart disease Mother     Cancer Son         pancreatic     Diabetes Maternal Uncle     Kidney disease Neg Hx     Stroke Neg Hx        Social History     Socioeconomic History    Marital status:    Tobacco Use    Smoking status: Former     Packs/day: 1.00     Years: 25.00     Pack years: 25.00     Types:  Cigarettes     Quit date: 1990     Years since quittin.4    Smokeless tobacco: Never   Substance and Sexual Activity    Alcohol use: No     Alcohol/week: 0.0 standard drinks    Drug use: No    Sexual activity: Not Currently     Birth control/protection: None     Social Determinants of Health     Financial Resource Strain: Low Risk     Difficulty of Paying Living Expenses: Not hard at all   Food Insecurity: No Food Insecurity    Worried About Running Out of Food in the Last Year: Never true    Ran Out of Food in the Last Year: Never true   Transportation Needs: No Transportation Needs    Lack of Transportation (Medical): No    Lack of Transportation (Non-Medical): No   Physical Activity: Sufficiently Active    Days of Exercise per Week: 7 days    Minutes of Exercise per Session: 30 min   Stress: No Stress Concern Present    Feeling of Stress : Not at all   Social Connections: Moderately Integrated    Frequency of Communication with Friends and Family: Twice a week    Frequency of Social Gatherings with Friends and Family: Three times a week    Attends Alevism Services: More than 4 times per year    Active Member of Clubs or Organizations: No    Attends Club or Organization Meetings: Never    Marital Status:    Housing Stability: Low Risk     Unable to Pay for Housing in the Last Year: No    Number of Places Lived in the Last Year: 1    Unstable Housing in the Last Year: No       MEDICATIONS & ALLERGIES:     Current Outpatient Medications on File Prior to Visit   Medication Sig Dispense Refill    apixaban (ELIQUIS) 2.5 mg Tab Take 1 tablet (2.5 mg total) by mouth 2 (two) times daily. 60 tablet 11    bisacodyL (DULCOLAX) 5 mg EC tablet Take 5 mg by mouth daily as needed for Constipation.      dextromethorphan-guaiFENesin (MUCINEX DM) 60-1,200 mg per 12 hr tablet Take 1 tablet by mouth 2 (two) times a day. for 5 days 10 tablet 0    dutasteride (AVODART) 0.5 mg capsule Take 1 capsule (0.5 mg total) by  mouth once daily. 90 capsule 3    pantoprazole (PROTONIX) 40 MG tablet Take 1 tablet by mouth once daily 90 tablet 2    rOPINIRole (REQUIP) 0.5 MG tablet Take 1 tablet (0.5 mg total) by mouth 3 (three) times daily. 30 tablet 2    rosuvastatin (CRESTOR) 5 MG tablet Take 1 tablet (5 mg total) by mouth once daily. 90 tablet 3    dexAMETHasone (DECADRON) 6 MG tablet Take 1 tablet (6 mg total) by mouth once daily. for 7 days 7 tablet 0     No current facility-administered medications on file prior to visit.        Review of patient's allergies indicates:   Allergen Reactions    Shellfish containing products Nausea And Vomiting    Amoxicillin Rash    Iodine and iodide containing products Nausea And Vomiting       OBJECTIVE:     Vital Signs:  Vitals:    06/21/23 1221   BP: 128/60   Pulse: 68   Resp: 18   Temp: 97.7 °F (36.5 °C)     There is no height or weight on file to calculate BMI.     Physical Exam:  Physical Exam  Constitutional:       Appearance: Normal appearance.   HENT:      Head: Normocephalic and atraumatic.      Nose: Nose normal.      Mouth/Throat:      Mouth: Mucous membranes are moist.      Pharynx: Oropharynx is clear.   Eyes:      Pupils: Pupils are equal, round, and reactive to light.   Cardiovascular:      Rate and Rhythm: Normal rate and regular rhythm.      Heart sounds: Murmur heard.   Pulmonary:      Effort: Pulmonary effort is normal.      Breath sounds: Normal breath sounds.   Abdominal:      General: Bowel sounds are normal.      Palpations: Abdomen is soft.   Musculoskeletal:      Cervical back: Neck supple.      Right lower leg: Edema present.      Left lower leg: Edema present.   Skin:     General: Skin is warm and dry.      Capillary Refill: Capillary refill takes 2 to 3 seconds.   Neurological:      Mental Status: He is alert and oriented to person, place, and time.   Psychiatric:         Mood and Affect: Mood normal.         Behavior: Behavior normal.         Thought Content: Thought  content normal.         Judgment: Judgment normal.       Laboratory  Lab Results   Component Value Date    WBC 9.58 06/17/2023    HGB 10.8 (L) 06/17/2023    HCT 32.9 (L) 06/17/2023    MCV 90 06/17/2023     06/17/2023     Lab Results   Component Value Date    INR 1.0 06/14/2023    INR 1.0 10/29/2022    INR 1.0 10/19/2020     Lab Results   Component Value Date    HGBA1C 5.5 03/16/2023     No results for input(s): POCTGLUCOSE in the last 72 hours.    Diagnostic Results:      TRANSITION OF CARE:     [facility] Contact Note: 06/19/2023    Family and/or Caretaker present at visit?  No.  Diagnostic tests reviewed/disposition: No diagnosic tests pending after this hospitalization.  Disease/illness education: Covid, pneumonia  Home health/community services discussion/referrals: Patient has home health established at [facility] .   Establishment or re-establishment of referral orders for community resources: No other necessary community resources.   Discussion with other health care providers: No discussion with other health care providers necessary.     Transition of Care Visit:  I have reviewed and updated the history and problem list.  I have reconciled the medication list.  I have discussed the hospitalization and current medical issues, prognosis and plans with the patient/family.  I  spent more than 50% of time discussing the care with the patient/family.  Total Face-to-Face Encounter: 60 minutes.    Medications Reconciliation:   I have reconciled the patient's home medications and discharge medications with the patient/family. I have updated all changes.  Refer to After-Visit Medication List.    ASSESSMENT & PLAN:     HIGH RISK CONDITION(S):  Paroxysmal A-Fib, Pacemeaker, Sinus Pause, BPH, HLD, HTN    1. COVID-19  -     Ambulatory referral/consult to [facility]     Encounter for Medical Follow-Up and Medication Review  - [facility] to schedule follow-up visit with  patient in 4 weeks or PRN     Patient Instructions Given:  - Continue all medications, treatments and therapies as ordered.   - Follow all instructions, recommendations as discussed.  - Maintain Safety Precautions at all times.  - Attend all medical appointments as scheduled.  - For worsening symptoms: call Primary Care Physician or Nurse Practitioner.  - For emergencies, call 911 or immediately report to the nearest emergency room       Were controlled substances prescribed?  No    Instructions for the patient:    Scheduled Follow-up :  Future Appointments   Date Time Provider Department Center   8/5/2023 10:00 AM HOME MONITOR DEVICE CHECK [facility] [place]   1/9/2024  1:40 PM Luis A Tejeda MD ON CARDIO  [facility]   3/12/2024  9:40 AM LABORATORY, [facility]   3/15/2024 10:00 AM Warren Rodriguez MD ON UROLOGY  [facility]   3/25/2024 11:30 AM ALBINA Nicole [facility]        After Visit Medication List :     Medication List            Accurate as of June 21, 2023  5:59 PM. If you have any questions, ask your nurse or doctor.                CONTINUE taking these medications      apixaban 2.5 mg Tab  Commonly known as: ELIQUIS  Take 1 tablet (2.5 mg total) by mouth 2 (two) times daily.     bisacodyL 5 mg EC tablet  Commonly known as: DULCOLAX     dexAMETHasone 6 MG tablet  Commonly known as: DECADRON  Take 1 tablet (6 mg total) by mouth once daily. for 7 days     dextromethorphan-guaiFENesin 60-1,200 mg per 12 hr tablet  Commonly known as: MUCINEX DM  Take 1 tablet by mouth 2 (two) times a day. for 5 days     dutasteride 0.5 mg capsule  Commonly known as: AVODART  Take 1 capsule (0.5 mg total) by mouth once daily.     pantoprazole 40 MG tablet  Commonly known as: PROTONIX  Take 1 tablet by mouth once daily     rOPINIRole 0.5 MG tablet  Commonly known as: REQUIP  Take 1 tablet (0.5 mg total) by mouth 3 (three) times daily.     rosuvastatin 5 MG tablet  Commonly known  as: CRESTOR  Take 1 tablet (5 mg total) by mouth once daily.              Signature: Mar Craig NP

## 2023-07-20 ENCOUNTER — HOSPITAL ENCOUNTER (OUTPATIENT)
Dept: RADIOLOGY | Facility: HOSPITAL | Age: 88
Discharge: HOME | End: 2023-07-20
Attending: FAMILY MEDICINE
Payer: MEDICARE

## 2023-07-20 ENCOUNTER — OFFICE VISIT (OUTPATIENT)
Dept: FAMILY MEDICINE | Facility: CLINIC | Age: 88
End: 2023-07-20
Payer: MEDICARE

## 2023-07-20 VITALS
HEIGHT: 70 IN | RESPIRATION RATE: 18 BRPM | WEIGHT: 153.25 LBS | OXYGEN SATURATION: 95 % | DIASTOLIC BLOOD PRESSURE: 70 MMHG | SYSTOLIC BLOOD PRESSURE: 132 MMHG | BODY MASS INDEX: 21.94 KG/M2 | TEMPERATURE: 97 F | HEART RATE: 75 BPM

## 2023-07-20 DIAGNOSIS — R07.89 CHEST WALL PAIN: ICD-10-CM

## 2023-07-20 DIAGNOSIS — R60.0 PEDAL EDEMA: ICD-10-CM

## 2023-07-20 DIAGNOSIS — R07.89 CHEST WALL PAIN: Primary | ICD-10-CM

## 2023-07-20 PROCEDURE — 99214 PR OFFICE/OUTPT VISIT, EST, LEVL IV, 30-39 MIN: ICD-10-PCS | Mod: HCNC,S$GLB,, | Performed by: FAMILY MEDICINE

## 2023-07-20 PROCEDURE — 71046 X-RAY EXAM CHEST 2 VIEWS: CPT | Mod: 26,HCNC,, | Performed by: RADIOLOGY

## 2023-07-20 PROCEDURE — 99999 PR PBB SHADOW E&M-EST. PATIENT-LVL III: CPT | Mod: PBBFAC,HCNC,, | Performed by: FAMILY MEDICINE

## 2023-07-20 PROCEDURE — 1101F PT FALLS ASSESS-DOCD LE1/YR: CPT | Mod: HCNC,CPTII,S$GLB, | Performed by: FAMILY MEDICINE

## 2023-07-20 PROCEDURE — 99214 OFFICE O/P EST MOD 30 MIN: CPT | Mod: HCNC,S$GLB,, | Performed by: FAMILY MEDICINE

## 2023-07-20 PROCEDURE — 3288F PR FALLS RISK ASSESSMENT DOCUMENTED: ICD-10-PCS | Mod: HCNC,CPTII,S$GLB, | Performed by: FAMILY MEDICINE

## 2023-07-20 PROCEDURE — 3288F FALL RISK ASSESSMENT DOCD: CPT | Mod: HCNC,CPTII,S$GLB, | Performed by: FAMILY MEDICINE

## 2023-07-20 PROCEDURE — 71046 XR CHEST PA AND LATERAL: ICD-10-PCS | Mod: 26,HCNC,, | Performed by: RADIOLOGY

## 2023-07-20 PROCEDURE — 1101F PR PT FALLS ASSESS DOC 0-1 FALLS W/OUT INJ PAST YR: ICD-10-PCS | Mod: HCNC,CPTII,S$GLB, | Performed by: FAMILY MEDICINE

## 2023-07-20 PROCEDURE — 1126F AMNT PAIN NOTED NONE PRSNT: CPT | Mod: HCNC,CPTII,S$GLB, | Performed by: FAMILY MEDICINE

## 2023-07-20 PROCEDURE — 1126F PR PAIN SEVERITY QUANTIFIED, NO PAIN PRESENT: ICD-10-PCS | Mod: HCNC,CPTII,S$GLB, | Performed by: FAMILY MEDICINE

## 2023-07-20 PROCEDURE — 71046 X-RAY EXAM CHEST 2 VIEWS: CPT | Mod: TC,HCNC,FY,PO

## 2023-07-20 PROCEDURE — 1159F MED LIST DOCD IN RCRD: CPT | Mod: HCNC,CPTII,S$GLB, | Performed by: FAMILY MEDICINE

## 2023-07-20 PROCEDURE — 99999 PR PBB SHADOW E&M-EST. PATIENT-LVL III: ICD-10-PCS | Mod: PBBFAC,HCNC,, | Performed by: FAMILY MEDICINE

## 2023-07-20 PROCEDURE — 1159F PR MEDICATION LIST DOCUMENTED IN MEDICAL RECORD: ICD-10-PCS | Mod: HCNC,CPTII,S$GLB, | Performed by: FAMILY MEDICINE

## 2023-07-20 RX ORDER — FUROSEMIDE 20 MG/1
20 TABLET ORAL DAILY
Qty: 30 TABLET | Refills: 1 | Status: SHIPPED | OUTPATIENT
Start: 2023-07-20 | End: 2023-09-07 | Stop reason: SDUPTHER

## 2023-07-20 RX ORDER — POTASSIUM CHLORIDE 20 MEQ/1
20 TABLET, EXTENDED RELEASE ORAL DAILY
Qty: 30 TABLET | Refills: 2 | Status: SHIPPED | OUTPATIENT
Start: 2023-07-20

## 2023-07-20 NOTE — PROGRESS NOTES
Chief Complaint:    No chief complaint on file.      History of Present Illness:    07/20/2023:-  Presents today for chest pain with deep breaths,  Denies any SOB with walking and has home health established who have listened to his lungs and didn't hear anything  Also has some bilateral leg swelling, just noticed a couple of days ago. Not on any fluid medications      06/14/2023:-  Presents today with cough and HA,  Tested positive for COVID today, has been sick for 1 week. Says he has been having a productive cough and SOB but denies any fever  States he felt sick before this started but it had went away.  O2 levels at 82 right now.         03/16/2023:-  Presents today for six-month follow-up   Doing okay blood pressure is running on the low side.  He is taking amlodipine   He also complains that he is taking Eliquis and he bleeds a lot slight bump taking 5 mg b.i.d. he has a atrial lead pacemaker, also suffers with paroxysmal AFib and susanna-tachy syndrome    11/09/2022:-  Patient presents today for a hospital follow-up on 10/28 for abnormal Holter monitor finding    An atrial lead pacemaker was placed on 11/01. Put on Eliquis. Doing better. No more syncopal episodes. His blood pressure is running normally.      09/27/2022:-  Patient with HTN presents today for a fall three days ago    He reports swelling in his R knee that has now subsided since he's been icing it. He didn't come to the office because it was closed so he waited until today. Previous history of cortisone injection.   He says his spells occur mostly after eating and there momentary and then goes away and but can come back again.  Also wants clarifications on his medications. They cancelled his Holter monitor because his echo was normal. His dizzy spells would occur after eating so he stopped Crestor and dutasteride.  Patient and his wife would like their flu vaccines today.   Patient would ilke rollator. Using his wife's today.  "    09/13/2022:-  Patient with HTN presents today for dizziness.    Had an episode of blurry vision, near-syncope while sitting down. Says it felt like he was going "blind". Denies palpitations or chest pain. He did have pain in his forehead. His blood pressure was 125/75 when the episode occurred. Didn't check his sugar. He took Pepto Bismol and his symptoms started fading. Symptoms returned the next day. He has a heart murmur. His last echocardiogram showed that one of his valves was tightening.   Reports R knee pain, will see Dr. Roberson on 10/31 for a shot. Wants to know if he can get something sooner. Dr. Sadler did an injection over one year ago. His knee can give out if he's been sitting for a while.       ROS:  Review of Systems   Constitutional:  Negative for appetite change, chills and fever.   HENT:  Negative for congestion, ear pain, postnasal drip, rhinorrhea, sinus pressure and sinus pain.    Eyes:  Negative for pain.   Respiratory:  Negative for cough, chest tightness and shortness of breath.    Cardiovascular:  Positive for chest pain (with deep breaths) and leg swelling. Negative for palpitations.   Gastrointestinal:  Negative for abdominal pain, blood in stool, constipation, diarrhea and nausea.   Genitourinary:  Negative for difficulty urinating, dysuria, flank pain and hematuria.   Musculoskeletal:  Negative for arthralgias and myalgias.   Skin:  Negative for pallor and wound.   Neurological:  Negative for dizziness, tremors, speech difficulty, light-headedness and headaches.   Psychiatric/Behavioral:  Negative for behavioral problems, dysphoric mood and sleep disturbance. The patient is not nervous/anxious.    All other systems reviewed and are negative.    Past Medical History:   Diagnosis Date    Abnormal exercise tolerance test 7/25/2013    Acute respiratory failure with hypoxia 6/15/2023    Arthritis     Colon polyp     Diverticulosis     Dyslipidemia 7/25/2013    GERD (gastroesophageal reflux " disease)     HTN, goal below 130/80 12/3/2018    Hyperlipidemia 1980    HIGH TG    Knee pain, right 2013    Low back pain with right-sided sciatica 12/3/2018    Meningioma     Paroxysmal A-fib 10/29/2022    Personal history of colonic polyps 2014    RLS (restless legs syndrome) 2013    Tobacco dependence     resolved    West Nile meningitis     per patient       Social History:  Social History     Socioeconomic History    Marital status:    Tobacco Use    Smoking status: Former     Packs/day: 1.00     Years: 25.00     Pack years: 25.00     Types: Cigarettes     Quit date: 1990     Years since quittin.5    Smokeless tobacco: Never   Substance and Sexual Activity    Alcohol use: No     Alcohol/week: 0.0 standard drinks    Drug use: No    Sexual activity: Not Currently     Birth control/protection: None     Social Determinants of Health     Financial Resource Strain: Low Risk     Difficulty of Paying Living Expenses: Not hard at all   Food Insecurity: No Food Insecurity    Worried About Running Out of Food in the Last Year: Never true    Ran Out of Food in the Last Year: Never true   Transportation Needs: No Transportation Needs    Lack of Transportation (Medical): No    Lack of Transportation (Non-Medical): No   Physical Activity: Sufficiently Active    Days of Exercise per Week: 7 days    Minutes of Exercise per Session: 30 min   Stress: No Stress Concern Present    Feeling of Stress : Not at all   Social Connections: Moderately Integrated    Frequency of Communication with Friends and Family: Twice a week    Frequency of Social Gatherings with Friends and Family: Three times a week    Attends Muslim Services: More than 4 times per year    Active Member of Clubs or Organizations: No    Attends Club or Organization Meetings: Never    Marital Status:    Housing Stability: Low Risk     Unable to Pay for Housing in the Last Year: No    Number of Places Lived in the Last Year:  "1    Unstable Housing in the Last Year: No       Family History:   family history includes Cancer in his son; Diabetes in his maternal uncle; Heart disease in his mother.    Health Maintenance   Topic Date Due    Lipid Panel  2024    TETANUS VACCINE  2025       Physical Exam:    Vital Signs  Temp: 97.1 °F (36.2 °C)  Pulse: 75  Resp: 18  SpO2: 95 %  BP: (!) 142/70  Pain Score: 0-No pain  Height and Weight  Height: 5' 10" (177.8 cm)  Weight: 69.5 kg (153 lb 3.5 oz)  BSA (Calculated - sq m): 1.85 sq meters  BMI (Calculated): 22  Weight in (lb) to have BMI = 25: 173.9]    Body mass index is 21.98 kg/m².    Physical Exam  Constitutional:       Appearance: Normal appearance.   HENT:      Head: Normocephalic and atraumatic.      Right Ear: Tympanic membrane normal.      Left Ear: Tympanic membrane normal.   Eyes:      Extraocular Movements: Extraocular movements intact.      Pupils: Pupils are equal, round, and reactive to light.   Cardiovascular:      Rate and Rhythm: Normal rate and regular rhythm.      Pulses: Normal pulses.      Heart sounds: Normal heart sounds. No murmur heard.    No gallop.   Pulmonary:      Effort: Pulmonary effort is normal. No respiratory distress.      Breath sounds: Normal breath sounds. No wheezing, rhonchi or rales.   Chest:      Chest wall: Tenderness present.       Abdominal:      General: There is no distension.      Palpations: Abdomen is soft.      Tenderness: There is no abdominal tenderness.   Musculoskeletal:         General: Swelling present. No deformity or signs of injury. Normal range of motion.      Cervical back: Normal range of motion.      Right lower le+ Pitting Edema present.      Left lower le+ Pitting Edema present.   Skin:     General: Skin is warm and dry.      Capillary Refill: Capillary refill takes less than 2 seconds.      Coloration: Skin is not jaundiced or pale.   Neurological:      General: No focal deficit present.      Mental Status: He is " alert and oriented to person, place, and time.   Psychiatric:         Mood and Affect: Mood normal.         Behavior: Behavior normal.       Lab Results   Component Value Date    CHOL 139 03/16/2023    CHOL 151 07/05/2022    CHOL 143 01/03/2022    TRIG 198 (H) 03/16/2023    TRIG 192 (H) 07/05/2022    TRIG 171 (H) 01/03/2022    HDL 61 03/16/2023    HDL 58 07/05/2022    HDL 51 01/03/2022    TOTALCHOLEST 2.3 03/16/2023    TOTALCHOLEST 2.6 07/05/2022    TOTALCHOLEST 2.8 01/03/2022    NONHDLCHOL 78 03/16/2023    NONHDLCHOL 93 07/05/2022    NONHDLCHOL 92 01/03/2022       Lab Results   Component Value Date    HGBA1C 5.5 03/16/2023       Assessment:      ICD-10-CM ICD-9-CM   1. Chest wall pain  R07.89 786.52   2. Pedal edema  R60.0 782.3       Plan:  Order X-Ray of Chest  Take Lasix 20 mg and potassium daily at the same time  Can continue to take some tylenol for pain as needed     Orders Placed This Encounter   Procedures    X-Ray Chest PA And Lateral    B-TYPE NATRIURETIC PEPTIDE    Comprehensive Metabolic Panel    CBC Auto Differential         Current Outpatient Medications   Medication Sig Dispense Refill    apixaban (ELIQUIS) 2.5 mg Tab Take 1 tablet (2.5 mg total) by mouth 2 (two) times daily. 60 tablet 11    bisacodyL (DULCOLAX) 5 mg EC tablet Take 5 mg by mouth daily as needed for Constipation.      dutasteride (AVODART) 0.5 mg capsule Take 1 capsule (0.5 mg total) by mouth once daily. 90 capsule 3    pantoprazole (PROTONIX) 40 MG tablet Take 1 tablet by mouth once daily 90 tablet 2    rOPINIRole (REQUIP) 0.5 MG tablet Take 1 tablet (0.5 mg total) by mouth 3 (three) times daily. 30 tablet 2    rosuvastatin (CRESTOR) 5 MG tablet Take 1 tablet (5 mg total) by mouth once daily. 90 tablet 3    furosemide (LASIX) 20 MG tablet Take 1 tablet (20 mg total) by mouth once daily. 30 tablet 1    potassium chloride SA (K-DUR,KLOR-CON) 20 MEQ tablet Take 1 tablet (20 mEq total) by mouth once daily. 30 tablet 2     No current  facility-administered medications for this visit.       There are no discontinued medications.    No follow-ups on file.      Madie Davis MD  Scribe Attestation:   I, Irineo Vega, am scribing for, and in the presence of, Dr.Arif Davis I performed the above scribed service and the documentation accurately describes the services I performed. I attest to the accuracy of the note.    I, Dr. Madie Davis, reviewed documentation as scribed above. I performed the services described in this documentation.  I agree that the record reflects my personal performance and is accurate and complete. Madie Davis MD.  07/20/2023

## 2023-07-21 ENCOUNTER — LAB VISIT (OUTPATIENT)
Dept: LAB | Facility: HOSPITAL | Age: 88
End: 2023-07-21
Attending: FAMILY MEDICINE
Payer: MEDICARE

## 2023-07-21 DIAGNOSIS — R60.0 PEDAL EDEMA: ICD-10-CM

## 2023-07-21 LAB
ALBUMIN SERPL BCP-MCNC: 3.2 G/DL (ref 3.5–5.2)
ALP SERPL-CCNC: 75 U/L (ref 55–135)
ALT SERPL W/O P-5'-P-CCNC: 13 U/L (ref 10–44)
ANION GAP SERPL CALC-SCNC: 8 MMOL/L (ref 8–16)
AST SERPL-CCNC: 21 U/L (ref 10–40)
BASOPHILS # BLD AUTO: 0.05 K/UL (ref 0–0.2)
BASOPHILS NFR BLD: 0.8 % (ref 0–1.9)
BILIRUB SERPL-MCNC: 0.4 MG/DL (ref 0.1–1)
BUN SERPL-MCNC: 9 MG/DL (ref 8–23)
CALCIUM SERPL-MCNC: 9 MG/DL (ref 8.7–10.5)
CHLORIDE SERPL-SCNC: 105 MMOL/L (ref 95–110)
CO2 SERPL-SCNC: 25 MMOL/L (ref 23–29)
CREAT SERPL-MCNC: 0.8 MG/DL (ref 0.5–1.4)
DIFFERENTIAL METHOD: ABNORMAL
EOSINOPHIL # BLD AUTO: 0.2 K/UL (ref 0–0.5)
EOSINOPHIL NFR BLD: 2.4 % (ref 0–8)
ERYTHROCYTE [DISTWIDTH] IN BLOOD BY AUTOMATED COUNT: 14.4 % (ref 11.5–14.5)
EST. GFR  (NO RACE VARIABLE): >60 ML/MIN/1.73 M^2
GLUCOSE SERPL-MCNC: 109 MG/DL (ref 70–110)
HCT VFR BLD AUTO: 36.2 % (ref 40–54)
HGB BLD-MCNC: 10.9 G/DL (ref 14–18)
IMM GRANULOCYTES # BLD AUTO: 0.03 K/UL (ref 0–0.04)
IMM GRANULOCYTES NFR BLD AUTO: 0.5 % (ref 0–0.5)
LYMPHOCYTES # BLD AUTO: 1 K/UL (ref 1–4.8)
LYMPHOCYTES NFR BLD: 15.1 % (ref 18–48)
MCH RBC QN AUTO: 28.8 PG (ref 27–31)
MCHC RBC AUTO-ENTMCNC: 30.1 G/DL (ref 32–36)
MCV RBC AUTO: 96 FL (ref 82–98)
MONOCYTES # BLD AUTO: 0.6 K/UL (ref 0.3–1)
MONOCYTES NFR BLD: 9.8 % (ref 4–15)
NEUTROPHILS # BLD AUTO: 4.5 K/UL (ref 1.8–7.7)
NEUTROPHILS NFR BLD: 71.4 % (ref 38–73)
NRBC BLD-RTO: 0 /100 WBC
PLATELET # BLD AUTO: 274 K/UL (ref 150–450)
PMV BLD AUTO: 9.9 FL (ref 9.2–12.9)
POTASSIUM SERPL-SCNC: 4 MMOL/L (ref 3.5–5.1)
PROT SERPL-MCNC: 6.3 G/DL (ref 6–8.4)
RBC # BLD AUTO: 3.78 M/UL (ref 4.6–6.2)
SODIUM SERPL-SCNC: 138 MMOL/L (ref 136–145)
WBC # BLD AUTO: 6.3 K/UL (ref 3.9–12.7)

## 2023-07-21 PROCEDURE — 80053 COMPREHEN METABOLIC PANEL: CPT | Mod: HCNC | Performed by: FAMILY MEDICINE

## 2023-07-21 PROCEDURE — 36415 COLL VENOUS BLD VENIPUNCTURE: CPT | Mod: HCNC,PO | Performed by: FAMILY MEDICINE

## 2023-07-21 PROCEDURE — 83880 ASSAY OF NATRIURETIC PEPTIDE: CPT | Mod: HCNC | Performed by: FAMILY MEDICINE

## 2023-07-21 PROCEDURE — 85025 COMPLETE CBC W/AUTO DIFF WBC: CPT | Mod: HCNC | Performed by: FAMILY MEDICINE

## 2023-07-22 LAB — BNP SERPL-MCNC: 60 PG/ML (ref 0–99)

## 2023-07-24 ENCOUNTER — TELEPHONE (OUTPATIENT)
Dept: CARDIOLOGY | Facility: CLINIC | Age: 88
End: 2023-07-24
Payer: MEDICARE

## 2023-07-24 NOTE — TELEPHONE ENCOUNTER
Contacted patient's daughter. Daughter states that patient has been experiencing extreme swelling in his ankles, legs, and feet.Sates that patient went to PCP last week; States that patient was complaining of SOB. So a X=Ray was done and states that patient walls in lungs are still not healed from COVID/Pneumonia that patient previously had. Daughter states that PCP put patient on potassium chloride 20 mg. And within 4 days the swelling has gone down. Daughter is calling to make sure that it's nothing heart related going on. No complaints of active chest pains or palpitations.       ----- Message from Alyssa Correa sent at 7/24/2023  8:26 AM CDT -----  Contact: patient's daughter Lauren [phone]  Patient's daughter Lauren called requesting a call back from Dr. Tejeda, regarding patient's ankle, feet and legs are swollen, please call to discuss

## 2023-07-26 DIAGNOSIS — G25.81 RLS (RESTLESS LEGS SYNDROME): Primary | ICD-10-CM

## 2023-07-26 RX ORDER — ROPINIROLE 0.5 MG/1
TABLET, FILM COATED ORAL
Qty: 30 TABLET | Refills: 0 | Status: SHIPPED | OUTPATIENT
Start: 2023-07-26 | End: 2023-08-15

## 2023-07-28 ENCOUNTER — TELEPHONE (OUTPATIENT)
Dept: CARDIOLOGY | Facility: CLINIC | Age: 88
End: 2023-07-28
Payer: MEDICARE

## 2023-07-28 NOTE — TELEPHONE ENCOUNTER
Contacted patient; Made patient a follow up appointment with NP on Monday to follow up about swelling in legs       ----- Message from Adriana Mckeon sent at 7/28/2023 10:12 AM CDT -----  Name of Who is Calling:Daniel            What is the request in detail:Daughter called to make soonest available appointment on 08/08 but is requesting a sooner appt as patient is still experiencing leg swelling. Patient was also added to wait list           Can the clinic reply by [facility]:  no         What Number to Call Back if not in [facility]: [phone]

## 2023-07-31 ENCOUNTER — OFFICE VISIT (OUTPATIENT)
Dept: CARDIOLOGY | Facility: CLINIC | Age: 88
End: 2023-07-31
Payer: MEDICARE

## 2023-07-31 VITALS
DIASTOLIC BLOOD PRESSURE: 72 MMHG | OXYGEN SATURATION: 91 % | BODY MASS INDEX: 21.65 KG/M2 | WEIGHT: 151.25 LBS | HEIGHT: 70 IN | HEART RATE: 89 BPM | SYSTOLIC BLOOD PRESSURE: 118 MMHG

## 2023-07-31 DIAGNOSIS — Z95.0 CARDIAC PACEMAKER IN SITU: ICD-10-CM

## 2023-07-31 DIAGNOSIS — I07.1 TRICUSPID VALVE INSUFFICIENCY, UNSPECIFIED ETIOLOGY: ICD-10-CM

## 2023-07-31 DIAGNOSIS — R00.1 SINUS BRADYCARDIA: ICD-10-CM

## 2023-07-31 DIAGNOSIS — I10 ESSENTIAL HYPERTENSION: Primary | ICD-10-CM

## 2023-07-31 DIAGNOSIS — E78.2 MIXED HYPERLIPIDEMIA: ICD-10-CM

## 2023-07-31 DIAGNOSIS — R60.0 LEG EDEMA: ICD-10-CM

## 2023-07-31 DIAGNOSIS — I48.0 PAROXYSMAL A-FIB: ICD-10-CM

## 2023-07-31 DIAGNOSIS — I35.8 AORTIC VALVE SCLEROSIS: ICD-10-CM

## 2023-07-31 PROCEDURE — 3288F FALL RISK ASSESSMENT DOCD: CPT | Mod: HCNC,CPTII,S$GLB,

## 2023-07-31 PROCEDURE — 1125F AMNT PAIN NOTED PAIN PRSNT: CPT | Mod: HCNC,CPTII,S$GLB,

## 2023-07-31 PROCEDURE — 1160F RVW MEDS BY RX/DR IN RCRD: CPT | Mod: HCNC,CPTII,S$GLB,

## 2023-07-31 PROCEDURE — 1125F PR PAIN SEVERITY QUANTIFIED, PAIN PRESENT: ICD-10-PCS | Mod: HCNC,CPTII,S$GLB,

## 2023-07-31 PROCEDURE — 1101F PR PT FALLS ASSESS DOC 0-1 FALLS W/OUT INJ PAST YR: ICD-10-PCS | Mod: HCNC,CPTII,S$GLB,

## 2023-07-31 PROCEDURE — 3288F PR FALLS RISK ASSESSMENT DOCUMENTED: ICD-10-PCS | Mod: HCNC,CPTII,S$GLB,

## 2023-07-31 PROCEDURE — 1159F MED LIST DOCD IN RCRD: CPT | Mod: HCNC,CPTII,S$GLB,

## 2023-07-31 PROCEDURE — 99214 OFFICE O/P EST MOD 30 MIN: CPT | Mod: HCNC,S$GLB,,

## 2023-07-31 PROCEDURE — 1160F PR REVIEW ALL MEDS BY PRESCRIBER/CLIN PHARMACIST DOCUMENTED: ICD-10-PCS | Mod: HCNC,CPTII,S$GLB,

## 2023-07-31 PROCEDURE — 1159F PR MEDICATION LIST DOCUMENTED IN MEDICAL RECORD: ICD-10-PCS | Mod: HCNC,CPTII,S$GLB,

## 2023-07-31 PROCEDURE — 1101F PT FALLS ASSESS-DOCD LE1/YR: CPT | Mod: HCNC,CPTII,S$GLB,

## 2023-07-31 PROCEDURE — 99999 PR PBB SHADOW E&M-EST. PATIENT-LVL IV: ICD-10-PCS | Mod: PBBFAC,HCNC,,

## 2023-07-31 PROCEDURE — 99214 PR OFFICE/OUTPT VISIT, EST, LEVL IV, 30-39 MIN: ICD-10-PCS | Mod: HCNC,S$GLB,,

## 2023-07-31 PROCEDURE — 99999 PR PBB SHADOW E&M-EST. PATIENT-LVL IV: CPT | Mod: PBBFAC,HCNC,,

## 2023-07-31 RX ORDER — MUPIROCIN 20 MG/G
OINTMENT TOPICAL 2 TIMES DAILY
COMMUNITY
Start: 2023-06-04

## 2023-07-31 NOTE — PATIENT INSTRUCTIONS
Ok to take 2 lasix tablets in the morning for 3 days starting tomorrow, then return to 1 tablet daily

## 2023-07-31 NOTE — PROGRESS NOTES
Subjective:   Patient ID:  Dominguez Ritchie is a 89 y.o. male who presents for evaluation of No chief complaint on file.      HPI89y/o male with PMHx significant for below. Atrial lead pacemaker 2022. Here today for CV follow up, c/o LE edema and SOB. Lasix prescribed by PCP 2 weeks ago, states hes had some improvement with 20mg daily. Uses walker when out the house but for the most part gets around well. Denies any CP at this time. CXR improving done by PCP recent dx with admission for COVID/PNA. Compliant with medications, denies any issues with bleeding. Compliant with diet    Echo last year EF 55%, mild TR, MR, AR      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       Past Surgical History:   Procedure Laterality Date    A-V CARDIAC PACEMAKER INSERTION Left 11/1/2022    Procedure: INSERTION, CARDIAC PACEMAKER, DUAL CHAMBER;  Surgeon: Finn Mccrary MD;  Location: [facility] CATH LAB;  Service: Cardiology;  Laterality: Left;  biotronik AAIR    APPENDECTOMY      BACK SURGERY Bilateral 2016    CATARACT EXTRACTION, BILATERAL      COLONOSCOPY  2/2009    EYE SURGERY      INJECTION OF ANESTHETIC AGENT AROUND NERVE Right 11/18/2022    Procedure: Right Genicular nerve block w/Light RN IV Sedation;  Surgeon: Benitez Roberson MD;  Location: [facility] PAIN MGT;  Service: Pain Management;  Laterality: Right;    INSERTION OF PERMANENT PACEMAKER Left 11/1/2022     Procedure: Implant PPM;  Surgeon: Finn Mccrary MD;  Location: [facility] CATH LAB;  Service: Cardiology;  Laterality: Left;    JOINT REPLACEMENT      left knee    LUMBAR PARAVERTEBRAL FACET JOINT NERVE BLOCK Bilateral 2019    Procedure: LMBB L3,4,5;  Surgeon: Nabor Sadler MD;  Location: [facility] PAIN MGT;  Service: Pain Management;  Laterality: Bilateral;    SELECTIVE INJECTION OF ANESTHETIC AGENT AROUND LUMBAR SPINAL NERVE ROOT BY TRANSFORAMINAL APPROACH Bilateral 2021    Procedure: Bilateral L4/5 +/- L5/S1 TF DREW;  Surgeon: Nabor Sadler MD;  [facility]: [facility];  Service: Pain Management;  Laterality: Bilateral;    SELECTIVE INJECTION OF ANESTHETIC AGENT AROUND LUMBAR SPINAL NERVE ROOT BY TRANSFORAMINAL APPROACH Bilateral 2022    Procedure: Bilateral L4/5 + L5/S1 TF DREW;  Surgeon: Nabor Sadler MD;  Location: [facility] PAIN MGT;  Service: Pain Management;  Laterality: Bilateral;    SELECTIVE INJECTION OF ANESTHETIC AGENT AROUND LUMBAR SPINAL NERVE ROOT BY TRANSFORAMINAL APPROACH Right 2022    Procedure: Right-sided L4/5 and L5/S1 transforaminal epidural steroid injection with RN IV sedation;  Surgeon: Benitez Roberson MD;  Location: [facility] PAIN MGT;  Service: Pain Management;  Laterality: Right;    SPINE SURGERY      UPPER GASTROINTESTINAL ENDOSCOPY  2009       Social History     Tobacco Use    Smoking status: Former     Current packs/day: 0.00     Average packs/day: 1 pack/day for 25.0 years (25.0 ttl pk-yrs)     Types: Cigarettes     Start date: 1965     Quit date: 1990     Years since quittin.6    Smokeless tobacco: Never   Substance Use Topics    Alcohol use: No     Alcohol/week: 0.0 standard drinks of alcohol    Drug use: No       Family History   Problem Relation Age of Onset    Heart disease Mother     Cancer Son         pancreatic     Diabetes Maternal Uncle     Kidney disease Neg Hx     Stroke Neg Hx        Current Outpatient Medications on File Prior to Visit   Medication Sig  Dispense Refill    apixaban (ELIQUIS) 2.5 mg Tab Take 1 tablet (2.5 mg total) by mouth 2 (two) times daily. 60 tablet 11    bisacodyL (DULCOLAX) 5 mg EC tablet Take 5 mg by mouth daily as needed for Constipation.      dutasteride (AVODART) 0.5 mg capsule Take 1 capsule (0.5 mg total) by mouth once daily. 90 capsule 3    furosemide (LASIX) 20 MG tablet Take 1 tablet (20 mg total) by mouth once daily. 30 tablet 1    mupirocin (BACTROBAN) 2 % ointment Apply topically 2 (two) times daily.      pantoprazole (PROTONIX) 40 MG tablet Take 1 tablet by mouth once daily 90 tablet 2    potassium chloride SA (K-DUR,KLOR-CON) 20 MEQ tablet Take 1 tablet (20 mEq total) by mouth once daily. 30 tablet 2    rOPINIRole (REQUIP) 0.5 MG tablet TAKE 1 TABLET BY MOUTH THREE TIMES DAILY 30 tablet 0    rosuvastatin (CRESTOR) 5 MG tablet Take 1 tablet (5 mg total) by mouth once daily. 90 tablet 3     No current facility-administered medications on file prior to visit.      Wt Readings from Last 3 Encounters:   07/31/23 68.6 kg (151 lb 3.8 oz)   07/20/23 69.5 kg (153 lb 3.5 oz)   06/17/23 67 kg (147 lb 11.3 oz)     Temp Readings from Last 3 Encounters:   07/20/23 97.1 °F (36.2 °C)   06/21/23 97.7 °F (36.5 °C)   06/17/23 98.4 °F (36.9 °C)     BP Readings from Last 3 Encounters:   07/31/23 118/72   07/20/23 132/70   06/21/23 128/60     Pulse Readings from Last 3 Encounters:   07/31/23 89   07/20/23 75   06/21/23 68        Review of Systems   Constitutional: Negative.   HENT: Negative.     Eyes: Negative.    Cardiovascular:  Positive for leg swelling.   Respiratory:  Positive for shortness of breath.    Skin: Negative.    Musculoskeletal: Negative.    Gastrointestinal: Negative.    Genitourinary: Negative.    Neurological: Negative.    Psychiatric/Behavioral: Negative.         Objective:   Physical Exam  Vitals and nursing note reviewed.   Constitutional:       Appearance: Normal appearance.   HENT:      Head: Normocephalic and atraumatic.    Eyes:      General:         Right eye: No discharge.         Left eye: No discharge.      Pupils: Pupils are equal, round, and reactive to light.   Cardiovascular:      Rate and Rhythm: Normal rate and regular rhythm.      Heart sounds: S1 normal and S2 normal. No murmur heard.     No friction rub.   Pulmonary:      Effort: Pulmonary effort is normal. No respiratory distress.      Breath sounds: Normal breath sounds. No rales.   Abdominal:      Palpations: Abdomen is soft.      Tenderness: There is no abdominal tenderness.   Musculoskeletal:      Cervical back: Neck supple.      Right lower leg: Edema present.      Left lower leg: Edema present.   Skin:     General: Skin is warm and dry.   Neurological:      General: No focal deficit present.      Mental Status: He is alert and oriented to person, place, and time.   Psychiatric:         Mood and Affect: Mood normal.         Behavior: Behavior normal.         Thought Content: Thought content normal.         Lab Results   Component Value Date    CHOL 139 03/16/2023    CHOL 151 07/05/2022    CHOL 143 01/03/2022     Lab Results   Component Value Date    HDL 61 03/16/2023    HDL 58 07/05/2022    HDL 51 01/03/2022     Lab Results   Component Value Date    LDLCALC 38.4 (L) 03/16/2023    LDLCALC 54.6 (L) 07/05/2022    LDLCALC 57.8 (L) 01/03/2022     Lab Results   Component Value Date    TRIG 198 (H) 03/16/2023    TRIG 192 (H) 07/05/2022    TRIG 171 (H) 01/03/2022     Lab Results   Component Value Date    CHOLHDL 43.9 03/16/2023    CHOLHDL 38.4 07/05/2022    CHOLHDL 35.7 01/03/2022       Chemistry        Component Value Date/Time     07/21/2023 0823    K 4.0 07/21/2023 0823     07/21/2023 0823    CO2 25 07/21/2023 0823    BUN 9 07/21/2023 0823    CREATININE 0.8 07/21/2023 0823     07/21/2023 0823        Component Value Date/Time    CALCIUM 9.0 07/21/2023 0823    ALKPHOS 75 07/21/2023 0823    AST 21 07/21/2023 0823    ALT 13 07/21/2023 0823    BILITOT 0.4  07/21/2023 0823    ESTGFRAFRICA 88 04/26/2023 1915    ESTGFRAFRICA >60.0 07/05/2022 1444    EGFRNONAA >60.0 07/05/2022 1444          Lab Results   Component Value Date    TSH 1.733 10/29/2022     Lab Results   Component Value Date    INR 1.0 06/14/2023    INR 1.0 10/29/2022    INR 1.0 10/19/2020     @RESUFAST(WBC,HGB,HCT,MCV,PLT)  @LABRCNTIP(BNP,BNPTRIAGEBLO)@  CrCl cannot be calculated (Patient's most recent lab result is older than the maximum 7 days allowed.).     Results for orders placed during the hospital encounter of 09/16/22    Echo    Interpretation Summary  · The left ventricle is normal in size with concentric remodeling and normal systolic function.  · Indeterminate left ventricular diastolic function.  · The estimated PA systolic pressure is 37 mmHg.  · Normal right ventricular size with normal right ventricular systolic function.  · Normal central venous pressure (3 mmHg).  · The estimated ejection fraction is 55%.  · Mild aortic regurgitation.  · Mild mitral regurgitation.  · Mild tricuspid regurgitation.     No results found for this or any previous visit.     Assessment:      1. Essential hypertension    2. Leg edema    3. Mixed hyperlipidemia    4. Tricuspid valve insufficiency, unspecified etiology    5. Aortic valve sclerosis    6. Sinus bradycardia    7. Paroxysmal A-fib    8. Cardiac pacemaker in situ        Plan:   Essential hypertension  -     Echo; Future    Leg edema  -     CV Ultrasound doppler arterial legs bilat; Future  -     Echo; Future    Mixed hyperlipidemia    Tricuspid valve insufficiency, unspecified etiology    Aortic valve sclerosis    Sinus bradycardia    Paroxysmal A-fib    Cardiac pacemaker in situ        Ok to increase lasix to 40mg daily x3 days and return to 20mg after, monitor BP at home  Echo  LE US    Cont lasix and KCL- edema  Statin- HLD  Eliquis- PAF    RTC 1 mos    Chanda Wade, FNP-C [facility], Cardiology

## 2023-08-05 ENCOUNTER — CLINICAL SUPPORT (OUTPATIENT)
Dept: CARDIOLOGY | Facility: HOSPITAL | Age: 88
End: 2023-08-05
Payer: MEDICARE

## 2023-08-05 DIAGNOSIS — Z95.0 PRESENCE OF CARDIAC PACEMAKER: ICD-10-CM

## 2023-08-05 PROCEDURE — 93294 CARDIAC DEVICE CHECK - REMOTE: ICD-10-PCS | Mod: HCNC,S$GLB,, | Performed by: INTERNAL MEDICINE

## 2023-08-05 PROCEDURE — 93296 REM INTERROG EVL PM/IDS: CPT | Mod: HCNC | Performed by: INTERNAL MEDICINE

## 2023-08-05 PROCEDURE — 93294 REM INTERROG EVL PM/LDLS PM: CPT | Mod: HCNC,S$GLB,, | Performed by: INTERNAL MEDICINE

## 2023-08-07 ENCOUNTER — HOSPITAL ENCOUNTER (OUTPATIENT)
Dept: CARDIOLOGY | Facility: HOSPITAL | Age: 88
Discharge: HOME | End: 2023-08-07
Payer: MEDICARE

## 2023-08-07 VITALS
SYSTOLIC BLOOD PRESSURE: 118 MMHG | WEIGHT: 151 LBS | SYSTOLIC BLOOD PRESSURE: 154 MMHG | HEIGHT: 70 IN | BODY MASS INDEX: 21.62 KG/M2 | DIASTOLIC BLOOD PRESSURE: 72 MMHG | DIASTOLIC BLOOD PRESSURE: 72 MMHG | HEIGHT: 70 IN | WEIGHT: 151 LBS | BODY MASS INDEX: 21.62 KG/M2

## 2023-08-07 DIAGNOSIS — I10 ESSENTIAL HYPERTENSION: ICD-10-CM

## 2023-08-07 DIAGNOSIS — R60.0 LEG EDEMA: ICD-10-CM

## 2023-08-07 LAB
AORTIC ROOT ANNULUS: 3.54 CM
ASCENDING AORTA: 3.54 CM
AV INDEX (PROSTH): 0.77
AV MEAN GRADIENT: 4 MMHG
AV PEAK GRADIENT: 7 MMHG
AV REGURGITATION PRESSURE HALF TIME: 643.96 MS
AV VALVE AREA BY VELOCITY RATIO: 2.05 CM²
AV VALVE AREA: 2.28 CM²
AV VELOCITY RATIO: 0.69
BSA FOR ECHO PROCEDURE: 1.84 M2
CV ECHO LV RWT: 0.49 CM
DOP CALC AO PEAK VEL: 1.31 M/S
DOP CALC AO VTI: 24.5 CM
DOP CALC LVOT AREA: 3 CM2
DOP CALC LVOT DIAMETER: 1.94 CM
DOP CALC LVOT PEAK VEL: 0.91 M/S
DOP CALC LVOT STROKE VOLUME: 55.84 CM3
DOP CALC RVOT PEAK VEL: 0.53 M/S
DOP CALC RVOT VTI: 10 CM
DOP CALCLVOT PEAK VEL VTI: 18.9 CM
ECHO LV POSTERIOR WALL: 0.93 CM (ref 0.6–1.1)
EJECTION FRACTION: 65 %
FRACTIONAL SHORTENING: 34 % (ref 28–44)
INTERVENTRICULAR SEPTUM: 1.13 CM (ref 0.6–1.1)
IVC DIAMETER: 1.5 CM
IVRT: 91.34 MSEC
LA MAJOR: 4.91 CM
LA WIDTH: 3.8 CM
LEFT ANT TIBIAL SYS PSV: 81 CM/S
LEFT ATRIUM SIZE: 4.23 CM
LEFT ATRIUM VOLUME INDEX MOD: 31.8 ML/M2
LEFT ATRIUM VOLUME MOD: 58.88 CM3
LEFT CFA PSV: 100 CM/S
LEFT INTERNAL DIMENSION IN SYSTOLE: 2.48 CM (ref 2.1–4)
LEFT PERONEAL SYS PSV: 105 CM/S
LEFT POPLITEAL PSV: 47 CM/S
LEFT POST TIBIAL SYS PSV: 129 CM/S
LEFT PROFUNDA SYS PSV: 78 CM/S
LEFT SUPER FEMORAL DIST SYS PSV: 77 CM/S
LEFT SUPER FEMORAL MID SYS PSV: 153 CM/S
LEFT SUPER FEMORAL OSTIAL SYS PSV: 88 CM/S
LEFT SUPER FEMORAL PROX SYS PSV: 81 CM/S
LEFT TIB/PER TRUNK SYS PSV: 159 CM/S
LEFT VENTRICLE DIASTOLIC VOLUME INDEX: 32.7 ML/M2
LEFT VENTRICLE DIASTOLIC VOLUME: 60.49 ML
LEFT VENTRICLE MASS INDEX: 65 G/M2
LEFT VENTRICLE SYSTOLIC VOLUME INDEX: 11.8 ML/M2
LEFT VENTRICLE SYSTOLIC VOLUME: 21.83 ML
LEFT VENTRICULAR INTERNAL DIMENSION IN DIASTOLE: 3.76 CM (ref 3.5–6)
LEFT VENTRICULAR MASS: 120.39 G
LVOT MG: 1.64 MMHG
LVOT MV: 0.57 CM/S
OHS CV LEFT LOWER EXTREMITY ABI (NO CALC): 0.92
OHS CV RIGHT ABI LOWER EXTREMITY (NO CALC): 0.94
PISA AR MAX VEL: 2.29 M/S
PISA TR MAX VEL: 2.88 M/S
PV MEAN GRADIENT: 0 MMHG
PV MV: 0.53 M/S
PV PEAK GRADIENT: 2 MMHG
PV PEAK VELOCITY: 0.75 M/S
RA MAJOR: 4.3 CM
RA PRESSURE ESTIMATED: 3 MMHG
RA WIDTH: 3.15 CM
RIGHT ANT TIBIAL SYS PSV: 106 CM/S
RIGHT CFA PSV: 109 CM/S
RIGHT PERONEAL SYS PSV: 136 CM/S
RIGHT POPLITEAL PSV: 45 CM/S
RIGHT POST TIBIAL SYS PSV: 83 CM/S
RIGHT PROFUNDA SYS PSV: 56 CM/S
RIGHT SUPER FEMORAL DIST SYS PSV: 95 CM/S
RIGHT SUPER FEMORAL MID SYS PSV: 150 CM/S
RIGHT SUPER FEMORAL OSTIAL SYS PSV: 55 CM/S
RIGHT SUPER FEMORAL PROX SYS PSV: 63 CM/S
RIGHT TIB/PER TRUNK SYS PSV: 45 CM/S
RIGHT VENTRICULAR END-DIASTOLIC DIMENSION: 3.05 CM
RV TB RVSP: 6 MMHG
SINUS: 3.54 CM
STJ: 2.84 CM
TR MAX PG: 33 MMHG
TV REST PULMONARY ARTERY PRESSURE: 36 MMHG
Z-SCORE OF LEFT VENTRICULAR DIMENSION IN END DIASTOLE: -3.13
Z-SCORE OF LEFT VENTRICULAR DIMENSION IN END SYSTOLE: -1.94

## 2023-08-07 PROCEDURE — 93306 TTE W/DOPPLER COMPLETE: CPT | Mod: HCNC

## 2023-08-07 PROCEDURE — 93306 ECHO (CUPID ONLY): ICD-10-PCS | Mod: 26,HCNC,, | Performed by: STUDENT IN AN ORGANIZED HEALTH CARE EDUCATION/TRAINING PROGRAM

## 2023-08-07 PROCEDURE — 93925 CV US DOPPLER ARTERIAL LEGS BILATERAL (CUPID ONLY): ICD-10-PCS | Mod: 26,HCNC,, | Performed by: INTERNAL MEDICINE

## 2023-08-07 PROCEDURE — 93306 TTE W/DOPPLER COMPLETE: CPT | Mod: 26,HCNC,, | Performed by: STUDENT IN AN ORGANIZED HEALTH CARE EDUCATION/TRAINING PROGRAM

## 2023-08-07 PROCEDURE — 93925 LOWER EXTREMITY STUDY: CPT | Mod: 26,HCNC,, | Performed by: INTERNAL MEDICINE

## 2023-08-07 PROCEDURE — 93925 LOWER EXTREMITY STUDY: CPT | Mod: HCNC

## 2023-08-08 ENCOUNTER — TELEPHONE (OUTPATIENT)
Dept: CARDIOLOGY | Facility: CLINIC | Age: 88
End: 2023-08-08
Payer: MEDICARE

## 2023-08-08 NOTE — TELEPHONE ENCOUNTER
Attempted to contact pt and inform him Ultrasound of legs showed mild disease, has good blood flow in both legs.As well as check on his swelling and symptoms. No answer, LVM - MEGAN    ----- Message from Chanda Wade NP sent at 8/7/2023  6:02 PM CDT -----  Ultrasound of legs showed mild disease, has good blood flow in both legs. How are his symptoms? Is his swelling improving?

## 2023-08-08 NOTE — TELEPHONE ENCOUNTER
Contacted pt and informed him Heart ultrasound is stable, function is normal, Ultrasound of legs showed mild disease, has good blood flow in both legs. Swelling has gone down but not all the way. States he takes 1 fluid pill once in the AM - If his BP is >125, he takes 2 pills. Pt states he is feeling better. Pt vu w/o q/c      ----- Message from Chanda Wade NP sent at 8/8/2023  1:19 PM CDT -----  Heart ultrasound is stable, function is normal

## 2023-08-08 NOTE — TELEPHONE ENCOUNTER
Attempted to contact pt to inform him Ultrasound of legs showed mild disease, has good blood flow in both legs, and also to check on symptoms and swelling. No answer, LVM - MEGAN    ----- Message from Chanda Wade NP sent at 8/7/2023  6:02 PM CDT -----  Ultrasound of legs showed mild disease, has good blood flow in both legs. How are his symptoms? Is his swelling improving?

## 2023-08-09 ENCOUNTER — EXTERNAL HOME HEALTH (OUTPATIENT)
Dept: HOME HEALTH SERVICES | Facility: HOSPITAL | Age: 88
End: 2023-08-09
Payer: MEDICARE

## 2023-08-15 ENCOUNTER — OFFICE VISIT (OUTPATIENT)
Dept: FAMILY MEDICINE | Facility: CLINIC | Age: 88
End: 2023-08-15
Payer: MEDICARE

## 2023-08-15 VITALS
BODY MASS INDEX: 22.21 KG/M2 | OXYGEN SATURATION: 95 % | DIASTOLIC BLOOD PRESSURE: 86 MMHG | HEIGHT: 70 IN | SYSTOLIC BLOOD PRESSURE: 139 MMHG | HEART RATE: 83 BPM | RESPIRATION RATE: 18 BRPM | WEIGHT: 155.13 LBS

## 2023-08-15 DIAGNOSIS — G25.81 RLS (RESTLESS LEGS SYNDROME): ICD-10-CM

## 2023-08-15 DIAGNOSIS — R25.2 LEG CRAMPS: ICD-10-CM

## 2023-08-15 DIAGNOSIS — R60.0 PEDAL EDEMA: Primary | ICD-10-CM

## 2023-08-15 DIAGNOSIS — D64.9 CHRONIC ANEMIA: ICD-10-CM

## 2023-08-15 PROCEDURE — 99214 OFFICE O/P EST MOD 30 MIN: CPT | Mod: HCNC,S$GLB,, | Performed by: FAMILY MEDICINE

## 2023-08-15 PROCEDURE — 1101F PR PT FALLS ASSESS DOC 0-1 FALLS W/OUT INJ PAST YR: ICD-10-PCS | Mod: HCNC,CPTII,S$GLB, | Performed by: FAMILY MEDICINE

## 2023-08-15 PROCEDURE — 1101F PT FALLS ASSESS-DOCD LE1/YR: CPT | Mod: HCNC,CPTII,S$GLB, | Performed by: FAMILY MEDICINE

## 2023-08-15 PROCEDURE — 99999 PR PBB SHADOW E&M-EST. PATIENT-LVL III: CPT | Mod: PBBFAC,HCNC,, | Performed by: FAMILY MEDICINE

## 2023-08-15 PROCEDURE — 1159F PR MEDICATION LIST DOCUMENTED IN MEDICAL RECORD: ICD-10-PCS | Mod: HCNC,CPTII,S$GLB, | Performed by: FAMILY MEDICINE

## 2023-08-15 PROCEDURE — 3288F PR FALLS RISK ASSESSMENT DOCUMENTED: ICD-10-PCS | Mod: HCNC,CPTII,S$GLB, | Performed by: FAMILY MEDICINE

## 2023-08-15 PROCEDURE — 1125F PR PAIN SEVERITY QUANTIFIED, PAIN PRESENT: ICD-10-PCS | Mod: HCNC,CPTII,S$GLB, | Performed by: FAMILY MEDICINE

## 2023-08-15 PROCEDURE — 3288F FALL RISK ASSESSMENT DOCD: CPT | Mod: HCNC,CPTII,S$GLB, | Performed by: FAMILY MEDICINE

## 2023-08-15 PROCEDURE — 1159F MED LIST DOCD IN RCRD: CPT | Mod: HCNC,CPTII,S$GLB, | Performed by: FAMILY MEDICINE

## 2023-08-15 PROCEDURE — 1125F AMNT PAIN NOTED PAIN PRSNT: CPT | Mod: HCNC,CPTII,S$GLB, | Performed by: FAMILY MEDICINE

## 2023-08-15 PROCEDURE — 99999 PR PBB SHADOW E&M-EST. PATIENT-LVL III: ICD-10-PCS | Mod: PBBFAC,HCNC,, | Performed by: FAMILY MEDICINE

## 2023-08-15 PROCEDURE — 99214 PR OFFICE/OUTPT VISIT, EST, LEVL IV, 30-39 MIN: ICD-10-PCS | Mod: HCNC,S$GLB,, | Performed by: FAMILY MEDICINE

## 2023-08-15 RX ORDER — ROPINIROLE 1 MG/1
1 TABLET, FILM COATED ORAL NIGHTLY
Qty: 90 TABLET | Refills: 3 | Status: SHIPPED | OUTPATIENT
Start: 2023-08-15

## 2023-08-15 NOTE — PROGRESS NOTES
Chief Complaint:    No chief complaint on file.    Having   History of Present Illness:      08/15/2023:-  Patient with Paroxysmal A-fib, HTN, HLD, and GERD presents today for leg swelling,    Has been having bilateral lower extremity swelling. Says his feet aren't swelling and that it is better today. He also states he went visit cardiologist for this problem and they did an US of his legs and determined there was no major blockage and blood flow is ok, but did not tell him how he can treat it. Says he doesn't sit much but when he does he tries to keep legs slightly elevated.    Does have underlying chronic anemia will check iron levels.  Recently had an echo done which was essentially normal and arterial Doppler which did not show any evidence of any blockages.      Recently started Requip for RLS but states it hasn't helped.      07/20/2023:-  Presents today for chest pain with deep breaths,  Denies any SOB with walking and has home health established who have listened to his lungs and didn't hear anything  Also has some bilateral leg swelling, just noticed a couple of days ago. Not on any fluid medications      06/14/2023:-  Presents today with cough and HA,  Tested positive for COVID today, has been sick for 1 week. Says he has been having a productive cough and SOB but denies any fever  States he felt sick before this started but it had went away.  O2 levels at 82 right now.         03/16/2023:-  Presents today for six-month follow-up   Doing okay blood pressure is running on the low side.  He is taking amlodipine   He also complains that he is taking Eliquis and he bleeds a lot slight bump taking 5 mg b.i.d. he has a atrial lead pacemaker, also suffers with paroxysmal AFib and susanna-tachy syndrome    11/09/2022:-  Patient presents today for a hospital follow-up on 10/28 for abnormal Holter monitor finding    An atrial lead pacemaker was placed on 11/01. Put on Eliquis. Doing better. No more syncopal  "episodes. His blood pressure is running normally.      09/27/2022:-  Patient with HTN presents today for a fall three days ago    He reports swelling in his R knee that has now subsided since he's been icing it. He didn't come to the office because it was closed so he waited until today. Previous history of cortisone injection.   He says his spells occur mostly after eating and there momentary and then goes away and but can come back again.  Also wants clarifications on his medications. They cancelled his Holter monitor because his echo was normal. His dizzy spells would occur after eating so he stopped Crestor and dutasteride.  Patient and his wife would like their flu vaccines today.   Patient would ilke rollator. Using his wife's today.     09/13/2022:-  Patient with HTN presents today for dizziness.    Had an episode of blurry vision, near-syncope while sitting down. Says it felt like he was going "blind". Denies palpitations or chest pain. He did have pain in his forehead. His blood pressure was 125/75 when the episode occurred. Didn't check his sugar. He took Pepto Bismol and his symptoms started fading. Symptoms returned the next day. He has a heart murmur. His last echocardiogram showed that one of his valves was tightening.   Reports R knee pain, will see Dr. Roberson on 10/31 for a shot. Wants to know if he can get something sooner. Dr. Sadler did an injection over one year ago. His knee can give out if he's been sitting for a while.       ROS:  Review of Systems   Constitutional:  Negative for appetite change, chills and fever.   HENT:  Negative for congestion, ear pain, postnasal drip, rhinorrhea, sinus pressure and sinus pain.    Eyes:  Negative for pain.   Respiratory:  Negative for cough, chest tightness and shortness of breath.    Cardiovascular:  Positive for leg swelling. Negative for chest pain and palpitations.   Gastrointestinal:  Negative for abdominal pain, blood in stool, constipation, diarrhea and " nausea.   Genitourinary:  Negative for difficulty urinating, dysuria, flank pain and hematuria.   Musculoskeletal:  Negative for arthralgias and myalgias.   Skin:  Negative for pallor and wound.   Neurological:  Negative for dizziness, tremors, speech difficulty, light-headedness and headaches.   Psychiatric/Behavioral:  Negative for behavioral problems, dysphoric mood and sleep disturbance. The patient is not nervous/anxious.    All other systems reviewed and are negative.      Past Medical History:   Diagnosis Date    Abnormal exercise tolerance test 2013    Acute respiratory failure with hypoxia 6/15/2023    Arthritis     Colon polyp     Diverticulosis     Dyslipidemia 2013    GERD (gastroesophageal reflux disease)     HTN, goal below 130/80 12/3/2018    Hyperlipidemia 1980    HIGH TG    Knee pain, right 2013    Low back pain with right-sided sciatica 12/3/2018    Meningioma     Paroxysmal A-fib 10/29/2022    Personal history of colonic polyps 2014    RLS (restless legs syndrome) 2013    Tobacco dependence     resolved    West Nile meningitis     per patient       Social History:  Social History     Socioeconomic History    Marital status:    Tobacco Use    Smoking status: Former     Current packs/day: 0.00     Average packs/day: 1 pack/day for 25.0 years (25.0 ttl pk-yrs)     Types: Cigarettes     Start date: 1965     Quit date: 1990     Years since quittin.6    Smokeless tobacco: Never   Substance and Sexual Activity    Alcohol use: No     Alcohol/week: 0.0 standard drinks of alcohol    Drug use: No    Sexual activity: Not Currently     Birth control/protection: None     Social Determinants of Health     Financial Resource Strain: Low Risk  (4/10/2023)    Overall Financial Resource Strain (CARDIA)     Difficulty of Paying Living Expenses: Not hard at all   Food Insecurity: No Food Insecurity (4/10/2023)    Hunger Vital Sign     Worried About Running Out of Food  "in the Last Year: Never true     Ran Out of Food in the Last Year: Never true   Transportation Needs: No Transportation Needs (4/10/2023)    PRAPARE - Transportation     Lack of Transportation (Medical): No     Lack of Transportation (Non-Medical): No   Physical Activity: Sufficiently Active (4/10/2023)    Exercise Vital Sign     Days of Exercise per Week: 7 days     Minutes of Exercise per Session: 30 min   Stress: No Stress Concern Present (4/10/2023)    [employer] - Occupational Stress Questionnaire     Feeling of Stress : Not at all   Social Connections: Moderately Integrated (4/10/2023)    Social Connection and Isolation Panel [NHANES]     Frequency of Communication with Friends and Family: Twice a week     Frequency of Social Gatherings with Friends and Family: Three times a week     Attends Mosque Services: More than 4 times per year     Active Member of Clubs or Organizations: No     Attends Club or Organization Meetings: Never     Marital Status:    Housing Stability: Low Risk  (4/10/2023)    Housing Stability Vital Sign     Unable to Pay for Housing in the Last Year: No     Number of Places Lived in the Last Year: 1     Unstable Housing in the Last Year: No       Family History:   family history includes Cancer in his son; Diabetes in his maternal uncle; Heart disease in his mother.    Health Maintenance   Topic Date Due    Shingles Vaccine (1 of 2) Never done    Lipid Panel  03/16/2024    TETANUS VACCINE  11/16/2025       Physical Exam:    Vital Signs  Pulse: 83  Resp: 18  SpO2: 95 %  BP: 139/86  Pain Score:   2  Height and Weight  Height: 5' 10" (177.8 cm)  Weight: 70.4 kg (155 lb 1.5 oz)  BSA (Calculated - sq m): 1.86 sq meters  BMI (Calculated): 22.3  Weight in (lb) to have BMI = 25: 173.9]    Body mass index is 22.25 kg/m².    Physical Exam  Constitutional:       Appearance: Normal appearance.   HENT:      Head: Normocephalic and atraumatic.      Right Ear: " Tympanic membrane normal.      Left Ear: Tympanic membrane normal.   Eyes:      Extraocular Movements: Extraocular movements intact.      Pupils: Pupils are equal, round, and reactive to light.   Cardiovascular:      Rate and Rhythm: Normal rate and regular rhythm.      Pulses: Normal pulses.      Heart sounds: Normal heart sounds. No murmur heard.     No gallop.   Pulmonary:      Effort: Pulmonary effort is normal. No respiratory distress.      Breath sounds: Normal breath sounds. No wheezing, rhonchi or rales.   Abdominal:      General: There is no distension.      Palpations: Abdomen is soft.      Tenderness: There is no abdominal tenderness.   Musculoskeletal:         General: Swelling present. No deformity or signs of injury. Normal range of motion.      Cervical back: Normal range of motion.      Right lower leg: Edema present.      Left lower leg: Edema present.   Skin:     General: Skin is warm and dry.      Capillary Refill: Capillary refill takes less than 2 seconds.      Coloration: Skin is not jaundiced or pale.   Neurological:      General: No focal deficit present.      Mental Status: He is alert and oriented to person, place, and time.   Psychiatric:         Mood and Affect: Mood normal.         Behavior: Behavior normal.         Lab Results   Component Value Date    CHOL 139 03/16/2023    CHOL 151 07/05/2022    CHOL 143 01/03/2022    TRIG 198 (H) 03/16/2023    TRIG 192 (H) 07/05/2022    TRIG 171 (H) 01/03/2022    HDL 61 03/16/2023    HDL 58 07/05/2022    HDL 51 01/03/2022    TOTALCHOLEST 2.3 03/16/2023    TOTALCHOLEST 2.6 07/05/2022    TOTALCHOLEST 2.8 01/03/2022    NONHDLCHOL 78 03/16/2023    NONHDLCHOL 93 07/05/2022    NONHDLCHOL 92 01/03/2022       Lab Results   Component Value Date    HGBA1C 5.5 03/16/2023       Assessment:      ICD-10-CM ICD-9-CM   1. Pedal edema  R60.0 782.3   2. Chronic anemia  D64.9 285.9   3. Leg cramps  R25.2 729.82   4. RLS (restless legs syndrome)  G25.81 333.94          Plan:  Start wearing compression stockings all day and try to keep your legs elevated where they are at least the same height as your heart or above it. Possibly try and elevate the foot of your bed at night while sleeping.  Increase Requip to 1 mg to help manage RLS.  Check iron levels  Check urinalysis to rule out proteinuria which can cause edema if he has significant hypoalbuminemia         Orders Placed This Encounter   Procedures    Urinalysis    Iron and TIBC    Reticulocytes    Transferrin    Lactate Dehydrogenase    Ferritin    CBC Auto Differential         Current Outpatient Medications   Medication Sig Dispense Refill    apixaban (ELIQUIS) 2.5 mg Tab Take 1 tablet (2.5 mg total) by mouth 2 (two) times daily. 60 tablet 11    bisacodyL (DULCOLAX) 5 mg EC tablet Take 5 mg by mouth daily as needed for Constipation.      dutasteride (AVODART) 0.5 mg capsule Take 1 capsule (0.5 mg total) by mouth once daily. 90 capsule 3    furosemide (LASIX) 20 MG tablet Take 1 tablet (20 mg total) by mouth once daily. 30 tablet 1    mupirocin (BACTROBAN) 2 % ointment Apply topically 2 (two) times daily.      pantoprazole (PROTONIX) 40 MG tablet Take 1 tablet by mouth once daily 90 tablet 2    potassium chloride SA (K-DUR,KLOR-CON) 20 MEQ tablet Take 1 tablet (20 mEq total) by mouth once daily. 30 tablet 2    rOPINIRole (REQUIP) 1 MG tablet Take 1 tablet (1 mg total) by mouth every evening. 90 tablet 3    rosuvastatin (CRESTOR) 5 MG tablet Take 1 tablet (5 mg total) by mouth once daily. 90 tablet 3     No current facility-administered medications for this visit.       Medications Discontinued During This Encounter   Medication Reason    rOPINIRole (REQUIP) 0.5 MG tablet        No follow-ups on file.      Madie Davis MD  Scribe Attestation:   IIrineo, am scribing for, and in the presence of, Dr.Arif Davis I performed the above scribed service and the documentation accurately describes the services I  performed. I attest to the accuracy of the note.    I, Dr. Madie Davis, reviewed documentation as scribed above. I performed the services described in this documentation.  I agree that the record reflects my personal performance and is accurate and complete. Madie Davis MD.  08/15/2023

## 2023-08-16 ENCOUNTER — TELEPHONE (OUTPATIENT)
Dept: FAMILY MEDICINE | Facility: CLINIC | Age: 88
End: 2023-08-16
Payer: MEDICARE

## 2023-08-16 ENCOUNTER — LAB VISIT (OUTPATIENT)
Dept: LAB | Facility: HOSPITAL | Age: 88
End: 2023-08-16
Attending: FAMILY MEDICINE
Payer: MEDICARE

## 2023-08-16 DIAGNOSIS — D64.9 CHRONIC ANEMIA: ICD-10-CM

## 2023-08-16 DIAGNOSIS — Z00.00 ENCOUNTER FOR PREVENTIVE HEALTH EXAMINATION: Primary | ICD-10-CM

## 2023-08-16 LAB
BASOPHILS # BLD AUTO: 0.04 K/UL (ref 0–0.2)
BASOPHILS NFR BLD: 0.7 % (ref 0–1.9)
DIFFERENTIAL METHOD: ABNORMAL
EOSINOPHIL # BLD AUTO: 0.2 K/UL (ref 0–0.5)
EOSINOPHIL NFR BLD: 3.8 % (ref 0–8)
ERYTHROCYTE [DISTWIDTH] IN BLOOD BY AUTOMATED COUNT: 14.5 % (ref 11.5–14.5)
HCT VFR BLD AUTO: 33.1 % (ref 40–54)
HGB BLD-MCNC: 10.6 G/DL (ref 14–18)
IMM GRANULOCYTES # BLD AUTO: 0.02 K/UL (ref 0–0.04)
IMM GRANULOCYTES NFR BLD AUTO: 0.3 % (ref 0–0.5)
IRON SERPL-MCNC: 56 UG/DL (ref 45–160)
LDH SERPL L TO P-CCNC: 199 U/L (ref 110–260)
LYMPHOCYTES # BLD AUTO: 1.3 K/UL (ref 1–4.8)
LYMPHOCYTES NFR BLD: 22.6 % (ref 18–48)
MCH RBC QN AUTO: 29.4 PG (ref 27–31)
MCHC RBC AUTO-ENTMCNC: 32 G/DL (ref 32–36)
MCV RBC AUTO: 92 FL (ref 82–98)
MONOCYTES # BLD AUTO: 0.6 K/UL (ref 0.3–1)
MONOCYTES NFR BLD: 11 % (ref 4–15)
NEUTROPHILS # BLD AUTO: 3.5 K/UL (ref 1.8–7.7)
NEUTROPHILS NFR BLD: 61.6 % (ref 38–73)
NRBC BLD-RTO: 0 /100 WBC
PLATELET # BLD AUTO: 205 K/UL (ref 150–450)
PMV BLD AUTO: 10.5 FL (ref 9.2–12.9)
RBC # BLD AUTO: 3.6 M/UL (ref 4.6–6.2)
RETICS/RBC NFR AUTO: 1.5 % (ref 0.4–2)
SATURATED IRON: 12 % (ref 20–50)
TOTAL IRON BINDING CAPACITY: 465 UG/DL (ref 250–450)
TRANSFERRIN SERPL-MCNC: 314 MG/DL (ref 200–375)
TRANSFERRIN SERPL-MCNC: 314 MG/DL (ref 200–375)
WBC # BLD AUTO: 5.72 K/UL (ref 3.9–12.7)

## 2023-08-16 PROCEDURE — 36415 COLL VENOUS BLD VENIPUNCTURE: CPT | Mod: HCNC,PO | Performed by: FAMILY MEDICINE

## 2023-08-16 PROCEDURE — 85025 COMPLETE CBC W/AUTO DIFF WBC: CPT | Mod: HCNC | Performed by: FAMILY MEDICINE

## 2023-08-16 PROCEDURE — 82728 ASSAY OF FERRITIN: CPT | Mod: HCNC | Performed by: FAMILY MEDICINE

## 2023-08-16 PROCEDURE — 83540 ASSAY OF IRON: CPT | Mod: HCNC | Performed by: FAMILY MEDICINE

## 2023-08-16 PROCEDURE — 85045 AUTOMATED RETICULOCYTE COUNT: CPT | Mod: HCNC | Performed by: FAMILY MEDICINE

## 2023-08-16 PROCEDURE — 84466 ASSAY OF TRANSFERRIN: CPT | Mod: HCNC | Performed by: FAMILY MEDICINE

## 2023-08-16 PROCEDURE — 83615 LACTATE (LD) (LDH) ENZYME: CPT | Mod: HCNC | Performed by: FAMILY MEDICINE

## 2023-08-17 LAB — FERRITIN SERPL-MCNC: 26 NG/ML (ref 20–300)

## 2023-08-23 ENCOUNTER — TELEPHONE (OUTPATIENT)
Dept: FAMILY MEDICINE | Facility: CLINIC | Age: 88
End: 2023-08-23
Payer: MEDICARE

## 2023-08-23 ENCOUNTER — PATIENT MESSAGE (OUTPATIENT)
Dept: FAMILY MEDICINE | Facility: CLINIC | Age: 88
End: 2023-08-23
Payer: MEDICARE

## 2023-08-23 DIAGNOSIS — D64.9 CHRONIC ANEMIA: Primary | ICD-10-CM

## 2023-08-28 RX ORDER — FERROUS GLUCONATE 324(38)MG
324 TABLET ORAL 2 TIMES DAILY WITH MEALS
Qty: 60 TABLET | Refills: 3 | Status: SHIPPED | OUTPATIENT
Start: 2023-08-28 | End: 2024-01-02

## 2023-09-01 ENCOUNTER — OFFICE VISIT (OUTPATIENT)
Dept: CARDIOLOGY | Facility: CLINIC | Age: 88
End: 2023-09-01
Payer: MEDICARE

## 2023-09-01 VITALS
DIASTOLIC BLOOD PRESSURE: 68 MMHG | WEIGHT: 150.81 LBS | HEART RATE: 77 BPM | BODY MASS INDEX: 21.59 KG/M2 | OXYGEN SATURATION: 95 % | HEIGHT: 70 IN | SYSTOLIC BLOOD PRESSURE: 132 MMHG

## 2023-09-01 DIAGNOSIS — Z95.0 CARDIAC PACEMAKER IN SITU: ICD-10-CM

## 2023-09-01 DIAGNOSIS — E78.2 MIXED HYPERLIPIDEMIA: Primary | ICD-10-CM

## 2023-09-01 DIAGNOSIS — I10 ESSENTIAL HYPERTENSION: ICD-10-CM

## 2023-09-01 DIAGNOSIS — I48.0 PAROXYSMAL A-FIB: ICD-10-CM

## 2023-09-01 DIAGNOSIS — I35.8 AORTIC VALVE SCLEROSIS: ICD-10-CM

## 2023-09-01 DIAGNOSIS — I49.5 TACHY-BRADY SYNDROME: ICD-10-CM

## 2023-09-01 DIAGNOSIS — I07.1 TRICUSPID VALVE INSUFFICIENCY, UNSPECIFIED ETIOLOGY: ICD-10-CM

## 2023-09-01 PROCEDURE — 1160F RVW MEDS BY RX/DR IN RCRD: CPT | Mod: HCNC,CPTII,S$GLB,

## 2023-09-01 PROCEDURE — 1160F PR REVIEW ALL MEDS BY PRESCRIBER/CLIN PHARMACIST DOCUMENTED: ICD-10-PCS | Mod: HCNC,CPTII,S$GLB,

## 2023-09-01 PROCEDURE — 1159F PR MEDICATION LIST DOCUMENTED IN MEDICAL RECORD: ICD-10-PCS | Mod: HCNC,CPTII,S$GLB,

## 2023-09-01 PROCEDURE — 99999 PR PBB SHADOW E&M-EST. PATIENT-LVL III: ICD-10-PCS | Mod: PBBFAC,HCNC,,

## 2023-09-01 PROCEDURE — 99214 OFFICE O/P EST MOD 30 MIN: CPT | Mod: HCNC,S$GLB,,

## 2023-09-01 PROCEDURE — 1125F AMNT PAIN NOTED PAIN PRSNT: CPT | Mod: HCNC,CPTII,S$GLB,

## 2023-09-01 PROCEDURE — 99214 PR OFFICE/OUTPT VISIT, EST, LEVL IV, 30-39 MIN: ICD-10-PCS | Mod: HCNC,S$GLB,,

## 2023-09-01 PROCEDURE — 3288F PR FALLS RISK ASSESSMENT DOCUMENTED: ICD-10-PCS | Mod: HCNC,CPTII,S$GLB,

## 2023-09-01 PROCEDURE — 1159F MED LIST DOCD IN RCRD: CPT | Mod: HCNC,CPTII,S$GLB,

## 2023-09-01 PROCEDURE — 99999 PR PBB SHADOW E&M-EST. PATIENT-LVL III: CPT | Mod: PBBFAC,HCNC,,

## 2023-09-01 PROCEDURE — 1125F PR PAIN SEVERITY QUANTIFIED, PAIN PRESENT: ICD-10-PCS | Mod: HCNC,CPTII,S$GLB,

## 2023-09-01 PROCEDURE — 1101F PT FALLS ASSESS-DOCD LE1/YR: CPT | Mod: HCNC,CPTII,S$GLB,

## 2023-09-01 PROCEDURE — 1101F PR PT FALLS ASSESS DOC 0-1 FALLS W/OUT INJ PAST YR: ICD-10-PCS | Mod: HCNC,CPTII,S$GLB,

## 2023-09-01 PROCEDURE — 3288F FALL RISK ASSESSMENT DOCD: CPT | Mod: HCNC,CPTII,S$GLB,

## 2023-09-01 NOTE — PROGRESS NOTES
Subjective:   Patient ID:  Dominguez Ritchie is a 89 y.o. male who presents for evaluation of No chief complaint on file.      HPI89y/o male with PMHx significant for below. Atrial lead pacemaker 2022. Here today for CV follow up, c/o LE edema and SOB. Lasix prescribed by PCP 2 weeks ago, states hes had some improvement with 20mg daily. Uses walker when out the house but for the most part gets around well. Denies any CP at this time. CXR improving done by PCP recent dx with admission for COVID/PNA. Compliant with medications, denies any issues with bleeding. Compliant with diet    Echo last year EF 55%, mild TR, MR, AR      9/1/23  Here today for CV follow up doing well. LE edema much improved. Currently taking his lasix 20 mg daily with extra tablet as needed. Compliant with medications, BP stable in clinic, weight stable    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       Past Surgical History:   Procedure Laterality Date    A-V CARDIAC PACEMAKER INSERTION Left 11/1/2022    Procedure: INSERTION, CARDIAC PACEMAKER, DUAL CHAMBER;  Surgeon: Finn Mccrary MD;  Location: [facility] CATH LAB;  Service: Cardiology;  Laterality: Left;  biotronik AAIR    APPENDECTOMY      BACK SURGERY Bilateral 2016    CATARACT EXTRACTION, BILATERAL      COLONOSCOPY  2/2009    EYE SURGERY      INJECTION OF ANESTHETIC AGENT AROUND NERVE Right 11/18/2022    Procedure: Right  Genicular nerve block w/Light RN IV Sedation;  Surgeon: Benitez Roberson MD;  Location: [facility] PAIN MGT;  Service: Pain Management;  Laterality: Right;    INSERTION OF PERMANENT PACEMAKER Left 2022    Procedure: Implant PPM;  Surgeon: Finn Mccrary MD;  Location: [facility] CATH LAB;  Service: Cardiology;  Laterality: Left;    JOINT REPLACEMENT      left knee    LUMBAR PARAVERTEBRAL FACET JOINT NERVE BLOCK Bilateral 2019    Procedure: LMBB L3,4,5;  Surgeon: Nabor Sadler MD;  Location: [facility] PAIN MGT;  Service: Pain Management;  Laterality: Bilateral;    SELECTIVE INJECTION OF ANESTHETIC AGENT AROUND LUMBAR SPINAL NERVE ROOT BY TRANSFORAMINAL APPROACH Bilateral 2021    Procedure: Bilateral L4/5 +/- L5/S1 TF DREW;  Surgeon: Nabor Sadler MD;  Location: [facility] PAIN MGT;  Service: Pain Management;  Laterality: Bilateral;    SELECTIVE INJECTION OF ANESTHETIC AGENT AROUND LUMBAR SPINAL NERVE ROOT BY TRANSFORAMINAL APPROACH Bilateral 2022    Procedure: Bilateral L4/5 + L5/S1 TF DREW;  Surgeon: Nabor Sadler MD;  Location: [facility] PAIN MGT;  Service: Pain Management;  Laterality: Bilateral;    SELECTIVE INJECTION OF ANESTHETIC AGENT AROUND LUMBAR SPINAL NERVE ROOT BY TRANSFORAMINAL APPROACH Right 2022    Procedure: Right-sided L4/5 and L5/S1 transforaminal epidural steroid injection with RN IV sedation;  Surgeon: Benitez Roberson MD;  Location: [facility] PAIN MGT;  Service: Pain Management;  Laterality: Right;    SPINE SURGERY      UPPER GASTROINTESTINAL ENDOSCOPY  2009       Social History     Tobacco Use    Smoking status: Former     Current packs/day: 0.00     Average packs/day: 1 pack/day for 25.0 years (25.0 ttl pk-yrs)     Types: Cigarettes     Start date: 1965     Quit date: 1990     Years since quittin.6    Smokeless tobacco: Never   Substance Use Topics    Alcohol use: No     Alcohol/week: 0.0 standard drinks of alcohol    Drug use: No       Family History   Problem Relation Age of Onset     Heart disease Mother     Cancer Son         pancreatic     Diabetes Maternal Uncle     Kidney disease Neg Hx     Stroke Neg Hx        Current Outpatient Medications on File Prior to Visit   Medication Sig Dispense Refill    apixaban (ELIQUIS) 2.5 mg Tab Take 1 tablet (2.5 mg total) by mouth 2 (two) times daily. 60 tablet 11    bisacodyL (DULCOLAX) 5 mg EC tablet Take 5 mg by mouth daily as needed for Constipation.      dutasteride (AVODART) 0.5 mg capsule Take 1 capsule (0.5 mg total) by mouth once daily. 90 capsule 3    ferrous gluconate (FERGON) 324 MG tablet Take 1 tablet (324 mg total) by mouth 2 (two) times daily with meals. 60 tablet 3    furosemide (LASIX) 20 MG tablet Take 1 tablet (20 mg total) by mouth once daily. 30 tablet 1    mupirocin (BACTROBAN) 2 % ointment Apply topically 2 (two) times daily.      pantoprazole (PROTONIX) 40 MG tablet Take 1 tablet by mouth once daily 90 tablet 2    potassium chloride SA (K-DUR,KLOR-CON) 20 MEQ tablet Take 1 tablet (20 mEq total) by mouth once daily. 30 tablet 2    rOPINIRole (REQUIP) 1 MG tablet Take 1 tablet (1 mg total) by mouth every evening. 90 tablet 3    rosuvastatin (CRESTOR) 5 MG tablet Take 1 tablet (5 mg total) by mouth once daily. 90 tablet 3     No current facility-administered medications on file prior to visit.      Wt Readings from Last 3 Encounters:   08/15/23 70.4 kg (155 lb 1.5 oz)   08/07/23 68.5 kg (151 lb)   08/07/23 68.5 kg (151 lb)     Temp Readings from Last 3 Encounters:   07/20/23 97.1 °F (36.2 °C)   06/21/23 97.7 °F (36.5 °C)   06/17/23 98.4 °F (36.9 °C)     BP Readings from Last 3 Encounters:   08/15/23 139/86   08/07/23 (!) 154/72   08/07/23 118/72     Pulse Readings from Last 3 Encounters:   08/15/23 83   07/31/23 89   07/20/23 75        Review of Systems   Constitutional: Negative.   HENT: Negative.     Eyes: Negative.    Cardiovascular: Negative.    Respiratory: Negative.     Skin: Negative.    Musculoskeletal: Negative.     Gastrointestinal: Negative.    Genitourinary: Negative.    Neurological: Negative.    Psychiatric/Behavioral: Negative.         Objective:   Physical Exam  Vitals and nursing note reviewed.   Constitutional:       Appearance: Normal appearance.   HENT:      Head: Normocephalic and atraumatic.   Eyes:      General:         Right eye: No discharge.         Left eye: No discharge.      Pupils: Pupils are equal, round, and reactive to light.   Cardiovascular:      Rate and Rhythm: Normal rate and regular rhythm.      Heart sounds: S1 normal and S2 normal. No murmur heard.     No friction rub.   Pulmonary:      Effort: Pulmonary effort is normal. No respiratory distress.      Breath sounds: Normal breath sounds. No rales.   Abdominal:      Palpations: Abdomen is soft.      Tenderness: There is no abdominal tenderness.   Musculoskeletal:      Cervical back: Neck supple.      Right lower leg: Edema present.      Left lower leg: Edema present.      Comments: improved   Skin:     General: Skin is warm and dry.   Neurological:      General: No focal deficit present.      Mental Status: He is alert and oriented to person, place, and time.   Psychiatric:         Mood and Affect: Mood normal.         Behavior: Behavior normal.         Thought Content: Thought content normal.         Lab Results   Component Value Date    CHOL 139 03/16/2023    CHOL 151 07/05/2022    CHOL 143 01/03/2022     Lab Results   Component Value Date    HDL 61 03/16/2023    HDL 58 07/05/2022    HDL 51 01/03/2022     Lab Results   Component Value Date    LDLCALC 38.4 (L) 03/16/2023    LDLCALC 54.6 (L) 07/05/2022    LDLCALC 57.8 (L) 01/03/2022     Lab Results   Component Value Date    TRIG 198 (H) 03/16/2023    TRIG 192 (H) 07/05/2022    TRIG 171 (H) 01/03/2022     Lab Results   Component Value Date    CHOLHDL 43.9 03/16/2023    CHOLHDL 38.4 07/05/2022    CHOLHDL 35.7 01/03/2022       Chemistry        Component Value Date/Time     07/21/2023 0823     K 4.0 07/21/2023 0823     07/21/2023 0823    CO2 25 07/21/2023 0823    BUN 9 07/21/2023 0823    CREATININE 0.8 07/21/2023 0823     07/21/2023 0823        Component Value Date/Time    CALCIUM 9.0 07/21/2023 0823    ALKPHOS 75 07/21/2023 0823    AST 21 07/21/2023 0823    ALT 13 07/21/2023 0823    BILITOT 0.4 07/21/2023 0823    ESTGFRAFRICA 88 04/26/2023 1915    ESTGFRAFRICA >60.0 07/05/2022 1444    EGFRNONAA >60.0 07/05/2022 1444          Lab Results   Component Value Date    TSH 1.733 10/29/2022     Lab Results   Component Value Date    INR 1.0 06/14/2023    INR 1.0 10/29/2022    INR 1.0 10/19/2020     @RESUFAST(WBC,HGB,HCT,MCV,PLT)  @LABRCNTIP(BNP,BNPTRIAGEBLO)@  CrCl cannot be calculated (Patient's most recent lab result is older than the maximum 7 days allowed.).     Results for orders placed during the hospital encounter of 09/16/22    Echo    Interpretation Summary  · The left ventricle is normal in size with concentric remodeling and normal systolic function.  · Indeterminate left ventricular diastolic function.  · The estimated PA systolic pressure is 37 mmHg.  · Normal right ventricular size with normal right ventricular systolic function.  · Normal central venous pressure (3 mmHg).  · The estimated ejection fraction is 55%.  · Mild aortic regurgitation.  · Mild mitral regurgitation.  · Mild tricuspid regurgitation.     No results found for this or any previous visit.     Assessment:      1. Mixed hyperlipidemia    2. Tricuspid valve insufficiency, unspecified etiology    3. Aortic valve sclerosis    4. Essential hypertension    5. Tachy-susanna syndrome    6. Paroxysmal A-fib    7. Cardiac pacemaker in situ        Plan:   Mixed hyperlipidemia    Tricuspid valve insufficiency, unspecified etiology    Aortic valve sclerosis    Essential hypertension    Tachy-susanna syndrome    Paroxysmal A-fib    Cardiac pacemaker in situ      RF modification, low Na low fat diet    monitor BP at home  Cont lasix and  KCL- edema  Statin- HLD  Eliquis- PAF    RTC as scheduled    Chanda Wade, FNP-C [facility], Cardiology

## 2023-09-07 ENCOUNTER — OFFICE VISIT (OUTPATIENT)
Dept: FAMILY MEDICINE | Facility: CLINIC | Age: 88
End: 2023-09-07
Payer: MEDICARE

## 2023-09-07 VITALS
OXYGEN SATURATION: 96 % | WEIGHT: 149.13 LBS | SYSTOLIC BLOOD PRESSURE: 120 MMHG | HEART RATE: 87 BPM | BODY MASS INDEX: 21.35 KG/M2 | DIASTOLIC BLOOD PRESSURE: 70 MMHG | HEIGHT: 70 IN

## 2023-09-07 DIAGNOSIS — R21 RASH AND NONSPECIFIC SKIN ERUPTION: Primary | ICD-10-CM

## 2023-09-07 PROCEDURE — 3288F FALL RISK ASSESSMENT DOCD: CPT | Mod: HCNC,CPTII,S$GLB, | Performed by: FAMILY MEDICINE

## 2023-09-07 PROCEDURE — 3288F PR FALLS RISK ASSESSMENT DOCUMENTED: ICD-10-PCS | Mod: HCNC,CPTII,S$GLB, | Performed by: FAMILY MEDICINE

## 2023-09-07 PROCEDURE — 1159F MED LIST DOCD IN RCRD: CPT | Mod: HCNC,CPTII,S$GLB, | Performed by: FAMILY MEDICINE

## 2023-09-07 PROCEDURE — 99999 PR PBB SHADOW E&M-EST. PATIENT-LVL III: CPT | Mod: PBBFAC,HCNC,, | Performed by: FAMILY MEDICINE

## 2023-09-07 PROCEDURE — 1101F PR PT FALLS ASSESS DOC 0-1 FALLS W/OUT INJ PAST YR: ICD-10-PCS | Mod: HCNC,CPTII,S$GLB, | Performed by: FAMILY MEDICINE

## 2023-09-07 PROCEDURE — 1159F PR MEDICATION LIST DOCUMENTED IN MEDICAL RECORD: ICD-10-PCS | Mod: HCNC,CPTII,S$GLB, | Performed by: FAMILY MEDICINE

## 2023-09-07 PROCEDURE — 99999 PR PBB SHADOW E&M-EST. PATIENT-LVL III: ICD-10-PCS | Mod: PBBFAC,HCNC,, | Performed by: FAMILY MEDICINE

## 2023-09-07 PROCEDURE — 1125F PR PAIN SEVERITY QUANTIFIED, PAIN PRESENT: ICD-10-PCS | Mod: HCNC,CPTII,S$GLB, | Performed by: FAMILY MEDICINE

## 2023-09-07 PROCEDURE — 1125F AMNT PAIN NOTED PAIN PRSNT: CPT | Mod: HCNC,CPTII,S$GLB, | Performed by: FAMILY MEDICINE

## 2023-09-07 PROCEDURE — 99213 OFFICE O/P EST LOW 20 MIN: CPT | Mod: HCNC,S$GLB,, | Performed by: FAMILY MEDICINE

## 2023-09-07 PROCEDURE — 99213 PR OFFICE/OUTPT VISIT, EST, LEVL III, 20-29 MIN: ICD-10-PCS | Mod: HCNC,S$GLB,, | Performed by: FAMILY MEDICINE

## 2023-09-07 PROCEDURE — 1101F PT FALLS ASSESS-DOCD LE1/YR: CPT | Mod: HCNC,CPTII,S$GLB, | Performed by: FAMILY MEDICINE

## 2023-09-07 RX ORDER — HYDROXYZINE HYDROCHLORIDE 10 MG/1
10 TABLET, FILM COATED ORAL 3 TIMES DAILY PRN
Qty: 30 TABLET | Refills: 2 | Status: SHIPPED | OUTPATIENT
Start: 2023-09-07 | End: 2024-02-28

## 2023-09-07 RX ORDER — FUROSEMIDE 20 MG/1
20 TABLET ORAL DAILY
Qty: 30 TABLET | Refills: 1 | Status: SHIPPED | OUTPATIENT
Start: 2023-09-07 | End: 2023-11-29

## 2023-09-07 RX ORDER — METHYLPREDNISOLONE 4 MG/1
TABLET ORAL
Qty: 1 EACH | Refills: 0 | Status: SHIPPED | OUTPATIENT
Start: 2023-09-07

## 2023-09-07 NOTE — PROGRESS NOTES
Chief Complaint:    Chief Complaint   Patient presents with    red rash on arm       Having   History of Present Illness:    09/07/2023:-  Presents today for rash,    Says it is mostly on his neck and arms, causes itching and burning, and was afraid because he saw some black spots.    08/15/2023:-  Patient with Paroxysmal A-fib, HTN, HLD, and GERD presents today for leg swelling,    Has been having bilateral lower extremity swelling. Says his feet aren't swelling and that it is better today. He also states he went visit cardiologist for this problem and they did an US of his legs and determined there was no major blockage and blood flow is ok, but did not tell him how he can treat it. Says he doesn't sit much but when he does he tries to keep legs slightly elevated.    Does have underlying chronic anemia will check iron levels.  Recently had an echo done which was essentially normal and arterial Doppler which did not show any evidence of any blockages.      Recently started Requip for RLS but states it hasn't helped.      07/20/2023:-  Presents today for chest pain with deep breaths,  Denies any SOB with walking and has home health established who have listened to his lungs and didn't hear anything  Also has some bilateral leg swelling, just noticed a couple of days ago. Not on any fluid medications      06/14/2023:-  Presents today with cough and HA,  Tested positive for COVID today, has been sick for 1 week. Says he has been having a productive cough and SOB but denies any fever  States he felt sick before this started but it had went away.  O2 levels at 82 right now.         03/16/2023:-  Presents today for six-month follow-up   Doing okay blood pressure is running on the low side.  He is taking amlodipine   He also complains that he is taking Eliquis and he bleeds a lot slight bump taking 5 mg b.i.d. he has a atrial lead pacemaker, also suffers with paroxysmal AFib and susanna-tachy  "syndrome    11/09/2022:-  Patient presents today for a hospital follow-up on 10/28 for abnormal Holter monitor finding    An atrial lead pacemaker was placed on 11/01. Put on Eliquis. Doing better. No more syncopal episodes. His blood pressure is running normally.      09/27/2022:-  Patient with HTN presents today for a fall three days ago    He reports swelling in his R knee that has now subsided since he's been icing it. He didn't come to the office because it was closed so he waited until today. Previous history of cortisone injection.   He says his spells occur mostly after eating and there momentary and then goes away and but can come back again.  Also wants clarifications on his medications. They cancelled his Holter monitor because his echo was normal. His dizzy spells would occur after eating so he stopped Crestor and dutasteride.  Patient and his wife would like their flu vaccines today.   Patient would ilke rollator. Using his wife's today.     09/13/2022:-  Patient with HTN presents today for dizziness.    Had an episode of blurry vision, near-syncope while sitting down. Says it felt like he was going "blind". Denies palpitations or chest pain. He did have pain in his forehead. His blood pressure was 125/75 when the episode occurred. Didn't check his sugar. He took Pepto Bismol and his symptoms started fading. Symptoms returned the next day. He has a heart murmur. His last echocardiogram showed that one of his valves was tightening.   Reports R knee pain, will see Dr. Roberson on 10/31 for a shot. Wants to know if he can get something sooner. Dr. Sadler did an injection over one year ago. His knee can give out if he's been sitting for a while.       ROS:  Review of Systems   Constitutional:  Negative for appetite change, chills and fever.   HENT:  Negative for congestion, ear pain, postnasal drip, rhinorrhea, sinus pressure and sinus pain.    Eyes:  Negative for pain.   Respiratory:  Negative for cough, chest " tightness and shortness of breath.    Cardiovascular:  Negative for chest pain and palpitations.   Gastrointestinal:  Negative for abdominal pain, blood in stool, constipation, diarrhea and nausea.   Genitourinary:  Negative for difficulty urinating, dysuria, flank pain and hematuria.   Musculoskeletal:  Negative for arthralgias and myalgias.   Skin:  Positive for rash (neck, arms). Negative for pallor and wound.   Neurological:  Negative for dizziness, tremors, speech difficulty, light-headedness and headaches.   Psychiatric/Behavioral:  Negative for behavioral problems, dysphoric mood and sleep disturbance. The patient is not nervous/anxious.    All other systems reviewed and are negative.      Past Medical History:   Diagnosis Date    Abnormal exercise tolerance test 2013    Acute respiratory failure with hypoxia 6/15/2023    Arthritis     Colon polyp     Diverticulosis     Dyslipidemia 2013    GERD (gastroesophageal reflux disease)     HTN, goal below 130/80 12/3/2018    Hyperlipidemia 1980    HIGH TG    Knee pain, right 2013    Low back pain with right-sided sciatica 12/3/2018    Meningioma     Paroxysmal A-fib 10/29/2022    Personal history of colonic polyps 2014    RLS (restless legs syndrome) 2013    Tobacco dependence     resolved    West Nile meningitis     per patient       Social History:  Social History     Socioeconomic History    Marital status:    Tobacco Use    Smoking status: Former     Current packs/day: 0.00     Average packs/day: 1 pack/day for 25.0 years (25.0 ttl pk-yrs)     Types: Cigarettes     Start date: 1965     Quit date: 1990     Years since quittin.7    Smokeless tobacco: Never   Substance and Sexual Activity    Alcohol use: No     Alcohol/week: 0.0 standard drinks of alcohol    Drug use: No    Sexual activity: Not Currently     Birth control/protection: None     Social Determinants of Health     Financial Resource Strain: Low Risk   "(4/10/2023)    Overall Financial Resource Strain (CARDIA)     Difficulty of Paying Living Expenses: Not hard at all   Food Insecurity: No Food Insecurity (4/10/2023)    Hunger Vital Sign     Worried About Running Out of Food in the Last Year: Never true     Ran Out of Food in the Last Year: Never true   Transportation Needs: No Transportation Needs (4/10/2023)    PRAPARE - Transportation     Lack of Transportation (Medical): No     Lack of Transportation (Non-Medical): No   Physical Activity: Sufficiently Active (4/10/2023)    Exercise Vital Sign     Days of Exercise per Week: 7 days     Minutes of Exercise per Session: 30 min   Stress: No Stress Concern Present (4/10/2023)    Citizen of [place] of Occupational Health - Occupational Stress Questionnaire     Feeling of Stress : Not at all   Social Connections: Moderately Integrated (4/10/2023)    Social Connection and Isolation Panel [NHANES]     Frequency of Communication with Friends and Family: Twice a week     Frequency of Social Gatherings with Friends and Family: Three times a week     Attends Latter-day Services: More than 4 times per year     Active Member of Clubs or Organizations: No     Attends Club or Organization Meetings: Never     Marital Status:    Housing Stability: Low Risk  (4/10/2023)    Housing Stability Vital Sign     Unable to Pay for Housing in the Last Year: No     Number of Places Lived in the Last Year: 1     Unstable Housing in the Last Year: No       Family History:   family history includes Cancer in his son; Diabetes in his maternal uncle; Heart disease in his mother.    Health Maintenance   Topic Date Due    Shingles Vaccine (1 of 2) Never done    Lipid Panel  03/16/2024    TETANUS VACCINE  11/16/2025       Physical Exam:    Vital Signs  Pulse: 87  SpO2: 96 %  BP: 120/70  BP Location: Right arm  Patient Position: Sitting  Pain Score:   6  Height and Weight  Height: 5' 10" (177.8 cm)  Weight: 67.7 kg (149 lb 2.3 oz)  BSA " (Calculated - sq m): 1.83 sq meters  BMI (Calculated): 21.4  Weight in (lb) to have BMI = 25: 173.9]    Body mass index is 21.4 kg/m².    Physical Exam  Constitutional:       Appearance: Normal appearance.   HENT:      Head: Normocephalic and atraumatic.      Right Ear: Tympanic membrane normal.      Left Ear: Tympanic membrane normal.   Eyes:      Extraocular Movements: Extraocular movements intact.      Pupils: Pupils are equal, round, and reactive to light.   Cardiovascular:      Rate and Rhythm: Normal rate and regular rhythm.      Pulses: Normal pulses.      Heart sounds: Normal heart sounds. No murmur heard.     No gallop.   Pulmonary:      Effort: Pulmonary effort is normal. No respiratory distress.      Breath sounds: Normal breath sounds. No wheezing, rhonchi or rales.   Abdominal:      General: There is no distension.      Palpations: Abdomen is soft.      Tenderness: There is no abdominal tenderness.   Musculoskeletal:         General: No deformity or signs of injury. Normal range of motion.      Cervical back: Normal range of motion.   Skin:     General: Skin is warm and dry.      Capillary Refill: Capillary refill takes less than 2 seconds.      Coloration: Skin is not jaundiced or pale.      Findings: Rash present.          Neurological:      General: No focal deficit present.      Mental Status: He is alert and oriented to person, place, and time.   Psychiatric:         Mood and Affect: Mood normal.         Behavior: Behavior normal.         Lab Results   Component Value Date    CHOL 139 03/16/2023    CHOL 151 07/05/2022    CHOL 143 01/03/2022    TRIG 198 (H) 03/16/2023    TRIG 192 (H) 07/05/2022    TRIG 171 (H) 01/03/2022    HDL 61 03/16/2023    HDL 58 07/05/2022    HDL 51 01/03/2022    TOTALCHOLEST 2.3 03/16/2023    TOTALCHOLEST 2.6 07/05/2022    TOTALCHOLEST 2.8 01/03/2022    NONHDLCHOL 78 03/16/2023    NONHDLCHOL 93 07/05/2022    NONHDLCHOL 92 01/03/2022       Lab Results   Component Value Date     HGBA1C 5.5 03/16/2023       Assessment:      ICD-10-CM ICD-9-CM   1. Rash and nonspecific skin eruption  R21 782.1         Plan:  Start methylprednisolone and Atarax for rash causing itching and burning.  Also use some calamine lotion to help with rash.    No orders of the defined types were placed in this encounter.        Current Outpatient Medications   Medication Sig Dispense Refill    apixaban (ELIQUIS) 2.5 mg Tab Take 1 tablet (2.5 mg total) by mouth 2 (two) times daily. 60 tablet 11    bisacodyL (DULCOLAX) 5 mg EC tablet Take 5 mg by mouth daily as needed for Constipation.      dutasteride (AVODART) 0.5 mg capsule Take 1 capsule (0.5 mg total) by mouth once daily. 90 capsule 3    ferrous gluconate (FERGON) 324 MG tablet Take 1 tablet (324 mg total) by mouth 2 (two) times daily with meals. 60 tablet 3    mupirocin (BACTROBAN) 2 % ointment Apply topically 2 (two) times daily.      pantoprazole (PROTONIX) 40 MG tablet Take 1 tablet by mouth once daily 90 tablet 2    potassium chloride SA (K-DUR,KLOR-CON) 20 MEQ tablet Take 1 tablet (20 mEq total) by mouth once daily. 30 tablet 2    rOPINIRole (REQUIP) 1 MG tablet Take 1 tablet (1 mg total) by mouth every evening. 90 tablet 3    furosemide (LASIX) 20 MG tablet Take 1 tablet (20 mg total) by mouth once daily. 30 tablet 1    hydrOXYzine HCL (ATARAX) 10 MG Tab Take 1 tablet (10 mg total) by mouth 3 (three) times daily as needed. 30 tablet 2    methylPREDNISolone (MEDROL DOSEPACK) 4 mg tablet use as directed 1 each 0    rosuvastatin (CRESTOR) 5 MG tablet Take 1 tablet (5 mg total) by mouth once daily. 90 tablet 3     No current facility-administered medications for this visit.       Medications Discontinued During This Encounter   Medication Reason    furosemide (LASIX) 20 MG tablet Reorder       No follow-ups on file.      Madie Davis MD  Scribe Attestation:   Irineo CAPPS, am scribing for, and in the presence of, Dr.Arif Davis I performed the above scribed  service and the documentation accurately describes the services I performed. I attest to the accuracy of the note.    I, Dr. Madie Davis, reviewed documentation as scribed above. I performed the services described in this documentation.  I agree that the record reflects my personal performance and is accurate and complete. Madie Davis MD.  09/07/2023

## 2023-09-18 PROBLEM — J96.01 ACUTE RESPIRATORY FAILURE WITH HYPOXIA: Status: RESOLVED | Noted: 2023-06-15 | Resolved: 2023-09-18

## 2023-09-20 ENCOUNTER — TELEPHONE (OUTPATIENT)
Dept: FAMILY MEDICINE | Facility: CLINIC | Age: 88
End: 2023-09-20
Payer: MEDICARE

## 2023-09-23 DIAGNOSIS — E78.2 MIXED HYPERLIPIDEMIA: ICD-10-CM

## 2023-09-23 NOTE — TELEPHONE ENCOUNTER
No care due was identified.  [facility] Embedded Care Due Messages. Reference number: [id number].   9/23/2023 12:43:19 PM CDT

## 2023-09-25 RX ORDER — ROSUVASTATIN CALCIUM 5 MG/1
5 TABLET, COATED ORAL
Qty: 90 TABLET | Refills: 1 | Status: SHIPPED | OUTPATIENT
Start: 2023-09-25

## 2023-09-25 NOTE — TELEPHONE ENCOUNTER
Refill Decision Note   Dominguez Ritchie  is requesting a refill authorization.  Brief Assessment and Rationale for Refill:  Approve     Medication Therapy Plan:         Comments:     Note composed:3:03 AM 09/25/2023

## 2023-10-17 RX ORDER — DUTASTERIDE 0.5 MG/1
0.5 CAPSULE, LIQUID FILLED ORAL
Qty: 90 CAPSULE | Refills: 3 | Status: SHIPPED | OUTPATIENT
Start: 2023-10-17

## 2023-10-20 RX ORDER — FLUOCINOLONE ACETONIDE 0.11 MG/ML
5 OIL AURICULAR (OTIC) 2 TIMES DAILY
Qty: 20 ML | Refills: 0 | Status: SHIPPED | OUTPATIENT
Start: 2023-10-20

## 2023-11-02 ENCOUNTER — OFFICE VISIT (OUTPATIENT)
Dept: FAMILY MEDICINE | Facility: CLINIC | Age: 88
End: 2023-11-02
Payer: MEDICARE

## 2023-11-02 VITALS
DIASTOLIC BLOOD PRESSURE: 84 MMHG | OXYGEN SATURATION: 96 % | BODY MASS INDEX: 22.27 KG/M2 | SYSTOLIC BLOOD PRESSURE: 130 MMHG | WEIGHT: 155.19 LBS | HEART RATE: 84 BPM

## 2023-11-02 DIAGNOSIS — L08.9 SKIN INFECTION: Primary | ICD-10-CM

## 2023-11-02 PROCEDURE — 1101F PR PT FALLS ASSESS DOC 0-1 FALLS W/OUT INJ PAST YR: ICD-10-PCS | Mod: HCNC,CPTII,S$GLB, | Performed by: FAMILY MEDICINE

## 2023-11-02 PROCEDURE — 1159F PR MEDICATION LIST DOCUMENTED IN MEDICAL RECORD: ICD-10-PCS | Mod: HCNC,CPTII,S$GLB, | Performed by: FAMILY MEDICINE

## 2023-11-02 PROCEDURE — 1125F PR PAIN SEVERITY QUANTIFIED, PAIN PRESENT: ICD-10-PCS | Mod: HCNC,CPTII,S$GLB, | Performed by: FAMILY MEDICINE

## 2023-11-02 PROCEDURE — 99999 PR PBB SHADOW E&M-EST. PATIENT-LVL IV: ICD-10-PCS | Mod: PBBFAC,HCNC,, | Performed by: FAMILY MEDICINE

## 2023-11-02 PROCEDURE — 1159F MED LIST DOCD IN RCRD: CPT | Mod: HCNC,CPTII,S$GLB, | Performed by: FAMILY MEDICINE

## 2023-11-02 PROCEDURE — 1101F PT FALLS ASSESS-DOCD LE1/YR: CPT | Mod: HCNC,CPTII,S$GLB, | Performed by: FAMILY MEDICINE

## 2023-11-02 PROCEDURE — 3288F FALL RISK ASSESSMENT DOCD: CPT | Mod: HCNC,CPTII,S$GLB, | Performed by: FAMILY MEDICINE

## 2023-11-02 PROCEDURE — 99213 OFFICE O/P EST LOW 20 MIN: CPT | Mod: HCNC,S$GLB,, | Performed by: FAMILY MEDICINE

## 2023-11-02 PROCEDURE — 99999 PR PBB SHADOW E&M-EST. PATIENT-LVL IV: CPT | Mod: PBBFAC,HCNC,, | Performed by: FAMILY MEDICINE

## 2023-11-02 PROCEDURE — 1125F AMNT PAIN NOTED PAIN PRSNT: CPT | Mod: HCNC,CPTII,S$GLB, | Performed by: FAMILY MEDICINE

## 2023-11-02 PROCEDURE — 99213 PR OFFICE/OUTPT VISIT, EST, LEVL III, 20-29 MIN: ICD-10-PCS | Mod: HCNC,S$GLB,, | Performed by: FAMILY MEDICINE

## 2023-11-02 PROCEDURE — 3288F PR FALLS RISK ASSESSMENT DOCUMENTED: ICD-10-PCS | Mod: HCNC,CPTII,S$GLB, | Performed by: FAMILY MEDICINE

## 2023-11-02 RX ORDER — CEPHALEXIN 500 MG/1
500 CAPSULE ORAL EVERY 8 HOURS
Qty: 21 CAPSULE | Refills: 0 | Status: SHIPPED | OUTPATIENT
Start: 2023-11-02

## 2023-11-02 NOTE — PROGRESS NOTES
Wound cleansed with normal saline, bactroban ointment applied and large bandaid applied, tolerated well.

## 2023-11-02 NOTE — PROGRESS NOTES
Chief Complaint:    Chief Complaint   Patient presents with    Extremity Laceration     Cut to left leg       Having   History of Present Illness:  11/2/2023:-  Presents today for laceration to lateral side of Left knee. Says he scraped the side of his leg 3-4 days. Says the erythema has increased around it.       09/07/2023:-  Presents today for rash,    Says it is mostly on his neck and arms, causes itching and burning, and was afraid because he saw some black spots.    08/15/2023:-  Patient with Paroxysmal A-fib, HTN, HLD, and GERD presents today for leg swelling,    Has been having bilateral lower extremity swelling. Says his feet aren't swelling and that it is better today. He also states he went visit cardiologist for this problem and they did an US of his legs and determined there was no major blockage and blood flow is ok, but did not tell him how he can treat it. Says he doesn't sit much but when he does he tries to keep legs slightly elevated.    Does have underlying chronic anemia will check iron levels.  Recently had an echo done which was essentially normal and arterial Doppler which did not show any evidence of any blockages.    Recently started Requip for RLS but states it hasn't helped.      07/20/2023:-  Presents today for chest pain with deep breaths,  Denies any SOB with walking and has home health established who have listened to his lungs and didn't hear anything  Also has some bilateral leg swelling, just noticed a couple of days ago. Not on any fluid medications      06/14/2023:-  Presents today with cough and HA,  Tested positive for COVID today, has been sick for 1 week. Says he has been having a productive cough and SOB but denies any fever  States he felt sick before this started but it had went away.  O2 levels at 82 right now.       03/16/2023:-  Presents today for six-month follow-up   Doing okay blood pressure is running on the low side.  He is taking amlodipine   He also complains  that he is taking Eliquis and he bleeds a lot slight bump taking 5 mg b.i.d. he has a atrial lead pacemaker, also suffers with paroxysmal AFib and susanna-tachy syndrome    ROS:  Review of Systems   Constitutional:  Negative for appetite change, chills and fever.   HENT:  Negative for congestion, ear pain, postnasal drip, rhinorrhea, sinus pressure and sinus pain.    Eyes:  Negative for pain.   Respiratory:  Negative for cough, chest tightness and shortness of breath.    Cardiovascular:  Negative for chest pain and palpitations.   Gastrointestinal:  Negative for abdominal pain, blood in stool, constipation, diarrhea and nausea.   Genitourinary:  Negative for difficulty urinating, dysuria, flank pain and hematuria.   Musculoskeletal:  Negative for arthralgias and myalgias.   Skin:  Negative for pallor and wound.   Neurological:  Negative for dizziness, tremors, speech difficulty, light-headedness and headaches.   Psychiatric/Behavioral:  Negative for behavioral problems, dysphoric mood and sleep disturbance. The patient is not nervous/anxious.    All other systems reviewed and are negative.      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       Social History:  Social History     Socioeconomic History    Marital status:    Tobacco Use    Smoking status: Former     Current packs/day: 0.00     Average packs/day: 1 pack/day for 25.0 years (25.0  ttl pk-yrs)     Types: Cigarettes     Start date: 1965     Quit date: 1990     Years since quittin.8    Smokeless tobacco: Never   Substance and Sexual Activity    Alcohol use: No     Alcohol/week: 0.0 standard drinks of alcohol    Drug use: No    Sexual activity: Not Currently     Birth control/protection: None     Social Determinants of Health     Financial Resource Strain: Low Risk  (4/10/2023)    Overall Financial Resource Strain (CARDIA)     Difficulty of Paying Living Expenses: Not hard at all   Food Insecurity: No Food Insecurity (4/10/2023)    Hunger Vital Sign     Worried About Running Out of Food in the Last Year: Never true     Ran Out of Food in the Last Year: Never true   Transportation Needs: No Transportation Needs (4/10/2023)    PRAPARE - Transportation     Lack of Transportation (Medical): No     Lack of Transportation (Non-Medical): No   Physical Activity: Sufficiently Active (4/10/2023)    Exercise Vital Sign     Days of Exercise per Week: 7 days     Minutes of Exercise per Session: 30 min   Stress: No Stress Concern Present (4/10/2023)    [place] of Occupational Health - Occupational Stress Questionnaire     Feeling of Stress : Not at all   Social Connections: Moderately Integrated (4/10/2023)    Social Connection and Isolation Panel [NHANES]     Frequency of Communication with Friends and Family: Twice a week     Frequency of Social Gatherings with Friends and Family: Three times a week     Attends [employer] Services: More than 4 times per year     Active Member of Clubs or Organizations: No     Attends Club or Organization Meetings: Never     Marital Status:    Housing Stability: Low Risk  (4/10/2023)    Housing Stability Vital Sign     Unable to Pay for Housing in the Last Year: No     Number of Places Lived in the Last Year: 1     Unstable Housing in the Last Year: No       Family History:   family history includes Cancer in his son; Diabetes in his maternal  uncle; Heart disease in his mother.    Health Maintenance   Topic Date Due    Shingles Vaccine (1 of 2) Never done    Lipid Panel  03/16/2024    TETANUS VACCINE  11/16/2025       Physical Exam:    Vital Signs  Pulse: 84  SpO2: 96 %  BP: 130/84  BP Location: Left arm  Patient Position: Sitting  Pain Score:   4  Height and Weight  Weight: 70.4 kg (155 lb 3.3 oz)]    Body mass index is 22.27 kg/m².    Physical Exam  Constitutional:       Appearance: Normal appearance.   HENT:      Head: Normocephalic and atraumatic.      Right Ear: Tympanic membrane normal.      Left Ear: Tympanic membrane normal.   Eyes:      Extraocular Movements: Extraocular movements intact.      Pupils: Pupils are equal, round, and reactive to light.   Cardiovascular:      Rate and Rhythm: Normal rate and regular rhythm.      Pulses: Normal pulses.      Heart sounds: Normal heart sounds. No murmur heard.     No gallop.   Pulmonary:      Effort: Pulmonary effort is normal. No respiratory distress.      Breath sounds: Normal breath sounds. No wheezing, rhonchi or rales.   Abdominal:      General: There is no distension.      Palpations: Abdomen is soft.      Tenderness: There is no abdominal tenderness.   Musculoskeletal:         General: No swelling, deformity or signs of injury. Normal range of motion.      Cervical back: Normal range of motion.   Skin:     General: Skin is warm and dry.      Capillary Refill: Capillary refill takes less than 2 seconds.      Coloration: Skin is not jaundiced or pale.      Findings: Abscess and erythema present.          Neurological:      General: No focal deficit present.      Mental Status: He is alert and oriented to person, place, and time.   Psychiatric:         Mood and Affect: Mood normal.         Behavior: Behavior normal.           Lab Results   Component Value Date    CHOL 139 03/16/2023    CHOL 151 07/05/2022    CHOL 143 01/03/2022    TRIG 198 (H) 03/16/2023    TRIG 192 (H) 07/05/2022    TRIG 171 (H)  01/03/2022    HDL 61 03/16/2023    HDL 58 07/05/2022    HDL 51 01/03/2022    TOTALCHOLEST 2.3 03/16/2023    TOTALCHOLEST 2.6 07/05/2022    TOTALCHOLEST 2.8 01/03/2022    NONHDLCHOL 78 03/16/2023    NONHDLCHOL 93 07/05/2022    NONHDLCHOL 92 01/03/2022       Lab Results   Component Value Date    HGBA1C 5.5 03/16/2023       Assessment:      ICD-10-CM ICD-9-CM   1. Skin infection  L08.9 686.9           Plan:  Keep infection wrapped up. Clean well.   Start Keflex for skin infection     No orders of the defined types were placed in this encounter.        Current Outpatient Medications   Medication Sig Dispense Refill    apixaban (ELIQUIS) 2.5 mg Tab Take 1 tablet (2.5 mg total) by mouth 2 (two) times daily. 60 tablet 11    bisacodyL (DULCOLAX) 5 mg EC tablet Take 5 mg by mouth daily as needed for Constipation.      dutasteride (AVODART) 0.5 mg capsule Take 1 capsule by mouth once daily 90 capsule 3    ferrous gluconate (FERGON) 324 MG tablet Take 1 tablet (324 mg total) by mouth 2 (two) times daily with meals. 60 tablet 3    fluocinolone acetonide oiL 0.01 % Drop Place 5 drops in ear(s) 2 (two) times a day. 20 mL 0    furosemide (LASIX) 20 MG tablet Take 1 tablet (20 mg total) by mouth once daily. 30 tablet 1    hydrOXYzine HCL (ATARAX) 10 MG Tab Take 1 tablet (10 mg total) by mouth 3 (three) times daily as needed. 30 tablet 2    methylPREDNISolone (MEDROL DOSEPACK) 4 mg tablet use as directed 1 each 0    mupirocin (BACTROBAN) 2 % ointment Apply topically 2 (two) times daily.      pantoprazole (PROTONIX) 40 MG tablet Take 1 tablet by mouth once daily 90 tablet 2    potassium chloride SA (K-DUR,KLOR-CON) 20 MEQ tablet Take 1 tablet (20 mEq total) by mouth once daily. 30 tablet 2    rOPINIRole (REQUIP) 1 MG tablet Take 1 tablet (1 mg total) by mouth every evening. 90 tablet 3    rosuvastatin (CRESTOR) 5 MG tablet Take 1 tablet by mouth once daily 90 tablet 1    cephALEXin (KEFLEX) 500 MG capsule Take 1 capsule (500 mg  total) by mouth every 8 (eight) hours. 21 capsule 0     No current facility-administered medications for this visit.       There are no discontinued medications.      No follow-ups on file.      Madie Davis MD  Scribe Attestation:   I, Irineo Vega, am scribing for, and in the presence of, Dr.Arif Davis I performed the above scribed service and the documentation accurately describes the services I performed. I attest to the accuracy of the note.    I, Dr. Madie Davis, reviewed documentation as scribed above. I performed the services described in this documentation.  I agree that the record reflects my personal performance and is accurate and complete. Madie Davis MD.  11/02/2023

## 2023-11-03 ENCOUNTER — CLINICAL SUPPORT (OUTPATIENT)
Dept: CARDIOLOGY | Facility: HOSPITAL | Age: 88
End: 2023-11-03
Payer: MEDICARE

## 2023-11-03 DIAGNOSIS — Z95.0 PRESENCE OF CARDIAC PACEMAKER: ICD-10-CM

## 2023-11-03 PROCEDURE — 93296 REM INTERROG EVL PM/IDS: CPT | Mod: HCNC | Performed by: INTERNAL MEDICINE

## 2023-11-14 ENCOUNTER — PATIENT MESSAGE (OUTPATIENT)
Dept: CARDIOLOGY | Facility: CLINIC | Age: 88
End: 2023-11-14
Payer: MEDICARE

## 2023-11-16 ENCOUNTER — TELEPHONE (OUTPATIENT)
Dept: CARDIOLOGY | Facility: CLINIC | Age: 88
End: 2023-11-16
Payer: MEDICARE

## 2023-11-16 NOTE — TELEPHONE ENCOUNTER
Please advise, per pt's daughter pt is currently in a donut hole with the insurance and would like to know if there is an alternative blood thinner. Please advise            ----- Message from Josephine Michaels sent at 11/15/2023  4:09 PM CST -----  Contact: Lauren/ Daughter  Lauren is calling to speak to the nurse regarding the patient blood thinner medication, she stated he is in a donut hole with the insurance and she would like to know if there is another alternative. Please give her a call at  [phone]    Thanks  ODILIA

## 2023-11-17 ENCOUNTER — TELEPHONE (OUTPATIENT)
Dept: CARDIOLOGY | Facility: CLINIC | Age: 88
End: 2023-11-17
Payer: MEDICARE

## 2023-11-17 NOTE — TELEPHONE ENCOUNTER
Spoke with pt's daughter in regards to getting an alternative blood thinner that would be cheaper.               ----- Message from Adriana Mckeon sent at 11/17/2023 11:41 AM CST -----  Name of Who is Calling:daughter           What is the request in detail:returning missed call. Daughter requesting a call today.           Can the clinic reply by [facility]:no           What Number to Call Back if not in [facility]: [phone]

## 2023-11-21 ENCOUNTER — TELEPHONE (OUTPATIENT)
Dept: CARDIOLOGY | Facility: CLINIC | Age: 88
End: 2023-11-21
Payer: MEDICARE

## 2023-11-21 NOTE — TELEPHONE ENCOUNTER
Attempted to contact daughter; M for patient to call back.     ----- Message from Luis A Tejeda MD sent at 11/20/2023  5:27 PM CST -----  I tried to call her , if that's the only possible option I will be better than no medication at all.  We can discuss more in clinic.    Thanks  ----- Message -----  From: Colten Perry  Sent: 11/17/2023   2:24 PM CST  To: Luis A Tejeda MD    Please advise  ----- Message -----  From: Lashon Teixeira  Sent: 11/17/2023   2:14 PM CST  To: I[facility] Staff    Pt's daughter stated her father would like to know can he get the ELQUIS in 5 mg and split it in half because of cost. Call Lauren back at [phone]. [email or web address]

## 2023-11-29 RX ORDER — FUROSEMIDE 20 MG/1
20 TABLET ORAL DAILY
Qty: 90 TABLET | Refills: 2 | Status: SHIPPED | OUTPATIENT
Start: 2023-11-29

## 2023-11-29 NOTE — TELEPHONE ENCOUNTER
Refill Decision Note   Doimnguez Ritchie  is requesting a refill authorization.  Brief Assessment and Rationale for Refill:  Approve     Medication Therapy Plan:         Comments:     Note composed:1:54 PM 11/29/2023

## 2023-11-29 NOTE — TELEPHONE ENCOUNTER
No care due was identified.  Health [facility] Embedded Care Due Messages. Reference number: [id number].   11/29/2023 9:09:45 AM CST

## 2023-12-01 NOTE — TELEPHONE ENCOUNTER
No care due was identified.  Health [facility] Embedded Care Due Messages. Reference number: [id number].   12/01/2023 12:31:06 PM CST

## 2023-12-02 RX ORDER — PANTOPRAZOLE SODIUM 40 MG/1
TABLET, DELAYED RELEASE ORAL
Qty: 90 TABLET | Refills: 3 | Status: SHIPPED | OUTPATIENT
Start: 2023-12-02

## 2023-12-02 NOTE — TELEPHONE ENCOUNTER
Refill Decision Note   Dominguez Ritchie  is requesting a refill authorization.  Brief Assessment and Rationale for Refill:  Approve     Medication Therapy Plan:         Comments:     Note composed:12:35 PM 12/02/2023

## 2023-12-06 ENCOUNTER — TELEPHONE (OUTPATIENT)
Dept: FAMILY MEDICINE | Facility: CLINIC | Age: 88
End: 2023-12-06
Payer: MEDICARE

## 2023-12-06 DIAGNOSIS — D64.9 ANEMIA, UNSPECIFIED TYPE: Primary | ICD-10-CM

## 2023-12-07 ENCOUNTER — LAB VISIT (OUTPATIENT)
Dept: LAB | Facility: HOSPITAL | Age: 88
End: 2023-12-07
Attending: FAMILY MEDICINE
Payer: MEDICARE

## 2023-12-07 DIAGNOSIS — D64.9 ANEMIA, UNSPECIFIED TYPE: ICD-10-CM

## 2023-12-07 LAB
BASOPHILS # BLD AUTO: 0.04 K/UL (ref 0–0.2)
BASOPHILS NFR BLD: 0.7 % (ref 0–1.9)
DIFFERENTIAL METHOD: ABNORMAL
EOSINOPHIL # BLD AUTO: 0.1 K/UL (ref 0–0.5)
EOSINOPHIL NFR BLD: 1.2 % (ref 0–8)
ERYTHROCYTE [DISTWIDTH] IN BLOOD BY AUTOMATED COUNT: 14.4 % (ref 11.5–14.5)
HCT VFR BLD AUTO: 43.7 % (ref 40–54)
HGB BLD-MCNC: 14.5 G/DL (ref 14–18)
IMM GRANULOCYTES # BLD AUTO: 0.02 K/UL (ref 0–0.04)
IMM GRANULOCYTES NFR BLD AUTO: 0.3 % (ref 0–0.5)
LYMPHOCYTES # BLD AUTO: 1.2 K/UL (ref 1–4.8)
LYMPHOCYTES NFR BLD: 20.4 % (ref 18–48)
MCH RBC QN AUTO: 31.4 PG (ref 27–31)
MCHC RBC AUTO-ENTMCNC: 33.2 G/DL (ref 32–36)
MCV RBC AUTO: 95 FL (ref 82–98)
MONOCYTES # BLD AUTO: 0.7 K/UL (ref 0.3–1)
MONOCYTES NFR BLD: 11.1 % (ref 4–15)
NEUTROPHILS # BLD AUTO: 3.9 K/UL (ref 1.8–7.7)
NEUTROPHILS NFR BLD: 66.3 % (ref 38–73)
NRBC BLD-RTO: 0 /100 WBC
PLATELET # BLD AUTO: 195 K/UL (ref 150–450)
PMV BLD AUTO: 10.2 FL (ref 9.2–12.9)
RBC # BLD AUTO: 4.62 M/UL (ref 4.6–6.2)
WBC # BLD AUTO: 5.93 K/UL (ref 3.9–12.7)

## 2023-12-07 PROCEDURE — 85025 COMPLETE CBC W/AUTO DIFF WBC: CPT | Mod: HCNC | Performed by: FAMILY MEDICINE

## 2023-12-07 PROCEDURE — 36415 COLL VENOUS BLD VENIPUNCTURE: CPT | Mod: HCNC,PO | Performed by: FAMILY MEDICINE

## 2023-12-09 ENCOUNTER — CLINICAL SUPPORT (OUTPATIENT)
Dept: CARDIOLOGY | Facility: HOSPITAL | Age: 88
End: 2023-12-09
Attending: INTERNAL MEDICINE
Payer: MEDICARE

## 2023-12-09 ENCOUNTER — CLINICAL SUPPORT (OUTPATIENT)
Dept: CARDIOLOGY | Facility: HOSPITAL | Age: 88
End: 2023-12-09
Payer: MEDICARE

## 2023-12-09 DIAGNOSIS — Z95.0 PRESENCE OF CARDIAC PACEMAKER: ICD-10-CM

## 2023-12-09 PROCEDURE — 93296 REM INTERROG EVL PM/IDS: CPT | Mod: HCNC | Performed by: INTERNAL MEDICINE

## 2023-12-09 PROCEDURE — 93294 CARDIAC DEVICE CHECK - REMOTE: ICD-10-PCS | Mod: HCNC,S$GLB,, | Performed by: INTERNAL MEDICINE

## 2023-12-09 PROCEDURE — 93294 REM INTERROG EVL PM/LDLS PM: CPT | Mod: HCNC,S$GLB,, | Performed by: INTERNAL MEDICINE

## 2023-12-14 NOTE — ADDENDUM NOTE
Addended by: DILLON STARR on: 1/23/2020 08:40 AM     Modules accepted: Orders     breathing is unlabored without accessory muscle use. , normal breath sounds.

## 2023-12-27 LAB
OHS CV DC REMOTE DEVICE TYPE: NORMAL
OHS CV RV PACING PERCENT: 70 %

## 2024-01-01 DIAGNOSIS — D64.9 ANEMIA, UNSPECIFIED TYPE: Primary | ICD-10-CM

## 2024-01-02 RX ORDER — FERROUS GLUCONATE 324(38)MG
1 TABLET ORAL 2 TIMES DAILY WITH MEALS
Qty: 60 TABLET | Refills: 0 | Status: SHIPPED | OUTPATIENT
Start: 2024-01-02 | End: 2024-01-04 | Stop reason: SDUPTHER

## 2024-01-04 DIAGNOSIS — D64.9 ANEMIA, UNSPECIFIED TYPE: ICD-10-CM

## 2024-01-04 RX ORDER — FERROUS GLUCONATE 324(38)MG
1 TABLET ORAL 2 TIMES DAILY WITH MEALS
Qty: 60 TABLET | Refills: 0 | Status: SHIPPED | OUTPATIENT
Start: 2024-01-04

## 2024-01-09 ENCOUNTER — OFFICE VISIT (OUTPATIENT)
Dept: CARDIOLOGY | Facility: CLINIC | Age: 89
End: 2024-01-09
Payer: MEDICARE

## 2024-01-09 VITALS
HEIGHT: 70 IN | SYSTOLIC BLOOD PRESSURE: 118 MMHG | HEART RATE: 78 BPM | WEIGHT: 155.19 LBS | DIASTOLIC BLOOD PRESSURE: 80 MMHG | BODY MASS INDEX: 22.22 KG/M2 | OXYGEN SATURATION: 95 % | RESPIRATION RATE: 16 BRPM

## 2024-01-09 DIAGNOSIS — Z95.0 CARDIAC PACEMAKER IN SITU: ICD-10-CM

## 2024-01-09 DIAGNOSIS — I49.5 TACHY-BRADY SYNDROME: ICD-10-CM

## 2024-01-09 DIAGNOSIS — E78.2 MIXED HYPERLIPIDEMIA: ICD-10-CM

## 2024-01-09 DIAGNOSIS — I48.0 PAROXYSMAL A-FIB: ICD-10-CM

## 2024-01-09 DIAGNOSIS — I35.8 AORTIC VALVE SCLEROSIS: Primary | ICD-10-CM

## 2024-01-09 PROCEDURE — 99214 OFFICE O/P EST MOD 30 MIN: CPT | Mod: HCNC,S$GLB,, | Performed by: INTERNAL MEDICINE

## 2024-01-09 PROCEDURE — 1101F PT FALLS ASSESS-DOCD LE1/YR: CPT | Mod: HCNC,CPTII,S$GLB, | Performed by: INTERNAL MEDICINE

## 2024-01-09 PROCEDURE — 1159F MED LIST DOCD IN RCRD: CPT | Mod: HCNC,CPTII,S$GLB, | Performed by: INTERNAL MEDICINE

## 2024-01-09 PROCEDURE — 3288F FALL RISK ASSESSMENT DOCD: CPT | Mod: HCNC,CPTII,S$GLB, | Performed by: INTERNAL MEDICINE

## 2024-01-09 PROCEDURE — 99999 PR PBB SHADOW E&M-EST. PATIENT-LVL IV: CPT | Mod: PBBFAC,HCNC,, | Performed by: INTERNAL MEDICINE

## 2024-01-09 NOTE — PROGRESS NOTES
Subjective:   Patient ID:  Dominguez Ritchie is a 89 y.o. male who presents for evaluation of No chief complaint on file.        HPI  1.9.2024  Comes in for a six-month follow-up.    He denies any chest pain, palpitations syncope or presyncope   Same issue with the Eliquis.  However no falls or bleeding.    His most recent echocardiogram he was in AFib during his study.    On his pacemaker interrogation he has been having atrial runs.  Unclear if AFib.      5.10.2023  Comes in for six-month follow-up.    He had his scalp lesion excised.  And a sentinel lymph node as well.    Denies any chest pain.  No syncope or presyncope.    No palpitations.    He is complaining of very easy bruising and he states that he has been taking half of the Eliquis dose.    No lower extremity swelling orthopnea or dyspnea.    States amlodipine was stopped due to low blood pressure by PCP.      11.2022  89-year-old male with past medical history below.    He received a atrial lead pacemaker last week after presenting to the hospital due to abnormal finding on his Holter monitor with long pauses during his conversion from rapid AFib to normal sinus.    He denies any more dizziness.    He denies any chest pain, dyspnea on exertion, palpitations.    He is in sinus rhythm today and paced in the atrium.    His wound looks healing well.  His recent interrogation was normal function.    He is following instructions and arm restrictions    Past Medical History:   Diagnosis Date    Abnormal exercise tolerance test 7/25/2013    Acute respiratory failure with hypoxia 6/15/2023    Arthritis     Colon polyp     Diverticulosis     Dyslipidemia 7/25/2013    GERD (gastroesophageal reflux disease)     HTN, goal below 130/80 12/3/2018    Hyperlipidemia 1980    HIGH TG    Knee pain, right 6/14/2013    Low back pain with right-sided sciatica 12/3/2018    Meningioma     Paroxysmal A-fib 10/29/2022    Personal history of colonic polyps 5/27/2014    RLS (restless  legs syndrome) 6/14/2013    Tobacco dependence     resolved    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 CATH LAB;  Service: Cardiology;  Laterality: Left;  biotronik AAIR    APPENDECTOMY      BACK SURGERY Bilateral 2016    CATARACT EXTRACTION, BILATERAL      COLONOSCOPY  2/2009    EYE SURGERY      INJECTION OF ANESTHETIC AGENT AROUND NERVE Right 11/18/2022    Procedure: Right Genicular nerve block w/Light RN IV Sedation;  Surgeon: Benitez Roberson MD;  Location: [facility] PAIN MGT;  Service: Pain Management;  Laterality: Right;    INSERTION OF PERMANENT PACEMAKER Left 11/1/2022    Procedure: Implant PPM;  Surgeon: Finn Mccrary MD;  Location: [facility] CATH LAB;  Service: Cardiology;  Laterality: Left;    JOINT REPLACEMENT      left knee    LUMBAR PARAVERTEBRAL FACET JOINT NERVE BLOCK Bilateral 8/29/2019    Procedure: LMBB L3,4,5;  Surgeon: Nabor Sadler MD;  Location: [facility] PAIN MGT;  Service: Pain Management;  Laterality: Bilateral;    SELECTIVE INJECTION OF ANESTHETIC AGENT AROUND LUMBAR SPINAL NERVE ROOT BY TRANSFORAMINAL APPROACH Bilateral 11/8/2021    Procedure: Bilateral L4/5 +/- L5/S1 TF DREW;  Surgeon: Nabor Sadler MD;  Location: [facility] PAIN MGT;  Service: Pain Management;  Laterality: Bilateral;    SELECTIVE INJECTION OF ANESTHETIC AGENT AROUND LUMBAR SPINAL NERVE ROOT BY TRANSFORAMINAL APPROACH Bilateral 1/4/2022    Procedure: Bilateral L4/5 + L5/S1 TF DREW;  Surgeon: Nabor Sadler MD;  Location: [facility] PAIN MGT;  Service: Pain Management;  Laterality: Bilateral;    SELECTIVE INJECTION OF ANESTHETIC AGENT AROUND LUMBAR SPINAL NERVE ROOT BY TRANSFORAMINAL APPROACH Right 12/30/2022    Procedure: Right-sided L4/5 and L5/S1 transforaminal epidural steroid injection with RN IV sedation;  Surgeon: Benitez Roberson  MD;  Location: [facility] PAIN MGT;  Service: Pain Management;  Laterality: Right;    SPINE SURGERY      UPPER GASTROINTESTINAL ENDOSCOPY  2009       Social History     Tobacco Use    Smoking status: Former     Current packs/day: 0.00     Average packs/day: 1 pack/day for 25.0 years (25.0 ttl pk-yrs)     Types: Cigarettes     Start date: 1965     Quit date: 1990     Years since quittin.0    Smokeless tobacco: Never   Substance Use Topics    Alcohol use: No     Alcohol/week: 0.0 standard drinks of alcohol    Drug use: No       Family History   Problem Relation Age of Onset    Heart disease Mother     Cancer Son         pancreatic     Diabetes Maternal Uncle     Kidney disease Neg Hx     Stroke Neg Hx        Review of Systems   Cardiovascular:  Negative for chest pain, dyspnea on exertion, palpitations and syncope.   Genitourinary: Negative.    Neurological: Negative.        Current Outpatient Medications on File Prior to Visit   Medication Sig    bisacodyL (DULCOLAX) 5 mg EC tablet Take 5 mg by mouth daily as needed for Constipation.    cephALEXin (KEFLEX) 500 MG capsule Take 1 capsule (500 mg total) by mouth every 8 (eight) hours.    dutasteride (AVODART) 0.5 mg capsule Take 1 capsule by mouth once daily    ferrous gluconate (FERGON) 324 MG tablet Take 1 tablet (324 mg total) by mouth 2 (two) times daily with meals.    fluocinolone acetonide oiL 0.01 % Drop Place 5 drops in ear(s) 2 (two) times a day.    furosemide (LASIX) 20 MG tablet Take 1 tablet (20 mg total) by mouth once daily.    hydrOXYzine HCL (ATARAX) 10 MG Tab Take 1 tablet (10 mg total) by mouth 3 (three) times daily as needed.    methylPREDNISolone (MEDROL DOSEPACK) 4 mg tablet use as directed    mupirocin (BACTROBAN) 2 % ointment Apply topically 2 (two) times daily.    pantoprazole (PROTONIX) 40 MG tablet Take 1 tablet by mouth once daily    potassium chloride SA (K-DUR,KLOR-CON) 20 MEQ tablet Take 1 tablet (20 mEq total) by mouth once daily.     rOPINIRole (REQUIP) 1 MG tablet Take 1 tablet (1 mg total) by mouth every evening.    rosuvastatin (CRESTOR) 5 MG tablet Take 1 tablet by mouth once daily    [DISCONTINUED] apixaban (ELIQUIS) 2.5 mg Tab Take 1 tablet (2.5 mg total) by mouth 2 (two) times daily.     No current facility-administered medications on file prior to visit.       Objective:   Objective:  Wt Readings from Last 3 Encounters:   01/09/24 70.4 kg (155 lb 3.3 oz)   12/14/23 70.3 kg (154 lb 15.7 oz)   11/02/23 70.4 kg (155 lb 3.3 oz)     Temp Readings from Last 3 Encounters:   07/20/23 97.1 °F (36.2 °C)   06/21/23 97.7 °F (36.5 °C)   06/17/23 98.4 °F (36.9 °C)     BP Readings from Last 3 Encounters:   01/09/24 118/80   11/02/23 130/84   09/07/23 120/70     Pulse Readings from Last 3 Encounters:   01/09/24 78   11/02/23 84   09/07/23 87       Physical Exam  Vitals reviewed.   Constitutional:       Appearance: He is well-developed.   Neck:      Vascular: No carotid bruit.   Cardiovascular:      Rate and Rhythm: Normal rate and regular rhythm.      Pulses: Intact distal pulses.      Heart sounds: Normal heart sounds. No murmur heard.  Pulmonary:      Breath sounds: Normal breath sounds.   Neurological:      Mental Status: He is oriented to person, place, and time.         Lab Results   Component Value Date    CHOL 139 03/16/2023    CHOL 151 07/05/2022    CHOL 143 01/03/2022     Lab Results   Component Value Date    HDL 61 03/16/2023    HDL 58 07/05/2022    HDL 51 01/03/2022     Lab Results   Component Value Date    LDLCALC 38.4 (L) 03/16/2023    LDLCALC 54.6 (L) 07/05/2022    LDLCALC 57.8 (L) 01/03/2022     Lab Results   Component Value Date    TRIG 198 (H) 03/16/2023    TRIG 192 (H) 07/05/2022    TRIG 171 (H) 01/03/2022     Lab Results   Component Value Date    CHOLHDL 43.9 03/16/2023    CHOLHDL 38.4 07/05/2022    CHOLHDL 35.7 01/03/2022       Chemistry        Component Value Date/Time     07/21/2023 0823    K 4.0 07/21/2023 0823      07/21/2023 0823    CO2 25 07/21/2023 0823    BUN 9 07/21/2023 0823    CREATININE 0.8 07/21/2023 0823     07/21/2023 0823        Component Value Date/Time    CALCIUM 9.0 07/21/2023 0823    ALKPHOS 75 07/21/2023 0823    AST 21 07/21/2023 0823    ALT 13 07/21/2023 0823    BILITOT 0.4 07/21/2023 0823    ESTGFRAFRICA 88 04/26/2023 1915    ESTGFRAFRICA >60.0 07/05/2022 1444    EGFRNONAA >60.0 07/05/2022 1444          Lab Results   Component Value Date    TSH 1.733 10/29/2022     Lab Results   Component Value Date    INR 1.0 06/14/2023    INR 1.0 10/29/2022    INR 1.0 10/19/2020     Lab Results   Component Value Date    WBC 5.93 12/07/2023    HGB 14.5 12/07/2023    HCT 43.7 12/07/2023    MCV 95 12/07/2023     12/07/2023     BNP  @LABRCNTIP(BNP,BNPTRIAGEBLO)@  CrCl cannot be calculated (Patient's most recent lab result is older than the maximum 7 days allowed.).     Imaging:  ======  Results for orders placed during the hospital encounter of 09/16/22    Echo    Interpretation Summary  · The left ventricle is normal in size with concentric remodeling and normal systolic function.  · Indeterminate left ventricular diastolic function.  · The estimated PA systolic pressure is 37 mmHg.  · Normal right ventricular size with normal right ventricular systolic function.  · Normal central venous pressure (3 mmHg).  · The estimated ejection fraction is 55%.  · Mild aortic regurgitation.  · Mild mitral regurgitation.  · Mild tricuspid regurgitation.    No results found for this or any previous visit.    Results for orders placed in visit on 04/01/13    US Carotid Bilateral    Narrative  DATE OF EXAM: Apr 8 2013    BOC   0519  -  US EXTRACRANIAL COMPLETE BILAT:   \  [id number]    CLINICAL HISTORY:   \401.9  HYPERTENSION NOS    PROCEDURE COMMENT:   \    ICD 9 CODE(S):   (\)    CPT 4 CODE(S)/MODIFIER(S):   (\)    Comparison: none    Findings:    Real-time, color flow and Doppler imaging performed.    Minimal calcific plaque  noted within the left bulb.    ICA                                    R  L  Peak systolic velocity:         124   Cm/sec               115   cm/sec  Peak diastolic velocity:        30   Cm/sec               33   cm/sec  Systolic velocity ratio:          1.3                               1.2  Diastolic velocity ratio:         1.4                               1.5      Vertebral artery flow:               Antegrade                antegrade    ECA flow:                                   Antegrade  antegrade    spectral waveforms appear within normal limits bilaterally.    Impression  1.  No hemodynamically significant plaque formation or stenosis identified.    2.  Further evaluation and/or follow-up imaging as clinically warranted.  A      Electronically signed by: FILEMON BRUNO III, MD  Date:     04/09/13  Time:    09:05        : LISA  Transcribe Date/Time: Apr 9 2013  9:05A  Dictated by : FILEMON BRUNO III, MD  Report reviewed by:  Read On:  \  Images were reviewed, findings were verified and document was  electronically  SIGNED BY: FILEMON BRUNO III, MD On: Apr 9 2013  9:05A    Results for orders placed during the hospital encounter of 01/16/17    X-Ray Chest PA And Lateral    Narrative  Comparison: 01/21/2011    2 views    Findings:    Heart size within normal limits.  Pulmonary vasculature is normal and symmetric.  Mild tortuosity of the aorta underlying atherosclerotic calcification.  Calcified AP window node again identified.  Trachea is normal in position along for slight rotation.  No consolidation or effusion.  Granuloma identified within the LEFT upper lobe.  Mild osteopenia and spondylosis with wedge-shaped compression deformity L1 compression deformity noted.  This was identified on L-spine series from 03/26/2015.  IMPRESSION:      1.  Stable exam without acute infiltrate.  See above.    2.  Known L1 compression deformity.      Electronically signed by: FILEMON BRUNO III  MD  Date:     01/16/17  Time:    16:34    No results found for this or any previous visit.    No valid procedures specified.    Diagnostic Results:  ECG: Reviewed    The ASCVD Risk score (Brennan DK, et al., 2019) failed to calculate for the following reasons:    The 2019 ASCVD risk score is only valid for ages 40 to 79    Assessment and Plan:   Aortic valve sclerosis    Paroxysmal A-fib  -     apixaban (ELIQUIS) 5 mg Tab; Take 1 tablet (5 mg total) by mouth 2 (two) times daily.  Dispense: 60 tablet; Refill: 11    Mixed hyperlipidemia    Tachy-susanna syndrome    Cardiac pacemaker in situ        Blood pressure controlled.  Lesion  Device function normal.    Follow with heme Onc.    Continue with Eliquis.  Weight more than 60 kg.  Normal kidney function.  No falls  Follow with device Clinic and EP as well.    Follow-up in 6 months.

## 2024-01-12 ENCOUNTER — TELEPHONE (OUTPATIENT)
Dept: CARDIOLOGY | Facility: CLINIC | Age: 89
End: 2024-01-12
Payer: MEDICARE

## 2024-01-12 NOTE — TELEPHONE ENCOUNTER
Patient contacted [facility] and provided contact numbers to get details on the co pays for the device check.   [phone] or [phone], the financial service department would explain the co pay better.  Previous message  Lauren, patient's daughter stated that they are getting a charge of 31 dollars every time pace maker is check and they would like to know the reason why. Please call her back at [phone].

## 2024-01-28 NOTE — DISCHARGE SUMMARY
[facility]  Discharge Note       Description of Procedure: Right Genicular Nerve Block (Superior Lateral, Superior Medial, Inferior Medial Genicular Nerves) under Fluoroscopic Guidance    Procedure Date: 7/25/2018    Admit Date: 7/25/2018  Discharge Date: 7/25/2018     Attending Physician: Viktoriya Tomlin   Discharge Provider: Viktoriya Tomlin    Preoperative Diagnosis: Right Knee Pain (chronic) and Right Knee Osteoarthritis     Postoperative Diagnosis: as above, same as preoperative diagnosis    Discharged Condition: Stable    Hospital Course: Patient was admitted for an outpatient procedure. The procedure was tolerated well with no complications.    Final Diagnoses: Same as principal problem.    Disposition: Home, self-care.    Follow up/Patient Instructions:  Follow-up in clinic in 2-3 weeks.    Medications: No medications were prescribed today. The patient was advised to resume normal medication regimen without change.  Specific information was provided regarding restarting any anticoagulant/s.    Discharge Procedure Orders (must include Diet, Follow-up, Activity):  Light activity for the remainder of the day, resume normal activity tomorrow. Resume normal diet. Follow-up in clinic in 2-3 weeks.   Reports chronic  history of GERD, she feels this is mostly stress induced. She takes Omeprazole 20mg as needed.

## 2024-02-23 DIAGNOSIS — R00.1 SINUS BRADYCARDIA: ICD-10-CM

## 2024-02-23 DIAGNOSIS — I49.5 TACHY-BRADY SYNDROME: ICD-10-CM

## 2024-02-23 DIAGNOSIS — I48.0 PAROXYSMAL A-FIB: ICD-10-CM

## 2024-02-23 DIAGNOSIS — Z95.0 CARDIAC PACEMAKER IN SITU: Primary | ICD-10-CM

## 2024-02-28 ENCOUNTER — LAB VISIT (OUTPATIENT)
Dept: LAB | Facility: HOSPITAL | Age: 89
End: 2024-02-28
Attending: FAMILY MEDICINE
Payer: MEDICARE

## 2024-02-28 ENCOUNTER — OFFICE VISIT (OUTPATIENT)
Dept: FAMILY MEDICINE | Facility: CLINIC | Age: 89
End: 2024-02-28
Payer: MEDICARE

## 2024-02-28 VITALS
OXYGEN SATURATION: 98 % | DIASTOLIC BLOOD PRESSURE: 72 MMHG | SYSTOLIC BLOOD PRESSURE: 138 MMHG | HEART RATE: 70 BPM | HEIGHT: 70 IN | WEIGHT: 153.44 LBS | BODY MASS INDEX: 21.97 KG/M2

## 2024-02-28 DIAGNOSIS — R97.20 ELEVATED PROSTATE SPECIFIC ANTIGEN (PSA): ICD-10-CM

## 2024-02-28 DIAGNOSIS — R97.20 ELEVATED PSA: ICD-10-CM

## 2024-02-28 DIAGNOSIS — R26.9 GAIT ABNORMALITY: Primary | ICD-10-CM

## 2024-02-28 DIAGNOSIS — R42 DIZZINESS: ICD-10-CM

## 2024-02-28 LAB
COMPLEXED PSA SERPL-MCNC: 10 NG/ML (ref 0–4)
COMPLEXED PSA SERPL-MCNC: 10.6 NG/ML (ref 0–4)

## 2024-02-28 PROCEDURE — 1159F MED LIST DOCD IN RCRD: CPT | Mod: HCNC,CPTII,S$GLB, | Performed by: FAMILY MEDICINE

## 2024-02-28 PROCEDURE — 84153 ASSAY OF PSA TOTAL: CPT | Mod: DBM,HCNC | Performed by: FAMILY MEDICINE

## 2024-02-28 PROCEDURE — 99214 OFFICE O/P EST MOD 30 MIN: CPT | Mod: HCNC,S$GLB,, | Performed by: FAMILY MEDICINE

## 2024-02-28 PROCEDURE — 99999 PR PBB SHADOW E&M-EST. PATIENT-LVL III: CPT | Mod: PBBFAC,HCNC,, | Performed by: FAMILY MEDICINE

## 2024-02-28 PROCEDURE — 84153 ASSAY OF PSA TOTAL: CPT | Mod: HCNC | Performed by: UROLOGY

## 2024-02-28 PROCEDURE — 36415 COLL VENOUS BLD VENIPUNCTURE: CPT | Mod: HCNC,PO | Performed by: FAMILY MEDICINE

## 2024-02-28 RX ORDER — MECLIZINE HCL 12.5 MG 12.5 MG/1
12.5 TABLET ORAL 3 TIMES DAILY PRN
Qty: 30 TABLET | Refills: 2 | Status: SHIPPED | OUTPATIENT
Start: 2024-02-28

## 2024-02-28 NOTE — PROGRESS NOTES
Chief Complaint:    Chief Complaint   Patient presents with    Dizziness       Having   History of Present Illness:    Patient with Paroxysmal A-fib, HTN, HLD, and GERD presents today for dizziness,    Dizziness for a week upon standing up and has to hold on to something, denies feeling like room is spinning. No new medications.     Blood pressure at home stable.     Weakness to left foot. Unable to lift foot up as much when walking.     Elevated PSA, follows with Dr. Rodriguez.    ROS:  Review of Systems   Constitutional:  Negative for appetite change, chills and fever.   HENT:  Negative for congestion, ear pain, postnasal drip, rhinorrhea, sinus pressure and sinus pain.    Eyes:  Negative for pain.   Respiratory:  Negative for cough, chest tightness and shortness of breath.    Cardiovascular:  Negative for chest pain and palpitations.   Gastrointestinal:  Negative for abdominal pain, blood in stool, constipation, diarrhea and nausea.   Genitourinary:  Negative for difficulty urinating, dysuria, flank pain and hematuria.   Musculoskeletal:  Negative for arthralgias and myalgias.   Skin:  Negative for pallor and wound.   Neurological:  Positive for dizziness and weakness. Negative for tremors, speech difficulty, light-headedness and headaches.   Psychiatric/Behavioral:  Negative for behavioral problems, dysphoric mood and sleep disturbance. The patient is not nervous/anxious.    All other systems reviewed and are negative.      Past Medical History:   Diagnosis Date    Abnormal exercise tolerance test 7/25/2013    Acute respiratory failure with hypoxia 6/15/2023    Arthritis     Colon polyp     Diverticulosis     Dyslipidemia 7/25/2013    GERD (gastroesophageal reflux disease)     HTN, goal below 130/80 12/3/2018    Hyperlipidemia 1980    HIGH TG    Knee pain, right 6/14/2013    Low back pain with right-sided sciatica 12/3/2018    Meningioma     Paroxysmal A-fib 10/29/2022    Personal history of colonic polyps  2014    RLS (restless legs syndrome) 2013    Tobacco dependence     resolved    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       Social History:  Social History     Socioeconomic History    Marital status:    Tobacco Use    Smoking status: Former     Current packs/day: 0.00     Average packs/day: 1 pack/day for 25.0 years (25.0 ttl pk-yrs)     Types: Cigarettes     Start date: 1965     Quit date: 1990     Years since quittin.1    Smokeless tobacco: Never   Substance and Sexual Activity    Alcohol use: No     Alcohol/week: 0.0 standard drinks of alcohol    Drug use: No    Sexual activity: Not Currently     Birth control/protection: None     Social Determinants of Health     Financial Resource Strain: Low Risk  (4/10/2023)    Overall Financial Resource Strain (CARDIA)     Difficulty of Paying Living Expenses: Not hard at all   Food Insecurity: No Food Insecurity (4/10/2023)    Hunger Vital Sign     Worried About Running Out of Food in the Last Year: Never true     Ran Out of Food in the Last Year: Never true   Transportation Needs: No Transportation Needs (4/10/2023)    PRAPARE - Transportation     Lack of Transportation (Medical): No     Lack of Transportation (Non-Medical): No   Physical Activity: Sufficiently Active (4/10/2023)    Exercise Vital Sign     Days of Exercise per Week: 7 days     Minutes of Exercise per Session: 30 min   Stress: No Stress Concern Present (4/10/2023)    [employer] - Occupational Stress Questionnaire     Feeling of Stress : Not at all   Social Connections: Moderately Integrated (4/10/2023)    Social Connection and Isolation Panel [NHANES]     Frequency of Communication with Friends and Family: Twice a week     Frequency of Social Gatherings with Friends and Family: Three times a week     Attends [employer] Services: More than 4 times per year     Active Member of Clubs or Organizations: No     Attends Club or Organization Meetings:  "Never     Marital Status:    Housing Stability: Low Risk  (4/10/2023)    Housing Stability Vital Sign     Unable to Pay for Housing in the Last Year: No     Number of Places Lived in the Last Year: 1     Unstable Housing in the Last Year: No       Family History:   family history includes Cancer in his son; Diabetes in his maternal uncle; Heart disease in his mother.    Health Maintenance   Topic Date Due    Shingles Vaccine (1 of 2) Never done    Lipid Panel  03/16/2024    TETANUS VACCINE  11/16/2025       Physical Exam:    Vital Signs  Pulse: 70  SpO2: 98 %  BP: 138/72  BP Location: Left arm  Patient Position: Sitting  Height and Weight  Height: 5' 10" (177.8 cm)  Weight: 69.6 kg (153 lb 7 oz)  BSA (Calculated - sq m): 1.85 sq meters  BMI (Calculated): 22  Weight in (lb) to have BMI = 25: 173.9]    Body mass index is 22.02 kg/m².    Physical Exam  Constitutional:       Appearance: Normal appearance.   HENT:      Head: Normocephalic and atraumatic.      Right Ear: Tympanic membrane normal.      Left Ear: Tympanic membrane normal.   Eyes:      Extraocular Movements: Extraocular movements intact.      Pupils: Pupils are equal, round, and reactive to light.   Cardiovascular:      Rate and Rhythm: Normal rate and regular rhythm.      Pulses: Normal pulses.      Heart sounds: Normal heart sounds. No murmur heard.     No gallop.   Pulmonary:      Effort: Pulmonary effort is normal. No respiratory distress.      Breath sounds: Normal breath sounds. No wheezing, rhonchi or rales.   Abdominal:      General: There is no distension.      Palpations: Abdomen is soft.      Tenderness: There is no abdominal tenderness.   Musculoskeletal:         General: No swelling, deformity or signs of injury. Normal range of motion.      Cervical back: Normal range of motion.      Left foot: Foot drop present.      Comments: Very poor dorsiflexion of left foot against resistance.    Skin:     General: Skin is warm and dry.      " Capillary Refill: Capillary refill takes less than 2 seconds.      Coloration: Skin is not jaundiced or pale.   Neurological:      General: No focal deficit present.      Mental Status: He is alert and oriented to person, place, and time.   Psychiatric:         Mood and Affect: Mood normal.         Behavior: Behavior normal.           Lab Results   Component Value Date    CHOL 139 03/16/2023    CHOL 151 07/05/2022    CHOL 143 01/03/2022    TRIG 198 (H) 03/16/2023    TRIG 192 (H) 07/05/2022    TRIG 171 (H) 01/03/2022    HDL 61 03/16/2023    HDL 58 07/05/2022    HDL 51 01/03/2022    TOTALCHOLEST 2.3 03/16/2023    TOTALCHOLEST 2.6 07/05/2022    TOTALCHOLEST 2.8 01/03/2022    NONHDLCHOL 78 03/16/2023    NONHDLCHOL 93 07/05/2022    NONHDLCHOL 92 01/03/2022       Lab Results   Component Value Date    HGBA1C 5.5 03/16/2023       Assessment:      ICD-10-CM ICD-9-CM   1. Gait abnormality  R26.9 781.2   2. Dizziness  R42 780.4   3. Elevated PSA  R97.20 790.93             Plan:    Stay active as tolerated and continue to use walker.   Start meclizine for dizziness.   Already had vestibular evaluation and was told its not vestiublar  Check PSA.     Orders Placed This Encounter   Procedures    PSA, SCREENING         Current Outpatient Medications   Medication Sig Dispense Refill    apixaban (ELIQUIS) 5 mg Tab Take 1 tablet (5 mg total) by mouth 2 (two) times daily. 60 tablet 11    bisacodyL (DULCOLAX) 5 mg EC tablet Take 5 mg by mouth daily as needed for Constipation.      dutasteride (AVODART) 0.5 mg capsule Take 1 capsule by mouth once daily 90 capsule 3    ferrous gluconate (FERGON) 324 MG tablet Take 1 tablet (324 mg total) by mouth 2 (two) times daily with meals. 60 tablet 0    fluocinolone acetonide oiL 0.01 % Drop Place 5 drops in ear(s) 2 (two) times a day. 20 mL 0    furosemide (LASIX) 20 MG tablet Take 1 tablet (20 mg total) by mouth once daily. 90 tablet 2    pantoprazole (PROTONIX) 40 MG tablet Take 1 tablet by mouth  once daily 90 tablet 3    potassium chloride SA (K-DUR,KLOR-CON) 20 MEQ tablet Take 1 tablet (20 mEq total) by mouth once daily. 30 tablet 2    rOPINIRole (REQUIP) 1 MG tablet Take 1 tablet (1 mg total) by mouth every evening. 90 tablet 3    rosuvastatin (CRESTOR) 5 MG tablet Take 1 tablet by mouth once daily 90 tablet 1    cephALEXin (KEFLEX) 500 MG capsule Take 1 capsule (500 mg total) by mouth every 8 (eight) hours. (Patient not taking: Reported on 2/28/2024) 21 capsule 0    meclizine (ANTIVERT) 12.5 mg tablet Take 1 tablet (12.5 mg total) by mouth 3 (three) times daily as needed. 30 tablet 2    methylPREDNISolone (MEDROL DOSEPACK) 4 mg tablet use as directed (Patient not taking: Reported on 2/28/2024) 1 each 0    mupirocin (BACTROBAN) 2 % ointment Apply topically 2 (two) times daily.       No current facility-administered medications for this visit.       Medications Discontinued During This Encounter   Medication Reason    hydrOXYzine HCL (ATARAX) 10 MG Tab          No follow-ups on file.      Madie Davis MD  Scribe Attestation:   I, Irineo Vega, am scribing for, and in the presence of, Dr.Arif Davis I performed the above scribed service and the documentation accurately describes the services I performed. I attest to the accuracy of the note.    I, Dr. Madie Davis, reviewed documentation as scribed above. I performed the services described in this documentation.  I agree that the record reflects my personal performance and is accurate and complete. Madie Davis MD.  02/28/2024

## 2024-03-08 ENCOUNTER — CLINICAL SUPPORT (OUTPATIENT)
Dept: CARDIOLOGY | Facility: HOSPITAL | Age: 89
End: 2024-03-08

## 2024-03-08 DIAGNOSIS — Z95.0 PRESENCE OF CARDIAC PACEMAKER: ICD-10-CM

## 2024-03-10 ENCOUNTER — CLINICAL SUPPORT (OUTPATIENT)
Dept: CARDIOLOGY | Facility: HOSPITAL | Age: 89
End: 2024-03-10
Attending: INTERNAL MEDICINE
Payer: MEDICARE

## 2024-03-10 DIAGNOSIS — Z95.0 PRESENCE OF CARDIAC PACEMAKER: ICD-10-CM

## 2024-03-10 PROCEDURE — 93294 REM INTERROG EVL PM/LDLS PM: CPT | Mod: S$GLB,,, | Performed by: INTERNAL MEDICINE

## 2024-03-10 PROCEDURE — 93296 REM INTERROG EVL PM/IDS: CPT | Performed by: INTERNAL MEDICINE

## 2024-03-15 ENCOUNTER — PATIENT MESSAGE (OUTPATIENT)
Dept: CARDIOLOGY | Facility: CLINIC | Age: 89
End: 2024-03-15
Payer: MEDICARE

## 2024-03-15 DIAGNOSIS — I49.5 TACHY-BRADY SYNDROME: ICD-10-CM

## 2024-03-15 DIAGNOSIS — R00.1 SINUS BRADYCARDIA: ICD-10-CM

## 2024-03-15 DIAGNOSIS — I48.0 PAROXYSMAL A-FIB: Primary | ICD-10-CM

## 2024-03-22 DIAGNOSIS — I10 ESSENTIAL HYPERTENSION: Primary | ICD-10-CM

## 2024-04-10 ENCOUNTER — TELEPHONE (OUTPATIENT)
Dept: CARDIOLOGY | Facility: CLINIC | Age: 89
End: 2024-04-10
Payer: MEDICARE

## 2024-04-10 NOTE — TELEPHONE ENCOUNTER
Patient contacted and was advised that we will see the patient in the clinic and was advised to come in early for a Device check.

## 2024-04-11 ENCOUNTER — TELEPHONE (OUTPATIENT)
Dept: CARDIOLOGY | Facility: CLINIC | Age: 89
End: 2024-04-11
Payer: MEDICARE

## 2024-04-11 DIAGNOSIS — E78.2 MIXED HYPERLIPIDEMIA: ICD-10-CM

## 2024-04-11 NOTE — TELEPHONE ENCOUNTER
Spoke to patient he state that he can wait until his visit on 4/15 to address leg issues. His concern was that when the weather changes his legs sometimes turn red with no pain. His daughter stated that it may be heart related. I informed patient that provider will do a full work up at 1 year visit on 4/15.also informed patient that appointment scheduled for today will be canceled. Patient verbalized understanding. No further question.

## 2024-04-11 NOTE — TELEPHONE ENCOUNTER
Care Due:                  Date            Visit Type   Department     Provider  --------------------------------------------------------------------------------                                EP -                              PRIMARY      [facility]  Last Visit: 02-      CARE ([facility])   MEDICINE       Madie Davis  Next Visit: None Scheduled  None         None Found                                                            Last  Test          Frequency    Reason                     Performed    Due Date  --------------------------------------------------------------------------------    Lipid Panel.  12 months..  rosuvastatin.............  03- 03-    [facility] Embedded Care Due Messages. Reference number: [id number].   4/11/2024 10:21:09 AM CDT

## 2024-04-12 RX ORDER — ROSUVASTATIN CALCIUM 5 MG/1
5 TABLET, COATED ORAL
Qty: 90 TABLET | Refills: 0 | Status: SHIPPED | OUTPATIENT
Start: 2024-04-12

## 2024-04-12 NOTE — TELEPHONE ENCOUNTER
Refill Routing Note   Medication(s) are not appropriate for processing by [facility] for the following reason(s):        Required labs outdated    [facility] action(s):  Defer   Requires labs : Yes               Appointments  past 12m or future 3m with PCP    Date Provider   Last Visit   2/28/2024 Madie Davis MD   Next Visit   Visit date not found Madie Davis MD   ED visits in past 90 days: 0        Note composed:12:22 PM 04/12/2024

## 2024-04-15 ENCOUNTER — HOSPITAL ENCOUNTER (OUTPATIENT)
Dept: CARDIOLOGY | Facility: HOSPITAL | Age: 89
Discharge: HOME | End: 2024-04-15
Payer: MEDICARE

## 2024-04-15 ENCOUNTER — HOSPITAL ENCOUNTER (OUTPATIENT)
Dept: CARDIOLOGY | Facility: HOSPITAL | Age: 89
Discharge: HOME | End: 2024-04-15
Attending: INTERNAL MEDICINE
Payer: MEDICARE

## 2024-04-15 ENCOUNTER — OFFICE VISIT (OUTPATIENT)
Dept: CARDIOLOGY | Facility: CLINIC | Age: 89
End: 2024-04-15
Payer: MEDICARE

## 2024-04-15 VITALS
SYSTOLIC BLOOD PRESSURE: 124 MMHG | WEIGHT: 153 LBS | BODY MASS INDEX: 21.9 KG/M2 | HEIGHT: 70 IN | HEART RATE: 77 BPM | OXYGEN SATURATION: 96 % | DIASTOLIC BLOOD PRESSURE: 76 MMHG

## 2024-04-15 DIAGNOSIS — I10 ESSENTIAL HYPERTENSION: ICD-10-CM

## 2024-04-15 DIAGNOSIS — E78.2 MIXED HYPERLIPIDEMIA: ICD-10-CM

## 2024-04-15 DIAGNOSIS — Z95.0 CARDIAC PACEMAKER IN SITU: ICD-10-CM

## 2024-04-15 DIAGNOSIS — I45.5 SINUS PAUSE: ICD-10-CM

## 2024-04-15 DIAGNOSIS — I73.9 CLAUDICATION OF BOTH LOWER EXTREMITIES: Primary | ICD-10-CM

## 2024-04-15 DIAGNOSIS — I48.0 PAROXYSMAL A-FIB: ICD-10-CM

## 2024-04-15 DIAGNOSIS — I07.1 TRICUSPID VALVE INSUFFICIENCY, UNSPECIFIED ETIOLOGY: ICD-10-CM

## 2024-04-15 DIAGNOSIS — I49.5 TACHY-BRADY SYNDROME: ICD-10-CM

## 2024-04-15 DIAGNOSIS — R00.1 SINUS BRADYCARDIA: ICD-10-CM

## 2024-04-15 DIAGNOSIS — Z78.9 STATIN INTOLERANCE: ICD-10-CM

## 2024-04-15 LAB
OHS QRS DURATION: 82 MS
OHS QTC CALCULATION: 445 MS

## 2024-04-15 PROCEDURE — 93010 ELECTROCARDIOGRAM REPORT: CPT | Mod: ,,, | Performed by: INTERNAL MEDICINE

## 2024-04-15 PROCEDURE — 1101F PT FALLS ASSESS-DOCD LE1/YR: CPT | Mod: CPTII,S$GLB,, | Performed by: NURSE PRACTITIONER

## 2024-04-15 PROCEDURE — 1125F AMNT PAIN NOTED PAIN PRSNT: CPT | Mod: CPTII,S$GLB,, | Performed by: NURSE PRACTITIONER

## 2024-04-15 PROCEDURE — 1159F MED LIST DOCD IN RCRD: CPT | Mod: CPTII,S$GLB,, | Performed by: NURSE PRACTITIONER

## 2024-04-15 PROCEDURE — 99214 OFFICE O/P EST MOD 30 MIN: CPT | Mod: 25,S$GLB,, | Performed by: NURSE PRACTITIONER

## 2024-04-15 PROCEDURE — 3288F FALL RISK ASSESSMENT DOCD: CPT | Mod: CPTII,S$GLB,, | Performed by: NURSE PRACTITIONER

## 2024-04-15 PROCEDURE — 93005 ELECTROCARDIOGRAM TRACING: CPT

## 2024-04-15 PROCEDURE — 99999 PR PBB SHADOW E&M-EST. PATIENT-LVL III: CPT | Mod: PBBFAC,,, | Performed by: NURSE PRACTITIONER

## 2024-04-15 RX ORDER — TRIAMCINOLONE ACETONIDE 1 MG/G
CREAM TOPICAL 2 TIMES DAILY PRN
COMMUNITY
Start: 2024-01-25

## 2024-04-15 RX ORDER — MULTIVIT WITH MINERALS/HERBS
1 TABLET ORAL DAILY
COMMUNITY

## 2024-04-15 NOTE — PROGRESS NOTES
Subjective:   Patient ID:  Dominguez Ritchie is a 89 y.o. male who presents for evaluation of No chief complaint on file.      HPI    Dominguez Ritchie is a 89 year old male who presents to Arrhythmia clinic for annual follow up and device check.    His current medical conditions include HTN, HLP, PAF on eliquis, s/p PPM, prediabetes.    Denies any cardiac complaints recently at home.     Denies chest pain or anginal equivalents. No shortness of breath, FOSTER or palpitations. Denies orthopnea, PND or abdominal bloating. Reports regular walking without any issues lately. NO leg swelling or claudications. No recent falls, syncope or near syncopal events. Reports compliance with medications and dietary restrictions. NO CNS complaints to suggest TIA or CVA today. No signs of abnormal bleeding on Eliquis.     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       Past Surgical History:   Procedure Laterality Date    A-V CARDIAC PACEMAKER INSERTION Left 11/1/2022    Procedure: INSERTION, CARDIAC PACEMAKER, DUAL CHAMBER;  Surgeon: Finn Mccrary MD;  Location: [facility] CATH LAB;  Service: Cardiology;  Laterality: Left;  biotronik AAIR    APPENDECTOMY      BACK SURGERY Bilateral 2016    CATARACT EXTRACTION, BILATERAL      COLONOSCOPY  2/2009    EYE SURGERY      INJECTION OF ANESTHETIC AGENT AROUND NERVE Right 11/18/2022    Procedure: Right Genicular nerve block w/Light  RN IV Sedation;  Surgeon: Benitez Roberson MD;  Location: [facility] PAIN MGT;  Service: Pain Management;  Laterality: Right;    INSERTION OF PERMANENT PACEMAKER Left 2022    Procedure: Implant PPM;  Surgeon: Finn Mccrary MD;  Location: [facility] CATH LAB;  Service: Cardiology;  Laterality: Left;    JOINT REPLACEMENT      left knee    LUMBAR PARAVERTEBRAL FACET JOINT NERVE BLOCK Bilateral 2019    Procedure: LMBB L3,4,5;  Surgeon: Nabor Sadler MD;  Location: [facility] PAIN MGT;  Service: Pain Management;  Laterality: Bilateral;    SELECTIVE INJECTION OF ANESTHETIC AGENT AROUND LUMBAR SPINAL NERVE ROOT BY TRANSFORAMINAL APPROACH Bilateral 2021    Procedure: Bilateral L4/5 +/- L5/S1 TF DREW;  Surgeon: Nabor Sadler MD;  Location: [facility] PAIN MGT;  Service: Pain Management;  Laterality: Bilateral;    SELECTIVE INJECTION OF ANESTHETIC AGENT AROUND LUMBAR SPINAL NERVE ROOT BY TRANSFORAMINAL APPROACH Bilateral 2022    Procedure: Bilateral L4/5 + L5/S1 TF DREW;  Surgeon: Nabor Sadler MD;  Location: [facility] PAIN MGT;  Service: Pain Management;  Laterality: Bilateral;    SELECTIVE INJECTION OF ANESTHETIC AGENT AROUND LUMBAR SPINAL NERVE ROOT BY TRANSFORAMINAL APPROACH Right 2022    Procedure: Right-sided L4/5 and L5/S1 transforaminal epidural steroid injection with RN IV sedation;  Surgeon: Benitez Roberson MD;  Location: [facility] PAIN MGT;  Service: Pain Management;  Laterality: Right;    SPINE SURGERY      UPPER GASTROINTESTINAL ENDOSCOPY  2009       Social History     Tobacco Use    Smoking status: Former     Current packs/day: 0.00     Average packs/day: 1 pack/day for 25.0 years (25.0 ttl pk-yrs)     Types: Cigarettes     Start date: 1965     Quit date: 1990     Years since quittin.3    Smokeless tobacco: Never   Substance Use Topics    Alcohol use: No     Alcohol/week: 0.0 standard drinks of alcohol    Drug use: No       Family History   Problem Relation Name Age of Onset    Heart disease Mother       Cancer Son          pancreatic     Diabetes Maternal Uncle      Kidney disease Neg Hx      Stroke Neg Hx         Wt Readings from Last 3 Encounters:   04/15/24 69.4 kg (153 lb)   02/28/24 69.6 kg (153 lb 7 oz)   01/09/24 70.4 kg (155 lb 3.3 oz)     Temp Readings from Last 3 Encounters:   07/20/23 97.1 °F (36.2 °C)   06/21/23 97.7 °F (36.5 °C)   06/17/23 98.4 °F (36.9 °C)     BP Readings from Last 3 Encounters:   04/15/24 124/76   02/28/24 138/72   01/09/24 118/80     Pulse Readings from Last 3 Encounters:   04/15/24 77   02/28/24 70   01/09/24 78       Current Outpatient Medications on File Prior to Visit   Medication Sig Dispense Refill    apixaban (ELIQUIS) 5 mg Tab Take 1 tablet (5 mg total) by mouth 2 (two) times daily. 60 tablet 11    b complex vitamins tablet Take 1 tablet by mouth once daily.      bisacodyL (DULCOLAX) 5 mg EC tablet Take 5 mg by mouth daily as needed for Constipation.      dutasteride (AVODART) 0.5 mg capsule Take 1 capsule by mouth once daily 90 capsule 3    ferrous gluconate (FERGON) 324 MG tablet Take 1 tablet (324 mg total) by mouth 2 (two) times daily with meals. 60 tablet 0    fluocinolone acetonide oiL 0.01 % Drop Place 5 drops in ear(s) 2 (two) times a day. 20 mL 0    furosemide (LASIX) 20 MG tablet Take 1 tablet (20 mg total) by mouth once daily. 90 tablet 2    meclizine (ANTIVERT) 12.5 mg tablet Take 1 tablet (12.5 mg total) by mouth 3 (three) times daily as needed. 30 tablet 2    mupirocin (BACTROBAN) 2 % ointment Apply topically 2 (two) times daily.      pantoprazole (PROTONIX) 40 MG tablet Take 1 tablet by mouth once daily 90 tablet 3    rosuvastatin (CRESTOR) 5 MG tablet Take 1 tablet by mouth once daily 90 tablet 0    triamcinolone acetonide 0.1% (KENALOG) 0.1 % cream Apply topically 2 (two) times daily as needed.      potassium chloride SA (K-DUR,KLOR-CON) 20 MEQ tablet Take 1 tablet (20 mEq total) by mouth once daily. (Patient not taking: Reported on 4/15/2024) 30 tablet 2     rOPINIRole (REQUIP) 1 MG tablet Take 1 tablet (1 mg total) by mouth every evening. (Patient not taking: Reported on 4/15/2024) 90 tablet 3    [DISCONTINUED] cephALEXin (KEFLEX) 500 MG capsule Take 1 capsule (500 mg total) by mouth every 8 (eight) hours. (Patient not taking: Reported on 2/28/2024) 21 capsule 0    [DISCONTINUED] methylPREDNISolone (MEDROL DOSEPACK) 4 mg tablet use as directed (Patient not taking: Reported on 2/28/2024) 1 each 0     No current facility-administered medications on file prior to visit.       No cardiac monitor results found for the past 12 months    Results for orders placed during the hospital encounter of 08/07/23    Echo    Interpretation Summary    Atrial fibrillation seen.    Left Ventricle: The left ventricle is normal in size. Normal wall thickness. There is concentric remodeling. Normal wall motion. There is normal systolic function. Ejection fraction by visual approximation is 65%. There is normal diastolic function.    Right Ventricle: Normal right ventricular cavity size. Wall thickness is normal. Right ventricle wall motion  is normal. Systolic function is normal.    Aortic Valve: The aortic valve is a trileaflet valve. There is mild aortic valve sclerosis. There is no stenosis. Aortic valve area by VTI is 2.28 cm². Aortic valve peak velocity is 1.31 m/s. Mean gradient is 4 mmHg. The dimensionless index is 0.77. There is no significant regurgitation.    Mitral Valve: There is no stenosis. There is mild to moderate regurgitation.    Tricuspid Valve: There is mild to moderate regurgitation.    Pulmonary Artery: The estimated pulmonary artery systolic pressure is 36 mmHg.    IVC/SVC: Normal venous pressure at 3 mmHg.    No results found for this or any previous visit.        Results for orders placed or performed during the hospital encounter of 04/15/24   EKG 12-lead    Collection Time: 04/15/24 12:59 PM   Result Value Ref Range    QRS Duration 82 ms    [facility] QTC Calculation 445  "ms    Narrative    Test Reason : I10,    Vent. Rate : 089 BPM     Atrial Rate : 089 BPM     P-R Int : 210 ms          QRS Dur : 082 ms      QT Int : 366 ms       P-R-T Axes : 000 -10 040 degrees     QTc Int : 445 ms    Atrial-paced rhythm with prolonged AV conduction with Premature atrial  complexes  Cannot rule out Anterior infarct ,age undetermined  Abnormal ECG  When compared with ECG of 14-JUN-2023 18:08,  Premature atrial complexes are now Present    Referred By: NOLBERTO NEELY           Confirmed By:          Review of Systems   Constitutional: Positive for malaise/fatigue.   HENT:  Negative for hearing loss and hoarse voice.    Eyes:  Negative for blurred vision and visual disturbance.   Cardiovascular:  Positive for claudication. Negative for chest pain, dyspnea on exertion, irregular heartbeat, leg swelling, near-syncope, orthopnea, palpitations, paroxysmal nocturnal dyspnea and syncope.   Respiratory:  Negative for cough, hemoptysis, shortness of breath, sleep disturbances due to breathing, snoring and wheezing.    Endocrine: Negative for cold intolerance and heat intolerance.   Hematologic/Lymphatic: Does not bruise/bleed easily.   Skin:  Negative for color change, dry skin and nail changes.   Musculoskeletal:  Positive for arthritis and back pain. Negative for joint pain and myalgias.   Gastrointestinal:  Negative for bloating, abdominal pain, constipation, nausea and vomiting.   Genitourinary:  Negative for dysuria, flank pain, hematuria and hesitancy.   Neurological:  Negative for headaches, light-headedness, loss of balance, numbness, paresthesias and weakness.   Psychiatric/Behavioral:  Negative for altered mental status.    Allergic/Immunologic: Negative for environmental allergies.         Objective:/76 (BP Location: Left arm, Patient Position: Sitting, BP Method: Small (Manual))   Pulse 77   Ht 5' 10" (1.778 m)   Wt 69.4 kg (153 lb)   SpO2 96%   BMI 21.95 kg/m²      Physical Exam  Vitals " and nursing note reviewed.   Constitutional:       General: He is not in acute distress.     Appearance: Normal appearance. He is well-developed. He is not ill-appearing.   HENT:      Head: Normocephalic and atraumatic.      Nose: Nose normal.      Mouth/Throat:      Mouth: Mucous membranes are moist.   Eyes:      Pupils: Pupils are equal, round, and reactive to light.   Neck:      Thyroid: No thyromegaly.      Vascular: No JVD.      Trachea: No tracheal deviation.   Cardiovascular:      Rate and Rhythm: Normal rate and regular rhythm.      Chest Wall: PMI is not displaced.      Pulses: Intact distal pulses.           Radial pulses are 2+ on the right side and 2+ on the left side.        Dorsalis pedis pulses are 2+ on the right side and 2+ on the left side.      Heart sounds: S1 normal and S2 normal. Heart sounds not distant. No murmur heard.  Pulmonary:      Effort: Pulmonary effort is normal. No respiratory distress.      Breath sounds: Normal breath sounds. No wheezing.   Abdominal:      General: Bowel sounds are normal. There is no distension.      Palpations: Abdomen is soft.      Tenderness: There is no abdominal tenderness.   Musculoskeletal:         General: No swelling. Normal range of motion.      Cervical back: Full passive range of motion without pain, normal range of motion and neck supple.      Right lower leg: No edema.      Left lower leg: No edema.      Right ankle: No swelling.      Left ankle: No swelling.   Skin:     General: Skin is warm and dry.      Capillary Refill: Capillary refill takes less than 2 seconds.      Nails: There is no clubbing.   Neurological:      General: No focal deficit present.      Mental Status: He is alert and oriented to person, place, and time.      Motor: Weakness present.   Psychiatric:         Speech: Speech normal.         Behavior: Behavior normal.         Thought Content: Thought content normal.         Judgment: Judgment normal.         Lab Results   Component  Value Date    CHOL 139 03/16/2023    CHOL 151 07/05/2022    CHOL 143 01/03/2022     Lab Results   Component Value Date    HDL 61 03/16/2023    HDL 58 07/05/2022    HDL 51 01/03/2022     Lab Results   Component Value Date    LDLCALC 38.4 (L) 03/16/2023    LDLCALC 54.6 (L) 07/05/2022    LDLCALC 57.8 (L) 01/03/2022     Lab Results   Component Value Date    TRIG 198 (H) 03/16/2023    TRIG 192 (H) 07/05/2022    TRIG 171 (H) 01/03/2022     Lab Results   Component Value Date    CHOLHDL 43.9 03/16/2023    CHOLHDL 38.4 07/05/2022    CHOLHDL 35.7 01/03/2022       Chemistry        Component Value Date/Time     07/21/2023 0823    K 4.0 07/21/2023 0823     07/21/2023 0823    CO2 25 07/21/2023 0823    BUN 9 07/21/2023 0823    CREATININE 0.8 07/21/2023 0823     07/21/2023 0823        Component Value Date/Time    CALCIUM 9.0 07/21/2023 0823    ALKPHOS 75 07/21/2023 0823    AST 21 07/21/2023 0823    ALT 13 07/21/2023 0823    BILITOT 0.4 07/21/2023 0823    ESTGFRAFRICA 88 04/26/2023 1915    ESTGFRAFRICA >60.0 07/05/2022 1444    EGFRNONAA >60.0 07/05/2022 1444          Lab Results   Component Value Date    TSH 1.733 10/29/2022     Lab Results   Component Value Date    INR 1.0 06/14/2023    INR 1.0 10/29/2022    INR 1.0 10/19/2020     Lab Results   Component Value Date    WBC 5.93 12/07/2023    HGB 14.5 12/07/2023    HCT 43.7 12/07/2023    MCV 95 12/07/2023     12/07/2023          Assessment:      1. Claudication of both lower extremities    2. Essential hypertension    3. Cardiac pacemaker in situ    4. Sinus pause    5. Tricuspid valve insufficiency, unspecified etiology    6. Mixed hyperlipidemia    7. Statin intolerance    8. Paroxysmal A-fib        Plan:     EXercise JEWEL and Arterial U/S due to claudication and discoloration to BLE    Continue Eliquis for cardio-embolic protection  Continue Lasix daily    Dash diet 2 gm sodium restriction  Profile BP at home    RTC in 1 year with device check.     Mireille  May, FNP-C [facility]

## 2024-04-21 LAB
OHS CV DC REMOTE DEVICE TYPE: NORMAL
OHS CV DC REMOTE DEVICE TYPE: NORMAL
OHS CV RV PACING PERCENT: 0 %
OHS CV RV PACING PERCENT: 67 %

## 2024-04-24 ENCOUNTER — HOSPITAL ENCOUNTER (OUTPATIENT)
Dept: CARDIOLOGY | Facility: HOSPITAL | Age: 89
Discharge: HOME | End: 2024-04-24
Attending: NURSE PRACTITIONER
Payer: MEDICARE

## 2024-04-24 VITALS
BODY MASS INDEX: 21.9 KG/M2 | DIASTOLIC BLOOD PRESSURE: 70 MMHG | SYSTOLIC BLOOD PRESSURE: 128 MMHG | WEIGHT: 153 LBS | HEIGHT: 70 IN | DIASTOLIC BLOOD PRESSURE: 76 MMHG | BODY MASS INDEX: 21.9 KG/M2 | WEIGHT: 153 LBS | HEIGHT: 70 IN | SYSTOLIC BLOOD PRESSURE: 128 MMHG

## 2024-04-24 DIAGNOSIS — I73.9 CLAUDICATION OF BOTH LOWER EXTREMITIES: ICD-10-CM

## 2024-04-24 LAB
LEFT ABI: 1.1
LEFT ANT TIBIAL SYS PSV: 33 CM/S
LEFT ARM BP: 127 MMHG
LEFT CFA PSV: 85 CM/S
LEFT DORSALIS PEDIS: 134 MMHG
LEFT PERONEAL SYS PSV: 55 CM/S
LEFT POPLITEAL PSV: 51 CM/S
LEFT POST TIBIAL SYS PSV: 75 CM/S
LEFT POSTERIOR TIBIAL: 141 MMHG
LEFT PROFUNDA SYS PSV: 68 CM/S
LEFT SUPER FEMORAL DIST SYS PSV: 159 CM/S
LEFT SUPER FEMORAL MID SYS PSV: 95 CM/S
LEFT SUPER FEMORAL OSTIAL SYS PSV: 79 CM/S
LEFT SUPER FEMORAL PROX SYS PSV: 81 CM/S
LEFT TBI: 0.57
LEFT TIB/PER TRUNK SYS PSV: 113 CM/S
LEFT TOE PRESSURE: 73 MMHG
OHS CV LEFT LOWER EXTREMITY ABI (NO CALC): 1.1
OHS CV RIGHT ABI LOWER EXTREMITY (NO CALC): 1.13
RIGHT ABI: 1.13
RIGHT ANT TIBIAL SYS PSV: 22 CM/S
RIGHT ARM BP: 128 MMHG
RIGHT CFA PSV: 99 CM/S
RIGHT DORSALIS PEDIS: 134 MMHG
RIGHT PERONEAL SYS PSV: 80 CM/S
RIGHT POPLITEAL PSV: 50 CM/S
RIGHT POST TIBIAL SYS PSV: 70 CM/S
RIGHT POSTERIOR TIBIAL: 145 MMHG
RIGHT PROFUNDA SYS PSV: 67 CM/S
RIGHT SUPER FEMORAL DIST SYS PSV: 170 CM/S
RIGHT SUPER FEMORAL MID SYS PSV: 78 CM/S
RIGHT SUPER FEMORAL OSTIAL SYS PSV: 57 CM/S
RIGHT SUPER FEMORAL PROX SYS PSV: 74 CM/S
RIGHT TBI: 0.98
RIGHT TIB/PER TRUNK SYS PSV: 52 CM/S
RIGHT TOE PRESSURE: 126 MMHG

## 2024-04-24 PROCEDURE — 93925 LOWER EXTREMITY STUDY: CPT

## 2024-04-24 PROCEDURE — 93922 UPR/L XTREMITY ART 2 LEVELS: CPT | Mod: 26,,, | Performed by: INTERNAL MEDICINE

## 2024-04-24 PROCEDURE — 93925 LOWER EXTREMITY STUDY: CPT | Mod: 26,,, | Performed by: INTERNAL MEDICINE

## 2024-04-24 PROCEDURE — 93922 UPR/L XTREMITY ART 2 LEVELS: CPT

## 2024-04-25 ENCOUNTER — TELEPHONE (OUTPATIENT)
Dept: CARDIOLOGY | Facility: CLINIC | Age: 89
End: 2024-04-25
Payer: MEDICARE

## 2024-04-25 NOTE — TELEPHONE ENCOUNTER
Mireille Suarez, NATALIEP-C  [facility] Staff  Bilateral normal JEWEL with normal waveforms  No impairment in blood flow noted in bilateral legs    Thanks    Spoke to patient verbalized understanding

## 2024-05-13 ENCOUNTER — OFFICE VISIT (OUTPATIENT)
Dept: FAMILY MEDICINE | Facility: CLINIC | Age: 89
End: 2024-05-13
Payer: MEDICARE

## 2024-05-13 ENCOUNTER — LAB VISIT (OUTPATIENT)
Dept: LAB | Facility: HOSPITAL | Age: 89
End: 2024-05-13
Attending: FAMILY MEDICINE
Payer: MEDICARE

## 2024-05-13 VITALS
OXYGEN SATURATION: 95 % | RESPIRATION RATE: 18 BRPM | BODY MASS INDEX: 21.2 KG/M2 | HEART RATE: 95 BPM | SYSTOLIC BLOOD PRESSURE: 103 MMHG | DIASTOLIC BLOOD PRESSURE: 75 MMHG | HEIGHT: 70 IN | WEIGHT: 148.06 LBS

## 2024-05-13 DIAGNOSIS — I48.0 PAF (PAROXYSMAL ATRIAL FIBRILLATION): Primary | ICD-10-CM

## 2024-05-13 DIAGNOSIS — I10 ESSENTIAL HYPERTENSION: ICD-10-CM

## 2024-05-13 DIAGNOSIS — I48.0 PAF (PAROXYSMAL ATRIAL FIBRILLATION): ICD-10-CM

## 2024-05-13 LAB
ALBUMIN SERPL BCP-MCNC: 3.8 G/DL (ref 3.5–5.2)
ALP SERPL-CCNC: 61 U/L (ref 55–135)
ALT SERPL W/O P-5'-P-CCNC: 10 U/L (ref 10–44)
ANION GAP SERPL CALC-SCNC: 9 MMOL/L (ref 8–16)
AST SERPL-CCNC: 18 U/L (ref 10–40)
BASOPHILS # BLD AUTO: 0.03 K/UL (ref 0–0.2)
BASOPHILS NFR BLD: 0.5 % (ref 0–1.9)
BILIRUB SERPL-MCNC: 0.6 MG/DL (ref 0.1–1)
BUN SERPL-MCNC: 14 MG/DL (ref 10–30)
CALCIUM SERPL-MCNC: 10.1 MG/DL (ref 8.7–10.5)
CHLORIDE SERPL-SCNC: 103 MMOL/L (ref 95–110)
CO2 SERPL-SCNC: 26 MMOL/L (ref 23–29)
CREAT SERPL-MCNC: 0.9 MG/DL (ref 0.5–1.4)
DIFFERENTIAL METHOD BLD: ABNORMAL
EOSINOPHIL # BLD AUTO: 0.1 K/UL (ref 0–0.5)
EOSINOPHIL NFR BLD: 1.4 % (ref 0–8)
ERYTHROCYTE [DISTWIDTH] IN BLOOD BY AUTOMATED COUNT: 12.3 % (ref 11.5–14.5)
EST. GFR  (NO RACE VARIABLE): >60 ML/MIN/1.73 M^2
GLUCOSE SERPL-MCNC: 103 MG/DL (ref 70–110)
HCT VFR BLD AUTO: 45.1 % (ref 40–54)
HGB BLD-MCNC: 14.8 G/DL (ref 14–18)
IMM GRANULOCYTES # BLD AUTO: 0.02 K/UL (ref 0–0.04)
IMM GRANULOCYTES NFR BLD AUTO: 0.3 % (ref 0–0.5)
LYMPHOCYTES # BLD AUTO: 1.2 K/UL (ref 1–4.8)
LYMPHOCYTES NFR BLD: 21 % (ref 18–48)
MCH RBC QN AUTO: 32 PG (ref 27–31)
MCHC RBC AUTO-ENTMCNC: 32.8 G/DL (ref 32–36)
MCV RBC AUTO: 98 FL (ref 82–98)
MONOCYTES # BLD AUTO: 0.7 K/UL (ref 0.3–1)
MONOCYTES NFR BLD: 11.5 % (ref 4–15)
NEUTROPHILS # BLD AUTO: 3.8 K/UL (ref 1.8–7.7)
NEUTROPHILS NFR BLD: 65.3 % (ref 38–73)
NRBC BLD-RTO: 0 /100 WBC
PLATELET # BLD AUTO: 186 K/UL (ref 150–450)
PMV BLD AUTO: 10.5 FL (ref 9.2–12.9)
POTASSIUM SERPL-SCNC: 4.6 MMOL/L (ref 3.5–5.1)
PROT SERPL-MCNC: 6.5 G/DL (ref 6–8.4)
RBC # BLD AUTO: 4.62 M/UL (ref 4.6–6.2)
SODIUM SERPL-SCNC: 138 MMOL/L (ref 136–145)
WBC # BLD AUTO: 5.81 K/UL (ref 3.9–12.7)

## 2024-05-13 PROCEDURE — 1159F MED LIST DOCD IN RCRD: CPT | Mod: HCNC,CPTII,S$GLB, | Performed by: FAMILY MEDICINE

## 2024-05-13 PROCEDURE — 80053 COMPREHEN METABOLIC PANEL: CPT | Mod: HCNC | Performed by: FAMILY MEDICINE

## 2024-05-13 PROCEDURE — 1125F AMNT PAIN NOTED PAIN PRSNT: CPT | Mod: HCNC,CPTII,S$GLB, | Performed by: FAMILY MEDICINE

## 2024-05-13 PROCEDURE — 1101F PT FALLS ASSESS-DOCD LE1/YR: CPT | Mod: HCNC,CPTII,S$GLB, | Performed by: FAMILY MEDICINE

## 2024-05-13 PROCEDURE — 36415 COLL VENOUS BLD VENIPUNCTURE: CPT | Mod: HCNC,PO | Performed by: FAMILY MEDICINE

## 2024-05-13 PROCEDURE — 85025 COMPLETE CBC W/AUTO DIFF WBC: CPT | Mod: HCNC | Performed by: FAMILY MEDICINE

## 2024-05-13 PROCEDURE — 99999 PR PBB SHADOW E&M-EST. PATIENT-LVL III: CPT | Mod: PBBFAC,HCNC,, | Performed by: FAMILY MEDICINE

## 2024-05-13 PROCEDURE — 3288F FALL RISK ASSESSMENT DOCD: CPT | Mod: HCNC,CPTII,S$GLB, | Performed by: FAMILY MEDICINE

## 2024-05-13 PROCEDURE — 99214 OFFICE O/P EST MOD 30 MIN: CPT | Mod: HCNC,S$GLB,, | Performed by: FAMILY MEDICINE

## 2024-05-13 NOTE — PROGRESS NOTES
Chief Complaint:    Chief Complaint   Patient presents with    Shortness of Breath       Having   History of Present Illness:    Patient with Paroxysmal A-fib, HTN, HLD, and GERD presents today for SOB,    Started to feel SOB 1 day ago, notes being in Afib one month ago. The sxs are intermittent and last for a few minutes, denies CP. Says when he able to belch he catches his breath back. He is currently on Eliquis    Blood pressure a little on low side today, he says at home it was in 120's this morning.       ROS:  Review of Systems   Constitutional:  Negative for appetite change, chills and fever.   HENT:  Negative for congestion, ear pain, postnasal drip, rhinorrhea, sinus pressure and sinus pain.    Eyes:  Negative for pain.   Respiratory:  Positive for shortness of breath. Negative for cough and chest tightness.    Cardiovascular:  Negative for chest pain and palpitations.   Gastrointestinal:  Negative for abdominal pain, blood in stool, constipation, diarrhea and nausea.   Genitourinary:  Negative for difficulty urinating, dysuria, flank pain and hematuria.   Musculoskeletal:  Negative for arthralgias and myalgias.   Skin:  Negative for pallor and wound.   Neurological:  Negative for dizziness, tremors, speech difficulty, light-headedness and headaches.   Psychiatric/Behavioral:  Negative for behavioral problems, dysphoric mood and sleep disturbance. The patient is not nervous/anxious.    All other systems reviewed and are negative.      Past Medical History:   Diagnosis Date    Abnormal exercise tolerance test 7/25/2013    Acute respiratory failure with hypoxia 6/15/2023    Arthritis     Colon polyp     Diverticulosis     Dyslipidemia 7/25/2013    GERD (gastroesophageal reflux disease)     HTN, goal below 130/80 12/3/2018    Hyperlipidemia 1980    HIGH TG    Knee pain, right 6/14/2013    Low back pain with right-sided sciatica 12/3/2018    Meningioma     Paroxysmal A-fib 10/29/2022    Personal history of  colonic polyps 2014    RLS (restless legs syndrome) 2013    Tobacco dependence     resolved    West Nile meningitis     per patient       Social History:  Social History     Socioeconomic History    Marital status:    Tobacco Use    Smoking status: Former     Current packs/day: 0.00     Average packs/day: 1 pack/day for 25.0 years (25.0 ttl pk-yrs)     Types: Cigarettes     Start date: 1965     Quit date: 1990     Years since quittin.3    Smokeless tobacco: Never   Substance and Sexual Activity    Alcohol use: No     Alcohol/week: 0.0 standard drinks of alcohol    Drug use: No    Sexual activity: Not Currently     Birth control/protection: None     Social Determinants of Health     Financial Resource Strain: Low Risk  (2024)    Overall Financial Resource Strain (CARDIA)     Difficulty of Paying Living Expenses: Not hard at all   Food Insecurity: No Food Insecurity (2024)    Hunger Vital Sign     Worried About Running Out of Food in the Last Year: Never true     Ran Out of Food in the Last Year: Never true   Transportation Needs: No Transportation Needs (2024)    PRAPARE - Transportation     Lack of Transportation (Medical): No     Lack of Transportation (Non-Medical): No   Physical Activity: Unknown (2024)    Exercise Vital Sign     Days of Exercise per Week: Patient declined   Stress: No Stress Concern Present (4/10/2023)    [employer] [place] [employer] - Occupational Stress Questionnaire     Feeling of Stress : Not at all   Housing Stability: Low Risk  (4/10/2023)    Housing Stability Vital Sign     Unable to Pay for Housing in the Last Year: No     Number of Places Lived in the Last Year: 1     Unstable Housing in the Last Year: No       Family History:   family history includes Cancer in his son; Diabetes in his maternal uncle; Heart disease in his mother.    Health Maintenance   Topic Date Due    Shingles Vaccine (1 of 2) Never done    Lipid  "Panel  03/16/2024    TETANUS VACCINE  11/16/2025       Physical Exam:    Vital Signs  Pulse: 95  Resp: 18  SpO2: 95 %  BP: 103/75  Pain Score:   6  Pain Loc: Generalized  Height and Weight  Height: 5' 10" (177.8 cm)  Weight: 67.1 kg (148 lb 0.6 oz)  BSA (Calculated - sq m): 1.82 sq meters  BMI (Calculated): 21.2  Weight in (lb) to have BMI = 25: 173.9]    Body mass index is 21.24 kg/m².    Physical Exam  Constitutional:       Appearance: Normal appearance.   HENT:      Head: Normocephalic and atraumatic.      Right Ear: Tympanic membrane normal.      Left Ear: Tympanic membrane normal.   Eyes:      Extraocular Movements: Extraocular movements intact.      Pupils: Pupils are equal, round, and reactive to light.   Cardiovascular:      Rate and Rhythm: Normal rate and regular rhythm.      Pulses: Normal pulses.      Heart sounds: Normal heart sounds. No murmur heard.     No gallop.   Pulmonary:      Effort: Pulmonary effort is normal. No respiratory distress.      Breath sounds: Normal breath sounds. No wheezing, rhonchi or rales.   Abdominal:      General: There is no distension.      Palpations: Abdomen is soft.      Tenderness: There is no abdominal tenderness.   Musculoskeletal:         General: No swelling, deformity or signs of injury. Normal range of motion.      Cervical back: Normal range of motion.   Skin:     General: Skin is warm and dry.      Capillary Refill: Capillary refill takes less than 2 seconds.      Coloration: Skin is not jaundiced or pale.   Neurological:      General: No focal deficit present.      Mental Status: He is alert and oriented to person, place, and time.   Psychiatric:         Mood and Affect: Mood normal.         Behavior: Behavior normal.           Lab Results   Component Value Date    CHOL 139 03/16/2023    CHOL 151 07/05/2022    CHOL 143 01/03/2022    TRIG 198 (H) 03/16/2023    TRIG 192 (H) 07/05/2022    TRIG 171 (H) 01/03/2022    HDL 61 03/16/2023    HDL 58 07/05/2022    HDL 51 " 01/03/2022    TOTALCHOLEST 2.3 03/16/2023    TOTALCHOLEST 2.6 07/05/2022    TOTALCHOLEST 2.8 01/03/2022    NONHDLCHOL 78 03/16/2023    NONHDLCHOL 93 07/05/2022    NONHDLCHOL 92 01/03/2022       Lab Results   Component Value Date    HGBA1C 5.5 03/16/2023       Assessment:      ICD-10-CM ICD-9-CM   1. PAF (paroxysmal atrial fibrillation)  I48.0 427.31   2. Essential hypertension  I10 401.9           Plan:  Discussed with pt associated risks for PAF and what would warrant a ER visit such as Chest pain, palpitations, SOB, weakness.   Stay active as tolerated and continue to use walker.   Continue to monitor blood pressure.  See labs below.      Orders Placed This Encounter   Procedures    Comprehensive Metabolic Panel    CBC Auto Differential         Current Outpatient Medications   Medication Sig Dispense Refill    apixaban (ELIQUIS) 5 mg Tab Take 1 tablet (5 mg total) by mouth 2 (two) times daily. 60 tablet 11    b complex vitamins tablet Take 1 tablet by mouth once daily.      fluocinolone acetonide oiL 0.01 % Drop Place 5 drops in ear(s) 2 (two) times a day. 20 mL 0    furosemide (LASIX) 20 MG tablet Take 1 tablet (20 mg total) by mouth once daily. 90 tablet 2    mupirocin (BACTROBAN) 2 % ointment Apply topically 2 (two) times daily.      pantoprazole (PROTONIX) 40 MG tablet Take 1 tablet by mouth once daily 90 tablet 3    rosuvastatin (CRESTOR) 5 MG tablet Take 1 tablet by mouth once daily 90 tablet 0    bisacodyL (DULCOLAX) 5 mg EC tablet Take 5 mg by mouth daily as needed for Constipation. (Patient not taking: Reported on 5/13/2024)      dutasteride (AVODART) 0.5 mg capsule Take 1 capsule by mouth once daily (Patient not taking: Reported on 5/13/2024) 90 capsule 3    ferrous gluconate (FERGON) 324 MG tablet Take 1 tablet (324 mg total) by mouth 2 (two) times daily with meals. (Patient not taking: Reported on 5/13/2024) 60 tablet 0    meclizine (ANTIVERT) 12.5 mg tablet Take 1 tablet (12.5 mg total) by mouth 3  (three) times daily as needed. (Patient not taking: Reported on 5/13/2024) 30 tablet 2    potassium chloride SA (K-DUR,KLOR-CON) 20 MEQ tablet Take 1 tablet (20 mEq total) by mouth once daily. (Patient not taking: Reported on 4/15/2024) 30 tablet 2    rOPINIRole (REQUIP) 1 MG tablet Take 1 tablet (1 mg total) by mouth every evening. (Patient not taking: Reported on 4/15/2024) 90 tablet 3    triamcinolone acetonide 0.1% (KENALOG) 0.1 % cream Apply topically 2 (two) times daily as needed. (Patient not taking: Reported on 5/13/2024)       No current facility-administered medications for this visit.       There are no discontinued medications.        No follow-ups on file.      Madie Davis MD  Scribe Attestation:   I, Irineo Vega, am scribing for, and in the presence of, Dr.Arif Davis I performed the above scribed service and the documentation accurately describes the services I performed. I attest to the accuracy of the note.    I, Dr. Madie Davis, reviewed documentation as scribed above. I performed the services described in this documentation.  I agree that the record reflects my personal performance and is accurate and complete. Madie Davis MD.  05/13/2024

## 2024-05-14 ENCOUNTER — PATIENT MESSAGE (OUTPATIENT)
Dept: FAMILY MEDICINE | Facility: CLINIC | Age: 89
End: 2024-05-14
Payer: MEDICARE

## 2024-06-07 ENCOUNTER — HOSPITAL ENCOUNTER (OUTPATIENT)
Dept: RADIOLOGY | Facility: HOSPITAL | Age: 89
Discharge: HOME | End: 2024-06-07
Attending: PHYSICAL MEDICINE & REHABILITATION
Payer: MEDICARE

## 2024-06-07 DIAGNOSIS — M54.17 RADICULOPATHY, LUMBOSACRAL REGION: ICD-10-CM

## 2024-06-07 PROCEDURE — 72131 CT LUMBAR SPINE W/O DYE: CPT | Mod: TC,HCNC

## 2024-06-07 PROCEDURE — 72131 CT LUMBAR SPINE W/O DYE: CPT | Mod: 26,HCNC,, | Performed by: RADIOLOGY

## 2024-06-09 ENCOUNTER — CLINICAL SUPPORT (OUTPATIENT)
Dept: CARDIOLOGY | Facility: HOSPITAL | Age: 89
End: 2024-06-09
Payer: MEDICARE

## 2024-06-09 ENCOUNTER — CLINICAL SUPPORT (OUTPATIENT)
Dept: CARDIOLOGY | Facility: HOSPITAL | Age: 89
End: 2024-06-09
Attending: INTERNAL MEDICINE
Payer: MEDICARE

## 2024-06-09 DIAGNOSIS — I48.0 PAROXYSMAL ATRIAL FIBRILLATION: ICD-10-CM

## 2024-06-09 DIAGNOSIS — I49.5 SICK SINUS SYNDROME: ICD-10-CM

## 2024-06-09 DIAGNOSIS — Z95.0 PRESENCE OF CARDIAC PACEMAKER: ICD-10-CM

## 2024-06-09 PROCEDURE — 93294 REM INTERROG EVL PM/LDLS PM: CPT | Mod: HCNC,S$GLB,, | Performed by: INTERNAL MEDICINE

## 2024-06-09 PROCEDURE — 93296 REM INTERROG EVL PM/IDS: CPT | Mod: HCNC | Performed by: INTERNAL MEDICINE

## 2024-06-19 LAB
OHS CV DC REMOTE DEVICE TYPE: NORMAL
OHS CV ICD SHOCK: NO
OHS CV RV PACING PERCENT: 50 %

## 2024-07-17 DIAGNOSIS — E78.2 MIXED HYPERLIPIDEMIA: ICD-10-CM

## 2024-07-17 RX ORDER — ROSUVASTATIN CALCIUM 5 MG/1
5 TABLET, COATED ORAL
Qty: 90 TABLET | Refills: 0 | Status: SHIPPED | OUTPATIENT
Start: 2024-07-17

## 2024-07-17 NOTE — TELEPHONE ENCOUNTER
Refill Routing Note   Medication(s) are not appropriate for processing by [facility] for the following reason(s):        Required labs outdated    [facility] action(s):  Defer     Requires labs : Yes             Appointments  past 12m or future 3m with PCP    Date Provider   Last Visit   5/13/2024 Madie Davis MD   Next Visit   Visit date not found Madie Davis MD   ED visits in past 90 days: 0        Note composed:4:10 PM 07/17/2024

## 2024-07-17 NOTE — TELEPHONE ENCOUNTER
Care Due:                  Date            Visit Type   Department     Provider  --------------------------------------------------------------------------------                                EP -                              PRIMARY      [facility]  Last Visit: 05-      CARE ([facility])   MEDICINE       Madie Davis  Next Visit: None Scheduled  None         None Found                                                            Last  Test          Frequency    Reason                     Performed    Due Date  --------------------------------------------------------------------------------    Lipid Panel.  12 months..  rosuvastatin.............  03- 03-    [facility] Embedded Care Due Messages. Reference number: [id number].   7/17/2024 2:45:41 PM CDT

## 2024-08-14 ENCOUNTER — OFFICE VISIT (OUTPATIENT)
Dept: CARDIOLOGY | Facility: CLINIC | Age: 89
End: 2024-08-14
Payer: MEDICARE

## 2024-08-14 VITALS
DIASTOLIC BLOOD PRESSURE: 79 MMHG | WEIGHT: 134.81 LBS | HEART RATE: 81 BPM | OXYGEN SATURATION: 96 % | SYSTOLIC BLOOD PRESSURE: 130 MMHG | BODY MASS INDEX: 19.34 KG/M2

## 2024-08-14 DIAGNOSIS — I07.1 TRICUSPID VALVE INSUFFICIENCY, UNSPECIFIED ETIOLOGY: ICD-10-CM

## 2024-08-14 DIAGNOSIS — I10 ESSENTIAL HYPERTENSION: ICD-10-CM

## 2024-08-14 DIAGNOSIS — E78.2 MIXED HYPERLIPIDEMIA: ICD-10-CM

## 2024-08-14 DIAGNOSIS — Z95.0 PRESENCE OF CARDIAC PACEMAKER: ICD-10-CM

## 2024-08-14 DIAGNOSIS — Z78.9 STATIN INTOLERANCE: ICD-10-CM

## 2024-08-14 DIAGNOSIS — I35.8 AORTIC VALVE SCLEROSIS: ICD-10-CM

## 2024-08-14 DIAGNOSIS — I49.5 SICK SINUS SYNDROME: Primary | ICD-10-CM

## 2024-08-14 PROCEDURE — 1159F MED LIST DOCD IN RCRD: CPT | Mod: HCNC,CPTII,S$GLB, | Performed by: INTERNAL MEDICINE

## 2024-08-14 PROCEDURE — 99214 OFFICE O/P EST MOD 30 MIN: CPT | Mod: HCNC,S$GLB,, | Performed by: INTERNAL MEDICINE

## 2024-08-14 PROCEDURE — 1101F PT FALLS ASSESS-DOCD LE1/YR: CPT | Mod: HCNC,CPTII,S$GLB, | Performed by: INTERNAL MEDICINE

## 2024-08-14 PROCEDURE — 99999 PR PBB SHADOW E&M-EST. PATIENT-LVL III: CPT | Mod: PBBFAC,HCNC,, | Performed by: INTERNAL MEDICINE

## 2024-08-14 PROCEDURE — 3288F FALL RISK ASSESSMENT DOCD: CPT | Mod: HCNC,CPTII,S$GLB, | Performed by: INTERNAL MEDICINE

## 2024-08-14 NOTE — PROGRESS NOTES
Subjective:   Patient ID:  Dominguez Ritchie is a 89 y.o. male who presents for evaluation of No chief complaint on file.        HPI  August 14, 2024.    Comes in for a six-month follow-up   Denies any complaints.  No falls.  No chest pain.  No dyspnea no palpitations syncope presyncope   No neuro symptoms of stroke.    No slurred speech , focal weakness or numbness, amaurosis, dizziness   No bleeding.    Compliant with medications.      1.9.2024  Comes in for a six-month follow-up.    He denies any chest pain, palpitations syncope or presyncope   Same issue with the Eliquis.  However no falls or bleeding.    His most recent echocardiogram he was in AFib during his study.    On his pacemaker interrogation he has been having atrial runs.  Unclear if AFib.      5.10.2023  Comes in for six-month follow-up.    He had his scalp lesion excised.  And a sentinel lymph node as well.    Denies any chest pain.  No syncope or presyncope.    No palpitations.    He is complaining of very easy bruising and he states that he has been taking half of the Eliquis dose.    No lower extremity swelling orthopnea or dyspnea.    States amlodipine was stopped due to low blood pressure by PCP.      11.2022  89-year-old male with past medical history below.    He received a atrial lead pacemaker last week after presenting to the hospital due to abnormal finding on his Holter monitor with long pauses during his conversion from rapid AFib to normal sinus.    He denies any more dizziness.    He denies any chest pain, dyspnea on exertion, palpitations.    He is in sinus rhythm today and paced in the atrium.    His wound looks healing well.  His recent interrogation was normal function.    He is following instructions and arm restrictions    Past Medical History:   Diagnosis Date    Abnormal exercise tolerance test 7/25/2013    Acute respiratory failure with hypoxia 6/15/2023    Arthritis     Colon polyp     Diverticulosis     Dyslipidemia 7/25/2013     GERD (gastroesophageal reflux disease)     HTN, goal below 130/80 12/3/2018    Hyperlipidemia 1980    HIGH TG    Knee pain, right 6/14/2013    Low back pain with right-sided sciatica 12/3/2018    Meningioma     Paroxysmal A-fib 10/29/2022    Personal history of colonic polyps 5/27/2014    RLS (restless legs syndrome) 6/14/2013    Tobacco dependence     resolved    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       Past Surgical History:   Procedure Laterality Date    A-V CARDIAC PACEMAKER INSERTION Left 11/1/2022    Procedure: INSERTION, CARDIAC PACEMAKER, DUAL CHAMBER;  Surgeon: Finn Mccrary MD;  Location: [facility] CATH LAB;  Service: Cardiology;  Laterality: Left;  biotronik AAIR    APPENDECTOMY      BACK SURGERY Bilateral 2016    CATARACT EXTRACTION, BILATERAL      COLONOSCOPY  2/2009    EYE SURGERY      INJECTION OF ANESTHETIC AGENT AROUND NERVE Right 11/18/2022    Procedure: Right Genicular nerve block w/Light RN IV Sedation;  Surgeon: Benitez Roberson MD;  Location: [facility] PAIN MGT;  Service: Pain Management;  Laterality: Right;    INSERTION OF PERMANENT PACEMAKER Left 11/1/2022    Procedure: Implant PPM;  Surgeon: Finn Mccrary MD;  Location: [facility] CATH LAB;  Service: Cardiology;  Laterality: Left;    JOINT REPLACEMENT      left knee    LUMBAR PARAVERTEBRAL FACET JOINT NERVE BLOCK Bilateral 8/29/2019    Procedure: LMBB L3,4,5;  Surgeon: Nabor Sadler MD;  Location: [facility] PAIN MGT;  Service: Pain Management;  Laterality: Bilateral;    SELECTIVE INJECTION OF ANESTHETIC AGENT AROUND LUMBAR SPINAL NERVE ROOT BY TRANSFORAMINAL APPROACH Bilateral 11/8/2021    Procedure: Bilateral L4/5 +/- L5/S1 TF DREW;  Surgeon: Nabor Sadler MD;  Location: [facility] PAIN MGT;  Service: Pain Management;  Laterality: Bilateral;    SELECTIVE INJECTION OF ANESTHETIC AGENT AROUND LUMBAR SPINAL NERVE ROOT BY TRANSFORAMINAL APPROACH Bilateral 1/4/2022    Procedure: Bilateral L4/5 + L5/S1 TF DREW;  Surgeon: Nabor Sadler MD;  Location: [facility]  PAIN MGT;  Service: Pain Management;  Laterality: Bilateral;    SELECTIVE INJECTION OF ANESTHETIC AGENT AROUND LUMBAR SPINAL NERVE ROOT BY TRANSFORAMINAL APPROACH Right 2022    Procedure: Right-sided L4/5 and L5/S1 transforaminal epidural steroid injection with RN IV sedation;  Surgeon: Benitez Roberson MD;  Location[facility] PAIN MGT;  Service: Pain Management;  Laterality: Right;    SPINE SURGERY      UPPER GASTROINTESTINAL ENDOSCOPY  2009       Social History     Tobacco Use    Smoking status: Former     Current packs/day: 0.00     Average packs/day: 1 pack/day for 25.0 years (25.0 ttl pk-yrs)     Types: Cigarettes     Start date: 1965     Quit date: 1990     Years since quittin.6    Smokeless tobacco: Never   Substance Use Topics    Alcohol use: No     Alcohol/week: 0.0 standard drinks of alcohol    Drug use: No       Family History   Problem Relation Name Age of Onset    Heart disease Mother      Cancer Son          pancreatic     Diabetes Maternal Uncle      Kidney disease Neg Hx      Stroke Neg Hx         Review of Systems   Cardiovascular:  Negative for chest pain, dyspnea on exertion, palpitations and syncope.   Genitourinary: Negative.    Neurological: Negative.        Current Outpatient Medications on File Prior to Visit   Medication Sig    apixaban (ELIQUIS) 5 mg Tab Take 1 tablet (5 mg total) by mouth 2 (two) times daily.    b complex vitamins tablet Take 1 tablet by mouth once daily.    dutasteride (AVODART) 0.5 mg capsule Take 1 capsule by mouth once daily    ferrous gluconate (FERGON) 324 MG tablet Take 1 tablet (324 mg total) by mouth 2 (two) times daily with meals.    fluocinolone acetonide oiL 0.01 % Drop Place 5 drops in ear(s) 2 (two) times a day.    furosemide (LASIX) 20 MG tablet Take 1 tablet (20 mg total) by mouth once daily.    mupirocin (BACTROBAN) 2 % ointment Apply topically 2 (two) times daily.    pantoprazole (PROTONIX) 40 MG tablet Take 1 tablet by mouth once daily     rosuvastatin (CRESTOR) 5 MG tablet Take 1 tablet by mouth once daily    triamcinolone acetonide 0.1% (KENALOG) 0.1 % cream Apply topically 2 (two) times daily as needed.    bisacodyL (DULCOLAX) 5 mg EC tablet Take 5 mg by mouth daily as needed for Constipation. (Patient not taking: Reported on 5/13/2024)    meclizine (ANTIVERT) 12.5 mg tablet Take 1 tablet (12.5 mg total) by mouth 3 (three) times daily as needed. (Patient not taking: Reported on 5/13/2024)    potassium chloride SA (K-DUR,KLOR-CON) 20 MEQ tablet Take 1 tablet (20 mEq total) by mouth once daily. (Patient not taking: Reported on 8/14/2024)    rOPINIRole (REQUIP) 1 MG tablet Take 1 tablet (1 mg total) by mouth every evening. (Patient not taking: Reported on 8/14/2024)     No current facility-administered medications on file prior to visit.       Objective:   Objective:  Wt Readings from Last 3 Encounters:   08/14/24 61.1 kg (134 lb 13 oz)   05/13/24 67.1 kg (148 lb 0.6 oz)   04/24/24 69.4 kg (153 lb)     Temp Readings from Last 3 Encounters:   07/20/23 97.1 °F (36.2 °C)   06/21/23 97.7 °F (36.5 °C)   06/17/23 98.4 °F (36.9 °C)     BP Readings from Last 3 Encounters:   08/14/24 130/79   05/13/24 103/75   04/24/24 128/76     Pulse Readings from Last 3 Encounters:   08/14/24 81   05/13/24 95   04/15/24 77       Physical Exam  Vitals reviewed.   Constitutional:       Appearance: He is well-developed.   Neck:      Vascular: No carotid bruit.   Cardiovascular:      Rate and Rhythm: Normal rate and regular rhythm.      Pulses: Intact distal pulses.      Heart sounds: Normal heart sounds. No murmur heard.  Pulmonary:      Breath sounds: Normal breath sounds.   Neurological:      Mental Status: He is oriented to person, place, and time.         Lab Results   Component Value Date    CHOL 139 03/16/2023    CHOL 151 07/05/2022    CHOL 143 01/03/2022     Lab Results   Component Value Date    HDL 61 03/16/2023    HDL 58 07/05/2022    HDL 51 01/03/2022     Lab Results    Component Value Date    LDLCALC 38.4 (L) 03/16/2023    LDLCALC 54.6 (L) 07/05/2022    LDLCALC 57.8 (L) 01/03/2022     Lab Results   Component Value Date    TRIG 198 (H) 03/16/2023    TRIG 192 (H) 07/05/2022    TRIG 171 (H) 01/03/2022     Lab Results   Component Value Date    CHOLHDL 43.9 03/16/2023    CHOLHDL 38.4 07/05/2022    CHOLHDL 35.7 01/03/2022       Chemistry        Component Value Date/Time     05/13/2024 0903    K 4.6 05/13/2024 0903     05/13/2024 0903    CO2 26 05/13/2024 0903    BUN 14 05/13/2024 0903    CREATININE 0.9 05/13/2024 0903     05/13/2024 0903        Component Value Date/Time    CALCIUM 10.1 05/13/2024 0903    ALKPHOS 61 05/13/2024 0903    AST 18 05/13/2024 0903    ALT 10 05/13/2024 0903    BILITOT 0.6 05/13/2024 0903    ESTGFRAFRICA 88 04/26/2023 1915    ESTGFRAFRICA >60.0 07/05/2022 1444    EGFRNONAA >60.0 07/05/2022 1444          Lab Results   Component Value Date    TSH 1.733 10/29/2022     Lab Results   Component Value Date    INR 1.0 06/14/2023    INR 1.0 10/29/2022    INR 1.0 10/19/2020     Lab Results   Component Value Date    WBC 5.81 05/13/2024    HGB 14.8 05/13/2024    HCT 45.1 05/13/2024    MCV 98 05/13/2024     05/13/2024     BNP  @LABRCNTIP(BNP,BNPTRIAGEBLO)@  CrCl cannot be calculated (Patient's most recent lab result is older than the maximum 7 days allowed.).     Imaging:  ======  Results for orders placed during the hospital encounter of 09/16/22    Echo    Interpretation Summary  · The left ventricle is normal in size with concentric remodeling and normal systolic function.  · Indeterminate left ventricular diastolic function.  · The estimated PA systolic pressure is 37 mmHg.  · Normal right ventricular size with normal right ventricular systolic function.  · Normal central venous pressure (3 mmHg).  · The estimated ejection fraction is 55%.  · Mild aortic regurgitation.  · Mild mitral regurgitation.  · Mild tricuspid regurgitation.    No results  found for this or any previous visit.    Results for orders placed in visit on 04/01/13    US Carotid Bilateral    Narrative  DATE OF EXAM: Apr 8 2013    BOC   0519  -  US EXTRACRANIAL COMPLETE BILAT:   \  [id number]    CLINICAL HISTORY:   \401.9  HYPERTENSION NOS    PROCEDURE COMMENT:   \    ICD 9 CODE(S):   (\)    CPT 4 CODE(S)/MODIFIER(S):   (\)    Comparison: none    Findings:    Real-time, color flow and Doppler imaging performed.    Minimal calcific plaque noted within the left bulb.    ICA                                    R  L  Peak systolic velocity:         124   Cm/sec               115   cm/sec  Peak diastolic velocity:        30   Cm/sec               33   cm/sec  Systolic velocity ratio:          1.3                               1.2  Diastolic velocity ratio:         1.4                               1.5      Vertebral artery flow:               Antegrade                antegrade    ECA flow:                                   Antegrade  antegrade    spectral waveforms appear within normal limits bilaterally.    Impression  1.  No hemodynamically significant plaque formation or stenosis identified.    2.  Further evaluation and/or follow-up imaging as clinically warranted.  A      Electronically signed by: FILEMON BRUNO III, MD  Date:     04/09/13  Time:    09:05        : LISA  Transcribe Date/Time: Apr 9 2013  9:05A  Dictated by : FILEMON BRUNO III, MD  Report reviewed by:  Read On:  \  Images were reviewed, findings were verified and document was  electronically  SIGNED BY: FILEMON BRUNO III, MD On: Apr 9 2013  9:05A    Results for orders placed during the hospital encounter of 01/16/17    X-Ray Chest PA And Lateral    Narrative  Comparison: 01/21/2011    2 views    Findings:    Heart size within normal limits.  Pulmonary vasculature is normal and symmetric.  Mild tortuosity of the aorta underlying atherosclerotic calcification.  Calcified AP window node again identified.   Trachea is normal in position along for slight rotation.  No consolidation or effusion.  Granuloma identified within the LEFT upper lobe.  Mild osteopenia and spondylosis with wedge-shaped compression deformity L1 compression deformity noted.  This was identified on L-spine series from 03/26/2015.  IMPRESSION:      1.  Stable exam without acute infiltrate.  See above.    2.  Known L1 compression deformity.      Electronically signed by: FILEMON BRUNO III, MD  Date:     01/16/17  Time:    16:34    No results found for this or any previous visit.    No valid procedures specified.    Diagnostic Results:  ECG: Reviewed    The ASCVD Risk score (Brennan ROBISON, et al., 2019) failed to calculate for the following reasons:    The 2019 ASCVD risk score is only valid for ages 40 to 79    Assessment and Plan:   Sick sinus syndrome    Presence of cardiac pacemaker    Essential hypertension    Tricuspid valve insufficiency, unspecified etiology    Mixed hyperlipidemia    Aortic valve sclerosis    Statin intolerance      Reviewed vascular study.  Normal JEWEL.  BP at goal for age less than 150/80.  Angina free.  Euvolemic.  Device function normal.    Follow with heme Onc.    Continue with Eliquis.  Weight more than 60 kg.  Normal kidney function.  No falls  Follow with device Clinic and EP as well.    Follow-up in 6 months.

## 2024-08-19 ENCOUNTER — OFFICE VISIT (OUTPATIENT)
Dept: FAMILY MEDICINE | Facility: CLINIC | Age: 89
End: 2024-08-19
Payer: MEDICARE

## 2024-08-19 VITALS
HEART RATE: 90 BPM | SYSTOLIC BLOOD PRESSURE: 110 MMHG | DIASTOLIC BLOOD PRESSURE: 70 MMHG | WEIGHT: 147.19 LBS | BODY MASS INDEX: 21.11 KG/M2 | OXYGEN SATURATION: 96 %

## 2024-08-19 DIAGNOSIS — I48.0 PAROXYSMAL A-FIB: Primary | ICD-10-CM

## 2024-08-19 DIAGNOSIS — E78.2 MIXED HYPERLIPIDEMIA: ICD-10-CM

## 2024-08-19 PROCEDURE — 1159F MED LIST DOCD IN RCRD: CPT | Mod: HCNC,CPTII,S$GLB, | Performed by: FAMILY MEDICINE

## 2024-08-19 PROCEDURE — 99999 PR PBB SHADOW E&M-EST. PATIENT-LVL IV: CPT | Mod: PBBFAC,HCNC,, | Performed by: FAMILY MEDICINE

## 2024-08-19 PROCEDURE — 1126F AMNT PAIN NOTED NONE PRSNT: CPT | Mod: HCNC,CPTII,S$GLB, | Performed by: FAMILY MEDICINE

## 2024-08-19 PROCEDURE — 99214 OFFICE O/P EST MOD 30 MIN: CPT | Mod: HCNC,S$GLB,, | Performed by: FAMILY MEDICINE

## 2024-08-19 NOTE — PROGRESS NOTES
Chief Complaint:  No chief complaint on file.      History of Present Illness:    History of Present Illness    Patient presents today for follow up. He recently visited his cardiologist. He experiences cold legs and arms, which the cardiologist attributed to his anticoagulant medication. He has atrial fibrillation and acknowledges the importance of staying on anticoagulants due to the risk of stroke if discontinued. His cardiologist has reduced his anticoagulant dosage from 5mg to 2.5mg twice daily to manage side effects. He expresses concern about these side effects and inquires about potentially changing his anticoagulant regimen, citing a friend's experience with switching to a different medication. He also reports significant bruising issues, stating that he bruises easily when bumping his hand. He has been advised that occasionally skipping a dose, a few times per week, might help manage side effects while maintaining the medication's effectiveness. He reports concerns about discrepancies in weight measurements. Different scales have shown varying weights, with one doctor's office recording 133 lbs, another 134 lbs, while his home bathroom scale and the current office scale show approximately 147-148 lbs. His daughter expressed worry about potential weight loss and urged him to seek medical evaluation. However, based on the current measurement of 147 lbs compared to 148 lbs in May, his weight appears stable. He acknowledges the importance of consistently using the same scale for accurate weight tracking. He reports experiencing memory issues. He jokingly mentions being prescribed medication for memory problems but states he often forgets to take it. He expresses cost concerns about his anticoagulant medication, noting that it is expensive, with 60 pills costing around $800. He reports paying $180-$190 per month for the medication towards the end of the year.    ROS:  General: denies fever, denies chills,  denies fatigue, denies weight gain, denies weight loss, denies loss of appetite  Eyes: denies vision changes, denies blurry vision, denies eye pain, denies eye discharge  ENT: denies ear pain, denies hearing loss, denies tinnitus, denies nasal congestion, denies sore throat  Cardiovascular: denies chest pain, denies palpitations, denies lower extremity edema  Respiratory: denies cough, denies shortness of breath, denies wheezing, denies sputum production  Endocrine: denies polyuria, denies polydipsia, denies heat intolerance, denies cold intolerance  Gastrointestinal: denies abdominal pain, denies heartburn, denies nausea, denies vomiting, denies diarrhea, denies constipation, denies blood in stool  Genitourinary: denies dysuria, denies urgency, denies frequency, denies hematuria, denies nocturia, denies incontinence  Heme & Lymphatic: denies easy or excessive bleeding, admits easy bruising, denies swollen lymph nodes  Musculoskeletal: denies muscle pain, denies back pain, denies joint pain, denies joint swelling  Skin: denies rash, denies lesion, denies itching, denies skin texture changes, denies skin color changes  Neurological: denies headache, denies dizziness, denies numbness, denies tingling, denies seizure activity, denies speech difficulty, denies memory loss, denies confusion  Psychiatric: denies anxiety, denies depression, denies sleep difficulty, admits memory problems           Past Medical History:   Diagnosis Date    Abnormal exercise tolerance test 7/25/2013    Acute respiratory failure with hypoxia 6/15/2023    Arthritis     Colon polyp     Diverticulosis     Dyslipidemia 7/25/2013    GERD (gastroesophageal reflux disease)     HTN, goal below 130/80 12/3/2018    Hyperlipidemia 1980    HIGH TG    Knee pain, right 6/14/2013    Low back pain with right-sided sciatica 12/3/2018    Meningioma     Paroxysmal A-fib 10/29/2022    Personal history of colonic polyps 5/27/2014    RLS (restless legs syndrome)  2013    Tobacco dependence     resolved    West Nile meningitis     per patient       Social History:  Social History     Socioeconomic History    Marital status:    Tobacco Use    Smoking status: Former     Current packs/day: 0.00     Average packs/day: 1 pack/day for 25.0 years (25.0 ttl pk-yrs)     Types: Cigarettes     Start date: 1965     Quit date: 1990     Years since quittin.6    Smokeless tobacco: Never   Substance and Sexual Activity    Alcohol use: No     Alcohol/week: 0.0 standard drinks of alcohol    Drug use: No    Sexual activity: Not Currently     Birth control/protection: None     Social Determinants of Health     Financial Resource Strain: Low Risk  (2024)    Overall Financial Resource Strain (CARDIA)     Difficulty of Paying Living Expenses: Not hard at all   Food Insecurity: No Food Insecurity (2024)    Hunger Vital Sign     Worried About Running Out of Food in the Last Year: Never true     Ran Out of Food in the Last Year: Never true   Transportation Needs: No Transportation Needs (2024)    PRAPARE - Transportation     Lack of Transportation (Medical): No     Lack of Transportation (Non-Medical): No   Physical Activity: Unknown (2024)    Exercise Vital Sign     Days of Exercise per Week: 0 days   Stress: No Stress Concern Present (2024)    [employer] - Occupational Stress Questionnaire     Feeling of Stress : Not at all   Housing Stability: Low Risk  (4/10/2023)    Housing Stability Vital Sign     Unable to Pay for Housing in the Last Year: No     Number of Places Lived in the Last Year: 1     Unstable Housing in the Last Year: No       Family History:   family history includes Cancer in his son; Diabetes in his maternal uncle; Heart disease in his mother.    Health Maintenance   Topic Date Due    Shingles Vaccine (1 of 2) Never done    Lipid Panel  2024    TETANUS VACCINE  2025       Exam:Physical      Vital Signs  Pulse: 90  SpO2: 96 %  BP: 110/70  BP Location: Left arm  Patient Position: Sitting  Pain Score: 0-No pain  Height and Weight  Weight: 66.7 kg (147 lb 2.5 oz)]    Body mass index is 21.11 kg/m².    Physical Exam    Vitals: Weight: 147 lbs.  General: Well-developed. Well-nourished. No acute distress.  Eyes: EOMI. Sclerae anicteric.  HENT: Normocephalic. Atraumatic. Nares patent. Moist oral mucosa.  Cardiovascular: Regular rate. Regular rhythm. No murmurs. No rubs. No gallops. Normal S1, S2.  Respiratory: Normal respiratory effort. Clear to auscultation bilaterally. No rales. No rhonchi. No wheezing.  Musculoskeletal: No  obvious deformity.  Extremities: No lower extremity edema.  Neurological: Alert & oriented x3. No slurred speech. Normal gait.  Psychiatric: Normal mood. Normal affect. Good insight. Good judgment.  Skin: Warm. Dry. No rash.           Assessment:        ICD-10-CM ICD-9-CM   1. Paroxysmal A-fib  I48.0 427.31   2. Mixed hyperlipidemia  E78.2 272.2         Plan:    Assessment & Plan    MEDICAL DECISION MAKING:  - Assessed patient's weight fluctuations, noting consistency between current weight (147 lbs) and previous measurement in May (148 lbs)  - Evaluated patient's concerns about cold extremities in relation to anticoagulant medication  - Considered risks and benefits of adjusting anticoagulant dosage  - Determined current dosage of 2.5 mg twice daily is appropriate, given by Dr. Tejeda (cardiologist)  - Suggested occasional dose skipping as a potential strategy to manage side effects while maintaining therapeutic effect  PATIENT EDUCATION:  - Explained importance of using consistent scales for accurate weight measurements  - Discussed the relationship between anticoagulants and cold extremities  - Educated on the risks of completely stopping anticoagulant medication, including potential for blood clots and stroke due to atrial fibrillation  - Clarified that lowering anticoagulant  dosage below current level may reduce its effectiveness  ACTION ITEMS/LIFESTYLE:  - Patient to use a consistent bathroom scale for weight measurements  MEDICATIONS:  - Continued anticoagulant at 2.5 mg twice daily  - Patient may occasionally skip a dose (e.g., few times per week) to manage side effects, but not to do so daily  FOLLOW UP:  - Follow up with Dr. Tejeda (cardiologist) to discuss anticoagulant medication management. Nurse will schedule the appointment while patient remains in the office.         Diagnoses and all orders for this visit:    Paroxysmal A-fib  -     Ambulatory referral/consult to Cardiology; Future    Mixed hyperlipidemia        No follow-ups on file.      Madie Davis MD

## 2024-09-08 ENCOUNTER — CLINICAL SUPPORT (OUTPATIENT)
Dept: CARDIOLOGY | Facility: HOSPITAL | Age: 89
End: 2024-09-08
Attending: INTERNAL MEDICINE
Payer: MEDICARE

## 2024-09-08 ENCOUNTER — CLINICAL SUPPORT (OUTPATIENT)
Dept: CARDIOLOGY | Facility: HOSPITAL | Age: 89
End: 2024-09-08
Payer: MEDICARE

## 2024-09-08 DIAGNOSIS — Z95.0 PRESENCE OF CARDIAC PACEMAKER: ICD-10-CM

## 2024-09-08 DIAGNOSIS — I49.5 SICK SINUS SYNDROME: ICD-10-CM

## 2024-09-08 DIAGNOSIS — I48.0 PAROXYSMAL ATRIAL FIBRILLATION: ICD-10-CM

## 2024-09-08 PROCEDURE — 93294 REM INTERROG EVL PM/LDLS PM: CPT | Mod: HCNC,S$GLB,, | Performed by: INTERNAL MEDICINE

## 2024-09-08 PROCEDURE — 93296 REM INTERROG EVL PM/IDS: CPT | Mod: HCNC | Performed by: INTERNAL MEDICINE

## 2024-09-18 LAB
OHS CV DC REMOTE DEVICE TYPE: NORMAL
OHS CV ICD SHOCK: NO
OHS CV RV PACING PERCENT: 43 %

## 2024-09-26 ENCOUNTER — OFFICE VISIT (OUTPATIENT)
Dept: CARDIOLOGY | Facility: CLINIC | Age: 89
End: 2024-09-26
Payer: MEDICARE

## 2024-09-26 VITALS
BODY MASS INDEX: 21.04 KG/M2 | SYSTOLIC BLOOD PRESSURE: 121 MMHG | OXYGEN SATURATION: 96 % | DIASTOLIC BLOOD PRESSURE: 72 MMHG | WEIGHT: 146.63 LBS | HEART RATE: 71 BPM

## 2024-09-26 DIAGNOSIS — R23.3 EASY BRUISING: ICD-10-CM

## 2024-09-26 DIAGNOSIS — I35.8 AORTIC VALVE SCLEROSIS: ICD-10-CM

## 2024-09-26 DIAGNOSIS — I49.5 SICK SINUS SYNDROME: Primary | ICD-10-CM

## 2024-09-26 DIAGNOSIS — Z78.9 STATIN INTOLERANCE: ICD-10-CM

## 2024-09-26 DIAGNOSIS — I10 ESSENTIAL HYPERTENSION: ICD-10-CM

## 2024-09-26 DIAGNOSIS — E78.2 MIXED HYPERLIPIDEMIA: ICD-10-CM

## 2024-09-26 DIAGNOSIS — I07.1 TRICUSPID VALVE INSUFFICIENCY, UNSPECIFIED ETIOLOGY: ICD-10-CM

## 2024-09-26 DIAGNOSIS — I48.0 PAROXYSMAL A-FIB: ICD-10-CM

## 2024-09-26 PROCEDURE — 1159F MED LIST DOCD IN RCRD: CPT | Mod: HCNC,CPTII,S$GLB, | Performed by: INTERNAL MEDICINE

## 2024-09-26 PROCEDURE — 93005 ELECTROCARDIOGRAM TRACING: CPT | Mod: HCNC

## 2024-09-26 PROCEDURE — 1101F PT FALLS ASSESS-DOCD LE1/YR: CPT | Mod: HCNC,CPTII,S$GLB, | Performed by: INTERNAL MEDICINE

## 2024-09-26 PROCEDURE — 99214 OFFICE O/P EST MOD 30 MIN: CPT | Mod: HCNC,S$GLB,, | Performed by: INTERNAL MEDICINE

## 2024-09-26 PROCEDURE — 99999 PR PBB SHADOW E&M-EST. PATIENT-LVL IV: CPT | Mod: PBBFAC,HCNC,, | Performed by: INTERNAL MEDICINE

## 2024-09-26 PROCEDURE — 1126F AMNT PAIN NOTED NONE PRSNT: CPT | Mod: HCNC,CPTII,S$GLB, | Performed by: INTERNAL MEDICINE

## 2024-09-26 PROCEDURE — 3288F FALL RISK ASSESSMENT DOCD: CPT | Mod: HCNC,CPTII,S$GLB, | Performed by: INTERNAL MEDICINE

## 2024-09-26 NOTE — PROGRESS NOTES
Subjective:   Patient ID:  Dominguez Ritchie is a 89 y.o. male who presents for evaluation of Follow-up        Follow-up  Pertinent negatives include no chest pain.   9.26.2024  Recently seen in the clinic.    Comes in due to easy bruising and multiple episodes of skin bleeding.  He already cut down on his Eliquis from 5 mg b.i.d. to 2.5 mg b.i.d. and still has issues with the easy bleeding.    He is wondering if he can just take baby aspirin.    No syncope or presyncope   EKG shows atrial paced rhythm.    I reviewed his device interrogation shows 800 mode switches    August 14, 2024.    Comes in for a six-month follow-up   Denies any complaints.  No falls.  No chest pain.  No dyspnea no palpitations syncope presyncope   No neuro symptoms of stroke.    No slurred speech , focal weakness or numbness, amaurosis, dizziness   No bleeding.    Compliant with medications.      1.9.2024  Comes in for a six-month follow-up.    He denies any chest pain, palpitations syncope or presyncope   Same issue with the Eliquis.  However no falls or bleeding.    His most recent echocardiogram he was in AFib during his study.    On his pacemaker interrogation he has been having atrial runs.  Unclear if AFib.      5.10.2023  Comes in for six-month follow-up.    He had his scalp lesion excised.  And a sentinel lymph node as well.    Denies any chest pain.  No syncope or presyncope.    No palpitations.    He is complaining of very easy bruising and he states that he has been taking half of the Eliquis dose.    No lower extremity swelling orthopnea or dyspnea.    States amlodipine was stopped due to low blood pressure by PCP.      11.2022  89-year-old male with past medical history below.    He received a atrial lead pacemaker last week after presenting to the hospital due to abnormal finding on his Holter monitor with long pauses during his conversion from rapid AFib to normal sinus.    He denies any more dizziness.    He denies any chest  pain, dyspnea on exertion, palpitations.    He is in sinus rhythm today and paced in the atrium.    His wound looks healing well.  His recent interrogation was normal function.    He is following instructions and arm restrictions    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       Past Surgical History:   Procedure Laterality Date    A-V CARDIAC PACEMAKER INSERTION Left 11/1/2022    Procedure: INSERTION, CARDIAC PACEMAKER, DUAL CHAMBER;  Surgeon: Finn Mccrary MD;  Location: [facility] CATH LAB;  Service: Cardiology;  Laterality: Left;  biotronik AAIR    APPENDECTOMY      BACK SURGERY Bilateral 2016    CATARACT EXTRACTION, BILATERAL      COLONOSCOPY  2/2009    EYE SURGERY      INJECTION OF ANESTHETIC AGENT AROUND NERVE Right 11/18/2022    Procedure: Right Genicular nerve block w/Light RN IV Sedation;  Surgeon: Benitez Roberson MD;  Location: [facility] PAIN MGT;  Service: Pain Management;  Laterality: Right;    INSERTION OF PERMANENT PACEMAKER Left 11/1/2022    Procedure: Implant PPM;  Surgeon: Finn Mccrary MD;  Location: [facility] CATH LAB;  Service: Cardiology;  Laterality: Left;    JOINT REPLACEMENT      left knee    LUMBAR PARAVERTEBRAL FACET JOINT NERVE BLOCK Bilateral 8/29/2019    Procedure: LMBB L3,4,5;  Surgeon: Nabor Sadler MD;  Location: [facility] PAIN MGT;  Service: Pain Management;  Laterality: Bilateral;    SELECTIVE INJECTION OF ANESTHETIC  AGENT AROUND LUMBAR SPINAL NERVE ROOT BY TRANSFORAMINAL APPROACH Bilateral 2021    Procedure: Bilateral L4/5 +/- L5/S1 TF DREW;  Surgeon: Nabor Sadler MD;  Location: [facility] PAIN MGT;  Service: Pain Management;  Laterality: Bilateral;    SELECTIVE INJECTION OF ANESTHETIC AGENT AROUND LUMBAR SPINAL NERVE ROOT BY TRANSFORAMINAL APPROACH Bilateral 2022    Procedure: Bilateral L4/5 + L5/S1 TF DREW;  Surgeon: Nabor Sadler MD;  Location: [facility] PAIN MGT;  Service: Pain Management;  Laterality: Bilateral;    SELECTIVE INJECTION OF ANESTHETIC AGENT AROUND LUMBAR SPINAL NERVE ROOT BY TRANSFORAMINAL APPROACH Right 2022    Procedure: Right-sided L4/5 and L5/S1 transforaminal epidural steroid injection with RN IV sedation;  Surgeon: Benitez Roberson MD;  Location: [facility] PAIN MGT;  Service: Pain Management;  Laterality: Right;    SPINE SURGERY      UPPER GASTROINTESTINAL ENDOSCOPY  2009       Social History     Tobacco Use    Smoking status: Former     Current packs/day: 0.00     Average packs/day: 1 pack/day for 25.0 years (25.0 ttl pk-yrs)     Types: Cigarettes     Start date: 1965     Quit date: 1990     Years since quittin.7    Smokeless tobacco: Never   Substance Use Topics    Alcohol use: No     Alcohol/week: 0.0 standard drinks of alcohol    Drug use: No       Family History   Problem Relation Name Age of Onset    Heart disease Mother      Cancer Son          pancreatic     Diabetes Maternal Uncle      Kidney disease Neg Hx      Stroke Neg Hx         Review of Systems   Cardiovascular:  Negative for chest pain, dyspnea on exertion, palpitations and syncope.   Genitourinary: Negative.    Neurological: Negative.        Current Outpatient Medications on File Prior to Visit   Medication Sig    b complex vitamins tablet Take 1 tablet by mouth once daily.    bisacodyL (DULCOLAX) 5 mg EC tablet Take 5 mg by mouth daily as needed for Constipation.    dutasteride (AVODART) 0.5 mg capsule Take 1 capsule by mouth  once daily    ferrous gluconate (FERGON) 324 MG tablet Take 1 tablet (324 mg total) by mouth 2 (two) times daily with meals.    fluocinolone acetonide oiL 0.01 % Drop Place 5 drops in ear(s) 2 (two) times a day.    furosemide (LASIX) 20 MG tablet Take 1 tablet (20 mg total) by mouth once daily.    mupirocin (BACTROBAN) 2 % ointment Apply topically 2 (two) times daily.    pantoprazole (PROTONIX) 40 MG tablet Take 1 tablet by mouth once daily    potassium chloride SA (K-DUR,KLOR-CON) 20 MEQ tablet Take 1 tablet (20 mEq total) by mouth once daily.    rOPINIRole (REQUIP) 1 MG tablet Take 1 tablet (1 mg total) by mouth every evening.    rosuvastatin (CRESTOR) 5 MG tablet Take 1 tablet by mouth once daily    triamcinolone acetonide 0.1% (KENALOG) 0.1 % cream Apply topically 2 (two) times daily as needed.    [DISCONTINUED] apixaban (ELIQUIS) 5 mg Tab Take 1 tablet (5 mg total) by mouth 2 (two) times daily.     No current facility-administered medications on file prior to visit.       Objective:   Objective:  Wt Readings from Last 3 Encounters:   09/26/24 66.5 kg (146 lb 9.7 oz)   08/19/24 66.7 kg (147 lb 2.5 oz)   08/14/24 61.1 kg (134 lb 13 oz)     Temp Readings from Last 3 Encounters:   07/20/23 97.1 °F (36.2 °C)   06/21/23 97.7 °F (36.5 °C)   06/17/23 98.4 °F (36.9 °C)     BP Readings from Last 3 Encounters:   09/26/24 121/72   08/19/24 110/70   08/14/24 130/79     Pulse Readings from Last 3 Encounters:   09/26/24 71   08/19/24 90   08/14/24 81       Physical Exam  Vitals reviewed.   Constitutional:       Appearance: He is well-developed.   Neck:      Vascular: No carotid bruit.   Cardiovascular:      Rate and Rhythm: Normal rate and regular rhythm.      Pulses: Intact distal pulses.      Heart sounds: Normal heart sounds. No murmur heard.  Pulmonary:      Breath sounds: Normal breath sounds.   Neurological:      Mental Status: He is oriented to person, place, and time.         Lab Results   Component Value Date    CHOL  139 03/16/2023    CHOL 151 07/05/2022    CHOL 143 01/03/2022     Lab Results   Component Value Date    HDL 61 03/16/2023    HDL 58 07/05/2022    HDL 51 01/03/2022     Lab Results   Component Value Date    LDLCALC 38.4 (L) 03/16/2023    LDLCALC 54.6 (L) 07/05/2022    LDLCALC 57.8 (L) 01/03/2022     Lab Results   Component Value Date    TRIG 198 (H) 03/16/2023    TRIG 192 (H) 07/05/2022    TRIG 171 (H) 01/03/2022     Lab Results   Component Value Date    CHOLHDL 43.9 03/16/2023    CHOLHDL 38.4 07/05/2022    CHOLHDL 35.7 01/03/2022       Chemistry        Component Value Date/Time     05/13/2024 0903    K 4.6 05/13/2024 0903     05/13/2024 0903    CO2 26 05/13/2024 0903    BUN 14 05/13/2024 0903    CREATININE 0.9 05/13/2024 0903     05/13/2024 0903        Component Value Date/Time    CALCIUM 10.1 05/13/2024 0903    ALKPHOS 61 05/13/2024 0903    AST 18 05/13/2024 0903    ALT 10 05/13/2024 0903    BILITOT 0.6 05/13/2024 0903    ESTGFRAFRICA 88 04/26/2023 1915    ESTGFRAFRICA >60.0 07/05/2022 1444    EGFRNONAA >60.0 07/05/2022 1444          Lab Results   Component Value Date    TSH 1.733 10/29/2022     Lab Results   Component Value Date    INR 1.0 06/14/2023    INR 1.0 10/29/2022    INR 1.0 10/19/2020     Lab Results   Component Value Date    WBC 5.81 05/13/2024    HGB 14.8 05/13/2024    HCT 45.1 05/13/2024    MCV 98 05/13/2024     05/13/2024     BNP  @LABRCNTIP(BNP,BNPTRIAGEBLO)@  CrCl cannot be calculated (Patient's most recent lab result is older than the maximum 7 days allowed.).     Imaging:  ======  Results for orders placed during the hospital encounter of 09/16/22    Echo    Interpretation Summary  · The left ventricle is normal in size with concentric remodeling and normal systolic function.  · Indeterminate left ventricular diastolic function.  · The estimated PA systolic pressure is 37 mmHg.  · Normal right ventricular size with normal right ventricular systolic function.  · Normal  central venous pressure (3 mmHg).  · The estimated ejection fraction is 55%.  · Mild aortic regurgitation.  · Mild mitral regurgitation.  · Mild tricuspid regurgitation.    No results found for this or any previous visit.    Results for orders placed in visit on 04/01/13    US Carotid Bilateral    Narrative  DATE OF EXAM: Apr 8 2013    [facility]   0519  -  US EXTRACRANIAL COMPLETE BILAT:   \  [id number]    CLINICAL HISTORY:   \401.9  HYPERTENSION NOS    PROCEDURE COMMENT:   \    ICD 9 CODE(S):   (\)    CPT 4 CODE(S)/MODIFIER(S):   (\)    Comparison: none    Findings:    Real-time, color flow and Doppler imaging performed.    Minimal calcific plaque noted within the left bulb.    ICA                                    R  L  Peak systolic velocity:         124   Cm/sec               115   cm/sec  Peak diastolic velocity:        30   Cm/sec               33   cm/sec  Systolic velocity ratio:          1.3                               1.2  Diastolic velocity ratio:         1.4                               1.5      Vertebral artery flow:               Antegrade                antegrade    ECA flow:                                   Antegrade  antegrade    spectral waveforms appear within normal limits bilaterally.    Impression  1.  No hemodynamically significant plaque formation or stenosis identified.    2.  Further evaluation and/or follow-up imaging as clinically warranted.  A      Electronically signed by: FILEMON BRUNO III, MD  Date:     04/09/13  Time:    09:05        : LISA  Transcribe Date/Time: Apr 9 2013  9:05A  Dictated by : FILEMON BRUNO III, MD  Report reviewed by:  Read On:  \  Images were reviewed, findings were verified and document was  electronically  SIGNED BY: FILEMON BRUNO III, MD On: Apr 9 2013  9:05A    Results for orders placed during the hospital encounter of 01/16/17    X-Ray Chest PA And Lateral    Narrative  Comparison: 01/21/2011    2 views    Findings:    Heart size within  normal limits.  Pulmonary vasculature is normal and symmetric.  Mild tortuosity of the aorta underlying atherosclerotic calcification.  Calcified AP window node again identified.  Trachea is normal in position along for slight rotation.  No consolidation or effusion.  Granuloma identified within the LEFT upper lobe.  Mild osteopenia and spondylosis with wedge-shaped compression deformity L1 compression deformity noted.  This was identified on L-spine series from 03/26/2015.  IMPRESSION:      1.  Stable exam without acute infiltrate.  See above.    2.  Known L1 compression deformity.      Electronically signed by: FILEMON BRUNO III, MD  Date:     01/16/17  Time:    16:34    No results found for this or any previous visit.    No valid procedures specified.    Diagnostic Results:  ECG: Reviewed    The ASCVD Risk score (Brennan DK, et al., 2019) failed to calculate for the following reasons:    The 2019 ASCVD risk score is only valid for ages 40 to 79    Assessment and Plan:   Sick sinus syndrome    Paroxysmal A-fib  -     Ambulatory referral/consult to Cardiology  -     IN OFFICE EKG 12-LEAD (to Muse)  -     apixaban (ELIQUIS) 2.5 mg Tab; Take 1 tablet (2.5 mg total) by mouth 2 (two) times daily.  Dispense: 60 tablet; Refill: 11    Mixed hyperlipidemia    Tricuspid valve insufficiency, unspecified etiology    Essential hypertension    Aortic valve sclerosis    Statin intolerance    Easy bruising      Reviewed vascular study.  Normal JEWEL.  BP at goal for age less than 150/80.  Angina free.  Euvolemic.  Device function normal.    Lower Eliquis to 2.5 mg daily.  Possibly alternate with 2.5 b.i.d. in the future  Follow with device Clinic and EP as well.    Follow-up in 6 months.

## 2024-09-27 LAB
OHS QRS DURATION: 82 MS
OHS QTC CALCULATION: 440 MS

## 2024-10-25 DIAGNOSIS — E78.2 MIXED HYPERLIPIDEMIA: ICD-10-CM

## 2024-10-27 RX ORDER — ROSUVASTATIN CALCIUM 5 MG/1
5 TABLET, COATED ORAL
Qty: 90 TABLET | Refills: 0 | Status: SHIPPED | OUTPATIENT
Start: 2024-10-27

## 2024-11-23 NOTE — TELEPHONE ENCOUNTER
No care due was identified.  [facility] Embedded Care Due Messages. Reference number: [id number].   11/23/2024 8:09:08 AM CST

## 2024-11-24 RX ORDER — PANTOPRAZOLE SODIUM 40 MG/1
TABLET, DELAYED RELEASE ORAL
Qty: 90 TABLET | Refills: 2 | Status: SHIPPED | OUTPATIENT
Start: 2024-11-24 | End: 2024-11-27 | Stop reason: SDUPTHER

## 2024-11-24 NOTE — TELEPHONE ENCOUNTER
Refill Decision Note   Dominguez Ritchie  is requesting a refill authorization.  Brief Assessment and Rationale for Refill:  Approve     Medication Therapy Plan:         Comments:     Note composed:2:42 PM 11/24/2024

## 2024-11-27 DIAGNOSIS — K21.9 GASTROESOPHAGEAL REFLUX DISEASE, UNSPECIFIED WHETHER ESOPHAGITIS PRESENT: Primary | ICD-10-CM

## 2024-11-27 RX ORDER — PANTOPRAZOLE SODIUM 40 MG/1
40 TABLET, DELAYED RELEASE ORAL DAILY
Qty: 30 TABLET | Refills: 2 | Status: SHIPPED | OUTPATIENT
Start: 2024-11-27

## 2024-11-27 NOTE — TELEPHONE ENCOUNTER
No care due was identified.  [facility] Embedded Care Due Messages. Reference number: [id number].   11/27/2024 4:20:44 PM CST

## 2024-12-08 ENCOUNTER — CLINICAL SUPPORT (OUTPATIENT)
Dept: CARDIOLOGY | Facility: HOSPITAL | Age: 89
End: 2024-12-08

## 2024-12-08 ENCOUNTER — CLINICAL SUPPORT (OUTPATIENT)
Dept: CARDIOLOGY | Facility: HOSPITAL | Age: 89
End: 2024-12-08
Attending: INTERNAL MEDICINE
Payer: MEDICARE

## 2024-12-08 DIAGNOSIS — Z95.0 PRESENCE OF CARDIAC PACEMAKER: ICD-10-CM

## 2024-12-08 DIAGNOSIS — I49.5 SICK SINUS SYNDROME: ICD-10-CM

## 2024-12-08 DIAGNOSIS — I48.0 PAROXYSMAL ATRIAL FIBRILLATION: ICD-10-CM

## 2024-12-08 PROCEDURE — 93296 REM INTERROG EVL PM/IDS: CPT | Mod: HCNC | Performed by: INTERNAL MEDICINE

## 2024-12-08 PROCEDURE — 93294 REM INTERROG EVL PM/LDLS PM: CPT | Mod: HCNC,S$GLB,, | Performed by: INTERNAL MEDICINE

## 2024-12-11 ENCOUNTER — OFFICE VISIT (OUTPATIENT)
Dept: FAMILY MEDICINE | Facility: CLINIC | Age: 89
End: 2024-12-11
Payer: MEDICARE

## 2024-12-11 VITALS
OXYGEN SATURATION: 98 % | SYSTOLIC BLOOD PRESSURE: 120 MMHG | BODY MASS INDEX: 21.24 KG/M2 | DIASTOLIC BLOOD PRESSURE: 70 MMHG | WEIGHT: 148.06 LBS | HEART RATE: 70 BPM

## 2024-12-11 DIAGNOSIS — J01.90 ACUTE BACTERIAL SINUSITIS: ICD-10-CM

## 2024-12-11 DIAGNOSIS — B96.89 ACUTE BACTERIAL SINUSITIS: ICD-10-CM

## 2024-12-11 DIAGNOSIS — N40.0 BPH WITH ELEVATED PSA: Primary | ICD-10-CM

## 2024-12-11 DIAGNOSIS — R05.8 DRY COUGH: ICD-10-CM

## 2024-12-11 DIAGNOSIS — R97.20 BPH WITH ELEVATED PSA: Primary | ICD-10-CM

## 2024-12-11 PROCEDURE — 1159F MED LIST DOCD IN RCRD: CPT | Mod: HCNC,CPTII,S$GLB, | Performed by: FAMILY MEDICINE

## 2024-12-11 PROCEDURE — 99999 PR PBB SHADOW E&M-EST. PATIENT-LVL III: CPT | Mod: PBBFAC,HCNC,, | Performed by: FAMILY MEDICINE

## 2024-12-11 PROCEDURE — 99214 OFFICE O/P EST MOD 30 MIN: CPT | Mod: HCNC,S$GLB,, | Performed by: FAMILY MEDICINE

## 2024-12-11 RX ORDER — BENZONATATE 200 MG/1
200 CAPSULE ORAL 3 TIMES DAILY PRN
Qty: 30 CAPSULE | Refills: 0 | Status: SHIPPED | OUTPATIENT
Start: 2024-12-11 | End: 2024-12-21

## 2024-12-11 RX ORDER — CEPHALEXIN 500 MG/1
500 CAPSULE ORAL EVERY 8 HOURS
Qty: 21 CAPSULE | Refills: 0 | Status: SHIPPED | OUTPATIENT
Start: 2024-12-11

## 2024-12-11 RX ORDER — DUTASTERIDE 0.5 MG/1
0.5 CAPSULE, LIQUID FILLED ORAL DAILY
Qty: 90 CAPSULE | Refills: 4 | Status: SHIPPED | OUTPATIENT
Start: 2024-12-11

## 2024-12-11 NOTE — PROGRESS NOTES
Chief Complaint:  No chief complaint on file.      History of Present Illness:    History of Present Illness    Patient presents today for medication refill. He has been taking Avodart 0.5 mg for approximately one year or longer, initially prescribed by his urologist due to elevated PSA levels. He reports no urinary troubles while on this medication. He reports experiencing a dry cough for a few weeks, particularly noticeable in enclosed spaces. He has been using cough drops to manage the symptoms. The cough was initially accompanied by a sinus infection, characterized by nasal discharge that progressed from clear to dark yellow. He describes pain in the sinuses when coughing, associated with watery eyes. He denies any fever or breathing problems. He requests a dermatology referral for skin concerns, initially expressing reluctance but acknowledging the need for a consultation.      ROS:  General: denies fever, denies chills, denies fatigue, denies weight gain, denies weight loss, denies loss of appetite  Eyes: denies vision changes, denies blurry vision, denies eye pain, denies eye discharge  ENT: denies ear pain, denies hearing loss, denies tinnitus, admits nasal congestion, denies sore throat  Cardiovascular: denies chest pain, denies palpitations, denies lower extremity edema  Respiratory: admits cough, denies shortness of breath, denies wheezing, denies sputum production  Endocrine: denies polyuria, denies polydipsia, denies heat intolerance, denies cold intolerance  Gastrointestinal: denies abdominal pain, denies heartburn, denies nausea, denies vomiting, denies diarrhea, denies constipation, denies blood in stool  Genitourinary: denies dysuria, denies urgency, denies frequency, denies hematuria, denies nocturia, denies incontinence  Heme & Lymphatic: denies easy or excessive bleeding, denies easy bruising, denies swollen lymph nodes  Musculoskeletal: denies muscle pain, denies back pain, denies joint pain,  denies joint swelling  Skin: denies rash, denies lesion, denies itching, denies skin texture changes, denies skin color changes  Neurological: denies headache, denies dizziness, denies numbness, denies tingling, denies seizure activity, denies speech difficulty, denies memory loss, denies confusion  Psychiatric: denies anxiety, denies depression, denies sleep difficulty           Past Medical History:   Diagnosis Date    Abnormal exercise tolerance test 2013    Acute respiratory failure with hypoxia 6/15/2023    Arthritis     Colon polyp     Diverticulosis     Dyslipidemia 2013    GERD (gastroesophageal reflux disease)     HTN, goal below 130/80 12/3/2018    Hyperlipidemia 1980    HIGH TG    Knee pain, right 2013    Low back pain with right-sided sciatica 12/3/2018    Meningioma     Paroxysmal A-fib 10/29/2022    Personal history of colonic polyps 2014    RLS (restless legs syndrome) 2013    Tobacco dependence     resolved    West Nile meningitis     per patient       Social History:  Social History     Socioeconomic History    Marital status:    Tobacco Use    Smoking status: Former     Current packs/day: 0.00     Average packs/day: 1 pack/day for 25.0 years (25.0 ttl pk-yrs)     Types: Cigarettes     Start date: 1965     Quit date: 1990     Years since quittin.9    Smokeless tobacco: Never   Substance and Sexual Activity    Alcohol use: No     Alcohol/week: 0.0 standard drinks of alcohol    Drug use: No    Sexual activity: Not Currently     Birth control/protection: None     Social Drivers of Health     Financial Resource Strain: Low Risk  (2024)    Overall Financial Resource Strain (CARDIA)     Difficulty of Paying Living Expenses: Not hard at all   Food Insecurity: No Food Insecurity (2024)    Hunger Vital Sign     Worried About Running Out of Food in the Last Year: Never true     Ran Out of Food in the Last Year: Never true   Transportation Needs: No  Transportation Needs (4/13/2024)    PRAPARE - Transportation     Lack of Transportation (Medical): No     Lack of Transportation (Non-Medical): No   Physical Activity: Unknown (8/13/2024)    Exercise Vital Sign     Days of Exercise per Week: 0 days   Stress: No Stress Concern Present (8/13/2024)    [employer] - Occupational Stress Questionnaire     Feeling of Stress : Not at all   Housing Stability: Low Risk  (4/10/2023)    Housing Stability Vital Sign     Unable to Pay for Housing in the Last Year: No     Number of Places Lived in the Last Year: 1     Unstable Housing in the Last Year: No       Family History:   family history includes Cancer in his son; Diabetes in his maternal uncle; Heart disease in his mother.    Health Maintenance   Topic Date Due    Shingles Vaccine (1 of 2) Never done    RSV Vaccine (Age 60+ and Pregnant patients) (1 - 1-dose 75+ series) Never done    Hemoglobin A1c (Prediabetes)  03/16/2024    Lipid Panel  03/16/2024    COVID-19 Vaccine (4 - 2024-25 season) 09/01/2024    TETANUS VACCINE  11/16/2025    Influenza Vaccine  Completed    Pneumococcal Vaccines (Age 65+)  Completed       Exam:Physical     Vital Signs  Pulse: 70  SpO2: 98 %  BP: 120/70  Height and Weight  Weight: 67.1 kg (148 lb 0.6 oz)]    Body mass index is 21.24 kg/m².    Physical Exam    General: Well-developed. Well-nourished. No acute distress.  Eyes: EOMI. Sclerae anicteric.  HENT: Normocephalic. Atraumatic. Nares patent. Moist oral mucosa. Tenderness in frontal sinus.  Cardiovascular: Regular rate. Regular rhythm. No murmurs. No rubs. No gallops. Normal S1, S2.  Respiratory: Normal respiratory effort. Clear to auscultation bilaterally. No rales. No rhonchi. No wheezing.  Musculoskeletal: No  obvious deformity.  Extremities: No lower extremity edema.  Neurological: Alert & oriented x3. No slurred speech. Normal gait.  Psychiatric: Normal mood. Normal affect. Good insight. Good judgment.  Skin:  Warm. Dry. No rash.           Assessment:        ICD-10-CM ICD-9-CM   1. BPH with elevated PSA  N40.0 600.00    R97.20 790.93   2. Dry cough  R05.8 786.2   3. Acute bacterial sinusitis  J01.90 461.9    B96.89          Plan:    Assessment & Plan    MEDICAL DECISION MAKING:  - Assessed prostate health, noting previous elevated PSA and subsequent negative cancer screening  - Evaluated effectiveness of Avodart (dutasteride) in managing prostate symptoms based on patient's report and previous PSA results  - Suspected sinus infection based on patient's symptoms of dry cough, nasal discharge, and facial pain  - Performed physical exam of sinuses and lungs to confirm diagnosis    MEDICATIONS:  - Started antibiotic for sinus infection  - Started cough medicine for symptomatic relief  - Refilled Avodart 0.5 mg, providing a 1-year supply    ORDERS:  - Performed physical exam of sinuses and lungs    FOLLOW UP:  - Follow up for next PSA test  - Contact the office if symptoms worsen or do not improve with prescribed medications         Diagnoses and all orders for this visit:    BPH with elevated PSA    Dry cough    Acute bacterial sinusitis    Other orders  -     dutasteride (AVODART) 0.5 mg capsule; Take 1 capsule (0.5 mg total) by mouth once daily.  -     cephALEXin (KEFLEX) 500 MG capsule; Take 1 capsule (500 mg total) by mouth every 8 (eight) hours.  -     benzonatate (TESSALON) 200 MG capsule; Take 1 capsule (200 mg total) by mouth 3 (three) times daily as needed for Cough.        No follow-ups on file.      Madie Davis MD

## 2025-01-03 ENCOUNTER — PATIENT MESSAGE (OUTPATIENT)
Dept: CARDIOLOGY | Facility: CLINIC | Age: OVER 89
End: 2025-01-03
Payer: MEDICARE

## 2025-01-03 NOTE — TELEPHONE ENCOUNTER
Attempted to contact pt to inform her clearance letter has been faxed to Dr. Bill's office. No answer, FAZALM

## 2025-01-13 DIAGNOSIS — Z00.00 ENCOUNTER FOR MEDICARE ANNUAL WELLNESS EXAM: ICD-10-CM

## 2025-01-27 DIAGNOSIS — E78.2 MIXED HYPERLIPIDEMIA: ICD-10-CM

## 2025-01-27 NOTE — TELEPHONE ENCOUNTER
Care Due:                  Date            Visit Type   Department     Provider  --------------------------------------------------------------------------------                                MYCHART                              FOLLOWUP/OF  [facility]  Last Visit: 12-      FICE VISIT   MEDICINE       Madie Davis  Next Visit: None Scheduled  None         None Found                                                            Last  Test          Frequency    Reason                     Performed    Due Date  --------------------------------------------------------------------------------    Lipid Panel.  12 months..  rosuvastatin.............  03- 03-    [facility] Embedded Care Due Messages. Reference number: [id number].   1/27/2025 2:10:37 PM CST

## 2025-01-28 RX ORDER — ROSUVASTATIN CALCIUM 5 MG/1
5 TABLET, COATED ORAL
Qty: 90 TABLET | Refills: 0 | Status: SHIPPED | OUTPATIENT
Start: 2025-01-28

## 2025-01-28 NOTE — TELEPHONE ENCOUNTER
Refill Routing Note   Medication(s) are not appropriate for processing by [facility] for the following reason(s):        Required labs outdated    [facility] action(s):  Defer     Requires labs : Yes             Appointments  past 12m or future 3m with PCP    Date Provider   Last Visit   12/11/2024 Madie Davis MD   Next Visit   Visit date not found Madie Davis MD   ED visits in past 90 days: 0        Note composed:3:05 AM 01/28/2025

## 2025-01-29 LAB
OHS CV DC REMOTE DEVICE TYPE: NORMAL
OHS CV RV PACING PERCENT: 40 %

## 2025-02-13 ENCOUNTER — OFFICE VISIT (OUTPATIENT)
Dept: FAMILY MEDICINE | Facility: CLINIC | Age: OVER 89
End: 2025-02-13
Payer: MEDICARE

## 2025-02-13 VITALS
HEART RATE: 67 BPM | RESPIRATION RATE: 16 BRPM | WEIGHT: 147.69 LBS | BODY MASS INDEX: 21.14 KG/M2 | HEIGHT: 70 IN | DIASTOLIC BLOOD PRESSURE: 80 MMHG | SYSTOLIC BLOOD PRESSURE: 136 MMHG | OXYGEN SATURATION: 96 %

## 2025-02-13 DIAGNOSIS — R09.89 RUNNY NOSE: Primary | ICD-10-CM

## 2025-02-13 DIAGNOSIS — I87.2 CHRONIC VENOUS INSUFFICIENCY: ICD-10-CM

## 2025-02-13 DIAGNOSIS — R20.9 BILATERAL COLD FEET: ICD-10-CM

## 2025-02-13 DIAGNOSIS — K13.0 LIP LESION: ICD-10-CM

## 2025-02-13 PROCEDURE — 99999 PR PBB SHADOW E&M-EST. PATIENT-LVL IV: CPT | Mod: PBBFAC,HCNC,, | Performed by: FAMILY MEDICINE

## 2025-02-13 PROCEDURE — 99214 OFFICE O/P EST MOD 30 MIN: CPT | Mod: S$GLB,,, | Performed by: FAMILY MEDICINE

## 2025-02-13 PROCEDURE — 1101F PT FALLS ASSESS-DOCD LE1/YR: CPT | Mod: CPTII,S$GLB,, | Performed by: FAMILY MEDICINE

## 2025-02-13 PROCEDURE — 1126F AMNT PAIN NOTED NONE PRSNT: CPT | Mod: CPTII,S$GLB,, | Performed by: FAMILY MEDICINE

## 2025-02-13 PROCEDURE — 3288F FALL RISK ASSESSMENT DOCD: CPT | Mod: CPTII,S$GLB,, | Performed by: FAMILY MEDICINE

## 2025-02-13 PROCEDURE — G2211 COMPLEX E/M VISIT ADD ON: HCPCS | Mod: S$GLB,,, | Performed by: FAMILY MEDICINE

## 2025-02-13 PROCEDURE — 1159F MED LIST DOCD IN RCRD: CPT | Mod: CPTII,S$GLB,, | Performed by: FAMILY MEDICINE

## 2025-02-13 RX ORDER — IPRATROPIUM BROMIDE 21 UG/1
2 SPRAY, METERED NASAL 3 TIMES DAILY
Qty: 30 ML | Refills: 3 | Status: SHIPPED | OUTPATIENT
Start: 2025-02-13

## 2025-02-13 NOTE — PROGRESS NOTES
Chief Complaint:    Chief Complaint   Patient presents with    Blister       History of Present Illness:    Patient presents today  History of Present Illness    Patient presents today for a lip problem. He reports a cold sore/fever blister present for 2-3 weeks. He was seen by dermatology 2-3 weeks ago who treated the lesion with cryotherapy. The lesion initially caused pain which has now resolved. He has been using OTC Abreva and Chapstick for management. He reports frequent throat clearing before speaking, runny nose described as severe, and occasional cough for approximately one month. He has been taking Mucinex for symptom management. He denies fever, congestion, or breathing difficulties. He reports bilateral knee pain with prolonged walking. He reports a leg sore present for approximately 2 weeks. He has been self-treating with previously prescribed topical medication and bandages. He reports cold feet, particularly when sedentary, and lower extremity swelling. He has previously used compression socks for management of the swelling.      ROS:  General: denies fever, denies chills, denies fatigue, denies weight gain, denies weight loss, denies loss of appetite  Eyes: denies vision changes, denies blurry vision, denies eye pain, denies eye discharge  ENT: denies ear pain, denies hearing loss, denies tinnitus, denies nasal congestion, denies sore throat  Cardiovascular: denies chest pain, denies palpitations, denies lower extremity edema  Respiratory: admits cough, denies shortness of breath, denies wheezing, denies sputum production, denies difficulty breathing  Endocrine: denies polyuria, denies polydipsia, denies heat intolerance, denies cold intolerance  Gastrointestinal: denies abdominal pain, denies heartburn, denies nausea, denies vomiting, denies diarrhea, denies constipation, denies blood in stool  Genitourinary: denies dysuria, denies urgency, denies frequency, denies hematuria, denies nocturia,  denies incontinence  Heme & Lymphatic: denies easy or excessive bleeding, denies easy bruising, denies swollen lymph nodes  Musculoskeletal: denies muscle pain, denies back pain, admits joint pain, denies joint swelling  Skin: denies rash, admits lesion, denies itching, denies skin texture changes, denies skin color changes  Neurological: denies headache, denies dizziness, denies numbness, denies tingling, denies seizure activity, denies speech difficulty, denies memory loss, denies confusion  Psychiatric: denies anxiety, denies depression, denies sleep difficulty             Past Medical History:   Diagnosis Date    Abnormal exercise tolerance test 2013    Acute respiratory failure with hypoxia 6/15/2023    Arthritis     Colon polyp     Diverticulosis     Dyslipidemia 2013    GERD (gastroesophageal reflux disease)     HTN, goal below 130/80 12/3/2018    Hyperlipidemia 1980    HIGH TG    Knee pain, right 2013    Low back pain with right-sided sciatica 12/3/2018    Meningioma     Paroxysmal A-fib 10/29/2022    Personal history of colonic polyps 2014    RLS (restless legs syndrome) 2013    Tobacco dependence     resolved    West Nile meningitis     per patient       Social History:  Social History     Socioeconomic History    Marital status:    Tobacco Use    Smoking status: Former     Current packs/day: 0.00     Average packs/day: 1 pack/day for 25.0 years (25.0 ttl pk-yrs)     Types: Cigarettes     Start date: 1965     Quit date: 1990     Years since quittin.1    Smokeless tobacco: Never   Substance and Sexual Activity    Alcohol use: No     Alcohol/week: 0.0 standard drinks of alcohol    Drug use: No    Sexual activity: Not Currently     Birth control/protection: None     Social Drivers of Health     Financial Resource Strain: Low Risk  (2024)    Overall Financial Resource Strain (CARDIA)     Difficulty of Paying Living Expenses: Not hard at all   Food  "Insecurity: No Food Insecurity (8/13/2024)    Hunger Vital Sign     Worried About Running Out of Food in the Last Year: Never true     Ran Out of Food in the Last Year: Never true   Transportation Needs: No Transportation Needs (4/13/2024)    PRAPARE - Transportation     Lack of Transportation (Medical): No     Lack of Transportation (Non-Medical): No   Physical Activity: Unknown (8/13/2024)    Exercise Vital Sign     Days of Exercise per Week: 0 days   Stress: No Stress Concern Present (8/13/2024)    [employer] - Occupational Stress Questionnaire     Feeling of Stress : Not at all   Housing Stability: Low Risk  (4/10/2023)    Housing Stability Vital Sign     Unable to Pay for Housing in the Last Year: No     Number of Places Lived in the Last Year: 1     Unstable Housing in the Last Year: No       Family History:   family history includes Cancer in his son; Diabetes in his maternal uncle; Heart disease in his mother.    Health Maintenance   Topic Date Due    Shingles Vaccine (1 of 2) Never done    RSV Vaccine (Age 60+ and Pregnant patients) (1 - 1-dose 75+ series) Never done    Hemoglobin A1c (Prediabetes)  03/16/2024    Lipid Panel  03/16/2024    COVID-19 Vaccine (4 - 2024-25 season) 09/01/2024    TETANUS VACCINE  11/16/2025    Influenza Vaccine  Completed    Pneumococcal Vaccines (Age 50+)  Completed       Exam:Physical     Vital Signs  Pulse: 67  Resp: 16  SpO2: 96 %  BP: (!) 146/80  BP Location: Left arm  Patient Position: Sitting  Pain Score: 0-No pain  Height and Weight  Height: 5' 10" (177.8 cm)  Weight: 67 kg (147 lb 11.3 oz)  BSA (Calculated - sq m): 1.82 sq meters  BMI (Calculated): 21.2  Weight in (lb) to have BMI = 25: 173.9]    Body mass index is 21.19 kg/m².    Physical Exam  Constitutional:       Appearance: He is well-developed.   HENT:      Head:      Comments: Lower lip lesion  Eyes:      Conjunctiva/sclera: Conjunctivae normal.      Pupils: Pupils are equal, round, " and reactive to light.   Cardiovascular:      Rate and Rhythm: Normal rate and regular rhythm.      Pulses:           Dorsalis pedis pulses are 0 on the right side and 0 on the left side.        Posterior tibial pulses are 0 on the right side and 0 on the left side.      Heart sounds: Normal heart sounds. No murmur heard.  Pulmonary:      Effort: Pulmonary effort is normal. No respiratory distress.      Breath sounds: Normal breath sounds. No wheezing or rales.   Chest:      Chest wall: No tenderness.   Abdominal:      General: There is no distension.      Palpations: Abdomen is soft. There is no mass.      Tenderness: There is no abdominal tenderness. There is no guarding.   Musculoskeletal:         General: No tenderness.      Cervical back: Normal range of motion and neck supple.   Feet:      Comments: Feet does appear to be dusky and cold and some pedal edema bilateral foot 2+  Lymphadenopathy:      Cervical: No cervical adenopathy.   Skin:     General: Skin is warm and dry.   Neurological:      Mental Status: He is alert and oriented to person, place, and time.      Deep Tendon Reflexes: Reflexes are normal and symmetric.   Psychiatric:         Behavior: Behavior normal.         Thought Content: Thought content normal.         Judgment: Judgment normal.           Assessment:      ICD-10-CM ICD-9-CM   1. Runny nose  R09.89 784.99   2. Chronic venous insufficiency  I87.2 459.81   3. Bilateral cold feet  R20.9 782.9   4. Lip lesion  K13.0 528.5         Plan:    Assessment & Plan    MEDICAL DECISION MAKING:  - Evaluated lip lesion persisting for 2-3 weeks, unresponsive to OTC treatments and previous cryotherapy by dermatologist Dr. Mio Michaels. Suspect possible cyst or skin cancer given duration and treatment resistance.  - Assessed healing knee abrasion, likely due to friction. No signs of infection observed.  - Noted chronic lower extremity edema and cold sensation. Suspect venous insufficiency given history of  dilated veins and previous circulation studies.  - Evaluated chronic rhinorrhea and throat clearing, possibly due to upper respiratory infection or allergies.    PATIENT EDUCATION:  - Explained the function of veins in returning blood to the heart and how impaired function can lead to swelling.  - Discussed the importance of protecting feet with socks and shoes, especially for patients with reduced mobility and circulatory issues.    ACTION ITEMS/LIFESTYLE:  - Patient to wear compression stockings, putting them on before getting out of bed  - Patient to elevate legs while sleeping  - Recommend increasing walking and mobility as much as possible, including walking inside the house with non-slip socks    MEDICATIONS:  - Started nasal spray (to be sent to [facility] pharmacy)  - Continued Buconex for throat clearing    ORDERS:  - Ultrasound of the leg ordered to assess circulation    REFERRALS:  - Referred to ENT for biopsy of lip lesion    FOLLOW UP:  - Contact Dr. Mio Michaels's office to schedule an appointment for lip biopsy  - Follow up after ultrasound for leg circulation assessment               No follow-ups on file.      Madie Davis MD

## 2025-02-18 ENCOUNTER — APPOINTMENT (OUTPATIENT)
Dept: RADIOLOGY | Facility: HOSPITAL | Age: OVER 89
End: 2025-02-18
Attending: FAMILY MEDICINE
Payer: MEDICARE

## 2025-02-18 DIAGNOSIS — I87.2 CHRONIC VENOUS INSUFFICIENCY: ICD-10-CM

## 2025-02-18 PROCEDURE — 93925 LOWER EXTREMITY STUDY: CPT | Mod: TC,PO

## 2025-02-18 PROCEDURE — 93925 LOWER EXTREMITY STUDY: CPT | Mod: 26,,, | Performed by: RADIOLOGY

## 2025-02-19 ENCOUNTER — RESULTS FOLLOW-UP (OUTPATIENT)
Dept: FAMILY MEDICINE | Facility: CLINIC | Age: OVER 89
End: 2025-02-19

## 2025-03-09 ENCOUNTER — CLINICAL SUPPORT (OUTPATIENT)
Dept: CARDIOLOGY | Facility: HOSPITAL | Age: OVER 89
End: 2025-03-09
Attending: INTERNAL MEDICINE
Payer: MEDICARE

## 2025-03-09 ENCOUNTER — CLINICAL SUPPORT (OUTPATIENT)
Dept: CARDIOLOGY | Facility: HOSPITAL | Age: OVER 89
End: 2025-03-09
Payer: MEDICARE

## 2025-03-09 DIAGNOSIS — Z95.0 PRESENCE OF CARDIAC PACEMAKER: ICD-10-CM

## 2025-03-09 DIAGNOSIS — I49.5 SICK SINUS SYNDROME: ICD-10-CM

## 2025-03-09 DIAGNOSIS — I48.0 PAROXYSMAL ATRIAL FIBRILLATION: ICD-10-CM

## 2025-03-09 PROCEDURE — 93296 REM INTERROG EVL PM/IDS: CPT | Mod: HCNC | Performed by: INTERNAL MEDICINE

## 2025-03-09 PROCEDURE — 93294 REM INTERROG EVL PM/LDLS PM: CPT | Mod: HCNC,S$GLB,, | Performed by: INTERNAL MEDICINE

## 2025-03-10 DIAGNOSIS — Z95.0 CARDIAC PACEMAKER IN SITU: Primary | ICD-10-CM

## 2025-03-10 DIAGNOSIS — I49.5 SICK SINUS SYNDROME: ICD-10-CM

## 2025-03-10 DIAGNOSIS — I49.5 TACHY-BRADY SYNDROME: ICD-10-CM

## 2025-03-11 LAB
OHS CV DC REMOTE DEVICE TYPE: NORMAL
OHS CV RV PACING PERCENT: 40 %

## 2025-03-14 ENCOUNTER — HOSPITAL ENCOUNTER (EMERGENCY)
Facility: HOSPITAL | Age: OVER 89
Discharge: HOME | End: 2025-03-14
Attending: EMERGENCY MEDICINE
Payer: MEDICARE

## 2025-03-14 VITALS
TEMPERATURE: 99 F | DIASTOLIC BLOOD PRESSURE: 75 MMHG | HEART RATE: 87 BPM | SYSTOLIC BLOOD PRESSURE: 150 MMHG | OXYGEN SATURATION: 95 % | RESPIRATION RATE: 16 BRPM

## 2025-03-14 DIAGNOSIS — D68.9 COAGULOPATHY: ICD-10-CM

## 2025-03-14 DIAGNOSIS — S01.00XA OPEN WOUND OF SCALP, UNSPECIFIED OPEN WOUND TYPE, INITIAL ENCOUNTER: Primary | ICD-10-CM

## 2025-03-14 PROCEDURE — 99283 EMERGENCY DEPT VISIT LOW MDM: CPT | Mod: HCNC

## 2025-03-14 NOTE — DISCHARGE INSTRUCTIONS
Bandages in about 24 hours.  Return if any further bleeding.  Return as needed.  See your dermatologist at next available

## 2025-03-14 NOTE — ED PROVIDER NOTES
SCRIBE #1 NOTE: I, Adan Kimbrough, am scribing for, and in the presence of, Jose Garcia Jr., MD. I have scribed the entire note.       History     Chief Complaint   Patient presents with    Head Laceration     Biopsy done recently on scalp. Woke up bleeding from site. Bleeding controlled with pressure dressing by EMS. Taking eliquis     Review of patient's allergies indicates:   Allergen Reactions    Shellfish containing products Nausea And Vomiting    Amoxicillin Rash    Iodine and iodide containing products Nausea And Vomiting         History of Present Illness     HPI    3/14/2025, 6:15 AM  History obtained from the patient and pt's daughter at bedside      History of Present Illness: Dominguez Ritchie is a 89 y.o. male patient with a PMHx of GERD, colon polyp, HTN, dyslipidemia, PAF, melanoma of the scalp, and tobacco dependence who presents to the Emergency Department for evaluation of right-sided biopsy site bleeding to right scalp which onset within the night. Pt's biopsy was from a week ago. The pt's daughter states the pt called her at 3:45 AM. The pt's pillow was drenched with blood. Pt is on Eliquis. Bleeding was controlled with pressure dressing placed by EMS. Pt's daughter lives 3 miles away from the pt. No associated sxs. Patient denies any CP, dizziness, SOB, N/V, and all other sxs at this time. No further complaints or concerns at this time.       Arrival mode: EMS     PCP: Madie Davis MD        Past Medical History:  Past Medical History:   Diagnosis Date    Abnormal exercise tolerance test 7/25/2013    Acute respiratory failure with hypoxia 6/15/2023    Arthritis     Colon polyp     Diverticulosis     Dyslipidemia 7/25/2013    GERD (gastroesophageal reflux disease)     HTN, goal below 130/80 12/3/2018    Hyperlipidemia 1980    HIGH TG    Knee pain, right 6/14/2013    Low back pain with right-sided sciatica 12/3/2018    Meningioma     Paroxysmal A-fib 10/29/2022    Personal history of colonic  polyps 5/27/2014    RLS (restless legs syndrome) 6/14/2013    Tobacco dependence     resolved    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       Past Surgical History:  Past Surgical History:   Procedure Laterality Date    A-V CARDIAC PACEMAKER INSERTION Left 11/1/2022    Procedure: INSERTION, CARDIAC PACEMAKER, DUAL CHAMBER;  Surgeon: Finn Mccrary MD;  Location: [facility] CATH LAB;  Service: Cardiology;  Laterality: Left;  biotronik AAIR    APPENDECTOMY      BACK SURGERY Bilateral 2016    CATARACT EXTRACTION, BILATERAL      COLONOSCOPY  2/2009    EYE SURGERY      INJECTION OF ANESTHETIC AGENT AROUND NERVE Right 11/18/2022    Procedure: Right Genicular nerve block w/Light RN IV Sedation;  Surgeon: Benitez Roberson MD;  Location: [facility] PAIN MGT;  Service: Pain Management;  Laterality: Right;    INSERTION OF PERMANENT PACEMAKER Left 11/1/2022    Procedure: Implant PPM;  Surgeon: Finn Mccrary MD;  Location: [facility] CATH LAB;  Service: Cardiology;  Laterality: Left;    JOINT REPLACEMENT      left knee    LUMBAR PARAVERTEBRAL FACET JOINT NERVE BLOCK Bilateral 8/29/2019    Procedure: LMBB L3,4,5;  Surgeon: Nabor Sadler MD;  Location: [facility] PAIN MGT;  Service: Pain Management;  Laterality: Bilateral;    SELECTIVE INJECTION OF ANESTHETIC AGENT AROUND LUMBAR SPINAL NERVE ROOT BY TRANSFORAMINAL APPROACH Bilateral 11/8/2021    Procedure: Bilateral L4/5 +/- L5/S1 TF DREW;  Surgeon: Nabor Sadler MD;  Location: [facility] PAIN MGT;  Service: Pain Management;  Laterality: Bilateral;    SELECTIVE INJECTION OF ANESTHETIC AGENT AROUND LUMBAR SPINAL NERVE ROOT BY TRANSFORAMINAL APPROACH Bilateral 1/4/2022    Procedure: Bilateral L4/5 + L5/S1 TF DREW;  Surgeon: Nabor Sadler MD;  Location: [facility] PAIN MGT;  Service: Pain Management;  Laterality: Bilateral;    SELECTIVE INJECTION OF ANESTHETIC AGENT AROUND LUMBAR SPINAL NERVE ROOT BY TRANSFORAMINAL APPROACH Right 12/30/2022    Procedure: Right-sided L4/5 and L5/S1 transforaminal epidural  steroid injection with RN IV sedation;  Surgeon: Benitez Roberson MD;  Location: [facility] [place];  Service: Pain Management;  Laterality: Right;    SPINE SURGERY      UPPER GASTROINTESTINAL ENDOSCOPY  2009         Family History:  Family History   Problem Relation Name Age of Onset    Heart disease Mother      Cancer Son          pancreatic     Diabetes Maternal Uncle      Kidney disease Neg Hx      Stroke Neg Hx         Social History:  Social History     Tobacco Use    Smoking status: Former     Current packs/day: 0.00     Average packs/day: 1 pack/day for 25.0 years (25.0 ttl pk-yrs)     Types: Cigarettes     Start date: 1965     Quit date: 1990     Years since quittin.2    Smokeless tobacco: Never   Substance and Sexual Activity    Alcohol use: No     Alcohol/week: 0.0 standard drinks of alcohol    Drug use: No    Sexual activity: Not Currently     Birth control/protection: None        Review of Systems     Review of Systems   Constitutional:  Negative for chills, diaphoresis and fever.   HENT:  Negative for congestion and sore throat.    Respiratory:  Negative for cough and shortness of breath.    Cardiovascular:  Negative for chest pain.   Gastrointestinal:  Negative for abdominal pain, nausea and vomiting.   Genitourinary:  Negative for dysuria.   Musculoskeletal:  Negative for back pain.   Skin:  Negative for rash.        (+) bleeding biopsy site to the right scalp   Neurological:  Negative for dizziness, weakness, light-headedness and headaches.   Hematological:  Does not bruise/bleed easily.   All other systems reviewed and are negative.       Physical Exam     Initial Vitals [25 0504]   BP Pulse Resp Temp SpO2   (!) 179/96 71 16 98.2 °F (36.8 °C) 98 %      MAP       --          Physical Exam  Nursing Notes and Vital Signs Reviewed.  Constitutional: Patient is in no acute distress. Well-developed and well-nourished.  Head: Atraumatic. Normocephalic.  Eyes:  EOM intact.  No scleral  icterus.  ENT: Mucous membranes are moist.  Nares clear 0 P clear  Neck:  Full ROM. No JVD.  Cardiovascular: Regular rate. Regular rhythm No murmurs, rubs, or gallops. Distal pulses are 2+ and symmetric  Musculoskeletal: Moves all extremities. No obvious deformities.  5 x 5 strength in all extremities   Skin: Warm and dry.  That has a nickel sized open wound to the top of the head.  This is both surgery bandages place he has been in his was removed and hemostasis has been obtained.  Patient was subsequently bandage with Surgicel has a precaution.  No active bleeding.  No sign of infection  Neurological:  Alert, awake, and appropriate.  Normal speech.  No acute focal neurological deficits are appreciated.  Two through 12 intact bilaterally.  Psychiatric: Normal affect. Good eye contact. Appropriate in content.       ED Course   Procedures  ED Vital Signs:  Vitals:    03/14/25 0504 03/14/25 0514 03/14/25 0534 03/14/25 0544   BP: (!) 179/96 (!) 175/85 (!) 144/79 (!) 149/70   Pulse: 71 74 63 74   Resp: 16      Temp: 98.2 °F (36.8 °C)      TempSrc: Oral      SpO2: 98% 95% 95% 95%    03/14/25 0604 03/14/25 0614 03/14/25 0630   BP: (!) 147/74 (!) 150/75    Pulse: 103 87    Resp:      Temp:   98.8 °F (37.1 °C)   TempSrc:   Oral   SpO2: 95% 95%        Abnormal Lab Results:  Labs Reviewed - No data to display       Imaging Results:  Imaging Results    None                   The Emergency Provider reviewed the vital signs and test results, which are outlined above.     ED Discussion       6:20 AM: Reassessed pt at this time.  Discussed with pt all pertinent ED information and results. Discussed pt dx and plan of tx. Gave pt all f/u and return to the ED instructions. All questions and concerns were addressed at this time. Pt expresses understanding of information and instructions, and is comfortable with plan to discharge. Pt is stable for discharge.    I discussed with patient and/or family/caretaker that evaluation in the ED  does not suggest any emergent or life threatening medical conditions requiring immediate intervention beyond what was provided in the ED, and I believe patient is safe for discharge.  Regardless, an unremarkable evaluation in the ED does not preclude the development or presence of a serious of life threatening condition. As such, patient was instructed to return immediately for any worsening or change in current symptoms.         Medical Decision Making  Differential diagnosis: Coagulopathy, postoperative bleed, abrasion, laceration    Patient is on Xarelto.  Patient has postop from melanoma resection to the scalp week ago.  Patient is scratching with his finger noted in his started bleeding was significant bleeding.  The patient has bandage prior to arrival with a pressure dressing which has retained good hemostasis.  Patient was subsequently bandage with Surgicel has a precaution.  He is stable safe for discharge in my opinion.  Vital signs were benign    Amount and/or Complexity of Data Reviewed  Independent Historian: caregiver     Details: pt's daughter at bedside    Risk  OTC drugs.  Prescription drug management.  Decision regarding hospitalization.                ED Medication(s):  Medications - No data to display    Discharge Medication List as of 3/14/2025  6:17 AM           Follow-up Information       Madie Davis MD.    Specialty: Family Medicine  Contact information:  [address] [address] [id number] [phone]                                 Scribe Attestation:   Scribe #1: I performed the above scribed service and the documentation accurately describes the services I performed. I attest to the accuracy of the note.     Attending:   Physician Attestation Statement for Scribe #1: I, Jose Garcia Jr., MD, personally performed the services described in this documentation, as scribed by Adan Kimbrough, in my presence, and it is both accurate and complete.           Clinical Impression        ICD-10-CM ICD-9-CM   1. Open wound of scalp, unspecified open wound type, initial encounter  S01.00XA 873.0   2. Coagulopathy  D68.9 286.9       Disposition:   Disposition: Discharged  Condition: Stable        Jose Garcia Jr., MD  03/14/25 0675

## 2025-03-18 ENCOUNTER — PATIENT MESSAGE (OUTPATIENT)
Dept: CARDIOLOGY | Facility: CLINIC | Age: OVER 89
End: 2025-03-18

## 2025-03-18 ENCOUNTER — OFFICE VISIT (OUTPATIENT)
Dept: CARDIOLOGY | Facility: CLINIC | Age: OVER 89
End: 2025-03-18
Payer: MEDICARE

## 2025-03-18 VITALS
HEIGHT: 70 IN | RESPIRATION RATE: 16 BRPM | DIASTOLIC BLOOD PRESSURE: 76 MMHG | HEART RATE: 88 BPM | WEIGHT: 147.19 LBS | BODY MASS INDEX: 21.07 KG/M2 | SYSTOLIC BLOOD PRESSURE: 128 MMHG | OXYGEN SATURATION: 96 %

## 2025-03-18 DIAGNOSIS — I35.8 AORTIC VALVE SCLEROSIS: ICD-10-CM

## 2025-03-18 DIAGNOSIS — I73.00 RAYNAUD'S DISEASE WITHOUT GANGRENE: ICD-10-CM

## 2025-03-18 DIAGNOSIS — E78.2 MIXED HYPERLIPIDEMIA: ICD-10-CM

## 2025-03-18 DIAGNOSIS — I10 ESSENTIAL HYPERTENSION: ICD-10-CM

## 2025-03-18 DIAGNOSIS — I48.0 PAROXYSMAL ATRIAL FIBRILLATION: ICD-10-CM

## 2025-03-18 DIAGNOSIS — I87.2 CHRONIC VENOUS INSUFFICIENCY: Primary | ICD-10-CM

## 2025-03-18 DIAGNOSIS — I27.20 PULMONARY HYPERTENSION: Primary | ICD-10-CM

## 2025-03-18 DIAGNOSIS — Z95.0 PRESENCE OF CARDIAC PACEMAKER: ICD-10-CM

## 2025-03-18 DIAGNOSIS — I49.5 SICK SINUS SYNDROME: ICD-10-CM

## 2025-03-18 DIAGNOSIS — Z78.9 STATIN INTOLERANCE: ICD-10-CM

## 2025-03-18 PROCEDURE — 99999 PR PBB SHADOW E&M-EST. PATIENT-LVL IV: CPT | Mod: PBBFAC,HCNC,, | Performed by: INTERNAL MEDICINE

## 2025-03-18 RX ORDER — AMLODIPINE BESYLATE 2.5 MG/1
2.5 TABLET ORAL DAILY
Qty: 30 TABLET | Refills: 11 | Status: SHIPPED | OUTPATIENT
Start: 2025-03-18 | End: 2026-03-18

## 2025-03-18 NOTE — PROGRESS NOTES
Subjective:   Patient ID:  Dominguez Ritchie is a 89 y.o. male who presents for evaluation of No chief complaint on file.        Follow-up  Pertinent negatives include no chest pain.   3.18.2025  Comes in for six-month visit.    Denies any significant FOSTER, angina, palpitations, lower extremity swelling syncope or presyncope   Compliant with medications.    He has a Raynaud's phenomenon to his lower extremities improves immediately with rubbing his legs.    He recently bumped his head.  Had it cauterized with dermatology he states.  Has a dressing over it.  Looks dry.      9.26.2024  Recently seen in the clinic.    Comes in due to easy bruising and multiple episodes of skin bleeding.  He already cut down on his Eliquis from 5 mg b.i.d. to 2.5 mg b.i.d. and still has issues with the easy bleeding.    He is wondering if he can just take baby aspirin.    No syncope or presyncope   EKG shows atrial paced rhythm.    I reviewed his device interrogation shows 800 mode switches    August 14, 2024.    Comes in for a six-month follow-up   Denies any complaints.  No falls.  No chest pain.  No dyspnea no palpitations syncope presyncope   No neuro symptoms of stroke.    No slurred speech , focal weakness or numbness, amaurosis, dizziness   No bleeding.    Compliant with medications.      1.9.2024  Comes in for a six-month follow-up.    He denies any chest pain, palpitations syncope or presyncope   Same issue with the Eliquis.  However no falls or bleeding.    His most recent echocardiogram he was in AFib during his study.    On his pacemaker interrogation he has been having atrial runs.  Unclear if AFib.      5.10.2023  Comes in for six-month follow-up.    He had his scalp lesion excised.  And a sentinel lymph node as well.    Denies any chest pain.  No syncope or presyncope.    No palpitations.    He is complaining of very easy bruising and he states that he has been taking half of the Eliquis dose.    No lower extremity swelling  orthopnea or dyspnea.    States amlodipine was stopped due to low blood pressure by PCP.      11.2022  89-year-old male with past medical history below.    He received a atrial lead pacemaker last week after presenting to the hospital due to abnormal finding on his Holter monitor with long pauses during his conversion from rapid AFib to normal sinus.    He denies any more dizziness.    He denies any chest pain, dyspnea on exertion, palpitations.    He is in sinus rhythm today and paced in the atrium.    His wound looks healing well.  His recent interrogation was normal function.    He is following instructions and arm restrictions    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       Past Surgical History:   Procedure Laterality Date    A-V CARDIAC PACEMAKER INSERTION Left 11/1/2022    Procedure: INSERTION, CARDIAC PACEMAKER, DUAL CHAMBER;  Surgeon: Finn Mccrary MD;  Location: [facility] CATH LAB;  Service: Cardiology;  Laterality: Left;  biotronik AAIR    APPENDECTOMY      BACK SURGERY Bilateral 2016    CATARACT EXTRACTION, BILATERAL      COLONOSCOPY  2/2009    EYE SURGERY      INJECTION OF ANESTHETIC AGENT AROUND NERVE Right 11/18/2022    Procedure: Right Genicular nerve block w/Light RN IV Sedation;  Surgeon: Benitez Roberson MD;  Location: [facility] PAIN MGT;  Service: Pain Management;  Laterality: Right;    INSERTION OF PERMANENT PACEMAKER Left  2022    Procedure: Implant PPM;  Surgeon: Finn Mccrary MD;  Location: [facility] CATH LAB;  Service: Cardiology;  Laterality: Left;    JOINT REPLACEMENT      left knee    LUMBAR PARAVERTEBRAL FACET JOINT NERVE BLOCK Bilateral 2019    Procedure: LMBB L3,4,5;  Surgeon: Nabor Sadler MD;  Location: [facility] PAIN MGT;  Service: Pain Management;  Laterality: Bilateral;    SELECTIVE INJECTION OF ANESTHETIC AGENT AROUND LUMBAR SPINAL NERVE ROOT BY TRANSFORAMINAL APPROACH Bilateral 2021    Procedure: Bilateral L4/5 +/- L5/S1 TF DREW;  Surgeon: Nabor Sadler MD;  Location: [facility] PAIN MGT;  Service: Pain Management;  Laterality: Bilateral;    SELECTIVE INJECTION OF ANESTHETIC AGENT AROUND LUMBAR SPINAL NERVE ROOT BY TRANSFORAMINAL APPROACH Bilateral 2022    Procedure: Bilateral L4/5 + L5/S1 TF DREW;  Surgeon: Nabor Sadler MD;  Location: [facility] PAIN MGT;  Service: Pain Management;  Laterality: Bilateral;    SELECTIVE INJECTION OF ANESTHETIC AGENT AROUND LUMBAR SPINAL NERVE ROOT BY TRANSFORAMINAL APPROACH Right 2022    Procedure: Right-sided L4/5 and L5/S1 transforaminal epidural steroid injection with RN IV sedation;  Surgeon: Benitez Roberson MD;  Location: [facility] PAIN MGT;  Service: Pain Management;  Laterality: Right;    SPINE SURGERY      UPPER GASTROINTESTINAL ENDOSCOPY  2009       Social History     Tobacco Use    Smoking status: Former     Current packs/day: 0.00     Average packs/day: 1 pack/day for 25.0 years (25.0 ttl pk-yrs)     Types: Cigarettes     Start date: 1965     Quit date: 1990     Years since quittin.2    Smokeless tobacco: Never   Substance Use Topics    Alcohol use: No     Alcohol/week: 0.0 standard drinks of alcohol    Drug use: No       Family History   Problem Relation Name Age of Onset    Heart disease Mother      Cancer Son          pancreatic     Diabetes Maternal Uncle      Kidney disease Neg Hx      Stroke Neg Hx         Review of Systems   Cardiovascular:  Negative for  chest pain, dyspnea on exertion, palpitations and syncope.   Genitourinary: Negative.    Neurological: Negative.        Current Outpatient Medications on File Prior to Visit   Medication Sig    apixaban (ELIQUIS) 2.5 mg Tab Take 1 tablet (2.5 mg total) by mouth 2 (two) times daily.    b complex vitamins tablet Take 1 tablet by mouth once daily.    bisacodyL (DULCOLAX) 5 mg EC tablet Take 5 mg by mouth daily as needed for Constipation.    cephALEXin (KEFLEX) 500 MG capsule Take 1 capsule (500 mg total) by mouth every 8 (eight) hours.    dutasteride (AVODART) 0.5 mg capsule Take 1 capsule (0.5 mg total) by mouth once daily.    ferrous gluconate (FERGON) 324 MG tablet Take 1 tablet (324 mg total) by mouth 2 (two) times daily with meals.    fluocinolone acetonide oiL 0.01 % Drop Place 5 drops in ear(s) 2 (two) times a day.    furosemide (LASIX) 20 MG tablet Take 1 tablet (20 mg total) by mouth once daily.    ipratropium (ATROVENT) 21 mcg (0.03 %) nasal spray 2 sprays by Each Nostril route 3 (three) times daily.    mupirocin (BACTROBAN) 2 % ointment Apply topically 2 (two) times daily.    pantoprazole (PROTONIX) 40 MG tablet Take 1 tablet (40 mg total) by mouth once daily.    potassium chloride SA (K-DUR,KLOR-CON) 20 MEQ tablet Take 1 tablet (20 mEq total) by mouth once daily.    rOPINIRole (REQUIP) 1 MG tablet Take 1 tablet (1 mg total) by mouth every evening.    rosuvastatin (CRESTOR) 5 MG tablet Take 1 tablet by mouth once daily    triamcinolone acetonide 0.1% (KENALOG) 0.1 % cream Apply topically 2 (two) times daily as needed.     No current facility-administered medications on file prior to visit.       Objective:   Objective:  Wt Readings from Last 3 Encounters:   03/18/25 66.7 kg (147 lb 2.5 oz)   02/13/25 67 kg (147 lb 11.3 oz)   12/11/24 67.1 kg (148 lb 0.6 oz)     Temp Readings from Last 3 Encounters:   03/14/25 98.8 °F (37.1 °C) (Oral)   07/20/23 97.1 °F (36.2 °C)   06/21/23 97.7 °F (36.5 °C)     BP Readings  from Last 3 Encounters:   03/18/25 128/76   03/14/25 (!) 150/75   02/13/25 136/80     Pulse Readings from Last 3 Encounters:   03/18/25 88   03/14/25 87   02/13/25 67       Physical Exam  Vitals reviewed.   Constitutional:       Appearance: He is well-developed.   Neck:      Vascular: No carotid bruit.   Cardiovascular:      Rate and Rhythm: Normal rate and regular rhythm.      Pulses: Intact distal pulses.      Heart sounds: Normal heart sounds. No murmur heard.  Pulmonary:      Breath sounds: Normal breath sounds.   Neurological:      Mental Status: He is oriented to person, place, and time.         Lab Results   Component Value Date    CHOL 139 03/16/2023    CHOL 151 07/05/2022    CHOL 143 01/03/2022     Lab Results   Component Value Date    HDL 61 03/16/2023    HDL 58 07/05/2022    HDL 51 01/03/2022     Lab Results   Component Value Date    LDLCALC 38.4 (L) 03/16/2023    LDLCALC 54.6 (L) 07/05/2022    LDLCALC 57.8 (L) 01/03/2022     Lab Results   Component Value Date    TRIG 198 (H) 03/16/2023    TRIG 192 (H) 07/05/2022    TRIG 171 (H) 01/03/2022     Lab Results   Component Value Date    CHOLHDL 43.9 03/16/2023    CHOLHDL 38.4 07/05/2022    CHOLHDL 35.7 01/03/2022       Chemistry        Component Value Date/Time     05/13/2024 0903    K 4.6 05/13/2024 0903     05/13/2024 0903    CO2 26 05/13/2024 0903    BUN 14 05/13/2024 0903    CREATININE 0.9 05/13/2024 0903     05/13/2024 0903        Component Value Date/Time    CALCIUM 10.1 05/13/2024 0903    ALKPHOS 61 05/13/2024 0903    AST 18 05/13/2024 0903    ALT 10 05/13/2024 0903    BILITOT 0.6 05/13/2024 0903    ESTGFRAFRICA 88 04/26/2023 1915    ESTGFRAFRICA >60.0 07/05/2022 1444    EGFRNONAA >60.0 07/05/2022 1444          Lab Results   Component Value Date    TSH 1.733 10/29/2022     Lab Results   Component Value Date    INR 1.0 06/14/2023    INR 1.0 10/29/2022    INR 1.0 10/19/2020     Lab Results   Component Value Date    WBC 5.81 05/13/2024     HGB 14.8 05/13/2024    HCT 45.1 05/13/2024    MCV 98 05/13/2024     05/13/2024     BNP  @LABRCNTIP(BNP,BNPTRIAGEBLO)@  CrCl cannot be calculated (Patient's most recent lab result is older than the maximum 7 days allowed.).     Imaging:  ======  Results for orders placed during the hospital encounter of 09/16/22    Echo    Interpretation Summary  · The left ventricle is normal in size with concentric remodeling and normal systolic function.  · Indeterminate left ventricular diastolic function.  · The estimated PA systolic pressure is 37 mmHg.  · Normal right ventricular size with normal right ventricular systolic function.  · Normal central venous pressure (3 mmHg).  · The estimated ejection fraction is 55%.  · Mild aortic regurgitation.  · Mild mitral regurgitation.  · Mild tricuspid regurgitation.    No results found for this or any previous visit.    Results for orders placed in visit on 04/01/13    US Carotid Bilateral    Narrative  DATE OF EXAM: Apr 8 2013    [facility]   0519  -  US EXTRACRANIAL COMPLETE BILAT:   \  [id number]    CLINICAL HISTORY:   \401.9  HYPERTENSION NOS    PROCEDURE COMMENT:   \    ICD 9 CODE(S):   (\)    CPT 4 CODE(S)/MODIFIER(S):   (\)    Comparison: none    Findings:    Real-time, color flow and Doppler imaging performed.    Minimal calcific plaque noted within the left bulb.    ICA                                    R  L  Peak systolic velocity:         124   Cm/sec               115   cm/sec  Peak diastolic velocity:        30   Cm/sec               33   cm/sec  Systolic velocity ratio:          1.3                               1.2  Diastolic velocity ratio:         1.4                               1.5      Vertebral artery flow:               Antegrade                antegrade    ECA flow:                                   Antegrade  antegrade    spectral waveforms appear within normal limits bilaterally.    Impression  1.  No hemodynamically significant plaque formation or stenosis  identified.    2.  Further evaluation and/or follow-up imaging as clinically warranted.  A      Electronically signed by: FILEMON BRUNO III, MD  Date:     04/09/13  Time:    09:05        : LISA  Transcribe Date/Time: Apr 9 2013  9:05A  Dictated by : FILEMON BRUNO III, MD  Report reviewed by:  Read On:  \  Images were reviewed, findings were verified and document was  electronically  SIGNED BY: FILEMON BRUNO III, MD On: Apr 9 2013  9:05A    Results for orders placed during the hospital encounter of 01/16/17    X-Ray Chest PA And Lateral    Narrative  Comparison: 01/21/2011    2 views    Findings:    Heart size within normal limits.  Pulmonary vasculature is normal and symmetric.  Mild tortuosity of the aorta underlying atherosclerotic calcification.  Calcified AP window node again identified.  Trachea is normal in position along for slight rotation.  No consolidation or effusion.  Granuloma identified within the LEFT upper lobe.  Mild osteopenia and spondylosis with wedge-shaped compression deformity L1 compression deformity noted.  This was identified on L-spine series from 03/26/2015.  IMPRESSION:      1.  Stable exam without acute infiltrate.  See above.    2.  Known L1 compression deformity.      Electronically signed by: FILEMON BRUNO III, MD  Date:     01/16/17  Time:    16:34    No results found for this or any previous visit.    No valid procedures specified.    Diagnostic Results:  ECG: Reviewed    The ASCVD Risk score (Brennan ROBISON, et al., 2019) failed to calculate for the following reasons:    The 2019 ASCVD risk score is only valid for ages 40 to 79    Assessment and Plan:   Pulmonary hypertension    Sick sinus syndrome    Raynaud's disease without gangrene  -     amLODIPine (NORVASC) 2.5 MG tablet; Take 1 tablet (2.5 mg total) by mouth once daily.  Dispense: 30 tablet; Refill: 11    Presence of cardiac pacemaker    Paroxysmal atrial fibrillation    Mixed hyperlipidemia    Essential  hypertension    Statin intolerance    Aortic valve sclerosis      2024 Normal JEWEL.  BP at goal for age less than 150/80.  Angina free.  Euvolemic.  Device function normal.    Lower Eliquis to 2.5 mg daily.  Possibly alternate with 2.5 b.i.d. in the future  Follow with device Clinic and EP as well.    Add low-dose amlodipine for Raynaud's phenomenon  Follow-up in 6 months.

## 2025-04-08 DIAGNOSIS — I73.9 PAD (PERIPHERAL ARTERY DISEASE): Primary | ICD-10-CM

## 2025-04-09 ENCOUNTER — TELEPHONE (OUTPATIENT)
Dept: VASCULAR SURGERY | Facility: CLINIC | Age: OVER 89
End: 2025-04-09
Payer: MEDICARE

## 2025-04-12 DIAGNOSIS — E78.2 MIXED HYPERLIPIDEMIA: ICD-10-CM

## 2025-04-12 NOTE — TELEPHONE ENCOUNTER
Care Due:                  Date            Visit Type   Department     Provider  --------------------------------------------------------------------------------                                EP -                              PRIMARY      [facility]  Last Visit: 02-      CARE ([facility])   MEDICINE       Madie Davis  Next Visit: None Scheduled  None         None Found                                                            Last  Test          Frequency    Reason                     Performed    Due Date  --------------------------------------------------------------------------------    CMP.........  12 months..  furosemide, potassium,     05- 05-                             rosuvastatin.............    Lipid Panel.  12 months..  rosuvastatin.............  03- 03-    [facility] Embedded Care Due Messages. Reference number: [id number].   4/12/2025 9:06:16 AM CDT

## 2025-04-14 RX ORDER — ROSUVASTATIN CALCIUM 5 MG/1
5 TABLET, COATED ORAL
Qty: 90 TABLET | Refills: 0 | OUTPATIENT
Start: 2025-04-14

## 2025-04-18 DIAGNOSIS — E78.2 MIXED HYPERLIPIDEMIA: ICD-10-CM

## 2025-04-18 NOTE — TELEPHONE ENCOUNTER
No care due was identified.  [facility] Embedded Care Due Messages. Reference number: [id number].   4/18/2025 9:51:03 AM CDT

## 2025-04-21 DIAGNOSIS — E78.2 MIXED HYPERLIPIDEMIA: ICD-10-CM

## 2025-04-21 RX ORDER — ROSUVASTATIN CALCIUM 5 MG/1
5 TABLET, COATED ORAL
Qty: 20 TABLET | Refills: 0 | Status: SHIPPED | OUTPATIENT
Start: 2025-04-21

## 2025-04-21 NOTE — TELEPHONE ENCOUNTER
"Refill Routing Note   Medication(s) are not appropriate for processing by [facility] for the following reason(s):        Required labs outdated    [facility] action(s):  Defer        Medication Therapy Plan: last message stated "due for appt" only 20 days allowed until OV      Appointments  past 12m or future 3m with PCP    Date Provider   Last Visit   2/13/2025 Madie Davis MD   Next Visit   Visit date not found Madie Davis MD   ED visits in past 90 days: 1        Note composed:10:40 AM 04/21/2025          "

## 2025-04-22 RX ORDER — ROSUVASTATIN CALCIUM 5 MG/1
5 TABLET, COATED ORAL DAILY
Qty: 20 TABLET | Refills: 0 | OUTPATIENT
Start: 2025-04-22

## 2025-04-22 NOTE — TELEPHONE ENCOUNTER
Refill Decision Note   Dominguez Ritchie  is requesting a refill authorization.  Brief Assessment and Rationale for Refill:  Quick Discontinue     Medication Therapy Plan: E-Prescribing Status: Receipt confirmed by pharmacy (4/21/2025  1:21 PM CDT)      Comments:     Note composed:8:48 AM 04/22/2025

## 2025-04-22 NOTE — TELEPHONE ENCOUNTER
----- Message from Janis sent at 4/22/2025  8:31 AM CDT -----  He is needing a refill on his rosuvastatin melody 5mg sent to [facility].

## 2025-04-22 NOTE — TELEPHONE ENCOUNTER
No care due was identified.  [facility] Embedded Care Due Messages. Reference number: [id number].   4/21/2025 9:51:44 PM CDT

## 2025-04-28 ENCOUNTER — PATIENT MESSAGE (OUTPATIENT)
Dept: FAMILY MEDICINE | Facility: CLINIC | Age: OVER 89
End: 2025-04-28
Payer: MEDICARE

## 2025-04-28 DIAGNOSIS — Z79.899 ENCOUNTER FOR LONG-TERM (CURRENT) USE OF MEDICATIONS: Primary | ICD-10-CM

## 2025-05-09 ENCOUNTER — LAB VISIT (OUTPATIENT)
Dept: LAB | Facility: HOSPITAL | Age: OVER 89
End: 2025-05-09
Attending: FAMILY MEDICINE
Payer: MEDICARE

## 2025-05-09 ENCOUNTER — RESULTS FOLLOW-UP (OUTPATIENT)
Dept: FAMILY MEDICINE | Facility: CLINIC | Age: OVER 89
End: 2025-05-09

## 2025-05-09 ENCOUNTER — OFFICE VISIT (OUTPATIENT)
Dept: FAMILY MEDICINE | Facility: CLINIC | Age: OVER 89
End: 2025-05-09
Payer: MEDICARE

## 2025-05-09 VITALS
WEIGHT: 149.56 LBS | DIASTOLIC BLOOD PRESSURE: 80 MMHG | BODY MASS INDEX: 21.41 KG/M2 | HEART RATE: 76 BPM | OXYGEN SATURATION: 96 % | SYSTOLIC BLOOD PRESSURE: 130 MMHG | HEIGHT: 70 IN

## 2025-05-09 DIAGNOSIS — I10 ESSENTIAL HYPERTENSION: ICD-10-CM

## 2025-05-09 DIAGNOSIS — E78.2 MIXED HYPERLIPIDEMIA: ICD-10-CM

## 2025-05-09 DIAGNOSIS — Z74.09 IMPAIRED FUNCTIONAL MOBILITY, BALANCE, GAIT, AND ENDURANCE: Primary | ICD-10-CM

## 2025-05-09 DIAGNOSIS — I48.0 PAROXYSMAL A-FIB: ICD-10-CM

## 2025-05-09 DIAGNOSIS — Z74.09 IMPAIRED FUNCTIONAL MOBILITY, BALANCE, GAIT, AND ENDURANCE: ICD-10-CM

## 2025-05-09 LAB
ABSOLUTE EOSINOPHIL (OHS): 0.1 K/UL
ABSOLUTE MONOCYTE (OHS): 0.66 K/UL (ref 0.3–1)
ABSOLUTE NEUTROPHIL COUNT (OHS): 5.1 K/UL (ref 1.8–7.7)
ALBUMIN SERPL BCP-MCNC: 3.7 G/DL (ref 3.5–5.2)
ALP SERPL-CCNC: 62 UNIT/L (ref 40–150)
ALT SERPL W/O P-5'-P-CCNC: 10 UNIT/L (ref 10–44)
ANION GAP (OHS): 9 MMOL/L (ref 8–16)
AST SERPL-CCNC: 21 UNIT/L (ref 11–45)
BASOPHILS # BLD AUTO: 0.04 K/UL
BASOPHILS NFR BLD AUTO: 0.6 %
BILIRUB SERPL-MCNC: 0.7 MG/DL (ref 0.1–1)
BUN SERPL-MCNC: 12 MG/DL (ref 10–30)
CALCIUM SERPL-MCNC: 9.1 MG/DL (ref 8.7–10.5)
CHLORIDE SERPL-SCNC: 104 MMOL/L (ref 95–110)
CHOLEST SERPL-MCNC: 140 MG/DL (ref 120–199)
CHOLEST/HDLC SERPL: 2.5 {RATIO} (ref 2–5)
CO2 SERPL-SCNC: 26 MMOL/L (ref 23–29)
CREAT SERPL-MCNC: 0.8 MG/DL (ref 0.5–1.4)
EAG (OHS): 111 MG/DL (ref 68–131)
ERYTHROCYTE [DISTWIDTH] IN BLOOD BY AUTOMATED COUNT: 12.6 % (ref 11.5–14.5)
GFR SERPLBLD CREATININE-BSD FMLA CKD-EPI: >60 ML/MIN/1.73/M2
GLUCOSE SERPL-MCNC: 104 MG/DL (ref 70–110)
HBA1C MFR BLD: 5.5 % (ref 4–5.6)
HCT VFR BLD AUTO: 41.4 % (ref 40–54)
HDLC SERPL-MCNC: 56 MG/DL (ref 40–75)
HDLC SERPL: 40 % (ref 20–50)
HGB BLD-MCNC: 13.6 GM/DL (ref 14–18)
IMM GRANULOCYTES # BLD AUTO: 0.02 K/UL (ref 0–0.04)
IMM GRANULOCYTES NFR BLD AUTO: 0.3 % (ref 0–0.5)
LDLC SERPL CALC-MCNC: 51 MG/DL (ref 63–159)
LYMPHOCYTES # BLD AUTO: 1.16 K/UL (ref 1–4.8)
MCH RBC QN AUTO: 32.4 PG (ref 27–31)
MCHC RBC AUTO-ENTMCNC: 32.9 G/DL (ref 32–36)
MCV RBC AUTO: 99 FL (ref 82–98)
NONHDLC SERPL-MCNC: 84 MG/DL
NUCLEATED RBC (/100WBC) (OHS): 0 /100 WBC
PLATELET # BLD AUTO: 211 K/UL (ref 150–450)
PMV BLD AUTO: 10.2 FL (ref 9.2–12.9)
POTASSIUM SERPL-SCNC: 4.6 MMOL/L (ref 3.5–5.1)
PROT SERPL-MCNC: 6.4 GM/DL (ref 6–8.4)
RBC # BLD AUTO: 4.2 M/UL (ref 4.6–6.2)
RELATIVE EOSINOPHIL (OHS): 1.4 %
RELATIVE LYMPHOCYTE (OHS): 16.4 % (ref 18–48)
RELATIVE MONOCYTE (OHS): 9.3 % (ref 4–15)
RELATIVE NEUTROPHIL (OHS): 72 % (ref 38–73)
SODIUM SERPL-SCNC: 139 MMOL/L (ref 136–145)
TRIGL SERPL-MCNC: 165 MG/DL (ref 30–150)
WBC # BLD AUTO: 7.08 K/UL (ref 3.9–12.7)

## 2025-05-09 PROCEDURE — 99999 PR PBB SHADOW E&M-EST. PATIENT-LVL III: CPT | Mod: PBBFAC,HCNC,, | Performed by: FAMILY MEDICINE

## 2025-05-09 PROCEDURE — 80061 LIPID PANEL: CPT | Mod: HCNC

## 2025-05-09 PROCEDURE — 85025 COMPLETE CBC W/AUTO DIFF WBC: CPT | Mod: HCNC

## 2025-05-09 PROCEDURE — 36415 COLL VENOUS BLD VENIPUNCTURE: CPT | Mod: HCNC,PN

## 2025-05-09 PROCEDURE — 83036 HEMOGLOBIN GLYCOSYLATED A1C: CPT | Mod: DBM,HCNC

## 2025-05-09 PROCEDURE — 80053 COMPREHEN METABOLIC PANEL: CPT | Mod: HCNC

## 2025-05-09 RX ORDER — TRAMADOL HYDROCHLORIDE 50 MG/1
50 TABLET ORAL EVERY 6 HOURS PRN
COMMUNITY
Start: 2025-05-06

## 2025-05-09 RX ORDER — ROSUVASTATIN CALCIUM 5 MG/1
5 TABLET, COATED ORAL DAILY
Qty: 90 TABLET | Refills: 3 | Status: SHIPPED | OUTPATIENT
Start: 2025-05-09

## 2025-05-09 NOTE — PROGRESS NOTES
Chief Complaint:    Chief Complaint   Patient presents with    Follow-up    Medication Refill       History of Present Illness:    History of Present Illness    Patient presents today for follow up. He reports feeling dizzy and worn down with generalized weakness. He experiences balance difficulties requiring support while standing and episodes of dizziness while sitting. He reports home blood pressure readings are generally within normal range, with systolic pressures sometimes less than 120 mmHg, occasionally dropping to 118 mmHg. He recently underwent a dental procedure involving cutting and stitching, which has prevented him from wearing dentures. His diet is currently limited to soup and soft foods due to this limitation. His Eliquis dose has been reduced to one pill at night. He is scheduled for vascular evaluation on the 19th of this month with pending CTA.      ROS:  General: denies fever, denies chills, admits fatigue, denies weight gain, denies weight loss, denies loss of appetite  Eyes: denies vision changes, denies blurry vision, denies eye pain, denies eye discharge  ENT: denies ear pain, denies hearing loss, denies tinnitus, denies nasal congestion, denies sore throat  Cardiovascular: denies chest pain, denies palpitations, denies lower extremity edema  Respiratory: denies cough, denies shortness of breath, denies wheezing, denies sputum production  Endocrine: denies polyuria, denies polydipsia, denies heat intolerance, denies cold intolerance  Gastrointestinal: denies abdominal pain, denies heartburn, denies nausea, denies vomiting, denies diarrhea, denies constipation, denies blood in stool, admits feeding difficulties, admits teeth problems  Genitourinary: denies dysuria, denies urgency, denies frequency, denies hematuria, denies nocturia, denies incontinence  Heme & Lymphatic: denies easy or excessive bleeding, denies easy bruising, denies swollen lymph nodes  Musculoskeletal: denies muscle pain,  denies back pain, denies joint pain, denies joint swelling  Skin: denies rash, denies lesion, denies itching, denies skin texture changes, denies skin color changes  Neurological: denies headache, admits dizziness, denies numbness, denies tingling, denies seizure activity, denies speech difficulty, denies memory loss, denies confusion , admits balance issues  Psychiatric: denies anxiety, denies depression, denies sleep difficulty           Past Medical History:   Diagnosis Date    Abnormal exercise tolerance test 2013    Acute respiratory failure with hypoxia 6/15/2023    Arthritis     Colon polyp     Diverticulosis     Dyslipidemia 2013    GERD (gastroesophageal reflux disease)     HTN, goal below 130/80 12/3/2018    Hyperlipidemia 1980    HIGH TG    Knee pain, right 2013    Low back pain with right-sided sciatica 12/3/2018    Meningioma     Paroxysmal A-fib 10/29/2022    Personal history of colonic polyps 2014    RLS (restless legs syndrome) 2013    Tobacco dependence     resolved    West Nile meningitis     per patient       Social History:  Social History     Socioeconomic History    Marital status:    Tobacco Use    Smoking status: Former     Current packs/day: 0.00     Average packs/day: 1 pack/day for 25.0 years (25.0 ttl pk-yrs)     Types: Cigarettes, Pipe     Start date: 1965     Quit date: 1990     Years since quittin.3    Smokeless tobacco: Never   Substance and Sexual Activity    Alcohol use: No    Drug use: No    Sexual activity: Not Currently     Birth control/protection: None     Social Drivers of Health     Financial Resource Strain: Low Risk  (2024)    Overall Financial Resource Strain (CARDIA)     Difficulty of Paying Living Expenses: Not hard at all   Food Insecurity: No Food Insecurity (2024)    Hunger Vital Sign     Worried About Running Out of Food in the Last Year: Never true     Ran Out of Food in the Last Year: Never true  "  Transportation Needs: No Transportation Needs (4/13/2024)    PRAPARE - Transportation     Lack of Transportation (Medical): No     Lack of Transportation (Non-Medical): No   Physical Activity: Unknown (8/13/2024)    Exercise Vital Sign     Days of Exercise per Week: 0 days   Stress: No Stress Concern Present (8/13/2024)    [employer] - Occupational Stress Questionnaire     Feeling of Stress : Not at all   Housing Stability: Low Risk  (4/10/2023)    Housing Stability Vital Sign     Unable to Pay for Housing in the Last Year: No     Number of Places Lived in the Last Year: 1     Unstable Housing in the Last Year: No       Family History:   family history includes Cancer in his son; Diabetes in his maternal uncle; Heart disease in his mother.    Health Maintenance   Topic Date Due    Shingles Vaccine (1 of 2) Never done    RSV Vaccine (Age 60+ and Pregnant patients) (1 - 1-dose 75+ series) Never done    Hemoglobin A1c (Prediabetes)  03/16/2024    Lipid Panel  03/16/2024    COVID-19 Vaccine (4 - 2024-25 season) 09/01/2024    TETANUS VACCINE  11/16/2025    Influenza Vaccine  Completed    Pneumococcal Vaccines (Age 50+)  Completed       Exam:Physical     Vital Signs  Pulse: 76  SpO2: 96 %  BP: 130/80  Pain Score: 0-No pain  Height and Weight  Height: 5' 10" (177.8 cm)  Weight: 67.8 kg (149 lb 9.3 oz)  BSA (Calculated - sq m): 1.83 sq meters  BMI (Calculated): 21.5  Weight in (lb) to have BMI = 25: 173.9]    Body mass index is 21.46 kg/m².    Physical Exam    Vitals: Blood pressure is okay.  General: Well-developed. Well-nourished. No acute distress.  Eyes: EOMI. Sclerae anicteric.  HENT: Normocephalic. Atraumatic. Nares patent. Moist oral mucosa.  Cardiovascular: Regular rate. Regular rhythm. No murmurs. No rubs. No gallops. Normal S1, S2.  Respiratory: Normal respiratory effort. Clear to auscultation bilaterally. No rales. No rhonchi. No wheezing.  Musculoskeletal: No  obvious " deformity.  Extremities: No lower extremity edema.  Neurological: Alert & oriented x3. No slurred speech. Normal gait.  Psychiatric: Normal mood. Normal affect. Good insight. Good judgment.  Skin: Warm. Dry. No rash.           Assessment:        ICD-10-CM ICD-9-CM   1. Impaired functional mobility, balance, gait, and endurance  Z74.09 V49.89   2. Mixed hyperlipidemia  E78.2 272.2   3. Essential hypertension  I10 401.9   4. Paroxysmal A-fib  I48.0 427.31         Plan:    Assessment & Plan    MEDICAL DECISION MAKING:  - Assessed BP, noting it was WNL.  - Evaluated reported symptoms of dizziness, weakness, and balance issues.  - Considered current anticoagulation therapy, noting recent reduction in Eliquis dosage.  - Reviewed upcoming vascular evaluation and CTA ordered by another provider.      ACTION ITEMS/LIFESTYLE:  - Complete labs at the  before leaving.    MEDICATIONS:  - Continue Eliquis at reduced dose of 1 pill at night, as recently adjusted by another provider.    ORDERS:  - Ordered CBC, CMP, and lipid panel.    FOLLOW UP:  - Follow up after vascular specialist's evaluation to determine if physical therapy referral is appropriate.         Dominguez was seen today for follow-up and medication refill.    Diagnoses and all orders for this visit:    Impaired functional mobility, balance, gait, and endurance  -     CBC Auto Differential; Future  -     Comprehensive Metabolic Panel; Future  -     Hemoglobin A1C; Future  -     Lipid Panel; Future    Mixed hyperlipidemia  -     rosuvastatin (CRESTOR) 5 MG tablet; Take 1 tablet (5 mg total) by mouth once daily.  -     CBC Auto Differential; Future  -     Comprehensive Metabolic Panel; Future  -     Hemoglobin A1C; Future  -     Lipid Panel; Future    Essential hypertension  -     CBC Auto Differential; Future  -     Comprehensive Metabolic Panel; Future  -     Hemoglobin A1C; Future  -     Lipid Panel; Future    Paroxysmal A-fib  -     CBC Auto Differential;  Future  -     Comprehensive Metabolic Panel; Future  -     Hemoglobin A1C; Future  -     Lipid Panel; Future        No follow-ups on file.      Madie Davis MD

## 2025-05-19 ENCOUNTER — INITIAL CONSULT (OUTPATIENT)
Dept: VASCULAR SURGERY | Facility: CLINIC | Age: OVER 89
End: 2025-05-19
Payer: MEDICARE

## 2025-05-19 VITALS — HEART RATE: 92 BPM | SYSTOLIC BLOOD PRESSURE: 161 MMHG | DIASTOLIC BLOOD PRESSURE: 82 MMHG

## 2025-05-19 DIAGNOSIS — I87.2 VENOUS INSUFFICIENCY: Primary | ICD-10-CM

## 2025-05-19 DIAGNOSIS — E78.00 PURE HYPERCHOLESTEROLEMIA: ICD-10-CM

## 2025-05-19 DIAGNOSIS — I10 ESSENTIAL HYPERTENSION: ICD-10-CM

## 2025-05-19 PROCEDURE — 99999 PR PBB SHADOW E&M-EST. PATIENT-LVL III: CPT | Mod: PBBFAC,HCNC,, | Performed by: STUDENT IN AN ORGANIZED HEALTH CARE EDUCATION/TRAINING PROGRAM

## 2025-05-19 PROCEDURE — 99204 OFFICE O/P NEW MOD 45 MIN: CPT | Mod: HCNC,S$GLB,, | Performed by: STUDENT IN AN ORGANIZED HEALTH CARE EDUCATION/TRAINING PROGRAM

## 2025-05-19 NOTE — ASSESSMENT & PLAN NOTE
Recommend compression garments be worn daily. Can remove at night. Can return to see me if he has persistent symptoms. If he does we may want to obtain tilt study to check for superficial venous reflux. No surgery is indicated at this time.

## 2025-05-19 NOTE — PROGRESS NOTES
Andres Kushal  [facility]    OFFICE NOTE    DATE OF VISIT: 2025  PATIENT NAME: Dominguez Ritchie  : 1932  MRN: [id number]  PRIMARY CARE PHYSICIAN: Madie Davis MD  CARDIOLOGIST:   REFERRING PROVIDER: Self, Aaareferral    CHIEF COMPLAINT   Chief Complaint   Patient presents with    Consult     Normal JEWEL 24  Art duplex 25 with no stenosis noted       HISTORY OF PRESENT ILLNESS:  Dominguez Ritchie is a 89 y.o. male who presents to clinic today comes in with congestion of his legs. When he wakes up in the morning his symptoms are minimal but as day progresses they get worse. His legs get swollen only slightly. He has not used compression stockings in the past. Denies claudication    ALLERGIES:  Review of patient's allergies indicates:   Allergen Reactions    Shellfish containing products Nausea And Vomiting    Amoxicillin Rash    Iodine and iodide containing products Nausea And Vomiting       PAST MEDICAL HISTORY:  Past Medical History:   Diagnosis Date    Abnormal exercise tolerance test 2013    Acute respiratory failure with hypoxia 6/15/2023    Arthritis     Colon polyp     Diverticulosis     Dyslipidemia 2013    GERD (gastroesophageal reflux disease)     HTN, goal below 130/80 12/3/2018    Hyperlipidemia 1980    HIGH TG    Knee pain, right 2013    Low back pain with right-sided sciatica 12/3/2018    Meningioma     Paroxysmal A-fib 10/29/2022    Personal history of colonic polyps 2014    RLS (restless legs syndrome) 2013    Tobacco dependence     resolved    West Nile meningitis     per patient       PAST SURGICAL HISTORY:  Past Surgical History:   Procedure Laterality Date    A-V CARDIAC PACEMAKER INSERTION Left 2022    Procedure: INSERTION, CARDIAC PACEMAKER, DUAL CHAMBER;  Surgeon: Finn Mccrary MD;  Location: [facility] CATH LAB;  Service: Cardiology;  Laterality: Left;  biotronik AAIR    APPENDECTOMY      BACK SURGERY Bilateral      CATARACT EXTRACTION, BILATERAL      COLONOSCOPY  2/2009    EYE SURGERY      INJECTION OF ANESTHETIC AGENT AROUND NERVE Right 11/18/2022    Procedure: Right Genicular nerve block w/Light RN IV Sedation;  Surgeon: Benitez Roberson MD;  Location: [facility];  Service: Pain Management;  Laterality: Right;    INSERTION OF PERMANENT PACEMAKER Left 11/1/2022    Procedure: Implant PPM;  Surgeon: Finn Mccrary MD;  Location: [facility] CATH LAB;  Service: Cardiology;  Laterality: Left;    JOINT REPLACEMENT      left knee    LUMBAR PARAVERTEBRAL FACET JOINT NERVE BLOCK Bilateral 8/29/2019    Procedure: LMBB L3,4,5;  Surgeon: Nabor Sadler MD;  Location: [facility];  Service: Pain Management;  Laterality: Bilateral;    SELECTIVE INJECTION OF ANESTHETIC AGENT AROUND LUMBAR SPINAL NERVE ROOT BY TRANSFORAMINAL APPROACH Bilateral 11/8/2021    Procedure: Bilateral L4/5 +/- L5/S1 TF DREW;  Surgeon: Nabor Sadler MD;  Location: [facility];  Service: Pain Management;  Laterality: Bilateral;    SELECTIVE INJECTION OF ANESTHETIC AGENT AROUND LUMBAR SPINAL NERVE ROOT BY TRANSFORAMINAL APPROACH Bilateral 1/4/2022    Procedure: Bilateral L4/5 + L5/S1 TF DREW;  Surgeon: Nabor Sadler MD;  Location: [facility];  Service: Pain Management;  Laterality: Bilateral;    SELECTIVE INJECTION OF ANESTHETIC AGENT AROUND LUMBAR SPINAL NERVE ROOT BY TRANSFORAMINAL APPROACH Right 12/30/2022    Procedure: Right-sided L4/5 and L5/S1 transforaminal epidural steroid injection with RN IV sedation;  Surgeon: Benitez Roberson MD;  Location: [facility];  Service: Pain Management;  Laterality: Right;    SPINE SURGERY      UPPER GASTROINTESTINAL ENDOSCOPY  2/2009       SOCIAL HISTORY:   Social History[1]    FAMILY HISTORY:  Family History   Problem Relation Name Age of Onset    Heart disease Mother Sylvie Ritchie     Cancer Son Pancreatic         pancreatic     Diabetes Maternal Uncle      Kidney disease Neg Hx      Stroke Neg Hx         REVIEW OF  SYSTEMS:  ROS  10 pt ros otherwise negative    PHYSICAL EXAM:  Vitals:    25 1016   BP: (!) 161/82   Pulse: 92      Physical Exam      Vss  Gen nad alert oriented  Cv rrr  Lung ctab  Palpable radial pulse  B/l venous congestion in calf/feet    VASCULAR LAB STUDIES:  None today    ASSESSMENT AND PLAN:  Hyperlipidemia  Medical management    Essential hypertension  Medical management    Venous insufficiency  Recommend compression garments be worn daily. Can remove at night. Can return to see me if he has persistent symptoms. If he does we may want to obtain tilt study to check for superficial venous reflux. No surgery is indicated at this time.      Gilmer was seen today for consult.    Diagnoses and all orders for this visit:    Venous insufficiency    Pure hypercholesterolemia    Essential hypertension        No follow-ups on file.        Andres Fatima  [facility]  Vascular Surgery  [phone] (Clinic Number)         [1]   Social History  Tobacco Use    Smoking status: Former     Current packs/day: 0.00     Average packs/day: 1 pack/day for 25.0 years (25.0 ttl pk-yrs)     Types: Cigarettes, Pipe     Start date: 1965     Quit date: 1990     Years since quittin.4    Smokeless tobacco: Never   Substance Use Topics    Alcohol use: No    Drug use: No

## 2025-06-08 ENCOUNTER — CLINICAL SUPPORT (OUTPATIENT)
Dept: CARDIOLOGY | Facility: HOSPITAL | Age: OVER 89
End: 2025-06-08
Attending: INTERNAL MEDICINE
Payer: MEDICARE

## 2025-06-08 ENCOUNTER — CLINICAL SUPPORT (OUTPATIENT)
Dept: CARDIOLOGY | Facility: HOSPITAL | Age: OVER 89
End: 2025-06-08
Payer: MEDICARE

## 2025-06-08 DIAGNOSIS — I49.5 SICK SINUS SYNDROME: ICD-10-CM

## 2025-06-08 DIAGNOSIS — I48.0 PAROXYSMAL ATRIAL FIBRILLATION: ICD-10-CM

## 2025-06-08 DIAGNOSIS — Z95.0 PRESENCE OF CARDIAC PACEMAKER: ICD-10-CM

## 2025-06-08 PROCEDURE — 93294 REM INTERROG EVL PM/LDLS PM: CPT | Mod: HCNC,S$GLB,, | Performed by: INTERNAL MEDICINE

## 2025-06-08 PROCEDURE — 93296 REM INTERROG EVL PM/IDS: CPT | Mod: HCNC | Performed by: INTERNAL MEDICINE

## 2025-06-11 LAB
OHS CV DC REMOTE DEVICE TYPE: NORMAL
OHS CV ICD SHOCK: NO
OHS CV RV PACING PERCENT: 41 %

## 2025-07-03 NOTE — Clinical Note
The lead thresholds were tested and was secured in place. A new lead was attached to the right atrium. 214.9

## (undated) DEVICE — DRESSING AQUACEL AG ADV 3.5X6

## (undated) DEVICE — SYR BULB 60CC IRRIGATION

## (undated) DEVICE — GUIDE WIRE J-TIP 260CM .025

## (undated) DEVICE — PAD GROUNDING DISPER ELECTRODE

## (undated) DEVICE — PAD DEFIB CADENCE ADULT R2

## (undated) DEVICE — SUT SILK 2-0 PS 18IN BLACK

## (undated) DEVICE — CABLE PACING ALLGTR CLIP 12

## (undated) DEVICE — SAFESHEATH II 8FR 13CM

## (undated) DEVICE — DRESSING ADH COVADERM PLUS 4X4

## (undated) DEVICE — SCALPEL SZ 10 RETRACTABLE

## (undated) DEVICE — SUT 3-0 VICRYL / SH (J416)

## (undated) DEVICE — CAUTERY BOVIE PENCIL

## (undated) DEVICE — GUIDEWIRE WHOLEY HI TORQ 175CM

## (undated) DEVICE — ADHESIVE DERMABOND ADVANCED

## (undated) DEVICE — NDL PERCUTANEOUS 21G 7CM VASC

## (undated) DEVICE — BLADE PLASMA WIDE SPATULA TIP

## (undated) DEVICE — OMNIPAQUE 300MG 150ML VIAL

## (undated) DEVICE — PACK PACER PERMANENT

## (undated) DEVICE — SUT 4/0 18IN COATED VICRYL